# Patient Record
Sex: MALE | Race: WHITE | NOT HISPANIC OR LATINO | Employment: OTHER | URBAN - METROPOLITAN AREA
[De-identification: names, ages, dates, MRNs, and addresses within clinical notes are randomized per-mention and may not be internally consistent; named-entity substitution may affect disease eponyms.]

---

## 2023-02-07 ENCOUNTER — HOSPITAL ENCOUNTER (INPATIENT)
Facility: HOSPITAL | Age: 63
LOS: 7 days | Discharge: HOME | DRG: 663 | End: 2023-02-14
Attending: EMERGENCY MEDICINE | Admitting: PEDIATRICS
Payer: COMMERCIAL

## 2023-02-07 ENCOUNTER — APPOINTMENT (OUTPATIENT)
Dept: GENERAL RADIOLOGY | Facility: HOSPITAL | Age: 63
DRG: 663 | End: 2023-02-07
Payer: COMMERCIAL

## 2023-02-07 ENCOUNTER — APPOINTMENT (OUTPATIENT)
Dept: CT IMAGING | Facility: HOSPITAL | Age: 63
DRG: 663 | End: 2023-02-07
Payer: COMMERCIAL

## 2023-02-07 DIAGNOSIS — N18.4 ACUTE RENAL FAILURE SUPERIMPOSED ON STAGE 4 CHRONIC KIDNEY DISEASE, UNSPECIFIED ACUTE RENAL FAILURE TYPE: Primary | ICD-10-CM

## 2023-02-07 DIAGNOSIS — E86.0 DEHYDRATION: ICD-10-CM

## 2023-02-07 DIAGNOSIS — E87.20 METABOLIC ACIDOSIS: ICD-10-CM

## 2023-02-07 DIAGNOSIS — N17.9 ACUTE RENAL FAILURE SUPERIMPOSED ON STAGE 4 CHRONIC KIDNEY DISEASE, UNSPECIFIED ACUTE RENAL FAILURE TYPE: Primary | ICD-10-CM

## 2023-02-07 DIAGNOSIS — E83.41 HYPERMAGNESEMIA: ICD-10-CM

## 2023-02-07 DIAGNOSIS — N13.9 OBSTRUCTIVE UROPATHY: ICD-10-CM

## 2023-02-07 DIAGNOSIS — E87.5 HYPERKALEMIA: ICD-10-CM

## 2023-02-07 DIAGNOSIS — E83.39 HYPERPHOSPHATEMIA: ICD-10-CM

## 2023-02-07 PROBLEM — R79.89 ELEVATED TROPONIN: Status: ACTIVE | Noted: 2023-02-07

## 2023-02-07 PROBLEM — R74.8 ELEVATED LIPASE: Status: ACTIVE | Noted: 2023-02-07

## 2023-02-07 PROBLEM — R77.8 ELEVATED TROPONIN: Status: ACTIVE | Noted: 2023-02-07

## 2023-02-07 LAB
ALBUMIN SERPL-MCNC: 4.2 G/DL (ref 3.5–5.2)
ALBUMIN/GLOB SERPL: 0.9 G/DL
ALP SERPL-CCNC: 103 U/L (ref 39–117)
ALT SERPL W P-5'-P-CCNC: 19 U/L (ref 1–41)
ANION GAP SERPL CALCULATED.3IONS-SCNC: 22 MMOL/L (ref 5–15)
APTT PPP: 38.1 SECONDS (ref 60–90)
AST SERPL-CCNC: 12 U/L (ref 1–40)
BASOPHILS # BLD AUTO: 0.07 10*3/MM3 (ref 0–0.2)
BASOPHILS NFR BLD AUTO: 0.6 % (ref 0–1.5)
BILIRUB SERPL-MCNC: 0.3 MG/DL (ref 0–1.2)
BUN SERPL-MCNC: 164 MG/DL (ref 8–23)
BUN/CREAT SERPL: 12.4 (ref 7–25)
CALCIUM SPEC-SCNC: 8.5 MG/DL (ref 8.6–10.5)
CHLORIDE SERPL-SCNC: 99 MMOL/L (ref 98–107)
CO2 SERPL-SCNC: 13 MMOL/L (ref 22–29)
CREAT SERPL-MCNC: 13.21 MG/DL (ref 0.76–1.27)
DEPRECATED RDW RBC AUTO: 44.3 FL (ref 37–54)
EGFRCR SERPLBLD CKD-EPI 2021: 3.8 ML/MIN/1.73
EOSINOPHIL # BLD AUTO: 1.96 10*3/MM3 (ref 0–0.4)
EOSINOPHIL NFR BLD AUTO: 16.2 % (ref 0.3–6.2)
ERYTHROCYTE [DISTWIDTH] IN BLOOD BY AUTOMATED COUNT: 13.5 % (ref 12.3–15.4)
GEN 5 2HR TROPONIN T REFLEX: 57 NG/L
GLOBULIN UR ELPH-MCNC: 4.5 GM/DL
GLUCOSE BLDC GLUCOMTR-MCNC: 101 MG/DL (ref 70–130)
GLUCOSE SERPL-MCNC: 169 MG/DL (ref 65–99)
HCT VFR BLD AUTO: 23.7 % (ref 37.5–51)
HGB BLD-MCNC: 7.8 G/DL (ref 13–17.7)
HOLD SPECIMEN: NORMAL
IMM GRANULOCYTES # BLD AUTO: 0.05 10*3/MM3 (ref 0–0.05)
IMM GRANULOCYTES NFR BLD AUTO: 0.4 % (ref 0–0.5)
INR PPP: 1.17 (ref 0.84–1.13)
LIPASE SERPL-CCNC: 124 U/L (ref 13–60)
LYMPHOCYTES # BLD AUTO: 1.09 10*3/MM3 (ref 0.7–3.1)
LYMPHOCYTES NFR BLD AUTO: 9 % (ref 19.6–45.3)
MAGNESIUM SERPL-MCNC: 3.3 MG/DL (ref 1.6–2.4)
MCH RBC QN AUTO: 29.4 PG (ref 26.6–33)
MCHC RBC AUTO-ENTMCNC: 32.9 G/DL (ref 31.5–35.7)
MCV RBC AUTO: 89.4 FL (ref 79–97)
MONOCYTES # BLD AUTO: 0.51 10*3/MM3 (ref 0.1–0.9)
MONOCYTES NFR BLD AUTO: 4.2 % (ref 5–12)
NEUTROPHILS NFR BLD AUTO: 69.6 % (ref 42.7–76)
NEUTROPHILS NFR BLD AUTO: 8.4 10*3/MM3 (ref 1.7–7)
NRBC BLD AUTO-RTO: 0 /100 WBC (ref 0–0.2)
NT-PROBNP SERPL-MCNC: 1316 PG/ML (ref 0–900)
PHOSPHATE SERPL-MCNC: 6.8 MG/DL (ref 2.5–4.5)
PLATELET # BLD AUTO: 277 10*3/MM3 (ref 140–450)
PMV BLD AUTO: 8.7 FL (ref 6–12)
POTASSIUM SERPL-SCNC: 5.3 MMOL/L (ref 3.5–5.2)
PROT SERPL-MCNC: 8.7 G/DL (ref 6–8.5)
PROTHROMBIN TIME: 14.8 SECONDS (ref 11.4–14.4)
RBC # BLD AUTO: 2.65 10*6/MM3 (ref 4.14–5.8)
SODIUM SERPL-SCNC: 134 MMOL/L (ref 136–145)
TROPONIN T DELTA: -3 NG/L
TROPONIN T SERPL HS-MCNC: 60 NG/L
WBC NRBC COR # BLD: 12.08 10*3/MM3 (ref 3.4–10.8)
WHOLE BLOOD HOLD COAG: NORMAL
WHOLE BLOOD HOLD SPECIMEN: NORMAL

## 2023-02-07 PROCEDURE — 82962 GLUCOSE BLOOD TEST: CPT

## 2023-02-07 PROCEDURE — 83735 ASSAY OF MAGNESIUM: CPT | Performed by: EMERGENCY MEDICINE

## 2023-02-07 PROCEDURE — 93005 ELECTROCARDIOGRAM TRACING: CPT | Performed by: EMERGENCY MEDICINE

## 2023-02-07 PROCEDURE — 84484 ASSAY OF TROPONIN QUANT: CPT | Performed by: EMERGENCY MEDICINE

## 2023-02-07 PROCEDURE — 84484 ASSAY OF TROPONIN QUANT: CPT

## 2023-02-07 PROCEDURE — 83690 ASSAY OF LIPASE: CPT

## 2023-02-07 PROCEDURE — 84100 ASSAY OF PHOSPHORUS: CPT | Performed by: EMERGENCY MEDICINE

## 2023-02-07 PROCEDURE — 85730 THROMBOPLASTIN TIME PARTIAL: CPT | Performed by: EMERGENCY MEDICINE

## 2023-02-07 PROCEDURE — 99285 EMERGENCY DEPT VISIT HI MDM: CPT

## 2023-02-07 PROCEDURE — 36415 COLL VENOUS BLD VENIPUNCTURE: CPT

## 2023-02-07 PROCEDURE — 25010000002 MORPHINE PER 10 MG: Performed by: INTERNAL MEDICINE

## 2023-02-07 PROCEDURE — 25010000002 HEPARIN (PORCINE) PER 1000 UNITS: Performed by: INTERNAL MEDICINE

## 2023-02-07 PROCEDURE — 99223 1ST HOSP IP/OBS HIGH 75: CPT | Performed by: INTERNAL MEDICINE

## 2023-02-07 PROCEDURE — 74176 CT ABD & PELVIS W/O CONTRAST: CPT

## 2023-02-07 PROCEDURE — 83880 ASSAY OF NATRIURETIC PEPTIDE: CPT

## 2023-02-07 PROCEDURE — 71045 X-RAY EXAM CHEST 1 VIEW: CPT

## 2023-02-07 PROCEDURE — 93005 ELECTROCARDIOGRAM TRACING: CPT

## 2023-02-07 PROCEDURE — 85610 PROTHROMBIN TIME: CPT | Performed by: EMERGENCY MEDICINE

## 2023-02-07 PROCEDURE — 80053 COMPREHEN METABOLIC PANEL: CPT | Performed by: EMERGENCY MEDICINE

## 2023-02-07 PROCEDURE — 85025 COMPLETE CBC W/AUTO DIFF WBC: CPT | Performed by: EMERGENCY MEDICINE

## 2023-02-07 RX ORDER — SODIUM CHLORIDE 0.9 % (FLUSH) 0.9 %
10 SYRINGE (ML) INJECTION AS NEEDED
Status: DISCONTINUED | OUTPATIENT
Start: 2023-02-07 | End: 2023-02-14 | Stop reason: HOSPADM

## 2023-02-07 RX ORDER — SODIUM CHLORIDE 0.9 % (FLUSH) 0.9 %
10 SYRINGE (ML) INJECTION EVERY 12 HOURS SCHEDULED
Status: DISCONTINUED | OUTPATIENT
Start: 2023-02-07 | End: 2023-02-14 | Stop reason: HOSPADM

## 2023-02-07 RX ORDER — BISACODYL 5 MG/1
5 TABLET, DELAYED RELEASE ORAL DAILY PRN
Status: DISCONTINUED | OUTPATIENT
Start: 2023-02-07 | End: 2023-02-14 | Stop reason: HOSPADM

## 2023-02-07 RX ORDER — ACETAMINOPHEN 650 MG/1
650 SUPPOSITORY RECTAL EVERY 4 HOURS PRN
Status: DISCONTINUED | OUTPATIENT
Start: 2023-02-07 | End: 2023-02-14 | Stop reason: HOSPADM

## 2023-02-07 RX ORDER — SODIUM BICARBONATE 650 MG/1
650 TABLET ORAL 3 TIMES DAILY
Status: DISCONTINUED | OUTPATIENT
Start: 2023-02-07 | End: 2023-02-14 | Stop reason: HOSPADM

## 2023-02-07 RX ORDER — ACETAMINOPHEN 325 MG/1
650 TABLET ORAL EVERY 4 HOURS PRN
Status: DISCONTINUED | OUTPATIENT
Start: 2023-02-07 | End: 2023-02-14 | Stop reason: HOSPADM

## 2023-02-07 RX ORDER — MORPHINE SULFATE 2 MG/ML
2 INJECTION, SOLUTION INTRAMUSCULAR; INTRAVENOUS
Status: DISCONTINUED | OUTPATIENT
Start: 2023-02-07 | End: 2023-02-13

## 2023-02-07 RX ORDER — ONDANSETRON 4 MG/1
4 TABLET, FILM COATED ORAL EVERY 6 HOURS PRN
Status: DISCONTINUED | OUTPATIENT
Start: 2023-02-07 | End: 2023-02-14 | Stop reason: HOSPADM

## 2023-02-07 RX ORDER — ASPIRIN 81 MG/1
324 TABLET, CHEWABLE ORAL ONCE
Status: COMPLETED | OUTPATIENT
Start: 2023-02-07 | End: 2023-02-07

## 2023-02-07 RX ORDER — NALOXONE HCL 0.4 MG/ML
0.4 VIAL (ML) INJECTION
Status: DISCONTINUED | OUTPATIENT
Start: 2023-02-07 | End: 2023-02-13

## 2023-02-07 RX ORDER — CHOLECALCIFEROL (VITAMIN D3) 125 MCG
5 CAPSULE ORAL NIGHTLY PRN
Status: DISCONTINUED | OUTPATIENT
Start: 2023-02-07 | End: 2023-02-14 | Stop reason: HOSPADM

## 2023-02-07 RX ORDER — NICOTINE POLACRILEX 4 MG
15 LOZENGE BUCCAL
Status: DISCONTINUED | OUTPATIENT
Start: 2023-02-07 | End: 2023-02-14 | Stop reason: HOSPADM

## 2023-02-07 RX ORDER — HEPARIN SODIUM 5000 [USP'U]/ML
5000 INJECTION, SOLUTION INTRAVENOUS; SUBCUTANEOUS EVERY 8 HOURS SCHEDULED
Status: DISCONTINUED | OUTPATIENT
Start: 2023-02-07 | End: 2023-02-08

## 2023-02-07 RX ORDER — SODIUM CHLORIDE 9 MG/ML
40 INJECTION, SOLUTION INTRAVENOUS AS NEEDED
Status: DISCONTINUED | OUTPATIENT
Start: 2023-02-07 | End: 2023-02-14 | Stop reason: HOSPADM

## 2023-02-07 RX ORDER — IBUPROFEN 600 MG/1
1 TABLET ORAL
Status: DISCONTINUED | OUTPATIENT
Start: 2023-02-07 | End: 2023-02-14 | Stop reason: HOSPADM

## 2023-02-07 RX ORDER — ACETAMINOPHEN 160 MG/5ML
650 SOLUTION ORAL EVERY 4 HOURS PRN
Status: DISCONTINUED | OUTPATIENT
Start: 2023-02-07 | End: 2023-02-14 | Stop reason: HOSPADM

## 2023-02-07 RX ORDER — AMOXICILLIN 250 MG
2 CAPSULE ORAL 2 TIMES DAILY
Status: DISCONTINUED | OUTPATIENT
Start: 2023-02-07 | End: 2023-02-14 | Stop reason: HOSPADM

## 2023-02-07 RX ORDER — ONDANSETRON 2 MG/ML
4 INJECTION INTRAMUSCULAR; INTRAVENOUS EVERY 6 HOURS PRN
Status: DISCONTINUED | OUTPATIENT
Start: 2023-02-07 | End: 2023-02-14 | Stop reason: HOSPADM

## 2023-02-07 RX ORDER — POLYETHYLENE GLYCOL 3350 17 G/17G
17 POWDER, FOR SOLUTION ORAL DAILY PRN
Status: DISCONTINUED | OUTPATIENT
Start: 2023-02-07 | End: 2023-02-14 | Stop reason: HOSPADM

## 2023-02-07 RX ORDER — FOLIC ACID/VIT B COMPLEX AND C 0.8 MG
1 TABLET ORAL DAILY
Status: DISCONTINUED | OUTPATIENT
Start: 2023-02-07 | End: 2023-02-14 | Stop reason: HOSPADM

## 2023-02-07 RX ORDER — DEXTROSE MONOHYDRATE 25 G/50ML
25 INJECTION, SOLUTION INTRAVENOUS
Status: DISCONTINUED | OUTPATIENT
Start: 2023-02-07 | End: 2023-02-14 | Stop reason: HOSPADM

## 2023-02-07 RX ORDER — BISACODYL 10 MG
10 SUPPOSITORY, RECTAL RECTAL DAILY PRN
Status: DISCONTINUED | OUTPATIENT
Start: 2023-02-07 | End: 2023-02-14 | Stop reason: HOSPADM

## 2023-02-07 RX ORDER — PANTOPRAZOLE SODIUM 40 MG/1
40 TABLET, DELAYED RELEASE ORAL
Status: DISCONTINUED | OUTPATIENT
Start: 2023-02-07 | End: 2023-02-09

## 2023-02-07 RX ORDER — INSULIN LISPRO 100 [IU]/ML
0-7 INJECTION, SOLUTION INTRAVENOUS; SUBCUTANEOUS
Status: DISCONTINUED | OUTPATIENT
Start: 2023-02-07 | End: 2023-02-14 | Stop reason: HOSPADM

## 2023-02-07 RX ADMIN — HEPARIN SODIUM 5000 UNITS: 5000 INJECTION, SOLUTION INTRAVENOUS; SUBCUTANEOUS at 17:49

## 2023-02-07 RX ADMIN — HEPARIN SODIUM 5000 UNITS: 5000 INJECTION, SOLUTION INTRAVENOUS; SUBCUTANEOUS at 21:40

## 2023-02-07 RX ADMIN — MORPHINE SULFATE 2 MG: 2 INJECTION, SOLUTION INTRAMUSCULAR; INTRAVENOUS at 22:01

## 2023-02-07 RX ADMIN — SODIUM BICARBONATE 650 MG TABLET 650 MG: at 21:40

## 2023-02-07 RX ADMIN — ASPIRIN 81 MG 324 MG: 81 TABLET ORAL at 13:52

## 2023-02-07 RX ADMIN — Medication 10 ML: at 21:41

## 2023-02-07 RX ADMIN — PANTOPRAZOLE SODIUM 40 MG: 40 TABLET, DELAYED RELEASE ORAL at 17:49

## 2023-02-07 RX ADMIN — SODIUM CHLORIDE, POTASSIUM CHLORIDE, SODIUM LACTATE AND CALCIUM CHLORIDE 1000 ML: 600; 310; 30; 20 INJECTION, SOLUTION INTRAVENOUS at 13:52

## 2023-02-07 RX ADMIN — Medication 1 TABLET: at 21:40

## 2023-02-07 RX ADMIN — SODIUM BICARBONATE 150 MEQ: 84 INJECTION, SOLUTION INTRAVENOUS at 17:49

## 2023-02-07 NOTE — CASE MANAGEMENT/SOCIAL WORK
Discharge Planning Assessment  [facility]     Patient Name: Jun Garcia  MRN: [id number]  Today's Date: 2/7/2023    Admit Date: 2/7/2023    Plan: IDP   Discharge Needs Assessment     Row Name 02/07/23 1352       Living Environment    People in Home child(jennifer), adult    Current Living Arrangements home    Primary Care Provided by self    Family Caregiver if Needed child(jennifer), adult       Transition Planning    Transportation Anticipated family or friend will provide       Discharge Needs Assessment    Readmission Within the Last 30 Days no previous admission in last 30 days    Equipment Currently Used at Home none               Discharge Plan     Row Name 02/07/23 1352       Plan    Plan IDP    Plan Comments MSW met with Pt at bedside to complete IDP. Pt lives with his four children in [facility]. Pt confirmed demographics and reports no PCP. Pt has [facility] [place]. Pt independent with ADL’s; Pt has no DME, HH, or O2. Pt reports children can transport when medically ready. CM will continue to follow.    Final Discharge Disposition Code 30 - still a patient              Continued Care and Services - Admitted Since 2/7/2023    Coordination has not been started for this encounter.          Demographic Summary     Row Name 02/07/23 1350       General Information    Admission Type inpatient    Arrived From home    Referral Source admission list;emergency department    Reason for Consult discharge planning    Preferred Language English               Functional Status     Row Name 02/07/23 1350       Functional Status    Usual Activity Tolerance good    Current Activity Tolerance good       Functional Status, IADL    Medications independent    Meal Preparation independent    Housekeeping independent    Laundry independent    Shopping independent                         DEB Rebollar

## 2023-02-07 NOTE — PLAN OF CARE
Goal Outcome Evaluation:  Plan of Care Reviewed With: patient           Outcome Evaluation: Pt had caldwell catheter placed. 900ml returned after placement. Sodium bicarb gtt infusing. Pt npo.

## 2023-02-07 NOTE — H&P
[facility] Medicine Services  HISTORY AND PHYSICAL    Patient Name: Jun Garcia  : 1960  MRN: [id number]  Primary Care Physician: Provider, No Known  Date of admission: 2023      Subjective   Subjective     Chief Complaint: weakness, nausea    HPI:  Jun Garcia is a 62 y.o. male presenting with his family with worsening weakness, nausea, weight loss, epigastric pain. He has a complicated recent history and is unable to provide full story but from what I can gather in [facility] he was admitted at  2022 for obstructive uropathy and renal failure. At that time cystoscopy and urgent HD was recommended but it sounds to me based on our conversation that the patient refused both. He was discharged home and has been taking no medications since that time. Tells me today that since that time he has gradually gone downhill. He has been unable to eat hardly anything and has lost a significant amount of weight. He complains also of epigastric burning during this time. He tells me has has not followed up with anyone because he got mixed messages from his last stay and felt like he was doing fine.    Review of Systems   Gen- No fevers, chills  CV- No chest pain, palpitations  Resp- No cough, dyspnea  GI- No N/V/D    All other systems reviewed and are negative.     Personal History     PMH: CKD IV, obstructive uropathy, ? diabetes    PSH: Reviewed and noncontributory    Family History: Otherwise pertinent FHx was reviewed and unremarkable.     Social History:  No tobacco, ETOH, illicit drugs    Medications:  Available home medication information reviewed.         No Known Allergies    Objective   Objective     Vital Signs:   Temp:  [98.5 °F (36.9 °C)] 98.5 °F (36.9 °C)  Heart Rate:  [] 99  Resp:  [18] 18  BP: (103-148)/(70-84) 148/84       Physical Exam   Constitutional: Awake, alert, appears to feel poorly  Eyes: PERRLA, sclerae anicteric, no conjunctival injection  HENT:  NCAT, dry mm  Neck: Supple, no thyromegaly, no lymphadenopathy, trachea midline  Respiratory: Clear to auscultation bilaterally, nonlabored respirations   Cardiovascular: Slightly tachy, no murmur  Gastrointestinal: Positive bowel sounds, soft, nontender, nondistended  Musculoskeletal: Muscle wasting.  Psychiatric: Appropriate affect, cooperative  Neurologic: Oriented x 3, strength symmetric in all extremities, Cranial Nerves grossly intact to confrontation, speech clear  Skin: No rashes    Result Review:  I have personally reviewed the results from the time of this admission to 2/7/2023 14:00 EST and agree with these findings:  []  Laboratory list / accordion  []  Microbiology  []  Radiology  []  EKG/Telemetry   []  Cardiology/Vascular   []  Pathology  []  Old records  []  Other:  Most notable findings include:         LAB RESULTS:      Lab 02/07/23  1207   WBC 12.08*   HEMOGLOBIN 7.8*   HEMATOCRIT 23.7*   PLATELETS 277   NEUTROS ABS 8.40*   IMMATURE GRANS (ABS) 0.05   LYMPHS ABS 1.09   MONOS ABS 0.51   EOS ABS 1.96*   MCV 89.4   PROTIME 14.8*   INR 1.17*   APTT 38.1*         Lab 02/07/23  1207   SODIUM 134*   POTASSIUM 5.3*   CHLORIDE 99   CO2 13.0*   ANION GAP 22.0*   *   CREATININE 13.21*   EGFR 3.8*   GLUCOSE 169*   CALCIUM 8.5*   MAGNESIUM 3.3*   PHOSPHORUS 6.8*         Lab 02/07/23  1207   TOTAL PROTEIN 8.7*   ALBUMIN 4.2   GLOBULIN 4.5   ALT (SGPT) 19   AST (SGOT) 12   BILIRUBIN 0.3   ALK PHOS 103   LIPASE 124*         Lab 02/07/23  1207   PROBNP 1,316.0*   HSTROP T 60*                 UA    Urinalysis 11/17/22   Squamous Epithelial Cells, UA 0-5   Specific Gravity, UA 1.010   Blood, UA Large (A)   Leukocytes, UA Large (A)   RBC, UA 31-50 (A)   Bacteria, UA Negative   (A) Abnormal value       Comments are available for some flowsheets but are not being displayed.             Microbiology Results (last 10 days)     ** No results found for the last 240 hours. **          No radiology results from the  last 24 hrs        Assessment & Plan   Assessment & Plan     Active Hospital Problems    Diagnosis  POA   • **Acute renal failure superimposed on stage 4 chronic kidney disease, unspecified acute renal failure type (HCC) [N17.9, N18.4]  Yes   • Metabolic acidosis [E87.20]  Yes   • Hyperkalemia [E87.5]  Yes   • Elevated troponin [R77.8]  Yes   • Elevated lipase [R74.8]  Yes     63 y/o male with prior hx obstructive uropathy and CKD presenting with worsening FTT and ongoing renal failure.    CKD IV-V nearing ESRD  Metabolic acidosis  Hyperkalemia  --NAL has been contacted by ED, will see patient and discuss need for HD with him.  --Will start bicarb gtt for volume expansion and to correct acidosis.  --CM for HD chair if he agrees.    Hx obstructive uropathy  --CT scan from Nov indicates obstruction at level of bladder concerning for mass. Apparently he had hematuria at that time. It sounds like cystoscopy was recommended however never occurred.   --Repeat CT A/P  --Consult urology  --[place] caldwell.    Elevated troponin   --Suspect due to volume depletion in setting of his renal failure. His troponin is about level as it was previously. He denies chest pain at this time. Rec'd asa in ED. Will continue to monitor troponin and EKG.  --Echo.    Elevated lipase  --Based on history of abd burning may have some resolving pancreatitis? Will continue supportive tx with IVF, pain meds. Okay for CLD once decision made regarding HD, HD access.  --Will also treat empirically for PUD with BID PPI for now.    DVT prophylaxis:  [facility]      CODE STATUS:  Full Code  Code Status and Medical Interventions:   Ordered at: 02/07/23 1357     Code Status (Patient has no pulse and is not breathing):    CPR (Attempt to Resuscitate)     Medical Interventions (Patient has pulse or is breathing):    Full Support       Expected Discharge 2/11       Carola Henry II, DO  02/07/23

## 2023-02-08 ENCOUNTER — APPOINTMENT (OUTPATIENT)
Dept: CARDIOLOGY | Facility: HOSPITAL | Age: 63
DRG: 663 | End: 2023-02-08
Payer: COMMERCIAL

## 2023-02-08 LAB
ABO GROUP BLD: NORMAL
ABO GROUP BLD: NORMAL
ANION GAP SERPL CALCULATED.3IONS-SCNC: 20 MMOL/L (ref 5–15)
BH CV ECHO MEAS - AO MAX PG: 37.2 MMHG
BH CV ECHO MEAS - AO MEAN PG: 14 MMHG
BH CV ECHO MEAS - AO ROOT DIAM: 3.2 CM
BH CV ECHO MEAS - AO V2 MAX: 305 CM/SEC
BH CV ECHO MEAS - AO V2 VTI: 41.7 CM
BH CV ECHO MEAS - AVA(I,D): 1.81 CM2
BH CV ECHO MEAS - EDV(CUBED): 59.3 ML
BH CV ECHO MEAS - EDV(MOD-SP2): 49.7 ML
BH CV ECHO MEAS - EDV(MOD-SP4): 49.2 ML
BH CV ECHO MEAS - EF(MOD-BP): 80.6 %
BH CV ECHO MEAS - EF(MOD-SP2): 83.2 %
BH CV ECHO MEAS - EF(MOD-SP4): 78.9 %
BH CV ECHO MEAS - ESV(CUBED): 12.2 ML
BH CV ECHO MEAS - ESV(MOD-SP2): 8.3 ML
BH CV ECHO MEAS - ESV(MOD-SP4): 10.4 ML
BH CV ECHO MEAS - FS: 41 %
BH CV ECHO MEAS - IVS/LVPW: 0.86 CM
BH CV ECHO MEAS - IVSD: 1.2 CM
BH CV ECHO MEAS - LAT PEAK E' VEL: 6.7 CM/SEC
BH CV ECHO MEAS - LV MASS(C)D: 179.7 GRAMS
BH CV ECHO MEAS - LV MAX PG: 10.6 MMHG
BH CV ECHO MEAS - LV MEAN PG: 6 MMHG
BH CV ECHO MEAS - LV V1 MAX: 163 CM/SEC
BH CV ECHO MEAS - LV V1 VTI: 29.7 CM
BH CV ECHO MEAS - LVIDD: 3.9 CM
BH CV ECHO MEAS - LVIDS: 2.3 CM
BH CV ECHO MEAS - LVOT AREA: 2.5 CM2
BH CV ECHO MEAS - LVOT DIAM: 1.8 CM
BH CV ECHO MEAS - LVPWD: 1.4 CM
BH CV ECHO MEAS - MED PEAK E' VEL: 9.7 CM/SEC
BH CV ECHO MEAS - MR MAX PG: 68.9 MMHG
BH CV ECHO MEAS - MR MAX VEL: 415 CM/SEC
BH CV ECHO MEAS - MR MEAN PG: 33 MMHG
BH CV ECHO MEAS - MR MEAN VEL: 259 CM/SEC
BH CV ECHO MEAS - MR VTI: 69.6 CM
BH CV ECHO MEAS - MV A MAX VEL: 73.9 CM/SEC
BH CV ECHO MEAS - MV DEC SLOPE: 801 CM/SEC2
BH CV ECHO MEAS - MV DEC TIME: 0.12 MSEC
BH CV ECHO MEAS - MV E MAX VEL: 113 CM/SEC
BH CV ECHO MEAS - MV E/A: 1.53
BH CV ECHO MEAS - MV P1/2T: 49.4 MSEC
BH CV ECHO MEAS - MVA(P1/2T): 4.5 CM2
BH CV ECHO MEAS - PA ACC TIME: 0.08 SEC
BH CV ECHO MEAS - PA PR(ACCEL): 44.4 MMHG
BH CV ECHO MEAS - SV(LVOT): 75.6 ML
BH CV ECHO MEAS - SV(MOD-SP2): 41.4 ML
BH CV ECHO MEAS - SV(MOD-SP4): 38.8 ML
BH CV ECHO MEAS - TAPSE (>1.6): 2.5 CM
BH CV ECHO MEASUREMENTS AVERAGE E/E' RATIO: 13.78
BH CV VAS BP LEFT ARM: NORMAL MMHG
BH CV XLRA - RV BASE: 2.8 CM
BH CV XLRA - RV LENGTH: 8.1 CM
BH CV XLRA - RV MID: 1.8 CM
BH CV XLRA - TDI S': 18 CM/SEC
BLD GP AB SCN SERPL QL: NEGATIVE
BUN SERPL-MCNC: 140 MG/DL (ref 8–23)
BUN/CREAT SERPL: 11.9 (ref 7–25)
CALCIUM SPEC-SCNC: 8.2 MG/DL (ref 8.6–10.5)
CHLORIDE SERPL-SCNC: 97 MMOL/L (ref 98–107)
CO2 SERPL-SCNC: 21 MMOL/L (ref 22–29)
CREAT SERPL-MCNC: 11.76 MG/DL (ref 0.76–1.27)
DEPRECATED RDW RBC AUTO: 42.5 FL (ref 37–54)
EGFRCR SERPLBLD CKD-EPI 2021: 4.4 ML/MIN/1.73
ERYTHROCYTE [DISTWIDTH] IN BLOOD BY AUTOMATED COUNT: 13.2 % (ref 12.3–15.4)
FERRITIN SERPL-MCNC: 899.1 NG/ML (ref 30–400)
GLUCOSE BLDC GLUCOMTR-MCNC: 160 MG/DL (ref 70–130)
GLUCOSE BLDC GLUCOMTR-MCNC: 186 MG/DL (ref 70–130)
GLUCOSE BLDC GLUCOMTR-MCNC: 189 MG/DL (ref 70–130)
GLUCOSE BLDC GLUCOMTR-MCNC: 228 MG/DL (ref 70–130)
GLUCOSE SERPL-MCNC: 140 MG/DL (ref 65–99)
HAV IGM SERPL QL IA: NORMAL
HBV CORE IGM SERPL QL IA: NORMAL
HBV SURFACE AG SERPL QL IA: NORMAL
HCT VFR BLD AUTO: 18 % (ref 37.5–51)
HCV AB SER DONR QL: NORMAL
HGB BLD-MCNC: 5.9 G/DL (ref 13–17.7)
IRON 24H UR-MRATE: 68 MCG/DL (ref 59–158)
IRON SATN MFR SERPL: 29 % (ref 20–50)
IVRT: 109 MSEC
LEFT ATRIUM VOLUME INDEX: 24.8 ML/M2
LEFT ATRIUM VOLUME: 44.4 ML
MAXIMAL PREDICTED HEART RATE: 156 BPM
MCH RBC QN AUTO: 29.1 PG (ref 26.6–33)
MCHC RBC AUTO-ENTMCNC: 32.8 G/DL (ref 31.5–35.7)
MCV RBC AUTO: 88.7 FL (ref 79–97)
PHOSPHATE SERPL-MCNC: 6.7 MG/DL (ref 2.5–4.5)
PLATELET # BLD AUTO: 255 10*3/MM3 (ref 140–450)
PMV BLD AUTO: 9 FL (ref 6–12)
POTASSIUM SERPL-SCNC: 4.4 MMOL/L (ref 3.5–5.2)
QT INTERVAL: 338 MS
QTC INTERVAL: 461 MS
RBC # BLD AUTO: 2.03 10*6/MM3 (ref 4.14–5.8)
RH BLD: POSITIVE
RH BLD: POSITIVE
SODIUM SERPL-SCNC: 138 MMOL/L (ref 136–145)
STRESS TARGET HR: 133 BPM
T&S EXPIRATION DATE: NORMAL
TIBC SERPL-MCNC: 234 MCG/DL (ref 298–536)
TRANSFERRIN SERPL-MCNC: 157 MG/DL (ref 200–360)
WBC NRBC COR # BLD: 9.66 10*3/MM3 (ref 3.4–10.8)

## 2023-02-08 PROCEDURE — 93010 ELECTROCARDIOGRAM REPORT: CPT | Performed by: INTERNAL MEDICINE

## 2023-02-08 PROCEDURE — 82962 GLUCOSE BLOOD TEST: CPT

## 2023-02-08 PROCEDURE — 86900 BLOOD TYPING SEROLOGIC ABO: CPT | Performed by: INTERNAL MEDICINE

## 2023-02-08 PROCEDURE — 93306 TTE W/DOPPLER COMPLETE: CPT | Performed by: INTERNAL MEDICINE

## 2023-02-08 PROCEDURE — 86923 COMPATIBILITY TEST ELECTRIC: CPT

## 2023-02-08 PROCEDURE — 86901 BLOOD TYPING SEROLOGIC RH(D): CPT

## 2023-02-08 PROCEDURE — P9016 RBC LEUKOCYTES REDUCED: HCPCS

## 2023-02-08 PROCEDURE — 86900 BLOOD TYPING SEROLOGIC ABO: CPT

## 2023-02-08 PROCEDURE — 93306 TTE W/DOPPLER COMPLETE: CPT

## 2023-02-08 PROCEDURE — 80074 ACUTE HEPATITIS PANEL: CPT | Performed by: INTERNAL MEDICINE

## 2023-02-08 PROCEDURE — 36430 TRANSFUSION BLD/BLD COMPNT: CPT

## 2023-02-08 PROCEDURE — 85027 COMPLETE CBC AUTOMATED: CPT | Performed by: INTERNAL MEDICINE

## 2023-02-08 PROCEDURE — 82728 ASSAY OF FERRITIN: CPT | Performed by: INTERNAL MEDICINE

## 2023-02-08 PROCEDURE — 86850 RBC ANTIBODY SCREEN: CPT | Performed by: INTERNAL MEDICINE

## 2023-02-08 PROCEDURE — 86901 BLOOD TYPING SEROLOGIC RH(D): CPT | Performed by: INTERNAL MEDICINE

## 2023-02-08 PROCEDURE — 25010000002 ONDANSETRON PER 1 MG: Performed by: INTERNAL MEDICINE

## 2023-02-08 PROCEDURE — 63710000001 INSULIN LISPRO (HUMAN) PER 5 UNITS: Performed by: INTERNAL MEDICINE

## 2023-02-08 PROCEDURE — 83540 ASSAY OF IRON: CPT | Performed by: INTERNAL MEDICINE

## 2023-02-08 PROCEDURE — 80048 BASIC METABOLIC PNL TOTAL CA: CPT | Performed by: INTERNAL MEDICINE

## 2023-02-08 PROCEDURE — 99233 SBSQ HOSP IP/OBS HIGH 50: CPT | Performed by: INTERNAL MEDICINE

## 2023-02-08 PROCEDURE — 84100 ASSAY OF PHOSPHORUS: CPT | Performed by: INTERNAL MEDICINE

## 2023-02-08 PROCEDURE — 93005 ELECTROCARDIOGRAM TRACING: CPT | Performed by: INTERNAL MEDICINE

## 2023-02-08 PROCEDURE — 84466 ASSAY OF TRANSFERRIN: CPT | Performed by: INTERNAL MEDICINE

## 2023-02-08 PROCEDURE — 99223 1ST HOSP IP/OBS HIGH 75: CPT | Performed by: NURSE PRACTITIONER

## 2023-02-08 RX ADMIN — INSULIN LISPRO 2 UNITS: 100 INJECTION, SOLUTION INTRAVENOUS; SUBCUTANEOUS at 11:35

## 2023-02-08 RX ADMIN — INSULIN LISPRO 2 UNITS: 100 INJECTION, SOLUTION INTRAVENOUS; SUBCUTANEOUS at 16:32

## 2023-02-08 RX ADMIN — ONDANSETRON 4 MG: 2 INJECTION INTRAMUSCULAR; INTRAVENOUS at 16:32

## 2023-02-08 RX ADMIN — SODIUM BICARBONATE 150 MEQ: 84 INJECTION, SOLUTION INTRAVENOUS at 13:20

## 2023-02-08 RX ADMIN — PANTOPRAZOLE SODIUM 40 MG: 40 TABLET, DELAYED RELEASE ORAL at 16:32

## 2023-02-08 RX ADMIN — PANTOPRAZOLE SODIUM 40 MG: 40 TABLET, DELAYED RELEASE ORAL at 08:08

## 2023-02-08 RX ADMIN — Medication 10 ML: at 20:35

## 2023-02-08 RX ADMIN — Medication 1 TABLET: at 08:08

## 2023-02-08 RX ADMIN — INSULIN LISPRO 2 UNITS: 100 INJECTION, SOLUTION INTRAVENOUS; SUBCUTANEOUS at 08:08

## 2023-02-08 RX ADMIN — SODIUM BICARBONATE 650 MG TABLET 650 MG: at 16:32

## 2023-02-08 RX ADMIN — SODIUM BICARBONATE 650 MG TABLET 650 MG: at 08:08

## 2023-02-08 RX ADMIN — SODIUM BICARBONATE 650 MG TABLET 650 MG: at 20:34

## 2023-02-08 RX ADMIN — ONDANSETRON 4 MG: 2 INJECTION INTRAMUSCULAR; INTRAVENOUS at 09:33

## 2023-02-08 NOTE — PROGRESS NOTES
[facility] Medicine Services  PROGRESS NOTE    Patient Name: Jun Garcia  : 1960  MRN: [id number]    Date of Admission: 2023  Primary Care Physician: Provider, No Known    Subjective   Subjective     CC: Follow-up generalized weakness    HPI: No acute events overnight, however this morning patient had an episode of nausea, vomiting and being unresponsive did complain of some chest discomfort, all resolved spontaneously.    ROS:  Gen- No fevers, chills  CV- No chest pain, palpitations  Resp- No cough, dyspnea  GI- No N/V/D, abd pain      Objective   Objective     Vital Signs:   Temp:  [97.6 °F (36.4 °C)-98.5 °F (36.9 °C)] 98.3 °F (36.8 °C)  Heart Rate:  [] 106  Resp:  [16-18] 18  BP: ()/(70-96) 99/73     Physical Exam:  Constitutional: Chronically ill-appearing male, no acute distress, awake, alert  HENT: NCAT, mucous membranes moist  Respiratory: Clear to auscultation bilaterally, respiratory effort normal   Cardiovascular: RRR, no murmurs, rubs, or gallops  Gastrointestinal: Positive bowel sounds, soft, nontender, nondistended  : Whitfield catheter in place, bloody output  Musculoskeletal: No bilateral ankle edema  Psychiatric: Appropriate affect, cooperative  Neurologic: Nonfocal  Skin: No rashes    Results Reviewed:  LAB RESULTS:      Lab 23  1207   WBC 12.08*   HEMOGLOBIN 7.8*   HEMATOCRIT 23.7*   PLATELETS 277   NEUTROS ABS 8.40*   IMMATURE GRANS (ABS) 0.05   LYMPHS ABS 1.09   MONOS ABS 0.51   EOS ABS 1.96*   MCV 89.4   PROTIME 14.8*   APTT 38.1*         Lab 23  0343 23  1207   SODIUM 138 134*   POTASSIUM 4.4 5.3*   CHLORIDE 97* 99   CO2 21.0* 13.0*   ANION GAP 20.0* 22.0*   * 164*   CREATININE 11.76* 13.21*   EGFR 4.4* 3.8*   GLUCOSE 140* 169*   CALCIUM 8.2* 8.5*   MAGNESIUM  --  3.3*   PHOSPHORUS 6.7* 6.8*         Lab 23  1207   TOTAL PROTEIN 8.7*   ALBUMIN 4.2   GLOBULIN 4.5   ALT (SGPT) 19   AST (SGOT) 12   BILIRUBIN 0.3    ALK PHOS 103   LIPASE 124*         Lab 02/07/23  1418 02/07/23  1207   PROBNP  --  1,316.0*   HSTROP T 57* 60*   PROTIME  --  14.8*   INR  --  1.17*             Lab 02/08/23  0343   IRON 68   IRON SATURATION 29   TIBC 234*   TRANSFERRIN 157*   FERRITIN 899.10*         Brief Urine Lab Results  (Last result in the past 365 days)      Color   Clarity   Blood   Leuk Est   Nitrite   Protein   CREAT   Urine HCG        11/17/22 1804 Yellow   Cloudy     Large                     Microbiology Results Abnormal     None          CT Abdomen Pelvis Without Contrast    Result Date: 2/7/2023  CT ABDOMEN PELVIS WO CONTRAST Date of Exam: 2/7/2023 5:05 PM EST Indication: urinary obstruction. Comparison: None available. Technique: Axial CT images were obtained of the abdomen and pelvis without the administration of contrast. Reconstructed coronal and sagittal images were also obtained. Automated exposure control and iterative construction methods were used. Findings: No suspicious infiltrate is seen in the lung bases. No pleural effusion. There is a trace amount of pericardial fluid. Heart size appears within normal limits. Large calcified gallstone is seen in the gallbladder lumen. No definite pericholecystic inflammation or biliary ductal dilatation. Small cyst is seen in the posterior-inferior right hepatic lobe. No splenomegaly. No suspicious adrenal lesion is seen. No acute pancreatic abnormality is identified. There is atherosclerosis of the aorta without definite aneurysm. No significant abdominal adenopathy is seen. There is moderate to severe bilateral hydronephrosis. There is bilateral renal cortical thinning. No significant renal calculi are seen. There is bilateral ureteral dilatation to the urinary bladder. No obstructing calculus or other ureteral abnormality is seen. The bladder is prominently distended. The bladder wall appears mildly thickened. There appears to be enlargement of the prostate with irregular  protrusion into the inferior urinary bladder. No definite inflammatory changes are seen surrounding the bladder on this exam. The seminal vesicles appear symmetric and grossly normal in size. No definite urethral calculus is seen. Overall findings are suspicious for bladder outlet obstruction due to prostate enlargement with moderate to severe hydronephrosis. No acute gastric abnormality is seen. Small bowel loops appear nondilated. The appendix appears within normal limits. No definite lytic or aggressive osseous lesion is seen. There are degenerative changes in the lumbar spine. There is mild to moderate bilateral hip arthritis. Small sclerotic foci in the proximal femurs and at L2 are nonspecific but may represent bone islands.     Impression: Impression: 1.Moderate to severe bilateral hydronephrosis with ureteral dilatation and prominent bladder distention. The prostate appears enlarged with irregular protrusion into the urinary bladder. Findings are suspicious for bladder outlet obstruction due to the prostate. Recommend urology consultation and catheter placement. 2.Prostate enlargement is indeterminate and could be related to benign hypertrophy or malignancy. 3.Large calcified gallstone without CT findings of acute cholecystitis. 4.Trace pericardial effusion. 5.Small sclerotic foci in the proximal femurs and at L2 are indeterminate and could be benign bone islands, but sclerotic metastases, which are commonly seen with prostate malignancy, cannot be excluded, given the above findings. Electronically Signed: Chaz Rodriguez  2/7/2023 5:51 PM EST  Workstation ID: [id number]    XR Chest 1 View    Result Date: 2/7/2023  XR CHEST 1 VW Date of Exam: 2/7/2023 2:04 PM EST Indication: Chest Pain Triage Protocol. Comparison: None available. Findings: Heart size and pulmonary vasculature are within normal limits. Lungs clear. Costophrenic angles sharp     Impression: Impression: No active cardiopulmonary disease  Electronically Signed: Martin Marinelli  2/7/2023 2:38 PM EST  Workstation ID: [id number]          I have reviewed the medications:  Scheduled Meds:b complex-vitamin c-folic acid, 1 tablet, Oral, Daily  epoetin sloan/sloan-epbx, 20,000 Units, Subcutaneous, Once per day on Mon Wed Fri  heparin (porcine), 5,000 Units, Subcutaneous, Q8H  insulin lispro, 0-7 Units, Subcutaneous, TID AC  pantoprazole, 40 mg, Oral, BID AC  senna-docusate sodium, 2 tablet, Oral, BID  sodium bicarbonate, 650 mg, Oral, TID  sodium chloride, 10 mL, Intravenous, Q12H      Continuous Infusions:sodium bicarbonate, 150 mEq, Last Rate: 150 mEq (02/07/23 1749)      PRN Meds:.•  acetaminophen **OR** acetaminophen **OR** acetaminophen  •  senna-docusate sodium **AND** polyethylene glycol **AND** bisacodyl **AND** bisacodyl  •  dextrose  •  dextrose  •  glucagon (human recombinant)  •  melatonin  •  Morphine **AND** naloxone  •  ondansetron **OR** ondansetron  •  sodium chloride  •  sodium chloride  •  sodium chloride    Assessment & Plan   Assessment & Plan     Active Hospital Problems    Diagnosis  POA   • **Acute renal failure superimposed on stage 4 chronic kidney disease, unspecified acute renal failure type (HCC) [N17.9, N18.4]  Yes   • Metabolic acidosis [E87.20]  Yes   • Hyperkalemia [E87.5]  Yes   • Elevated troponin [R77.8]  Yes   • Elevated lipase [R74.8]  Yes      Resolved Hospital Problems   No resolved problems to display.      Brief Hospital Course to date:  63 y/o male with prior hx obstructive uropathy and CKD presenting with worsening FTT and ongoing renal failure.     CKD IV-V nearing ESRD  Metabolic acidosis, improving  Hyperkalemia, resolved  -Nephrology following, patient needs HD, was previously reluctant, but after further discussion with him he is now agreeable, will let renal know.  -Plan for tunneled catheter placement for initiation of HD  -Continue sodium bicarb  -Follow-up repeat renal function panel    Obstructive uropathy  -CT  abdomen pelvis continues to show moderate to severe bilateral hydronephrosis, enlarged prostate with irregular protrusion into the urinary bladder (same finding seen on CT done in November at )  -Urology has been consulted for possible cystoscopy  -Whitfield catheter in place     Anemia  -Likely anemia of chronic disease, follow-up repeat H&H as patient is now having gross hematuria    Elevated troponin   -Suspect this is secondary to volume depletion  -Patient is without any chest pain troponin has flattened   -EKG is unremarkable, follow-up echo elevated troponin      Elevated lipase  -Suspected to be likely secondary to resolving pancreatitis  -Continue supportive therapy with IV fluids and pain meds, diet as tolerated  -Continue twice daily PPI for now  for possible PUD     Expected Discharge Location and Transportation: home  Expected Discharge   Expected Discharge Date and Time     Expected Discharge Date Expected Discharge Time    Feb 10, 2023            DVT prophylaxis:  Medical DVT prophylaxis orders are present.     AM-PAC 6 Clicks Score (PT): 19 (02/07/23 9975)    CODE STATUS:   Code Status and Medical Interventions:   Ordered at: 02/07/23 6853     Code Status (Patient has no pulse and is not breathing):    CPR (Attempt to Resuscitate)     Medical Interventions (Patient has pulse or is breathing):    Full Support       Mirian Walsh MD  02/08/23

## 2023-02-08 NOTE — CASE MANAGEMENT/SOCIAL WORK
Continued Stay Note   [facility]     Patient Name: Jun Garcia  MRN: [id number]  Today's Date: 2/8/2023    Admit Date: 2/7/2023    Plan: Outpt HD   Discharge Plan     Row Name 02/08/23 1146       Plan    Plan Outpt HD    Patient/Family in Agreement with Plan yes    Plan Comments I spoke with the pt to discuss outpt HD clinic options. He could not offer what his preference would be. I spoke with his daughter Althea. She lives with him. She states they have no clinic choice. I faxed a new referral to [facility] at [phone]. Althea states family can transport the pt to from from outpt HD after DC if needed. The pt drives.    Final Discharge Disposition Code 01 - home or self-care               Discharge Codes    No documentation.               Expected Discharge Date and Time     Expected Discharge Date Expected Discharge Time    Feb 10, 2023             Milka Vazquez RN

## 2023-02-08 NOTE — CONSULTS
[facility]   HISTORY AND PHYSICAL    Patient Name: Jun Garcia  : 1960  MRN: [id number]  Primary Care Physician:  Art, No Known  Date of admission: 2023    Subjective   Subjective     Chief Complaint: Weakness    HPI:    Jun Garcia is a 62 y.o. male who presented to [facility] with worsening weakness, nausea, and weight loss.     Per chart review, he was admitted to [facility] in  with renal failure and bladder outlet obstruction. It was recommended that he undergo Cystoscopy and dialysis. Per chart review, it seems he declined this. He reports a caldwell catheter was placed during his admission at .     CT AP wo 23; Moderate/severe bilateral hydroureteronephrosis. Bilateral renal cortical thinning. The bladder is distended. The prostate is enlarged.      Labs upon admission remarkable for Cr 13.21, HCT 23.7. No UA results.   HCT this AM; 18.0. 2 units of PRBC ordered.     A caldwell catheter was placed upon admission with initial clear/yellow urine output. Caldwell catheter is now draining grossly bloody urine. He reports he was voiding fine at home without difficulty. He does not have a Urologist. He denies hx of smoking or family hx of bladder or kidney cancer.     Review of Systems   All systems were reviewed and negative except for: Gross hematuria    Personal History     Past Medical History:   Diagnosis Date   • Diabetes mellitus (HCC)    • Elevated cholesterol    • GERD (gastroesophageal reflux disease)    • Hyperlipidemia    • Hypertension    • Stroke (HCC)        History reviewed. No pertinent surgical history.    Family History: Family history is unknown by patient. Otherwise pertinent FHx was reviewed and not pertinent to current issue.    Social History:  reports that he has never smoked. He has never been exposed to tobacco smoke. He has never used smokeless tobacco. He reports that he does not drink alcohol and does not use drugs.    Home  Medications:         Allergies:  No Known Allergies    Objective   Objective     Vitals:   Temp:  [97.6 °F (36.4 °C)-98.5 °F (36.9 °C)] 98.3 °F (36.8 °C)  Heart Rate:  [] 106  Resp:  [16-18] 18  BP: ()/(70-96) 99/73  Physical Exam    Constitutional: Awake in bed, alert    Eyes: PERRLA, sclerae anicteric, no conjunctival injection   HENT: Normocephalic, atraumatic, mucous membranes moist   Neck: Supple, trachea midline   Respiratory:Equal chest rise, non-labored respirations    Cardiovascular: RRR   Gastrointestinal: Soft   Musculoskeletal: No bilateral ankle edema, no clubbing or cyanosis to extremities   Genitourinary: Uncircumcised, caldwell catheter with grossly bloody urine output   Psychiatric: Appropriate affect, cooperative   Neurologic: Oriented x 3, Cranial Nerves grossly intact, speech clear   Skin: No rashes     Result Review    Result Review:  I have personally reviewed the results from the time of this admission to 2/8/2023 08:35 EST and agree with these findings:  [x]  Laboratory  []  Microbiology  [x]  Radiology  []  EKG/Telemetry   []  Cardiology/Vascular   []  Pathology  []  Old records  []  Other:     Most notable findings include: Cr 13.21    Assessment & Plan   Assessment / Plan     Brief Patient Summary:  Jun Garcia is a 62 y.o. male who presented to [facility] with weakness and weight loss found to have moderate/severe bilateral hydroureteronephrosis and distended urinary bladder as well as renal failure.    Active Hospital Problems:  Active Hospital Problems    Diagnosis    • **Acute renal failure superimposed on stage 4 chronic kidney disease, unspecified acute renal failure type (HCC)    • Metabolic acidosis    • Hyperkalemia    • Elevated troponin    • Elevated lipase      I removed current indwelling caldwell catheter.   At the bedside, using sterile technique I was able to pass a 22 fr 3 way caldwell catheter with minimal resistance met. There was immediate return of grossly  bloody/maroon colored urine output. The caldwell catheter was secured with 30 cc normal saline and connected to gravity bag drainage. I irrigated caldwell catheter with 200 cc sterile water with return of two medium sized clots. The caldwell catheter was connected to continuous bladder irrigation and CBI was initiated at fast rate, urine output began to clear to pink.     Plan:  -Run CBI at rate to keep urine output light pink to clear  -Perform manual bladder irrigation as needed to remove intravesical clots  -Trend H&H; transfuse PRN  -Will need outpatient Cystoscopy at discharge   will follow, please call if any questions  D/w Dr. Damico      DVT prophylaxis:  Medical DVT prophylaxis orders are present.    CODE STATUS:    Code Status (Patient has no pulse and is not breathing): CPR (Attempt to Resuscitate)  Medical Interventions (Patient has pulse or is breathing): Full Support    Admission Status:  I believe this patient meets inpatient status.    Electronically signed by FRANKY Krause, 02/08/23, 8:35 AM EST.

## 2023-02-08 NOTE — CONSULTS
NAL Consult Note    Jun Garcia  1960  [id number]    Date of Admit:  2/7/2023    Date of Consult: 2/7/2023        Requesting Provider: No ref. provider found  Evaluating Physician: Zion Hardin MD        Reason for Consultation:  STAGE 5 CKD WITH UREMIA.   History of present illness:    Patient is a 62 y.o.  Yr old male WITH H/O STAGE 5 CKD SECONDARY TO OBSTRUCTIVE NEPHROPATHY ( SEEN AT [facility] 11/2022 AND TOLD THE HE NEEDED TO START DIALYSIS BUT THE PATIENT REFUSED ), HTN, DM, HLP, NONCOMPLIANCE WITH MEDS ADMITTED WITH SEVERE RENAL FAILURE (  WITH CREAT 13.2 ) AND SYMPTOMS OF UREMIA. PATIENT NOW WANTS TO START DIALYSIS ( HE MAY BE INTERESTED IN PD THAT HE LEARNED ABOUT AT  ).    Past Medical History:   Diagnosis Date   • Diabetes mellitus (HCC)    • Elevated cholesterol    • GERD (gastroesophageal reflux disease)    • Hyperlipidemia    • Hypertension    • Stroke (HCC)        History reviewed. No pertinent surgical history.    Social History     Socioeconomic History   • Marital status:    Tobacco Use   • Smoking status: Never     Passive exposure: Never   • Smokeless tobacco: Never   Vaping Use   • Vaping Use: Never used   Substance and Sexual Activity   • Alcohol use: Never   • Drug use: Never   • Sexual activity: Defer       Family history is unknown by patient.    No Known Allergies    Medication:    Current Facility-Administered Medications:   •  acetaminophen (TYLENOL) tablet 650 mg, 650 mg, Oral, Q4H PRN **OR** acetaminophen (TYLENOL) 160 MG/5ML solution 650 mg, 650 mg, Oral, Q4H PRN **OR** acetaminophen (TYLENOL) suppository 650 mg, 650 mg, Rectal, Q4H PRN, Carola Henry II, DO  •  b complex-vitamin c-folic acid (NEPHRO-LASHON) tablet 1 tablet, 1 tablet, Oral, Daily, Zion Hardin MD  •  sennosides-docusate (PERICOLACE) 8.6-50 MG per tablet 2 tablet, 2 tablet, Oral, BID **AND** polyethylene glycol (MIRALAX) packet 17 g, 17 g, Oral, Daily PRN **AND** bisacodyl  (DULCOLAX) EC tablet 5 mg, 5 mg, Oral, Daily PRN **AND** bisacodyl (DULCOLAX) suppository 10 mg, 10 mg, Rectal, Daily PRN, Carl, Carola M II, DO  •  dextrose (D50W) (25 g/50 mL) IV injection 25 g, 25 g, Intravenous, Q15 Min PRN, Carl, Carola M II, DO  •  dextrose (GLUTOSE) oral gel 15 g, 15 g, Oral, Q15 Min PRN, Carl, Carola M II, DO  •  [START ON 2/8/2023] epoetin sloan-epbx (RETACRIT) injection 20,000 Units, 20,000 Units, Subcutaneous, Once per day on Mon Wed Fri, Zion Hardin MD  •  glucagon (GLUCAGEN) injection 1 mg, 1 mg, Intramuscular, Q15 Min PRN, Carl, Carola M II, DO  •  heparin (porcine) 5000 UNIT/ML injection 5,000 Units, 5,000 Units, Subcutaneous, Q8H, Carl, Carola M II, DO, 5,000 Units at 02/07/23 1749  •  Insulin Lispro (humaLOG) injection 0-7 Units, 0-7 Units, Subcutaneous, TID AC, Carl, Carola M II, DO  •  melatonin tablet 5 mg, 5 mg, Oral, Nightly PRN, Carl, Carola M II, DO  •  morphine injection 2 mg, 2 mg, Intravenous, Q2H PRN **AND** naloxone (NARCAN) injection 0.4 mg, 0.4 mg, Intravenous, Q5 Min PRN, Carl, Carola M II, DO  •  ondansetron (ZOFRAN) tablet 4 mg, 4 mg, Oral, Q6H PRN **OR** ondansetron (ZOFRAN) injection 4 mg, 4 mg, Intravenous, Q6H PRN, Carl, Carola M II, DO  •  pantoprazole (PROTONIX) EC tablet 40 mg, 40 mg, Oral, BID AC, Carl, Carola M II, DO, 40 mg at 02/07/23 1749  •  sodium bicarbonate 8.4 % 150 mEq in dextrose (D5W) 5 % 1,000 mL infusion (greater than 75 mEq), 150 mEq, Intravenous, Continuous, Carola Henry M II, DO, Last Rate: 100 mL/hr at 02/07/23 1749, 150 mEq at 02/07/23 1749  •  sodium chloride 0.9 % flush 10 mL, 10 mL, Intravenous, PRN, Jefferson Burton MD  •  sodium chloride 0.9 % flush 10 mL, 10 mL, Intravenous, Q12H, Carola Henry II, DO  •  sodium chloride 0.9 % flush 10 mL, 10 mL, Intravenous, PRN, Carola Henry M II, DO  •  sodium chloride 0.9 % infusion 40 mL, 40 mL, Intravenous, PRN, Carola Henry II, DO    No  "medications prior to admission.       Review of Systems:    Constitutional-- No Fever, chills or sweats. + significant change in weight. + WEAKNESS/FATIGUE.   Eye-- no diplopia, no conjunctivitis  ENT-- No new hearing or throat complaints.  No epistaxis or oral sores. No odynophagia or dysphagia. No headache, photophobia or neck stiffness.  CV-- No chest pain, palpitations, soa, or edema  Resp-- No SOB/cough/Hemoptysis  GI- + nausea/vomiting. NO diarrhea.  No hematochezia, melena, or hematemesis. + ABD PAIN. + POOR APPETITE.   -- No dysuria, hematuria, or flank pain. No lower tract obstructive symptoms  Skin-- No rash. + ITCHING  Lymph- no painful or swollen lymph nodes in neck/axilla or groin.   Heme- No active bruising or bleeding; no Hx of DVT or PE.  MS-- no swelling or pain in the joints  Neuro-- No acute focal weakness or numbness in the arms or legs.  No seizures.  Psych--No anxiety or depression  Endo--No cold or heat intolerance.  No polyuria, polydipsia, or polyphagia    Full review of systems reviewed and negative otherwise for acute complaints    Physical Exam:   Vital Signs   Blood pressure 155/85, pulse 90, temperature 97.6 °F (36.4 °C), temperature source Axillary, resp. rate 18, height 170.2 cm (67\"), weight 68 kg (150 lb), SpO2 100 %.     GENERAL: Awake and alert, in no acute distress. APPEARS CHRONICALLY ILL. THIN.  HEENT: Normocephalic, atraumatic.  PER.  No conjunctivitis. No icterus. Oropharynx clear without evidence of thrush or exudate. No evidence of peridontal disease.    NECK: Supple without nuchal rigidity. No mass.  LYMPH: No painful cervical, axillary or inguinal lymphadenopathy.  HEART: RRR; No murmur, rubs, gallops. No bruits in neck, abdomen, or groins, no edema  LUNGS: Normal respiratory effort. Nonlabored. No dullness.  No crepitus.  Clear to auscultation bilaterally without wheezing, rales, rhonchi.  ABDOMEN: Soft, nontender, nondistended. Positive bowel sounds. No rebound or " guarding. NO mass or HSM.  JOINTS:  Full range of motion, no redness or tenderness.  EXT:  No cyanosis/clubbing.  :  BLADDER NOT DISTENDED. NO CVAT.   SKIN: Warm and dry without rash  NEURO: Oriented to PPT. No focal neurological deficits. Strength equal bilateral  PSYCHIATRIC: SPEAKS ENGLISH. Normal insight and judgement. Cooperative with PE    Laboratory Data  Results from last 7 days   Lab Units 02/07/23  1207   HEMOGLOBIN g/dL 7.8*   HEMATOCRIT % 23.7*     Results from last 7 days   Lab Units 02/07/23  1207   SODIUM mmol/L 134*   POTASSIUM mmol/L 5.3*   CHLORIDE mmol/L 99   CO2 mmol/L 13.0*   BUN mg/dL 164*   CREATININE mg/dL 13.21*   CALCIUM mg/dL 8.5*   PHOSPHORUS mg/dL 6.8*   MAGNESIUM mg/dL 3.3*   ALBUMIN g/dL 4.2     Results from last 7 days   Lab Units 02/07/23  1207   GLUCOSE mg/dL 169*         Results from last 7 days   Lab Units 02/07/23  1207   ALK PHOS U/L 103   BILIRUBIN mg/dL 0.3   ALT (SGPT) U/L 19   AST (SGOT) U/L 12     Estimated Creatinine Clearance: 5.4 mL/min (A) (by C-G formula based on SCr of 13.21 mg/dL (H)).    Radiology:      Impression: ESRD WITH UREMIA. H/O OBSTRUCTIVE NEPHROPATHY.  ANEMIA. DM. HTN. HLP.       PLAN: Thank you for asking us to see Jun Garcia, I recommend the following: START RRT. GET IR TO PLACE A THD CATH 2/8/22. START HD 2/8/23. ASK DR CHAVES TO SEE ABOUT PERMANENT ACCESS PLACEMENT.  TO ARRANGE OUTPATIENT HD TX'S.  CHECK IV FE. START EPO/NEPHROVITE. WILL FOLLOW. UROLOGY CONSULTED BY DR LE.        Zion Hardin MD  2/7/2023  19:14 EST

## 2023-02-08 NOTE — PROGRESS NOTES
[facility] Medicine Services  PROGRESS NOTE    Patient Name: Jun Garcia  : 1960  MRN: [id number]    Date of Admission: 2023  Primary Care Physician: Provider, No Known    Subjective   Subjective     CC:f/u generalized weakness    HPI: Patient is s/p OR for cystoscopy, see that he is feeling okay denies any pain currently.    ROS:  Gen- No fevers, chills  CV- No chest pain, palpitations  Resp- No cough, dyspnea  GI- No N/V/D, abd pain      Objective   Objective     Vital Signs:   Temp:  [97.4 °F (36.3 °C)-98.6 °F (37 °C)] 97.6 °F (36.4 °C)  Heart Rate:  [] 69  Resp:  [16-20] 16  BP: ()/(49-93) 165/81  Flow (L/min):  [2] 2     Physical Exam:  Constitutional: chronically ill and frail appearing male in no acute distress, awake, alert  HENT: NCAT, mucous membranes moist  Respiratory: Clear to auscultation bilaterally, respiratory effort normal   Cardiovascular: RRR, no murmurs, rubs, or gallops  : Whitfield catheter in place, blood-tinged output  Gastrointestinal: Positive bowel sounds, soft, nontender, nondistended  Musculoskeletal: No bilateral ankle edema  Psychiatric: Appropriate affect, cooperative  Neurologic: non-focal  Skin: No rashes    Results Reviewed:  LAB RESULTS:      Lab 23  0939 23  1207   WBC 12.49* 9.66 12.08*   HEMOGLOBIN 7.8* 5.9* 7.8*   HEMATOCRIT 22.6* 18.0* 23.7*   PLATELETS 186 255 277   NEUTROS ABS 9.08*  --  8.40*   IMMATURE GRANS (ABS) 0.06*  --  0.05   LYMPHS ABS 1.23  --  1.09   MONOS ABS 0.61  --  0.51   EOS ABS 1.46*  --  1.96*   MCV 84.0 88.7 89.4   PROTIME  --   --  14.8*   APTT  --   --  38.1*         Lab 23  0343 23  1207   SODIUM 138 138 134*   POTASSIUM 4.6 4.4 5.3*   CHLORIDE 94* 97* 99   CO2 27.0 21.0* 13.0*   ANION GAP 17.0* 20.0* 22.0*   * 140* 164*   CREATININE 12.87* 11.76* 13.21*   EGFR 3.9* 4.4* 3.8*   GLUCOSE 158* 140* 169*   CALCIUM 7.7* 8.2* 8.5*    MAGNESIUM  --   --  3.3*   PHOSPHORUS 8.4* 6.7* 6.8*         Lab 02/09/23  0449 02/07/23  1207   TOTAL PROTEIN  --  8.7*   ALBUMIN 3.1* 4.2   GLOBULIN  --  4.5   ALT (SGPT)  --  19   AST (SGOT)  --  12   BILIRUBIN  --  0.3   ALK PHOS  --  103   LIPASE  --  124*         Lab 02/07/23  1418 02/07/23  1207   PROBNP  --  1,316.0*   HSTROP T 57* 60*   PROTIME  --  14.8*   INR  --  1.17*             Lab 02/08/23  1019 02/08/23  0939 02/08/23  0343   IRON  --   --  68   IRON SATURATION  --   --  29   TIBC  --   --  234*   TRANSFERRIN  --   --  157*   FERRITIN  --   --  899.10*   ABO TYPING O   < >  --    RH TYPING Positive   < >  --    ANTIBODY SCREEN Negative  --   --     < > = values in this interval not displayed.         Brief Urine Lab Results  (Last result in the past 365 days)      Color   Clarity   Blood   Leuk Est   Nitrite   Protein   CREAT   Urine HCG        11/17/22 1804 Yellow   Cloudy     Large                     Microbiology Results Abnormal     None          Adult Transthoracic Echo Complete w/ Color, Spectral and Contrast if necessary per protocol    Result Date: 2/8/2023  •  Left ventricular systolic function is hyperdynamic (EF > 70%). •  Left ventricular wall thickness is consistent with mild concentric hypertrophy •  Normal right ventricular cavity size and systolic function noted. •  There is a moderate (1-2cm) pericardial effusion. There is no evidence of cardiac tamponade.  No chamber collapse.     CT Abdomen Pelvis Without Contrast    Result Date: 2/7/2023  CT ABDOMEN PELVIS WO CONTRAST Date of Exam: 2/7/2023 5:05 PM EST Indication: urinary obstruction. Comparison: None available. Technique: Axial CT images were obtained of the abdomen and pelvis without the administration of contrast. Reconstructed coronal and sagittal images were also obtained. Automated exposure control and iterative construction methods were used. Findings: No suspicious infiltrate is seen in the lung bases. No pleural  effusion. There is a trace amount of pericardial fluid. Heart size appears within normal limits. Large calcified gallstone is seen in the gallbladder lumen. No definite pericholecystic inflammation or biliary ductal dilatation. Small cyst is seen in the posterior-inferior right hepatic lobe. No splenomegaly. No suspicious adrenal lesion is seen. No acute pancreatic abnormality is identified. There is atherosclerosis of the aorta without definite aneurysm. No significant abdominal adenopathy is seen. There is moderate to severe bilateral hydronephrosis. There is bilateral renal cortical thinning. No significant renal calculi are seen. There is bilateral ureteral dilatation to the urinary bladder. No obstructing calculus or other ureteral abnormality is seen. The bladder is prominently distended. The bladder wall appears mildly thickened. There appears to be enlargement of the prostate with irregular protrusion into the inferior urinary bladder. No definite inflammatory changes are seen surrounding the bladder on this exam. The seminal vesicles appear symmetric and grossly normal in size. No definite urethral calculus is seen. Overall findings are suspicious for bladder outlet obstruction due to prostate enlargement with moderate to severe hydronephrosis. No acute gastric abnormality is seen. Small bowel loops appear nondilated. The appendix appears within normal limits. No definite lytic or aggressive osseous lesion is seen. There are degenerative changes in the lumbar spine. There is mild to moderate bilateral hip arthritis. Small sclerotic foci in the proximal femurs and at L2 are nonspecific but may represent bone islands.     Impression: Impression: 1.Moderate to severe bilateral hydronephrosis with ureteral dilatation and prominent bladder distention. The prostate appears enlarged with irregular protrusion into the urinary bladder. Findings are suspicious for bladder outlet obstruction due to the prostate.  Recommend urology consultation and catheter placement. 2.Prostate enlargement is indeterminate and could be related to benign hypertrophy or malignancy. 3.Large calcified gallstone without CT findings of acute cholecystitis. 4.Trace pericardial effusion. 5.Small sclerotic foci in the proximal femurs and at L2 are indeterminate and could be benign bone islands, but sclerotic metastases, which are commonly seen with prostate malignancy, cannot be excluded, given the above findings. Electronically Signed: Chaz Rodriguez  2/7/2023 5:51 PM EST  Workstation ID: [id number]    XR Chest 1 View    Result Date: 2/7/2023  XR CHEST 1 VW Date of Exam: 2/7/2023 2:04 PM EST Indication: Chest Pain Triage Protocol. Comparison: None available. Findings: Heart size and pulmonary vasculature are within normal limits. Lungs clear. Costophrenic angles sharp     Impression: Impression: No active cardiopulmonary disease Electronically Signed: Martin Isis  2/7/2023 2:38 PM EST  Workstation ID: [id number]      Results for orders placed during the hospital encounter of 02/07/23    Adult Transthoracic Echo Complete w/ Color, Spectral and Contrast if necessary per protocol    Interpretation Summary  •  Left ventricular systolic function is hyperdynamic (EF > 70%).  •  Left ventricular wall thickness is consistent with mild concentric hypertrophy  •  Normal right ventricular cavity size and systolic function noted.  •  There is a moderate (1-2cm) pericardial effusion. There is no evidence of cardiac tamponade.  No chamber collapse.      I have reviewed the medications:  Scheduled Meds:[MAR Hold] b complex-vitamin c-folic acid, 1 tablet, Oral, Daily  [MAR Hold] insulin lispro, 0-7 Units, Subcutaneous, TID AC  [MAR Hold] pantoprazole, 40 mg, Oral, Q AM  [MAR Hold] senna-docusate sodium, 2 tablet, Oral, BID  [MAR Hold] sodium bicarbonate, 650 mg, Oral, TID  [MAR Hold] sodium chloride, 10 mL, Intravenous, Q12H  sodium chloride, 3 mL, Intravenous,  Q12H      Continuous Infusions:lactated ringers, 9 mL/hr  sodium chloride, 9 mL/hr      PRN Meds:.•  [MAR Hold] acetaminophen **OR** [MAR Hold] acetaminophen **OR** [MAR Hold] acetaminophen  •  [MAR Hold] senna-docusate sodium **AND** [MAR Hold] polyethylene glycol **AND** [MAR Hold] bisacodyl **AND** [MAR Hold] bisacodyl  •  [MAR Hold] dextrose  •  [MAR Hold] dextrose  •  droperidol **OR** droperidol  •  fentanyl  •  [MAR Hold] glucagon (human recombinant)  •  hydrALAZINE  •  HYDROcodone-acetaminophen  •  HYDROmorphone  •  ipratropium-albuterol  •  labetalol  •  lactated ringers  •  [MAR Hold] melatonin  •  [MAR Hold] Morphine **AND** [MAR Hold] naloxone  •  naloxone  •  [MAR Hold] ondansetron **OR** [MAR Hold] ondansetron  •  ondansetron  •  promethazine **OR** promethazine  •  [MAR Hold] sodium chloride  •  [MAR Hold] sodium chloride  •  sodium chloride  •  [MAR Hold] sodium chloride  •  sodium chloride    Assessment & Plan   Assessment & Plan     Active Hospital Problems    Diagnosis  POA   • **Acute renal failure superimposed on stage 4 chronic kidney disease, unspecified acute renal failure type (HCC) [N17.9, N18.4]  Yes   • Metabolic acidosis [E87.20]  Yes   • Hyperkalemia [E87.5]  Yes   • Elevated troponin [R77.8]  Yes   • Elevated lipase [R74.8]  Yes      Resolved Hospital Problems   No resolved problems to display.        Brief Hospital Course to date:  61 y/o male with prior hx obstructive uropathy and CKD presenting with worsening FTT and ongoing renal failure.     CKD IV-V nearing ESRD  Metabolic acidosis, improving  Hyperkalemia, resolved  -Nephrology following, discussed with Dr. Caicedo, patient needs HD, plan for tunnel cath placement and initiation of HD tentatively for today 2/9/2023  -Dr. Estes following along for PD cath and AV fistula placement   -Follow-up repeat renal function panel     Obstructive uropathy  Gross hematuria  -CT abdomen pelvis continues to show moderate to severe bilateral  hydronephrosis, enlarged prostate with irregular protrusion into the urinary bladder (same finding seen on CT done in November at )  -Urology following, s/p caldwell cath placement, there was difficulty in irrigating at bedside overnight, he is s/p cystoscopy and clot evacuation in the OR by Dr. Velasco  -Continue CBI for the next 24hrs     Anemia  -Multifactorial ACD vs blood loss from hematuria  -Hg 5.8 2/8/23, s/p 2 units PRBC with good response, continue to closely monitor  -follow-up repeat H&H.     Elevated troponin   -Suspect this is secondary to volume depletion  -Patient is without any chest pain troponin has flattened   -EKG is unremarkable, echowith EF 70%, moderate pericardial effusion      Elevated lipase  -Suspected to be likely secondary to resolving pancreatitis  -Continue supportive therapy with IV fluids and pain meds, diet as tolerated  -Continue daily PPI for now  for possible PUD    Expected Discharge Location and Transportation: home  Expected Discharge   Expected Discharge Date and Time     Expected Discharge Date Expected Discharge Time    Feb 10, 2023            DVT prophylaxis:  No DVT prophylaxis order currently exists.     AM-PAC 6 Clicks Score (PT): 19 (02/09/23 0822)    CODE STATUS:   Code Status and Medical Interventions:   Ordered at: 02/07/23 4205     Code Status (Patient has no pulse and is not breathing):    CPR (Attempt to Resuscitate)     Medical Interventions (Patient has pulse or is breathing):    Full Support       Mirian Walsh MD  02/09/23

## 2023-02-08 NOTE — PROGRESS NOTES
" LOS: 1 day   Patient Care Team:  Provider, No Known as PCP - General  Provider, No Known as PCP - Family Medicine    Chief Complaint: Chest pain.    Subjective     Slight improvement in renal parameters. Good UOP. Currently on CBI due to gross hematuria with cath insertion Megha any uremic symptoms.   Subjective    History taken from: patient    Objective     Vital Sign Min/Max for last 24 hours  Temp  Min: 97.4 °F (36.3 °C)  Max: 98.3 °F (36.8 °C)   BP  Min: 86/53  Max: 155/85   Pulse  Min: 88  Max: 106   Resp  Min: 16  Max: 18   SpO2  Min: 99 %  Max: 100 %   No data recorded   Weight  Min: 68 kg (149 lb 14.6 oz)  Max: 68 kg (149 lb 14.6 oz)     Flowsheet Rows    Flowsheet Row First Filed Value   Admission Height 170.2 cm (67\") Documented at 02/07/2023 1150   Admission Weight 68 kg (150 lb) Documented at 02/07/2023 1150          I/O this shift:  In: 560 [P.O.:200; Blood:360]  Out: -2000   I/O last 3 completed shifts:  In: -   Out: 1300 [Urine:1300]    Objective:  General Appearance:  Comfortable.    Vital signs: (most recent): Blood pressure 132/76, pulse 101, temperature 97.6 °F (36.4 °C), temperature source Oral, resp. rate 16, height 170.2 cm (67.01\"), weight 68 kg (149 lb 14.6 oz), SpO2 100 %.  Vital signs are normal.    Output: Producing urine.    HEENT: Normal HEENT exam.    Lungs:  Normal effort and normal respiratory rate.  Breath sounds clear to auscultation.  He is not in respiratory distress.  No decreased breath sounds or wheezes.    Heart: Normal rate.  Regular rhythm.  S1 normal and S2 normal.  No murmur or gallop.   Abdomen: Abdomen is soft.  Bowel sounds are normal.     Extremities: Normal range of motion.  There is no dependent edema or local swelling.    Pulses: Distal pulses are intact.    Neurological: Patient is alert and oriented to person, place and time.  Normal strength.    Skin:  Warm.              Results Review:     I reviewed the patient's new clinical results.    WBC WBC   Date Value " Ref Range Status   02/08/2023 9.66 3.40 - 10.80 10*3/mm3 Final   02/07/2023 12.08 (H) 3.40 - 10.80 10*3/mm3 Final      HGB Hemoglobin   Date Value Ref Range Status   02/08/2023 5.9 (C) 13.0 - 17.7 g/dL Final   02/07/2023 7.8 (L) 13.0 - 17.7 g/dL Final      HCT Hematocrit   Date Value Ref Range Status   02/08/2023 18.0 (C) 37.5 - 51.0 % Final   02/07/2023 23.7 (L) 37.5 - 51.0 % Final      Platlets No results found for: LABPLAT   MCV MCV   Date Value Ref Range Status   02/08/2023 88.7 79.0 - 97.0 fL Final   02/07/2023 89.4 79.0 - 97.0 fL Final          Sodium Sodium   Date Value Ref Range Status   02/08/2023 138 136 - 145 mmol/L Final   02/07/2023 134 (L) 136 - 145 mmol/L Final      Potassium Potassium   Date Value Ref Range Status   02/08/2023 4.4 3.5 - 5.2 mmol/L Final   02/07/2023 5.3 (H) 3.5 - 5.2 mmol/L Final      Chloride Chloride   Date Value Ref Range Status   02/08/2023 97 (L) 98 - 107 mmol/L Final   02/07/2023 99 98 - 107 mmol/L Final      CO2 CO2   Date Value Ref Range Status   02/08/2023 21.0 (L) 22.0 - 29.0 mmol/L Final   02/07/2023 13.0 (L) 22.0 - 29.0 mmol/L Final      BUN BUN   Date Value Ref Range Status   02/08/2023 140 (H) 8 - 23 mg/dL Final   02/07/2023 164 (H) 8 - 23 mg/dL Final      Creatinine Creatinine   Date Value Ref Range Status   02/08/2023 11.76 (H) 0.76 - 1.27 mg/dL Final   02/07/2023 13.21 (H) 0.76 - 1.27 mg/dL Final      Calcium Calcium   Date Value Ref Range Status   02/08/2023 8.2 (L) 8.6 - 10.5 mg/dL Final   02/07/2023 8.5 (L) 8.6 - 10.5 mg/dL Final      PO4 No results found for: CAPO4   Albumin Albumin   Date Value Ref Range Status   02/07/2023 4.2 3.5 - 5.2 g/dL Final      Magnesium Magnesium   Date Value Ref Range Status   02/07/2023 3.3 (H) 1.6 - 2.4 mg/dL Final      Uric Acid No results found for: URICACID     Medication Review: yes    Assessment & Plan       Acute renal failure superimposed on stage 4 chronic kidney disease, unspecified acute renal failure type (HCC)     Metabolic acidosis    Hyperkalemia    Elevated troponin    Elevated lipase      Assessment & Plan     Assessment & Plan     CKD stage V: Hx of obstructive uropathy with severe hydronephrosis on this admission noted previously on admission at  hospital. Patient left the hospital against medical advise.      Azotemia:BUN significantly elevated. Does have mild uremic symptoms. Fatigue, poor appetite and vomiiting     Obstructive uropathy: Likley due to enlarge prostate resulting in severe hydronephrosis. Cystoscopy was recommended on last visit at  but patient didn't have any intervention. Similar findings noted on this admission      Anemia: ACD due to advance CKD     Hyperkalemia: Due to CKD     Met acidosis: Due to CKD      Hyponatremia: Due to CKD     Recs  Severe renal dysfunction in the setting of chronic obstruction. Unfortunately this might have caused irreversible renal damage as evidenced by cortical thining on CT scan. Patient reluctant about dialysis and wants to consider home dialysis. I explained to him that he may need HD given severe metabolic derangements. Given improvement in renal function and patients prefernce will hold off on Hd at present and monitor renal function. Seen by Gen surgery candidate for both type of access placement. On CBI managed per urology. D/C bicarb based fluids. Encourage po intake.     Jordan Marquez MD  02/08/23  18:32 EST

## 2023-02-08 NOTE — CONSULTS
Patient Care Team:  Provider, No Known as PCP - General  Provider, No Known as PCP - Family Medicine    Chief complaint: Epigastric pain  Weakness  Severe azotemia and renal failure       History of Present Illness: Mr Jun betancourt is a 63 yo gentleman with hx of CKD stage V, Obstructive uropathy, HTN. He is admitted with generalize weakness, epigastric pain. Patient is poor historian but did mentioned he has been out of his bp meds for last 5-6 months. He was admitted at  few months ago and was told to start dialysis however he left against medical advise. He does mention having kidney disease for many yrs.On this admission his labs remarkable for severe azotemia cr 10 , HB 7.8 Na 132. He cee any change in UOP. No significant LE edema noted. He mentioned food taste ok but he couldn't keep things down due to frequent nausea and vomiting. He is unable to tell if he had any workup in the past for ckd.However renal US from nov 2022 at  showed severe b/l hydronephrosis and bladder outlet obstruction 2/2 enlarge prostate. CT A/P on this admission showed similar findings for severe hydronephrosis but thinning of renal parenchyma. Caldwell cath was placed ~900cc UOP noted. Patient is extremely reluctant to the idea of dialysis he was hesitant to get caldwell cath placed as well.     Review of Systems   Constitutional: Positive for fatigue.   HENT: Negative.    Respiratory: Negative.    Cardiovascular: Negative.    Gastrointestinal: Positive for abdominal pain.   Genitourinary: Positive for difficulty urinating.   Musculoskeletal: Negative.    Neurological: Negative.    Hematological: Negative.    Psychiatric/Behavioral: Negative.         Past Medical History:   Diagnosis Date   • Diabetes mellitus (HCC)    • Elevated cholesterol    • GERD (gastroesophageal reflux disease)    • Hyperlipidemia    • Hypertension    • Stroke (HCC)    , History reviewed. No pertinent surgical history.,   Family History   Family  history unknown: Yes   ,   Social History     Socioeconomic History   • Marital status:    Tobacco Use   • Smoking status: Never     Passive exposure: Never   • Smokeless tobacco: Never   Vaping Use   • Vaping Use: Never used   Substance and Sexual Activity   • Alcohol use: Never   • Drug use: Never   • Sexual activity: Defer     E-cigarette/Vaping   • E-cigarette/Vaping Use Never User    • Passive Exposure No    • Counseling Given No      E-cigarette/Vaping Substances   • Nicotine No    • THC No    • CBD No    • Flavoring No      E-cigarette/Vaping Devices   • Disposable No    • Pre-filled or Refillable Cartridge No    • Refillable Tank No    • Pre-filled Pod No         and   No medications prior to admission.       Objective     Vital Signs  Temp:  [97.6 °F (36.4 °C)-98.5 °F (36.9 °C)] 97.6 °F (36.4 °C)  Heart Rate:  [] 90  Resp:  [18] 18  BP: (103-160)/(70-96) 155/85    No intake/output data recorded.  No intake/output data recorded.    Physical Exam  Constitutional:       Appearance: Normal appearance.   HENT:      Head: Normocephalic and atraumatic.      Nose: Nose normal.   Eyes:      Extraocular Movements: Extraocular movements intact.      Pupils: Pupils are equal, round, and reactive to light.   Cardiovascular:      Rate and Rhythm: Normal rate and regular rhythm.      Pulses: Normal pulses.      Heart sounds: Normal heart sounds.   Pulmonary:      Effort: Pulmonary effort is normal.      Breath sounds: Normal breath sounds.   Abdominal:      General: Abdomen is flat.   Skin:     General: Skin is warm.      Findings: No lesion or rash.   Neurological:      General: No focal deficit present.      Mental Status: He is alert and oriented to person, place, and time. Mental status is at baseline.         Results Review:    I reviewed the patient's new clinical results.    WBC WBC   Date Value Ref Range Status   02/07/2023 12.08 (H) 3.40 - 10.80 10*3/mm3 Final      HGB Hemoglobin   Date Value Ref  Range Status   02/07/2023 7.8 (L) 13.0 - 17.7 g/dL Final      HCT Hematocrit   Date Value Ref Range Status   02/07/2023 23.7 (L) 37.5 - 51.0 % Final      Platlets No results found for: LABPLAT   MCV MCV   Date Value Ref Range Status   02/07/2023 89.4 79.0 - 97.0 fL Final          Sodium Sodium   Date Value Ref Range Status   02/07/2023 134 (L) 136 - 145 mmol/L Final      Potassium Potassium   Date Value Ref Range Status   02/07/2023 5.3 (H) 3.5 - 5.2 mmol/L Final      Chloride Chloride   Date Value Ref Range Status   02/07/2023 99 98 - 107 mmol/L Final      CO2 CO2   Date Value Ref Range Status   02/07/2023 13.0 (L) 22.0 - 29.0 mmol/L Final      BUN BUN   Date Value Ref Range Status   02/07/2023 164 (H) 8 - 23 mg/dL Final      Creatinine Creatinine   Date Value Ref Range Status   02/07/2023 13.21 (H) 0.76 - 1.27 mg/dL Final      Calcium Calcium   Date Value Ref Range Status   02/07/2023 8.5 (L) 8.6 - 10.5 mg/dL Final      PO4 No results found for: CAPO4   Albumin Albumin   Date Value Ref Range Status   02/07/2023 4.2 3.5 - 5.2 g/dL Final      Magnesium Magnesium   Date Value Ref Range Status   02/07/2023 3.3 (H) 1.6 - 2.4 mg/dL Final      Uric Acid No results found for: URICACID         Assessment & Plan       Acute renal failure superimposed on stage 4 chronic kidney disease, unspecified acute renal failure type (HCC)    Metabolic acidosis    Hyperkalemia    Elevated troponin    Elevated lipase      Assessment & Plan    CKD stage V: Hx of obstructive uropathy with severe hydronephrosis on this admission noted previously on admission at  hospital. Patient left the hospital against medical advise.     Azotemia:BUN significantly elevated. Does have mild uremic symptoms. Fatigue, poor appetite and vomiiting    Obstructive uropathy: Likley due to enlarge prostate resulting in severe hydronephrosis. Cystoscopy was recommended on last visit at  but patient didn't have any intervention. Similar findings noted on this  admission     Anemia: ACD due to advance CKD    Hyperkalemia: Due to CKD    Met acidosis: Due to CKD     Hyponatremia: Due to CKD    Recs  Severe renal dysfunction in the setting of chronic obstruction. Unfortunately this  Might have caused irreversible renal damage as evidenced by cortical thining on CT scan. Patient reluctant about dialysis and wants to consider home dialysis. I explained to him that he may need HD given severe metabolic derangements. Will watch the renal function overnight after placing caldwell cath. He will also need urology consult. High risk for needing dialysis. NPO after midnight for possible H Dcath tomorrow if no significant improvement in renal function. Start Na-bicarb 650mg TID. Expect improvement in hyperkalemia after placing caldwell cath. Daily labs. Strict I/O.      I discussed the patients findings and my recommendations with patient    Jordan Marquez MD  02/07/23  19:27 EST

## 2023-02-08 NOTE — PLAN OF CARE
Problem: Adult Inpatient Plan of Care  Goal: Absence of Hospital-Acquired Illness or Injury  Outcome: Ongoing, Progressing  Intervention: Identify and Manage Fall Risk  Recent Flowsheet Documentation  Taken 2/7/2023 2000 by Lisa Richardson RN  Safety Promotion/Fall Prevention:   activity supervised   assistive device/personal items within reach   clutter free environment maintained   fall prevention program maintained   lighting adjusted   nonskid shoes/slippers when out of bed   safety round/check completed   room organization consistent  Intervention: Prevent Skin Injury  Recent Flowsheet Documentation  Taken 2/7/2023 2000 by Lisa Richardson RN  Body Position: position changed independently  Intervention: Prevent and Manage VTE (Venous Thromboembolism) Risk  Recent Flowsheet Documentation  Taken 2/7/2023 2000 by Lisa Richardson RN  Activity Management:   activity adjusted per tolerance   activity minimized  VTE Prevention/Management: (See MAR) bilateral  Range of Motion: active ROM (range of motion) encouraged  Intervention: Prevent Infection  Recent Flowsheet Documentation  Taken 2/7/2023 2000 by Lisa Richardson RN  Infection Prevention:   hand hygiene promoted   rest/sleep promoted   environmental surveillance performed  Goal: Optimal Comfort and Wellbeing  Outcome: Ongoing, Progressing  Intervention: Provide Person-Centered Care  Recent Flowsheet Documentation  Taken 2/7/2023 2000 by Lisa Richardson RN  Trust Relationship/Rapport:   care explained   thoughts/feelings acknowledged   questions encouraged  Goal: Readiness for Transition of Care  Outcome: Ongoing, Progressing     Problem: Fall Injury Risk  Goal: Absence of Fall and Fall-Related Injury  Outcome: Ongoing, Progressing  Intervention: Identify and Manage Contributors  Recent Flowsheet Documentation  Taken 2/7/2023 2000 by Lisa Richardson RN  Medication Review/Management: medications reviewed  Intervention: Promote Injury-Free  Environment  Recent Flowsheet Documentation  Taken 2/7/2023 2000 by Lisa Richardson, RN  Safety Promotion/Fall Prevention:   activity supervised   assistive device/personal items within reach   clutter free environment maintained   fall prevention program maintained   lighting adjusted   nonskid shoes/slippers when out of bed   safety round/check completed   room organization consistent     Problem: Skin Injury Risk Increased  Goal: Skin Health and Integrity  Outcome: Ongoing, Progressing  Intervention: Optimize Skin Protection  Recent Flowsheet Documentation  Taken 2/7/2023 2000 by Lisa Richardson, RN  Head of Bed (HOB) Positioning: HOB elevated   Goal Outcome Evaluation:

## 2023-02-08 NOTE — CONSULTS
General Surgery Consultation Note    Date of Service: 2/8/2023  Jun Garcia  [id number]  1960      Referring Provider: Mirian Walsh MD    Location of Consult: Inpatient     Reason for Consultation: Dialysis access       History of Present Illness:  I am seeing, Jun Feryjdsamia, in consultation for Mirian Walsh MD regarding dialysis access.  62-year-old gentleman with obstructive uropathy has acute on chronic renal failure.  I was asked to see this gentleman by nephrology for the future creation of long-term access via a arteriovenous fistula or PD catheter.  Patient currently has a Whitfield catheter in place and is being seen by urology for his gross hematuria.  He is also currently receiving a blood transfusion for hemoglobin of 5.9.  He is hopeful that his renal function will improve possibly by treating his prostate.  He is right-handed.  He has never had any chest operations including ports or pacemaker.  He has a left upper extremity IV in place.    Problems Addressed this Visit        Genitourinary and Reproductive     * (Principal) Acute renal failure superimposed on stage 4 chronic kidney disease, unspecified acute renal failure type (HCC) - Primary    Metabolic acidosis    Hyperkalemia   Other Visit Diagnoses     Hypermagnesemia        Hyperphosphatemia        Dehydration          Diagnoses       Codes Comments    Acute renal failure superimposed on stage 4 chronic kidney disease, unspecified acute renal failure type (HCC)    -  Primary ICD-10-CM: N17.9, N18.4  ICD-9-CM: 584.9, 585.4     Hypermagnesemia     ICD-10-CM: E83.41  ICD-9-CM: 275.2     Hyperkalemia     ICD-10-CM: E87.5  ICD-9-CM: 276.7     Hyperphosphatemia     ICD-10-CM: E83.39  ICD-9-CM: 275.3     Dehydration     ICD-10-CM: E86.0  ICD-9-CM: 276.51     Metabolic acidosis     ICD-10-CM: E87.20  ICD-9-CM: 276.2           Past Medical History:   Diagnosis Date   • Diabetes mellitus (HCC)    • Elevated cholesterol    • GERD  (gastroesophageal reflux disease)    • Hyperlipidemia    • Hypertension    • Stroke (HCC)        No past surgical history on file.    No Known Allergies    No current facility-administered medications on file prior to encounter.     No current outpatient medications on file prior to encounter.         Current Facility-Administered Medications:   •  acetaminophen (TYLENOL) tablet 650 mg, 650 mg, Oral, Q4H PRN **OR** acetaminophen (TYLENOL) 160 MG/5ML solution 650 mg, 650 mg, Oral, Q4H PRN **OR** acetaminophen (TYLENOL) suppository 650 mg, 650 mg, Rectal, Q4H PRN, Carola Henry M II, DO  •  b complex-vitamin c-folic acid (NEPHRO-LASHON) tablet 1 tablet, 1 tablet, Oral, Daily, Zion Hardin MD, 1 tablet at 02/08/23 0808  •  sennosides-docusate (PERICOLACE) 8.6-50 MG per tablet 2 tablet, 2 tablet, Oral, BID **AND** polyethylene glycol (MIRALAX) packet 17 g, 17 g, Oral, Daily PRN **AND** bisacodyl (DULCOLAX) EC tablet 5 mg, 5 mg, Oral, Daily PRN **AND** bisacodyl (DULCOLAX) suppository 10 mg, 10 mg, Rectal, Daily PRN, Carola Henry II, DO  •  dextrose (D50W) (25 g/50 mL) IV injection 25 g, 25 g, Intravenous, Q15 Min PRN, Carola Henry II, DO  •  dextrose (GLUTOSE) oral gel 15 g, 15 g, Oral, Q15 Min PRN, Carola Henry II, DO  •  epoetin sloan-epbx (RETACRIT) injection 20,000 Units, 20,000 Units, Subcutaneous, Once per day on Mon Wed Fri, Zion Hardin MD  •  glucagon (GLUCAGEN) injection 1 mg, 1 mg, Intramuscular, Q15 Min PRN, Carola Henry II, DO  •  Insulin Lispro (humaLOG) injection 0-7 Units, 0-7 Units, Subcutaneous, TID AC, Carola Herny II, DO, 2 Units at 02/08/23 1632  •  melatonin tablet 5 mg, 5 mg, Oral, Nightly PRN, Carola Henry II, DO  •  morphine injection 2 mg, 2 mg, Intravenous, Q2H PRN, 2 mg at 02/07/23 2201 **AND** naloxone (NARCAN) injection 0.4 mg, 0.4 mg, Intravenous, Q5 Min PRN, Carola Henry II, DO  •  ondansetron (ZOFRAN) tablet 4 mg, 4 mg, Oral, Q6H PRN  **OR** ondansetron (ZOFRAN) injection 4 mg, 4 mg, Intravenous, Q6H PRN, Carl, Carola M II, DO, 4 mg at 02/08/23 1632  •  pantoprazole (PROTONIX) EC tablet 40 mg, 40 mg, Oral, BID AC, Carl, Carola M II, DO, 40 mg at 02/08/23 1632  •  sodium bicarbonate 8.4 % 150 mEq in dextrose (D5W) 5 % 1,000 mL infusion (greater than 75 mEq), 150 mEq, Intravenous, Continuous, Carl, Carola M II, DO, Last Rate: 100 mL/hr at 02/08/23 1320, 150 mEq at 02/08/23 1320  •  sodium bicarbonate tablet 650 mg, 650 mg, Oral, TID, Jordan Marquez MD, 650 mg at 02/08/23 1632  •  sodium chloride 0.9 % flush 10 mL, 10 mL, Intravenous, PRN, Jefferson Burton MD  •  sodium chloride 0.9 % flush 10 mL, 10 mL, Intravenous, Q12H, Carl, Carola M II, DO, 10 mL at 02/07/23 2141  •  sodium chloride 0.9 % flush 10 mL, 10 mL, Intravenous, PRN, Carl, Carola M II, DO  •  sodium chloride 0.9 % infusion 40 mL, 40 mL, Intravenous, PRN, Carl, Carola M II, DO    Family History   Family history unknown: Yes     Social History     Socioeconomic History   • Marital status:    Tobacco Use   • Smoking status: Never     Passive exposure: Never   • Smokeless tobacco: Never   Vaping Use   • Vaping Use: Never used   Substance and Sexual Activity   • Alcohol use: Never   • Drug use: Never   • Sexual activity: Defer       Review of Systems:  Review of Systems   Constitutional: Positive for fatigue. Negative for appetite change.   HENT: Negative for drooling, hearing loss and postnasal drip.    Eyes: Negative for photophobia and visual disturbance.   Respiratory: Negative for cough, chest tightness and stridor.    Cardiovascular: Positive for leg swelling. Negative for chest pain.   Gastrointestinal: Negative for abdominal pain, nausea and vomiting.   Endocrine: Negative for polyphagia and polyuria.   Genitourinary: Positive for difficulty urinating and hematuria. Negative for urgency.   Musculoskeletal: Negative for arthralgias and joint swelling.   Skin:  "Negative for pallor and rash.   Allergic/Immunologic: Negative for immunocompromised state.   Neurological: Negative for dizziness, syncope and numbness.   Hematological: Negative for adenopathy.   Psychiatric/Behavioral: Negative for confusion. The patient is not hyperactive.      Otherwise the 12 point review of systems is negative.    /76   Pulse 101   Temp 97.6 °F (36.4 °C) (Oral)   Resp 16   Ht 170.2 cm (67.01\")   Wt 68 kg (149 lb 14.6 oz)   SpO2 100%   BMI 23.47 kg/m²   Body mass index is 23.47 kg/m².    General: Laying in bed pleasantly conversant  HEENT: PER, no icterus, normal sclerae  Cardiac: regular rhythm,  no audible rubs  Pulmonary: bilateral breath sounds, nonlabored  Abdominal: Soft, no abdominal scars, no obvious hernias, no peritonitis  Neurologic: awake, alert, no obvious focal deficits  Extremities: warm, + edema  Skin: no obvious rashes nor worrisome lesions seen     CBC  Results from last 7 days   Lab Units 02/08/23  0939   WBC 10*3/mm3 9.66   HEMOGLOBIN g/dL 5.9*   HEMATOCRIT % 18.0*   PLATELETS 10*3/mm3 255       CMP  Results from last 7 days   Lab Units 02/08/23  0343 02/07/23  1207   SODIUM mmol/L 138 134*   POTASSIUM mmol/L 4.4 5.3*   CHLORIDE mmol/L 97* 99   CO2 mmol/L 21.0* 13.0*   BUN mg/dL 140* 164*   CREATININE mg/dL 11.76* 13.21*   CALCIUM mg/dL 8.2* 8.5*   BILIRUBIN mg/dL  --  0.3   ALK PHOS U/L  --  103   ALT (SGPT) U/L  --  19   AST (SGOT) U/L  --  12   GLUCOSE mg/dL 140* 169*       Radiology  Imaging Results (Last 72 Hours)     Procedure Component Value Units Date/Time    CT Abdomen Pelvis Without Contrast [[id number]] Collected: 02/07/23 1741     Updated: 02/07/23 1753    Narrative:      CT ABDOMEN PELVIS WO CONTRAST    Date of Exam: 2/7/2023 5:05 PM EST    Indication: urinary obstruction.    Comparison: None available.    Technique: Axial CT images were obtained of the abdomen and pelvis without the administration of contrast. Reconstructed coronal and sagittal " images were also obtained. Automated exposure control and iterative construction methods were used.    Findings:  No suspicious infiltrate is seen in the lung bases. No pleural effusion. There is a trace amount of pericardial fluid. Heart size appears within normal limits.    Large calcified gallstone is seen in the gallbladder lumen. No definite pericholecystic inflammation or biliary ductal dilatation. Small cyst is seen in the posterior-inferior right hepatic lobe. No splenomegaly. No suspicious adrenal lesion is seen. No   acute pancreatic abnormality is identified. There is atherosclerosis of the aorta without definite aneurysm. No significant abdominal adenopathy is seen.    There is moderate to severe bilateral hydronephrosis. There is bilateral renal cortical thinning. No significant renal calculi are seen. There is bilateral ureteral dilatation to the urinary bladder. No obstructing calculus or other ureteral abnormality   is seen. The bladder is prominently distended. The bladder wall appears mildly thickened. There appears to be enlargement of the prostate with irregular protrusion into the inferior urinary bladder. No definite inflammatory changes are seen surrounding   the bladder on this exam. The seminal vesicles appear symmetric and grossly normal in size. No definite urethral calculus is seen. Overall findings are suspicious for bladder outlet obstruction due to prostate enlargement with moderate to severe   hydronephrosis.    No acute gastric abnormality is seen. Small bowel loops appear nondilated. The appendix appears within normal limits.    No definite lytic or aggressive osseous lesion is seen. There are degenerative changes in the lumbar spine. There is mild to moderate bilateral hip arthritis. Small sclerotic foci in the proximal femurs and at L2 are nonspecific but may represent bone   islands.      Impression:      Impression:  1.Moderate to severe bilateral hydronephrosis with ureteral  dilatation and prominent bladder distention. The prostate appears enlarged with irregular protrusion into the urinary bladder. Findings are suspicious for bladder outlet obstruction due to the   prostate. Recommend urology consultation and catheter placement.  2.Prostate enlargement is indeterminate and could be related to benign hypertrophy or malignancy.  3.Large calcified gallstone without CT findings of acute cholecystitis.  4.Trace pericardial effusion.  5.Small sclerotic foci in the proximal femurs and at L2 are indeterminate and could be benign bone islands, but sclerotic metastases, which are commonly seen with prostate malignancy, cannot be excluded, given the above findings.    Electronically Signed: Chaz Rodriguez    2/7/2023 5:51 PM EST    Workstation ID: [id number]    XR Chest 1 View [[id number]] Collected: 02/07/23 1437     Updated: 02/07/23 1440    Narrative:      XR CHEST 1 VW    Date of Exam: 2/7/2023 2:04 PM EST    Indication: Chest Pain Triage Protocol.    Comparison: None available.    Findings:  Heart size and pulmonary vasculature are within normal limits. Lungs clear. Costophrenic angles sharp      Impression:      Impression:  No active cardiopulmonary disease    Electronically Signed: Martin Marinelli    2/7/2023 2:38 PM EST    Workstation ID: [id number]            Assessment:  Acute kidney injury  Chronic renal failure - obstructive uropathy    Plan:  At this point he is hopeful that his kidney function will improve with relief of his obstructive uropathy.  I discussed the possibilities of arteriovenous fistula creation and PD with him and his family.  No further IVs in the left upper extremity or blood draws.  I will obtain a left upper extremity vein mapping for future arteriovenous fistula creation.  He is a candidate looking at his abdomen for peritoneal dialysis.  IR may place a tunneled catheter if this is needed.  Otherwise I will follow along peripherally.    Sanford Chowdhury,  MD  02/08/23  18:01 EST

## 2023-02-08 NOTE — DISCHARGE PLACEMENT REQUEST
"Jun Garcia (62 y.o. Male)   To [facility] Admissions  From Milka Vazquez [phone]    Date of Birth   1960    Social Security Number       Address   [address] [place] [address]    Home Phone   [phone]    MRN   [id number]       Sikh   Holiness    Marital Status                               Admission Date   2/7/23    Admission Type   Emergency    Admitting Provider   Mirian Walsh MD    Attending Provider   Mirian Walsh MD    Department, Room/Bed   [address], S513/1       Discharge Date       Discharge Disposition       Discharge Destination                               Attending Provider: Mirian Walsh MD    Allergies: No Known Allergies    Isolation: None   Infection: None   Code Status: CPR    Ht: 170.2 cm (67.01\")   Wt: 68 kg (149 lb 14.6 oz)    Admission Cmt: None   Principal Problem: Acute renal failure superimposed on stage 4 chronic kidney disease, unspecified acute renal failure type (HCC) [N17.9,N18.4]                 Active Insurance as of 2/7/2023     Primary Coverage     Payor Plan Insurance Group Employer/Plan Group    [facility] MEDICAID      Payor Plan Address Payor Plan Phone Number Payor Plan Fax Number Effective Dates    [address] [address] [phone]  2/7/2023 - None Entered    [facility] [id number]       Subscriber Name Subscriber Birth Date Member ID       JUN GARCIA 1960 [id number]                 Emergency Contacts      (Rel.) Home Phone Work Phone Mobile Phone    CAMPOSVENECIA DOWD (Daughter) [phone] -- [phone]    ANDRESTAWANDA (Son) [phone] -- [phone]            Insurance Information                [facility] MEDICAID Phone: [phone]    Subscriber: Jun Garcia Subscriber#: [id number]    Group#: -- Precert#: --             History & Physical      Carola Henry II, DO at 02/07/23 1359              [facility] Medicine " Services  HISTORY AND PHYSICAL    Patient Name: Jun Garcia  : 1960  MRN: [id number]  Primary Care Physician: Provider, No Known  Date of admission: 2023      Subjective    Subjective     Chief Complaint: weakness, nausea    HPI:  Jun Garcia is a 62 y.o. male presenting with his family with worsening weakness, nausea, weight loss, epigastric pain. He has a complicated recent history and is unable to provide full story but from what I can gather in [facility] he was admitted at  2022 for obstructive uropathy and renal failure. At that time cystoscopy and urgent HD was recommended but it sounds to me based on our conversation that the patient refused both. He was discharged home and has been taking no medications since that time. Tells me today that since that time he has gradually gone downhill. He has been unable to eat hardly anything and has lost a significant amount of weight. He complains also of epigastric burning during this time. He tells me has has not followed up with anyone because he got mixed messages from his last stay and felt like he was doing fine.    Review of Systems   Gen- No fevers, chills  CV- No chest pain, palpitations  Resp- No cough, dyspnea  GI- No N/V/D    All other systems reviewed and are negative.     Personal History     PMH: CKD IV, obstructive uropathy, ? diabetes    PSH: Reviewed and noncontributory    Family History: Otherwise pertinent FHx was reviewed and unremarkable.     Social History:  No tobacco, ETOH, illicit drugs    Medications:  Available home medication information reviewed.         No Known Allergies    Objective    Objective     Vital Signs:   Temp:  [98.5 °F (36.9 °C)] 98.5 °F (36.9 °C)  Heart Rate:  [] 99  Resp:  [18] 18  BP: (103-148)/(70-84) 148/84       Physical Exam   Constitutional: Awake, alert, appears to feel poorly  Eyes: PERRLA, sclerae anicteric, no conjunctival injection  HENT: NCAT, dry mm  Neck: Supple, no thyromegaly, no  lymphadenopathy, trachea midline  Respiratory: Clear to auscultation bilaterally, nonlabored respirations   Cardiovascular: Slightly tachy, no murmur  Gastrointestinal: Positive bowel sounds, soft, nontender, nondistended  Musculoskeletal: Muscle wasting.  Psychiatric: Appropriate affect, cooperative  Neurologic: Oriented x 3, strength symmetric in all extremities, Cranial Nerves grossly intact to confrontation, speech clear  Skin: No rashes    Result Review:  I have personally reviewed the results from the time of this admission to 2/7/2023 14:00 EST and agree with these findings:  []  Laboratory list / accordion  []  Microbiology  []  Radiology  []  EKG/Telemetry   []  Cardiology/Vascular   []  Pathology  []  Old records  []  Other:  Most notable findings include:         LAB RESULTS:      Lab 02/07/23  1207   WBC 12.08*   HEMOGLOBIN 7.8*   HEMATOCRIT 23.7*   PLATELETS 277   NEUTROS ABS 8.40*   IMMATURE GRANS (ABS) 0.05   LYMPHS ABS 1.09   MONOS ABS 0.51   EOS ABS 1.96*   MCV 89.4   PROTIME 14.8*   INR 1.17*   APTT 38.1*         Lab 02/07/23  1207   SODIUM 134*   POTASSIUM 5.3*   CHLORIDE 99   CO2 13.0*   ANION GAP 22.0*   *   CREATININE 13.21*   EGFR 3.8*   GLUCOSE 169*   CALCIUM 8.5*   MAGNESIUM 3.3*   PHOSPHORUS 6.8*         Lab 02/07/23  1207   TOTAL PROTEIN 8.7*   ALBUMIN 4.2   GLOBULIN 4.5   ALT (SGPT) 19   AST (SGOT) 12   BILIRUBIN 0.3   ALK PHOS 103   LIPASE 124*         Lab 02/07/23  1207   PROBNP 1,316.0*   HSTROP T 60*                 UA    Urinalysis 11/17/22   Squamous Epithelial Cells, UA 0-5   Specific Gravity, UA 1.010   Blood, UA Large (A)   Leukocytes, UA Large (A)   RBC, UA 31-50 (A)   Bacteria, UA Negative   (A) Abnormal value       Comments are available for some flowsheets but are not being displayed.             Microbiology Results (last 10 days)     ** No results found for the last 240 hours. **          No radiology results from the last 24 hrs        Assessment & Plan   Assessment  & Plan     Active Hospital Problems    Diagnosis  POA   • **Acute renal failure superimposed on stage 4 chronic kidney disease, unspecified acute renal failure type (HCC) [N17.9, N18.4]  Yes   • Metabolic acidosis [E87.20]  Yes   • Hyperkalemia [E87.5]  Yes   • Elevated troponin [R77.8]  Yes   • Elevated lipase [R74.8]  Yes     61 y/o male with prior hx obstructive uropathy and CKD presenting with worsening FTT and ongoing renal failure.    CKD IV-V nearing ESRD  Metabolic acidosis  Hyperkalemia  --NAL has been contacted by ED, will see patient and discuss need for HD with him.  --Will start bicarb gtt for volume expansion and to correct acidosis.  --CM for HD chair if he agrees.    Hx obstructive uropathy  --CT scan from Nov indicates obstruction at level of bladder concerning for mass. Apparently he had hematuria at that time. It sounds like cystoscopy was recommended however never occurred.   --Repeat CT A/P  --Consult urology  --Elsah caldwell.    Elevated troponin   --Suspect due to volume depletion in setting of his renal failure. His troponin is about level as it was previously. He denies chest pain at this time. Rec'd asa in ED. Will continue to monitor troponin and EKG.  --Echo.    Elevated lipase  --Based on history of abd burning may have some resolving pancreatitis? Will continue supportive tx with IVF, pain meds. Okay for CLD once decision made regarding HD, HD access.  --Will also treat empirically for PUD with BID PPI for now.    DVT prophylaxis:  [facility]      CODE STATUS:  Full Code  Code Status and Medical Interventions:   Ordered at: 02/07/23 1357     Code Status (Patient has no pulse and is not breathing):    CPR (Attempt to Resuscitate)     Medical Interventions (Patient has pulse or is breathing):    Full Support       Expected Discharge 2/11       Carola Henry II, DO  02/07/23    Electronically signed by Carola Henry II, DO at 02/07/23 1410       Vital Signs (last day)     Date/Time Temp  Temp src Pulse Resp BP Patient Position SpO2    02/08/23 1030 98.2 (36.8) Oral 106 18 107/75 Lying 100    02/08/23 0758 98.3 (36.8) Oral 106 18 99/73 Lying 100    02/08/23 0336 98.2 (36.8) Oral 97 16 108/77 Lying 100    02/07/23 2348 98.1 (36.7) Oral 95 18 143/81 Lying 99    02/07/23 1900 97.6 (36.4) Axillary 90 18 155/85 Lying 100    02/07/23 1451 97.8 (36.6) Oral 82 18 159/81 Lying 100    02/07/23 1400 -- -- 89 -- 150/96 -- 100    02/07/23 1355 -- -- 89 -- 160/93 -- 100    02/07/23 1300 -- -- 99 -- -- -- 99    02/07/23 1254 -- -- 104 -- 148/84 -- 98    02/07/23 1150 98.5 (36.9) Oral 114 18 103/70 Sitting 98            Current Facility-Administered Medications   Medication Dose Route Frequency Provider Last Rate Last Admin   • acetaminophen (TYLENOL) tablet 650 mg  650 mg Oral Q4H PRN Carola Henry II, DO        Or   • acetaminophen (TYLENOL) 160 MG/5ML solution 650 mg  650 mg Oral Q4H PRN Carola Henry II, DO        Or   • acetaminophen (TYLENOL) suppository 650 mg  650 mg Rectal Q4H PRN Carola Henry II, DO       • b complex-vitamin c-folic acid (NEPHRO-LASHON) tablet 1 tablet  1 tablet Oral Daily Zion Hardin MD   1 tablet at 02/08/23 0808   • sennosides-docusate (PERICOLACE) 8.6-50 MG per tablet 2 tablet  2 tablet Oral BID Carola Henry II, DO        And   • polyethylene glycol (MIRALAX) packet 17 g  17 g Oral Daily PRN Carola Henry II, DO        And   • bisacodyl (DULCOLAX) EC tablet 5 mg  5 mg Oral Daily PRN Carola Henry II, DO        And   • bisacodyl (DULCOLAX) suppository 10 mg  10 mg Rectal Daily PRN Carola Henry II, DO       • dextrose (D50W) (25 g/50 mL) IV injection 25 g  25 g Intravenous Q15 Min PRN Carola Henry II, DO       • dextrose (GLUTOSE) oral gel 15 g  15 g Oral Q15 Min PRN Carola Henry II, DO       • epoetin sloan-epbx (RETACRIT) injection 20,000 Units  20,000 Units Subcutaneous Once per day on Mon Wed Fri Lashawn, Zion Barrera MD       • glucagon  (GLUCAGEN) injection 1 mg  1 mg Intramuscular Q15 Min PRN Carl, Carola M II, DO       • heparin (porcine) 5000 UNIT/ML injection 5,000 Units  5,000 Units Subcutaneous Q8H Carl, Carola M II, DO   5,000 Units at 02/07/23 2140   • Insulin Lispro (humaLOG) injection 0-7 Units  0-7 Units Subcutaneous TID AC Carl, Carola M II, DO   2 Units at 02/08/23 1135   • melatonin tablet 5 mg  5 mg Oral Nightly PRN Carl, Carola M II, DO       • morphine injection 2 mg  2 mg Intravenous Q2H PRN Carl, Acrola M II, DO   2 mg at 02/07/23 2201    And   • naloxone (NARCAN) injection 0.4 mg  0.4 mg Intravenous Q5 Min PRN Carl, Carola M II, DO       • ondansetron (ZOFRAN) tablet 4 mg  4 mg Oral Q6H PRN Carl, Carola M II, DO        Or   • ondansetron (ZOFRAN) injection 4 mg  4 mg Intravenous Q6H PRN Carl, Carola M II, DO   4 mg at 02/08/23 0933   • pantoprazole (PROTONIX) EC tablet 40 mg  40 mg Oral BID AC Carl, Carola M II, DO   40 mg at 02/08/23 0808   • sodium bicarbonate 8.4 % 150 mEq in dextrose (D5W) 5 % 1,000 mL infusion (greater than 75 mEq)  150 mEq Intravenous Continuous Carl, Carola M II,  mL/hr at 02/07/23 1749 150 mEq at 02/07/23 1749   • sodium bicarbonate tablet 650 mg  650 mg Oral TID Jordan Marquez MD   650 mg at 02/08/23 0808   • sodium chloride 0.9 % flush 10 mL  10 mL Intravenous PRN Jefferson Burton MD       • sodium chloride 0.9 % flush 10 mL  10 mL Intravenous Q12H Carl, Carola M II, DO   10 mL at 02/07/23 2141   • sodium chloride 0.9 % flush 10 mL  10 mL Intravenous PRN Carl, Carola M II, DO       • sodium chloride 0.9 % infusion 40 mL  40 mL Intravenous PRN Carl, Carola M II, DO           Lab Results (last 24 hours)     Procedure Component Value Units Date/Time    POC Glucose Once [[id number]]  (Abnormal) Collected: 02/08/23 1052    Specimen: Blood Updated: 02/08/23 1053     Glucose 160 mg/dL      Comment: Meter: [id number] : [id number] Germaine Murillo       CBC (No Diff)  [[id number]]  (Abnormal) Collected: 02/08/23 0939    Specimen: Blood Updated: 02/08/23 1007     WBC 9.66 10*3/mm3      RBC 2.03 10*6/mm3      Hemoglobin 5.9 g/dL      Hematocrit 18.0 %      MCV 88.7 fL      MCH 29.1 pg      MCHC 32.8 g/dL      RDW 13.2 %      RDW-SD 42.5 fl      MPV 9.0 fL      Platelets 255 10*3/mm3     Narrative:      Verified by repeat analysis.  2nd collection.      POC Glucose Once [[id number]]  (Abnormal) Collected: 02/08/23 0913    Specimen: Blood Updated: 02/08/23 0915     Glucose 228 mg/dL      Comment: Meter: [id number] : [id number] Forbesgerald Wilson       POC Glucose Once [[id number]]  (Abnormal) Collected: 02/08/23 0800    Specimen: Blood Updated: 02/08/23 0801     Glucose 186 mg/dL      Comment: Meter: [id number] : [id number] Razia Lora       Basic Metabolic Panel [[id number]]  (Abnormal) Collected: 02/08/23 0343    Specimen: Blood Updated: 02/08/23 0718     Glucose 140 mg/dL       mg/dL      Creatinine 11.76 mg/dL      Sodium 138 mmol/L      Potassium 4.4 mmol/L      Chloride 97 mmol/L      CO2 21.0 mmol/L      Calcium 8.2 mg/dL      BUN/Creatinine Ratio 11.9     Anion Gap 20.0 mmol/L      eGFR 4.4 mL/min/1.73      Comment: <15 Indicative of kidney failure       Narrative:      GFR Normal >60  Chronic Kidney Disease <60  Kidney Failure <15      Iron Profile [[id number]]  (Abnormal) Collected: 02/08/23 0343    Specimen: Blood Updated: 02/08/23 0701     Iron 68 mcg/dL      Iron Saturation 29 %      Transferrin 157 mg/dL      TIBC 234 mcg/dL     Phosphorus [[id number]]  (Abnormal) Collected: 02/08/23 0343    Specimen: Blood Updated: 02/08/23 0701     Phosphorus 6.7 mg/dL     Hepatitis Panel, Acute [[id number]]  (Normal) Collected: 02/08/23 0343    Specimen: Blood Updated: 02/08/23 0651     Hepatitis B Surface Ag Non-Reactive     Hep A IgM Non-Reactive     Hep B C IgM Non-Reactive     Hepatitis C Ab Non-Reactive    Narrative:      Results may be falsely decreased if patient taking  Biotin.     Ferritin [[id number]]  (Abnormal) Collected: 02/08/23 0343    Specimen: Blood Updated: 02/08/23 0649     Ferritin 899.10 ng/mL     Narrative:      Results may be falsely decreased if patient taking Biotin.      POC Glucose Once [[id number]]  (Normal) Collected: 02/07/23 1615    Specimen: Blood Updated: 02/07/23 1622     Glucose 101 mg/dL      Comment: Meter: [id number] : [id number] Adele Rowe       [place] Draw [[id number]] Collected: 02/07/23 1207    Specimen: Blood Updated: 02/07/23 1615    Narrative:      The following orders were created for panel order [place] Draw.  Procedure                               Abnormality         Status                     ---------                               -----------         ------                     Green Top (Gel)[[id number]]                                  Final result               Lavender Top[[id number]]                                     Final result               Gold Top - SST[[id number]]                                   Final result               Chiang Top[[id number]]                                         Final result               Light Blue Top[[id number]]                                   Final result                 Please view results for these tests on the individual orders.    Gray Top [[id number]] Collected: 02/07/23 1207    Specimen: Blood Updated: 02/07/23 1615     Extra Tube Hold for add-ons.     Comment: Auto resulted.       High Sensitivity Troponin T 2Hr [[id number]]  (Abnormal) Collected: 02/07/23 1418    Specimen: Blood Updated: 02/07/23 1504     HS Troponin T 57 ng/L      Troponin T Delta -3 ng/L     Narrative:      High Sensitive Troponin T Reference Range:  <10.0 ng/L- Negative Female for AMI  <15.0 ng/L- Negative Male for AMI  >=10 - Abnormal Female indicating possible myocardial injury.  >=15 - Abnormal Male indicating possible myocardial injury.   Clinicians would have to utilize clinical acumen, EKG, Troponin, and serial changes  to determine if it is an Acute Myocardial Infarction or myocardial injury due to an underlying chronic condition.         Green Top (Gel) [[id number]] Collected: 02/07/23 1207    Specimen: Blood Updated: 02/07/23 1316     Extra Tube Hold for add-ons.     Comment: Auto resulted.       Lavender Top [[id number]] Collected: 02/07/23 1207    Specimen: Blood Updated: 02/07/23 1316     Extra Tube hold for add-on     Comment: Auto resulted       Gold Top - SST [[id number]] Collected: 02/07/23 1207    Specimen: Blood Updated: 02/07/23 1316     Extra Tube Hold for add-ons.     Comment: Auto resulted.       Light Blue Top [[id number]] Collected: 02/07/23 1207    Specimen: Blood Updated: 02/07/23 1316     Extra Tube Hold for add-ons.     Comment: Auto resulted       Protime-INR [[id number]]  (Abnormal) Collected: 02/07/23 1207    Specimen: Blood Updated: 02/07/23 1314     Protime 14.8 Seconds      INR 1.17    aPTT [[id number]]  (Abnormal) Collected: 02/07/23 1207    Specimen: Blood Updated: 02/07/23 1314     PTT 38.1 seconds     Narrative:      PTT = The equivalent PTT values for the therapeutic range of heparin levels at 0.3 to 0.5 U/ml are 60 to 70 seconds.    Magnesium [[id number]]  (Abnormal) Collected: 02/07/23 1207    Specimen: Blood Updated: 02/07/23 1305     Magnesium 3.3 mg/dL     Phosphorus [[id number]]  (Abnormal) Collected: 02/07/23 1207    Specimen: Blood Updated: 02/07/23 1305     Phosphorus 6.8 mg/dL     Comprehensive Metabolic Panel [[id number]]  (Abnormal) Collected: 02/07/23 1207    Specimen: Blood Updated: 02/07/23 1247     Glucose 169 mg/dL       mg/dL      Creatinine 13.21 mg/dL      Sodium 134 mmol/L      Potassium 5.3 mmol/L      Chloride 99 mmol/L      CO2 13.0 mmol/L      Calcium 8.5 mg/dL      Total Protein 8.7 g/dL      Albumin 4.2 g/dL      ALT (SGPT) 19 U/L      AST (SGOT) 12 U/L      Alkaline Phosphatase 103 U/L      Total Bilirubin 0.3 mg/dL      Globulin 4.5 gm/dL      Comment: Calculated Result         A/G Ratio 0.9 g/dL      BUN/Creatinine Ratio 12.4     Anion Gap 22.0 mmol/L      eGFR 3.8 mL/min/1.73      Comment: <15 Indicative of kidney failure       Narrative:      GFR Normal >60  Chronic Kidney Disease <60  Kidney Failure <15      Troponin [[id number]]  (Abnormal) Collected: 02/07/23 1207    Specimen: Blood Updated: 02/07/23 1241     HS Troponin T 60 ng/L     Narrative:      High Sensitive Troponin T Reference Range:  <10.0 ng/L- Negative Female for AMI  <15.0 ng/L- Negative Male for AMI  >=10 - Abnormal Female indicating possible myocardial injury.  >=15 - Abnormal Male indicating possible myocardial injury.   Clinicians would have to utilize clinical acumen, EKG, Troponin, and serial changes to determine if it is an Acute Myocaridal Infarction or myocardial injury due to an underlying chronic condition.         Lipase [[id number]]  (Abnormal) Collected: 02/07/23 1207    Specimen: Blood Updated: 02/07/23 1241     Lipase 124 U/L     BNP [[id number]]  (Abnormal) Collected: 02/07/23 1207    Specimen: Blood Updated: 02/07/23 1240     proBNP 1,316.0 pg/mL     Narrative:      Among patients with dyspnea, NT-proBNP is highly sensitive for the detection of acute congestive heart failure. In addition NT-proBNP of <300 pg/ml effectively rules out acute congestive heart failure with 99% negative predictive value.    Results may be falsely decreased if patient taking Biotin.      CBC & Differential [[id number]]  (Abnormal) Collected: 02/07/23 1207    Specimen: Blood Updated: 02/07/23 1221    Narrative:      The following orders were created for panel order CBC & Differential.  Procedure                               Abnormality         Status                     ---------                               -----------         ------                     CBC Auto Differential[[id number]]        Abnormal            Final result                 Please view results for these tests on the individual orders.    CBC Auto  Differential [[id number]]  (Abnormal) Collected: 02/07/23 1207    Specimen: Blood Updated: 02/07/23 1221     WBC 12.08 10*3/mm3      RBC 2.65 10*6/mm3      Hemoglobin 7.8 g/dL      Hematocrit 23.7 %      MCV 89.4 fL      MCH 29.4 pg      MCHC 32.9 g/dL      RDW 13.5 %      RDW-SD 44.3 fl      MPV 8.7 fL      Platelets 277 10*3/mm3      Neutrophil % 69.6 %      Lymphocyte % 9.0 %      Monocyte % 4.2 %      Eosinophil % 16.2 %      Basophil % 0.6 %      Immature Grans % 0.4 %      Neutrophils, Absolute 8.40 10*3/mm3      Lymphocytes, Absolute 1.09 10*3/mm3      Monocytes, Absolute 0.51 10*3/mm3      Eosinophils, Absolute 1.96 10*3/mm3      Basophils, Absolute 0.07 10*3/mm3      Immature Grans, Absolute 0.05 10*3/mm3      nRBC 0.0 /100 WBC         Imaging Results (Most Recent)     Procedure Component Value Units Date/Time    CT Abdomen Pelvis Without Contrast [[id number]] Collected: 02/07/23 1741     Updated: 02/07/23 1753    Narrative:      CT ABDOMEN PELVIS WO CONTRAST    Date of Exam: 2/7/2023 5:05 PM EST    Indication: urinary obstruction.    Comparison: None available.    Technique: Axial CT images were obtained of the abdomen and pelvis without the administration of contrast. Reconstructed coronal and sagittal images were also obtained. Automated exposure control and iterative construction methods were used.    Findings:  No suspicious infiltrate is seen in the lung bases. No pleural effusion. There is a trace amount of pericardial fluid. Heart size appears within normal limits.    Large calcified gallstone is seen in the gallbladder lumen. No definite pericholecystic inflammation or biliary ductal dilatation. Small cyst is seen in the posterior-inferior right hepatic lobe. No splenomegaly. No suspicious adrenal lesion is seen. No   acute pancreatic abnormality is identified. There is atherosclerosis of the aorta without definite aneurysm. No significant abdominal adenopathy is seen.    There is moderate to severe  bilateral hydronephrosis. There is bilateral renal cortical thinning. No significant renal calculi are seen. There is bilateral ureteral dilatation to the urinary bladder. No obstructing calculus or other ureteral abnormality   is seen. The bladder is prominently distended. The bladder wall appears mildly thickened. There appears to be enlargement of the prostate with irregular protrusion into the inferior urinary bladder. No definite inflammatory changes are seen surrounding   the bladder on this exam. The seminal vesicles appear symmetric and grossly normal in size. No definite urethral calculus is seen. Overall findings are suspicious for bladder outlet obstruction due to prostate enlargement with moderate to severe   hydronephrosis.    No acute gastric abnormality is seen. Small bowel loops appear nondilated. The appendix appears within normal limits.    No definite lytic or aggressive osseous lesion is seen. There are degenerative changes in the lumbar spine. There is mild to moderate bilateral hip arthritis. Small sclerotic foci in the proximal femurs and at L2 are nonspecific but may represent bone   islands.      Impression:      Impression:  1.Moderate to severe bilateral hydronephrosis with ureteral dilatation and prominent bladder distention. The prostate appears enlarged with irregular protrusion into the urinary bladder. Findings are suspicious for bladder outlet obstruction due to the   prostate. Recommend urology consultation and catheter placement.  2.Prostate enlargement is indeterminate and could be related to benign hypertrophy or malignancy.  3.Large calcified gallstone without CT findings of acute cholecystitis.  4.Trace pericardial effusion.  5.Small sclerotic foci in the proximal femurs and at L2 are indeterminate and could be benign bone islands, but sclerotic metastases, which are commonly seen with prostate malignancy, cannot be excluded, given the above findings.    Electronically Signed:  Chaz Rodriguez    2/7/2023 5:51 PM EST    Workstation ID: [id number]    XR Chest 1 View [[id number]] Collected: 02/07/23 1437     Updated: 02/07/23 1440    Narrative:      XR CHEST 1 VW    Date of Exam: 2/7/2023 2:04 PM EST    Indication: Chest Pain Triage Protocol.    Comparison: None available.    Findings:  Heart size and pulmonary vasculature are within normal limits. Lungs clear. Costophrenic angles sharp      Impression:      Impression:  No active cardiopulmonary disease    Electronically Signed: Martin Marinelli    2/7/2023 2:38 PM EST    Workstation ID: [id number]        ECG/EMG Results (most recent)     Procedure Component Value Units Date/Time    ECG 12 Lead ED Triage Standing Order; Chest Pain [[id number]] Collected: 02/07/23 1203     Updated: 02/07/23 1336     QT Interval 350 ms      QTC Interval 469 ms     Narrative:      Test Reason : ED Triage Standing Order~  Blood Pressure :   */*   mmHG  Vent. Rate : 108 BPM     Atrial Rate : 108 BPM     P-R Int : 186 ms          QRS Dur : 104 ms      QT Int : 350 ms       P-R-T Axes :  75  79  71 degrees     QTc Int : 469 ms    Sinus tachycardia  Incomplete right bundle branch block  Borderline ECG  No previous ECGs available    Referred By: EDMD           Confirmed By:     ECG 12 Lead ED Triage Standing Order; Chest Pain [[id number]] Collected: 02/07/23 1411     Updated: 02/07/23 1416     QT Interval 392 ms      QTC Interval 449 ms     Narrative:      Test Reason : ED Triage Standing Order~  Blood Pressure :   */*   mmHG  Vent. Rate :  79 BPM     Atrial Rate :  79 BPM     P-R Int : 224 ms          QRS Dur : 102 ms      QT Int : 392 ms       P-R-T Axes :  71  74  75 degrees     QTc Int : 449 ms    Sinus rhythm with 1st degree AV block with premature supraventricular complexes  Incomplete right bundle branch block  Borderline ECG  When compared with ECG of 07-FEB-2023 12:03, (Unconfirmed)  premature supraventricular complexes are now present  NM interval has  increased    Referred By: EDMD           Confirmed By:     SCANNED - TELEMETRY   [[id number]] Resulted: 02/07/23     Updated: 02/08/23 0626    ECG 12 Lead Chest Pain [[id number]] Collected: 02/08/23 0917     Updated: 02/08/23 0920     QT Interval 338 ms      QTC Interval 461 ms     Narrative:      Test Reason : Chest Pain  Blood Pressure :   */*   mmHG  Vent. Rate : 112 BPM     Atrial Rate : 112 BPM     P-R Int : 204 ms          QRS Dur :  82 ms      QT Int : 338 ms       P-R-T Axes :  73  85  75 degrees     QTc Int : 461 ms    Sinus tachycardia  Nonspecific ST abnormality  Abnormal ECG    Referred By: OPI           Confirmed By:     Adult Transthoracic Echo Complete w/ Color, Spectral and Contrast if necessary per protocol [[id number]] Resulted: 02/08/23 1041     Updated: 02/08/23 1046     Target HR (85%) 133 bpm      Max. Pred. HR (100%) 156 bpm      EF(MOD-bp) 80.6 %      LVIDd 3.9 cm      LVIDs 2.30 cm      IVSd 1.20 cm      LVPWd 1.40 cm      FS 41.0 %      IVS/LVPW 0.86 cm      ESV(cubed) 12.2 ml      EDV(cubed) 59.3 ml      LVOT area 2.5 cm2      LV mass(C)d 179.7 grams      LVOT diam 1.80 cm      EDV(MOD-sp2) 49.7 ml      EDV(MOD-sp4) 49.2 ml      ESV(MOD-sp2) 8.3 ml      ESV(MOD-sp4) 10.4 ml      SV(MOD-sp2) 41.4 ml      SV(MOD-sp4) 38.8 ml      EF(MOD-sp2) 83.2 %      EF(MOD-sp4) 78.9 %      MV E max darryl 113.0 cm/sec      MV A max darryl 73.9 cm/sec      MV dec time 0.12 msec      MV E/A 1.53     LA ESV Index (BP) 24.8 ml/m2      Med Peak E' Darryl 9.7 cm/sec      Lat Peak E' Darryl 6.7 cm/sec      Avg E/e' ratio 13.78     SV(LVOT) 75.6 ml      RV Base 2.8 cm      RV Mid 1.80 cm      RV Length 8.1 cm      TAPSE (>1.6) 2.5 cm      RV S' 18.0 cm/sec      LV V1 max 163.0 cm/sec      LV V1 max PG 10.6 mmHg      LV V1 mean PG 6.0 mmHg      LV V1 VTI 29.7 cm      Ao pk darryl 305.0 cm/sec      Ao max PG 37.2 mmHg      Ao mean PG 14.0 mmHg      Ao V2 VTI 41.7 cm      NOREEN(I,D) 1.81 cm2      MV P1/2t 49.4 msec      MVA(P1/2t) 4.5  cm2      MV dec slope 801.0 cm/sec2      MR max adele 415.0 cm/sec      MR max PG 68.9 mmHg      MR mean adele 259.0 cm/sec      MR mean PG 33.0 mmHg      MR VTI 69.6 cm      PA acc time 0.08 sec      PA pr(Accel) 44.4 mmHg      Ao root diam 3.2 cm      IVRT 109.0 msec      LA ESV (BP) 44.4 ml      BH CV VAS BP LEFT ARM 99/73 mmHg     Narrative:      •  Left ventricular systolic function is hyperdynamic (EF > 70%).  •  Left ventricular wall thickness is consistent with mild concentric   hypertrophy  •  Normal right ventricular cavity size and systolic function noted.  •  There is a moderate (1-2cm) pericardial effusion. There is no evidence   of cardiac tamponade.  No chamber collapse.          Operative/Procedure Notes (most recent note)    No notes of this type exist for this encounter.            Physician Progress Notes (most recent note)      Mirian Walsh MD at 23 0810              [facility] Medicine Services  PROGRESS NOTE    Patient Name: Jun Garcia  : 1960  MRN: [id number]    Date of Admission: 2023  Primary Care Physician: Provider, No Known    Subjective   Subjective     CC: Follow-up generalized weakness    HPI: No acute events overnight, however this morning patient had an episode of nausea, vomiting and being unresponsive did complain of some chest discomfort, all resolved spontaneously.    ROS:  Gen- No fevers, chills  CV- No chest pain, palpitations  Resp- No cough, dyspnea  GI- No N/V/D, abd pain      Objective   Objective     Vital Signs:   Temp:  [97.6 °F (36.4 °C)-98.5 °F (36.9 °C)] 98.3 °F (36.8 °C)  Heart Rate:  [] 106  Resp:  [16-18] 18  BP: ()/(70-96) 99/73     Physical Exam:  Constitutional: Chronically ill-appearing male, no acute distress, awake, alert  HENT: NCAT, mucous membranes moist  Respiratory: Clear to auscultation bilaterally, respiratory effort normal   Cardiovascular: RRR, no murmurs, rubs, or gallops  Gastrointestinal:  Positive bowel sounds, soft, nontender, nondistended  : Whitfield catheter in place, bloody output  Musculoskeletal: No bilateral ankle edema  Psychiatric: Appropriate affect, cooperative  Neurologic: Nonfocal  Skin: No rashes    Results Reviewed:  LAB RESULTS:      Lab 02/07/23  1207   WBC 12.08*   HEMOGLOBIN 7.8*   HEMATOCRIT 23.7*   PLATELETS 277   NEUTROS ABS 8.40*   IMMATURE GRANS (ABS) 0.05   LYMPHS ABS 1.09   MONOS ABS 0.51   EOS ABS 1.96*   MCV 89.4   PROTIME 14.8*   APTT 38.1*         Lab 02/08/23  0343 02/07/23  1207   SODIUM 138 134*   POTASSIUM 4.4 5.3*   CHLORIDE 97* 99   CO2 21.0* 13.0*   ANION GAP 20.0* 22.0*   * 164*   CREATININE 11.76* 13.21*   EGFR 4.4* 3.8*   GLUCOSE 140* 169*   CALCIUM 8.2* 8.5*   MAGNESIUM  --  3.3*   PHOSPHORUS 6.7* 6.8*         Lab 02/07/23  1207   TOTAL PROTEIN 8.7*   ALBUMIN 4.2   GLOBULIN 4.5   ALT (SGPT) 19   AST (SGOT) 12   BILIRUBIN 0.3   ALK PHOS 103   LIPASE 124*         Lab 02/07/23  1418 02/07/23  1207   PROBNP  --  1,316.0*   HSTROP T 57* 60*   PROTIME  --  14.8*   INR  --  1.17*             Lab 02/08/23  0343   IRON 68   IRON SATURATION 29   TIBC 234*   TRANSFERRIN 157*   FERRITIN 899.10*         Brief Urine Lab Results  (Last result in the past 365 days)      Color   Clarity   Blood   Leuk Est   Nitrite   Protein   CREAT   Urine HCG        11/17/22 1804 Yellow   Cloudy     Large                     Microbiology Results Abnormal     None          CT Abdomen Pelvis Without Contrast    Result Date: 2/7/2023  CT ABDOMEN PELVIS WO CONTRAST Date of Exam: 2/7/2023 5:05 PM EST Indication: urinary obstruction. Comparison: None available. Technique: Axial CT images were obtained of the abdomen and pelvis without the administration of contrast. Reconstructed coronal and sagittal images were also obtained. Automated exposure control and iterative construction methods were used. Findings: No suspicious infiltrate is seen in the lung bases. No pleural effusion. There is a  trace amount of pericardial fluid. Heart size appears within normal limits. Large calcified gallstone is seen in the gallbladder lumen. No definite pericholecystic inflammation or biliary ductal dilatation. Small cyst is seen in the posterior-inferior right hepatic lobe. No splenomegaly. No suspicious adrenal lesion is seen. No acute pancreatic abnormality is identified. There is atherosclerosis of the aorta without definite aneurysm. No significant abdominal adenopathy is seen. There is moderate to severe bilateral hydronephrosis. There is bilateral renal cortical thinning. No significant renal calculi are seen. There is bilateral ureteral dilatation to the urinary bladder. No obstructing calculus or other ureteral abnormality is seen. The bladder is prominently distended. The bladder wall appears mildly thickened. There appears to be enlargement of the prostate with irregular protrusion into the inferior urinary bladder. No definite inflammatory changes are seen surrounding the bladder on this exam. The seminal vesicles appear symmetric and grossly normal in size. No definite urethral calculus is seen. Overall findings are suspicious for bladder outlet obstruction due to prostate enlargement with moderate to severe hydronephrosis. No acute gastric abnormality is seen. Small bowel loops appear nondilated. The appendix appears within normal limits. No definite lytic or aggressive osseous lesion is seen. There are degenerative changes in the lumbar spine. There is mild to moderate bilateral hip arthritis. Small sclerotic foci in the proximal femurs and at L2 are nonspecific but may represent bone islands.     Impression: Impression: 1.Moderate to severe bilateral hydronephrosis with ureteral dilatation and prominent bladder distention. The prostate appears enlarged with irregular protrusion into the urinary bladder. Findings are suspicious for bladder outlet obstruction due to the prostate. Recommend urology  consultation and catheter placement. 2.Prostate enlargement is indeterminate and could be related to benign hypertrophy or malignancy. 3.Large calcified gallstone without CT findings of acute cholecystitis. 4.Trace pericardial effusion. 5.Small sclerotic foci in the proximal femurs and at L2 are indeterminate and could be benign bone islands, but sclerotic metastases, which are commonly seen with prostate malignancy, cannot be excluded, given the above findings. Electronically Signed: Chaz Rodriguez  2/7/2023 5:51 PM EST  Workstation ID: [id number]    XR Chest 1 View    Result Date: 2/7/2023  XR CHEST 1 VW Date of Exam: 2/7/2023 2:04 PM EST Indication: Chest Pain Triage Protocol. Comparison: None available. Findings: Heart size and pulmonary vasculature are within normal limits. Lungs clear. Costophrenic angles sharp     Impression: Impression: No active cardiopulmonary disease Electronically Signed: Martin Marinelli  2/7/2023 2:38 PM EST  Workstation ID: [id number]          I have reviewed the medications:  Scheduled Meds:b complex-vitamin c-folic acid, 1 tablet, Oral, Daily  epoetin sloan/sloan-epbx, 20,000 Units, Subcutaneous, Once per day on Mon Wed Fri  heparin (porcine), 5,000 Units, Subcutaneous, Q8H  insulin lispro, 0-7 Units, Subcutaneous, TID AC  pantoprazole, 40 mg, Oral, BID AC  senna-docusate sodium, 2 tablet, Oral, BID  sodium bicarbonate, 650 mg, Oral, TID  sodium chloride, 10 mL, Intravenous, Q12H      Continuous Infusions:sodium bicarbonate, 150 mEq, Last Rate: 150 mEq (02/07/23 5199)      PRN Meds:.•  acetaminophen **OR** acetaminophen **OR** acetaminophen  •  senna-docusate sodium **AND** polyethylene glycol **AND** bisacodyl **AND** bisacodyl  •  dextrose  •  dextrose  •  glucagon (human recombinant)  •  melatonin  •  Morphine **AND** naloxone  •  ondansetron **OR** ondansetron  •  sodium chloride  •  sodium chloride  •  sodium chloride    Assessment & Plan   Assessment & Plan     Active Hospital Problems     Diagnosis  POA   • **Acute renal failure superimposed on stage 4 chronic kidney disease, unspecified acute renal failure type (HCC) [N17.9, N18.4]  Yes   • Metabolic acidosis [E87.20]  Yes   • Hyperkalemia [E87.5]  Yes   • Elevated troponin [R77.8]  Yes   • Elevated lipase [R74.8]  Yes      Resolved Hospital Problems   No resolved problems to display.      Brief Hospital Course to date:  63 y/o male with prior hx obstructive uropathy and CKD presenting with worsening FTT and ongoing renal failure.     CKD IV-V nearing ESRD  Metabolic acidosis, improving  Hyperkalemia, resolved  -Nephrology following, patient needs HD, was previously reluctant, but after further discussion with him he is now agreeable, will let renal know.  -Plan for tunneled catheter placement for initiation of HD  -Continue sodium bicarb  -Follow-up repeat renal function panel    Obstructive uropathy  -CT abdomen pelvis continues to show moderate to severe bilateral hydronephrosis, enlarged prostate with irregular protrusion into the urinary bladder (same finding seen on CT done in November at )  -Urology has been consulted for possible cystoscopy  -Whitfield catheter in place     Anemia  -Likely anemia of chronic disease, follow-up repeat H&H as patient is now having gross hematuria    Elevated troponin   -Suspect this is secondary to volume depletion  -Patient is without any chest pain troponin has flattened   -EKG is unremarkable, follow-up echo elevated troponin      Elevated lipase  -Suspected to be likely secondary to resolving pancreatitis  -Continue supportive therapy with IV fluids and pain meds, diet as tolerated  -Continue twice daily PPI for now  for possible PUD     Expected Discharge Location and Transportation: home  Expected Discharge   Expected Discharge Date and Time     Expected Discharge Date Expected Discharge Time    Feb 10, 2023            DVT prophylaxis:  Medical DVT prophylaxis orders are present.     AM-PAC 6 Clicks  Score (PT): 19 (02/07/23 1515)    CODE STATUS:   Code Status and Medical Interventions:   Ordered at: 02/07/23 3457     Code Status (Patient has no pulse and is not breathing):    CPR (Attempt to Resuscitate)     Medical Interventions (Patient has pulse or is breathing):    Full Support       Mirian Walsh MD  02/08/23              Electronically signed by Mirian Walsh MD at 02/08/23 0954          Consult Notes (most recent note)      Jordan Marquez MD at 02/07/23 1927            Patient Care Team:  Provider, No Known as PCP - General  Provider, No Known as PCP - Family Medicine    Chief complaint: Epigastric pain  Weakness  Severe azotemia and renal failure       History of Present Illness: Mr Jun betancourt is a 63 yo gentleman with hx of CKD stage V, Obstructive uropathy, HTN. He is admitted with generalize weakness, epigastric pain. Patient is poor historian but did mentioned he has been out of his bp meds for last 5-6 months. He was admitted at  few months ago and was told to start dialysis however he left against medical advise. He does mention having kidney disease for many yrs.On this admission his labs remarkable for severe azotemia cr 10 , HB 7.8 Na 132. He cee any change in UOP. No significant LE edema noted. He mentioned food taste ok but he couldn't keep things down due to frequent nausea and vomiting. He is unable to tell if he had any workup in the past for ckd.However renal US from nov 2022 at  showed severe b/l hydronephrosis and bladder outlet obstruction 2/2 enlarge prostate. CT A/P on this admission showed similar findings for severe hydronephrosis but thinning of renal parenchyma. Caldwell cath was placed ~900cc UOP noted. Patient is extremely reluctant to the idea of dialysis he was hesitant to get caldwell cath placed as well.     Review of Systems   Constitutional: Positive for fatigue.   HENT: Negative.    Respiratory: Negative.    Cardiovascular: Negative.     Gastrointestinal: Positive for abdominal pain.   Genitourinary: Positive for difficulty urinating.   Musculoskeletal: Negative.    Neurological: Negative.    Hematological: Negative.    Psychiatric/Behavioral: Negative.         Past Medical History:   Diagnosis Date   • Diabetes mellitus (HCC)    • Elevated cholesterol    • GERD (gastroesophageal reflux disease)    • Hyperlipidemia    • Hypertension    • Stroke (HCC)    , History reviewed. No pertinent surgical history.,   Family History   Family history unknown: Yes   ,   Social History     Socioeconomic History   • Marital status:    Tobacco Use   • Smoking status: Never     Passive exposure: Never   • Smokeless tobacco: Never   Vaping Use   • Vaping Use: Never used   Substance and Sexual Activity   • Alcohol use: Never   • Drug use: Never   • Sexual activity: Defer     E-cigarette/Vaping   • E-cigarette/Vaping Use Never User    • Passive Exposure No    • Counseling Given No      E-cigarette/Vaping Substances   • Nicotine No    • THC No    • CBD No    • Flavoring No      E-cigarette/Vaping Devices   • Disposable No    • Pre-filled or Refillable Cartridge No    • Refillable Tank No    • Pre-filled Pod No         and   No medications prior to admission.       Objective     Vital Signs  Temp:  [97.6 °F (36.4 °C)-98.5 °F (36.9 °C)] 97.6 °F (36.4 °C)  Heart Rate:  [] 90  Resp:  [18] 18  BP: (103-160)/(70-96) 155/85    No intake/output data recorded.  No intake/output data recorded.    Physical Exam  Constitutional:       Appearance: Normal appearance.   HENT:      Head: Normocephalic and atraumatic.      Nose: Nose normal.   Eyes:      Extraocular Movements: Extraocular movements intact.      Pupils: Pupils are equal, round, and reactive to light.   Cardiovascular:      Rate and Rhythm: Normal rate and regular rhythm.      Pulses: Normal pulses.      Heart sounds: Normal heart sounds.   Pulmonary:      Effort: Pulmonary effort is normal.      Breath  sounds: Normal breath sounds.   Abdominal:      General: Abdomen is flat.   Skin:     General: Skin is warm.      Findings: No lesion or rash.   Neurological:      General: No focal deficit present.      Mental Status: He is alert and oriented to person, place, and time. Mental status is at baseline.         Results Review:    I reviewed the patient's new clinical results.    WBC WBC   Date Value Ref Range Status   02/07/2023 12.08 (H) 3.40 - 10.80 10*3/mm3 Final      HGB Hemoglobin   Date Value Ref Range Status   02/07/2023 7.8 (L) 13.0 - 17.7 g/dL Final      HCT Hematocrit   Date Value Ref Range Status   02/07/2023 23.7 (L) 37.5 - 51.0 % Final      Platlets No results found for: LABPLAT   MCV MCV   Date Value Ref Range Status   02/07/2023 89.4 79.0 - 97.0 fL Final          Sodium Sodium   Date Value Ref Range Status   02/07/2023 134 (L) 136 - 145 mmol/L Final      Potassium Potassium   Date Value Ref Range Status   02/07/2023 5.3 (H) 3.5 - 5.2 mmol/L Final      Chloride Chloride   Date Value Ref Range Status   02/07/2023 99 98 - 107 mmol/L Final      CO2 CO2   Date Value Ref Range Status   02/07/2023 13.0 (L) 22.0 - 29.0 mmol/L Final      BUN BUN   Date Value Ref Range Status   02/07/2023 164 (H) 8 - 23 mg/dL Final      Creatinine Creatinine   Date Value Ref Range Status   02/07/2023 13.21 (H) 0.76 - 1.27 mg/dL Final      Calcium Calcium   Date Value Ref Range Status   02/07/2023 8.5 (L) 8.6 - 10.5 mg/dL Final      PO4 No results found for: CAPO4   Albumin Albumin   Date Value Ref Range Status   02/07/2023 4.2 3.5 - 5.2 g/dL Final      Magnesium Magnesium   Date Value Ref Range Status   02/07/2023 3.3 (H) 1.6 - 2.4 mg/dL Final      Uric Acid No results found for: URICACID         Assessment & Plan       Acute renal failure superimposed on stage 4 chronic kidney disease, unspecified acute renal failure type (HCC)    Metabolic acidosis    Hyperkalemia    Elevated troponin    Elevated lipase      Assessment &  Plan    CKD stage V: Hx of obstructive uropathy with severe hydronephrosis on this admission noted previously on admission at [facility]. Patient left the hospital against medical advise.     Azotemia:BUN significantly elevated. Does have mild uremic symptoms. Fatigue, poor appetite and vomiiting    Obstructive uropathy: Likley due to enlarge prostate resulting in severe hydronephrosis. Cystoscopy was recommended on last visit at  but patient didn't have any intervention. Similar findings noted on this admission     Anemia: ACD due to advance CKD    Hyperkalemia: Due to CKD    Met acidosis: Due to CKD     Hyponatremia: Due to CKD    Recs  Severe renal dysfunction in the setting of chronic obstruction. Unfortunately this  Might have caused irreversible renal damage as evidenced by cortical thining on CT scan. Patient reluctant about dialysis and wants to consider home dialysis. I explained to him that he may need HD given severe metabolic derangements. Will watch the renal function overnight after placing caldwell cath. He will also need urology consult. High risk for needing dialysis. NPO after midnight for possible H Dcath tomorrow if no significant improvement in renal function. Start Na-bicarb 650mg TID. Expect improvement in hyperkalemia after placing caldwell cath. Daily labs. Strict I/O.      I discussed the patients findings and my recommendations with patient    Jordan Marquez MD  02/07/23  19:27 EST          Electronically signed by Jordan Marquez MD at 02/07/23 9018

## 2023-02-08 NOTE — ED PROVIDER NOTES
Subjective   History of Present Illness    Pt presents with chest tightness that started about three weeks ago.  It is worse with exertion and better after vomiting.  He has had vomiting for a couple of weeks, says all PO intake comes back up.  He also has palpitations with exertion and some fatigue overall.    He has PMH HTN, DM, CKD. He has been out of all meds for 4-6 months.  He was admitted over at the [facility] in November for renal failure, says he was told he may need dialysis but he didn't like what they were saying and left AMA.  He says he is having normal urine output currently.    History provided by:  Patient      Review of Systems   Constitutional: Negative for fever.   Respiratory: Negative for shortness of breath.    Cardiovascular: Positive for chest pain.   Gastrointestinal: Positive for nausea and vomiting. Negative for abdominal pain.   Genitourinary: Negative for decreased urine volume.   All other systems reviewed and are negative.      Past Medical History:   Diagnosis Date   • Diabetes mellitus (HCC)    • Elevated cholesterol    • GERD (gastroesophageal reflux disease)    • Hyperlipidemia    • Hypertension    • Stroke (HCC)        No Known Allergies    History reviewed. No pertinent surgical history.    Family History   Family history unknown: Yes       Social History     Socioeconomic History   • Marital status:    Tobacco Use   • Smoking status: Never     Passive exposure: Never   • Smokeless tobacco: Never   Vaping Use   • Vaping Use: Never used   Substance and Sexual Activity   • Alcohol use: Never   • Drug use: Never   • Sexual activity: Defer           Objective   Physical Exam  Vitals and nursing note reviewed.   Constitutional:       General: He is not in acute distress.     Appearance: Normal appearance. He is not ill-appearing.   HENT:      Head: Normocephalic and atraumatic.      Mouth/Throat:      Mouth: Mucous membranes are moist.   Eyes:      General: No scleral  icterus.        Right eye: No discharge.         Left eye: No discharge.      Conjunctiva/sclera: Conjunctivae normal.   Cardiovascular:      Rate and Rhythm: Normal rate and regular rhythm.      Heart sounds: No murmur heard.  Pulmonary:      Effort: Pulmonary effort is normal. No respiratory distress.      Breath sounds: Normal breath sounds. No wheezing.   Abdominal:      General: Bowel sounds are normal. There is no distension.      Palpations: Abdomen is soft.      Tenderness: There is no abdominal tenderness. There is no guarding or rebound.   Musculoskeletal:         General: No swelling. Normal range of motion.      Cervical back: Normal range of motion and neck supple.   Skin:     General: Skin is warm and dry.      Findings: No rash.   Neurological:      General: No focal deficit present.      Mental Status: He is alert. Mental status is at baseline.   Psychiatric:         Mood and Affect: Mood normal.         Behavior: Behavior normal.         Thought Content: Thought content normal.         Critical Care  Performed by: Jefferson Burton MD  Authorized by: Jefferson Burton MD     Critical care provider statement:     Critical care time (minutes):  45    Critical care time was exclusive of:  Separately billable procedures and treating other patients    Critical care was necessary to treat or prevent imminent or life-threatening deterioration of the following conditions:  Renal failure    Critical care was time spent personally by me on the following activities:  Obtaining history from patient or surrogate, ordering and performing treatments and interventions, ordering and review of laboratory studies, ordering and review of radiographic studies, discussions with consultants, examination of patient, review of old charts and re-evaluation of patient's condition    I assumed direction of critical care for this patient from another provider in my specialty: no      Care discussed with: admitting  provider                 ED Course  ED Course as of 03/20/23 1159   Fri Mar 17, 2023   1906 Patient stated that they have been due to that [facility] several times but I do not see any history on the computer of these visits.  Upon doing a chart search I found another record which is under a separate Social Security number.  Have notified registration to emergency his charts. [CP]      ED Course User Index  [CP] EzioWilliam, DO         EKG ST.  Labs are impressively deranged c/w acute on chronic renal failure.  Potassium mildly up but no EKG changes to suggest impending life threatening dysrhythmia.  I reviewed  notes, Cr was 5.98 on discharge there in November so this is a major change.  I consulted nephrology for evaluation and possible dialysis.  Long talk with pt and family about need for admission for this life threatening problem.                                  Medical Decision Making  Acute renal failure superimposed on stage 4 chronic kidney disease, unspecified acute renal failure type (HCC): chronic illness or injury with severe exacerbation, progression, or side effects of treatment that poses a threat to life or bodily functions  Dehydration: complicated acute illness or injury  Hyperkalemia: complicated acute illness or injury  Hypermagnesemia: complicated acute illness or injury  Hyperphosphatemia: complicated acute illness or injury  Metabolic acidosis: acute illness or injury  Amount and/or Complexity of Data Reviewed  Labs: ordered. Decision-making details documented in ED Course.  Radiology: ordered.  ECG/medicine tests: ordered and independent interpretation performed. Decision-making details documented in ED Course.      Risk  Prescription drug management.  Decision regarding hospitalization.      Critical Care  Total time providing critical care: 45 minutes      Final diagnoses:   Acute renal failure superimposed on stage 4 chronic kidney disease, unspecified acute renal failure type (HCC)    Hypermagnesemia   Hyperkalemia   Hyperphosphatemia   Dehydration   Metabolic acidosis       ED Disposition  ED Disposition     ED Disposition   Decision to Admit    Condition   --    Comment   Level of Care: Telemetry [5]   Diagnosis: Acute renal failure superimposed on stage 4 chronic kidney disease, unspecified acute renal failure type (HCC) [[id number]]   Admitting Physician: GLENDY GIRON [[id number]]   Certification: I Certify That Inpatient Hospital Services Are Medically Necessary For Greater Than 2 Midnights               No follow-up provider specified.       Medication List      No changes were made to your prescriptions during this visit.          Jefferson Burton MD  02/07/23 1930       Jefferson Burton MD  03/20/23 1515

## 2023-02-08 NOTE — PLAN OF CARE
Goal Outcome Evaluation:  Plan of Care Reviewed With: patient           Outcome Evaluation: Pt had CBI started today by urology. Pt urine is still red at this time. First unit of blood transfusing. Pt has no c/o pain at this time.

## 2023-02-08 NOTE — PAYOR COMM NOTE
"Utilization Review [phone]  Inpatient notification  Jun Garcia (62 y.o. Male)     Date of Birth   1960    Social Security Number       Address   [address] [place] [address]    Home Phone   [phone]    MRN   [id number]       Yarsani   Restorationist    Marital Status                               Admission Date   23    Admission Type   Emergency    Admitting Provider   Mirian Walsh MD    Attending Provider   Mirian Walsh MD    Department, Room/Bed   [address], S513/1       Discharge Date       Discharge Disposition       Discharge Destination                               Attending Provider: Mirian Walsh MD    Allergies: No Known Allergies    Isolation: None   Infection: None   Code Status: CPR    Ht: 170.2 cm (67\")   Wt: 68 kg (150 lb)    Admission Cmt: None   Principal Problem: Acute renal failure superimposed on stage 4 chronic kidney disease, unspecified acute renal failure type (HCC) [N17.9,N18.4]                 Active Insurance as of 2023     Primary Coverage     Payor Plan Insurance Group Employer/Plan Group    [employer] [place] [employer]ICA[employer]      Payor Plan Address Payor Plan Phone Number Payor Plan Fax Number Effective Dates    [address] [address] [phone]  2023 - None Entered    [facility] [id number]       Subscriber Name Subscriber Birth Date Member ID       JUN GARCIA 1960 [id number]                 Emergency Contacts      (Rel.) Home Phone Work Phone Mobile Phone    CAMPOSANA DOWDNA (Daughter) [phone] -- [phone]    ANDRESKAYLATAWANDA (Son) [phone] -- [phone]               History & Physical      Carola Henry II DO at 23 1359              [facility] Medicine Services  HISTORY AND PHYSICAL    Patient Name: Jun Garcia  : 1960  MRN: [id number]  Primary Care Physician: Provider, No Known  Date of admission: 2023      Subjective    Subjective "     Chief Complaint: weakness, nausea    HPI:  Jun Garcia is a 62 y.o. male presenting with his family with worsening weakness, nausea, weight loss, epigastric pain. He has a complicated recent history and is unable to provide full story but from what I can gather in [facility] he was admitted at  11/2022 for obstructive uropathy and renal failure. At that time cystoscopy and urgent HD was recommended but it sounds to me based on our conversation that the patient refused both. He was discharged home and has been taking no medications since that time. Tells me today that since that time he has gradually gone downhill. He has been unable to eat hardly anything and has lost a significant amount of weight. He complains also of epigastric burning during this time. He tells me has has not followed up with anyone because he got mixed messages from his last stay and felt like he was doing fine.    Review of Systems   Gen- No fevers, chills  CV- No chest pain, palpitations  Resp- No cough, dyspnea  GI- No N/V/D    All other systems reviewed and are negative.     Personal History     PMH: CKD IV, obstructive uropathy, ? diabetes    PSH: Reviewed and noncontributory    Family History: Otherwise pertinent FHx was reviewed and unremarkable.     Social History:  No tobacco, ETOH, illicit drugs    Medications:  Available home medication information reviewed.         No Known Allergies    Objective    Objective     Vital Signs:   Temp:  [98.5 °F (36.9 °C)] 98.5 °F (36.9 °C)  Heart Rate:  [] 99  Resp:  [18] 18  BP: (103-148)/(70-84) 148/84       Physical Exam   Constitutional: Awake, alert, appears to feel poorly  Eyes: PERRLA, sclerae anicteric, no conjunctival injection  HENT: NCAT, dry mm  Neck: Supple, no thyromegaly, no lymphadenopathy, trachea midline  Respiratory: Clear to auscultation bilaterally, nonlabored respirations   Cardiovascular: Slightly tachy, no murmur  Gastrointestinal: Positive bowel sounds, soft,  nontender, nondistended  Musculoskeletal: Muscle wasting.  Psychiatric: Appropriate affect, cooperative  Neurologic: Oriented x 3, strength symmetric in all extremities, Cranial Nerves grossly intact to confrontation, speech clear  Skin: No rashes    Result Review:  I have personally reviewed the results from the time of this admission to 2/7/2023 14:00 EST and agree with these findings:  []  Laboratory list / accordion  []  Microbiology  []  Radiology  []  EKG/Telemetry   []  Cardiology/Vascular   []  Pathology  []  Old records  []  Other:  Most notable findings include:         LAB RESULTS:      Lab 02/07/23  1207   WBC 12.08*   HEMOGLOBIN 7.8*   HEMATOCRIT 23.7*   PLATELETS 277   NEUTROS ABS 8.40*   IMMATURE GRANS (ABS) 0.05   LYMPHS ABS 1.09   MONOS ABS 0.51   EOS ABS 1.96*   MCV 89.4   PROTIME 14.8*   INR 1.17*   APTT 38.1*         Lab 02/07/23  1207   SODIUM 134*   POTASSIUM 5.3*   CHLORIDE 99   CO2 13.0*   ANION GAP 22.0*   *   CREATININE 13.21*   EGFR 3.8*   GLUCOSE 169*   CALCIUM 8.5*   MAGNESIUM 3.3*   PHOSPHORUS 6.8*         Lab 02/07/23  1207   TOTAL PROTEIN 8.7*   ALBUMIN 4.2   GLOBULIN 4.5   ALT (SGPT) 19   AST (SGOT) 12   BILIRUBIN 0.3   ALK PHOS 103   LIPASE 124*         Lab 02/07/23  1207   PROBNP 1,316.0*   HSTROP T 60*                 UA    Urinalysis 11/17/22   Squamous Epithelial Cells, UA 0-5   Specific Gravity, UA 1.010   Blood, UA Large (A)   Leukocytes, UA Large (A)   RBC, UA 31-50 (A)   Bacteria, UA Negative   (A) Abnormal value       Comments are available for some flowsheets but are not being displayed.             Microbiology Results (last 10 days)     ** No results found for the last 240 hours. **          No radiology results from the last 24 hrs        Assessment & Plan   Assessment & Plan     Active Hospital Problems    Diagnosis  POA   • **Acute renal failure superimposed on stage 4 chronic kidney disease, unspecified acute renal failure type (HCC) [N17.9, N18.4]  Yes   •  Metabolic acidosis [E87.20]  Yes   • Hyperkalemia [E87.5]  Yes   • Elevated troponin [R77.8]  Yes   • Elevated lipase [R74.8]  Yes     61 y/o male with prior hx obstructive uropathy and CKD presenting with worsening FTT and ongoing renal failure.    CKD IV-V nearing ESRD  Metabolic acidosis  Hyperkalemia  --NAL has been contacted by ED, will see patient and discuss need for HD with him.  --Will start bicarb gtt for volume expansion and to correct acidosis.  --CM for HD chair if he agrees.    Hx obstructive uropathy  --CT scan from Nov indicates obstruction at level of bladder concerning for mass. Apparently he had hematuria at that time. It sounds like cystoscopy was recommended however never occurred.   --Repeat CT A/P  --Consult urology  --[facility] caldwell.    Elevated troponin   --Suspect due to volume depletion in setting of his renal failure. His troponin is about level as it was previously. He denies chest pain at this time. Rec'd asa in ED. Will continue to monitor troponin and EKG.  --Echo.    Elevated lipase  --Based on history of abd burning may have some resolving pancreatitis? Will continue supportive tx with IVF, pain meds. Okay for CLD once decision made regarding HD, HD access.  --Will also treat empirically for PUD with BID PPI for now.    DVT prophylaxis:  [facility]      CODE STATUS:  Full Code  Code Status and Medical Interventions:   Ordered at: 02/07/23 1357     Code Status (Patient has no pulse and is not breathing):    CPR (Attempt to Resuscitate)     Medical Interventions (Patient has pulse or is breathing):    Full Support       Expected Discharge 2/11       Carola Henry II, DO  02/07/23    Electronically signed by Carola Henry II, DO at 02/07/23 1410       Physician Progress Notes (all)    No notes of this type exist for this encounter.            Consult Notes (all)      Jordan Marquez MD at 02/07/23 1927            Patient Care Team:  Provider, No Known as PCP - General  Provider, No Known  as PCP - Family Medicine    Chief complaint: Epigastric pain  Weakness  Severe azotemia and renal failure       History of Present Illness: Mr Jun betancourt is a 61 yo gentleman with hx of CKD stage V, Obstructive uropathy, HTN. He is admitted with generalize weakness, epigastric pain. Patient is poor historian but did mentioned he has been out of his bp meds for last 5-6 months. He was admitted at  few months ago and was told to start dialysis however he left against medical advise. He does mention having kidney disease for many yrs.On this admission his labs remarkable for severe azotemia cr 10 , HB 7.8 Na 132. He cee any change in UOP. No significant LE edema noted. He mentioned food taste ok but he couldn't keep things down due to frequent nausea and vomiting. He is unable to tell if he had any workup in the past for ckd.However renal US from nov 2022 at  showed severe b/l hydronephrosis and bladder outlet obstruction 2/2 enlarge prostate. CT A/P on this admission showed similar findings for severe hydronephrosis but thinning of renal parenchyma. Caldwell cath was placed ~900cc UOP noted. Patient is extremely reluctant to the idea of dialysis he was hesitant to get caldwell cath placed as well.     Review of Systems   Constitutional: Positive for fatigue.   HENT: Negative.    Respiratory: Negative.    Cardiovascular: Negative.    Gastrointestinal: Positive for abdominal pain.   Genitourinary: Positive for difficulty urinating.   Musculoskeletal: Negative.    Neurological: Negative.    Hematological: Negative.    Psychiatric/Behavioral: Negative.         Past Medical History:   Diagnosis Date   • Diabetes mellitus (HCC)    • Elevated cholesterol    • GERD (gastroesophageal reflux disease)    • Hyperlipidemia    • Hypertension    • Stroke (HCC)    , History reviewed. No pertinent surgical history.,   Family History   Family history unknown: Yes   ,   Social History     Socioeconomic History   • Marital  status:    Tobacco Use   • Smoking status: Never     Passive exposure: Never   • Smokeless tobacco: Never   Vaping Use   • Vaping Use: Never used   Substance and Sexual Activity   • Alcohol use: Never   • Drug use: Never   • Sexual activity: Defer     E-cigarette/Vaping   • E-cigarette/Vaping Use Never User    • Passive Exposure No    • Counseling Given No      E-cigarette/Vaping Substances   • Nicotine No    • THC No    • CBD No    • Flavoring No      E-cigarette/Vaping Devices   • Disposable No    • Pre-filled or Refillable Cartridge No    • Refillable Tank No    • Pre-filled Pod No         and   No medications prior to admission.       Objective     Vital Signs  Temp:  [97.6 °F (36.4 °C)-98.5 °F (36.9 °C)] 97.6 °F (36.4 °C)  Heart Rate:  [] 90  Resp:  [18] 18  BP: (103-160)/(70-96) 155/85    No intake/output data recorded.  No intake/output data recorded.    Physical Exam  Constitutional:       Appearance: Normal appearance.   HENT:      Head: Normocephalic and atraumatic.      Nose: Nose normal.   Eyes:      Extraocular Movements: Extraocular movements intact.      Pupils: Pupils are equal, round, and reactive to light.   Cardiovascular:      Rate and Rhythm: Normal rate and regular rhythm.      Pulses: Normal pulses.      Heart sounds: Normal heart sounds.   Pulmonary:      Effort: Pulmonary effort is normal.      Breath sounds: Normal breath sounds.   Abdominal:      General: Abdomen is flat.   Skin:     General: Skin is warm.      Findings: No lesion or rash.   Neurological:      General: No focal deficit present.      Mental Status: He is alert and oriented to person, place, and time. Mental status is at baseline.         Results Review:    I reviewed the patient's new clinical results.    WBC WBC   Date Value Ref Range Status   02/07/2023 12.08 (H) 3.40 - 10.80 10*3/mm3 Final      HGB Hemoglobin   Date Value Ref Range Status   02/07/2023 7.8 (L) 13.0 - 17.7 g/dL Final      HCT Hematocrit   Date  Value Ref Range Status   02/07/2023 23.7 (L) 37.5 - 51.0 % Final      Platlets No results found for: LABPLAT   MCV MCV   Date Value Ref Range Status   02/07/2023 89.4 79.0 - 97.0 fL Final          Sodium Sodium   Date Value Ref Range Status   02/07/2023 134 (L) 136 - 145 mmol/L Final      Potassium Potassium   Date Value Ref Range Status   02/07/2023 5.3 (H) 3.5 - 5.2 mmol/L Final      Chloride Chloride   Date Value Ref Range Status   02/07/2023 99 98 - 107 mmol/L Final      CO2 CO2   Date Value Ref Range Status   02/07/2023 13.0 (L) 22.0 - 29.0 mmol/L Final      BUN BUN   Date Value Ref Range Status   02/07/2023 164 (H) 8 - 23 mg/dL Final      Creatinine Creatinine   Date Value Ref Range Status   02/07/2023 13.21 (H) 0.76 - 1.27 mg/dL Final      Calcium Calcium   Date Value Ref Range Status   02/07/2023 8.5 (L) 8.6 - 10.5 mg/dL Final      PO4 No results found for: CAPO4   Albumin Albumin   Date Value Ref Range Status   02/07/2023 4.2 3.5 - 5.2 g/dL Final      Magnesium Magnesium   Date Value Ref Range Status   02/07/2023 3.3 (H) 1.6 - 2.4 mg/dL Final      Uric Acid No results found for: URICACID         Assessment & Plan       Acute renal failure superimposed on stage 4 chronic kidney disease, unspecified acute renal failure type (HCC)    Metabolic acidosis    Hyperkalemia    Elevated troponin    Elevated lipase      Assessment & Plan    CKD stage V: Hx of obstructive uropathy with severe hydronephrosis on this admission noted previously on admission at [facility]. Patient left the hospital against medical advise.     Azotemia:BUN significantly elevated. Does have mild uremic symptoms. Fatigue, poor appetite and vomiiting    Obstructive uropathy: Likley due to enlarge prostate resulting in severe hydronephrosis. Cystoscopy was recommended on last visit at  but patient didn't have any intervention. Similar findings noted on this admission     Anemia: ACD due to advance CKD    Hyperkalemia: Due to CKD    Met  acidosis: Due to CKD     Hyponatremia: Due to CKD    Recs  Severe renal dysfunction in the setting of chronic obstruction. Unfortunately this  Might have caused irreversible renal damage as evidenced by cortical thining on CT scan. Patient reluctant about dialysis and wants to consider home dialysis. I explained to him that he may need HD given severe metabolic derangements. Will watch the renal function overnight after placing caldwell cath. He will also need urology consult. High risk for needing dialysis. NPO after midnight for possible H Dcath tomorrow if no significant improvement in renal function. Start Na-bicarb 650mg TID. Expect improvement in hyperkalemia after placing caldwell cath. Daily labs. Strict I/O.      I discussed the patients findings and my recommendations with patient    Jordan Marquez MD  02/07/23  19:27 EST          Electronically signed by Jordan Marquez MD at 02/07/23 1945     Zion Hardin MD at 02/07/23 1914          NAL Consult Note    Jun Garcia  1960  [id number]    Date of Admit:  2/7/2023    Date of Consult: 2/7/2023        Requesting Provider: No ref. provider found  Evaluating Physician: Zion Hardin MD        Reason for Consultation:  STAGE 5 CKD WITH UREMIA.   History of present illness:    Patient is a 62 y.o.  Yr old male WITH H/O STAGE 5 CKD SECONDARY TO OBSTRUCTIVE NEPHROPATHY ( SEEN AT [facility] 11/2022 AND TOLD THE HE NEEDED TO START DIALYSIS BUT THE PATIENT REFUSED ), HTN, DM, HLP, NONCOMPLIANCE WITH MEDS ADMITTED WITH SEVERE RENAL FAILURE (  WITH CREAT 13.2 ) AND SYMPTOMS OF UREMIA. PATIENT NOW WANTS TO START DIALYSIS ( HE MAY BE INTERESTED IN PD THAT HE LEARNED ABOUT AT  ).    Past Medical History:   Diagnosis Date   • Diabetes mellitus (HCC)    • Elevated cholesterol    • GERD (gastroesophageal reflux disease)    • Hyperlipidemia    • Hypertension    • Stroke (HCC)        History reviewed. No pertinent surgical history.    Social History      Socioeconomic History   • Marital status:    Tobacco Use   • Smoking status: Never     Passive exposure: Never   • Smokeless tobacco: Never   Vaping Use   • Vaping Use: Never used   Substance and Sexual Activity   • Alcohol use: Never   • Drug use: Never   • Sexual activity: Defer       Family history is unknown by patient.    No Known Allergies    Medication:    Current Facility-Administered Medications:   •  acetaminophen (TYLENOL) tablet 650 mg, 650 mg, Oral, Q4H PRN **OR** acetaminophen (TYLENOL) 160 MG/5ML solution 650 mg, 650 mg, Oral, Q4H PRN **OR** acetaminophen (TYLENOL) suppository 650 mg, 650 mg, Rectal, Q4H PRN, Carl, Carola M II, DO  •  b complex-vitamin c-folic acid (NEPHRO-LASHON) tablet 1 tablet, 1 tablet, Oral, Daily, Zion Hardin MD  •  sennosides-docusate (PERICOLACE) 8.6-50 MG per tablet 2 tablet, 2 tablet, Oral, BID **AND** polyethylene glycol (MIRALAX) packet 17 g, 17 g, Oral, Daily PRN **AND** bisacodyl (DULCOLAX) EC tablet 5 mg, 5 mg, Oral, Daily PRN **AND** bisacodyl (DULCOLAX) suppository 10 mg, 10 mg, Rectal, Daily PRN, CarlCarola M II, DO  •  dextrose (D50W) (25 g/50 mL) IV injection 25 g, 25 g, Intravenous, Q15 Min PRN, CarlCarola M II, DO  •  dextrose (GLUTOSE) oral gel 15 g, 15 g, Oral, Q15 Min PRN, Carl, Carola M II, DO  •  [START ON 2/8/2023] epoetin sloan-epbx (RETACRIT) injection 20,000 Units, 20,000 Units, Subcutaneous, Once per day on Mon Wed Fri, Zion Hardin MD  •  glucagon (GLUCAGEN) injection 1 mg, 1 mg, Intramuscular, Q15 Min PRN, Carl Carola M II, DO  •  heparin (porcine) 5000 UNIT/ML injection 5,000 Units, 5,000 Units, Subcutaneous, Q8H, Carola Henry M II, DO, 5,000 Units at 02/07/23 1599  •  Insulin Lispro (humaLOG) injection 0-7 Units, 0-7 Units, Subcutaneous, TID AC, Carola Henry II, DO  •  melatonin tablet 5 mg, 5 mg, Oral, Nightly PRN, Carola Henry II, DO  •  morphine injection 2 mg, 2 mg, Intravenous, Q2H PRN  **AND** naloxone (NARCAN) injection 0.4 mg, 0.4 mg, Intravenous, Q5 Min PRN, Carl, Carola M II, DO  •  ondansetron (ZOFRAN) tablet 4 mg, 4 mg, Oral, Q6H PRN **OR** ondansetron (ZOFRAN) injection 4 mg, 4 mg, Intravenous, Q6H PRN, Carl, Carola M II, DO  •  pantoprazole (PROTONIX) EC tablet 40 mg, 40 mg, Oral, BID AC, Carl, Carola M II, DO, 40 mg at 02/07/23 1749  •  sodium bicarbonate 8.4 % 150 mEq in dextrose (D5W) 5 % 1,000 mL infusion (greater than 75 mEq), 150 mEq, Intravenous, Continuous, Carl, Carola M II, DO, Last Rate: 100 mL/hr at 02/07/23 1749, 150 mEq at 02/07/23 1749  •  sodium chloride 0.9 % flush 10 mL, 10 mL, Intravenous, PRN, Jefferson Burton MD  •  sodium chloride 0.9 % flush 10 mL, 10 mL, Intravenous, Q12H, Carl, Carola M II, DO  •  sodium chloride 0.9 % flush 10 mL, 10 mL, Intravenous, PRN, Carl, Carola M II, DO  •  sodium chloride 0.9 % infusion 40 mL, 40 mL, Intravenous, PRN, Carl, Carola M II, DO    No medications prior to admission.       Review of Systems:    Constitutional-- No Fever, chills or sweats. + significant change in weight. + WEAKNESS/FATIGUE.   Eye-- no diplopia, no conjunctivitis  ENT-- No new hearing or throat complaints.  No epistaxis or oral sores. No odynophagia or dysphagia. No headache, photophobia or neck stiffness.  CV-- No chest pain, palpitations, soa, or edema  Resp-- No SOB/cough/Hemoptysis  GI- + nausea/vomiting. NO diarrhea.  No hematochezia, melena, or hematemesis. + ABD PAIN. + POOR APPETITE.   -- No dysuria, hematuria, or flank pain. No lower tract obstructive symptoms  Skin-- No rash. + ITCHING  Lymph- no painful or swollen lymph nodes in neck/axilla or groin.   Heme- No active bruising or bleeding; no Hx of DVT or PE.  MS-- no swelling or pain in the joints  Neuro-- No acute focal weakness or numbness in the arms or legs.  No seizures.  Psych--No anxiety or depression  Endo--No cold or heat intolerance.  No polyuria, polydipsia, or  "polyphagia    Full review of systems reviewed and negative otherwise for acute complaints    Physical Exam:   Vital Signs   Blood pressure 155/85, pulse 90, temperature 97.6 °F (36.4 °C), temperature source Axillary, resp. rate 18, height 170.2 cm (67\"), weight 68 kg (150 lb), SpO2 100 %.     GENERAL: Awake and alert, in no acute distress. APPEARS CHRONICALLY ILL. THIN.  HEENT: Normocephalic, atraumatic.  PER.  No conjunctivitis. No icterus. Oropharynx clear without evidence of thrush or exudate. No evidence of peridontal disease.    NECK: Supple without nuchal rigidity. No mass.  LYMPH: No painful cervical, axillary or inguinal lymphadenopathy.  HEART: RRR; No murmur, rubs, gallops. No bruits in neck, abdomen, or groins, no edema  LUNGS: Normal respiratory effort. Nonlabored. No dullness.  No crepitus.  Clear to auscultation bilaterally without wheezing, rales, rhonchi.  ABDOMEN: Soft, nontender, nondistended. Positive bowel sounds. No rebound or guarding. NO mass or HSM.  JOINTS:  Full range of motion, no redness or tenderness.  EXT:  No cyanosis/clubbing.  :  BLADDER NOT DISTENDED. NO CVAT.   SKIN: Warm and dry without rash  NEURO: Oriented to PPT. No focal neurological deficits. Strength equal bilateral  PSYCHIATRIC: SPEAKS ENGLISH. Normal insight and judgement. Cooperative with PE    Laboratory Data  Results from last 7 days   Lab Units 02/07/23  1207   HEMOGLOBIN g/dL 7.8*   HEMATOCRIT % 23.7*     Results from last 7 days   Lab Units 02/07/23  1207   SODIUM mmol/L 134*   POTASSIUM mmol/L 5.3*   CHLORIDE mmol/L 99   CO2 mmol/L 13.0*   BUN mg/dL 164*   CREATININE mg/dL 13.21*   CALCIUM mg/dL 8.5*   PHOSPHORUS mg/dL 6.8*   MAGNESIUM mg/dL 3.3*   ALBUMIN g/dL 4.2     Results from last 7 days   Lab Units 02/07/23  1207   GLUCOSE mg/dL 169*         Results from last 7 days   Lab Units 02/07/23  1207   ALK PHOS U/L 103   BILIRUBIN mg/dL 0.3   ALT (SGPT) U/L 19   AST (SGOT) U/L 12     Estimated Creatinine Clearance: " 5.4 mL/min (A) (by C-G formula based on SCr of 13.21 mg/dL (H)).    Radiology:      Impression: ESRD WITH UREMIA. H/O OBSTRUCTIVE NEPHROPATHY.  ANEMIA. DM. HTN. HLP.       PLAN: Thank you for asking us to see Jun Garcia, I recommend the following: START RRT. GET IR TO PLACE A THD CATH 2/8/22. START HD 2/8/23. ASK DR CHAVES TO SEE ABOUT PERMANENT ACCESS PLACEMENT.  TO ARRANGE OUTPATIENT HD TX'S.  CHECK IV FE. START EPO/NEPHROVITE. WILL FOLLOW. UROLOGY CONSULTED BY DR LE.        Zion Hardin MD  2/7/2023  19:14 EST                    Electronically signed by Zion Hardin MD at 02/07/23 1928

## 2023-02-09 ENCOUNTER — APPOINTMENT (OUTPATIENT)
Dept: GENERAL RADIOLOGY | Facility: HOSPITAL | Age: 63
DRG: 663 | End: 2023-02-09
Payer: COMMERCIAL

## 2023-02-09 ENCOUNTER — ANESTHESIA (OUTPATIENT)
Dept: PERIOP | Facility: HOSPITAL | Age: 63
DRG: 663 | End: 2023-02-09
Payer: COMMERCIAL

## 2023-02-09 ENCOUNTER — ANESTHESIA EVENT (OUTPATIENT)
Dept: PERIOP | Facility: HOSPITAL | Age: 63
DRG: 663 | End: 2023-02-09
Payer: COMMERCIAL

## 2023-02-09 ENCOUNTER — APPOINTMENT (OUTPATIENT)
Dept: CARDIOLOGY | Facility: HOSPITAL | Age: 63
DRG: 663 | End: 2023-02-09
Payer: COMMERCIAL

## 2023-02-09 LAB
ALBUMIN SERPL-MCNC: 3.1 G/DL (ref 3.5–5.2)
ANION GAP SERPL CALCULATED.3IONS-SCNC: 17 MMOL/L (ref 5–15)
BASOPHILS # BLD AUTO: 0.05 10*3/MM3 (ref 0–0.2)
BASOPHILS NFR BLD AUTO: 0.4 % (ref 0–1.5)
BH BB BLOOD EXPIRATION DATE: NORMAL
BH BB BLOOD EXPIRATION DATE: NORMAL
BH BB BLOOD TYPE BARCODE: 5100
BH BB BLOOD TYPE BARCODE: 5100
BH BB DISPENSE STATUS: NORMAL
BH BB DISPENSE STATUS: NORMAL
BH BB PRODUCT CODE: NORMAL
BH BB PRODUCT CODE: NORMAL
BH BB UNIT NUMBER: NORMAL
BH BB UNIT NUMBER: NORMAL
BUN SERPL-MCNC: 150 MG/DL (ref 8–23)
BUN/CREAT SERPL: 11.7 (ref 7–25)
CALCIUM SPEC-SCNC: 7.7 MG/DL (ref 8.6–10.5)
CHLORIDE SERPL-SCNC: 94 MMOL/L (ref 98–107)
CO2 SERPL-SCNC: 27 MMOL/L (ref 22–29)
CREAT SERPL-MCNC: 12.87 MG/DL (ref 0.76–1.27)
CROSSMATCH INTERPRETATION: NORMAL
CROSSMATCH INTERPRETATION: NORMAL
DEPRECATED RDW RBC AUTO: 42.6 FL (ref 37–54)
EGFRCR SERPLBLD CKD-EPI 2021: 3.9 ML/MIN/1.73
EOSINOPHIL # BLD AUTO: 1.46 10*3/MM3 (ref 0–0.4)
EOSINOPHIL NFR BLD AUTO: 11.7 % (ref 0.3–6.2)
ERYTHROCYTE [DISTWIDTH] IN BLOOD BY AUTOMATED COUNT: 14.1 % (ref 12.3–15.4)
GLUCOSE BLDC GLUCOMTR-MCNC: 164 MG/DL (ref 70–130)
GLUCOSE BLDC GLUCOMTR-MCNC: 200 MG/DL (ref 70–130)
GLUCOSE BLDC GLUCOMTR-MCNC: 208 MG/DL (ref 70–130)
GLUCOSE BLDC GLUCOMTR-MCNC: 212 MG/DL (ref 70–130)
GLUCOSE SERPL-MCNC: 158 MG/DL (ref 65–99)
HCT VFR BLD AUTO: 22.6 % (ref 37.5–51)
HCT VFR BLD AUTO: 23 % (ref 37.5–51)
HGB BLD-MCNC: 7.8 G/DL (ref 13–17.7)
HGB BLD-MCNC: 8 G/DL (ref 13–17.7)
IMM GRANULOCYTES # BLD AUTO: 0.06 10*3/MM3 (ref 0–0.05)
IMM GRANULOCYTES NFR BLD AUTO: 0.5 % (ref 0–0.5)
LYMPHOCYTES # BLD AUTO: 1.23 10*3/MM3 (ref 0.7–3.1)
LYMPHOCYTES NFR BLD AUTO: 9.8 % (ref 19.6–45.3)
MCH RBC QN AUTO: 29 PG (ref 26.6–33)
MCHC RBC AUTO-ENTMCNC: 34.5 G/DL (ref 31.5–35.7)
MCV RBC AUTO: 84 FL (ref 79–97)
MONOCYTES # BLD AUTO: 0.61 10*3/MM3 (ref 0.1–0.9)
MONOCYTES NFR BLD AUTO: 4.9 % (ref 5–12)
NEUTROPHILS NFR BLD AUTO: 72.7 % (ref 42.7–76)
NEUTROPHILS NFR BLD AUTO: 9.08 10*3/MM3 (ref 1.7–7)
NRBC BLD AUTO-RTO: 0 /100 WBC (ref 0–0.2)
PHOSPHATE SERPL-MCNC: 8.4 MG/DL (ref 2.5–4.5)
PLATELET # BLD AUTO: 186 10*3/MM3 (ref 140–450)
PMV BLD AUTO: 9.2 FL (ref 6–12)
POTASSIUM SERPL-SCNC: 4.6 MMOL/L (ref 3.5–5.2)
QT INTERVAL: 350 MS
QT INTERVAL: 392 MS
QTC INTERVAL: 449 MS
QTC INTERVAL: 469 MS
RBC # BLD AUTO: 2.69 10*6/MM3 (ref 4.14–5.8)
SODIUM SERPL-SCNC: 138 MMOL/L (ref 136–145)
UNIT  ABO: NORMAL
UNIT  ABO: NORMAL
UNIT  RH: NORMAL
UNIT  RH: NORMAL
WBC NRBC COR # BLD: 12.49 10*3/MM3 (ref 3.4–10.8)

## 2023-02-09 PROCEDURE — 25010000002 ONDANSETRON PER 1 MG: Performed by: NURSE ANESTHETIST, CERTIFIED REGISTERED

## 2023-02-09 PROCEDURE — 85025 COMPLETE CBC W/AUTO DIFF WBC: CPT | Performed by: INTERNAL MEDICINE

## 2023-02-09 PROCEDURE — 36430 TRANSFUSION BLD/BLD COMPNT: CPT

## 2023-02-09 PROCEDURE — 82962 GLUCOSE BLOOD TEST: CPT

## 2023-02-09 PROCEDURE — 80069 RENAL FUNCTION PANEL: CPT | Performed by: INTERNAL MEDICINE

## 2023-02-09 PROCEDURE — 63710000001 INSULIN LISPRO (HUMAN) PER 5 UNITS: Performed by: INTERNAL MEDICINE

## 2023-02-09 PROCEDURE — 25010000002 CEFAZOLIN IN DEXTROSE 2-4 GM/100ML-% SOLUTION: Performed by: UROLOGY

## 2023-02-09 PROCEDURE — 0 MEPERIDINE PER 100 MG

## 2023-02-09 PROCEDURE — 93971 EXTREMITY STUDY: CPT

## 2023-02-09 PROCEDURE — 97161 PT EVAL LOW COMPLEX 20 MIN: CPT | Performed by: PHYSICAL THERAPIST

## 2023-02-09 PROCEDURE — 74420 UROGRAPHY RTRGR +-KUB: CPT

## 2023-02-09 PROCEDURE — P9016 RBC LEUKOCYTES REDUCED: HCPCS

## 2023-02-09 PROCEDURE — 25010000002 DEXAMETHASONE PER 1 MG: Performed by: NURSE ANESTHETIST, CERTIFIED REGISTERED

## 2023-02-09 PROCEDURE — C1758 CATHETER, URETERAL: HCPCS | Performed by: UROLOGY

## 2023-02-09 PROCEDURE — P9041 ALBUMIN (HUMAN),5%, 50ML: HCPCS | Performed by: NURSE ANESTHETIST, CERTIFIED REGISTERED

## 2023-02-09 PROCEDURE — 93971 EXTREMITY STUDY: CPT | Performed by: INTERNAL MEDICINE

## 2023-02-09 PROCEDURE — 25010000002 PROPOFOL 10 MG/ML EMULSION: Performed by: NURSE ANESTHETIST, CERTIFIED REGISTERED

## 2023-02-09 PROCEDURE — 85014 HEMATOCRIT: CPT | Performed by: INTERNAL MEDICINE

## 2023-02-09 PROCEDURE — 0W3R8ZZ CONTROL BLEEDING IN GENITOURINARY TRACT, VIA NATURAL OR ARTIFICIAL OPENING ENDOSCOPIC: ICD-10-PCS | Performed by: UROLOGY

## 2023-02-09 PROCEDURE — 25010000002 IOPAMIDOL 61 % SOLUTION: Performed by: UROLOGY

## 2023-02-09 PROCEDURE — 63710000001 INSULIN LISPRO (HUMAN) PER 5 UNITS: Performed by: UROLOGY

## 2023-02-09 PROCEDURE — 52001 CYSTO W/IRRG&EVAC MLT CLOTS: CPT | Performed by: UROLOGY

## 2023-02-09 PROCEDURE — 85018 HEMOGLOBIN: CPT | Performed by: INTERNAL MEDICINE

## 2023-02-09 PROCEDURE — 99232 SBSQ HOSP IP/OBS MODERATE 35: CPT | Performed by: UROLOGY

## 2023-02-09 PROCEDURE — 99233 SBSQ HOSP IP/OBS HIGH 50: CPT | Performed by: INTERNAL MEDICINE

## 2023-02-09 PROCEDURE — 25010000002 ALBUMIN HUMAN 5% PER 50 ML: Performed by: NURSE ANESTHETIST, CERTIFIED REGISTERED

## 2023-02-09 PROCEDURE — 86900 BLOOD TYPING SEROLOGIC ABO: CPT

## 2023-02-09 PROCEDURE — 52214 CYSTOSCOPY AND TREATMENT: CPT | Performed by: UROLOGY

## 2023-02-09 PROCEDURE — 0TCB8ZZ EXTIRPATION OF MATTER FROM BLADDER, VIA NATURAL OR ARTIFICIAL OPENING ENDOSCOPIC: ICD-10-PCS | Performed by: UROLOGY

## 2023-02-09 PROCEDURE — 25010000002 ONDANSETRON PER 1 MG: Performed by: UROLOGY

## 2023-02-09 PROCEDURE — 74420 UROGRAPHY RTRGR +-KUB: CPT | Performed by: UROLOGY

## 2023-02-09 PROCEDURE — 97165 OT EVAL LOW COMPLEX 30 MIN: CPT

## 2023-02-09 RX ORDER — SODIUM CHLORIDE 9 MG/ML
9 INJECTION, SOLUTION INTRAVENOUS CONTINUOUS
Status: DISCONTINUED | OUTPATIENT
Start: 2023-02-09 | End: 2023-02-14 | Stop reason: HOSPADM

## 2023-02-09 RX ORDER — FAMOTIDINE 10 MG/ML
20 INJECTION, SOLUTION INTRAVENOUS ONCE
Status: DISCONTINUED | OUTPATIENT
Start: 2023-02-09 | End: 2023-02-09 | Stop reason: HOSPADM

## 2023-02-09 RX ORDER — ALBUMIN, HUMAN INJ 5% 5 %
SOLUTION INTRAVENOUS CONTINUOUS PRN
Status: DISCONTINUED | OUTPATIENT
Start: 2023-02-09 | End: 2023-02-09 | Stop reason: SURG

## 2023-02-09 RX ORDER — SODIUM CHLORIDE 0.9 % (FLUSH) 0.9 %
10 SYRINGE (ML) INJECTION AS NEEDED
Status: DISCONTINUED | OUTPATIENT
Start: 2023-02-09 | End: 2023-02-09 | Stop reason: HOSPADM

## 2023-02-09 RX ORDER — LIDOCAINE HYDROCHLORIDE 10 MG/ML
0.5 INJECTION, SOLUTION EPIDURAL; INFILTRATION; INTRACAUDAL; PERINEURAL ONCE AS NEEDED
Status: DISCONTINUED | OUTPATIENT
Start: 2023-02-09 | End: 2023-02-09 | Stop reason: HOSPADM

## 2023-02-09 RX ORDER — SODIUM CHLORIDE, SODIUM LACTATE, POTASSIUM CHLORIDE, CALCIUM CHLORIDE 600; 310; 30; 20 MG/100ML; MG/100ML; MG/100ML; MG/100ML
9 INJECTION, SOLUTION INTRAVENOUS CONTINUOUS
Status: DISCONTINUED | OUTPATIENT
Start: 2023-02-09 | End: 2023-02-14 | Stop reason: HOSPADM

## 2023-02-09 RX ORDER — LIDOCAINE HYDROCHLORIDE 10 MG/ML
INJECTION, SOLUTION EPIDURAL; INFILTRATION; INTRACAUDAL; PERINEURAL AS NEEDED
Status: DISCONTINUED | OUTPATIENT
Start: 2023-02-09 | End: 2023-02-09 | Stop reason: SURG

## 2023-02-09 RX ORDER — MEPERIDINE HYDROCHLORIDE 50 MG/ML
INJECTION INTRAMUSCULAR; INTRAVENOUS; SUBCUTANEOUS
Status: COMPLETED
Start: 2023-02-09 | End: 2023-02-09

## 2023-02-09 RX ORDER — SODIUM CHLORIDE 9 MG/ML
40 INJECTION, SOLUTION INTRAVENOUS AS NEEDED
Status: DISCONTINUED | OUTPATIENT
Start: 2023-02-09 | End: 2023-02-09 | Stop reason: HOSPADM

## 2023-02-09 RX ORDER — FENTANYL CITRATE 50 UG/ML
50 INJECTION, SOLUTION INTRAMUSCULAR; INTRAVENOUS
Status: DISCONTINUED | OUTPATIENT
Start: 2023-02-09 | End: 2023-02-09 | Stop reason: HOSPADM

## 2023-02-09 RX ORDER — SODIUM CHLORIDE 0.9 % (FLUSH) 0.9 %
3 SYRINGE (ML) INJECTION EVERY 12 HOURS SCHEDULED
Status: DISCONTINUED | OUTPATIENT
Start: 2023-02-09 | End: 2023-02-09 | Stop reason: HOSPADM

## 2023-02-09 RX ORDER — ONDANSETRON 2 MG/ML
INJECTION INTRAMUSCULAR; INTRAVENOUS AS NEEDED
Status: DISCONTINUED | OUTPATIENT
Start: 2023-02-09 | End: 2023-02-09 | Stop reason: SURG

## 2023-02-09 RX ORDER — CEFAZOLIN SODIUM 2 G/100ML
2 INJECTION, SOLUTION INTRAVENOUS ONCE
Status: COMPLETED | OUTPATIENT
Start: 2023-02-09 | End: 2023-02-09

## 2023-02-09 RX ORDER — PANTOPRAZOLE SODIUM 40 MG/1
40 TABLET, DELAYED RELEASE ORAL
Status: DISCONTINUED | OUTPATIENT
Start: 2023-02-09 | End: 2023-02-14 | Stop reason: HOSPADM

## 2023-02-09 RX ORDER — SODIUM CHLORIDE, SODIUM LACTATE, POTASSIUM CHLORIDE, CALCIUM CHLORIDE 600; 310; 30; 20 MG/100ML; MG/100ML; MG/100ML; MG/100ML
INJECTION, SOLUTION INTRAVENOUS CONTINUOUS PRN
Status: DISCONTINUED | OUTPATIENT
Start: 2023-02-09 | End: 2023-02-09 | Stop reason: SURG

## 2023-02-09 RX ORDER — HYDROCODONE BITARTRATE AND ACETAMINOPHEN 5; 325 MG/1; MG/1
1 TABLET ORAL ONCE AS NEEDED
Status: DISCONTINUED | OUTPATIENT
Start: 2023-02-09 | End: 2023-02-09 | Stop reason: HOSPADM

## 2023-02-09 RX ORDER — PROPOFOL 10 MG/ML
VIAL (ML) INTRAVENOUS AS NEEDED
Status: DISCONTINUED | OUTPATIENT
Start: 2023-02-09 | End: 2023-02-09 | Stop reason: SURG

## 2023-02-09 RX ORDER — NALOXONE HCL 0.4 MG/ML
0.4 VIAL (ML) INJECTION AS NEEDED
Status: DISCONTINUED | OUTPATIENT
Start: 2023-02-09 | End: 2023-02-09 | Stop reason: HOSPADM

## 2023-02-09 RX ORDER — PROMETHAZINE HYDROCHLORIDE 25 MG/1
25 TABLET ORAL ONCE AS NEEDED
Status: DISCONTINUED | OUTPATIENT
Start: 2023-02-09 | End: 2023-02-09 | Stop reason: HOSPADM

## 2023-02-09 RX ORDER — DEXAMETHASONE SODIUM PHOSPHATE 4 MG/ML
INJECTION, SOLUTION INTRA-ARTICULAR; INTRALESIONAL; INTRAMUSCULAR; INTRAVENOUS; SOFT TISSUE AS NEEDED
Status: DISCONTINUED | OUTPATIENT
Start: 2023-02-09 | End: 2023-02-09 | Stop reason: SURG

## 2023-02-09 RX ORDER — LIDOCAINE HYDROCHLORIDE 20 MG/ML
JELLY TOPICAL AS NEEDED
Status: DISCONTINUED | OUTPATIENT
Start: 2023-02-09 | End: 2023-02-09 | Stop reason: HOSPADM

## 2023-02-09 RX ORDER — MAGNESIUM HYDROXIDE 1200 MG/15ML
LIQUID ORAL AS NEEDED
Status: DISCONTINUED | OUTPATIENT
Start: 2023-02-09 | End: 2023-02-09 | Stop reason: HOSPADM

## 2023-02-09 RX ORDER — MIDAZOLAM HYDROCHLORIDE 1 MG/ML
1 INJECTION INTRAMUSCULAR; INTRAVENOUS
Status: DISCONTINUED | OUTPATIENT
Start: 2023-02-09 | End: 2023-02-09 | Stop reason: HOSPADM

## 2023-02-09 RX ORDER — LABETALOL HYDROCHLORIDE 5 MG/ML
5 INJECTION, SOLUTION INTRAVENOUS
Status: DISCONTINUED | OUTPATIENT
Start: 2023-02-09 | End: 2023-02-09 | Stop reason: HOSPADM

## 2023-02-09 RX ORDER — PHENYLEPHRINE HCL IN 0.9% NACL 1 MG/10 ML
SYRINGE (ML) INTRAVENOUS AS NEEDED
Status: DISCONTINUED | OUTPATIENT
Start: 2023-02-09 | End: 2023-02-09 | Stop reason: SURG

## 2023-02-09 RX ORDER — FAMOTIDINE 20 MG/1
20 TABLET, FILM COATED ORAL ONCE
Status: DISCONTINUED | OUTPATIENT
Start: 2023-02-09 | End: 2023-02-09 | Stop reason: HOSPADM

## 2023-02-09 RX ORDER — SODIUM CHLORIDE 9 MG/ML
INJECTION, SOLUTION INTRAVENOUS CONTINUOUS PRN
Status: DISCONTINUED | OUTPATIENT
Start: 2023-02-09 | End: 2023-02-09 | Stop reason: SURG

## 2023-02-09 RX ORDER — DROPERIDOL 2.5 MG/ML
0.62 INJECTION, SOLUTION INTRAMUSCULAR; INTRAVENOUS ONCE AS NEEDED
Status: DISCONTINUED | OUTPATIENT
Start: 2023-02-09 | End: 2023-02-09 | Stop reason: HOSPADM

## 2023-02-09 RX ORDER — HYDRALAZINE HYDROCHLORIDE 20 MG/ML
5 INJECTION INTRAMUSCULAR; INTRAVENOUS
Status: DISCONTINUED | OUTPATIENT
Start: 2023-02-09 | End: 2023-02-09 | Stop reason: HOSPADM

## 2023-02-09 RX ORDER — SODIUM CHLORIDE 0.9 % (FLUSH) 0.9 %
10 SYRINGE (ML) INJECTION EVERY 12 HOURS SCHEDULED
Status: DISCONTINUED | OUTPATIENT
Start: 2023-02-09 | End: 2023-02-09 | Stop reason: HOSPADM

## 2023-02-09 RX ORDER — SODIUM CHLORIDE 0.9 % (FLUSH) 0.9 %
3-10 SYRINGE (ML) INJECTION AS NEEDED
Status: DISCONTINUED | OUTPATIENT
Start: 2023-02-09 | End: 2023-02-09 | Stop reason: HOSPADM

## 2023-02-09 RX ORDER — PROMETHAZINE HYDROCHLORIDE 25 MG/1
25 SUPPOSITORY RECTAL ONCE AS NEEDED
Status: DISCONTINUED | OUTPATIENT
Start: 2023-02-09 | End: 2023-02-09 | Stop reason: HOSPADM

## 2023-02-09 RX ORDER — DROPERIDOL 2.5 MG/ML
0.62 INJECTION, SOLUTION INTRAMUSCULAR; INTRAVENOUS
Status: DISCONTINUED | OUTPATIENT
Start: 2023-02-09 | End: 2023-02-09 | Stop reason: HOSPADM

## 2023-02-09 RX ORDER — ONDANSETRON 2 MG/ML
4 INJECTION INTRAMUSCULAR; INTRAVENOUS ONCE AS NEEDED
Status: DISCONTINUED | OUTPATIENT
Start: 2023-02-09 | End: 2023-02-09 | Stop reason: HOSPADM

## 2023-02-09 RX ORDER — IPRATROPIUM BROMIDE AND ALBUTEROL SULFATE 2.5; .5 MG/3ML; MG/3ML
3 SOLUTION RESPIRATORY (INHALATION) ONCE AS NEEDED
Status: DISCONTINUED | OUTPATIENT
Start: 2023-02-09 | End: 2023-02-09 | Stop reason: HOSPADM

## 2023-02-09 RX ADMIN — DEXAMETHASONE SODIUM PHOSPHATE 4 MG: 4 INJECTION, SOLUTION INTRAMUSCULAR; INTRAVENOUS at 11:04

## 2023-02-09 RX ADMIN — ALBUMIN HUMAN: 0.05 INJECTION, SOLUTION INTRAVENOUS at 10:21

## 2023-02-09 RX ADMIN — SODIUM CHLORIDE, POTASSIUM CHLORIDE, SODIUM LACTATE AND CALCIUM CHLORIDE: 600; 310; 30; 20 INJECTION, SOLUTION INTRAVENOUS at 10:19

## 2023-02-09 RX ADMIN — SODIUM CHLORIDE: 9 INJECTION, SOLUTION INTRAVENOUS at 09:56

## 2023-02-09 RX ADMIN — ONDANSETRON 4 MG: 2 INJECTION INTRAMUSCULAR; INTRAVENOUS at 16:37

## 2023-02-09 RX ADMIN — SODIUM CHLORIDE, SODIUM LACTATE, POTASSIUM CHLORIDE, CALCIUM CHLORIDE: 600; 310; 30; 20 INJECTION, SOLUTION INTRAVENOUS at 10:16

## 2023-02-09 RX ADMIN — Medication 400 MCG: at 10:05

## 2023-02-09 RX ADMIN — PANTOPRAZOLE SODIUM 40 MG: 40 TABLET, DELAYED RELEASE ORAL at 08:22

## 2023-02-09 RX ADMIN — Medication 200 MCG: at 10:09

## 2023-02-09 RX ADMIN — ONDANSETRON 4 MG: 2 INJECTION INTRAMUSCULAR; INTRAVENOUS at 11:04

## 2023-02-09 RX ADMIN — Medication 10 ML: at 20:08

## 2023-02-09 RX ADMIN — CEFAZOLIN SODIUM 2 G: 2 INJECTION, SOLUTION INTRAVENOUS at 10:03

## 2023-02-09 RX ADMIN — LIDOCAINE HYDROCHLORIDE 50 MG: 10 INJECTION, SOLUTION EPIDURAL; INFILTRATION; INTRACAUDAL; PERINEURAL at 10:05

## 2023-02-09 RX ADMIN — INSULIN LISPRO 3 UNITS: 100 INJECTION, SOLUTION INTRAVENOUS; SUBCUTANEOUS at 17:38

## 2023-02-09 RX ADMIN — Medication 1 TABLET: at 08:22

## 2023-02-09 RX ADMIN — SODIUM CHLORIDE: 9 INJECTION, SOLUTION INTRAVENOUS at 10:37

## 2023-02-09 RX ADMIN — Medication 200 MCG: at 10:40

## 2023-02-09 RX ADMIN — INSULIN LISPRO 3 UNITS: 100 INJECTION, SOLUTION INTRAVENOUS; SUBCUTANEOUS at 08:23

## 2023-02-09 RX ADMIN — SENNOSIDES AND DOCUSATE SODIUM 2 TABLET: 8.6; 5 TABLET ORAL at 08:22

## 2023-02-09 RX ADMIN — ALBUMIN HUMAN: 0.05 INJECTION, SOLUTION INTRAVENOUS at 10:26

## 2023-02-09 RX ADMIN — Medication 10 ML: at 08:23

## 2023-02-09 RX ADMIN — SENNOSIDES AND DOCUSATE SODIUM 2 TABLET: 8.6; 5 TABLET ORAL at 20:08

## 2023-02-09 RX ADMIN — MEPERIDINE HYDROCHLORIDE 12.5 MG: 50 INJECTION INTRAMUSCULAR; INTRAVENOUS; SUBCUTANEOUS at 12:09

## 2023-02-09 RX ADMIN — Medication 300 MCG: at 10:17

## 2023-02-09 RX ADMIN — PROPOFOL 140 MG: 10 INJECTION, EMULSION INTRAVENOUS at 10:05

## 2023-02-09 RX ADMIN — SODIUM BICARBONATE 650 MG TABLET 650 MG: at 16:14

## 2023-02-09 RX ADMIN — SODIUM BICARBONATE 650 MG TABLET 650 MG: at 20:08

## 2023-02-09 RX ADMIN — SODIUM BICARBONATE 650 MG TABLET 650 MG: at 08:22

## 2023-02-09 RX ADMIN — Medication 100 MCG: at 10:30

## 2023-02-09 RX ADMIN — ACETAMINOPHEN 325MG 650 MG: 325 TABLET ORAL at 08:31

## 2023-02-09 NOTE — PLAN OF CARE
Problem: Anemia  Goal: Anemia Symptom Improvement  Outcome: Ongoing, Not Progressing  Intervention: Monitor and Manage Anemia  Recent Flowsheet Documentation  Taken 2/9/2023 0400 by Lias Richardson RN  Safety Promotion/Fall Prevention:   activity supervised   assistive device/personal items within reach  Taken 2/9/2023 0200 by Lias Richardson RN  Safety Promotion/Fall Prevention:   activity supervised   assistive device/personal items within reach   clutter free environment maintained   fall prevention program maintained   lighting adjusted   nonskid shoes/slippers when out of bed   safety round/check completed   room organization consistent  Taken 2/9/2023 0000 by Lisa Richardson RN  Safety Promotion/Fall Prevention:   activity supervised   assistive device/personal items within reach   clutter free environment maintained   fall prevention program maintained   nonskid shoes/slippers when out of bed   mobility aid in reach   safety round/check completed   room organization consistent  Taken 2/8/2023 2000 by Lisa Richardson RN  Safety Promotion/Fall Prevention:   activity supervised   assistive device/personal items within reach   clutter free environment maintained   fall prevention program maintained   mobility aid in reach   lighting adjusted   nonskid shoes/slippers when out of bed   safety round/check completed   room organization consistent   Goal Outcome Evaluation:

## 2023-02-09 NOTE — PLAN OF CARE
Goal Outcome Evaluation:  Plan of Care Reviewed With: patient           Outcome Evaluation: PT evaluation completed.  Pt transferred supine-->sit with MinAx1, stood with CGA, and ambulated 350 feet without AD with CGAx1.  Skilled PT services warranted to improve weakness, decreased activity tolerance, and balance deficits.  Recommend home with assist and HH PT.

## 2023-02-09 NOTE — PROGRESS NOTES
[facility]   Urology Progress Note    Patient Name: Jun Garcia  : 1960  MRN: [id number]  Date of admission: 2023  Surgical Procedures Since Admission:  Procedure(s):  CYSTOSCOPY CLOT EVACUATION WITH FULGURATION  Surgeon:  Mandi Velasco MD  Status:  Day of Surgery  -------------------    Subjective   Subjective     Chief Complaint: Gross hematuria    History of Present Illness   Pt clotted his catheter off several times overnight.  Requiring multiple rounds of manual irrigation.  CBI has been going wide open with cherry red urine out.      I manually irrigated his catheter with approximately 150 cc of clot evacuated.  CBI restarted with immediate return of cherry colored urine.       Objective   Objective     Vitals:   Temp:  [97.4 °F (36.3 °C)-98.6 °F (37 °C)] 97.6 °F (36.4 °C)  Heart Rate:  [] 109  Resp:  [16-20] 18  BP: ()/(49-93) 92/67  Output by Drain (mL) 23 0701 - 23 1900 23 1901 - 23 0700 23 0701 - 23 1003 Range Total   Continuous Bladder Irrigation Triple-lumen 22 Fr -2000 -[id number]  -[id number]       Physical Exam  Vitals and nursing note reviewed.   Constitutional:       General: He is awake. He is not in acute distress.     Appearance: Normal appearance. He is well-developed.   HENT:      Head: Normocephalic and atraumatic.      Right Ear: External ear normal.      Left Ear: External ear normal.      Nose: Nose normal.   Eyes:      Conjunctiva/sclera: Conjunctivae normal.   Pulmonary:      Effort: Pulmonary effort is normal.   Abdominal:      General: There is no distension.      Palpations: Abdomen is soft. There is no mass.      Tenderness: There is no abdominal tenderness. There is no right CVA tenderness, left CVA tenderness, guarding or rebound.      Hernia: No hernia is present. There is no hernia in the left inguinal area or right inguinal area.   Genitourinary:     Pubic Area: No rash.       Penis: Normal.       Testes: Normal.       Rectum: No mass or tenderness. Normal anal tone.      Comments: Bright red urine   Musculoskeletal:      Cervical back: Normal range of motion.   Lymphadenopathy:      Lower Body: No right inguinal adenopathy. No left inguinal adenopathy.   Skin:     General: Skin is warm.   Neurological:      General: No focal deficit present.      Mental Status: He is alert and oriented to person, place, and time.   Psychiatric:         Behavior: Behavior normal. Behavior is cooperative.               Result Review    Result Review:  I have personally reviewed the results from the time of this admission to 2/9/2023 10:03 EST and agree with these findings:  []  Laboratory  []  Microbiology  []  Radiology  []  EKG/Telemetry   []  Cardiology/Vascular   []  Pathology  []  Old records  []  Other:  Most notable findings include:     Assessment & Plan   Assessment / Plan     Brief Patient Summary:  Jun Garcia is a 62 y.o. male who has gross hematuria and clot retention.  Unfortunately, we have been unsuccessful irrigating at the bedside.  I discussed his options with him and he is agreeable to cystoscopy and clot evacuation in the OR.  I discussed the risks and benefits of the operation with him as well.     Active Hospital Problems:  Active Hospital Problems    Diagnosis    • **Acute renal failure superimposed on stage 4 chronic kidney disease, unspecified acute renal failure type (HCC)    • Metabolic acidosis    • Hyperkalemia    • Elevated troponin    • Elevated lipase      Plan:   1. OR today for cystoscopy, clot evacuation

## 2023-02-09 NOTE — PROGRESS NOTES
"General Surgery Daily Progress Note    Subjective:  No new complaints    Objective:  /78 (BP Location: Left arm, Patient Position: Lying)   Pulse 77   Temp 97.7 °F (36.5 °C) (Oral)   Resp 18   Ht 170.2 cm (67\")   Wt 67.6 kg (149 lb)   SpO2 99%   BMI 23.34 kg/m²       General Appearance: Moderate distress  Eyes: Anicteric  Neck: Trachea midline   Cardiovascular:  RRR without murmur nor rub  Lungs:  Bilateral respirations unlabored   Abdomen:  Soft, nontender, no masses, no organomegaly   Extremities:  No cyanosis, trace edema   Skin:  No obvious rashes   Neurologic: awake and conversant       Imaging Results (Last 24 Hours)     Procedure Component Value Units Date/Time    XR Pyelogram Retrograde [[id number]] Resulted: 02/09/23 1104     Updated: 02/09/23 1106          CBC:  Results from last 7 days   Lab Units 02/09/23  1549 02/09/23  0449   WBC 10*3/mm3  --  12.49*   HEMOGLOBIN g/dL 8.0* 7.8*   HEMATOCRIT % 23.0* 22.6*   PLATELETS 10*3/mm3  --  186       CMP:  Results from last 7 days   Lab Units 02/09/23  0449 02/08/23  0343 02/07/23  1207   SODIUM mmol/L 138   < > 134*   POTASSIUM mmol/L 4.6   < > 5.3*   CHLORIDE mmol/L 94*   < > 99   CO2 mmol/L 27.0   < > 13.0*   BUN mg/dL 150*   < > 164*   CREATININE mg/dL 12.87*   < > 13.21*   CALCIUM mg/dL 7.7*   < > 8.5*   BILIRUBIN mg/dL  --   --  0.3   ALK PHOS U/L  --   --  103   ALT (SGPT) U/L  --   --  19   AST (SGOT) U/L  --   --  12   GLUCOSE mg/dL 158*   < > 169*    < > = values in this interval not displayed.         Assessment:  Stage V kidney disease  Obstructive uropathy    Plan:   Awaiting left upper extremity ultrasound results.  Will likely require ongoing dialysis access.  The patient needs to make a decision regarding peritoneal dialysis or hemodialysis with fistula creation.  We will see as an outpatient.    Follow-up at [facility], [phone], regarding future access.    Sanford Chowdhury MD - 2/9/2023, 17:42 " EST

## 2023-02-09 NOTE — ANESTHESIA PREPROCEDURE EVALUATION
Anesthesia Evaluation     Patient summary reviewed and Nursing notes reviewed   NPO Solid Status: > 8 hours  NPO Liquid Status: > 8 hours           Airway   Mallampati: II  TM distance: >3 FB  Neck ROM: full  No difficulty expected  Dental - normal exam     Pulmonary     breath sounds clear to auscultation  Cardiovascular   Exercise tolerance: good (4-7 METS)    Rhythm: regular  Rate: normal        Neuro/Psych  GI/Hepatic/Renal/Endo      Musculoskeletal     Abdominal    Substance History      OB/GYN          Other                      Anesthesia Plan    ASA 3     general     intravenous induction     Anesthetic plan, risks, benefits, and alternatives have been provided, discussed and informed consent has been obtained with: patient.        CODE STATUS:    Code Status (Patient has no pulse and is not breathing): CPR (Attempt to Resuscitate)  Medical Interventions (Patient has pulse or is breathing): Full Support

## 2023-02-09 NOTE — PLAN OF CARE
Goal Outcome Evaluation:  Plan of Care Reviewed With: patient, daughter, family        Progress: no change  Outcome Evaluation: Pt presents below his functional baseline with deficits including generalized weakness, decreased activity tolerance, and impaired balance warranting skilled OT services. Family and pt educated on adaptive equipment including shower chair and raised toilet seat for improved performance and fall prevention. Rec home with assist and home health OT and PT.

## 2023-02-09 NOTE — PROGRESS NOTES
" LOS: 2 days   Patient Care Team:  Provider, No Known as PCP - General  Provider, No Known as PCP - Family Medicine    Chief Complaint: Chest pain.    Subjective     No significant change in renal function. Plan for dialysis tomorrow     Subjective:  Symptoms:  He reports chest pain.        History taken from: patient    Objective     Vital Sign Min/Max for last 24 hours  Temp  Min: 97.4 °F (36.3 °C)  Max: 98.6 °F (37 °C)   BP  Min: 92/67  Max: 170/80   Pulse  Min: 65  Max: 118   Resp  Min: 16  Max: 20   SpO2  Min: 97 %  Max: 100 %   Flow (L/min)  Min: 2  Max: 2   Weight  Min: 67.6 kg (149 lb)  Max: 67.6 kg (149 lb)     Flowsheet Rows    Flowsheet Row First Filed Value   Admission Height 170.2 cm (67\") Documented at 02/07/2023 1150   Admission Weight 68 kg (150 lb) Documented at 02/07/2023 1150          I/O this shift:  In: 2676 [I.V.:1800; Blood:376; IV Piggyback:500]  Out: 1400 [Urine:900; Blood:500]  I/O last 3 completed shifts:  In: 931.7 [P.O.:200; Blood:731.7]  Out: -10047     Objective:  General Appearance:  Comfortable.    Vital signs: (most recent): Blood pressure 149/78, pulse 77, temperature 97.7 °F (36.5 °C), temperature source Oral, resp. rate 18, height 170.2 cm (67\"), weight 67.6 kg (149 lb), SpO2 99 %.  Vital signs are normal.    Output: Producing urine.    HEENT: Normal HEENT exam.    Lungs:  Normal effort and normal respiratory rate.  Breath sounds clear to auscultation.  He is not in respiratory distress.  No decreased breath sounds or wheezes.    Heart: Normal rate.  Regular rhythm.  S1 normal and S2 normal.  No murmur or gallop.   Abdomen: Abdomen is soft.  Bowel sounds are normal.     Extremities: Normal range of motion.  There is no dependent edema or local swelling.    Pulses: Distal pulses are intact.    Neurological: Patient is alert and oriented to person, place and time.  Normal strength.    Skin:  Warm.              Results Review:     I reviewed the patient's new clinical " results.    WBC WBC   Date Value Ref Range Status   02/09/2023 12.49 (H) 3.40 - 10.80 10*3/mm3 Final   02/08/2023 9.66 3.40 - 10.80 10*3/mm3 Final   02/07/2023 12.08 (H) 3.40 - 10.80 10*3/mm3 Final      HGB Hemoglobin   Date Value Ref Range Status   02/09/2023 7.8 (L) 13.0 - 17.7 g/dL Final   02/08/2023 5.9 (C) 13.0 - 17.7 g/dL Final   02/07/2023 7.8 (L) 13.0 - 17.7 g/dL Final      HCT Hematocrit   Date Value Ref Range Status   02/09/2023 22.6 (L) 37.5 - 51.0 % Final   02/08/2023 18.0 (C) 37.5 - 51.0 % Final   02/07/2023 23.7 (L) 37.5 - 51.0 % Final      Platlets No results found for: LABPLAT   MCV MCV   Date Value Ref Range Status   02/09/2023 84.0 79.0 - 97.0 fL Final   02/08/2023 88.7 79.0 - 97.0 fL Final   02/07/2023 89.4 79.0 - 97.0 fL Final          Sodium Sodium   Date Value Ref Range Status   02/09/2023 138 136 - 145 mmol/L Final   02/08/2023 138 136 - 145 mmol/L Final   02/07/2023 134 (L) 136 - 145 mmol/L Final      Potassium Potassium   Date Value Ref Range Status   02/09/2023 4.6 3.5 - 5.2 mmol/L Final   02/08/2023 4.4 3.5 - 5.2 mmol/L Final   02/07/2023 5.3 (H) 3.5 - 5.2 mmol/L Final      Chloride Chloride   Date Value Ref Range Status   02/09/2023 94 (L) 98 - 107 mmol/L Final   02/08/2023 97 (L) 98 - 107 mmol/L Final   02/07/2023 99 98 - 107 mmol/L Final      CO2 CO2   Date Value Ref Range Status   02/09/2023 27.0 22.0 - 29.0 mmol/L Final   02/08/2023 21.0 (L) 22.0 - 29.0 mmol/L Final   02/07/2023 13.0 (L) 22.0 - 29.0 mmol/L Final      BUN BUN   Date Value Ref Range Status   02/09/2023 150 (H) 8 - 23 mg/dL Final   02/08/2023 140 (H) 8 - 23 mg/dL Final   02/07/2023 164 (H) 8 - 23 mg/dL Final      Creatinine Creatinine   Date Value Ref Range Status   02/09/2023 12.87 (H) 0.76 - 1.27 mg/dL Final   02/08/2023 11.76 (H) 0.76 - 1.27 mg/dL Final   02/07/2023 13.21 (H) 0.76 - 1.27 mg/dL Final      Calcium Calcium   Date Value Ref Range Status   02/09/2023 7.7 (L) 8.6 - 10.5 mg/dL Final   02/08/2023 8.2 (L) 8.6  - 10.5 mg/dL Final   02/07/2023 8.5 (L) 8.6 - 10.5 mg/dL Final      PO4 No results found for: CAPO4   Albumin Albumin   Date Value Ref Range Status   02/09/2023 3.1 (L) 3.5 - 5.2 g/dL Final   02/07/2023 4.2 3.5 - 5.2 g/dL Final      Magnesium Magnesium   Date Value Ref Range Status   02/07/2023 3.3 (H) 1.6 - 2.4 mg/dL Final      Uric Acid No results found for: URICACID     Medication Review: yes    Assessment & Plan       Acute renal failure superimposed on stage 4 chronic kidney disease, unspecified acute renal failure type (HCC)    Metabolic acidosis    Hyperkalemia    Elevated troponin    Elevated lipase      Assessment & Plan        CKD stage V: Hx of obstructive uropathy with severe hydronephrosis on this admission noted previously on admission at [facility]. Patient left the hospital against medical advise.      Azotemia:BUN significantly elevated. Does have mild uremic symptoms. Fatigue, poor appetite and vomiiting     Obstructive uropathy: Likley due to enlarge prostate resulting in severe hydronephrosis. Cystoscopy was recommended on last visit at  but patient didn't have any intervention. Similar findings noted on this admission      Anemia: ACD due to advance CKD     Hyperkalemia: Due to CKD     Met acidosis: Due to CKD      Hyponatremia: Due to CKD     Recs  No significant improvement in renal function. Plan for HD tomorrow after placement of HD cath. NPO after midnight.     Jordan Marquez MD  02/09/23  16:06 EST

## 2023-02-09 NOTE — OP NOTE
Procedure Note    Jun Garcia      Pre-op Diagnosis:   clot retention    Post-op Diagnosis:     Post-Op Diagnosis Codes:     * Clot retention of urine [R33.8]    Procedure/CPT® Codes:  MS CYSTOURETHROSCOPY,FULGURATN [55944]  MS CYSTO W/IRRIG & EVAC MULTPLE OBSTRUCTING CLOTS [54236]  CHG UROGRAPHY RETROGRADE WITH/WO KUB [59734]    Surgeon: Mandi Velasco MD      Anesthesia: see anesthesia record    Staff:   * No surgical staff found *    Estimated Blood Loss: 500 mL  Urine Voided: * No values recorded between 2/9/2023 12:00 AM and 2/9/2023 11:21 AM *    Specimens:                None      Drains: 22 f 3-way caldwell catheter on CBI    Findings:   1.  Cystoscopy with large organized clot within the bladder  2.  Approximately 500 cc of clot evacuated  3.  Oozing noted from the bladder neck and prostate.  Large median lobe noted.  All bleeders fulgurated.  4.  Possible wisp of blood seen from the right ureteral orifice.  Retrograde pyelogram with what appears to be a possible filling defect, however, he does have possible cysts in the right renal pelvis.  Left retrograde pyelogram difficult to obtain due to J hooking of the ureter.  However, no large filling defect noted.  5.  No blood seen with contrast draining into the bladder.      Blood: N/A    Complications: None immediate    Indication for Procedure: Mr. Garcia is a 62-year-old gentleman who was admitted with gross hematuria.  He was found to be in clot retention overnight and required several episodes of manual irrigation.  He required blood transfusion as well.  This morning he again went into clot retention and we were unable to evacuate his clot.  He was taken to the operating room urgently for cystoscopy and clot evacuation.    Description of Procedure: The patient was seen and examined in the preoperative area.  The risks, benefits, and alternatives were explained to the patient and family.  Informed consent was obtained.  The patient was  then taken back to the operating theater and placed on the table in a supine position.  Venodyne boots were placed on the bilateral lower extremities.  General anesthesia was induced without any complication.  They were then placed in the dorsal lithotomy position with all pressure points padded and secured.  The patient was prepped and draped in the usual sterile fashion and a timeout was taken.      A 26 Togolese rigid resectoscope was then passed through the urethra and into the bladder.  Upon entry into the bladder a huge clot was encountered.  This was irrigated out with approximately 500 cc of clot in total evacuated.  Once this was accomplished a cystoscopy was performed over no masses or lesions noted within the walls of the bladder.  He had a heavily trabeculated bladder.  Attention was then turned to the bladder neck and prostate where there was oozing noted.  Using bipolar electrocautery all areas of bleeding were fulgurated.  We noticed a small wisp of blood coming from the right ureteral orifice.  It was unclear if this was left over blood or blood that was actually being drained from the right kidney.  Decision was made to perform a retrograde pyelogram.    Retrograde pyelogram was performed bilaterally.  The left side there was J hooking of the ureter but no obvious filling defect or hydronephrosis.  On the right side there was a filling defect in the renal pelvis this may coincide with his CT scan and hydronephrosis with possible cysts.  Upon removal of the Pollick catheter no blood was seen draining with the drainage of contrast.    At this point the bladder was again inspected there is no injury or further bleeding noted.  A 22 Togolese three-way hematuria catheter was inserted and started on CBI.  He was given lidocaine jelly.  He tolerated the procedure well and there were no complications to procedure.  He was taken to PACU in stable and estimated condition.    Disposition: The patient will be  discharge when PACU criteria is met.  We will keep him on CBI for the next 24 hours.  He is cleared to eat from our standpoint.  We will plan on a possible clamp trial in the morning.  The intraoperative findings and postoperative plans were discussed with the patient and family.     Mandi Velasco MD     Date: 2/9/2023  Time: 11:21 EST

## 2023-02-09 NOTE — NURSING NOTE
CBI was ran wide open all night, urine is still cherry red. Several clot evacuations were needed throughout the night, some easier than other. Clots continue to gather and clog the catheter. May potentially need larger caldwell.

## 2023-02-09 NOTE — ANESTHESIA POSTPROCEDURE EVALUATION
Patient: Jun Garcia    Procedure Summary     Date: 02/09/23 Room / Location:  [facility] 07 /  [facility]    Anesthesia Start: 0957 Anesthesia Stop: 1116    Procedure: CYSTOSCOPY CLOT EVACUATION WITH FULGURATION, RETROGRADE PYELOGRAM Diagnosis:     Surgeons: Mandi Velasco MD Provider: Sterling Tan MD    Anesthesia Type: general ASA Status: 3          Anesthesia Type: general    Vitals  No vitals data found for the desired time range.          Post Anesthesia Care and Evaluation    Patient location during evaluation: PACU  Patient participation: complete - patient participated  Level of consciousness: awake and alert  Pain management: adequate    Airway patency: patent  Anesthetic complications: No anesthetic complications  PONV Status: none  Cardiovascular status: hemodynamically stable and acceptable  Respiratory status: nonlabored ventilation, acceptable and nasal cannula  Hydration status: acceptable

## 2023-02-09 NOTE — THERAPY EVALUATION
Patient Name: Jun Garcia  : 1960    MRN: [id number]                              Today's Date: 2023       Admit Date: 2023    Visit Dx:     ICD-10-CM ICD-9-CM   1. Acute renal failure superimposed on stage 4 chronic kidney disease, unspecified acute renal failure type (HCC)  N17.9 584.9    N18.4 585.4   2. Hypermagnesemia  E83.41 275.2   3. Hyperkalemia  E87.5 276.7   4. Hyperphosphatemia  E83.39 275.3   5. Dehydration  E86.0 276.51   6. Metabolic acidosis  E87.20 276.2     Patient Active Problem List   Diagnosis   • Acute renal failure superimposed on stage 4 chronic kidney disease, unspecified acute renal failure type (HCC)   • Metabolic acidosis   • Hyperkalemia   • Elevated troponin   • Elevated lipase     Past Medical History:   Diagnosis Date   • Diabetes mellitus (HCC)    • Elevated cholesterol    • GERD (gastroesophageal reflux disease)    • Hyperlipidemia    • Hypertension    • Stroke (HCC)      History reviewed. No pertinent surgical history.   General Information     Row Name 23 1520          Physical Therapy Time and Intention    Document Type evaluation  -LM     Mode of Treatment individual therapy;physical therapy  -LM     Row Name 23 1520          General Information    Patient Profile Reviewed yes  -LM     Prior Level of Function independent:;all household mobility;gait;ADL's  Had 2 recent falls d/t dizziness  -LM     Existing Precautions/Restrictions fall;other (see comments)  CBI  -LM     Barriers to Rehab medically complex;language barrier  -LM     Row Name 23 1520          Living Environment    People in Home child(jennifer), adult  -LM     Row Name 23 1520          Home Main Entrance    Number of Stairs, Main Entrance three  States they are small steps  -LM     Stair Railings, Main Entrance none  -LM     Row Name 23 1520          Stairs Within Home, Primary    Stairs, Within Home, Primary Pt stays on the main level  -LM     Row Name 23 1520           Cognition    Orientation Status (Cognition) oriented x 3  -LM     Row Name 02/09/23 1520          Safety Issues, Functional Mobility    Safety Issues Affecting Function (Mobility) insight into deficits/self-awareness;safety precaution awareness  -LM     Impairments Affecting Function (Mobility) balance;endurance/activity tolerance;strength  -LM           User Key  (r) = Recorded By, (t) = Taken By, (c) = Cosigned By    Initials Name Provider Type    LM Lelo Higuera, PT Physical Therapist               Mobility     Row Name 02/09/23 1523          Bed Mobility    Bed Mobility supine-sit;sit-supine  -LM     Supine-Sit [place] (Bed Mobility) minimum assist (75% patient effort);1 person assist;verbal cues  -LM     Sit-Supine [place] (Bed Mobility) standby assist  -LM     Assistive Device (Bed Mobility) bed rails;head of bed elevated  -LM     Row Name 02/09/23 1523          Sit-Stand Transfer    Sit-Stand [place] (Transfers) contact guard;1 person assist;verbal cues  -LM     Row Name 02/09/23 1523          Gait/Stairs (Locomotion)    [place] Level (Gait) contact guard;1 person assist;verbal cues  -LM     Distance in Feet (Gait) 350  -LM     Deviations/Abnormal Patterns (Gait) liliya decreased  -LM     Bilateral Gait Deviations heel strike decreased  -LM     Comment, (Gait/Stairs) One very small standing rest break needed.  Increased sway noted throughout, but no LOB.  Pt reports feeling much better after walking.  -LM           User Key  (r) = Recorded By, (t) = Taken By, (c) = Cosigned By    Initials Name Provider Type    LM Lelo Higuera, PT Physical Therapist               Obj/Interventions     Row Name 02/09/23 1527          Range of Motion Comprehensive    General Range of Motion bilateral lower extremity ROM WFL  -LM     Row Name 02/09/23 1527          Strength Comprehensive (MMT)    General Manual Muscle Testing (MMT) Assessment lower extremity strength deficits identified  -LM      Comment, General Manual Muscle Testing (MMT) Assessment RLE grossly 4/5 throughout; LLE grossly 4-/5 throughout  -LM     Row Name 02/09/23 1527          Balance    Balance Assessment sitting static balance;standing static balance;standing dynamic balance  -LM     Static Sitting Balance independent  -LM     Position, Sitting Balance unsupported  -LM     Static Standing Balance standby assist  -LM     Dynamic Standing Balance contact guard  -LM     Position/Device Used, Standing Balance unsupported  -LM     Row Name 02/09/23 1527          Sensory Assessment (Somatosensory)    Sensory Assessment (Somatosensory) LE sensation intact  -LM           User Key  (r) = Recorded By, (t) = Taken By, (c) = Cosigned By    Initials Name Provider Type    LM Lelo Higuera, PT Physical Therapist               Goals/Plan     Row Name 02/09/23 1529          Bed Mobility Goal 1 (PT)    Activity/Assistive Device (Bed Mobility Goal 1, PT) sit to supine/supine to sit  -LM     [place] Level/Cues Needed (Bed Mobility Goal 1, PT) independent  -LM     Time Frame (Bed Mobility Goal 1, PT) long term goal (LTG);10 days  -LM     Row Name 02/09/23 1529          Gait Training Goal 1 (PT)    Activity/Assistive Device (Gait Training Goal 1, PT) gait (walking locomotion)  -LM     [place] Level (Gait Training Goal 1, PT) independent  -LM     Distance (Gait Training Goal 1, PT) 300 feet  -LM     Time Frame (Gait Training Goal 1, PT) long term goal (LTG);10 days  -LM     Row Name 02/09/23 1529          Stairs Goal 1 (PT)    Activity/Assistive Device (Stairs Goal 1, PT) ascending stairs;descending stairs  -LM     [place] Level/Cues Needed (Stairs Goal 1, PT) standby assist  -LM     Number of Stairs (Stairs Goal 1, PT) 3  -LM     Time Frame (Stairs Goal 1, PT) long term goal (LTG);10 days  -LM     Row Name 02/09/23 1529          Therapy Assessment/Plan (PT)    Planned Therapy Interventions (PT) balance training;bed mobility training;gait  training;home exercise program;motor coordination training;neuromuscular re-education;patient/family education;postural re-education;ROM (range of motion);stair training;strengthening;stretching;transfer training  -           User Key  (r) = Recorded By, (t) = Taken By, (c) = Cosigned By    Initials Name Provider Type    Lelo Valadez, PT Physical Therapist               Clinical Impression     Row Name 02/09/23 1528          Pain    Pretreatment Pain Rating 0/10 - no pain  -LM     Posttreatment Pain Rating 0/10 - no pain  -LM     Row Name 02/09/23 1528          Plan of Care Review    Plan of Care Reviewed With patient  -LM     Outcome Evaluation PT evaluation completed.  Pt transferred supine-->sit with MinAx1, stood with CGA, and ambulated 350 feet without AD with CGAx1.  Skilled PT services warranted to improve weakness, decreased activity tolerance, and balance deficits.  Recommend home with assist and HH PT.  -     Row Name 02/09/23 1528          Therapy Assessment/Plan (PT)    Rehab Potential (PT) good, to achieve stated therapy goals  -     Criteria for Skilled Interventions Met (PT) yes;meets criteria;skilled treatment is necessary  -     Therapy Frequency (PT) daily  -     Row Name 02/09/23 1528          Positioning and Restraints    Pre-Treatment Position in bed  -LM     Post Treatment Position bed  -LM     In Bed supine;call light within reach;encouraged to call for assist;with family/caregiver;notified nsg  -           User Key  (r) = Recorded By, (t) = Taken By, (c) = Cosigned By    Initials Name Provider Type    Lelo Valadez, PT Physical Therapist               Outcome Measures     Row Name 02/09/23 1530 02/09/23 0822       How much help from another person do you currently need...    Turning from your back to your side while in flat bed without using bedrails? 4  -LM 4  -DF    Moving from lying on back to sitting on the side of a flat bed without bedrails? 3  -LM 4  -DF    Moving to  and from a bed to a chair (including a wheelchair)? 3  -LM 3  -DF    Standing up from a chair using your arms (e.g., wheelchair, bedside chair)? 3  -LM 3  -DF    Climbing 3-5 steps with a railing? 3  -LM 2  -DF    To walk in hospital room? 3  -LM 3  -DF    AM-PAC 6 Clicks Score (PT) 19  -LM 19  -DF    Highest level of mobility 6 --> Walked 10 steps or more  -LM 6 --> Walked 10 steps or more  -DF    Row Name 02/09/23 1530          Functional Assessment    Outcome Measure Options AM-PAC 6 Clicks Basic Mobility (PT)  -           User Key  (r) = Recorded By, (t) = Taken By, (c) = Cosigned By    Initials Name Provider Type    LM Lelo Higuera, PT Physical Therapist    Genoveva Burris RN Registered Nurse                             Physical Therapy Education     Title: PT OT SLP Therapies (In Progress)     Topic: Physical Therapy (In Progress)     Point: Mobility training (Done)     Learning Progress Summary           Patient Acceptance, E,I, VU,NR by  at 2/9/2023 1530                   Point: Home exercise program (Not Started)     Learner Progress:  Not documented in this visit.          Point: Precautions (Done)     Learning Progress Summary           Patient Acceptance, E,I, VU,NR by  at 2/9/2023 1530                               User Key     Initials Effective Dates Name Provider Type Discipline     06/16/21 -  Lelo Higuera, PT Physical Therapist PT              PT Recommendation and Plan  Planned Therapy Interventions (PT): balance training, bed mobility training, gait training, home exercise program, motor coordination training, neuromuscular re-education, patient/family education, postural re-education, ROM (range of motion), stair training, strengthening, stretching, transfer training  Plan of Care Reviewed With: patient  Outcome Evaluation: PT evaluation completed.  Pt transferred supine-->sit with MinAx1, stood with CGA, and ambulated 350 feet without AD with CGAx1.  Skilled PT services warranted  to improve weakness, decreased activity tolerance, and balance deficits.  Recommend home with assist and HH PT.     Time Calculation:    PT Charges     Row Name 02/09/23 1531             Time Calculation    Start Time 1448  -LM      PT Received On 02/09/23  -LM      PT Goal Re-Cert Due Date 02/19/23  -LM         Untimed Charges    PT Eval/Re-eval Minutes 46  -LM         Total Minutes    Untimed Charges Total Minutes 46  -LM       Total Minutes 46  -LM            User Key  (r) = Recorded By, (t) = Taken By, (c) = Cosigned By    Initials Name Provider Type    LM Lelo Higuera, PT Physical Therapist              Therapy Charges for Today     Code Description Service Date Service Provider Modifiers Qty    [id number] HC PT EVAL LOW COMPLEXITY 4 2/9/2023 Lelo Higuera, PT GP 1          PT G-Codes  Outcome Measure Options: AM-PAC 6 Clicks Basic Mobility (PT)  AM-PAC 6 Clicks Score (PT): 19  PT Discharge Summary  Anticipated Discharge Disposition (PT): home with assist, home with home health    Lelo Higuera PT  2/9/2023

## 2023-02-09 NOTE — THERAPY EVALUATION
Patient Name: Jun Garcia  : 1960    MRN: [id number]                              Today's Date: 2023       Admit Date: 2023    Visit Dx:     ICD-10-CM ICD-9-CM   1. Acute renal failure superimposed on stage 4 chronic kidney disease, unspecified acute renal failure type (HCC)  N17.9 584.9    N18.4 585.4   2. Hypermagnesemia  E83.41 275.2   3. Hyperkalemia  E87.5 276.7   4. Hyperphosphatemia  E83.39 275.3   5. Dehydration  E86.0 276.51   6. Metabolic acidosis  E87.20 276.2     Patient Active Problem List   Diagnosis   • Acute renal failure superimposed on stage 4 chronic kidney disease, unspecified acute renal failure type (HCC)   • Metabolic acidosis   • Hyperkalemia   • Elevated troponin   • Elevated lipase     Past Medical History:   Diagnosis Date   • Diabetes mellitus (HCC)    • Elevated cholesterol    • GERD (gastroesophageal reflux disease)    • Hyperlipidemia    • Hypertension    • Stroke (HCC)      History reviewed. No pertinent surgical history.   General Information     Row Name 23 1544          OT Time and Intention    Document Type evaluation  -AN     Mode of Treatment occupational therapy  -AN     Row Name 23 1544          General Information    Patient Profile Reviewed yes  -AN     Prior Level of Function independent:;all household mobility;community mobility;gait;ADL's  2 recent falls due to dizziness  -AN     Existing Precautions/Restrictions fall;other (see comments)  CBI  -AN     Barriers to Rehab medically complex;language barrier  -AN     Row Name 23 1544          Living Environment    People in Home child(jennifer), adult  -AN     Row Name 23 1544          Home Main Entrance    Number of Stairs, Main Entrance three  -AN     Stair Railings, Main Entrance none  -AN     Row Name 23 1544          Stairs Within Home, Primary    Stairs, Within Home, Primary pt able to stay on the main floor  -AN     Stairs Comment, Within Home, Primary walk in shower;  daughter endorses low toilets  -AN     Row Name 02/09/23 1544          Cognition    Orientation Status (Cognition) oriented x 3  -AN     Row Name 02/09/23 1544          Safety Issues, Functional Mobility    Safety Issues Affecting Function (Mobility) safety precautions follow-through/compliance;safety precaution awareness;insight into deficits/self-awareness  -AN     Impairments Affecting Function (Mobility) balance;endurance/activity tolerance;strength  -AN           User Key  (r) = Recorded By, (t) = Taken By, (c) = Cosigned By    Initials Name Provider Type    AN Latonia Boateng OT Occupational Therapist                 Mobility/ADL's     Row Name 02/09/23 1545          Bed Mobility    Bed Mobility supine-sit  -AN     Supine-Sit [place] (Bed Mobility) minimum assist (75% patient effort);1 person assist;verbal cues  -AN     Sit-Supine [place] (Bed Mobility) supervision  -AN     Bed Mobility, Safety Issues impaired trunk control for bed mobility  -AN     Assistive Device (Bed Mobility) bed rails;head of bed elevated  -AN     Row Name 02/09/23 1545          Transfers    Transfers sit-stand transfer;stand-sit transfer;toilet transfer  -AN     [facility] Name 02/09/23 1545          Sit-Stand Transfer    Sit-Stand [place] (Transfers) contact guard;verbal cues  -AN     Row Name 02/09/23 1545          Stand-Sit Transfer    Stand-Sit [place] (Transfers) contact guard  -AN     [facility] Name 02/09/23 1545          Toilet Transfer    Type (Toilet Transfer) sit-stand;stand-sit  -AN     [place] Level (Toilet Transfer) contact guard  -AN     Comment, (Toilet Transfer) provided education to pt and daughter on raising the height of the toilet by either a BSC placed over top or a toilet seat riser; verbalized understanding  -AN     [facility] Name 02/09/23 1545          Functional Mobility    Functional Mobility- Ind. Level contact guard assist  -AN     Functional Mobility-Distance (Feet) --  household distance  -AN      Row Name 02/09/23 1545          Activities of Daily Living    BADL Assessment/Intervention upper body dressing;bathing;lower body dressing  -AN     [facility] Name 02/09/23 1545          Upper Body Dressing Assessment/Training    [place] Level (Upper Body Dressing) don;set up  gown  -AN     Position (Upper Body Dressing) edge of bed sitting  -AN     Row Name 02/09/23 1545          Bathing Assessment/Intervention    Comment, (Bathing) provided education to pt and daughter on use of shower chair when in the shower for fall prevention and improved performance; verbalized understanding  -AN     Row Name 02/09/23 1545          Lower Body Dressing Assessment/Training    [place] Level (Lower Body Dressing) don;shoes/slippers;doff;set up  -AN     Position (Lower Body Dressing) edge of bed sitting  -AN           User Key  (r) = Recorded By, (t) = Taken By, (c) = Cosigned By    Initials Name Provider Type    AN Latonia Boateng OT Occupational Therapist               Obj/Interventions     Row Name 02/09/23 1549          Sensory Assessment (Somatosensory)    Sensory Assessment (Somatosensory) sensation intact  -AN     Row Name 02/09/23 1549          Vision Assessment/Intervention    Visual Impairment/Limitations WFL  -AN     Row Name 02/09/23 1549          Range of Motion Comprehensive    General Range of Motion bilateral upper extremity ROM WFL;bilateral lower extremity ROM WFL  -AN     Row Name 02/09/23 1549          Strength Comprehensive (MMT)    General Manual Muscle Testing (MMT) Assessment upper extremity strength deficits identified;lower extremity strength deficits identified  -AN     Comment, General Manual Muscle Testing (MMT) Assessment BUE grossly 4/5, observed LLE weakness with mobility  -AN     Row Name 02/09/23 1549          Balance    Balance Assessment sitting static balance;sitting dynamic balance;sit to stand dynamic balance;standing static balance;standing dynamic balance  -AN     Static Sitting  Balance supervision  -AN     Dynamic Sitting Balance supervision  -AN     Position, Sitting Balance sitting edge of bed  -AN     Sit to Stand Dynamic Balance contact guard;verbal cues  -AN     Static Standing Balance standby assist  -AN     Dynamic Standing Balance contact guard  -AN     Position/Device Used, Standing Balance supported  -AN           User Key  (r) = Recorded By, (t) = Taken By, (c) = Cosigned By    Initials Name Provider Type    Latonia Larkin, OT Occupational Therapist               Goals/Plan     Row Name 02/09/23 1552          Bed Mobility Goal 1 (OT)    Activity/Assistive Device (Bed Mobility Goal 1, OT) sit to supine/supine to sit  -AN     [place] Level/Cues Needed (Bed Mobility Goal 1, OT) modified independence  -AN     Time Frame (Bed Mobility Goal 1, OT) long term goal (LTG);10 days  -AN     Row Name 02/09/23 1552          Transfer Goal 1 (OT)    Activity/Assistive Device (Transfer Goal 1, OT) sit-to-stand/stand-to-sit;bed-to-chair/chair-to-bed;toilet  -AN     [place] Level/Cues Needed (Transfer Goal 1, OT) modified independence  -AN     Time Frame (Transfer Goal 1, OT) long term goal (LTG);10 days  -AN     Row Name 02/09/23 1552          Toileting Goal 1 (OT)    Activity/Device (Toileting Goal 1, OT) adjust/manage clothing;perform perineal hygiene;commode  -AN     [place] Level/Cues Needed (Toileting Goal 1, OT) modified independence  -AN     Time Frame (Toileting Goal 1, OT) long term goal (LTG);10 days  -AN     Row Name 02/09/23 1552          Therapy Assessment/Plan (OT)    Planned Therapy Interventions (OT) BADL retraining;activity tolerance training;adaptive equipment training;functional balance retraining;occupation/activity based interventions;patient/caregiver education/training;transfer/mobility retraining;strengthening exercise  -AN           User Key  (r) = Recorded By, (t) = Taken By, (c) = Cosigned By    Initials Name Provider Type    Latonia Larkin,  OT Occupational Therapist               Clinical Impression     Row Name 02/09/23 1549          Pain Assessment    Pretreatment Pain Rating 0/10 - no pain  -AN     Posttreatment Pain Rating 0/10 - no pain  -AN     Pre/Posttreatment Pain Comment asymptomatic  -AN     Pain Intervention(s) Repositioned;Ambulation/increased activity  -AN     Row Name 02/09/23 1549          Plan of Care Review    Plan of Care Reviewed With patient;daughter;family  -AN     Progress no change  -AN     Outcome Evaluation Pt presents below his functional baseline with deficits including generalized weakness, decreased activity tolerance, and impaired balance warranting skilled OT services. Family and pt educated on adaptive equipment including shower chair and raised toilet seat for improved performance and fall prevention. Rec home with assist and home health OT and PT.  -AN     Row Name 02/09/23 1549          Therapy Assessment/Plan (OT)    Patient/Family Therapy Goal Statement (OT) Return to PLOF  -AN     Rehab Potential (OT) good, to achieve stated therapy goals  -AN     Criteria for Skilled Therapeutic Interventions Met (OT) yes;skilled treatment is necessary  -AN     Therapy Frequency (OT) daily  -AN     Row Name 02/09/23 1549          Therapy Plan Review/Discharge Plan (OT)    Equipment Needs Upon Discharge (OT) shower chair;commode chair  -AN     Anticipated Discharge Disposition (OT) home with assist;home with home health  -AN     [facility] Name 02/09/23 1549          Vital Signs    Pre Systolic BP Rehab --  VSS  -AN     O2 Delivery Pre Treatment room air  -AN     O2 Delivery Intra Treatment room air  -AN     O2 Delivery Post Treatment room air  -AN     Pre Patient Position Supine  -AN     Intra Patient Position Standing  -AN     Post Patient Position Supine  -AN     [facility] Name 02/09/23 1549          Positioning and Restraints    Pre-Treatment Position in bed  -AN     Post Treatment Position bed  -AN     In Bed notified nsg;supine;exit  alarm on;encouraged to call for assist;call light within reach;side rails up x2  -AN           User Key  (r) = Recorded By, (t) = Taken By, (c) = Cosigned By    Initials Name Provider Type    Latonia Larkin OT Occupational Therapist               Outcome Measures     Row Name 02/09/23 1552          How much help from another is currently needed...    Putting on and taking off regular lower body clothing? 3  -AN     Bathing (including washing, rinsing, and drying) 3  -AN     Toileting (which includes using toilet bed pan or urinal) 3  -AN     Putting on and taking off regular upper body clothing 3  -AN     Taking care of personal grooming (such as brushing teeth) 4  -AN     Eating meals 4  -AN     AM-PAC 6 Clicks Score (OT) 20  -AN     Row Name 02/09/23 1530 02/09/23 0822       How much help from another person do you currently need...    Turning from your back to your side while in flat bed without using bedrails? 4  -LM 4  -DF    Moving from lying on back to sitting on the side of a flat bed without bedrails? 3  -LM 4  -DF    Moving to and from a bed to a chair (including a wheelchair)? 3  -LM 3  -DF    Standing up from a chair using your arms (e.g., wheelchair, bedside chair)? 3  -LM 3  -DF    Climbing 3-5 steps with a railing? 3  -LM 2  -DF    To walk in hospital room? 3  -LM 3  -DF    AM-PAC 6 Clicks Score (PT) 19  -LM 19  -DF    Highest level of mobility 6 --> Walked 10 steps or more  -LM 6 --> Walked 10 steps or more  -DF    Row Name 02/09/23 1552 02/09/23 1530       Functional Assessment    Outcome Measure Options AM-PAC 6 Clicks Daily Activity (OT)  -AN AM-PAC 6 Clicks Basic Mobility (PT)  -LM          User Key  (r) = Recorded By, (t) = Taken By, (c) = Cosigned By    Initials Name Provider Type    Lelo Valadez, PT Physical Therapist    Genoveva Burris RN Registered Nurse    Latonia Larkin OT Occupational Therapist                Occupational Therapy Education     Title: PT OT SLP  Therapies (In Progress)     Topic: Occupational Therapy (In Progress)     Point: ADL training (Done)     Description:   Instruct learner(s) on proper safety adaptation and remediation techniques during self care or transfers.   Instruct in proper use of assistive devices.              Learning Progress Summary           Patient Acceptance, E, VU by AN at 2/9/2023 1553   Family Acceptance, E, VU by AN at 2/9/2023 1553                   Point: Home exercise program (Not Started)     Description:   Instruct learner(s) on appropriate technique for monitoring, assisting and/or progressing therapeutic exercises/activities.              Learner Progress:  Not documented in this visit.          Point: Precautions (Done)     Description:   Instruct learner(s) on prescribed precautions during self-care and functional transfers.              Learning Progress Summary           Patient Acceptance, E, VU by AN at 2/9/2023 1553   Family Acceptance, E, VU by AN at 2/9/2023 1553                   Point: Body mechanics (Done)     Description:   Instruct learner(s) on proper positioning and spine alignment during self-care, functional mobility activities and/or exercises.              Learning Progress Summary           Patient Acceptance, E, VU by AN at 2/9/2023 1553   Family Acceptance, E, VU by AN at 2/9/2023 1553                               User Key     Initials Effective Dates Name Provider Type Discipline     09/21/21 -  Latonia Boateng OT Occupational Therapist OT              OT Recommendation and Plan  Planned Therapy Interventions (OT): BADL retraining, activity tolerance training, adaptive equipment training, functional balance retraining, occupation/activity based interventions, patient/caregiver education/training, transfer/mobility retraining, strengthening exercise  Therapy Frequency (OT): daily  Plan of Care Review  Plan of Care Reviewed With: patient, daughter, family  Progress: no change  Outcome Evaluation:  Pt presents below his functional baseline with deficits including generalized weakness, decreased activity tolerance, and impaired balance warranting skilled OT services. Family and pt educated on adaptive equipment including shower chair and raised toilet seat for improved performance and fall prevention. Rec home with assist and home health OT and PT.     Time Calculation:    Time Calculation- OT     Row Name 02/09/23 1553             Time Calculation- OT    OT Start Time 1450  -AN      OT Received On 02/09/23  -AN      OT Goal Re-Cert Due Date 02/19/23  -AN         Untimed Charges    OT Eval/Re-eval Minutes 46  -AN         Total Minutes    Untimed Charges Total Minutes 46  -AN       Total Minutes 46  -AN            User Key  (r) = Recorded By, (t) = Taken By, (c) = Cosigned By    Initials Name Provider Type    AN Latonia Boateng OT Occupational Therapist              Therapy Charges for Today     Code Description Service Date Service Provider Modifiers Qty    [id number] HC OT EVAL LOW COMPLEXITY 4 2/9/2023 Latonia Boateng OT GO 1               Latonia Boateng OT  2/9/2023

## 2023-02-09 NOTE — ANESTHESIA PROCEDURE NOTES
Airway  Urgency: elective    Date/Time: 2/9/2023 10:06 AM    General Information and Staff    Patient location during procedure: OR  CRNA/CAA: Shantanu Wallace III, CRNA    Indications and Patient Condition  Indications for airway management: airway protection    Preoxygenated: yes  MILS not maintained throughout  Mask difficulty assessment: 0 - not attempted    Final Airway Details  Final airway type: supraglottic airway      Successful airway: I-gel  Size 4     Number of attempts at approach: 1  Assessment: lips, teeth, and gum same as pre-op    Additional Comments  Symmetric chest rise and fall with equal breath sounds bilaterally.

## 2023-02-10 ENCOUNTER — APPOINTMENT (OUTPATIENT)
Dept: NEPHROLOGY | Facility: HOSPITAL | Age: 63
DRG: 663 | End: 2023-02-10
Payer: COMMERCIAL

## 2023-02-10 ENCOUNTER — APPOINTMENT (OUTPATIENT)
Dept: INTERVENTIONAL RADIOLOGY/VASCULAR | Facility: HOSPITAL | Age: 63
DRG: 663 | End: 2023-02-10
Payer: COMMERCIAL

## 2023-02-10 LAB
ALBUMIN SERPL-MCNC: 3.2 G/DL (ref 3.5–5.2)
ANION GAP SERPL CALCULATED.3IONS-SCNC: 19 MMOL/L (ref 5–15)
BH BB BLOOD EXPIRATION DATE: NORMAL
BH BB BLOOD TYPE BARCODE: 5100
BH BB DISPENSE STATUS: NORMAL
BH BB PRODUCT CODE: NORMAL
BH BB UNIT NUMBER: NORMAL
BH CV UPPER VENOUS LEFT AXILLARY COMPRESS: NORMAL
BH CV UPPER VENOUS LEFT AXILLARY PHASIC: NORMAL
BH CV UPPER VENOUS LEFT AXILLARY SPONT: NORMAL
BH CV UPPER VENOUS LEFT BASILIC FOREARM COMPRESS: NORMAL
BH CV UPPER VENOUS LEFT BASILIC UPPER COMPRESS: NORMAL
BH CV UPPER VENOUS LEFT BRACHIAL COMPRESS: NORMAL
BH CV UPPER VENOUS LEFT CEPHALIC UPPER COMPRESS: NORMAL
BH CV UPPER VENOUS LEFT INTERNAL JUGULAR COMPRESS: NORMAL
BH CV UPPER VENOUS LEFT INTERNAL JUGULAR PHASIC: NORMAL
BH CV UPPER VENOUS LEFT INTERNAL JUGULAR SPONT: NORMAL
BH CV UPPER VENOUS LEFT SUBCLAVIAN COMPRESS: NORMAL
BH CV UPPER VENOUS LEFT SUBCLAVIAN PHASIC: NORMAL
BH CV UPPER VENOUS LEFT SUBCLAVIAN SPONT: NORMAL
BH CV VAS MEAS BASILIC ANTECUBITAL FOSSA LEFT: 0.27 CM
BH CV VAS MEAS BASILIC FOREARM LEFT - MID: 0.19 CM
BH CV VAS MEAS BASILIC FOREARM LEFT - PROX: 0.26 CM
BH CV VAS MEAS BASILIC UPPER ARM LEFT - DIST: 0.34 CM
BH CV VAS MEAS BASILIC UPPER ARM LEFT - MID: 0.26 CM
BH CV VAS MEAS BASILIC UPPER ARM LEFT - PROX: 0.31 CM
BH CV VAS MEAS CEPHALIC UPPER ARM LEFT - MID: 0.06 CM
BH CV VAS MEAS CEPHALIC UPPER ARM LEFT - PROX: 0.07 CM
BH CV VAS MEAS RADIAL UPPER ARM LEFT - DIST: 0.19 CM
BUN SERPL-MCNC: 153 MG/DL (ref 8–23)
BUN/CREAT SERPL: 12 (ref 7–25)
CALCIUM SPEC-SCNC: 7.8 MG/DL (ref 8.6–10.5)
CHLORIDE SERPL-SCNC: 96 MMOL/L (ref 98–107)
CO2 SERPL-SCNC: 24 MMOL/L (ref 22–29)
CREAT SERPL-MCNC: 12.71 MG/DL (ref 0.76–1.27)
CROSSMATCH INTERPRETATION: NORMAL
EGFRCR SERPLBLD CKD-EPI 2021: 4 ML/MIN/1.73
GLUCOSE BLDC GLUCOMTR-MCNC: 117 MG/DL (ref 70–130)
GLUCOSE BLDC GLUCOMTR-MCNC: 261 MG/DL (ref 70–130)
GLUCOSE SERPL-MCNC: 98 MG/DL (ref 65–99)
MAXIMAL PREDICTED HEART RATE: 156 BPM
PHOSPHATE SERPL-MCNC: 9.2 MG/DL (ref 2.5–4.5)
POTASSIUM SERPL-SCNC: 4.6 MMOL/L (ref 3.5–5.2)
SODIUM SERPL-SCNC: 139 MMOL/L (ref 136–145)
STRESS TARGET HR: 133 BPM
UNIT  ABO: NORMAL
UNIT  RH: NORMAL
UPPER ARTERIAL LEFT ARM BRACHIAL LENGTH: 0.45 CM

## 2023-02-10 PROCEDURE — 99233 SBSQ HOSP IP/OBS HIGH 50: CPT | Performed by: NURSE PRACTITIONER

## 2023-02-10 PROCEDURE — 25010000002 HEPARIN (PORCINE) PER 1000 UNITS: Performed by: INTERNAL MEDICINE

## 2023-02-10 PROCEDURE — C1894 INTRO/SHEATH, NON-LASER: HCPCS

## 2023-02-10 PROCEDURE — 99233 SBSQ HOSP IP/OBS HIGH 50: CPT | Performed by: INTERNAL MEDICINE

## 2023-02-10 PROCEDURE — C1750 CATH, HEMODIALYSIS,LONG-TERM: HCPCS

## 2023-02-10 PROCEDURE — 25010000002 FENTANYL CITRATE (PF) 50 MCG/ML SOLUTION: Performed by: RADIOLOGY

## 2023-02-10 PROCEDURE — 80069 RENAL FUNCTION PANEL: CPT | Performed by: INTERNAL MEDICINE

## 2023-02-10 PROCEDURE — 36558 INSERT TUNNELED CV CATH: CPT

## 2023-02-10 PROCEDURE — 25010000002 CEFAZOLIN 1-4 GM/50ML-% SOLUTION: Performed by: RADIOLOGY

## 2023-02-10 PROCEDURE — 25010000002 ONDANSETRON PER 1 MG: Performed by: UROLOGY

## 2023-02-10 PROCEDURE — 0JH63XZ INSERTION OF TUNNELED VASCULAR ACCESS DEVICE INTO CHEST SUBCUTANEOUS TISSUE AND FASCIA, PERCUTANEOUS APPROACH: ICD-10-PCS | Performed by: RADIOLOGY

## 2023-02-10 PROCEDURE — 99152 MOD SED SAME PHYS/QHP 5/>YRS: CPT

## 2023-02-10 PROCEDURE — 77001 FLUOROGUIDE FOR VEIN DEVICE: CPT

## 2023-02-10 PROCEDURE — 63710000001 INSULIN LISPRO (HUMAN) PER 5 UNITS: Performed by: UROLOGY

## 2023-02-10 PROCEDURE — B548ZZA ULTRASONOGRAPHY OF SUPERIOR VENA CAVA, GUIDANCE: ICD-10-PCS | Performed by: RADIOLOGY

## 2023-02-10 PROCEDURE — 02HV33Z INSERTION OF INFUSION DEVICE INTO SUPERIOR VENA CAVA, PERCUTANEOUS APPROACH: ICD-10-PCS | Performed by: RADIOLOGY

## 2023-02-10 PROCEDURE — 25010000002 HEPARIN (PORCINE) PER 1000 UNITS

## 2023-02-10 PROCEDURE — 82962 GLUCOSE BLOOD TEST: CPT

## 2023-02-10 PROCEDURE — 25010000002 MIDAZOLAM PER 1 MG: Performed by: RADIOLOGY

## 2023-02-10 PROCEDURE — 76937 US GUIDE VASCULAR ACCESS: CPT

## 2023-02-10 RX ORDER — MIDAZOLAM HYDROCHLORIDE 1 MG/ML
INJECTION INTRAMUSCULAR; INTRAVENOUS
Status: DISPENSED
Start: 2023-02-10 | End: 2023-02-10

## 2023-02-10 RX ORDER — CEFAZOLIN SODIUM 1 G/50ML
1 INJECTION, SOLUTION INTRAVENOUS
Status: COMPLETED | OUTPATIENT
Start: 2023-02-10 | End: 2023-02-10

## 2023-02-10 RX ORDER — HEPARIN SODIUM 1000 [USP'U]/ML
INJECTION, SOLUTION INTRAVENOUS; SUBCUTANEOUS
Status: COMPLETED
Start: 2023-02-10 | End: 2023-02-10

## 2023-02-10 RX ORDER — HEPARIN SODIUM 1000 [USP'U]/ML
2100 INJECTION, SOLUTION INTRAVENOUS; SUBCUTANEOUS AS NEEDED
Status: DISCONTINUED | OUTPATIENT
Start: 2023-02-10 | End: 2023-02-14 | Stop reason: HOSPADM

## 2023-02-10 RX ORDER — LIDOCAINE HYDROCHLORIDE AND EPINEPHRINE 20; 5 MG/ML; UG/ML
INJECTION, SOLUTION EPIDURAL; INFILTRATION; INTRACAUDAL; PERINEURAL
Status: COMPLETED
Start: 2023-02-10 | End: 2023-02-10

## 2023-02-10 RX ORDER — MANNITOL 250 MG/ML
25 INJECTION, SOLUTION INTRAVENOUS AS NEEDED
Status: DISPENSED | OUTPATIENT
Start: 2023-02-10 | End: 2023-02-11

## 2023-02-10 RX ORDER — FENTANYL CITRATE 50 UG/ML
INJECTION, SOLUTION INTRAMUSCULAR; INTRAVENOUS AS NEEDED
Status: COMPLETED | OUTPATIENT
Start: 2023-02-10 | End: 2023-02-10

## 2023-02-10 RX ORDER — MIDAZOLAM HYDROCHLORIDE 1 MG/ML
INJECTION INTRAMUSCULAR; INTRAVENOUS AS NEEDED
Status: COMPLETED | OUTPATIENT
Start: 2023-02-10 | End: 2023-02-10

## 2023-02-10 RX ORDER — FENTANYL CITRATE 50 UG/ML
INJECTION, SOLUTION INTRAMUSCULAR; INTRAVENOUS
Status: DISPENSED
Start: 2023-02-10 | End: 2023-02-10

## 2023-02-10 RX ADMIN — SENNOSIDES AND DOCUSATE SODIUM 2 TABLET: 8.6; 5 TABLET ORAL at 14:38

## 2023-02-10 RX ADMIN — FENTANYL CITRATE 50 MCG: 50 INJECTION, SOLUTION INTRAMUSCULAR; INTRAVENOUS at 10:38

## 2023-02-10 RX ADMIN — Medication 10 ML: at 08:53

## 2023-02-10 RX ADMIN — HEPARIN SODIUM 2100 UNITS: 1000 INJECTION INTRAVENOUS; SUBCUTANEOUS at 12:11

## 2023-02-10 RX ADMIN — LIDOCAINE HYDROCHLORIDE AND EPINEPHRINE 4 ML: 20; 5 INJECTION, SOLUTION EPIDURAL; INFILTRATION; INTRACAUDAL; PERINEURAL at 10:47

## 2023-02-10 RX ADMIN — SODIUM BICARBONATE 650 MG TABLET 650 MG: at 20:31

## 2023-02-10 RX ADMIN — Medication 10 ML: at 20:31

## 2023-02-10 RX ADMIN — MICONAZOLE NITRATE 1 APPLICATION: 20 CREAM TOPICAL at 17:09

## 2023-02-10 RX ADMIN — SODIUM BICARBONATE 650 MG TABLET 650 MG: at 18:54

## 2023-02-10 RX ADMIN — SODIUM BICARBONATE 650 MG TABLET 650 MG: at 14:38

## 2023-02-10 RX ADMIN — SENNOSIDES AND DOCUSATE SODIUM 2 TABLET: 8.6; 5 TABLET ORAL at 20:31

## 2023-02-10 RX ADMIN — ONDANSETRON 4 MG: 2 INJECTION INTRAMUSCULAR; INTRAVENOUS at 08:52

## 2023-02-10 RX ADMIN — HEPARIN SODIUM 4100 UNITS: 1000 INJECTION, SOLUTION INTRAVENOUS; SUBCUTANEOUS at 10:46

## 2023-02-10 RX ADMIN — CEFAZOLIN SODIUM 1 G: 1 INJECTION, SOLUTION INTRAVENOUS at 10:23

## 2023-02-10 RX ADMIN — MIDAZOLAM HYDROCHLORIDE 1 MG: 1 INJECTION, SOLUTION INTRAMUSCULAR; INTRAVENOUS at 10:38

## 2023-02-10 RX ADMIN — INSULIN LISPRO 4 UNITS: 100 INJECTION, SOLUTION INTRAVENOUS; SUBCUTANEOUS at 17:09

## 2023-02-10 RX ADMIN — PANTOPRAZOLE SODIUM 40 MG: 40 TABLET, DELAYED RELEASE ORAL at 05:17

## 2023-02-10 NOTE — PLAN OF CARE
Goal Outcome Evaluation:  Plan of Care Reviewed With: patient            Pt VSS. RA. NSR. Pt still on CBI, with [place] to clear urine. Very few clots. Plans for a dialysis cath today with hemodialysis after. Will continue to monitor.

## 2023-02-10 NOTE — DISCHARGE PLACEMENT REQUEST
Jun Garcia (62 y.o. Male)   To [facility]  From Milka Vazquez [phone]    Syed Guzman, RN   Registered Nurse  Radiology  Nursing Note      Signed  Date of Service:  02/10/23 1047  Creation Time:  02/10/23 1047     Signed          15.5 Yoruba Image guided tunneled dialysis catheter placed to right internal jugular by Dr Perdomo. Patient tolerated well. Sedation time of 13 minutes. Patient was given 50 mcg Fentanyl & 1 mg Versed. Report called to nurse.                   [address] [address]  Phone:  [phone]  Fax:  [phone]        Patient:     Jun Garcia MRN:  [id number]   [address] [address] :  1960  SSN:    Phone: [phone] Sex:  M      INSURANCE PAYOR PLAN GROUP # SUBSCRIBER ID   Primary:    [facility] [id number]   [id number]   Admitting Diagnosis: Acute renal failure superimposed on stage 4 chronic kidney disease, unspecified acute renal failure type (HCC) [N17.9, N18.4]  Order Date:  2023        Hemodialysis Inpatient       (Order ID: [id number])     Diagnosis:         Priority:  Routine Expected Date:   Expiration Date:        Interval:  Once Count:    Duration of Treatment: 1.5 Hours  Access Site: Tunneled Dialysis Catheter  Dialyzer: Revaclear  DFR: 500 mL/min  Dialysate Temperature (C): 36  BFR-As tolerated to a maximum of: 250 mL/min  Dialysate Solution Bath: K+ = 2 mEq, Ca = 2.5mEq  Bicarb: 33 mEq  Na+: 140 mEq  Fluid Removal: 500     Specimen Type:   Specimen Source:   Specimen Taken Date:   Specimen Taken Time:                  Authorizing Provider:Jordan Marquez MD  Authorizing Provider's NPI: [id number]  Order Entered By: Jordan Marquez MD 2023  4:08 PM     Electronically signed by: Jordan Marquez MD 2023  4:08 PM         Date of Birth   1960    Social Security Number       Address   [address] [place] [address]    Home Phone   [phone]    MRN  "  [id number]       Anglican   Shinto    Marital Status                               Admission Date   23    Admission Type   Emergency    Admitting Provider   Mirian Walsh MD    Attending Provider   Mirian Walsh MD    Department, Room/Bed   [facility] 5F, S513/1       Discharge Date       Discharge Disposition       Discharge Destination                               Attending Provider: Mirian Walsh MD    Allergies: No Known Allergies    Isolation: None   Infection: None   Code Status: CPR    Ht: 170.2 cm (67.01\")   Wt: 67.6 kg (149 lb 0.5 oz)    Admission Cmt: None   Principal Problem: Acute renal failure superimposed on stage 4 chronic kidney disease, unspecified acute renal failure type (HCC) [N17.9,N18.4]                 Active Insurance as of 2023     Primary Coverage     Payor Plan Insurance Group Employer/Plan Group    [employer] [place] [employer] [place]ICA[place]      Payor Plan Address Payor Plan Phone Number Payor Plan Fax Number Effective Dates    [address] [address] [phone]  2023 - None Entered    [address] [address]       Subscriber Name Subscriber Birth Date Member ID       JUN GARCIA 1960 [id number]                 Emergency Contacts      (Rel.) Home Phone Work Phone Mobile Phone    VENECIA CAMPOS (Daughter) [phone] -- [phone]    CAMPOSTAWANDA DOWD (Son) [phone] -- [phone]            Insurance Information                [facility]/WELLCARE MEDICAID Phone: [phone]    Subscriber: Jun Garcia Subscriber#: [id number]    Group#: -- Precert#: --             History & Physical      Carola Henry II, DO at [address] [place] [address] Medicine Services  HISTORY AND PHYSICAL    Patient Name: Jun Garcia  : 1960  MRN: [id number]  Primary Care Physician: Provider, No Known  Date of admission: 2023      Subjective    Subjective     Chief Complaint: weakness, " nausea    HPI:  Jun Garcia is a 62 y.o. male presenting with his family with worsening weakness, nausea, weight loss, epigastric pain. He has a complicated recent history and is unable to provide full story but from what I can gather in Psychiatric he was admitted at  11/2022 for obstructive uropathy and renal failure. At that time cystoscopy and urgent HD was recommended but it sounds to me based on our conversation that the patient refused both. He was discharged home and has been taking no medications since that time. Tells me today that since that time he has gradually gone downhill. He has been unable to eat hardly anything and has lost a significant amount of weight. He complains also of epigastric burning during this time. He tells me has has not followed up with anyone because he got mixed messages from his last stay and felt like he was doing fine.    Review of Systems   Gen- No fevers, chills  CV- No chest pain, palpitations  Resp- No cough, dyspnea  GI- No N/V/D    All other systems reviewed and are negative.     Personal History     PMH: CKD IV, obstructive uropathy, ? diabetes    PSH: Reviewed and noncontributory    Family History: Otherwise pertinent FHx was reviewed and unremarkable.     Social History:  No tobacco, ETOH, illicit drugs    Medications:  Available home medication information reviewed.         No Known Allergies    Objective    Objective     Vital Signs:   Temp:  [98.5 °F (36.9 °C)] 98.5 °F (36.9 °C)  Heart Rate:  [] 99  Resp:  [18] 18  BP: (103-148)/(70-84) 148/84       Physical Exam   Constitutional: Awake, alert, appears to feel poorly  Eyes: PERRLA, sclerae anicteric, no conjunctival injection  HENT: NCAT, dry mm  Neck: Supple, no thyromegaly, no lymphadenopathy, trachea midline  Respiratory: Clear to auscultation bilaterally, nonlabored respirations   Cardiovascular: Slightly tachy, no murmur  Gastrointestinal: Positive bowel sounds, soft, nontender,  nondistended  Musculoskeletal: Muscle wasting.  Psychiatric: Appropriate affect, cooperative  Neurologic: Oriented x 3, strength symmetric in all extremities, Cranial Nerves grossly intact to confrontation, speech clear  Skin: No rashes    Result Review:  I have personally reviewed the results from the time of this admission to 2/7/2023 14:00 EST and agree with these findings:  []  Laboratory list / accordion  []  Microbiology  []  Radiology  []  EKG/Telemetry   []  Cardiology/Vascular   []  Pathology  []  Old records  []  Other:  Most notable findings include:         LAB RESULTS:      Lab 02/07/23  1207   WBC 12.08*   HEMOGLOBIN 7.8*   HEMATOCRIT 23.7*   PLATELETS 277   NEUTROS ABS 8.40*   IMMATURE GRANS (ABS) 0.05   LYMPHS ABS 1.09   MONOS ABS 0.51   EOS ABS 1.96*   MCV 89.4   PROTIME 14.8*   INR 1.17*   APTT 38.1*         Lab 02/07/23  1207   SODIUM 134*   POTASSIUM 5.3*   CHLORIDE 99   CO2 13.0*   ANION GAP 22.0*   *   CREATININE 13.21*   EGFR 3.8*   GLUCOSE 169*   CALCIUM 8.5*   MAGNESIUM 3.3*   PHOSPHORUS 6.8*         Lab 02/07/23  1207   TOTAL PROTEIN 8.7*   ALBUMIN 4.2   GLOBULIN 4.5   ALT (SGPT) 19   AST (SGOT) 12   BILIRUBIN 0.3   ALK PHOS 103   LIPASE 124*         Lab 02/07/23  1207   PROBNP 1,316.0*   HSTROP T 60*                 UA    Urinalysis 11/17/22   Squamous Epithelial Cells, UA 0-5   Specific Gravity, UA 1.010   Blood, UA Large (A)   Leukocytes, UA Large (A)   RBC, UA 31-50 (A)   Bacteria, UA Negative   (A) Abnormal value       Comments are available for some flowsheets but are not being displayed.             Microbiology Results (last 10 days)     ** No results found for the last 240 hours. **          No radiology results from the last 24 hrs        Assessment & Plan   Assessment & Plan     Active Hospital Problems    Diagnosis  POA   • **Acute renal failure superimposed on stage 4 chronic kidney disease, unspecified acute renal failure type (HCC) [N17.9, N18.4]  Yes   • Metabolic  acidosis [E87.20]  Yes   • Hyperkalemia [E87.5]  Yes   • Elevated troponin [R77.8]  Yes   • Elevated lipase [R74.8]  Yes     63 y/o male with prior hx obstructive uropathy and CKD presenting with worsening FTT and ongoing renal failure.    CKD IV-V nearing ESRD  Metabolic acidosis  Hyperkalemia  --NAL has been contacted by ED, will see patient and discuss need for HD with him.  --Will start bicarb gtt for volume expansion and to correct acidosis.  --CM for HD chair if he agrees.    Hx obstructive uropathy  --CT scan from Nov indicates obstruction at level of bladder concerning for mass. Apparently he had hematuria at that time. It sounds like cystoscopy was recommended however never occurred.   --Repeat CT A/P  --Consult urology  --[place] caldwell.    Elevated troponin   --Suspect due to volume depletion in setting of his renal failure. His troponin is about level as it was previously. He denies chest pain at this time. Rec'd asa in ED. Will continue to monitor troponin and EKG.  --Echo.    Elevated lipase  --Based on history of abd burning may have some resolving pancreatitis? Will continue supportive tx with IVF, pain meds. Okay for CLD once decision made regarding HD, HD access.  --Will also treat empirically for PUD with BID PPI for now.    DVT prophylaxis:  [facility]      CODE STATUS:  Full Code  Code Status and Medical Interventions:   Ordered at: 02/07/23 1357     Code Status (Patient has no pulse and is not breathing):    CPR (Attempt to Resuscitate)     Medical Interventions (Patient has pulse or is breathing):    Full Support       Expected Discharge 2/11       Carola Henry II, DO  02/07/23    Electronically signed by Carola Henry II, DO at 02/07/23 1410       Lab Results (last 72 hours)     Procedure Component Value Units Date/Time    POC Glucose Once [[id number]]  (Normal) Collected: 02/10/23 0730    Specimen: Blood Updated: 02/10/23 0731     Glucose 117 mg/dL      Comment: Meter: [id number] :  [id number] Gorge Skanee       Renal Function Panel [[id number]]  (Abnormal) Collected: 02/10/23 0410    Specimen: Blood Updated: 02/10/23 0504     Glucose 98 mg/dL       mg/dL      Creatinine 12.71 mg/dL      Sodium 139 mmol/L      Potassium 4.6 mmol/L      Chloride 96 mmol/L      CO2 24.0 mmol/L      Calcium 7.8 mg/dL      Albumin 3.2 g/dL      Phosphorus 9.2 mg/dL      Anion Gap 19.0 mmol/L      BUN/Creatinine Ratio 12.0     eGFR 4.0 mL/min/1.73      Comment: <15 Indicative of kidney failure       Narrative:      GFR Normal >60  Chronic Kidney Disease <60  Kidney Failure <15      POC Glucose Once [[id number]]  (Abnormal) Collected: 02/1960    Specimen: Blood Updated: 02/09/23 2008     Glucose 200 mg/dL      Comment: Meter: [id number] : [id number] Gertrude Correa       POC Glucose Once [[id number]]  (Abnormal) Collected: 02/09/23 1723    Specimen: Blood Updated: 02/09/23 1726     Glucose 208 mg/dL      Comment: Meter: [id number] : [id number] Norberto Calloway       Hemoglobin & Hematocrit, Blood [[id number]]  (Abnormal) Collected: 02/09/23 1549    Specimen: Blood Updated: 02/09/23 1622     Hemoglobin 8.0 g/dL      Hematocrit 23.0 %     POC Glucose Once [[id number]]  (Abnormal) Collected: 02/09/23 1127    Specimen: Blood Updated: 02/09/23 1129     Glucose 164 mg/dL      Comment: Meter: [id number] : [id number] Yanet Booth       POC Glucose Once [[id number]]  (Abnormal) Collected: 02/09/23 0751    Specimen: Blood Updated: 02/09/23 0755     Glucose 212 mg/dL      Comment: Meter: [id number] : [id number] Razia Paulino       Renal Function Panel [[id number]]  (Abnormal) Collected: 02/09/23 0449    Specimen: Blood Updated: 02/09/23 0617     Glucose 158 mg/dL       mg/dL      Creatinine 12.87 mg/dL      Sodium 138 mmol/L      Potassium 4.6 mmol/L      Chloride 94 mmol/L      CO2 27.0 mmol/L      Calcium 7.7 mg/dL      Albumin 3.1 g/dL      Phosphorus 8.4 mg/dL      Anion Gap 17.0 mmol/L       BUN/Creatinine Ratio 11.7     eGFR 3.9 mL/min/1.73      Comment: <15 Indicative of kidney failure       Narrative:      GFR Normal >60  Chronic Kidney Disease <60  Kidney Failure <15      CBC & Differential [[id number]]  (Abnormal) Collected: 02/09/23 0449    Specimen: Blood Updated: 02/09/23 0531    Narrative:      The following orders were created for panel order CBC & Differential.  Procedure                               Abnormality         Status                     ---------                               -----------         ------                     CBC Auto Differential[[id number]]        Abnormal            Final result                 Please view results for these tests on the individual orders.    CBC Auto Differential [[id number]]  (Abnormal) Collected: 02/09/23 0449    Specimen: Blood Updated: 02/09/23 0531     WBC 12.49 10*3/mm3      RBC 2.69 10*6/mm3      Hemoglobin 7.8 g/dL      Hematocrit 22.6 %      MCV 84.0 fL      MCH 29.0 pg      MCHC 34.5 g/dL      RDW 14.1 %      RDW-SD 42.6 fl      MPV 9.2 fL      Platelets 186 10*3/mm3      Neutrophil % 72.7 %      Lymphocyte % 9.8 %      Monocyte % 4.9 %      Eosinophil % 11.7 %      Basophil % 0.4 %      Immature Grans % 0.5 %      Neutrophils, Absolute 9.08 10*3/mm3      Lymphocytes, Absolute 1.23 10*3/mm3      Monocytes, Absolute 0.61 10*3/mm3      Eosinophils, Absolute 1.46 10*3/mm3      Basophils, Absolute 0.05 10*3/mm3      Immature Grans, Absolute 0.06 10*3/mm3      nRBC 0.0 /100 WBC     POC Glucose Once [[id number]]  (Abnormal) Collected: 02/08/23 1618    Specimen: Blood Updated: 02/08/23 1620     Glucose 189 mg/dL      Comment: Meter: [id number] : [id number] Germaine Murillo       POC Glucose Once [[id number]]  (Abnormal) Collected: 02/08/23 1052    Specimen: Blood Updated: 02/08/23 1053     Glucose 160 mg/dL      Comment: Meter: [id number] : [id number] Herron Lidia       CBC (No Diff) [[id number]]  (Abnormal) Collected: 02/08/23 0939    Specimen:  Blood Updated: 02/08/23 1007     WBC 9.66 10*3/mm3      RBC 2.03 10*6/mm3      Hemoglobin 5.9 g/dL      Hematocrit 18.0 %      MCV 88.7 fL      MCH 29.1 pg      MCHC 32.8 g/dL      RDW 13.2 %      RDW-SD 42.5 fl      MPV 9.0 fL      Platelets 255 10*3/mm3     Narrative:      Verified by repeat analysis.  2nd collection.      POC Glucose Once [[id number]]  (Abnormal) Collected: 02/08/23 0913    Specimen: Blood Updated: 02/08/23 0915     Glucose 228 mg/dL      Comment: Meter: [id number] : [id number] Andrei Wilson       POC Glucose Once [[id number]]  (Abnormal) Collected: 02/08/23 0800    Specimen: Blood Updated: 02/08/23 0801     Glucose 186 mg/dL      Comment: Meter: [id number] : [id number] Razia Paulino       Basic Metabolic Panel [[id number]]  (Abnormal) Collected: 02/08/23 0343    Specimen: Blood Updated: 02/08/23 0718     Glucose 140 mg/dL       mg/dL      Creatinine 11.76 mg/dL      Sodium 138 mmol/L      Potassium 4.4 mmol/L      Chloride 97 mmol/L      CO2 21.0 mmol/L      Calcium 8.2 mg/dL      BUN/Creatinine Ratio 11.9     Anion Gap 20.0 mmol/L      eGFR 4.4 mL/min/1.73      Comment: <15 Indicative of kidney failure       Narrative:      GFR Normal >60  Chronic Kidney Disease <60  Kidney Failure <15      Iron Profile [[id number]]  (Abnormal) Collected: 02/08/23 0343    Specimen: Blood Updated: 02/08/23 0701     Iron 68 mcg/dL      Iron Saturation 29 %      Transferrin 157 mg/dL      TIBC 234 mcg/dL     Phosphorus [[id number]]  (Abnormal) Collected: 02/08/23 0343    Specimen: Blood Updated: 02/08/23 0701     Phosphorus 6.7 mg/dL     Hepatitis Panel, Acute [[id number]]  (Normal) Collected: 02/08/23 0343    Specimen: Blood Updated: 02/08/23 0651     Hepatitis B Surface Ag Non-Reactive     Hep A IgM Non-Reactive     Hep B C IgM Non-Reactive     Hepatitis C Ab Non-Reactive    Narrative:      Results may be falsely decreased if patient taking Biotin.     Ferritin [[id number]]  (Abnormal) Collected: 02/08/23  0343    Specimen: Blood Updated: 02/08/23 0649     Ferritin 899.10 ng/mL     Narrative:      Results may be falsely decreased if patient taking Biotin.      POC Glucose Once [[id number]]  (Normal) Collected: 02/07/23 1615    Specimen: Blood Updated: 02/07/23 1622     Glucose 101 mg/dL      Comment: Meter: [id number] : [id number] Adele Rowe       [place] Draw [[id number]] Collected: 02/07/23 1207    Specimen: Blood Updated: 02/07/23 1615    Narrative:      The following orders were created for panel order [place] Draw.  Procedure                               Abnormality         Status                     ---------                               -----------         ------                     Green Top (Gel)[[id number]]                                  Final result               Lavender Top[[id number]]                                     Final result               Gold Top - SST[[id number]]                                   Final result               Chiang Top[[id number]]                                         Final result               Light Blue Top[[id number]]                                   Final result                 Please view results for these tests on the individual orders.    Gray Top [[id number]] Collected: 02/07/23 1207    Specimen: Blood Updated: 02/07/23 1615     Extra Tube Hold for add-ons.     Comment: Auto resulted.       High Sensitivity Troponin T 2Hr [[id number]]  (Abnormal) Collected: 02/07/23 1418    Specimen: Blood Updated: 02/07/23 1504     HS Troponin T 57 ng/L      Troponin T Delta -3 ng/L     Narrative:      High Sensitive Troponin T Reference Range:  <10.0 ng/L- Negative Female for AMI  <15.0 ng/L- Negative Male for AMI  >=10 - Abnormal Female indicating possible myocardial injury.  >=15 - Abnormal Male indicating possible myocardial injury.   Clinicians would have to utilize clinical acumen, EKG, Troponin, and serial changes to determine if it is an Acute Myocardial Infarction or  myocardial injury due to an underlying chronic condition.         Green Top (Gel) [[id number]] Collected: 02/07/23 1207    Specimen: Blood Updated: 02/07/23 1316     Extra Tube Hold for add-ons.     Comment: Auto resulted.       Lavender Top [[id number]] Collected: 02/07/23 1207    Specimen: Blood Updated: 02/07/23 1316     Extra Tube hold for add-on     Comment: Auto resulted       Gold Top - SST [[id number]] Collected: 02/07/23 1207    Specimen: Blood Updated: 02/07/23 1316     Extra Tube Hold for add-ons.     Comment: Auto resulted.       Light Blue Top [[id number]] Collected: 02/07/23 1207    Specimen: Blood Updated: 02/07/23 1316     Extra Tube Hold for add-ons.     Comment: Auto resulted       Protime-INR [[id number]]  (Abnormal) Collected: 02/07/23 1207    Specimen: Blood Updated: 02/07/23 1314     Protime 14.8 Seconds      INR 1.17    aPTT [[id number]]  (Abnormal) Collected: 02/07/23 1207    Specimen: Blood Updated: 02/07/23 1314     PTT 38.1 seconds     Narrative:      PTT = The equivalent PTT values for the therapeutic range of heparin levels at 0.3 to 0.5 U/ml are 60 to 70 seconds.    Magnesium [[id number]]  (Abnormal) Collected: 02/07/23 1207    Specimen: Blood Updated: 02/07/23 1305     Magnesium 3.3 mg/dL     Phosphorus [[id number]]  (Abnormal) Collected: 02/07/23 1207    Specimen: Blood Updated: 02/07/23 1305     Phosphorus 6.8 mg/dL     Comprehensive Metabolic Panel [[id number]]  (Abnormal) Collected: 02/07/23 1207    Specimen: Blood Updated: 02/07/23 1247     Glucose 169 mg/dL       mg/dL      Creatinine 13.21 mg/dL      Sodium 134 mmol/L      Potassium 5.3 mmol/L      Chloride 99 mmol/L      CO2 13.0 mmol/L      Calcium 8.5 mg/dL      Total Protein 8.7 g/dL      Albumin 4.2 g/dL      ALT (SGPT) 19 U/L      AST (SGOT) 12 U/L      Alkaline Phosphatase 103 U/L      Total Bilirubin 0.3 mg/dL      Globulin 4.5 gm/dL      Comment: Calculated Result        A/G Ratio 0.9 g/dL      BUN/Creatinine Ratio 12.4      Anion Gap 22.0 mmol/L      eGFR 3.8 mL/min/1.73      Comment: <15 Indicative of kidney failure       Narrative:      GFR Normal >60  Chronic Kidney Disease <60  Kidney Failure <15      Troponin [[id number]]  (Abnormal) Collected: 02/07/23 1207    Specimen: Blood Updated: 02/07/23 1241     HS Troponin T 60 ng/L     Narrative:      High Sensitive Troponin T Reference Range:  <10.0 ng/L- Negative Female for AMI  <15.0 ng/L- Negative Male for AMI  >=10 - Abnormal Female indicating possible myocardial injury.  >=15 - Abnormal Male indicating possible myocardial injury.   Clinicians would have to utilize clinical acumen, EKG, Troponin, and serial changes to determine if it is an Acute Myocaridal Infarction or myocardial injury due to an underlying chronic condition.         Lipase [[id number]]  (Abnormal) Collected: 02/07/23 1207    Specimen: Blood Updated: 02/07/23 1241     Lipase 124 U/L     BNP [[id number]]  (Abnormal) Collected: 02/07/23 1207    Specimen: Blood Updated: 02/07/23 1240     proBNP 1,316.0 pg/mL     Narrative:      Among patients with dyspnea, NT-proBNP is highly sensitive for the detection of acute congestive heart failure. In addition NT-proBNP of <300 pg/ml effectively rules out acute congestive heart failure with 99% negative predictive value.    Results may be falsely decreased if patient taking Biotin.      CBC & Differential [[id number]]  (Abnormal) Collected: 02/07/23 1207    Specimen: Blood Updated: 02/07/23 1221    Narrative:      The following orders were created for panel order CBC & Differential.  Procedure                               Abnormality         Status                     ---------                               -----------         ------                     CBC Auto Differential[[id number]]        Abnormal            Final result                 Please view results for these tests on the individual orders.    CBC Auto Differential [[id number]]  (Abnormal) Collected: 02/07/23 1207     Specimen: Blood Updated: 02/07/23 1221     WBC 12.08 10*3/mm3      RBC 2.65 10*6/mm3      Hemoglobin 7.8 g/dL      Hematocrit 23.7 %      MCV 89.4 fL      MCH 29.4 pg      MCHC 32.9 g/dL      RDW 13.5 %      RDW-SD 44.3 fl      MPV 8.7 fL      Platelets 277 10*3/mm3      Neutrophil % 69.6 %      Lymphocyte % 9.0 %      Monocyte % 4.2 %      Eosinophil % 16.2 %      Basophil % 0.6 %      Immature Grans % 0.4 %      Neutrophils, Absolute 8.40 10*3/mm3      Lymphocytes, Absolute 1.09 10*3/mm3      Monocytes, Absolute 0.51 10*3/mm3      Eosinophils, Absolute 1.96 10*3/mm3      Basophils, Absolute 0.07 10*3/mm3      Immature Grans, Absolute 0.05 10*3/mm3      nRBC 0.0 /100 WBC           Imaging Results (Most Recent)     Procedure Component Value Units Date/Time    IR Tunneled Catheter [[id number]] Resulted: 02/10/23 1006     Updated: 02/10/23 1048    XR Pyelogram Retrograde [[id number]] Collected: 02/10/23 0752     Updated: 02/10/23 0759    Narrative:      XR PYELOGRAM RETROGRADE    Date of Exam: 2/9/2023 10:48 AM EST    Indication: CYSTOSCOPY CLOT EVACUATION WITH FULGURATION, RETROGRADE PYELOGRAM.    Comparison: CT 2/7/2023   Findings:  Bilat. retrograde programs were performed. There is severe left-sided hydronephrosis. Within the right renal pelvis there is a filling defect which may be an air bubble. Correlation with real-time fluoroscopy would be recommended. There is severe   right-sided hydronephrosis.    Total fluoroscopy time 0.9 minutes, 7 images        Impression:      Impression:  Bilateral hydronephrosis with filling defects within the right renal pelvis which may be an air bubble. Clinical correlation recommended.    Electronically Signed: Moi Holden    2/10/2023 7:57 AM EST    Workstation ID: [id number]    CT Abdomen Pelvis Without Contrast [[id number]] Collected: 02/07/23 1741     Updated: 02/07/23 1753    Narrative:      CT ABDOMEN PELVIS WO CONTRAST    Date of Exam: 2/7/2023 5:05 PM EST    Indication:  urinary obstruction.    Comparison: None available.    Technique: Axial CT images were obtained of the abdomen and pelvis without the administration of contrast. Reconstructed coronal and sagittal images were also obtained. Automated exposure control and iterative construction methods were used.    Findings:  No suspicious infiltrate is seen in the lung bases. No pleural effusion. There is a trace amount of pericardial fluid. Heart size appears within normal limits.    Large calcified gallstone is seen in the gallbladder lumen. No definite pericholecystic inflammation or biliary ductal dilatation. Small cyst is seen in the posterior-inferior right hepatic lobe. No splenomegaly. No suspicious adrenal lesion is seen. No   acute pancreatic abnormality is identified. There is atherosclerosis of the aorta without definite aneurysm. No significant abdominal adenopathy is seen.    There is moderate to severe bilateral hydronephrosis. There is bilateral renal cortical thinning. No significant renal calculi are seen. There is bilateral ureteral dilatation to the urinary bladder. No obstructing calculus or other ureteral abnormality   is seen. The bladder is prominently distended. The bladder wall appears mildly thickened. There appears to be enlargement of the prostate with irregular protrusion into the inferior urinary bladder. No definite inflammatory changes are seen surrounding   the bladder on this exam. The seminal vesicles appear symmetric and grossly normal in size. No definite urethral calculus is seen. Overall findings are suspicious for bladder outlet obstruction due to prostate enlargement with moderate to severe   hydronephrosis.    No acute gastric abnormality is seen. Small bowel loops appear nondilated. The appendix appears within normal limits.    No definite lytic or aggressive osseous lesion is seen. There are degenerative changes in the lumbar spine. There is mild to moderate bilateral hip arthritis.  Small sclerotic foci in the proximal femurs and at L2 are nonspecific but may represent bone   islands.      Impression:      Impression:  1.Moderate to severe bilateral hydronephrosis with ureteral dilatation and prominent bladder distention. The prostate appears enlarged with irregular protrusion into the urinary bladder. Findings are suspicious for bladder outlet obstruction due to the   prostate. Recommend urology consultation and catheter placement.  2.Prostate enlargement is indeterminate and could be related to benign hypertrophy or malignancy.  3.Large calcified gallstone without CT findings of acute cholecystitis.  4.Trace pericardial effusion.  5.Small sclerotic foci in the proximal femurs and at L2 are indeterminate and could be benign bone islands, but sclerotic metastases, which are commonly seen with prostate malignancy, cannot be excluded, given the above findings.    Electronically Signed: Chaz Rodriguez    2/7/2023 5:51 PM EST    Workstation ID: [id number]    XR Chest 1 View [[id number]] Collected: 02/07/23 1437     Updated: 02/07/23 1440    Narrative:      XR CHEST 1 VW    Date of Exam: 2/7/2023 2:04 PM EST    Indication: Chest Pain Triage Protocol.    Comparison: None available.    Findings:  Heart size and pulmonary vasculature are within normal limits. Lungs clear. Costophrenic angles sharp      Impression:      Impression:  No active cardiopulmonary disease    Electronically Signed: Martin Marinelli    2/7/2023 2:38 PM EST    Workstation ID: [id number]        ECG/EMG Results (most recent)     Procedure Component Value Units Date/Time    SCANNED - TELEMETRY   [[id number]] Resulted: 02/07/23     Updated: 02/08/23 0626    Adult Transthoracic Echo Complete w/ Color, Spectral and Contrast if necessary per protocol [[id number]] Resulted: 02/08/23 1041     Updated: 02/08/23 1046     Target HR (85%) 133 bpm      Max. Pred. HR (100%) 156 bpm      EF(MOD-bp) 80.6 %      LVIDd 3.9 cm      LVIDs 2.30 cm      IVSd 1.20  cm      LVPWd 1.40 cm      FS 41.0 %      IVS/LVPW 0.86 cm      ESV(cubed) 12.2 ml      EDV(cubed) 59.3 ml      LVOT area 2.5 cm2      LV mass(C)d 179.7 grams      LVOT diam 1.80 cm      EDV(MOD-sp2) 49.7 ml      EDV(MOD-sp4) 49.2 ml      ESV(MOD-sp2) 8.3 ml      ESV(MOD-sp4) 10.4 ml      SV(MOD-sp2) 41.4 ml      SV(MOD-sp4) 38.8 ml      EF(MOD-sp2) 83.2 %      EF(MOD-sp4) 78.9 %      MV E max darryl 113.0 cm/sec      MV A max darryl 73.9 cm/sec      MV dec time 0.12 msec      MV E/A 1.53     LA ESV Index (BP) 24.8 ml/m2      Med Peak E' Darryl 9.7 cm/sec      Lat Peak E' Darryl 6.7 cm/sec      Avg E/e' ratio 13.78     SV(LVOT) 75.6 ml      RV Base 2.8 cm      RV Mid 1.80 cm      RV Length 8.1 cm      TAPSE (>1.6) 2.5 cm      RV S' 18.0 cm/sec      LV V1 max 163.0 cm/sec      LV V1 max PG 10.6 mmHg      LV V1 mean PG 6.0 mmHg      LV V1 VTI 29.7 cm      Ao pk darryl 305.0 cm/sec      Ao max PG 37.2 mmHg      Ao mean PG 14.0 mmHg      Ao V2 VTI 41.7 cm      NOREEN(I,D) 1.81 cm2      MV P1/2t 49.4 msec      MVA(P1/2t) 4.5 cm2      MV dec slope 801.0 cm/sec2      MR max darryl 415.0 cm/sec      MR max PG 68.9 mmHg      MR mean darryl 259.0 cm/sec      MR mean PG 33.0 mmHg      MR VTI 69.6 cm      PA acc time 0.08 sec      PA pr(Accel) 44.4 mmHg      Ao root diam 3.2 cm      IVRT 109.0 msec      LA ESV (BP) 44.4 ml      BH CV VAS BP LEFT ARM 99/73 mmHg     Narrative:      •  Left ventricular systolic function is hyperdynamic (EF > 70%).  •  Left ventricular wall thickness is consistent with mild concentric   hypertrophy  •  Normal right ventricular cavity size and systolic function noted.  •  There is a moderate (1-2cm) pericardial effusion. There is no evidence   of cardiac tamponade.  No chamber collapse.      ECG 12 Lead Chest Pain [[id number]] Collected: 02/08/23 0917     Updated: 02/08/23 1656     QT Interval 338 ms      QTC Interval 461 ms     Narrative:      Test Reason : Chest Pain  Blood Pressure :   */*   mmHG  Vent. Rate : 112 BPM      Atrial Rate : 112 BPM     P-R Int : 204 ms          QRS Dur :  82 ms      QT Int : 338 ms       P-R-T Axes :  73  85  75 degrees     QTc Int : 461 ms    Sinus tachycardia  Nonspecific ST abnormality  Abnormal ECG  Confirmed by KENNETH GONZALES MD ([id number]) on 2/8/2023 4:52:28 PM    Referred By: OPI           Confirmed By: KENNETH GONZALES MD    ECG 12 Lead ED Triage Standing Order; Chest Pain [[id number]] Collected: 02/07/23 1411     Updated: 02/09/23 2011     QT Interval 392 ms      QTC Interval 449 ms     Narrative:      Test Reason : ED Triage Standing Order~  Blood Pressure :   */*   mmHG  Vent. Rate :  79 BPM     Atrial Rate :  79 BPM     P-R Int : 224 ms          QRS Dur : 102 ms      QT Int : 392 ms       P-R-T Axes :  71  74  75 degrees     QTc Int : 449 ms    Sinus rhythm with 1st degree AV block with premature supraventricular complexes  Incomplete right bundle branch block  Borderline ECG  When compared with ECG of 07-FEB-2023 12:03, (Unconfirmed)  premature supraventricular complexes are now present  HI interval has increased  Confirmed by GAGE ALEMAN MD ([id number]) on 2/9/2023 8:08:59 PM    Referred By: EDMD           Confirmed By: GAGE ALEMAN MD    ECG 12 Lead ED Triage Standing Order; Chest Pain [[id number]] Collected: 02/07/23 1203     Updated: 02/09/23 2011     QT Interval 350 ms      QTC Interval 469 ms     Narrative:      Test Reason : ED Triage Standing Order~  Blood Pressure :   */*   mmHG  Vent. Rate : 108 BPM     Atrial Rate : 108 BPM     P-R Int : 186 ms          QRS Dur : 104 ms      QT Int : 350 ms       P-R-T Axes :  75  79  71 degrees     QTc Int : 469 ms    Sinus tachycardia  Incomplete right bundle branch block  Borderline ECG  No previous ECGs available  Confirmed by GAGE ALEMAN MD ([id number]) on 2/9/2023 8:09:01 PM    Referred By: TIM           Confirmed By: GAGE ALEMAN MD           Physician Progress Notes (most recent note)      Lesly Coffman APRN at 02/10/23 0921     Summary:Urology Progress                 [facility]   Urology Progress Note    Patient Name: Jun Garcia  : 1960  MRN: [id number]  Primary Care Physician:  Provider, No Known  Date of admission: 2023    Subjective   Subjective     Chief Complaint: Weakness    HPI:  Patient resting in bed. Denies pain. CBI on slow drip. Dark pink urine to caldwell drainage. Light pink in tubing. No clots.     Review of Systems   All systems were reviewed and negative except for: hematuria    Objective   Objective     Vitals:   Temp:  [97.4 °F (36.3 °C)-98 °F (36.7 °C)] 98 °F (36.7 °C)  Heart Rate:  [65-78] 78  Resp:  [16-20] 18  BP: (116-170)/(69-82) 147/81  Flow (L/min):  [2] 2  Physical Exam    Constitutional: Awake in bed, alert   Eyes: PERRLA, sclerae anicteric, no conjunctival injection   HENT: Normocephalic, atraumatic, mucous membranes moist   Neck: Supple, trachea midline   Respiratory: Equal chest rise, non-labored respirations    Cardiovascular: RRR, palpable radial pulses bilaterally   Gastrointestinal: Soft, nontender, non-distended   Genitourinary: 3 way caldwell catheter with cbi on slow drip. Dark pink urine to caldwell drainage bag. Light pink urine in tubing. No clots. Stat lock tugging catheter.    Musculoskeletal: No lower extremity edema bilaterally, no clubbing or cyanosis to extremities   Psychiatric: Appropriate affect, cooperative   Neurologic: Oriented x 3,  Cranial Nerves grossly intact, speech clear   Skin: No rashes     Result Review    Result Review:  I have personally reviewed the results from the time of this admission to 2/10/2023 09:59 EST and agree with these findings:  [x]  Laboratory  []  Microbiology  []  Radiology  []  EKG/Telemetry   []  Cardiology/Vascular   []  Pathology  []  Old records  []  Other:    Assessment & Plan   Assessment / Plan     Brief Patient Summary:  Jun Garcia is a 62 y.o. male who presented to [facility] with weakness and weight loss found to have moderate/severe bilateral  hydroureteronephrosis and distended urinary bladder as well as renal failure. He is POD 1 s/p Cystoscopy, clot evacuation and fulguration with Dr. Velasco.     Active Hospital Problems:  Active Hospital Problems    Diagnosis    • **Acute renal failure superimposed on stage 4 chronic kidney disease, unspecified acute renal failure type (HCC)    • Metabolic acidosis    • Hyperkalemia    • Elevated troponin    • Elevated lipase        Plan:  -I increased CBI drip at bedside, urine output now clear. Stat lock exchanged.   -Run CBI at rate to keep urine output light pink to clear  -Perform manual bladder irrigation as needed to remove intravesical blood clots  -Trend H&H, transfuse PRN   will follow, please call if any questions  D/w Dr. Damico        DVT prophylaxis:  No DVT prophylaxis order currently exists.    CODE STATUS:   Code Status (Patient has no pulse and is not breathing): CPR (Attempt to Resuscitate)  Medical Interventions (Patient has pulse or is breathing): Full Support    Disposition:  I expect patient to remain inpatient.    Electronically signed by FRANKY Krause, 02/10/23, 9:59 AM EST.             Electronically signed by Lelsy Coffman APRN at 02/10/23 1004

## 2023-02-10 NOTE — PRE-SEDATION DOCUMENTATION
[facility]   Vascular Interventional Radiology  History & Physicial    Pertinent information including components of history, physical examination, review of systems, lab and imaging data, and impression and plan from this source was also reviewed for the creation of the following pre-procedural note: Progress Notes by Mirian Walsh MD (02/10/2023 07:20)       Patient Name:Jun Garcia    : 1960    MRN: [id number]    Primary Care Physician: Provider, No Known    Referring Physician: No ref. provider found     Date of admission: 2023    Subjective     Reason for Consult: TDC placement    History of Present Illness     Jun Garcia is a 62 y.o. male referred to IR as noted above.      Active Hospital Problems:  Active Hospital Problems    Diagnosis    • **Acute renal failure superimposed on stage 4 chronic kidney disease, unspecified acute renal failure type (HCC)    • Metabolic acidosis    • Hyperkalemia    • Elevated troponin    • Elevated lipase        Personal History     Past Medical History:   Diagnosis Date   • Diabetes mellitus (HCC)    • Elevated cholesterol    • GERD (gastroesophageal reflux disease)    • Hyperlipidemia    • Hypertension    • Stroke (HCC)        History reviewed. No pertinent surgical history.    Family History: His Family history is unknown by patient.     Social History: He  reports that he has never smoked. He has never been exposed to tobacco smoke. He has never used smokeless tobacco. He reports that he does not drink alcohol and does not use drugs.    Home Medications:       Current Medications:  •  acetaminophen **OR** acetaminophen **OR** acetaminophen  •  b complex-vitamin c-folic acid  •  senna-docusate sodium **AND** polyethylene glycol **AND** bisacodyl **AND** bisacodyl  •  dextrose  •  dextrose  •  fentaNYL citrate (PF)  •  glucagon (human recombinant)  •  heparin (porcine)  •  insulin lispro  •  lactated ringers  •  lidocaine-EPINEPHrine  •   "mannitol  •  melatonin  •  midazolam  •  Morphine **AND** naloxone  •  ondansetron **OR** ondansetron  •  pantoprazole  •  sodium bicarbonate  •  sodium chloride  •  sodium chloride  •  sodium chloride  •  sodium chloride  •  sodium chloride     Allergies:  He has No Known Allergies.    Review of Systems    IR Procedure pertinent significant findings are mentioned in the PMH and PSH above.    Objective     Visit Vitals  /79   Pulse 84   Temp 98 °F (36.7 °C) (Oral)   Resp 16   Ht 170.2 cm (67.01\")   Wt 67.6 kg (149 lb 0.5 oz)   SpO2 100%   BMI 23.34 kg/m²        Physical Exam    A&Ox3.   Able to communicate  No Apparent Distress  Average physique   CVS: Regular rate and rhythm  Respiratory: Non labored breathing. No signs of respiratory compromise.    Result Review      I have personally reviewed the results from the time of this admission to 2/10/2023 10:26 EST and agree with these findings.  [x]  Laboratory  []  Microbiology  [x]  Radiology  []  EKG/Telemetry   []  Cardiology/Vascular   []  Pathology  []  Old records  []  Other:    Most notable findings include: As noted:    Results from last 7 days   Lab Units 02/09/23  1549 02/09/23  0449 02/08/23  0939 02/07/23  1207   INR   --   --   --  1.17*   APTT seconds  --   --   --  38.1*   HEMOGLOBIN g/dL 8.0* 7.8* 5.9* 7.8*   HEMATOCRIT % 23.0* 22.6* 18.0* 23.7*   PLATELETS 10*3/mm3  --  186 255 277       Estimated Creatinine Clearance: 5.6 mL/min (A) (by C-G formula based on SCr of 12.71 mg/dL (H)).   Creatinine   Date Value Ref Range Status   02/10/2023 12.71 (H) 0.76 - 1.27 mg/dL Final   02/09/2023 12.87 (H) 0.76 - 1.27 mg/dL Final   02/08/2023 11.76 (H) 0.76 - 1.27 mg/dL Final       SARS-CoV-2, LAYNE   Date Value Ref Range Status   11/17/2022 Not Detected Not Detected Final        No results found for: PREGTESTUR, PREGSERUM, HCG, HCGQUANT     ASA SCALE ASSESSMENT (applicable ONLY if sedation planned):   3     MALLAMPATI CLASSIFICATION (applicable ONLY if " sedation planned):   3    Assessment / Plan     Jun Garcia is a 62 y.o. male referred to the IR service with above problem.    Plan:   As above.    Notice: The note was created before the performance of the procedure. It might have been left in the pending status and signed off after the procedure. The time stamp on the note may be misleading.    Aidan Perdomo MD   Vascular Interventional Radiology  02/10/23   10:26 AM EST

## 2023-02-10 NOTE — PROGRESS NOTES
Urology Brief Note    I reevaluated the patient this afternoon, his catheter is draining light pink on a slow drip.  I anticipate we will be able to wean off his continuous bladder irrigation tomorrow 2/11/23.     Tentative plan will be patient will see FRANKY Krause back in clinic in 7 to 10 days postdischarge for intermittent catheterization teaching and catheter removal.      He is high risk for bladder outlet surgery (renal failure, anemia, etc), and bladder outlet surgery may not benefit him given his renal failure and bilateral renal atrophy.     We discussed he is high risk for recurrent retention and may be at risk of pyocystis if he is not emptying his bladder, at this juncture it is likely that he will need long-term dialysis however we will still need to manage his residual urine with intermittent catheterization.  Patient reports understanding.  He states he has performed intermittent catheterization previously with his urologist at [facility].    PLAN  - f/u FRANKY Krause for intermittent cath teaching 2-3x daily within 7-10 days of d/c  - Urology will see patient Saturday to wean CBI    Pawan Damico MD

## 2023-02-10 NOTE — PROGRESS NOTES
" LOS: 3 days   Patient Care Team:  Provider, No Known as PCP - General  Provider, No Known as PCP - Family Medicine    Chief Complaint: Chest pain.    Subjective     First HD session today tolerated well. Plan for Next session tomorrow.     Subjective:  Symptoms:  He reports chest pain.        History taken from: patient    Objective     Vital Sign Min/Max for last 24 hours  Temp  Min: 97.7 °F (36.5 °C)  Max: 98 °F (36.7 °C)   BP  Min: 113/83  Max: 179/79   Pulse  Min: 76  Max: 89   Resp  Min: 16  Max: 20   SpO2  Min: 97 %  Max: 100 %   No data recorded   Weight  Min: 67.6 kg (149 lb 0.5 oz)  Max: 67.6 kg (149 lb 0.5 oz)     Flowsheet Rows    Flowsheet Row First Filed Value   Admission Height 170.2 cm (67\") Documented at 02/07/2023 1150   Admission Weight 68 kg (150 lb) Documented at 02/07/2023 1150          I/O this shift:  In: -   Out: -760 [Other:840]  I/O last 3 completed shifts:  In: 3287.7 [P.O.:240; I.V.:1800; Blood:747.7; IV Piggyback:500]  Out: -25730 [Blood:500]    Objective:  General Appearance:  Comfortable.    Vital signs: (most recent): Blood pressure 152/75, pulse 77, temperature 97.8 °F (36.6 °C), temperature source Oral, resp. rate 20, height 170.2 cm (67.01\"), weight 67.6 kg (149 lb 0.5 oz), SpO2 99 %.  Vital signs are normal.    Output: Producing urine.    HEENT: Normal HEENT exam.    Lungs:  Normal effort and normal respiratory rate.  Breath sounds clear to auscultation.  He is not in respiratory distress.  No decreased breath sounds or wheezes.    Heart: Normal rate.  Regular rhythm.  S1 normal and S2 normal.  No murmur or gallop.   Abdomen: Abdomen is soft.  Bowel sounds are normal.     Extremities: Normal range of motion.  There is no dependent edema or local swelling.    Pulses: Distal pulses are intact.    Neurological: Patient is alert and oriented to person, place and time.  Normal strength.    Skin:  Warm.              Results Review:     I reviewed the patient's new clinical " results.    WBC WBC   Date Value Ref Range Status   02/09/2023 12.49 (H) 3.40 - 10.80 10*3/mm3 Final   02/08/2023 9.66 3.40 - 10.80 10*3/mm3 Final      HGB Hemoglobin   Date Value Ref Range Status   02/09/2023 8.0 (L) 13.0 - 17.7 g/dL Final   02/09/2023 7.8 (L) 13.0 - 17.7 g/dL Final   02/08/2023 5.9 (C) 13.0 - 17.7 g/dL Final      HCT Hematocrit   Date Value Ref Range Status   02/09/2023 23.0 (L) 37.5 - 51.0 % Final   02/09/2023 22.6 (L) 37.5 - 51.0 % Final   02/08/2023 18.0 (C) 37.5 - 51.0 % Final      Platlets No results found for: LABPLAT   MCV MCV   Date Value Ref Range Status   02/09/2023 84.0 79.0 - 97.0 fL Final   02/08/2023 88.7 79.0 - 97.0 fL Final          Sodium Sodium   Date Value Ref Range Status   02/10/2023 139 136 - 145 mmol/L Final   02/09/2023 138 136 - 145 mmol/L Final   02/08/2023 138 136 - 145 mmol/L Final      Potassium Potassium   Date Value Ref Range Status   02/10/2023 4.6 3.5 - 5.2 mmol/L Final   02/09/2023 4.6 3.5 - 5.2 mmol/L Final   02/08/2023 4.4 3.5 - 5.2 mmol/L Final      Chloride Chloride   Date Value Ref Range Status   02/10/2023 96 (L) 98 - 107 mmol/L Final   02/09/2023 94 (L) 98 - 107 mmol/L Final   02/08/2023 97 (L) 98 - 107 mmol/L Final      CO2 CO2   Date Value Ref Range Status   02/10/2023 24.0 22.0 - 29.0 mmol/L Final   02/09/2023 27.0 22.0 - 29.0 mmol/L Final   02/08/2023 21.0 (L) 22.0 - 29.0 mmol/L Final      BUN BUN   Date Value Ref Range Status   02/10/2023 153 (H) 8 - 23 mg/dL Final   02/09/2023 150 (H) 8 - 23 mg/dL Final   02/08/2023 140 (H) 8 - 23 mg/dL Final      Creatinine Creatinine   Date Value Ref Range Status   02/10/2023 12.71 (H) 0.76 - 1.27 mg/dL Final   02/09/2023 12.87 (H) 0.76 - 1.27 mg/dL Final   02/08/2023 11.76 (H) 0.76 - 1.27 mg/dL Final      Calcium Calcium   Date Value Ref Range Status   02/10/2023 7.8 (L) 8.6 - 10.5 mg/dL Final   02/09/2023 7.7 (L) 8.6 - 10.5 mg/dL Final   02/08/2023 8.2 (L) 8.6 - 10.5 mg/dL Final      PO4 No results found for:  CAPO4   Albumin Albumin   Date Value Ref Range Status   02/10/2023 3.2 (L) 3.5 - 5.2 g/dL Final   02/09/2023 3.1 (L) 3.5 - 5.2 g/dL Final      Magnesium No results found for: MG   Uric Acid No results found for: URICACID     Medication Review: yes    Assessment & Plan       Acute renal failure superimposed on stage 4 chronic kidney disease, unspecified acute renal failure type (HCC)    Metabolic acidosis    Hyperkalemia    Elevated troponin    Elevated lipase      Assessment & Plan        CKD stage V: Hx of obstructive uropathy with severe hydronephrosis on this admission noted previously on admission at [facility]. Patient left the hospital against medical advise.      Azotemia:BUN significantly elevated. Does have mild uremic symptoms. Fatigue, poor appetite and vomiiting     Obstructive uropathy: Likley due to enlarge prostate resulting in severe hydronephrosis. Cystoscopy was recommended on last visit at  but patient didn't have any intervention. Similar findings noted on this admission      Anemia: ACD due to advance CKD     Hyperkalemia: Due to CKD     Met acidosis: Due to CKD      Hyponatremia: Due to CKD     Recs  Plan for Hd in AM. Currently on CBI  Daily labs     Jordan Marquez MD  02/10/23  17:25 EST

## 2023-02-10 NOTE — CASE MANAGEMENT/SOCIAL WORK
Continued Stay Note  [facility]     Patient Name: Jun Garcia  MRN: [id number]  Today's Date: 2/10/2023    Admit Date: 2/7/2023    Plan: Home at DC   Discharge Plan     Row Name 02/10/23 1142       Plan    Plan Home at DC    Patient/Family in Agreement with Plan other (see comments)    Plan Comments The pt is off the floor. [place] with [place] [facility] [place] [phone] ex [phone]. The pt has an outpt HD spot at [facility] for MWF at 0600. I spoke with Vivienne at the [facility] clinic and faxed her additional info to [phone]. The pts start date at the outpt clinic will be Wed 2-. The plan is to DC after HD on Monday 2-. CM will f/u on Monday and update [facility].    1500. I updated the pt on the outpt HD plans. He is in agreeement.    Final Discharge Disposition Code 01 - home or self-care               Discharge Codes    No documentation.               Expected Discharge Date and Time     Expected Discharge Date Expected Discharge Time    Feb 14, 2023             Milka Vazquez RN

## 2023-02-10 NOTE — PROGRESS NOTES
[facility]   Urology Progress Note    Patient Name: Jun Garcia  : 1960  MRN: [id number]  Primary Care Physician:  Provider, No Known  Date of admission: 2023    Subjective   Subjective     Chief Complaint: Weakness    HPI:  Patient resting in bed. Denies pain. CBI on slow drip. Dark pink urine to caldwell drainage. Light pink in tubing. No clots.     Review of Systems   All systems were reviewed and negative except for: hematuria    Objective   Objective     Vitals:   Temp:  [97.4 °F (36.3 °C)-98 °F (36.7 °C)] 98 °F (36.7 °C)  Heart Rate:  [65-78] 78  Resp:  [16-20] 18  BP: (116-170)/(69-82) 147/81  Flow (L/min):  [2] 2  Physical Exam    Constitutional: Awake in bed, alert   Eyes: PERRLA, sclerae anicteric, no conjunctival injection   HENT: Normocephalic, atraumatic, mucous membranes moist   Neck: Supple, trachea midline   Respiratory: Equal chest rise, non-labored respirations    Cardiovascular: RRR, palpable radial pulses bilaterally   Gastrointestinal: Soft, nontender, non-distended   Genitourinary: 3 way caldwell catheter with cbi on slow drip. Dark pink urine to caldwell drainage bag. Light pink urine in tubing. No clots. Stat lock tugging catheter.    Musculoskeletal: No lower extremity edema bilaterally, no clubbing or cyanosis to extremities   Psychiatric: Appropriate affect, cooperative   Neurologic: Oriented x 3,  Cranial Nerves grossly intact, speech clear   Skin: No rashes     Result Review    Result Review:  I have personally reviewed the results from the time of this admission to 2/10/2023 09:59 EST and agree with these findings:  [x]  Laboratory  []  Microbiology  []  Radiology  []  EKG/Telemetry   []  Cardiology/Vascular   []  Pathology  []  Old records  []  Other:    Assessment & Plan   Assessment / Plan     Brief Patient Summary:  Jun Garcia is a 62 y.o. male who presented to [facility] with weakness and weight loss found to have moderate/severe bilateral  hydroureteronephrosis and distended urinary bladder as well as renal failure. He is POD 1 s/p Cystoscopy, clot evacuation and fulguration with Dr. Velasco.     Active Hospital Problems:  Active Hospital Problems    Diagnosis    • **Acute renal failure superimposed on stage 4 chronic kidney disease, unspecified acute renal failure type (HCC)    • Metabolic acidosis    • Hyperkalemia    • Elevated troponin    • Elevated lipase        Plan:  -I increased CBI drip at bedside, urine output now clear. Stat lock exchanged.   -Run CBI at rate to keep urine output light pink to clear  -Perform manual bladder irrigation as needed to remove intravesical blood clots  -Trend H&H, transfuse PRN   will follow, please call if any questions  D/w Dr. Damico        DVT prophylaxis:  No DVT prophylaxis order currently exists.    CODE STATUS:   Code Status (Patient has no pulse and is not breathing): CPR (Attempt to Resuscitate)  Medical Interventions (Patient has pulse or is breathing): Full Support    Disposition:  I expect patient to remain inpatient.    Electronically signed by FRANKY Krause, 02/10/23, 9:59 AM EST.

## 2023-02-10 NOTE — PLAN OF CARE
Problem: Device-Related Complication Risk (Hemodialysis)  Goal: Safe, Effective Therapy Delivery  Outcome: Ongoing, Progressing  Intervention: Optimize Device Care and Function  Recent Flowsheet Documentation  Taken 2/10/2023 1238 by Jacque Reid, RN  Medication Review/Management:   medications reviewed   high-risk medications identified  Taken 2/10/2023 1103 by Jacque Reid, RN  Medication Review/Management:   medications reviewed   high-risk medications identified     Problem: Hemodynamic Instability (Hemodialysis)  Goal: Effective Tissue Perfusion  Outcome: Ongoing, Progressing     Problem: Infection (Hemodialysis)  Goal: Absence of Infection Signs and Symptoms  Outcome: Ongoing, Progressing   Goal Outcome Evaluation:      HD complete, blood reinfused, tx tolerated well. Report given to primary MATT eBckett. HD tomorrow.

## 2023-02-10 NOTE — PROGRESS NOTES
"                    Clinical Nutrition       Patient Name: Jun Garcia  YOB: 1960  MRN: [id number]  Date of Encounter: 02/10/23 14:51 EST  Admission date: 2/7/2023      Reason for Visit   MST score 2+, Unintended wt loss, Reduced oral intake    EMR Reviewed     EMR Reviewed: yes     Admission Diagnosis:  Acute renal failure superimposed on stage 4 chronic kidney disease, unspecified acute renal failure type (HCC) [N17.9, N18.4]    Problem List:    Acute renal failure superimposed on stage 4 chronic kidney disease, unspecified acute renal failure type (HCC)    Metabolic acidosis    Hyperkalemia    Elevated troponin    Elevated lipase    Anthropometric      Height: 170.2 cm (67.01\")  Last Filed Weight: Weight: 67.6 kg (149 lb 0.5 oz) (02/09/23 1817)  Weight Method: Stated  BMI: BMI (Calculated): 23.3  BMI classification: Normal: 18.5-24.9kg/m2   IBW:  145 lb    UBW: only previously documented wt in VUG=529 lb on 11/17/22  Weight change: none at least 3 months, no previous data avail     Reported/Observed/Food/Nutrition Related - Comments     Pt in dialysis at time visit, wife at bedside. She denies pt having any nutritional concerns, eating well. Per EMR, weight stable at least 3 months- no further previous data. Wife denied pt having further dietary needs/preferences, NKFA.      Current Nutrition Prescription     Diet: Diabetic Diets; Consistent Carbohydrate; Texture: Regular Texture (IDDSI 7); Fluid Consistency: Thin (IDDSI 0)  Orders Placed This Encounter      DIET MESSAGE Pt is requesting a tray, he has been NPO and is hungry.  Thank you.  He gets a diabetic tray.      Average Intake from Charting: none yet documented    Nutrition Diagnosis     Problem No nutrition diagnosis at this time   Etiology    Signs/Symptoms    Status:    Actions     Follow treatment progress, Care plan reviewed, Advise alternate selection, Advised available snacks, Interview for preferences, Menu provided    Monitor " Per Protocol      Carmel Solomon RD  Time Spent: 20m

## 2023-02-10 NOTE — NURSING NOTE
15.5 Divehi Image guided tunneled dialysis catheter placed to right internal jugular by Dr Perdomo. Patient tolerated well. Sedation time of 13 minutes. Patient was given 50 mcg Fentanyl & 1 mg Versed. Report called to nurse.

## 2023-02-10 NOTE — NURSING NOTE
Pt back from dialysis and HD cath placement at this time.  Pt voices no complaints. VSS.  Will continue to monitor.

## 2023-02-10 NOTE — PROGRESS NOTES
[facility] Medicine Services  PROGRESS NOTE    Patient Name: Jun Garcia  : 1960  MRN: [id number]    Date of Admission: 2023  Primary Care Physician: Provider, No Known    Subjective   Subjective     CC: Follow-up generalized weakness    HPI: No acute events overnight, presented he slept well, feels much better this morning    ROS:  Gen- No fevers, chills  CV- No chest pain, palpitations  Resp- No cough, dyspnea  GI- No N/V/D, abd pain      Objective   Objective     Vital Signs:   Temp:  [97.4 °F (36.3 °C)-98 °F (36.7 °C)] 98 °F (36.7 °C)  Heart Rate:  [] 77  Resp:  [16-20] 20  BP: ()/(67-82) 142/70  Flow (L/min):  [2] 2     Physical Exam:  Constitutional: Chronically ill-appearing male, in no acute distress, awake, alert  HENT: NCAT, mucous membranes moist  Respiratory: Clear to auscultation bilaterally, respiratory effort normal   Cardiovascular: RRR, no murmurs, rubs, or gallops  : Whitfield catheter in place, CBI ongoing  Gastrointestinal: Positive bowel sounds, soft, nontender, nondistended  Musculoskeletal: No bilateral ankle edema  Psychiatric: Appropriate affect, cooperative  Neurologic: Nonfocal  Skin: No rashes    Results Reviewed:  LAB RESULTS:      Lab 23  1549 23  0449 23  0939 23  1207   WBC  --  12.49* 9.66 12.08*   HEMOGLOBIN 8.0* 7.8* 5.9* 7.8*   HEMATOCRIT 23.0* 22.6* 18.0* 23.7*   PLATELETS  --  186 255 277   NEUTROS ABS  --  9.08*  --  8.40*   IMMATURE GRANS (ABS)  --  0.06*  --  0.05   LYMPHS ABS  --  1.23  --  1.09   MONOS ABS  --  0.61  --  0.51   EOS ABS  --  1.46*  --  1.96*   MCV  --  84.0 88.7 89.4   PROTIME  --   --   --  14.8*   APTT  --   --   --  38.1*         Lab 02/10/23  0410 23  0449 23  0343 23  1207   SODIUM 139 138 138 134*   POTASSIUM 4.6 4.6 4.4 5.3*   CHLORIDE 96* 94* 97* 99   CO2 24.0 27.0 21.0* 13.0*   ANION GAP 19.0* 17.0* 20.0* 22.0*   * 150* 140* 164*   CREATININE  12.71* 12.87* 11.76* 13.21*   EGFR 4.0* 3.9* 4.4* 3.8*   GLUCOSE 98 158* 140* 169*   CALCIUM 7.8* 7.7* 8.2* 8.5*   MAGNESIUM  --   --   --  3.3*   PHOSPHORUS 9.2* 8.4* 6.7* 6.8*         Lab 02/10/23  0410 02/09/23  0449 02/07/23  1207   TOTAL PROTEIN  --   --  8.7*   ALBUMIN 3.2* 3.1* 4.2   GLOBULIN  --   --  4.5   ALT (SGPT)  --   --  19   AST (SGOT)  --   --  12   BILIRUBIN  --   --  0.3   ALK PHOS  --   --  103   LIPASE  --   --  124*         Lab 02/07/23  1418 02/07/23  1207   PROBNP  --  1,316.0*   HSTROP T 57* 60*   PROTIME  --  14.8*   INR  --  1.17*             Lab 02/08/23  1019 02/08/23  0939 02/08/23  0343   IRON  --   --  68   IRON SATURATION  --   --  29   TIBC  --   --  234*   TRANSFERRIN  --   --  157*   FERRITIN  --   --  899.10*   ABO TYPING O   < >  --    RH TYPING Positive   < >  --    ANTIBODY SCREEN Negative  --   --     < > = values in this interval not displayed.         Brief Urine Lab Results  (Last result in the past 365 days)      Color   Clarity   Blood   Leuk Est   Nitrite   Protein   CREAT   Urine HCG        11/17/22 1804 Yellow   Cloudy     Large                     Microbiology Results Abnormal     None          Adult Transthoracic Echo Complete w/ Color, Spectral and Contrast if necessary per protocol    Result Date: 2/8/2023  •  Left ventricular systolic function is hyperdynamic (EF > 70%). •  Left ventricular wall thickness is consistent with mild concentric hypertrophy •  Normal right ventricular cavity size and systolic function noted. •  There is a moderate (1-2cm) pericardial effusion. There is no evidence of cardiac tamponade.  No chamber collapse.       Results for orders placed during the hospital encounter of 02/07/23    Adult Transthoracic Echo Complete w/ Color, Spectral and Contrast if necessary per protocol    Interpretation Summary  •  Left ventricular systolic function is hyperdynamic (EF > 70%).  •  Left ventricular wall thickness is consistent with mild concentric  hypertrophy  •  Normal right ventricular cavity size and systolic function noted.  •  There is a moderate (1-2cm) pericardial effusion. There is no evidence of cardiac tamponade.  No chamber collapse.      I have reviewed the medications:  Scheduled Meds:b complex-vitamin c-folic acid, 1 tablet, Oral, Daily  insulin lispro, 0-7 Units, Subcutaneous, TID AC  pantoprazole, 40 mg, Oral, Q AM  senna-docusate sodium, 2 tablet, Oral, BID  sodium bicarbonate, 650 mg, Oral, TID  sodium chloride, 10 mL, Intravenous, Q12H      Continuous Infusions:lactated ringers, 9 mL/hr  sodium chloride, 9 mL/hr      PRN Meds:.•  acetaminophen **OR** acetaminophen **OR** acetaminophen  •  senna-docusate sodium **AND** polyethylene glycol **AND** bisacodyl **AND** bisacodyl  •  dextrose  •  dextrose  •  glucagon (human recombinant)  •  melatonin  •  Morphine **AND** naloxone  •  ondansetron **OR** ondansetron  •  sodium chloride  •  sodium chloride  •  sodium chloride    Assessment & Plan   Assessment & Plan     Active Hospital Problems    Diagnosis  POA   • **Acute renal failure superimposed on stage 4 chronic kidney disease, unspecified acute renal failure type (HCC) [N17.9, N18.4]  Yes   • Metabolic acidosis [E87.20]  Yes   • Hyperkalemia [E87.5]  Yes   • Elevated troponin [R77.8]  Yes   • Elevated lipase [R74.8]  Yes      Resolved Hospital Problems   No resolved problems to display.        Brief Hospital Course to date:  Jun Garcia is a 62 y.o. male with prior hx obstructive uropathy and CKD presenting with worsening FTT and ongoing renal failure and obstructive uropathy.     CKD IV-V nearing ESRD  Metabolic acidosis, improving  Hyperkalemia, resolved  -Nephrology following, discussed with Dr. Caicedo, patient needs HD, plan for tunnel cath placement and initiation of HD today 2/9/2023  -Dr. Estes following along for PD cath and AV fistula placement, this will be evaluated as outpatient   -Follow-up repeat renal function  panel  -Patient will need HD chair arranged at discharge, CM to arrange     Obstructive uropathy  Gross hematuria  -CT abdomen pelvis showed moderate to severe bilateral hydronephrosis, enlarged prostate with irregular protrusion into the urinary bladder (same finding seen on CT done in November at )  -Urology following, he is s/p cystoscopy and clot evacuation in the OR by Dr. Velasco 2/9/2023  -CBI ongoing      Anemia  -Multifactorial ACD vs blood loss from hematuria  -Hg 5.8 2/8/23, s/p 2 units PRBC with good response, continue to closely monitor  -follow-up repeat H&H.     Elevated troponin   -Suspect this is secondary to volume depletion  -Patient is without any chest pain troponin has flattened   -EKG is unremarkable, echowith EF 70%, moderate pericardial effusion      Elevated lipase  -Suspected to be likely secondary to resolving pancreatitis  -Continue supportive therapy with IV fluids and pain meds, diet as tolerated    Expected Discharge Location and Transportation: Home  Expected Discharge   Expected Discharge Date and Time     Expected Discharge Date Expected Discharge Time    Feb 14, 2023            DVT prophylaxis:  No DVT prophylaxis order currently exists.     AM-PAC 6 Clicks Score (PT): 19 (02/09/23 2008)    CODE STATUS:   Code Status and Medical Interventions:   Ordered at: 02/07/23 6495     Code Status (Patient has no pulse and is not breathing):    CPR (Attempt to Resuscitate)     Medical Interventions (Patient has pulse or is breathing):    Full Support       Mirian Walsh MD  02/10/23

## 2023-02-11 ENCOUNTER — APPOINTMENT (OUTPATIENT)
Dept: NEPHROLOGY | Facility: HOSPITAL | Age: 63
DRG: 663 | End: 2023-02-11
Payer: COMMERCIAL

## 2023-02-11 LAB
ALBUMIN SERPL-MCNC: 3.3 G/DL (ref 3.5–5.2)
ANION GAP SERPL CALCULATED.3IONS-SCNC: 14 MMOL/L (ref 5–15)
BUN SERPL-MCNC: 115 MG/DL (ref 8–23)
BUN/CREAT SERPL: 11.7 (ref 7–25)
CALCIUM SPEC-SCNC: 7.7 MG/DL (ref 8.6–10.5)
CHLORIDE SERPL-SCNC: 102 MMOL/L (ref 98–107)
CO2 SERPL-SCNC: 24 MMOL/L (ref 22–29)
CREAT SERPL-MCNC: 9.81 MG/DL (ref 0.76–1.27)
DEPRECATED RDW RBC AUTO: 47 FL (ref 37–54)
EGFRCR SERPLBLD CKD-EPI 2021: 5.4 ML/MIN/1.73
ERYTHROCYTE [DISTWIDTH] IN BLOOD BY AUTOMATED COUNT: 13.8 % (ref 12.3–15.4)
GLUCOSE BLDC GLUCOMTR-MCNC: 148 MG/DL (ref 70–130)
GLUCOSE BLDC GLUCOMTR-MCNC: 151 MG/DL (ref 70–130)
GLUCOSE BLDC GLUCOMTR-MCNC: 249 MG/DL (ref 70–130)
GLUCOSE BLDC GLUCOMTR-MCNC: 318 MG/DL (ref 70–130)
GLUCOSE BLDC GLUCOMTR-MCNC: 596 MG/DL (ref 70–130)
GLUCOSE SERPL-MCNC: 97 MG/DL (ref 65–99)
HCT VFR BLD AUTO: 20.3 % (ref 37.5–51)
HCT VFR BLD AUTO: 23.2 % (ref 37.5–51)
HCT VFR BLD AUTO: 27.8 % (ref 37.5–51)
HGB BLD-MCNC: 6.6 G/DL (ref 13–17.7)
HGB BLD-MCNC: 7.8 G/DL (ref 13–17.7)
HGB BLD-MCNC: 9.6 G/DL (ref 13–17.7)
MCH RBC QN AUTO: 30.7 PG (ref 26.6–33)
MCHC RBC AUTO-ENTMCNC: 32.5 G/DL (ref 31.5–35.7)
MCV RBC AUTO: 94.4 FL (ref 79–97)
PHOSPHATE SERPL-MCNC: 7.6 MG/DL (ref 2.5–4.5)
PLATELET # BLD AUTO: 130 10*3/MM3 (ref 140–450)
PMV BLD AUTO: 9 FL (ref 6–12)
POTASSIUM SERPL-SCNC: 4.5 MMOL/L (ref 3.5–5.2)
RBC # BLD AUTO: 2.15 10*6/MM3 (ref 4.14–5.8)
SODIUM SERPL-SCNC: 140 MMOL/L (ref 136–145)
WBC NRBC COR # BLD: 11.43 10*3/MM3 (ref 3.4–10.8)

## 2023-02-11 PROCEDURE — 99233 SBSQ HOSP IP/OBS HIGH 50: CPT | Performed by: INTERNAL MEDICINE

## 2023-02-11 PROCEDURE — 99232 SBSQ HOSP IP/OBS MODERATE 35: CPT | Performed by: NURSE PRACTITIONER

## 2023-02-11 PROCEDURE — 85027 COMPLETE CBC AUTOMATED: CPT | Performed by: INTERNAL MEDICINE

## 2023-02-11 PROCEDURE — P9016 RBC LEUKOCYTES REDUCED: HCPCS

## 2023-02-11 PROCEDURE — 82962 GLUCOSE BLOOD TEST: CPT

## 2023-02-11 PROCEDURE — 85014 HEMATOCRIT: CPT | Performed by: INTERNAL MEDICINE

## 2023-02-11 PROCEDURE — 86900 BLOOD TYPING SEROLOGIC ABO: CPT

## 2023-02-11 PROCEDURE — 63710000001 INSULIN LISPRO (HUMAN) PER 5 UNITS: Performed by: UROLOGY

## 2023-02-11 PROCEDURE — 85018 HEMOGLOBIN: CPT | Performed by: INTERNAL MEDICINE

## 2023-02-11 PROCEDURE — 80069 RENAL FUNCTION PANEL: CPT | Performed by: INTERNAL MEDICINE

## 2023-02-11 PROCEDURE — 25010000002 MORPHINE PER 10 MG: Performed by: UROLOGY

## 2023-02-11 RX ADMIN — INSULIN LISPRO 5 UNITS: 100 INJECTION, SOLUTION INTRAVENOUS; SUBCUTANEOUS at 14:12

## 2023-02-11 RX ADMIN — Medication 10 ML: at 08:28

## 2023-02-11 RX ADMIN — ACETAMINOPHEN 325MG 650 MG: 325 TABLET ORAL at 16:45

## 2023-02-11 RX ADMIN — Medication 10 ML: at 20:12

## 2023-02-11 RX ADMIN — SODIUM BICARBONATE 650 MG TABLET 650 MG: at 08:27

## 2023-02-11 RX ADMIN — INSULIN LISPRO 3 UNITS: 100 INJECTION, SOLUTION INTRAVENOUS; SUBCUTANEOUS at 08:27

## 2023-02-11 RX ADMIN — SODIUM BICARBONATE 650 MG TABLET 650 MG: at 15:54

## 2023-02-11 RX ADMIN — SODIUM BICARBONATE 650 MG TABLET 650 MG: at 20:08

## 2023-02-11 RX ADMIN — SENNOSIDES AND DOCUSATE SODIUM 2 TABLET: 8.6; 5 TABLET ORAL at 08:28

## 2023-02-11 RX ADMIN — Medication 5 MG: at 20:07

## 2023-02-11 RX ADMIN — MORPHINE SULFATE 2 MG: 2 INJECTION, SOLUTION INTRAMUSCULAR; INTRAVENOUS at 14:01

## 2023-02-11 RX ADMIN — MORPHINE SULFATE 2 MG: 2 INJECTION, SOLUTION INTRAMUSCULAR; INTRAVENOUS at 20:07

## 2023-02-11 RX ADMIN — MICONAZOLE NITRATE 1 APPLICATION: 20 CREAM TOPICAL at 13:51

## 2023-02-11 RX ADMIN — MICONAZOLE NITRATE 1 APPLICATION: 20 CREAM TOPICAL at 18:27

## 2023-02-11 RX ADMIN — MORPHINE SULFATE 2 MG: 2 INJECTION, SOLUTION INTRAMUSCULAR; INTRAVENOUS at 01:17

## 2023-02-11 RX ADMIN — PANTOPRAZOLE SODIUM 40 MG: 40 TABLET, DELAYED RELEASE ORAL at 05:38

## 2023-02-11 RX ADMIN — SENNOSIDES AND DOCUSATE SODIUM 2 TABLET: 8.6; 5 TABLET ORAL at 20:07

## 2023-02-11 RX ADMIN — Medication 1 TABLET: at 08:28

## 2023-02-11 NOTE — PROGRESS NOTES
[facility] Medicine Services  PROGRESS NOTE    Patient Name: Jun Garcia  : 1960  MRN: [id number]    Date of Admission: 2023  Primary Care Physician: Provider, No Known    Subjective   Subjective     CC: Follow-up generalized weakness    HPI: No acute events overnight, patient that he rested well, does have some discomfort around his tunneled cath site    ROS:  Gen- No fevers, chills  CV- No chest pain, palpitations  Resp- No cough, dyspnea  GI- No N/V/D, abd pain       Objective   Objective     Vital Signs:   Temp:  [97.7 °F (36.5 °C)-99.2 °F (37.3 °C)] 98 °F (36.7 °C)  Heart Rate:  [76-92] 92  Resp:  [14-20] 14  BP: (113-179)/(70-96) 160/81  Flow (L/min):  [2] 2     Physical Exam:  Constitutional: Chronically ill-appearing male no acute distress, awake, alert  HENT: NCAT, mucous membranes moist  Respiratory: Clear to auscultation bilaterally, respiratory effort normal   Cardiovascular: RRR, no murmurs, rubs, or gallops  Gastrointestinal: Positive bowel sounds, soft, nontender, nondistended  Musculoskeletal: No bilateral ankle edema  Psychiatric: Appropriate affect, cooperative  Neurologic: Oriented x 3, nonfocal  Skin: No rashes    Results Reviewed:  LAB RESULTS:      Lab 23  0334 23  1549 23  0449 23  0939 23  1207   WBC 11.43*  --  12.49* 9.66 12.08*   HEMOGLOBIN 6.6* 8.0* 7.8* 5.9* 7.8*   HEMATOCRIT 20.3* 23.0* 22.6* 18.0* 23.7*   PLATELETS 130*  --  186 255 277   NEUTROS ABS  --   --  9.08*  --  8.40*   IMMATURE GRANS (ABS)  --   --  0.06*  --  0.05   LYMPHS ABS  --   --  1.23  --  1.09   MONOS ABS  --   --  0.61  --  0.51   EOS ABS  --   --  1.46*  --  1.96*   MCV 94.4  --  84.0 88.7 89.4   PROTIME  --   --   --   --  14.8*   APTT  --   --   --   --  38.1*         Lab 23  0334 02/10/23  0410 23  0449 23  0343 23  1207   SODIUM 140 139 138 138 134*   POTASSIUM 4.5 4.6 4.6 4.4 5.3*   CHLORIDE 102 96* 94* 97* 99    CO2 24.0 24.0 27.0 21.0* 13.0*   ANION GAP 14.0 19.0* 17.0* 20.0* 22.0*   * 153* 150* 140* 164*   CREATININE 9.81* 12.71* 12.87* 11.76* 13.21*   EGFR 5.4* 4.0* 3.9* 4.4* 3.8*   GLUCOSE 97 98 158* 140* 169*   CALCIUM 7.7* 7.8* 7.7* 8.2* 8.5*   MAGNESIUM  --   --   --   --  3.3*   PHOSPHORUS 7.6* 9.2* 8.4* 6.7* 6.8*         Lab 02/11/23  0334 02/10/23  0410 02/09/23  0449 02/07/23  1207   TOTAL PROTEIN  --   --   --  8.7*   ALBUMIN 3.3* 3.2* 3.1* 4.2   GLOBULIN  --   --   --  4.5   ALT (SGPT)  --   --   --  19   AST (SGOT)  --   --   --  12   BILIRUBIN  --   --   --  0.3   ALK PHOS  --   --   --  103   LIPASE  --   --   --  124*         Lab 02/07/23  1418 02/07/23  1207   PROBNP  --  1,316.0*   HSTROP T 57* 60*   PROTIME  --  14.8*   INR  --  1.17*             Lab 02/08/23  1019 02/08/23  0939 02/08/23  0343   IRON  --   --  68   IRON SATURATION  --   --  29   TIBC  --   --  234*   TRANSFERRIN  --   --  157*   FERRITIN  --   --  899.10*   ABO TYPING O   < >  --    RH TYPING Positive   < >  --    ANTIBODY SCREEN Negative  --   --     < > = values in this interval not displayed.         Brief Urine Lab Results  (Last result in the past 365 days)      Color   Clarity   Blood   Leuk Est   Nitrite   Protein   CREAT   Urine HCG        11/17/22 1804 Yellow   Cloudy     Large                     Microbiology Results Abnormal     None          IR Tunneled Catheter    Result Date: 2/10/2023  IR TUNNELED CATHETER-                                                         History: CKD V; N17.9-Acute kidney failure, unspecified; N18.4-Chronic kidney disease, stage 4 (severe); E83.41-Hypermagnesemia; E87.5-Hyperkalemia; E83.39-Other disorders of phosphorus metabolism; E86.0-Dehydration; E87.20-Acidosis, unspecified    : Aidan Perdomo MD.  Modality: Sonography and fluoroscopy                DOSE REDUCTION: The examination was performed according to departmental dose-optimization program.  Fluoro time: 0.4  minutes  Radiation Dose: 1.7 mGy air Kerma.                                                                              SEDATION: Moderate sedation was administered. 1 milligram of Versed and 50 micrograms of fentanyl IV was used for moderate sedation monitored under my direction. Total intra service time of sedation was 13 minutes. The patient's vital signs were monitored throughout the procedure and recorded in the patient's medical record by the nurse.  Medicines: Ancef 1 g IV.  Anesthesia: Lidocaine with epinephrine, local infiltration.  Estimated blood loss:  < 5 cc.          Technique: A thorough discussion of the risks, benefits, and alternatives of the procedure, and if applicable, moderate sedation, was carried out with the patient. They were encouraged to ask any questions. Any questions were answered. They verbalized understanding. A written informed consent was then signed.   A multi-component timeout was performed prior to starting the procedure using the departmental protocol.  The procedure room personnel used personal protective equipment. The operators used sterile gloves and if indicated, sterile gowns. The surgical site was prepped with chlorhexidine gluconate  and draped in the maximal applicable sterile fashion.  A preliminary ultrasonogram was performed of the target that revealed a patent and compressible Right internal jugular vein. Pertinent ultrasound images were stored in the PACS for documentation.  A sterile prep and drape of the right neck and upper chest was performed using maximal sterile technique.  Using aseptic precautions, real-time ultrasound guidance, the target vein was accessed after local anesthetic infiltration and dermatotomy with an access needle. A guidewire was advanced into the central venous system under fluoroscopic guidance. Over the wire following standard technique a peel-away sheath was placed.  After local anesthesia, an incision was created at the exit site  location and a cuffed tunneled dialysis catheter of an appropriate length was tunneled from the exit site to the venotomy site using a tunneling device. The catheter was advanced into the venous system through the peel-away sheath which was removed.  The catheter aspirated and flushed well and was terminally packed with heparin 1000 units per cc.  The catheter was secured to skin using nonabsorbable suture and a CHG dressing applied.  The venotomy site was closed using Dermabond. An aseptic dressing was applied using the protocol for Dermabond.  The patient was transferred to the recovery area and was discharged from the department in stable condition.                     Complications: None immediate.     Device: 15.5 Slovenian x 19 cm cuff to tip Duraflow catheter.  Findings: Patent and compressible Right internal jugular. Final image shows the catheter to be in good position with the catheter tip in the right atrium, an excellent position for use. There is no complication.                                                               Impression: Impression:    Successful ultrasound and fluoroscopic guided Right internal jugular vein route cuffed tunneled hemodialysis catheter placement as described above.  Thank you for the opportunity to assist in the care of your patient.  This report was finalized on 2/10/2023 8:51 PM by Aidan Perdomo MD.      XR Pyelogram Retrograde    Result Date: 2/10/2023  XR PYELOGRAM RETROGRADE Date of Exam: 2/9/2023 10:48 AM EST Indication: CYSTOSCOPY CLOT EVACUATION WITH FULGURATION, RETROGRADE PYELOGRAM. Comparison: CT 2/7/2023 Findings: Bilat. retrograde programs were performed. There is severe left-sided hydronephrosis. Within the right renal pelvis there is a filling defect which may be an air bubble. Correlation with real-time fluoroscopy would be recommended. There is severe right-sided hydronephrosis. Total fluoroscopy time 0.9 minutes, 7 images     Impression: Impression:  Bilateral hydronephrosis with filling defects within the right renal pelvis which may be an air bubble. Clinical correlation recommended. Electronically Signed: Moi Holden  2/10/2023 7:57 AM EST  Workstation ID: [id number]    Duplex Vein Mapping Study Upper Extremity - Left CAR    Result Date: 2/10/2023  •  Inadequate/small left cephalic vein •  Adequate/good basilic vein. 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       Results for orders placed during the hospital encounter of 02/07/23    Adult Transthoracic Echo Complete w/ Color, Spectral and Contrast if necessary per protocol    Interpretation Summary  •  Left ventricular systolic function is hyperdynamic (EF > 70%).  •  Left ventricular wall thickness is consistent with mild concentric hypertrophy  •  Normal right ventricular cavity size and systolic function noted.  •  There is a moderate (1-2cm) pericardial effusion. There is no evidence of cardiac tamponade.  No chamber collapse.      I have reviewed the medications:  Scheduled Meds:b complex-vitamin c-folic acid, 1 tablet, Oral, Daily  insulin lispro, 0-7 Units, Subcutaneous, TID AC  miconazole, 1 application, Topical, Q12H  pantoprazole, 40 mg, Oral, Q AM  senna-docusate sodium, 2 tablet, Oral, BID  sodium bicarbonate, 650 mg, Oral, TID  sodium chloride, 10 mL, Intravenous, Q12H      Continuous Infusions:lactated ringers, 9 mL/hr  sodium chloride, 9 mL/hr      PRN Meds:.•  acetaminophen **OR** acetaminophen **OR** acetaminophen  •  senna-docusate sodium **AND** polyethylene glycol **AND** bisacodyl **AND** bisacodyl  •  dextrose  •  dextrose  •  glucagon (human recombinant)  •  heparin (porcine)  •  mannitol  •  melatonin  •  Morphine **AND** naloxone  •  ondansetron **OR** ondansetron  •  sodium chloride  •  sodium chloride  •  sodium chloride    Assessment & Plan   Assessment & Plan     Active Hospital Problems    Diagnosis  POA   • **Acute renal failure  superimposed on stage 4 chronic kidney disease, unspecified acute renal failure type (HCC) [N17.9, N18.4]  Yes   • Metabolic acidosis [E87.20]  Yes   • Hyperkalemia [E87.5]  Yes   • Elevated troponin [R77.8]  Yes   • Elevated lipase [R74.8]  Yes      Resolved Hospital Problems   No resolved problems to display.        Brief Hospital Course to date:  Jun Garcia is a 62 y.o. male with prior hx obstructive uropathy and CKD presenting with worsening FTT and ongoing renal failure and obstructive uropathy.     CKD IV-V nearing ESRD  Metabolic acidosis, improving  Hyperkalemia, resolved  -Nephrology following, cysts seen by Dr. Caicedo, he is  s/p tunnel cath placement and has been initiated on HD, started 2/9/2023  -Dr. Estes following along for PD cath and AV fistula placement, this will be evaluated as outpatient   -Follow-up repeat renal function panel  - has already arranged HD chair, tentatively will be on MWF schedule.(Will likely to be dialyzed Monday so they can start his outpatient sessions on Wednesday, 2/15/2023)     Obstructive uropathy  Gross hematuria  -CT abdomen pelvis showed moderate to severe bilateral hydronephrosis, enlarged prostate with irregular protrusion into the urinary bladder (same finding seen on CT done in November at )  -Urology following, he is s/p cystoscopy and clot evacuation in the OR by Dr. Velasco 2/9/2023  -CBI ongoing, remains high risk for bladder outlet surgery, will be discharged with [facility] and will see urology (Lesly alanis, FRANKY ) as outpatient 7-10  days post is just for intermittent catheterization teaching and catheter removal     Anemia  -Multifactorial ACD vs blood loss from hematuria  -Hg 5.8 2/8/23, s/p 2 units PRBC with good response, however this morning H&H has dropped again to 6.6 we will give an additional 1 unit PRBC today  -follow-up repeat H&H.     Elevated troponin   -Suspect this is secondary to volume depletion  -Patient is without any chest pain  troponin has flattened   -EKG is unremarkable, echowith EF 70%, moderate pericardial effusion      Elevated lipase  -Suspected to be likely secondary to resolving pancreatitis  -Continue supportive therapy with IV fluids and pain meds, diet as tolerated    Expected Discharge Location and Transportation:home  Expected Discharge   Expected Discharge Date and Time     Expected Discharge Date Expected Discharge Time    Feb 13, 2023            DVT prophylaxis:  Medical DVT prophylaxis orders are present.     AM-PAC 6 Clicks Score (PT): 19 (02/11/23 0000)    CODE STATUS:   Code Status and Medical Interventions:   Ordered at: 02/07/23 1357     Code Status (Patient has no pulse and is not breathing):    CPR (Attempt to Resuscitate)     Medical Interventions (Patient has pulse or is breathing):    Full Support       Mirian Walsh MD  02/11/23

## 2023-02-11 NOTE — PROGRESS NOTES
[facility]   Urology Progress Note    Patient Name: Jun Garcia  : 1960  MRN: [id number]  Primary Care Physician:  Provider, No Known  Date of admission: 2023    Subjective   Subjective     Chief Complaint: Weakness    HPI:  Patient resting in bed. Plan for dialysis today. CBI on slow drip; urine light pink. No clots. Itching/irritation from caldwell catheter. Miconazole cream PRN.   HCT 20.3.     Review of Systems   All systems were reviewed and negative except for: hematuria    Objective   Objective     Vitals:   Temp:  [97.7 °F (36.5 °C)-99.2 °F (37.3 °C)] 98 °F (36.7 °C)  Heart Rate:  [76-92] 81  Resp:  [14-20] 16  BP: (113-160)/(70-96) 141/77  Flow (L/min):  [2] 2  Physical Exam    Constitutional: Awake in bed, alert   Eyes: PERRLA, sclerae anicteric, no conjunctival injection   HENT: Normocephalic, atraumatic, mucous membranes moist   Neck: Supple, trachea midline   Respiratory: Equal chest rise, non-labored respirations    Cardiovascular: RRR   Gastrointestinal: Soft, nontender, non-distended   Genitourinary: 3 way caldwell catheter; cbi on slow drip. Urine output light pink.    Musculoskeletal: No lower extremity edema bilaterally, no clubbing or cyanosis to extremities   Psychiatric: Appropriate affect, cooperative   Neurologic: Oriented x 3,  Cranial Nerves grossly intact, speech clear   Skin: No rashes     Result Review    Result Review:  I have personally reviewed the results from the time of this admission to 2023 11:12 EST and agree with these findings:  [x]  Laboratory  []  Microbiology  []  Radiology  []  EKG/Telemetry   []  Cardiology/Vascular   []  Pathology  []  Old records  []  Other:    Most notable findings include: HCT 20.3    Assessment & Plan   Assessment / Plan     Brief Patient Summary:  Jun Garcia is a 62 y.o. male who presented to [facility] with weakness and weight loss found to have moderate/severe bilateral hydroureteronephrosis and distended urinary  bladder as well as renal failure. He is POD 2 s/p Cystoscopy, clot evacuation and fulguration with Dr. Velasco.     Active Hospital Problems:  Active Hospital Problems    Diagnosis    • **Acute renal failure superimposed on stage 4 chronic kidney disease, unspecified acute renal failure type (HCC)    • Metabolic acidosis    • Hyperkalemia    • Elevated troponin    • Elevated lipase        Plan:   -Continue CBI on slow drip today given HCT. Likely plan to discontinue CBI 02/12 if urine remains clear/light pink.  -Plan for outpatient follow up 7-10 days post discharge for voiding trial and for intermittent catheterization teaching.    will follow, please call if any questions.  Rounds with Dr. Christianson    DVT prophylaxis:  Medical DVT prophylaxis orders are present.    CODE STATUS:   Code Status (Patient has no pulse and is not breathing): CPR (Attempt to Resuscitate)  Medical Interventions (Patient has pulse or is breathing): Full Support    Disposition:  I expect patient to remain inpatient.    Electronically signed by FRANKY Krause, 02/11/23, 11:12 AM EST.

## 2023-02-11 NOTE — PLAN OF CARE
Goal Outcome Evaluation:  Plan of Care Reviewed With: patient            Pt VSS. RA. NSR. Pt complained of some pain at dialysis cath site. PRN morphine given. CBI still going. Plan for dialysis today. Will continue with POC.

## 2023-02-11 NOTE — PROGRESS NOTES
" LOS: 4 days   Patient Care Team:  Provider, No Known as PCP - General  Provider, No Known as PCP - Family Medicine    Chief Complaint: Chest pain.    Subjective     2nd session of HD today. Tolerating well. Urine clearing up but he remains on CBI.     Subjective:  Symptoms:  He reports chest pain.        History taken from: patient    Objective     Vital Sign Min/Max for last 24 hours  Temp  Min: 97.7 °F (36.5 °C)  Max: 99.2 °F (37.3 °C)   BP  Min: 124/73  Max: 160/81   Pulse  Min: 77  Max: 92   Resp  Min: 14  Max: 20   SpO2  Min: 98 %  Max: 100 %   Flow (L/min)  Min: 2  Max: 2   No data recorded     Flowsheet Rows    Flowsheet Row First Filed Value   Admission Height 170.2 cm (67\") Documented at 02/07/2023 1150   Admission Weight 68 kg (150 lb) Documented at 02/07/2023 1150          I/O this shift:  In: 558 [Blood:558]  Out: -   I/O last 3 completed shifts:  In: 240 [P.O.:240]  Out: -3035 [Other:840]    Objective:  General Appearance:  Comfortable.    Vital signs: (most recent): Blood pressure 148/81, pulse 83, temperature 98.1 °F (36.7 °C), temperature source Oral, resp. rate 14, height 170.2 cm (67.01\"), weight 67.6 kg (149 lb 0.5 oz), SpO2 99 %.  Vital signs are normal.    Output: Producing urine.    HEENT: Normal HEENT exam.    Lungs:  Normal effort and normal respiratory rate.  Breath sounds clear to auscultation.  He is not in respiratory distress.  No decreased breath sounds or wheezes.    Heart: Normal rate.  Regular rhythm.  S1 normal and S2 normal.  No murmur or gallop.   Abdomen: Abdomen is soft.  Bowel sounds are normal.     Extremities: Normal range of motion.  There is no dependent edema or local swelling.    Pulses: Distal pulses are intact.    Neurological: Patient is alert and oriented to person, place and time.  Normal strength.    Skin:  Warm.              Results Review:     I reviewed the patient's new clinical results.    WBC WBC   Date Value Ref Range Status   02/11/2023 11.43 (H) 3.40 - " 10.80 10*3/mm3 Final   02/09/2023 12.49 (H) 3.40 - 10.80 10*3/mm3 Final      HGB Hemoglobin   Date Value Ref Range Status   02/11/2023 7.8 (L) 13.0 - 17.7 g/dL Final   02/11/2023 6.6 (C) 13.0 - 17.7 g/dL Final   02/09/2023 8.0 (L) 13.0 - 17.7 g/dL Final   02/09/2023 7.8 (L) 13.0 - 17.7 g/dL Final      HCT Hematocrit   Date Value Ref Range Status   02/11/2023 23.2 (L) 37.5 - 51.0 % Final   02/11/2023 20.3 (C) 37.5 - 51.0 % Final   02/09/2023 23.0 (L) 37.5 - 51.0 % Final   02/09/2023 22.6 (L) 37.5 - 51.0 % Final      Platlets No results found for: LABPLAT   MCV MCV   Date Value Ref Range Status   02/11/2023 94.4 79.0 - 97.0 fL Final   02/09/2023 84.0 79.0 - 97.0 fL Final          Sodium Sodium   Date Value Ref Range Status   02/11/2023 140 136 - 145 mmol/L Final   02/10/2023 139 136 - 145 mmol/L Final   02/09/2023 138 136 - 145 mmol/L Final      Potassium Potassium   Date Value Ref Range Status   02/11/2023 4.5 3.5 - 5.2 mmol/L Final   02/10/2023 4.6 3.5 - 5.2 mmol/L Final   02/09/2023 4.6 3.5 - 5.2 mmol/L Final      Chloride Chloride   Date Value Ref Range Status   02/11/2023 102 98 - 107 mmol/L Final   02/10/2023 96 (L) 98 - 107 mmol/L Final   02/09/2023 94 (L) 98 - 107 mmol/L Final      CO2 CO2   Date Value Ref Range Status   02/11/2023 24.0 22.0 - 29.0 mmol/L Final   02/10/2023 24.0 22.0 - 29.0 mmol/L Final   02/09/2023 27.0 22.0 - 29.0 mmol/L Final      BUN BUN   Date Value Ref Range Status   02/11/2023 115 (H) 8 - 23 mg/dL Final   02/10/2023 153 (H) 8 - 23 mg/dL Final   02/09/2023 150 (H) 8 - 23 mg/dL Final      Creatinine Creatinine   Date Value Ref Range Status   02/11/2023 9.81 (H) 0.76 - 1.27 mg/dL Final   02/10/2023 12.71 (H) 0.76 - 1.27 mg/dL Final   02/09/2023 12.87 (H) 0.76 - 1.27 mg/dL Final      Calcium Calcium   Date Value Ref Range Status   02/11/2023 7.7 (L) 8.6 - 10.5 mg/dL Final   02/10/2023 7.8 (L) 8.6 - 10.5 mg/dL Final   02/09/2023 7.7 (L) 8.6 - 10.5 mg/dL Final      PO4 No results found for:  CAPO4   Albumin Albumin   Date Value Ref Range Status   02/11/2023 3.3 (L) 3.5 - 5.2 g/dL Final   02/10/2023 3.2 (L) 3.5 - 5.2 g/dL Final   02/09/2023 3.1 (L) 3.5 - 5.2 g/dL Final      Magnesium No results found for: MG   Uric Acid No results found for: URICACID     Medication Review: yes    Assessment & Plan       Acute renal failure superimposed on stage 4 chronic kidney disease, unspecified acute renal failure type (HCC)    Metabolic acidosis    Hyperkalemia    Elevated troponin    Elevated lipase      Assessment & Plan        CKD stage V: Hx of obstructive uropathy with severe hydronephrosis on this admission noted previously on admission at [facility]. Patient left the hospital against medical advise.      Azotemia:BUN significantly elevated. Does have mild uremic symptoms. Fatigue, poor appetite and vomiiting     Obstructive uropathy: Likley due to enlarge prostate resulting in severe hydronephrosis. Cystoscopy was recommended on last visit at  but patient didn't have any intervention. Similar findings noted on this admission      Anemia: ACD due to advance CKD     Hyperkalemia: Due to CKD     Met acidosis: Due to CKD      Hyponatremia: Due to CKD     Recs  3rd HD session on Monday. Need outpatient HD chair. Will have outpatient discussion about home dialysis modality. It seems like patient preferred TTS jeannie in the afternoon but he got HD chair for MWF in the morning       Jordan Marquez MD  02/11/23  12:53 EST

## 2023-02-11 NOTE — PLAN OF CARE
Goal Outcome Evaluation:      PT admitted for acute renal failure /urinary retention. He continues to have a bladder irrigation infusing without any difficulty. Urine remains pink/red resembling a blush wine.  Intake and Outputs are on flowsheet to be reviewed. Pt H/H this AM was 6.6 so he was transfused with one unit PRBC this AM which was stopped at 0800hrs. Pt did go to dialysis and repeat H/H was 7.8/23.2 at 1100Am . Dr Walsh would like to see Hemoglobin above 8.0. Therefore,he was transfused with an additional unit of PRBC. H/H will be redrawn tonight at 2200hr hrs. AM labs will be a CBC no diff and renal panel. PT has c/o pain at urethral meatus and topical cream/tylenol and morphine was given to darryn pain. Pt did sit in the chair for a few hrs this afternoon and was entertained by his friend visiting a good portion of the afternoon.

## 2023-02-12 LAB
ALBUMIN SERPL-MCNC: 3.2 G/DL (ref 3.5–5.2)
ANION GAP SERPL CALCULATED.3IONS-SCNC: 13 MMOL/L (ref 5–15)
BH BB BLOOD EXPIRATION DATE: NORMAL
BH BB BLOOD EXPIRATION DATE: NORMAL
BH BB BLOOD TYPE BARCODE: 5100
BH BB BLOOD TYPE BARCODE: 5100
BH BB DISPENSE STATUS: NORMAL
BH BB DISPENSE STATUS: NORMAL
BH BB PRODUCT CODE: NORMAL
BH BB PRODUCT CODE: NORMAL
BH BB UNIT NUMBER: NORMAL
BH BB UNIT NUMBER: NORMAL
BUN SERPL-MCNC: 80 MG/DL (ref 8–23)
BUN/CREAT SERPL: 10.9 (ref 7–25)
CALCIUM SPEC-SCNC: 7.8 MG/DL (ref 8.6–10.5)
CHLORIDE SERPL-SCNC: 103 MMOL/L (ref 98–107)
CO2 SERPL-SCNC: 22 MMOL/L (ref 22–29)
CREAT SERPL-MCNC: 7.34 MG/DL (ref 0.76–1.27)
CROSSMATCH INTERPRETATION: NORMAL
CROSSMATCH INTERPRETATION: NORMAL
DEPRECATED RDW RBC AUTO: 43.1 FL (ref 37–54)
EGFRCR SERPLBLD CKD-EPI 2021: 7.7 ML/MIN/1.73
ERYTHROCYTE [DISTWIDTH] IN BLOOD BY AUTOMATED COUNT: 13.6 % (ref 12.3–15.4)
GLUCOSE BLDC GLUCOMTR-MCNC: 129 MG/DL (ref 70–130)
GLUCOSE BLDC GLUCOMTR-MCNC: 159 MG/DL (ref 70–130)
GLUCOSE BLDC GLUCOMTR-MCNC: 160 MG/DL (ref 70–130)
GLUCOSE SERPL-MCNC: 125 MG/DL (ref 65–99)
HCT VFR BLD AUTO: 28.1 % (ref 37.5–51)
HGB BLD-MCNC: 9.4 G/DL (ref 13–17.7)
MCH RBC QN AUTO: 29.4 PG (ref 26.6–33)
MCHC RBC AUTO-ENTMCNC: 33.5 G/DL (ref 31.5–35.7)
MCV RBC AUTO: 87.8 FL (ref 79–97)
PHOSPHATE SERPL-MCNC: 6.1 MG/DL (ref 2.5–4.5)
PLATELET # BLD AUTO: 137 10*3/MM3 (ref 140–450)
PMV BLD AUTO: 8.9 FL (ref 6–12)
POTASSIUM SERPL-SCNC: 4.3 MMOL/L (ref 3.5–5.2)
RBC # BLD AUTO: 3.2 10*6/MM3 (ref 4.14–5.8)
SODIUM SERPL-SCNC: 138 MMOL/L (ref 136–145)
UNIT  ABO: NORMAL
UNIT  ABO: NORMAL
UNIT  RH: NORMAL
UNIT  RH: NORMAL
WBC NRBC COR # BLD: 13.21 10*3/MM3 (ref 3.4–10.8)

## 2023-02-12 PROCEDURE — 85027 COMPLETE CBC AUTOMATED: CPT | Performed by: INTERNAL MEDICINE

## 2023-02-12 PROCEDURE — 99232 SBSQ HOSP IP/OBS MODERATE 35: CPT | Performed by: INTERNAL MEDICINE

## 2023-02-12 PROCEDURE — 25010000002 MORPHINE PER 10 MG: Performed by: UROLOGY

## 2023-02-12 PROCEDURE — 99232 SBSQ HOSP IP/OBS MODERATE 35: CPT | Performed by: NURSE PRACTITIONER

## 2023-02-12 PROCEDURE — 63710000001 INSULIN LISPRO (HUMAN) PER 5 UNITS: Performed by: UROLOGY

## 2023-02-12 PROCEDURE — 82962 GLUCOSE BLOOD TEST: CPT

## 2023-02-12 PROCEDURE — 80069 RENAL FUNCTION PANEL: CPT | Performed by: INTERNAL MEDICINE

## 2023-02-12 RX ORDER — LIDOCAINE HYDROCHLORIDE 20 MG/ML
JELLY TOPICAL AS NEEDED
Status: DISCONTINUED | OUTPATIENT
Start: 2023-02-12 | End: 2023-02-14 | Stop reason: HOSPADM

## 2023-02-12 RX ADMIN — BISACODYL 5 MG: 5 TABLET, COATED ORAL at 08:42

## 2023-02-12 RX ADMIN — Medication 5 MG: at 21:04

## 2023-02-12 RX ADMIN — SODIUM BICARBONATE 650 MG TABLET 650 MG: at 21:04

## 2023-02-12 RX ADMIN — MORPHINE SULFATE 2 MG: 2 INJECTION, SOLUTION INTRAMUSCULAR; INTRAVENOUS at 08:42

## 2023-02-12 RX ADMIN — Medication 1 TABLET: at 08:08

## 2023-02-12 RX ADMIN — MICONAZOLE NITRATE 1 APPLICATION: 20 CREAM TOPICAL at 21:00

## 2023-02-12 RX ADMIN — SENNOSIDES AND DOCUSATE SODIUM 2 TABLET: 8.6; 5 TABLET ORAL at 08:08

## 2023-02-12 RX ADMIN — INSULIN LISPRO 2 UNITS: 100 INJECTION, SOLUTION INTRAVENOUS; SUBCUTANEOUS at 11:40

## 2023-02-12 RX ADMIN — SENNOSIDES AND DOCUSATE SODIUM 2 TABLET: 8.6; 5 TABLET ORAL at 21:04

## 2023-02-12 RX ADMIN — POLYETHYLENE GLYCOL 3350 17 G: 17 POWDER, FOR SOLUTION ORAL at 16:28

## 2023-02-12 RX ADMIN — Medication 10 ML: at 08:09

## 2023-02-12 RX ADMIN — ACETAMINOPHEN 325MG 650 MG: 325 TABLET ORAL at 16:29

## 2023-02-12 RX ADMIN — MICONAZOLE NITRATE 1 APPLICATION: 20 CREAM TOPICAL at 14:14

## 2023-02-12 RX ADMIN — SODIUM BICARBONATE 650 MG TABLET 650 MG: at 16:29

## 2023-02-12 RX ADMIN — INSULIN LISPRO 2 UNITS: 100 INJECTION, SOLUTION INTRAVENOUS; SUBCUTANEOUS at 16:51

## 2023-02-12 RX ADMIN — PANTOPRAZOLE SODIUM 40 MG: 40 TABLET, DELAYED RELEASE ORAL at 05:43

## 2023-02-12 RX ADMIN — SODIUM BICARBONATE 650 MG TABLET 650 MG: at 08:08

## 2023-02-12 RX ADMIN — MICONAZOLE NITRATE 1 APPLICATION: 20 CREAM TOPICAL at 08:09

## 2023-02-12 NOTE — PROGRESS NOTES
" LOS: 5 days   Patient Care Team:  Provider, No Known as PCP - General  Provider, No Known as PCP - Family Medicine    Chief Complaint: Chest pain.    Subjective     3rd HD session tomorrow    Subjective:  Symptoms:  He reports chest pain.        History taken from: patient    Objective     Vital Sign Min/Max for last 24 hours  Temp  Min: 97.7 °F (36.5 °C)  Max: 98.3 °F (36.8 °C)   BP  Min: 111/74  Max: 185/106   Pulse  Min: 84  Max: 114   Resp  Min: 14  Max: 18   SpO2  Min: 93 %  Max: 99 %   No data recorded   No data recorded     Flowsheet Rows    Flowsheet Row First Filed Value   Admission Height 170.2 cm (67\") Documented at 02/07/2023 1150   Admission Weight 68 kg (150 lb) Documented at 02/07/2023 1150          I/O this shift:  In: -   Out: -1850   I/O last 3 completed shifts:  In: 1639.3 [I.V.:305; Blood:1334.3]  Out: -2950 [Other:1000]    Objective:  General Appearance:  Comfortable.    Vital signs: (most recent): Blood pressure 154/93, pulse 84, temperature 98.2 °F (36.8 °C), temperature source Oral, resp. rate 15, height 170.2 cm (67.01\"), weight 67.6 kg (149 lb 0.5 oz), SpO2 97 %.  Vital signs are normal.    Output: Producing urine.    HEENT: Normal HEENT exam.    Lungs:  Normal effort and normal respiratory rate.  Breath sounds clear to auscultation.  He is not in respiratory distress.  No decreased breath sounds or wheezes.    Heart: Normal rate.  Regular rhythm.  S1 normal and S2 normal.  No murmur or gallop.   Abdomen: Abdomen is soft.  Bowel sounds are normal.     Extremities: Normal range of motion.  There is no dependent edema or local swelling.    Pulses: Distal pulses are intact.    Neurological: Patient is alert and oriented to person, place and time.  Normal strength.    Skin:  Warm.              Results Review:     I reviewed the patient's new clinical results.    WBC WBC   Date Value Ref Range Status   02/12/2023 13.21 (H) 3.40 - 10.80 10*3/mm3 Final   02/11/2023 11.43 (H) 3.40 - 10.80 10*3/mm3 " Final      HGB Hemoglobin   Date Value Ref Range Status   02/12/2023 9.4 (L) 13.0 - 17.7 g/dL Final   02/11/2023 9.6 (L) 13.0 - 17.7 g/dL Final   02/11/2023 7.8 (L) 13.0 - 17.7 g/dL Final   02/11/2023 6.6 (C) 13.0 - 17.7 g/dL Final   02/09/2023 8.0 (L) 13.0 - 17.7 g/dL Final      HCT Hematocrit   Date Value Ref Range Status   02/12/2023 28.1 (L) 37.5 - 51.0 % Final   02/11/2023 27.8 (L) 37.5 - 51.0 % Final   02/11/2023 23.2 (L) 37.5 - 51.0 % Final   02/11/2023 20.3 (C) 37.5 - 51.0 % Final   02/09/2023 23.0 (L) 37.5 - 51.0 % Final      Platlets No results found for: LABPLAT   MCV MCV   Date Value Ref Range Status   02/12/2023 87.8 79.0 - 97.0 fL Final   02/11/2023 94.4 79.0 - 97.0 fL Final          Sodium Sodium   Date Value Ref Range Status   02/12/2023 138 136 - 145 mmol/L Final   02/11/2023 140 136 - 145 mmol/L Final   02/10/2023 139 136 - 145 mmol/L Final      Potassium Potassium   Date Value Ref Range Status   02/12/2023 4.3 3.5 - 5.2 mmol/L Final   02/11/2023 4.5 3.5 - 5.2 mmol/L Final   02/10/2023 4.6 3.5 - 5.2 mmol/L Final      Chloride Chloride   Date Value Ref Range Status   02/12/2023 103 98 - 107 mmol/L Final   02/11/2023 102 98 - 107 mmol/L Final   02/10/2023 96 (L) 98 - 107 mmol/L Final      CO2 CO2   Date Value Ref Range Status   02/12/2023 22.0 22.0 - 29.0 mmol/L Final   02/11/2023 24.0 22.0 - 29.0 mmol/L Final   02/10/2023 24.0 22.0 - 29.0 mmol/L Final      BUN BUN   Date Value Ref Range Status   02/12/2023 80 (H) 8 - 23 mg/dL Final   02/11/2023 115 (H) 8 - 23 mg/dL Final   02/10/2023 153 (H) 8 - 23 mg/dL Final      Creatinine Creatinine   Date Value Ref Range Status   02/12/2023 7.34 (H) 0.76 - 1.27 mg/dL Final   02/11/2023 9.81 (H) 0.76 - 1.27 mg/dL Final   02/10/2023 12.71 (H) 0.76 - 1.27 mg/dL Final      Calcium Calcium   Date Value Ref Range Status   02/12/2023 7.8 (L) 8.6 - 10.5 mg/dL Final   02/11/2023 7.7 (L) 8.6 - 10.5 mg/dL Final   02/10/2023 7.8 (L) 8.6 - 10.5 mg/dL Final      PO4 No  results found for: CAPO4   Albumin Albumin   Date Value Ref Range Status   02/12/2023 3.2 (L) 3.5 - 5.2 g/dL Final   02/11/2023 3.3 (L) 3.5 - 5.2 g/dL Final   02/10/2023 3.2 (L) 3.5 - 5.2 g/dL Final      Magnesium No results found for: MG   Uric Acid No results found for: URICACID     Medication Review: yes    Assessment & Plan       Acute renal failure superimposed on stage 4 chronic kidney disease, unspecified acute renal failure type (HCC)    Metabolic acidosis    Hyperkalemia    Elevated troponin    Elevated lipase      Assessment & Plan        CKD stage V: Hx of obstructive uropathy with severe hydronephrosis on this admission noted previously on admission at  hospital. Patient left the hospital against medical advise.      Azotemia:BUN significantly elevated. Does have mild uremic symptoms. Fatigue, poor appetite and vomiiting     Obstructive uropathy: Likley due to enlarge prostate resulting in severe hydronephrosis. Cystoscopy was recommended on last visit at  but patient didn't have any intervention. Similar findings noted on this admission      Anemia: ACD due to advance CKD     Hyperkalemia: Due to CKD     Met acidosis: Due to CKD      Hyponatremia: Due to CKD     Recs  3rd HD session on Monday. Need outpatient HD chair. Will have outpatient discussion about home dialysis modality. It seems like patient preferred TTS [facility]annie in the afternoon but he got HD chair for MWF in the morning       Jordan Marquez MD  02/12/23  13:13 EST

## 2023-02-12 NOTE — PLAN OF CARE
Problem: Adult Inpatient Plan of Care  Goal: Plan of Care Review  Outcome: Ongoing, Progressing  Goal: Patient-Specific Goal (Individualized)  Outcome: Ongoing, Progressing  Goal: Absence of Hospital-Acquired Illness or Injury  Outcome: Ongoing, Progressing  Intervention: Identify and Manage Fall Risk  Intervention: Prevent Skin Injury  Intervention: Prevent and Manage VTE (Venous Thromboembolism) Risk  Intervention: Prevent Infection  Goal: Optimal Comfort and Wellbeing  Outcome: Ongoing, Progressing  Intervention: Provide Person-Centered Care  Goal: Readiness for Transition of Care  Outcome: Ongoing, Progressing     Problem: Fall Injury Risk  Goal: Absence of Fall and Fall-Related Injury  Outcome: Ongoing, Progressing  Intervention: Identify and Manage Contributors  Intervention: Promote Injury-Free Environment     Problem: Skin Injury Risk Increased  Goal: Skin Health and Integrity  Outcome: Ongoing, Progressing  Intervention: Optimize Skin Protection     Problem: Anemia  Goal: Anemia Symptom Improvement  Outcome: Ongoing, Progressing  Intervention: Monitor and Manage Anemia     Problem: Device-Related Complication Risk (Hemodialysis)  Goal: Safe, Effective Therapy Delivery  Outcome: Ongoing, Progressing  Intervention: Optimize Device Care and Function     Problem: Hemodynamic Instability (Hemodialysis)  Goal: Effective Tissue Perfusion  Outcome: Ongoing, Progressing     Problem: Infection (Hemodialysis)  Goal: Absence of Infection Signs and Symptoms  Outcome: Ongoing, Progressing     Problem: Fluid and Electrolyte Imbalance (Acute Kidney Injury/Impairment)  Goal: Fluid and Electrolyte Balance  Outcome: Ongoing, Progressing     Problem: Oral Intake Inadequate (Acute Kidney Injury/Impairment)  Goal: Optimal Nutrition Intake  Outcome: Ongoing, Progressing     Problem: Renal Function Impairment (Acute Kidney Injury/Impairment)  Goal: Effective Renal Function  Outcome: Ongoing, Progressing  Intervention: Monitor  and Support Renal Function   Goal Outcome Evaluation:      PT admitted for acute renal failure /urinary retention. He has a bladder irrigation infusing - pain r/to urethral opening/ meatus.   Urine dark pink  Intake and Outputs are on flowsheet to be reviewed. Pt H/H this AM was 6.6 -  one unit PRBC this AM . Pt did go to dialysis and repeat H/H was 7.8/23.2 at 1100Am = 1 unit PRBC  . 2300 HH 9.6 Dr Walsh would like to see Hemoglobin above 8.0.  AM labs will be a CBC no diff and renal panel.    morphine was given to darryn pain.

## 2023-02-12 NOTE — PROGRESS NOTES
[facility] Medicine Services  PROGRESS NOTE    Patient Name: Jun Garcia  : 1960  MRN: [id number]    Date of Admission: 2023  Primary Care Physician: Provider, No Known    Subjective   Subjective     CC: Follow-up generalized weakness    HPI: No acute events overnight, patient does endorse some discomfort around his Whitfield, otherwise slept well    ROS:  Gen- No fevers, chills  CV- No chest pain, palpitations  Resp- No cough, dyspnea  GI- No N/V/D, abd pain      Objective   Objective     Vital Signs:   Temp:  [97.7 °F (36.5 °C)-98.3 °F (36.8 °C)] 97.7 °F (36.5 °C)  Heart Rate:  [] 90  Resp:  [12-18] 18  BP: (111-185)/() 134/97     Physical Exam:  Constitutional: Chronically ill-appearing male no acute distress, awake, alert  HENT: NCAT, mucous membranes moist  Respiratory: Clear to auscultation bilaterally, respiratory effort normal   Cardiovascular: RRR, no murmurs, rubs, or gallops  Gastrointestinal: Positive bowel sounds, soft, nontender, nondistended  Musculoskeletal: No bilateral ankle edema  : Whitfield catheter in place  Psychiatric: Appropriate affect, cooperative  Neurologic: Oriented x 3, nonfocal  Skin: No rashes    Results Reviewed:  LAB RESULTS:      Lab 23  0337 23  2326 23  1100 23  0334 23  1549 23  0449 23  0939 23  1207   WBC 13.21*  --   --  11.43*  --  12.49* 9.66 12.08*   HEMOGLOBIN 9.4* 9.6* 7.8* 6.6* 8.0* 7.8* 5.9* 7.8*   HEMATOCRIT 28.1* 27.8* 23.2* 20.3* 23.0* 22.6* 18.0* 23.7*   PLATELETS 137*  --   --  130*  --  186 255 277   NEUTROS ABS  --   --   --   --   --  9.08*  --  8.40*   IMMATURE GRANS (ABS)  --   --   --   --   --  0.06*  --  0.05   LYMPHS ABS  --   --   --   --   --  1.23  --  1.09   MONOS ABS  --   --   --   --   --  0.61  --  0.51   EOS ABS  --   --   --   --   --  1.46*  --  1.96*   MCV 87.8  --   --  94.4  --  84.0 88.7 89.4   PROTIME  --   --   --   --   --   --   --   14.8*   APTT  --   --   --   --   --   --   --  38.1*         Lab 02/12/23  0337 02/11/23  0334 02/10/23  0410 02/09/23  0449 02/08/23  0343 02/07/23  1207   SODIUM 138 140 139 138 138 134*   POTASSIUM 4.3 4.5 4.6 4.6 4.4 5.3*   CHLORIDE 103 102 96* 94* 97* 99   CO2 22.0 24.0 24.0 27.0 21.0* 13.0*   ANION GAP 13.0 14.0 19.0* 17.0* 20.0* 22.0*   BUN 80* 115* 153* 150* 140* 164*   CREATININE 7.34* 9.81* 12.71* 12.87* 11.76* 13.21*   EGFR 7.7* 5.4* 4.0* 3.9* 4.4* 3.8*   GLUCOSE 125* 97 98 158* 140* 169*   CALCIUM 7.8* 7.7* 7.8* 7.7* 8.2* 8.5*   MAGNESIUM  --   --   --   --   --  3.3*   PHOSPHORUS 6.1* 7.6* 9.2* 8.4* 6.7* 6.8*         Lab 02/12/23  0337 02/11/23  0334 02/10/23  0410 02/09/23  0449 02/07/23  1207   TOTAL PROTEIN  --   --   --   --  8.7*   ALBUMIN 3.2* 3.3* 3.2* 3.1* 4.2   GLOBULIN  --   --   --   --  4.5   ALT (SGPT)  --   --   --   --  19   AST (SGOT)  --   --   --   --  12   BILIRUBIN  --   --   --   --  0.3   ALK PHOS  --   --   --   --  103   LIPASE  --   --   --   --  124*         Lab 02/07/23  1418 02/07/23  1207   PROBNP  --  1,316.0*   HSTROP T 57* 60*   PROTIME  --  14.8*   INR  --  1.17*             Lab 02/08/23  1019 02/08/23  0939 02/08/23 0343   IRON  --   --  68   IRON SATURATION  --   --  29   TIBC  --   --  234*   TRANSFERRIN  --   --  157*   FERRITIN  --   --  899.10*   ABO TYPING O   < >  --    RH TYPING Positive   < >  --    ANTIBODY SCREEN Negative  --   --     < > = values in this interval not displayed.         Brief Urine Lab Results  (Last result in the past 365 days)      Color   Clarity   Blood   Leuk Est   Nitrite   Protein   CREAT   Urine HCG        11/17/22 1804 Yellow   Cloudy     Large                     Microbiology Results Abnormal     None          IR Tunneled Catheter    Result Date: 2/10/2023  IR TUNNELED CATHETER-                                                         History: CKD V; N17.9-Acute kidney failure, unspecified; N18.4-Chronic kidney disease, stage 4  (severe); E83.41-Hypermagnesemia; E87.5-Hyperkalemia; E83.39-Other disorders of phosphorus metabolism; E86.0-Dehydration; E87.20-Acidosis, unspecified    : Aidan Perdomo MD.  Modality: Sonography and fluoroscopy                DOSE REDUCTION: The examination was performed according to departmental dose-optimization program.  Fluoro time: 0.4 minutes  Radiation Dose: 1.7 mGy air Kerma.                                                                              SEDATION: Moderate sedation was administered. 1 milligram of Versed and 50 micrograms of fentanyl IV was used for moderate sedation monitored under my direction. Total intra service time of sedation was 13 minutes. The patient's vital signs were monitored throughout the procedure and recorded in the patient's medical record by the nurse.  Medicines: Ancef 1 g IV.  Anesthesia: Lidocaine with epinephrine, local infiltration.  Estimated blood loss:  < 5 cc.          Technique: A thorough discussion of the risks, benefits, and alternatives of the procedure, and if applicable, moderate sedation, was carried out with the patient. They were encouraged to ask any questions. Any questions were answered. They verbalized understanding. A written informed consent was then signed.   A multi-component timeout was performed prior to starting the procedure using the departmental protocol.  The procedure room personnel used personal protective equipment. The operators used sterile gloves and if indicated, sterile gowns. The surgical site was prepped with chlorhexidine gluconate  and draped in the maximal applicable sterile fashion.  A preliminary ultrasonogram was performed of the target that revealed a patent and compressible Right internal jugular vein. Pertinent ultrasound images were stored in the PACS for documentation.  A sterile prep and drape of the right neck and upper chest was performed using maximal sterile technique.  Using aseptic precautions,  real-time ultrasound guidance, the target vein was accessed after local anesthetic infiltration and dermatotomy with an access needle. A guidewire was advanced into the central venous system under fluoroscopic guidance. Over the wire following standard technique a peel-away sheath was placed.  After local anesthesia, an incision was created at the exit site location and a cuffed tunneled dialysis catheter of an appropriate length was tunneled from the exit site to the venotomy site using a tunneling device. The catheter was advanced into the venous system through the peel-away sheath which was removed.  The catheter aspirated and flushed well and was terminally packed with heparin 1000 units per cc.  The catheter was secured to skin using nonabsorbable suture and a CHG dressing applied.  The venotomy site was closed using Dermabond. An aseptic dressing was applied using the protocol for Dermabond.  The patient was transferred to the recovery area and was discharged from the department in stable condition.                     Complications: None immediate.     Device: 15.5 Estonian x 19 cm cuff to tip Duraflow catheter.  Findings: Patent and compressible Right internal jugular. Final image shows the catheter to be in good position with the catheter tip in the right atrium, an excellent position for use. There is no complication.                                                               Impression: Impression:    Successful ultrasound and fluoroscopic guided Right internal jugular vein route cuffed tunneled hemodialysis catheter placement as described above.  Thank you for the opportunity to assist in the care of your patient.  This report was finalized on 2/10/2023 8:51 PM by Aidan Perdomo MD.        Results for orders placed during the hospital encounter of 02/07/23    Adult Transthoracic Echo Complete w/ Color, Spectral and Contrast if necessary per protocol    Interpretation Summary  •  Left ventricular  systolic function is hyperdynamic (EF > 70%).  •  Left ventricular wall thickness is consistent with mild concentric hypertrophy  •  Normal right ventricular cavity size and systolic function noted.  •  There is a moderate (1-2cm) pericardial effusion. There is no evidence of cardiac tamponade.  No chamber collapse.      I have reviewed the medications:  Scheduled Meds:b complex-vitamin c-folic acid, 1 tablet, Oral, Daily  insulin lispro, 0-7 Units, Subcutaneous, TID AC  miconazole, 1 application, Topical, Q12H  pantoprazole, 40 mg, Oral, Q AM  senna-docusate sodium, 2 tablet, Oral, BID  sodium bicarbonate, 650 mg, Oral, TID  sodium chloride, 10 mL, Intravenous, Q12H      Continuous Infusions:lactated ringers, 9 mL/hr  sodium chloride, 9 mL/hr      PRN Meds:.•  acetaminophen **OR** acetaminophen **OR** acetaminophen  •  senna-docusate sodium **AND** polyethylene glycol **AND** bisacodyl **AND** bisacodyl  •  dextrose  •  dextrose  •  glucagon (human recombinant)  •  heparin (porcine)  •  melatonin  •  Morphine **AND** naloxone  •  ondansetron **OR** ondansetron  •  sodium chloride  •  sodium chloride  •  sodium chloride    Assessment & Plan   Assessment & Plan     Active Hospital Problems    Diagnosis  POA   • **Acute renal failure superimposed on stage 4 chronic kidney disease, unspecified acute renal failure type (HCC) [N17.9, N18.4]  Yes   • Metabolic acidosis [E87.20]  Yes   • Hyperkalemia [E87.5]  Yes   • Elevated troponin [R77.8]  Yes   • Elevated lipase [R74.8]  Yes      Resolved Hospital Problems   No resolved problems to display.        Brief Hospital Course to date:  Jun Garcia is a 62 y.o. male with prior hx obstructive uropathy and CKD presenting with worsening FTT and ongoing renal failure and obstructive uropathy.     CKD IV-V nearing ESRD  Metabolic acidosis, improving  Hyperkalemia, resolved  -Nephrology following, seen by Dr. Caicedo, he is s/p tunnel cath placement and has been initiated on  HD, started 2/9/2023, has so far received 2 sessions, third session planned for tomorrow 2/13/2023  -Dr. Estes followed for PD cath and AV fistula placement, this will be evaluated as outpatient   - has already arranged HD chair, for MWF, however per renal patient prefers TThS, CM to rearrange.     Obstructive uropathy  Gross hematuria  -CT abdomen pelvis showed moderate to severe bilateral hydronephrosis, enlarged prostate with irregular protrusion into the urinary bladder (same finding seen on CT done in November at )  -Urology following, he is s/p cystoscopy and clot evacuation in the OR by Dr. Velasco 2/9/2023  -CBI ongoing, remains high risk for bladder outlet surgery, will be discharged with Whitfield and will see urology (Lesly alanis, APRN ) as outpatient 7-10  days post discharge for intermittent catheterization teaching and catheter removal     Anemia  -Multifactorial ACD from ESRD vs blood loss from hematuria  -Hg 5.8 2/8/23, s/p 2 units PRBC with good response, however this morning H&H has dropped again to 6.6 on 2/11, he is s/p an additional 1 unit PRBC with good response  -follow-up repeat H&H.     Elevated troponin   -Suspect this is secondary to volume depletion  -Patient is without any chest pain troponin has flattened   -EKG is unremarkable, echo with EF 70%, moderate pericardial effusion      Elevated lipase  -Suspected to be likely secondary to resolving pancreatitis  -s/p supportive therapy with IV fluids and pain meds     Expected Discharge Location and Transportation: Home  Expected Discharge   Expected Discharge Date and Time     Expected Discharge Date Expected Discharge Time    Feb 13, 2023            DVT prophylaxis:  Medical DVT prophylaxis orders are present.     AM-PAC 6 Clicks Score (PT): 24 (02/11/23 2007)    CODE STATUS:   Code Status and Medical Interventions:   Ordered at: 02/07/23 6630     Code Status (Patient has no pulse and is not breathing):    CPR (Attempt to Resuscitate)      Medical Interventions (Patient has pulse or is breathing):    Full Support       Mirian Walsh MD  02/12/23

## 2023-02-12 NOTE — PROGRESS NOTES
[facility]   Urology Progress Note    Patient Name: Jun Garcia  : 1960  MRN: [id number]  Primary Care Physician:  Provider, No Known  Date of admission: 2023    Subjective   Subjective     Chief Complaint: Weakness    HPI:  Patient resting in bed. Mild discomfort from caldwell catheter. CBI on slow drip. [place]/clear urine output to caldwell drainage. HCT 28.1.    Review of Systems   All systems were reviewed and negative except for: hematuria    Objective   Objective     Vitals:   Temp:  [97.7 °F (36.5 °C)-98.3 °F (36.8 °C)] 98.2 °F (36.8 °C)  Heart Rate:  [] 84  Resp:  [14-18] 15  BP: (111-185)/() 154/93  Physical Exam    Constitutional: Awake in bed, alert   Eyes: PERRLA, sclerae anicteric, no conjunctival injection   HENT: Normocephalic, atraumatic, mucous membranes moist   Neck: Supple, trachea midline   Respiratory: Equal chest rise, non-labored respirations    Cardiovascular: RRR, palpable radial pulses bilaterally   Gastrointestinal: Soft, nontender, non-distended   Genitourinary: 3 way caldwell catheter with cbi on slow drip. Urine clear/pink to caldwell drainage.    Musculoskeletal: No lower extremity edema bilaterally, no clubbing or cyanosis to extremities   Psychiatric: Appropriate affect, cooperative   Neurologic: Oriented x 3,  Cranial Nerves grossly intact, speech clear   Skin: No rashes     Result Review    Result Review:  I have personally reviewed the results from the time of this admission to 2023 12:34 EST and agree with these findings:  [x]  Laboratory  []  Microbiology  []  Radiology  []  EKG/Telemetry   []  Cardiology/Vascular   []  Pathology  []  Old records  []  Other:    Most notable findings include: Cr 7.34    Assessment & Plan   Assessment / Plan     Brief Patient Summary:  Jun Garcia is a 62 y.o. male who presented to [facility] with weakness and weight loss found to have moderate/severe bilateral hydroureteronephrosis and distended urinary  bladder as well as renal failure. He is POD 3 s/p Cystoscopy, clot evacuation and fulguration with Dr. Velasco.      Active Hospital Problems:  Active Hospital Problems    Diagnosis    • **Acute renal failure superimposed on stage 4 chronic kidney disease, unspecified acute renal failure type (HCC)    • Metabolic acidosis    • Hyperkalemia    • Elevated troponin    • Elevated lipase        Plan:   -I clamped CBI at bedside. D/w RN to restart CBI if urine becomes grossly bloody  -F/U 7-10 days post discharge for voiding trial and to learn CIC.    will be available as needed, please call if any questions  D/w Dr. Christianson       DVT prophylaxis:  Medical DVT prophylaxis orders are present.    CODE STATUS:   Code Status (Patient has no pulse and is not breathing): CPR (Attempt to Resuscitate)  Medical Interventions (Patient has pulse or is breathing): Full Support    Disposition:  I expect patient to remain inpatient    Electronically signed by FRANKY Krause, 02/12/23, 12:34 PM EST.

## 2023-02-13 ENCOUNTER — APPOINTMENT (OUTPATIENT)
Dept: NEPHROLOGY | Facility: HOSPITAL | Age: 63
DRG: 663 | End: 2023-02-13
Payer: COMMERCIAL

## 2023-02-13 LAB
ALBUMIN SERPL-MCNC: 3.2 G/DL (ref 3.5–5.2)
ANION GAP SERPL CALCULATED.3IONS-SCNC: 16 MMOL/L (ref 5–15)
BUN SERPL-MCNC: 94 MG/DL (ref 8–23)
BUN/CREAT SERPL: 10.5 (ref 7–25)
CALCIUM SPEC-SCNC: 8.3 MG/DL (ref 8.6–10.5)
CHLORIDE SERPL-SCNC: 102 MMOL/L (ref 98–107)
CO2 SERPL-SCNC: 21 MMOL/L (ref 22–29)
CREAT SERPL-MCNC: 8.93 MG/DL (ref 0.76–1.27)
DEPRECATED RDW RBC AUTO: 44.1 FL (ref 37–54)
EGFRCR SERPLBLD CKD-EPI 2021: 6.1 ML/MIN/1.73
ERYTHROCYTE [DISTWIDTH] IN BLOOD BY AUTOMATED COUNT: 13.7 % (ref 12.3–15.4)
GLUCOSE BLDC GLUCOMTR-MCNC: 133 MG/DL (ref 70–130)
GLUCOSE BLDC GLUCOMTR-MCNC: 142 MG/DL (ref 70–130)
GLUCOSE BLDC GLUCOMTR-MCNC: 97 MG/DL (ref 70–130)
GLUCOSE SERPL-MCNC: 100 MG/DL (ref 65–99)
HCT VFR BLD AUTO: 29.1 % (ref 37.5–51)
HGB BLD-MCNC: 9.7 G/DL (ref 13–17.7)
MCH RBC QN AUTO: 29.7 PG (ref 26.6–33)
MCHC RBC AUTO-ENTMCNC: 33.3 G/DL (ref 31.5–35.7)
MCV RBC AUTO: 89 FL (ref 79–97)
PHOSPHATE SERPL-MCNC: 7 MG/DL (ref 2.5–4.5)
PLATELET # BLD AUTO: 167 10*3/MM3 (ref 140–450)
PMV BLD AUTO: 8.8 FL (ref 6–12)
POTASSIUM SERPL-SCNC: 4.4 MMOL/L (ref 3.5–5.2)
RBC # BLD AUTO: 3.27 10*6/MM3 (ref 4.14–5.8)
SODIUM SERPL-SCNC: 139 MMOL/L (ref 136–145)
WBC NRBC COR # BLD: 13.84 10*3/MM3 (ref 3.4–10.8)

## 2023-02-13 PROCEDURE — 25010000002 METHYLNALTREXONE 12 MG/0.6ML SOLUTION: Performed by: NURSE PRACTITIONER

## 2023-02-13 PROCEDURE — 85027 COMPLETE CBC AUTOMATED: CPT | Performed by: INTERNAL MEDICINE

## 2023-02-13 PROCEDURE — 97110 THERAPEUTIC EXERCISES: CPT | Performed by: PHYSICAL THERAPIST

## 2023-02-13 PROCEDURE — 97116 GAIT TRAINING THERAPY: CPT | Performed by: PHYSICAL THERAPIST

## 2023-02-13 PROCEDURE — 99232 SBSQ HOSP IP/OBS MODERATE 35: CPT | Performed by: NURSE PRACTITIONER

## 2023-02-13 PROCEDURE — 80069 RENAL FUNCTION PANEL: CPT | Performed by: INTERNAL MEDICINE

## 2023-02-13 PROCEDURE — 99233 SBSQ HOSP IP/OBS HIGH 50: CPT | Performed by: NURSE PRACTITIONER

## 2023-02-13 PROCEDURE — 25010000002 MANNITOL PER 50 ML: Performed by: INTERNAL MEDICINE

## 2023-02-13 PROCEDURE — 25010000002 MORPHINE PER 10 MG: Performed by: UROLOGY

## 2023-02-13 PROCEDURE — 82962 GLUCOSE BLOOD TEST: CPT

## 2023-02-13 RX ORDER — HYDROCODONE BITARTRATE AND ACETAMINOPHEN 5; 325 MG/1; MG/1
1 TABLET ORAL EVERY 6 HOURS PRN
Status: DISCONTINUED | OUTPATIENT
Start: 2023-02-13 | End: 2023-02-14 | Stop reason: HOSPADM

## 2023-02-13 RX ORDER — MANNITOL 250 MG/ML
25 INJECTION, SOLUTION INTRAVENOUS AS NEEDED
Status: ACTIVE | OUTPATIENT
Start: 2023-02-13 | End: 2023-02-13

## 2023-02-13 RX ORDER — BACITRACIN ZINC AND POLYMYXIN B SULFATE 500; 10000 [USP'U]/G; [USP'U]/G
1 OINTMENT TOPICAL
Status: DISCONTINUED | OUTPATIENT
Start: 2023-02-13 | End: 2023-02-14 | Stop reason: HOSPADM

## 2023-02-13 RX ORDER — MANNITOL 250 MG/ML
25 INJECTION, SOLUTION INTRAVENOUS AS NEEDED
Status: DISPENSED | OUTPATIENT
Start: 2023-02-13 | End: 2023-02-13

## 2023-02-13 RX ADMIN — MICONAZOLE NITRATE 1 APPLICATION: 20 CREAM TOPICAL at 20:42

## 2023-02-13 RX ADMIN — SODIUM BICARBONATE 650 MG TABLET 650 MG: at 15:52

## 2023-02-13 RX ADMIN — SENNOSIDES AND DOCUSATE SODIUM 2 TABLET: 8.6; 5 TABLET ORAL at 20:42

## 2023-02-13 RX ADMIN — SODIUM BICARBONATE 650 MG TABLET 650 MG: at 20:42

## 2023-02-13 RX ADMIN — Medication 10 ML: at 11:50

## 2023-02-13 RX ADMIN — MORPHINE SULFATE 2 MG: 2 INJECTION, SOLUTION INTRAMUSCULAR; INTRAVENOUS at 04:05

## 2023-02-13 RX ADMIN — Medication 5 MG: at 20:42

## 2023-02-13 RX ADMIN — PANTOPRAZOLE SODIUM 40 MG: 40 TABLET, DELAYED RELEASE ORAL at 05:20

## 2023-02-13 RX ADMIN — METHYLNALTREXONE BROMIDE 6 MG: 12 INJECTION, SOLUTION SUBCUTANEOUS at 15:53

## 2023-02-13 RX ADMIN — HYDROCODONE BITARTRATE AND ACETAMINOPHEN 1 TABLET: 5; 325 TABLET ORAL at 22:57

## 2023-02-13 RX ADMIN — Medication 1 TABLET: at 11:49

## 2023-02-13 RX ADMIN — MANNITOL 25 G: 12.5 INJECTION, SOLUTION INTRAVENOUS at 09:17

## 2023-02-13 RX ADMIN — Medication 10 ML: at 20:42

## 2023-02-13 RX ADMIN — SENNOSIDES AND DOCUSATE SODIUM 2 TABLET: 8.6; 5 TABLET ORAL at 11:49

## 2023-02-13 RX ADMIN — BACITRACIN ZINC AND POLYMYXIN B SULFATE 1 APPLICATION: at 14:41

## 2023-02-13 RX ADMIN — HYDROCODONE BITARTRATE AND ACETAMINOPHEN 1 TABLET: 5; 325 TABLET ORAL at 16:13

## 2023-02-13 RX ADMIN — ACETAMINOPHEN 325MG 650 MG: 325 TABLET ORAL at 13:01

## 2023-02-13 RX ADMIN — SODIUM BICARBONATE 650 MG TABLET 650 MG: at 11:49

## 2023-02-13 NOTE — CASE MANAGEMENT/SOCIAL WORK
Continued Stay Note   [place]     Patient Name: Jun Garcia  MRN: [id number]  Today's Date: 2/13/2023    Admit Date: 2/7/2023    Plan: Home at DC   Discharge Plan     Row Name 02/13/23 1152       Plan    Plan Home at DC    Patient/Family in Agreement with Plan yes    Plan Comments I spoke with the pt. He wants to keep the MWF schedule for outpt HD at [facility]. I spoke with Sandra at [facility] [phone]. The pt has an outpt start date of 2- at 0600. If he does not DC by 2- I will need to move his start date to Friday.    Final Discharge Disposition Code 01 - home or self-care               Discharge Codes    No documentation.               Expected Discharge Date and Time     Expected Discharge Date Expected Discharge Time    Feb 13, 2023             Milka Vazquez RN

## 2023-02-13 NOTE — PROGRESS NOTES
" LOS: 6 days   Patient Care Team:  Provider, No Known as PCP - General  Provider, No Known as PCP - Family Medicine    Chief Complaint: Chest pain.    Subjective     Seen on HD tolerating well so far. Goal UF 2 liter as tolerated. Bp stable. Good access pressure.       Subjective:  Symptoms:  He reports chest pain.        History taken from: patient    Objective     Vital Sign Min/Max for last 24 hours  Temp  Min: 98 °F (36.7 °C)  Max: 98.2 °F (36.8 °C)   BP  Min: 86/40  Max: 155/87   Pulse  Min: 81  Max: 111   Resp  Min: 14  Max: 18   SpO2  Min: 97 %  Max: 100 %   No data recorded   No data recorded     Flowsheet Rows    Flowsheet Row First Filed Value   Admission Height 170.2 cm (67\") Documented at 02/07/2023 1150   Admission Weight 68 kg (150 lb) Documented at 02/07/2023 1150          No intake/output data recorded.  I/O last 3 completed shifts:  In: 622.3 [P.O.:320; I.V.:26; Blood:276.3]  Out: -6650     Objective:  General Appearance:  Comfortable.    Vital signs: (most recent): Blood pressure 113/85, pulse 111, temperature 98 °F (36.7 °C), temperature source Oral, resp. rate 14, height 170.2 cm (67.01\"), weight 67.6 kg (149 lb 0.5 oz), SpO2 100 %.  Vital signs are normal.    Output: Producing urine.    HEENT: Normal HEENT exam.    Lungs:  Normal effort and normal respiratory rate.  Breath sounds clear to auscultation.  He is not in respiratory distress.  No decreased breath sounds or wheezes.    Heart: Normal rate.  Regular rhythm.  S1 normal and S2 normal.  No murmur or gallop.   Abdomen: Abdomen is soft.  Bowel sounds are normal.     Extremities: Normal range of motion.  There is no dependent edema or local swelling.    Pulses: Distal pulses are intact.    Neurological: Patient is alert and oriented to person, place and time.  Normal strength.    Skin:  Warm.              Results Review:     I reviewed the patient's new clinical results.    WBC WBC   Date Value Ref Range Status   02/13/2023 13.84 (H) 3.40 - " 10.80 10*3/mm3 Final   02/12/2023 13.21 (H) 3.40 - 10.80 10*3/mm3 Final   02/11/2023 11.43 (H) 3.40 - 10.80 10*3/mm3 Final      HGB Hemoglobin   Date Value Ref Range Status   02/13/2023 9.7 (L) 13.0 - 17.7 g/dL Final   02/12/2023 9.4 (L) 13.0 - 17.7 g/dL Final   02/11/2023 9.6 (L) 13.0 - 17.7 g/dL Final   02/11/2023 7.8 (L) 13.0 - 17.7 g/dL Final   02/11/2023 6.6 (C) 13.0 - 17.7 g/dL Final      HCT Hematocrit   Date Value Ref Range Status   02/13/2023 29.1 (L) 37.5 - 51.0 % Final   02/12/2023 28.1 (L) 37.5 - 51.0 % Final   02/11/2023 27.8 (L) 37.5 - 51.0 % Final   02/11/2023 23.2 (L) 37.5 - 51.0 % Final   02/11/2023 20.3 (C) 37.5 - 51.0 % Final      Platlets No results found for: LABPLAT   MCV MCV   Date Value Ref Range Status   02/13/2023 89.0 79.0 - 97.0 fL Final   02/12/2023 87.8 79.0 - 97.0 fL Final   02/11/2023 94.4 79.0 - 97.0 fL Final          Sodium Sodium   Date Value Ref Range Status   02/13/2023 139 136 - 145 mmol/L Final   02/12/2023 138 136 - 145 mmol/L Final   02/11/2023 140 136 - 145 mmol/L Final      Potassium Potassium   Date Value Ref Range Status   02/13/2023 4.4 3.5 - 5.2 mmol/L Final   02/12/2023 4.3 3.5 - 5.2 mmol/L Final   02/11/2023 4.5 3.5 - 5.2 mmol/L Final      Chloride Chloride   Date Value Ref Range Status   02/13/2023 102 98 - 107 mmol/L Final   02/12/2023 103 98 - 107 mmol/L Final   02/11/2023 102 98 - 107 mmol/L Final      CO2 CO2   Date Value Ref Range Status   02/13/2023 21.0 (L) 22.0 - 29.0 mmol/L Final   02/12/2023 22.0 22.0 - 29.0 mmol/L Final   02/11/2023 24.0 22.0 - 29.0 mmol/L Final      BUN BUN   Date Value Ref Range Status   02/13/2023 94 (H) 8 - 23 mg/dL Final   02/12/2023 80 (H) 8 - 23 mg/dL Final   02/11/2023 115 (H) 8 - 23 mg/dL Final      Creatinine Creatinine   Date Value Ref Range Status   02/13/2023 8.93 (H) 0.76 - 1.27 mg/dL Final   02/12/2023 7.34 (H) 0.76 - 1.27 mg/dL Final   02/11/2023 9.81 (H) 0.76 - 1.27 mg/dL Final      Calcium Calcium   Date Value Ref Range  Status   02/13/2023 8.3 (L) 8.6 - 10.5 mg/dL Final   02/12/2023 7.8 (L) 8.6 - 10.5 mg/dL Final   02/11/2023 7.7 (L) 8.6 - 10.5 mg/dL Final      PO4 No results found for: CAPO4   Albumin Albumin   Date Value Ref Range Status   02/13/2023 3.2 (L) 3.5 - 5.2 g/dL Final   02/12/2023 3.2 (L) 3.5 - 5.2 g/dL Final   02/11/2023 3.3 (L) 3.5 - 5.2 g/dL Final      Magnesium No results found for: MG   Uric Acid No results found for: URICACID     Medication Review: yes    Assessment & Plan       Acute renal failure superimposed on stage 4 chronic kidney disease, unspecified acute renal failure type (HCC)    Metabolic acidosis    Hyperkalemia    Elevated troponin    Elevated lipase      Assessment & Plan        CKD stage V: Hx of obstructive uropathy with severe hydronephrosis on this admission noted previously on admission at [facility]. Patient left the hospital against medical advise.      Azotemia:BUN significantly elevated. Does have mild uremic symptoms. Fatigue, poor appetite and vomiiting     Obstructive uropathy: Likley due to enlarge prostate resulting in severe hydronephrosis. Cystoscopy was recommended on last visit at  but patient didn't have any intervention. Similar findings noted on this admission. Underwent cystoscopy and also required CBI.       Anemia: ACD due to advance CKD     Hyperkalemia: Due to CKD     Met acidosis: Due to CKD      Hyponatremia: Due to CKD     Recs  HD per [facility] [place] Will continue HD per [facility]. Will have outpatient discussion about home dialysis modality. Can take tylenol for pain. He will likely go home with urinary cath and f/u w urology       Jordan Marquez MD  02/13/23  10:09 EST

## 2023-02-13 NOTE — PLAN OF CARE
Problem: Adult Inpatient Plan of Care  Goal: Plan of Care Review  Outcome: Ongoing, Progressing  Goal: Patient-Specific Goal (Individualized)  Outcome: Ongoing, Progressing  Goal: Absence of Hospital-Acquired Illness or Injury  Outcome: Ongoing, Progressing  Intervention: Identify and Manage Fall Risk  Recent Flowsheet Documentation  Taken 2/12/2023 2200 by Deya Bocanegra RN  Safety Promotion/Fall Prevention:   activity supervised   assistive device/personal items within reach   clutter free environment maintained   fall prevention program maintained   mobility aid in reach   lighting adjusted   muscle strengthening facilitated   nonskid shoes/slippers when out of bed   room organization consistent   toileting scheduled   safety round/check completed  Intervention: Prevent Skin Injury  Recent Flowsheet Documentation  Taken 2/12/2023 2200 by Deya Bocanegra RN  Body Position: position changed independently  Skin Protection: incontinence pads utilized  Intervention: Prevent and Manage VTE (Venous Thromboembolism) Risk  Recent Flowsheet Documentation  Taken 2/12/2023 2200 by Deya Bocanegra RN  Activity Management: activity adjusted per tolerance  Intervention: Prevent Infection  Recent Flowsheet Documentation  Taken 2/12/2023 2200 by Deya Bocanegra RN  Infection Prevention: rest/sleep promoted  Goal: Optimal Comfort and Wellbeing  Outcome: Ongoing, Progressing  Goal: Readiness for Transition of Care  Outcome: Ongoing, Progressing     Problem: Fall Injury Risk  Goal: Absence of Fall and Fall-Related Injury  Outcome: Ongoing, Progressing  Intervention: Identify and Manage Contributors  Recent Flowsheet Documentation  Taken 2/12/2023 2200 by Deya Bocanegra RN  Medication Review/Management: medications reviewed  Intervention: Promote Injury-Free Environment  Recent Flowsheet Documentation  Taken 2/12/2023 2200 by Deya Bocanegra RN  Safety Promotion/Fall Prevention:   activity supervised   assistive device/personal  items within reach   clutter free environment maintained   fall prevention program maintained   mobility aid in reach   lighting adjusted   muscle strengthening facilitated   nonskid shoes/slippers when out of bed   room organization consistent   toileting scheduled   safety round/check completed     Problem: Skin Injury Risk Increased  Goal: Skin Health and Integrity  Outcome: Ongoing, Progressing  Intervention: Optimize Skin Protection  Recent Flowsheet Documentation  Taken 2/12/2023 2200 by Deya Bocanegra RN  Pressure Reduction Techniques:   weight shift assistance provided   frequent weight shift encouraged  Head of Bed (HOB) Positioning: HOB elevated  Pressure Reduction Devices: positioning supports utilized  Skin Protection: incontinence pads utilized     Problem: Anemia  Goal: Anemia Symptom Improvement  Outcome: Ongoing, Progressing  Intervention: Monitor and Manage Anemia  Recent Flowsheet Documentation  Taken 2/12/2023 2200 by Deya Bocanegra RN  Safety Promotion/Fall Prevention:   activity supervised   assistive device/personal items within reach   clutter free environment maintained   fall prevention program maintained   mobility aid in reach   lighting adjusted   muscle strengthening facilitated   nonskid shoes/slippers when out of bed   room organization consistent   toileting scheduled   safety round/check completed     Problem: Device-Related Complication Risk (Hemodialysis)  Goal: Safe, Effective Therapy Delivery  Outcome: Ongoing, Progressing  Intervention: Optimize Device Care and Function  Recent Flowsheet Documentation  Taken 2/12/2023 2200 by Deya Bocanegra RN  Medication Review/Management: medications reviewed     Problem: Hemodynamic Instability (Hemodialysis)  Goal: Effective Tissue Perfusion  Outcome: Ongoing, Progressing     Problem: Infection (Hemodialysis)  Goal: Absence of Infection Signs and Symptoms  Outcome: Ongoing, Progressing     Problem: Fluid and Electrolyte Imbalance (Acute  Kidney Injury/Impairment)  Goal: Fluid and Electrolyte Balance  Outcome: Ongoing, Progressing     Problem: Oral Intake Inadequate (Acute Kidney Injury/Impairment)  Goal: Optimal Nutrition Intake  Outcome: Ongoing, Progressing     Problem: Renal Function Impairment (Acute Kidney Injury/Impairment)  Goal: Effective Renal Function  Outcome: Ongoing, Progressing  Intervention: Monitor and Support Renal Function  Recent Flowsheet Documentation  Taken 2/12/2023 2200 by Deya Bocanegra, RN  Medication Review/Management: medications reviewed   Goal Outcome Evaluation:                  written material

## 2023-02-13 NOTE — PROGRESS NOTES
[facility] Medicine Services  PROGRESS NOTE    Patient Name: Jun Garcia  : 1960  MRN: [id number]    Date of Admission: 2023  Primary Care Physician: Provider, No Known    Subjective   Subjective     CC: Follow-up generalized weakness    HPI:   Penile and lower abdominal pain overnight, CBI restarted    ROS:  Gen- No fevers, chills  CV- No chest pain, palpitations  Resp- No cough, dyspnea  GI- No N/V/D, abd pain as above      Objective   Objective     Vital Signs:   Temp:  [97.8 °F (36.6 °C)-98.2 °F (36.8 °C)] 97.8 °F (36.6 °C)  Heart Rate:  [] 107  Resp:  [14-18] 16  BP: ()/(40-89) 140/83     Physical Exam:  Constitutional: No acute distress, awake, alert  HENT: NCAT, mucous membranes moist  Respiratory: Clear to auscultation bilaterally, respiratory effort normal   Cardiovascular: regular, tachy, no murmurs, rubs, or gallops  Gastrointestinal: Positive bowel sounds, soft, nontender, nondistended  Musculoskeletal: No bilateral ankle edema  Psychiatric: Appropriate affect, cooperative  Neurologic: Oriented x 3, DEMARCO, speech clear  Skin: No rashes noted      Results Reviewed:  LAB RESULTS:      Lab 23  0426 23  0337 23  2326 23  1100 23  0334 23  1549 23  0449 23  0939 23  1207   WBC 13.84* 13.21*  --   --  11.43*  --  12.49* 9.66 12.08*   HEMOGLOBIN 9.7* 9.4* 9.6* 7.8* 6.6*   < > 7.8* 5.9* 7.8*   HEMATOCRIT 29.1* 28.1* 27.8* 23.2* 20.3*   < > 22.6* 18.0* 23.7*   PLATELETS 167 137*  --   --  130*  --  186 255 277   NEUTROS ABS  --   --   --   --   --   --  9.08*  --  8.40*   IMMATURE GRANS (ABS)  --   --   --   --   --   --  0.06*  --  0.05   LYMPHS ABS  --   --   --   --   --   --  1.23  --  1.09   MONOS ABS  --   --   --   --   --   --  0.61  --  0.51   EOS ABS  --   --   --   --   --   --  1.46*  --  1.96*   MCV 89.0 87.8  --   --  94.4  --  84.0 88.7 89.4   PROTIME  --   --   --   --   --   --   --   --   14.8*   APTT  --   --   --   --   --   --   --   --  38.1*    < > = values in this interval not displayed.         Lab 02/13/23  0426 02/12/23  0337 02/11/23  0334 02/10/23  0410 02/09/23  0449 02/08/23  0343 02/07/23  1207   SODIUM 139 138 140 139 138   < > 134*   POTASSIUM 4.4 4.3 4.5 4.6 4.6   < > 5.3*   CHLORIDE 102 103 102 96* 94*   < > 99   CO2 21.0* 22.0 24.0 24.0 27.0   < > 13.0*   ANION GAP 16.0* 13.0 14.0 19.0* 17.0*   < > 22.0*   BUN 94* 80* 115* 153* 150*   < > 164*   CREATININE 8.93* 7.34* 9.81* 12.71* 12.87*   < > 13.21*   EGFR 6.1* 7.7* 5.4* 4.0* 3.9*   < > 3.8*   GLUCOSE 100* 125* 97 98 158*   < > 169*   CALCIUM 8.3* 7.8* 7.7* 7.8* 7.7*   < > 8.5*   MAGNESIUM  --   --   --   --   --   --  3.3*   PHOSPHORUS 7.0* 6.1* 7.6* 9.2* 8.4*   < > 6.8*    < > = values in this interval not displayed.         Lab 02/13/23  0426 02/12/23  0337 02/11/23  0334 02/10/23  0410 02/09/23  0449 02/07/23  1207   TOTAL PROTEIN  --   --   --   --   --  8.7*   ALBUMIN 3.2* 3.2* 3.3* 3.2* 3.1* 4.2   GLOBULIN  --   --   --   --   --  4.5   ALT (SGPT)  --   --   --   --   --  19   AST (SGOT)  --   --   --   --   --  12   BILIRUBIN  --   --   --   --   --  0.3   ALK PHOS  --   --   --   --   --  103   LIPASE  --   --   --   --   --  124*         Lab 02/07/23  1418 02/07/23  1207   PROBNP  --  1,316.0*   HSTROP T 57* 60*   PROTIME  --  14.8*   INR  --  1.17*             Lab 02/08/23  1019 02/08/23  0939 02/08/23  0343   IRON  --   --  68   IRON SATURATION  --   --  29   TIBC  --   --  234*   TRANSFERRIN  --   --  157*   FERRITIN  --   --  899.10*   ABO TYPING O   < >  --    RH TYPING Positive   < >  --    ANTIBODY SCREEN Negative  --   --     < > = values in this interval not displayed.         Brief Urine Lab Results  (Last result in the past 365 days)      Color   Clarity   Blood   Leuk Est   Nitrite   Protein   CREAT   Urine HCG        11/17/22 1804 Yellow   Cloudy     Large                     Microbiology Results Abnormal      None          No radiology results from the last 24 hrs    Results for orders placed during the hospital encounter of 02/07/23    Adult Transthoracic Echo Complete w/ Color, Spectral and Contrast if necessary per protocol    Interpretation Summary  •  Left ventricular systolic function is hyperdynamic (EF > 70%).  •  Left ventricular wall thickness is consistent with mild concentric hypertrophy  •  Normal right ventricular cavity size and systolic function noted.  •  There is a moderate (1-2cm) pericardial effusion. There is no evidence of cardiac tamponade.  No chamber collapse.      I have reviewed the medications:  Scheduled Meds:b complex-vitamin c-folic acid, 1 tablet, Oral, Daily  insulin lispro, 0-7 Units, Subcutaneous, TID AC  miconazole, 1 application, Topical, Q12H  pantoprazole, 40 mg, Oral, Q AM  senna-docusate sodium, 2 tablet, Oral, BID  sodium bicarbonate, 650 mg, Oral, TID  sodium chloride, 10 mL, Intravenous, Q12H      Continuous Infusions:lactated ringers, 9 mL/hr  sodium chloride, 9 mL/hr      PRN Meds:.•  acetaminophen **OR** acetaminophen **OR** acetaminophen  •  senna-docusate sodium **AND** polyethylene glycol **AND** bisacodyl **AND** bisacodyl  •  dextrose  •  dextrose  •  glucagon (human recombinant)  •  heparin (porcine)  •  Lidocaine HCl gel  •  mannitol  •  mannitol  •  melatonin  •  Morphine **AND** naloxone  •  ondansetron **OR** ondansetron  •  sodium chloride  •  sodium chloride  •  sodium chloride    Assessment & Plan   Assessment & Plan     Active Hospital Problems    Diagnosis  POA   • **Acute renal failure superimposed on stage 4 chronic kidney disease, unspecified acute renal failure type (HCC) [N17.9, N18.4]  Yes   • Metabolic acidosis [E87.20]  Yes   • Hyperkalemia [E87.5]  Yes   • Elevated troponin [R77.8]  Yes   • Elevated lipase [R74.8]  Yes      Resolved Hospital Problems   No resolved problems to display.        Brief Hospital Course to date:  Jun Garcia is a 62  y.o. male with prior hx obstructive uropathy and CKD presenting with worsening FTT and ongoing renal failure and obstructive uropathy.     CKD IV-V nearing ESRD  Metabolic acidosis, improving  Hyperkalemia, resolved  -Nephrology following, seen by Dr. Caicedo, he is s/p tunnel cath placement and has been initiated on HD, started 2/9/2023, has so far received 3 sessions  -Dr. Chowdhury followed for PD cath and AV fistula placement, this will be evaluated as outpatient   - has already arranged HD chair, for MWF     Obstructive uropathy  Gross hematuria, resolved  -CT abdomen pelvis showed moderate to severe bilateral hydronephrosis, enlarged prostate with irregular protrusion into the urinary bladder (same finding seen on CT done in November at )  -Urology following, he is s/p cystoscopy and clot evacuation in the OR by Dr. Velasco 2/9/2023  -CBI restarted overnight due to penile shaft pain, remains high risk for bladder outlet surgery, plan is to be discharged with [facility] and will see urology (Lesly alanis, FRANKY ) as outpatient 7-10  days post discharge for intermittent catheterization teaching and catheter removal.  Urology to see for further plans, clamping at 2p to re-eval     Anemia  -Multifactorial ACD from ESRD vs blood loss from hematuria  -Hg 5.8 2/8/23, s/p 2 units PRBC with good response, however this morning H&H has dropped again to 6.6 on 2/11, he is s/p an additional 1 unit PRBC with good response  -repeat H&H stable     Elevated troponin   -Suspect this is secondary to volume depletion  -Patient is without any chest pain troponin has flattened   -EKG is unremarkable, echo with EF 70%, moderate pericardial effusion      Elevated lipase  -Suspected to be likely secondary to resolving pancreatitis  -s/p supportive therapy with IV fluids and pain meds     Constipation  -last BM prior to arrival  -has received multiple agents in bowel protocol  -add relistor    Expected Discharge Location and Transportation:  Home  Expected Discharge   Expected Discharge Date and Time     Expected Discharge Date Expected Discharge Time    Feb 14, 2023            DVT prophylaxis:  Medical DVT prophylaxis orders are present.     AM-PAC 6 Clicks Score (PT): 20 (02/13/23 0800)    CODE STATUS:   Code Status and Medical Interventions:   Ordered at: 02/07/23 1357     Code Status (Patient has no pulse and is not breathing):    CPR (Attempt to Resuscitate)     Medical Interventions (Patient has pulse or is breathing):    Full Support       Maritza Hernández, FRANKY  02/13/23

## 2023-02-13 NOTE — THERAPY TREATMENT NOTE
Patient Name: Jun Garcia  : 1960    MRN: [id number]                              Today's Date: 2023       Admit Date: 2023    Visit Dx:     ICD-10-CM ICD-9-CM   1. Acute renal failure superimposed on stage 4 chronic kidney disease, unspecified acute renal failure type (HCC)  N17.9 584.9    N18.4 585.4   2. Hypermagnesemia  E83.41 275.2   3. Hyperkalemia  E87.5 276.7   4. Hyperphosphatemia  E83.39 275.3   5. Dehydration  E86.0 276.51   6. Metabolic acidosis  E87.20 276.2     Patient Active Problem List   Diagnosis   • Acute renal failure superimposed on stage 4 chronic kidney disease, unspecified acute renal failure type (HCC)   • Metabolic acidosis   • Hyperkalemia   • Elevated troponin   • Elevated lipase     Past Medical History:   Diagnosis Date   • Diabetes mellitus (HCC)    • Elevated cholesterol    • GERD (gastroesophageal reflux disease)    • Hyperlipidemia    • Hypertension    • Stroke (HCC)      Past Surgical History:   Procedure Laterality Date   • CYSTOSCOPY BLADDER BIOPSY N/A 2023    Procedure: CYSTOSCOPY CLOT EVACUATION WITH FULGURATION, RETROGRADE PYELOGRAM;  Surgeon: Mandi Velasco MD;  Location: [facility];  Service: Urology;  Laterality: N/A;      General Information     Row Name 23 1403          Physical Therapy Time and Intention    Document Type therapy note (daily note)  -LM     Mode of Treatment individual therapy;physical therapy  -     Row Name 23 1403          General Information    Patient Profile Reviewed yes  -LM     Existing Precautions/Restrictions fall;other (see comments)  CBI  -LM     Row Name 23 1403          Cognition    Orientation Status (Cognition) oriented x 3  -LM     Row Name 23 1403          Safety Issues, Functional Mobility    Safety Issues Affecting Function (Mobility) safety precaution awareness;safety precautions follow-through/compliance  -     Impairments Affecting Function (Mobility) balance;endurance/activity  tolerance;strength  -LM           User Key  (r) = Recorded By, (t) = Taken By, (c) = Cosigned By    Initials Name Provider Type    LM Lelo Higuera PT Physical Therapist               Mobility     Row Name 02/13/23 1403          Bed Mobility    Bed Mobility supine-sit;sit-supine  -LM     Supine-Sit [place] (Bed Mobility) modified independence  -LM     Sit-Supine [place] (Bed Mobility) independent  -LM     Row Name 02/13/23 1403          Sit-Stand Transfer    Sit-Stand [place] (Transfers) standby assist  -LM     Row Name 02/13/23 1403          Gait/Stairs (Locomotion)    [place] Level (Gait) contact guard;1 person assist  -LM     Assistive Device (Gait) other (see comments)  Pt pushed his CBI pole  -LM     Distance in Feet (Gait) 200  -LM     Deviations/Abnormal Patterns (Gait) liliya decreased  -LM     Bilateral Gait Deviations heel strike decreased  -LM     Comment, (Gait/Stairs) 2 small LOBs noted, but pt able to self correct.  Vc's for forward head posture as pt likes to look at his feet.  -LM           User Key  (r) = Recorded By, (t) = Taken By, (c) = Cosigned By    Initials Name Provider Type    Lelo Valadez PT Physical Therapist               Obj/Interventions     Row Name 02/13/23 1405          Motor Skills    Therapeutic Exercise hip;knee;ankle  -LM     Row Name 02/13/23 1405          Hip (Therapeutic Exercise)    Hip (Therapeutic Exercise) AROM (active range of motion);isometric exercises  -LM     Hip AROM (Therapeutic Exercise) bilateral;aBduction;aDduction;supine;10 repetitions  -LM     Hip Isometrics (Therapeutic Exercise) bilateral;gluteal sets;supine;10 repetitions  -LM     Row Name 02/13/23 1405          Knee (Therapeutic Exercise)    Knee (Therapeutic Exercise) AROM (active range of motion);isometric exercises  -LM     Knee AROM (Therapeutic Exercise) bilateral;SLR (straight leg raise);heel slides;supine;10 repetitions  -LM     Knee Isometrics (Therapeutic Exercise)  bilateral;quad sets;supine;10 repetitions  -LM     Row Name 02/13/23 1405          Ankle (Therapeutic Exercise)    Ankle (Therapeutic Exercise) AROM (active range of motion)  -LM     Ankle AROM (Therapeutic Exercise) bilateral;dorsiflexion;plantarflexion;supine;10 repetitions  -LM     Row Name 02/13/23 1405          Balance    Static Sitting Balance independent  -LM     Dynamic Sitting Balance independent  -LM     Position, Sitting Balance unsupported  -LM     Static Standing Balance standby assist  -LM     Dynamic Standing Balance contact guard  -LM     Position/Device Used, Standing Balance supported;other (see comments)  Pt pushed his CBI pole  -LM     Comment, Balance Pt donned/doffed his shoes while sitting EOB.  -LM           User Key  (r) = Recorded By, (t) = Taken By, (c) = Cosigned By    Initials Name Provider Type    LM Lelo Higuera, PT Physical Therapist               Goals/Plan    No documentation.                Clinical Impression     Row Name 02/13/23 1406          Pain    Pretreatment Pain Rating 7/10  -LM     Posttreatment Pain Rating 7/10  -LM     Pain Location - other (see comments)  catheter insertion site/bladder area  -LM     Pain Intervention(s) Repositioned;Ambulation/increased activity  -LM     Row Name 02/13/23 1406          Plan of Care Review    Plan of Care Reviewed With patient  -LM     Progress improving  -LM     Outcome Evaluation Progressing well.  Pt transfered supine<-->sit independently, stood with SBA, and ambulated 200 feet pushing his own CBI pole with CGAx1 - 2 small LOB's noted pt able to self correct.  Pt continues to be limited by decreased activity tolerance, balance deficits, and gait instability.  Continue to recommend home with assist and HH PT.  -LM     Row Name 02/13/23 1406          Vital Signs    Pre Systolic BP Rehab 140  -LM     Pre Treatment Diastolic BP 83  -LM     Pretreatment Heart Rate (beats/min) 111  -LM     Posttreatment Heart Rate (beats/min) 113  -LM      Pre SpO2 (%) 98  -LM     O2 Delivery Pre Treatment room air  -LM     Post SpO2 (%) 98  -LM     O2 Delivery Post Treatment room air  -LM     Pre Patient Position Supine  -LM     Post Patient Position Supine  -LM     Row Name 02/13/23 1406          Positioning and Restraints    Pre-Treatment Position in bed  -LM     Post Treatment Position bed  -LM     In Bed supine;call light within reach;encouraged to call for assist;SCD pump applied;notified nsg  -LM           User Key  (r) = Recorded By, (t) = Taken By, (c) = Cosigned By    Initials Name Provider Type    Lelo Valadez, PT Physical Therapist               Outcome Measures     Row Name 02/13/23 1408 02/13/23 0800       How much help from another person do you currently need...    Turning from your back to your side while in flat bed without using bedrails? 4  -LM 4  -[place]    Moving from lying on back to sitting on the side of a flat bed without bedrails? 4  -LM 4  -[place]    Moving to and from a bed to a chair (including a wheelchair)? 3  -LM 3  -[place]    Standing up from a chair using your arms (e.g., wheelchair, bedside chair)? 3  -LM 3  -[place]    Climbing 3-5 steps with a railing? 3  -LM 3  -[place]    To walk in hospital room? 3  -LM 3  -[place]    AM-PAC 6 Clicks Score (PT) 20  -LM 20  -[place]    Highest level of mobility 6 --> Walked 10 steps or more  -LM 6 --> Walked 10 steps or more  -[place]    Row Name 02/13/23 1408          Functional Assessment    Outcome Measure Options AM-PAC 6 Clicks Basic Mobility (PT)  -LM           User Key  (r) = Recorded By, (t) = Taken By, (c) = Cosigned By    Initials Name Provider Type    Lelo Valadez, PT Physical Therapist    Lucila Tran, RN Registered Nurse                             Physical Therapy Education     Title: PT OT SLP Therapies (In Progress)     Topic: Physical Therapy (In Progress)     Point: Mobility training (In Progress)     Learning Progress Summary           Patient Acceptance, E, NR by BA at 2/12/2023 0451    Acceptance,  E,I, VU,NR by LM at 2/9/2023 1530                   Point: Home exercise program (In Progress)     Learning Progress Summary           Patient Acceptance, E, NR by KR at 2/12/2023 2218                   Point: Precautions (In Progress)     Learning Progress Summary           Patient Acceptance, E, NR by KR at 2/12/2023 2218    Acceptance, E,I, VU,NR by LM at 2/9/2023 1530                               User Key     Initials Effective Dates Name Provider Type Discipline     06/16/21 -  Lelo Higuera PT Physical Therapist PT    KR 01/16/23 -  Deya Bocanegra, RN Registered Nurse Nurse              PT Recommendation and Plan  Planned Therapy Interventions (PT): balance training, bed mobility training, gait training, home exercise program, motor coordination training, neuromuscular re-education, patient/family education, postural re-education, ROM (range of motion), stair training, strengthening, stretching, transfer training  Plan of Care Reviewed With: patient  Progress: improving  Outcome Evaluation: Progressing well.  Pt transfered supine<-->sit independently, stood with SBA, and ambulated 200 feet pushing his own CBI pole with CGAx1 - 2 small LOB's noted pt able to self correct.  Pt continues to be limited by decreased activity tolerance, balance deficits, and gait instability.  Continue to recommend home with assist and HH PT.     Time Calculation:    PT Charges     Row Name 02/13/23 1408             Time Calculation    Start Time 1338  -LM      PT Received On 02/13/23  -LM      PT Goal Re-Cert Due Date 02/19/23  -LM         Timed Charges    34149 - PT Therapeutic Exercise Minutes 8  -LM      10188 - Gait Training Minutes  15  -LM         Total Minutes    Timed Charges Total Minutes 23  -LM       Total Minutes 23  -LM            User Key  (r) = Recorded By, (t) = Taken By, (c) = Cosigned By    Initials Name Provider Type    LM Lelo Higuera, PT Physical Therapist              Therapy Charges for Today     Code  Description Service Date Service Provider Modifiers Qty    21254276587 HC GAIT TRAINING EA 15 MIN 2/13/2023 Lelo Higuera, PT GP 1    28861314575 HC PT THER PROC EA 15 MIN 2/13/2023 Lelo Higuera, PT GP 1          PT G-Codes  Outcome Measure Options: AM-PAC 6 Clicks Basic Mobility (PT)  AM-PAC 6 Clicks Score (PT): 20  AM-PAC 6 Clicks Score (OT): 20  PT Discharge Summary  Anticipated Discharge Disposition (PT): home with assist, home with home health    Lelo Higuera, PT  2/13/2023

## 2023-02-13 NOTE — PROGRESS NOTES
Patient resting in bed. Just returned from Dialysis. Continues to report discomfort from caldwell catheter. Attempted to clamp CBI yesterday. Developed pain and bladder scan 100cc. RN restarted CBI with pink urine output and small clots.     CBI on slow drip with urine output yellow/clear, tinge of pink. 1 tiny clot in tubing.     No urethral discharge. Scant erythema of glans penis. Continue Miconazole cream BID and Lidocaine Jelly PRN for discomfort. Begin Bacitracin ointment around tip of catheter.     Will attempt to clamp CBI again.    will be available, please call if any questions.

## 2023-02-13 NOTE — PLAN OF CARE
Problem: Device-Related Complication Risk (Hemodialysis)  Goal: Safe, Effective Therapy Delivery  Outcome: Ongoing, Progressing     Problem: Hemodynamic Instability (Hemodialysis)  Goal: Effective Tissue Perfusion  Outcome: Ongoing, Progressing     Problem: Infection (Hemodialysis)  Goal: Absence of Infection Signs and Symptoms  Outcome: Ongoing, Progressing   Goal Outcome Evaluation:               HD completed. UF goal reached. Tolerated well, some episodes of low BP but stabilized. Blood returned to pt. Report to primary nurse.

## 2023-02-13 NOTE — PLAN OF CARE
Goal Outcome Evaluation:  Plan of Care Reviewed With: patient        Progress: improving  Outcome Evaluation: Progressing well.  Pt transfered supine<-->sit independently, stood with SBA, and ambulated 200 feet pushing his own CBI pole with CGAx1 - 2 small LOB's noted pt able to self correct.  Pt continues to be limited by decreased activity tolerance, balance deficits, and gait instability.  Continue to recommend home with assist and HH PT.

## 2023-02-14 ENCOUNTER — READMISSION MANAGEMENT (OUTPATIENT)
Dept: CALL CENTER | Facility: HOSPITAL | Age: 63
End: 2023-02-14
Payer: COMMERCIAL

## 2023-02-14 VITALS
HEART RATE: 80 BPM | DIASTOLIC BLOOD PRESSURE: 83 MMHG | HEIGHT: 67 IN | BODY MASS INDEX: 23.39 KG/M2 | RESPIRATION RATE: 16 BRPM | TEMPERATURE: 97.5 F | SYSTOLIC BLOOD PRESSURE: 127 MMHG | WEIGHT: 149.03 LBS | OXYGEN SATURATION: 97 %

## 2023-02-14 LAB
ANION GAP SERPL CALCULATED.3IONS-SCNC: 15 MMOL/L (ref 5–15)
BASOPHILS # BLD AUTO: 0.1 10*3/MM3 (ref 0–0.2)
BASOPHILS NFR BLD AUTO: 0.8 % (ref 0–1.5)
BUN SERPL-MCNC: 68 MG/DL (ref 8–23)
BUN/CREAT SERPL: 10 (ref 7–25)
CALCIUM SPEC-SCNC: 8.3 MG/DL (ref 8.6–10.5)
CHLORIDE SERPL-SCNC: 100 MMOL/L (ref 98–107)
CO2 SERPL-SCNC: 22 MMOL/L (ref 22–29)
CREAT SERPL-MCNC: 6.82 MG/DL (ref 0.76–1.27)
DEPRECATED RDW RBC AUTO: 44 FL (ref 37–54)
EGFRCR SERPLBLD CKD-EPI 2021: 8.4 ML/MIN/1.73
EOSINOPHIL # BLD AUTO: 1.94 10*3/MM3 (ref 0–0.4)
EOSINOPHIL NFR BLD AUTO: 15.1 % (ref 0.3–6.2)
ERYTHROCYTE [DISTWIDTH] IN BLOOD BY AUTOMATED COUNT: 13.6 % (ref 12.3–15.4)
GLUCOSE BLDC GLUCOMTR-MCNC: 114 MG/DL (ref 70–130)
GLUCOSE BLDC GLUCOMTR-MCNC: 158 MG/DL (ref 70–130)
GLUCOSE SERPL-MCNC: 92 MG/DL (ref 65–99)
HCT VFR BLD AUTO: 29.7 % (ref 37.5–51)
HGB BLD-MCNC: 9.8 G/DL (ref 13–17.7)
IMM GRANULOCYTES # BLD AUTO: 0.17 10*3/MM3 (ref 0–0.05)
IMM GRANULOCYTES NFR BLD AUTO: 1.3 % (ref 0–0.5)
LYMPHOCYTES # BLD AUTO: 1.5 10*3/MM3 (ref 0.7–3.1)
LYMPHOCYTES NFR BLD AUTO: 11.7 % (ref 19.6–45.3)
MCH RBC QN AUTO: 29.6 PG (ref 26.6–33)
MCHC RBC AUTO-ENTMCNC: 33 G/DL (ref 31.5–35.7)
MCV RBC AUTO: 89.7 FL (ref 79–97)
MONOCYTES # BLD AUTO: 1.2 10*3/MM3 (ref 0.1–0.9)
MONOCYTES NFR BLD AUTO: 9.3 % (ref 5–12)
NEUTROPHILS NFR BLD AUTO: 61.8 % (ref 42.7–76)
NEUTROPHILS NFR BLD AUTO: 7.96 10*3/MM3 (ref 1.7–7)
NRBC BLD AUTO-RTO: 0 /100 WBC (ref 0–0.2)
PLATELET # BLD AUTO: 192 10*3/MM3 (ref 140–450)
PMV BLD AUTO: 9 FL (ref 6–12)
POTASSIUM SERPL-SCNC: 4.5 MMOL/L (ref 3.5–5.2)
RBC # BLD AUTO: 3.31 10*6/MM3 (ref 4.14–5.8)
SODIUM SERPL-SCNC: 137 MMOL/L (ref 136–145)
WBC NRBC COR # BLD: 12.87 10*3/MM3 (ref 3.4–10.8)

## 2023-02-14 PROCEDURE — G0108 DIAB MANAGE TRN  PER INDIV: HCPCS | Performed by: REGISTERED NURSE

## 2023-02-14 PROCEDURE — 99239 HOSP IP/OBS DSCHRG MGMT >30: CPT | Performed by: NURSE PRACTITIONER

## 2023-02-14 PROCEDURE — 82962 GLUCOSE BLOOD TEST: CPT

## 2023-02-14 PROCEDURE — 99233 SBSQ HOSP IP/OBS HIGH 50: CPT | Performed by: NURSE PRACTITIONER

## 2023-02-14 PROCEDURE — 85025 COMPLETE CBC W/AUTO DIFF WBC: CPT | Performed by: NURSE PRACTITIONER

## 2023-02-14 PROCEDURE — 63710000001 INSULIN LISPRO (HUMAN) PER 5 UNITS: Performed by: UROLOGY

## 2023-02-14 PROCEDURE — 80048 BASIC METABOLIC PNL TOTAL CA: CPT | Performed by: NURSE PRACTITIONER

## 2023-02-14 PROCEDURE — 97116 GAIT TRAINING THERAPY: CPT

## 2023-02-14 PROCEDURE — 97110 THERAPEUTIC EXERCISES: CPT

## 2023-02-14 RX ORDER — HYDROCODONE BITARTRATE AND ACETAMINOPHEN 5; 325 MG/1; MG/1
1 TABLET ORAL EVERY 8 HOURS PRN
Qty: 6 TABLET | Refills: 0 | Status: SHIPPED | OUTPATIENT
Start: 2023-02-14

## 2023-02-14 RX ORDER — SODIUM BICARBONATE 650 MG/1
650 TABLET ORAL 3 TIMES DAILY
Qty: 90 TABLET | Refills: 1 | Status: SHIPPED | OUTPATIENT
Start: 2023-02-14

## 2023-02-14 RX ORDER — BACITRACIN ZINC AND POLYMYXIN B SULFATE 500; 1000 [USP'U]/G; [USP'U]/G
OINTMENT TOPICAL
Qty: 30 G | Refills: 0 | Status: SHIPPED | OUTPATIENT
Start: 2023-02-14

## 2023-02-14 RX ORDER — ACETAMINOPHEN 325 MG/1
650 TABLET ORAL EVERY 4 HOURS PRN
Start: 2023-02-14

## 2023-02-14 RX ORDER — FOLIC ACID/VIT B COMPLEX AND C 0.8 MG
1 TABLET ORAL DAILY
Qty: 30 TABLET | Refills: 1 | Status: SHIPPED | OUTPATIENT
Start: 2023-02-15

## 2023-02-14 RX ORDER — PANTOPRAZOLE SODIUM 40 MG/1
40 TABLET, DELAYED RELEASE ORAL
Qty: 30 TABLET | Refills: 1 | Status: SHIPPED | OUTPATIENT
Start: 2023-02-15

## 2023-02-14 RX ADMIN — PANTOPRAZOLE SODIUM 40 MG: 40 TABLET, DELAYED RELEASE ORAL at 05:31

## 2023-02-14 RX ADMIN — SENNOSIDES AND DOCUSATE SODIUM 2 TABLET: 8.6; 5 TABLET ORAL at 09:38

## 2023-02-14 RX ADMIN — LIDOCAINE HYDROCHLORIDE: 20 JELLY TOPICAL at 10:13

## 2023-02-14 RX ADMIN — SODIUM BICARBONATE 650 MG TABLET 650 MG: at 09:38

## 2023-02-14 RX ADMIN — Medication 10 ML: at 09:37

## 2023-02-14 RX ADMIN — INSULIN LISPRO 2 UNITS: 100 INJECTION, SOLUTION INTRAVENOUS; SUBCUTANEOUS at 12:45

## 2023-02-14 RX ADMIN — Medication 1 TABLET: at 09:39

## 2023-02-14 RX ADMIN — HYDROCODONE BITARTRATE AND ACETAMINOPHEN 1 TABLET: 5; 325 TABLET ORAL at 10:13

## 2023-02-14 RX ADMIN — BACITRACIN ZINC AND POLYMYXIN B SULFATE 1 APPLICATION: at 09:38

## 2023-02-14 RX ADMIN — MICONAZOLE NITRATE 1 APPLICATION: 20 CREAM TOPICAL at 09:38

## 2023-02-14 NOTE — PROGRESS NOTES
Mammogram due May 2022  Call weight management for evaluation  Patient resting in bed. CBI clamped since yesterday afternoon. Dark pink urine output to caldwell catheter drainage, no clots.     Added bacitracin ointment yesterday; he reports this is helping with his pain.     At the bedside, I performed manual bladder irrigation with 250 cc sterile water. Urine output light pink/clear, no clots.     Ok to discharge home from Urology standpoint with caldwell catheter in place. Nursing to provide leg bag/teach caldwell care at discharge. Will send prescription for Bacitracin ointment twice daily as needed for catheter irritation. Plan for outpatient follow up in 7-10 days to learn CIC. Our office will call to schedule appointment.      will sign off, thank you for the consult.   D/w Dr. Damico

## 2023-02-14 NOTE — DISCHARGE PLACEMENT REQUEST
"Jun Garcia (62 y.o. Male)   To [facility]  From Milka Vazquez [phone]    Date of Birth   1960    Social Security Number       Address   [address] [place] [address]    Home Phone   [phone]    MRN   [id number]       Pentecostalism   Hoahaoism    Marital Status                               Admission Date   23    Admission Type   Emergency    Admitting Provider   Anum Bowers MD    Attending Provider   Anum Bowers MD    Department, Room/Bed   [address], S513/1       Discharge Date       Discharge Disposition   Home or Self Care    Discharge Destination                               Attending Provider: Anum Bowers MD    Allergies: No Known Allergies    Isolation: None   Infection: None   Code Status: CPR    Ht: 170.2 cm (67.01\")   Wt: 67.6 kg (149 lb 0.5 oz)    Admission Cmt: None   Principal Problem: Acute renal failure superimposed on stage 4 chronic kidney disease, unspecified acute renal failure type (HCC) [N17.9,N18.4]                 Active Insurance as of 2023     Primary Coverage     Payor Plan Insurance Group Employer/Plan Group    [employer] [place] [employer] MEDICA[place]      Payor Plan Address Payor Plan Phone Number Payor Plan Fax Number Effective Dates    [address] [address] [phone]  2023 - None Entered    [facility] [id number]       Subscriber Name Subscriber Birth Date Member ID       JUN GARCIA 1960 [id number]                 Emergency Contacts      (Rel.) Home Phone Work Phone Mobile Phone    VENECIA CAMPOS (Daughter) [phone] -- [phone]    CAMPOSTAWANDA DOWD (Son) [phone] -- [phone]                 Discharge Summary      Maritza Hernández APRN at 23 1010              [facility] Medicine Services  DISCHARGE SUMMARY    Patient Name: Jun Garcia  : 1960  MRN: [id number]    Date of Admission: 2023 12:48 PM  Date of Discharge: 23  Primary " Care Physician: Provider, No Known    Consults     Date and Time Order Name Status Description    2/8/2023 12:34 AM Inpatient General Surgery Consult Completed     2/7/2023  3:07 PM Inpatient Nephrology Consult      2/7/2023  2:09 PM Inpatient Urology Consult Completed           Hospital Course     Presenting Problem:   Acute renal failure superimposed on stage 4 chronic kidney disease, unspecified acute renal failure type (HCC) [N17.9, N18.4]    Active Hospital Problems    Diagnosis  POA   • **Acute renal failure superimposed on stage 4 chronic kidney disease, unspecified acute renal failure type (HCC) [N17.9, N18.4]  Yes   • Diabetes mellitus (HCC) [E11.9]  Yes   • Metabolic acidosis [E87.20]  Yes   • Hyperkalemia [E87.5]  Yes   • Elevated troponin [R77.8]  Yes   • Elevated lipase [R74.8]  Yes      Resolved Hospital Problems   No resolved problems to display.        Hospital Course:  Jun Garcia is a 62 y.o. male with prior hx obstructive uropathy and CKD presenting with worsening FTT and ongoing renal failure and obstructive uropathy.     CKD IV-V nearing ESRD  Metabolic acidosis, improving  Hyperkalemia, resolved  -Nephrology following, seen by Dr. Caicedo, he is s/p tunnel cath placement and has been initiated on HD, started 2/9/2023, has so far received 3 sessions  -Dr. Chowdhury followed for PD cath and AV fistula placement, this will be evaluated as outpatient   - has already arranged HD chair, for MWF     Obstructive uropathy  Gross hematuria, resolved  -CT abdomen pelvis showed moderate to severe bilateral hydronephrosis, enlarged prostate with irregular protrusion into the urinary bladder (same finding seen on CT done in November at )  -Urology following, he is s/p cystoscopy and clot evacuation in the OR by Dr. Velasco 2/9/2023  -CBI restarted overnight 2/13/23 due to penile shaft pain, remains high risk for bladder outlet surgery, plan is to be discharged with [facility] and will see urology (Lesly  FRANKY Coffman ) as outpatient 7-10  days post discharge for intermittent catheterization teaching and catheter removal.    -continue BID bacitracin ointment to help with penile pain/catheter irriation  -manual bladder irrigation with 250 cc sterile water done per urology on date of dc. Urine output light pink/clear, no clots.      Anemia  -Multifactorial ACD from ESRD vs blood loss from hematuria  -Hg 5.8 2/8/23, s/p 2 units PRBC with good response, H&H  dropped again to 6.6 on 2/11, he is s/p an additional 1 unit PRBC with good response  -repeat H&H stable     T2DM  -do not have a recent A1C, unable to obtain this admission due to multiple blood products  -blood sugars have been fairly controlled last few days.  Earlier in admission, blood sugars in 200's, up to 500 one time  -start januvia due to ESRD  -diabetes educator to see    Elevated troponin   -Suspect this is secondary to volume depletion  -Patient is without any chest pain and troponin has flattened   -EKG is unremarkable, echo with EF 70%, moderate pericardial effusion      Elevated lipase  -Suspected to be likely secondary to resolving pancreatitis  -s/p supportive therapy with IV fluids and pain meds     Constipation, resolved  -s/p relistor         Discharge Follow Up Recommendations for outpatient labs/diagnostics:  PCP 1 week  Urology 7-10 days, their office will call for an appointment  Keep caldwell until that follow up  MWF dialysis at [facility]    Day of Discharge     HPI:   Penile pain/caldwell irritation improved    Review of Systems  Gen- No fevers, chills  CV- No chest pain, palpitations  Resp- No cough, dyspnea  GI- No N/V/D, abd pain      Vital Signs:   Temp:  [97.5 °F (36.4 °C)-98.6 °F (37 °C)] 97.5 °F (36.4 °C)  Heart Rate:  [] 80  Resp:  [16-18] 16  BP: (125-145)/(71-89) 127/83      Physical Exam:  Constitutional: No acute distress, awake, alert  HENT: NCAT, mucous membranes moist  Respiratory: Clear to auscultation bilaterally,  respiratory effort normal   Cardiovascular: RRR, no murmurs, rubs, or gallops  Gastrointestinal: Positive bowel sounds, soft, nontender, nondistended  Musculoskeletal: No bilateral ankle edema  Psychiatric: Flat affect, cooperative  Neurologic: Oriented x 3, DEMARCO, speech clear  Skin: No rashes      Pertinent  and/or Most Recent Results     LAB RESULTS:      Lab 02/14/23 0442 02/13/23 0426 02/12/23  0337 02/11/23  2326 02/11/23  1100 02/11/23  0334 02/09/23  1549 02/09/23 0449   WBC 12.87* 13.84* 13.21*  --   --  11.43*  --  12.49*   HEMOGLOBIN 9.8* 9.7* 9.4* 9.6* 7.8* 6.6*   < > 7.8*   HEMATOCRIT 29.7* 29.1* 28.1* 27.8* 23.2* 20.3*   < > 22.6*   PLATELETS 192 167 137*  --   --  130*  --  186   NEUTROS ABS 7.96*  --   --   --   --   --   --  9.08*   IMMATURE GRANS (ABS) 0.17*  --   --   --   --   --   --  0.06*   LYMPHS ABS 1.50  --   --   --   --   --   --  1.23   MONOS ABS 1.20*  --   --   --   --   --   --  0.61   EOS ABS 1.94*  --   --   --   --   --   --  1.46*   MCV 89.7 89.0 87.8  --   --  94.4  --  84.0    < > = values in this interval not displayed.         Lab 02/14/23 0442 02/13/23 0426 02/12/23 0337 02/11/23  0334 02/10/23  0410 02/09/23 0449   SODIUM 137 139 138 140 139 138   POTASSIUM 4.5 4.4 4.3 4.5 4.6 4.6   CHLORIDE 100 102 103 102 96* 94*   CO2 22.0 21.0* 22.0 24.0 24.0 27.0   ANION GAP 15.0 16.0* 13.0 14.0 19.0* 17.0*   BUN 68* 94* 80* 115* 153* 150*   CREATININE 6.82* 8.93* 7.34* 9.81* 12.71* 12.87*   EGFR 8.4* 6.1* 7.7* 5.4* 4.0* 3.9*   GLUCOSE 92 100* 125* 97 98 158*   CALCIUM 8.3* 8.3* 7.8* 7.7* 7.8* 7.7*   PHOSPHORUS  --  7.0* 6.1* 7.6* 9.2* 8.4*         Lab 02/13/23  0426 02/12/23  0337 02/11/23  0334 02/10/23  0410 02/09/23  0449   ALBUMIN 3.2* 3.2* 3.3* 3.2* 3.1*                 Lab 02/08/23  1019 02/08/23  0939 02/08/23  0343   IRON  --   --  68   IRON SATURATION  --   --  29   TIBC  --   --  234*   TRANSFERRIN  --   --  157*   FERRITIN  --   --  899.10*   ABO TYPING O   < >  --     RH TYPING Positive   < >  --    ANTIBODY SCREEN Negative  --   --     < > = values in this interval not displayed.         Brief Urine Lab Results  (Last result in the past 365 days)      Color   Clarity   Blood   Leuk Est   Nitrite   Protein   CREAT   Urine HCG        11/17/22 1804 Yellow   Cloudy     Large                   Microbiology Results (last 10 days)     ** No results found for the last 240 hours. **          Adult Transthoracic Echo Complete w/ Color, Spectral and Contrast if necessary per protocol    Result Date: 2/8/2023  •  Left ventricular systolic function is hyperdynamic (EF > 70%). •  Left ventricular wall thickness is consistent with mild concentric hypertrophy •  Normal right ventricular cavity size and systolic function noted. •  There is a moderate (1-2cm) pericardial effusion. There is no evidence of cardiac tamponade.  No chamber collapse.     CT Abdomen Pelvis Without Contrast    Result Date: 2/7/2023  CT ABDOMEN PELVIS WO CONTRAST Date of Exam: 2/7/2023 5:05 PM EST Indication: urinary obstruction. Comparison: None available. Technique: Axial CT images were obtained of the abdomen and pelvis without the administration of contrast. Reconstructed coronal and sagittal images were also obtained. Automated exposure control and iterative construction methods were used. Findings: No suspicious infiltrate is seen in the lung bases. No pleural effusion. There is a trace amount of pericardial fluid. Heart size appears within normal limits. Large calcified gallstone is seen in the gallbladder lumen. No definite pericholecystic inflammation or biliary ductal dilatation. Small cyst is seen in the posterior-inferior right hepatic lobe. No splenomegaly. No suspicious adrenal lesion is seen. No acute pancreatic abnormality is identified. There is atherosclerosis of the aorta without definite aneurysm. No significant abdominal adenopathy is seen. There is moderate to severe bilateral hydronephrosis. There  is bilateral renal cortical thinning. No significant renal calculi are seen. There is bilateral ureteral dilatation to the urinary bladder. No obstructing calculus or other ureteral abnormality is seen. The bladder is prominently distended. The bladder wall appears mildly thickened. There appears to be enlargement of the prostate with irregular protrusion into the inferior urinary bladder. No definite inflammatory changes are seen surrounding the bladder on this exam. The seminal vesicles appear symmetric and grossly normal in size. No definite urethral calculus is seen. Overall findings are suspicious for bladder outlet obstruction due to prostate enlargement with moderate to severe hydronephrosis. No acute gastric abnormality is seen. Small bowel loops appear nondilated. The appendix appears within normal limits. No definite lytic or aggressive osseous lesion is seen. There are degenerative changes in the lumbar spine. There is mild to moderate bilateral hip arthritis. Small sclerotic foci in the proximal femurs and at L2 are nonspecific but may represent bone islands.     Impression: 1.Moderate to severe bilateral hydronephrosis with ureteral dilatation and prominent bladder distention. The prostate appears enlarged with irregular protrusion into the urinary bladder. Findings are suspicious for bladder outlet obstruction due to the prostate. Recommend urology consultation and catheter placement. 2.Prostate enlargement is indeterminate and could be related to benign hypertrophy or malignancy. 3.Large calcified gallstone without CT findings of acute cholecystitis. 4.Trace pericardial effusion. 5.Small sclerotic foci in the proximal femurs and at L2 are indeterminate and could be benign bone islands, but sclerotic metastases, which are commonly seen with prostate malignancy, cannot be excluded, given the above findings. Electronically Signed: Chaz Rodriguez  2/7/2023 5:51 PM EST  Workstation ID: [id number]    IR  Tunneled Catheter    Result Date: 2/10/2023  IR TUNNELED CATHETER-                                                         History: CKD V; N17.9-Acute kidney failure, unspecified; N18.4-Chronic kidney disease, stage 4 (severe); E83.41-Hypermagnesemia; E87.5-Hyperkalemia; E83.39-Other disorders of phosphorus metabolism; E86.0-Dehydration; E87.20-Acidosis, unspecified    : Aidan Perdomo MD.  Modality: Sonography and fluoroscopy                DOSE REDUCTION: The examination was performed according to departmental dose-optimization program.  Fluoro time: 0.4 minutes  Radiation Dose: 1.7 mGy air Kerma.                                                                              SEDATION: Moderate sedation was administered. 1 milligram of Versed and 50 micrograms of fentanyl IV was used for moderate sedation monitored under my direction. Total intra service time of sedation was 13 minutes. The patient's vital signs were monitored throughout the procedure and recorded in the patient's medical record by the nurse.  Medicines: Ancef 1 g IV.  Anesthesia: Lidocaine with epinephrine, local infiltration.  Estimated blood loss:  < 5 cc.          Technique: A thorough discussion of the risks, benefits, and alternatives of the procedure, and if applicable, moderate sedation, was carried out with the patient. They were encouraged to ask any questions. Any questions were answered. They verbalized understanding. A written informed consent was then signed.   A multi-component timeout was performed prior to starting the procedure using the departmental protocol.  The procedure room personnel used personal protective equipment. The operators used sterile gloves and if indicated, sterile gowns. The surgical site was prepped with chlorhexidine gluconate  and draped in the maximal applicable sterile fashion.  A preliminary ultrasonogram was performed of the target that revealed a patent and compressible Right internal  jugular vein. Pertinent ultrasound images were stored in the PACS for documentation.  A sterile prep and drape of the right neck and upper chest was performed using maximal sterile technique.  Using aseptic precautions, real-time ultrasound guidance, the target vein was accessed after local anesthetic infiltration and dermatotomy with an access needle. A guidewire was advanced into the central venous system under fluoroscopic guidance. Over the wire following standard technique a peel-away sheath was placed.  After local anesthesia, an incision was created at the exit site location and a cuffed tunneled dialysis catheter of an appropriate length was tunneled from the exit site to the venotomy site using a tunneling device. The catheter was advanced into the venous system through the peel-away sheath which was removed.  The catheter aspirated and flushed well and was terminally packed with heparin 1000 units per cc.  The catheter was secured to skin using nonabsorbable suture and a CHG dressing applied.  The venotomy site was closed using Dermabond. An aseptic dressing was applied using the protocol for Dermabond.  The patient was transferred to the recovery area and was discharged from the department in stable condition.                     Complications: None immediate.     Device: 15.5 Tajik x 19 cm cuff to tip Duraflow catheter.  Findings: Patent and compressible Right internal jugular. Final image shows the catheter to be in good position with the catheter tip in the right atrium, an excellent position for use. There is no complication.                                                               Impression:    Successful ultrasound and fluoroscopic guided Right internal jugular vein route cuffed tunneled hemodialysis catheter placement as described above.  Thank you for the opportunity to assist in the care of your patient.  This report was finalized on 2/10/2023 8:51 PM by Aidan Perdomo MD.      XR Chest 1  View    Result Date: 2/7/2023  XR CHEST 1 VW Date of Exam: 2/7/2023 2:04 PM EST Indication: Chest Pain Triage Protocol. Comparison: None available. Findings: Heart size and pulmonary vasculature are within normal limits. Lungs clear. Costophrenic angles sharp     Impression: No active cardiopulmonary disease Electronically Signed: Martin Marinelli  2/7/2023 2:38 PM EST  Workstation ID: [id number]    XR Pyelogram Retrograde    Result Date: 2/10/2023  XR PYELOGRAM RETROGRADE Date of Exam: 2/9/2023 10:48 AM EST Indication: CYSTOSCOPY CLOT EVACUATION WITH FULGURATION, RETROGRADE PYELOGRAM. Comparison: CT 2/7/2023 Findings: Bilat. retrograde programs were performed. There is severe left-sided hydronephrosis. Within the right renal pelvis there is a filling defect which may be an air bubble. Correlation with real-time fluoroscopy would be recommended. There is severe right-sided hydronephrosis. Total fluoroscopy time 0.9 minutes, 7 images     Impression: Bilateral hydronephrosis with filling defects within the right renal pelvis which may be an air bubble. Clinical correlation recommended. Electronically Signed: Moi Holden  2/10/2023 7:57 AM EST  Workstation ID: [id number]    Duplex Vein Mapping Study Upper Extremity - Left CAR    Result Date: 2/10/2023  •  Inadequate/small left cephalic vein •  Adequate/good basilic vein. 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       Results for orders placed during the hospital encounter of 02/07/23    Duplex Vein Mapping Study Upper Extremity - Left CAR    Interpretation Summary  •  Inadequate/small left cephalic vein  •  Adequate/good basilic vein.  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      Results for orders placed during the hospital encounter of 02/07/23    Duplex Vein Mapping Study Upper Extremity - Left CAR    Interpretation Summary  •  Inadequate/small left cephalic vein  •   Adequate/good basilic vein.  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      Results for orders placed during the hospital encounter of 02/07/23    Adult Transthoracic Echo Complete w/ Color, Spectral and Contrast if necessary per protocol    Interpretation Summary  •  Left ventricular systolic function is hyperdynamic (EF > 70%).  •  Left ventricular wall thickness is consistent with mild concentric hypertrophy  •  Normal right ventricular cavity size and systolic function noted.  •  There is a moderate (1-2cm) pericardial effusion. There is no evidence of cardiac tamponade.  No chamber collapse.        Discharge Details        Discharge Medications      New Medications      Instructions Start Date   acetaminophen 325 MG tablet  Commonly known as: TYLENOL   650 mg, Oral, Every 4 Hours PRN      b complex-vitamin c-folic acid 0.8 MG tablet tablet   1 tablet, Oral, Daily   Start Date: February 15, 2023     bacitracin-polymyxin b 500-10349 UNIT/GM ointment  Commonly known as: POLYSPORIN   Apply to tip of penis/catheter twice daily for irritation      HYDROcodone-acetaminophen 5-325 MG per tablet  Commonly known as: NORCO   1 tablet, Oral, Every 8 Hours PRN      miconazole 2 % cream  Commonly known as: MICOTIN   1 application, Topical, Every 12 Hours Scheduled      pantoprazole 40 MG EC tablet  Commonly known as: PROTONIX   40 mg, Oral, Every Early Morning   Start Date: February 15, 2023     SITagliptin 25 MG tablet  Commonly known as: Januvia   25 mg, Oral, Daily      sodium bicarbonate 650 MG tablet   650 mg, Oral, 3 Times Daily             No Known Allergies      Discharge Disposition:  Home or Self Care    Diet:  Hospital:  Diet Order   Procedures   • Diet: Diabetic Diets; Consistent Carbohydrate; Texture: Regular Texture (IDDSI 7); Fluid Consistency: Thin (IDDSI 0)       Activity:  Activity Instructions     Activity as Tolerated                 CODE STATUS:    Code  Status and Medical Interventions:   Ordered at: 02/07/23 1357     Code Status (Patient has no pulse and is not breathing):    CPR (Attempt to Resuscitate)     Medical Interventions (Patient has pulse or is breathing):    Full Support       Future Appointments   Date Time Provider Department Center   2/15/2023  7:00 AM [facility]IALYS[facility]   2/17/2023 11:00 AM Samir Chow DO [facility]   2/20/2023  2:30 PM Lesly Coffman APRN [facility]       Additional Instructions for the Follow-ups that You Need to Schedule     Discharge Follow-up with PCP   As directed       Currently Documented PCP:    Provider, No Known    PCP Phone Number:    None     Follow Up Details: 1 week         Discharge Follow-up with Specified Provider: Nephrology   As directed      To: Nephrology    Follow Up Details: Dialysis on [facility]         Discharge Follow-up with Specified Provider: Urology   As directed      To: Urology    Follow Up Details: 7-10d, office will call with an appointment                 FRANKY Sumner  02/14/23      Time Spent on Discharge:  I spent 40 minutes on this discharge activity which included: face-to-face encounter with the patient, reviewing the data in the system, coordination of the care with the nursing staff as well as consultants, documentation, and entering orders.            Electronically signed by Maritza Hernández APRN at 02/14/23 5482

## 2023-02-14 NOTE — PROGRESS NOTES
" LOS: 7 days   Patient Care Team:  Provider, No Known as PCP - General  Provider, No Known as PCP - Family Medicine    Chief Complaint: F/U ESRD    Subjective     Seen on HD tolerating well so far. Goal UF 2 liter as tolerated. Bp stable. Good access pressure.       Subjective:  Symptoms:  Resolved.  No chest pain.    Diet:  No nausea or vomiting.        History taken from: patient    Objective     Vital Sign Min/Max for last 24 hours  Temp  Min: 97.5 °F (36.4 °C)  Max: 98.6 °F (37 °C)   BP  Min: 125/80  Max: 145/86   Pulse  Min: 81  Max: 116   Resp  Min: 16  Max: 18   SpO2  Min: 95 %  Max: 98 %   No data recorded   No data recorded     Flowsheet Rows    Flowsheet Row First Filed Value   Admission Height 170.2 cm (67\") Documented at 02/07/2023 1150   Admission Weight 68 kg (150 lb) Documented at 02/07/2023 1150          No intake/output data recorded.  I/O last 3 completed shifts:  In: -   Out: -830 [Other:1520]    Objective:  General Appearance:  Comfortable (THD CATH).    Vital signs: (most recent): Blood pressure 127/83, pulse 85, temperature 97.5 °F (36.4 °C), temperature source Oral, resp. rate 16, height 170.2 cm (67.01\"), weight 67.6 kg (149 lb 0.5 oz), SpO2 97 %.  Vital signs are normal.    Output: Producing urine.    HEENT: Normal HEENT exam.    Lungs:  Normal effort and normal respiratory rate.  Breath sounds clear to auscultation.  He is not in respiratory distress.  No decreased breath sounds or wheezes.    Heart: Normal rate.  Regular rhythm.  S1 normal and S2 normal.  No murmur or gallop.   Abdomen: Abdomen is soft.  Bowel sounds are normal.     Extremities: Normal range of motion.  There is no dependent edema or local swelling.    Pulses: Distal pulses are intact.    Neurological: Patient is alert and oriented to person, place and time.  Normal strength.    Skin:  Warm.              Results Review:     I reviewed the patient's new clinical results.    WBC WBC   Date Value Ref Range Status   02/14/2023 " 12.87 (H) 3.40 - 10.80 10*3/mm3 Final   02/13/2023 13.84 (H) 3.40 - 10.80 10*3/mm3 Final   02/12/2023 13.21 (H) 3.40 - 10.80 10*3/mm3 Final      HGB Hemoglobin   Date Value Ref Range Status   02/14/2023 9.8 (L) 13.0 - 17.7 g/dL Final   02/13/2023 9.7 (L) 13.0 - 17.7 g/dL Final   02/12/2023 9.4 (L) 13.0 - 17.7 g/dL Final   02/11/2023 9.6 (L) 13.0 - 17.7 g/dL Final      HCT Hematocrit   Date Value Ref Range Status   02/14/2023 29.7 (L) 37.5 - 51.0 % Final   02/13/2023 29.1 (L) 37.5 - 51.0 % Final   02/12/2023 28.1 (L) 37.5 - 51.0 % Final   02/11/2023 27.8 (L) 37.5 - 51.0 % Final      Platlets No results found for: LABPLAT   MCV MCV   Date Value Ref Range Status   02/14/2023 89.7 79.0 - 97.0 fL Final   02/13/2023 89.0 79.0 - 97.0 fL Final   02/12/2023 87.8 79.0 - 97.0 fL Final          Sodium Sodium   Date Value Ref Range Status   02/14/2023 137 136 - 145 mmol/L Final   02/13/2023 139 136 - 145 mmol/L Final   02/12/2023 138 136 - 145 mmol/L Final      Potassium Potassium   Date Value Ref Range Status   02/14/2023 4.5 3.5 - 5.2 mmol/L Final   02/13/2023 4.4 3.5 - 5.2 mmol/L Final   02/12/2023 4.3 3.5 - 5.2 mmol/L Final      Chloride Chloride   Date Value Ref Range Status   02/14/2023 100 98 - 107 mmol/L Final   02/13/2023 102 98 - 107 mmol/L Final   02/12/2023 103 98 - 107 mmol/L Final      CO2 CO2   Date Value Ref Range Status   02/14/2023 22.0 22.0 - 29.0 mmol/L Final   02/13/2023 21.0 (L) 22.0 - 29.0 mmol/L Final   02/12/2023 22.0 22.0 - 29.0 mmol/L Final      BUN BUN   Date Value Ref Range Status   02/14/2023 68 (H) 8 - 23 mg/dL Final   02/13/2023 94 (H) 8 - 23 mg/dL Final   02/12/2023 80 (H) 8 - 23 mg/dL Final      Creatinine Creatinine   Date Value Ref Range Status   02/14/2023 6.82 (H) 0.76 - 1.27 mg/dL Final   02/13/2023 8.93 (H) 0.76 - 1.27 mg/dL Final   02/12/2023 7.34 (H) 0.76 - 1.27 mg/dL Final      Calcium Calcium   Date Value Ref Range Status   02/14/2023 8.3 (L) 8.6 - 10.5 mg/dL Final   02/13/2023 8.3 (L)  8.6 - 10.5 mg/dL Final   02/12/2023 7.8 (L) 8.6 - 10.5 mg/dL Final      PO4 No results found for: CAPO4   Albumin Albumin   Date Value Ref Range Status   02/13/2023 3.2 (L) 3.5 - 5.2 g/dL Final   02/12/2023 3.2 (L) 3.5 - 5.2 g/dL Final      Magnesium No results found for: MG   Uric Acid No results found for: URICACID     Medication Review: yes    Assessment & Plan       Acute renal failure superimposed on stage 4 chronic kidney disease, unspecified acute renal failure type (HCC)    Metabolic acidosis    Hyperkalemia    Elevated troponin    Elevated lipase    Diabetes mellitus (HCC)      Assessment & Plan        CKD stage V/ESRD: Hx of obstructive uropathy with severe hydronephrosis on this admission noted previously on admission at  hospital. Patient left the hospital against medical advise.      Azotemia:BUN significantly elevated. Does have mild uremic symptoms. Fatigue, poor appetite and vomiiting     Obstructive uropathy: Likley due to enlarge prostate resulting in severe hydronephrosis. Cystoscopy was recommended on last visit at  but patient didn't have any intervention. Similar findings noted on this admission. Underwent cystoscopy and also required CBI.       Anemia: ACD due to advance CKD     Hyperkalemia: Due to CKD     Met acidosis: Due to CKD      Hyponatremia: Due to CKD     Recs  HD per [facility].. Will continue HD per [facility]. Will have outpatient discussion about home dialysis modality. PATIENT WILL NEED OUTPATIENT AVF +/- PD CATH IF PATIENT CHOOSES CAPD. Can take tylenol for pain. He will likely go home with urinary cath and f/u w urology   NEXT HD 2/15/23 EITHER IN-PATIENT OR OUTPATIENT IF DISCHARGED.    Zion Hardin MD  02/14/23  11:50 EST

## 2023-02-14 NOTE — CONSULTS
"Mr. Garcia gave permission for diabetes education. His daughter was also present. He reports he was diagnosed 13+ years ago with diabetes. He states he had been on insulin, but his PCP passed away about one year ago and he hasn't taken insulin since as he has not followed up with another provider. He has not had an A1c, nor checking his BG at home as he does not have a meter. Per APRN note, due to receiving multiple blood products, unable to get a current A1c. He needs a new PCP to follow up with him. He also has concerns if he is going to be sent home on insulin, as he has been receiving bolus insulin here.   Discussed with his nurse, Jody, Mr. Garcia's concerns. Jody is to set up a follow up appointment with a new PCP prior to discharge and no home insulin ordered per discharge instructions. Patient and daughter made aware.   Instructed that he obtain an OTC meter, such as [employer]'s Reli On, which is inexpensive to use. Discussed that he check minimally BID, fasting and 2 hours post prandial. To take his readings with him to his PCP follow up appointment. He will also have his glucose checked at his HD appointments, starting tomorrow OP.  Reviewed signs, symptoms and treatment of hyperglycemia and hypoglycemia, using the Rule of 15. Patient verbalized understanding using Teach Back. Instructed to check BG for any signs/symptoms of hypo/hyperglycemia. Handouts provided:  \"What is Diabetes?\", A1c goals, BG goals. Thank you for this referral.        "

## 2023-02-14 NOTE — PLAN OF CARE
Problem: Adult Inpatient Plan of Care  Goal: Plan of Care Review  Outcome: Ongoing, Progressing  Goal: Patient-Specific Goal (Individualized)  Outcome: Ongoing, Progressing  Goal: Absence of Hospital-Acquired Illness or Injury  Outcome: Ongoing, Progressing  Intervention: Identify and Manage Fall Risk  Recent Flowsheet Documentation  Taken 2/14/2023 0000 by Deya Bocanegra RN  Safety Promotion/Fall Prevention:   activity supervised   assistive device/personal items within reach   clutter free environment maintained   fall prevention program maintained   mobility aid in reach   lighting adjusted   nonskid shoes/slippers when out of bed   muscle strengthening facilitated   room organization consistent   safety round/check completed   toileting scheduled  Taken 2/13/2023 2200 by Deya Bocanegra RN  Safety Promotion/Fall Prevention:   activity supervised   assistive device/personal items within reach   clutter free environment maintained   fall prevention program maintained   lighting adjusted   mobility aid in reach   muscle strengthening facilitated   nonskid shoes/slippers when out of bed   room organization consistent   safety round/check completed   toileting scheduled  Intervention: Prevent Skin Injury  Recent Flowsheet Documentation  Taken 2/14/2023 0000 by Deya Bocanegra RN  Body Position: position changed independently  Taken 2/13/2023 2200 by Deya Bocanegra RN  Body Position: position changed independently  Skin Protection: incontinence pads utilized  Intervention: Prevent and Manage VTE (Venous Thromboembolism) Risk  Recent Flowsheet Documentation  Taken 2/14/2023 0000 by Deya Bocanegra RN  Activity Management: activity adjusted per tolerance  Taken 2/13/2023 2200 by Deya Bocanegra RN  Activity Management: activity adjusted per tolerance  VTE Prevention/Management: sequential compression devices on  Range of Motion: active ROM (range of motion) encouraged  Intervention: Prevent Infection  Recent  Flowsheet Documentation  Taken 2/14/2023 0000 by Deya Bocanegra RN  Infection Prevention: hand hygiene promoted  Taken 2/13/2023 2200 by Deya Bocanegra RN  Infection Prevention: hand hygiene promoted  Goal: Optimal Comfort and Wellbeing  Outcome: Ongoing, Progressing  Goal: Readiness for Transition of Care  Outcome: Ongoing, Progressing     Problem: Fall Injury Risk  Goal: Absence of Fall and Fall-Related Injury  Outcome: Ongoing, Progressing  Intervention: Identify and Manage Contributors  Recent Flowsheet Documentation  Taken 2/14/2023 0000 by Deya Bocanegra RN  Medication Review/Management: medications reviewed  Taken 2/13/2023 2200 by Deya Bocanegra RN  Medication Review/Management: medications reviewed  Intervention: Promote Injury-Free Environment  Recent Flowsheet Documentation  Taken 2/14/2023 0000 by Deya Bocanegra RN  Safety Promotion/Fall Prevention:   activity supervised   assistive device/personal items within reach   clutter free environment maintained   fall prevention program maintained   mobility aid in reach   lighting adjusted   nonskid shoes/slippers when out of bed   muscle strengthening facilitated   room organization consistent   safety round/check completed   toileting scheduled  Taken 2/13/2023 2200 by Deya Bocanegra RN  Safety Promotion/Fall Prevention:   activity supervised   assistive device/personal items within reach   clutter free environment maintained   fall prevention program maintained   lighting adjusted   mobility aid in reach   muscle strengthening facilitated   nonskid shoes/slippers when out of bed   room organization consistent   safety round/check completed   toileting scheduled     Problem: Skin Injury Risk Increased  Goal: Skin Health and Integrity  Outcome: Ongoing, Progressing  Intervention: Optimize Skin Protection  Recent Flowsheet Documentation  Taken 2/14/2023 0000 by Deya Bocanegra RN  Head of Bed (HOB) Positioning: HOB elevated  Taken 2/13/2023 2200 by  Deya Bocanegra RN  Pressure Reduction Techniques: frequent weight shift encouraged  Head of Bed (HOB) Positioning: HOB elevated  Pressure Reduction Devices: positioning supports utilized  Skin Protection: incontinence pads utilized     Problem: Anemia  Goal: Anemia Symptom Improvement  Outcome: Ongoing, Progressing  Intervention: Monitor and Manage Anemia  Recent Flowsheet Documentation  Taken 2/14/2023 0000 by Deya Bocanegra RN  Safety Promotion/Fall Prevention:   activity supervised   assistive device/personal items within reach   clutter free environment maintained   fall prevention program maintained   mobility aid in reach   lighting adjusted   nonskid shoes/slippers when out of bed   muscle strengthening facilitated   room organization consistent   safety round/check completed   toileting scheduled  Taken 2/13/2023 2200 by Deya Bocanegra RN  Safety Promotion/Fall Prevention:   activity supervised   assistive device/personal items within reach   clutter free environment maintained   fall prevention program maintained   lighting adjusted   mobility aid in reach   muscle strengthening facilitated   nonskid shoes/slippers when out of bed   room organization consistent   safety round/check completed   toileting scheduled     Problem: Device-Related Complication Risk (Hemodialysis)  Goal: Safe, Effective Therapy Delivery  Outcome: Ongoing, Progressing  Intervention: Optimize Device Care and Function  Recent Flowsheet Documentation  Taken 2/14/2023 0000 by Deya Bocanegra RN  Medication Review/Management: medications reviewed  Taken 2/13/2023 2200 by Deya Bocanegra RN  Medication Review/Management: medications reviewed     Problem: Hemodynamic Instability (Hemodialysis)  Goal: Effective Tissue Perfusion  Outcome: Ongoing, Progressing     Problem: Infection (Hemodialysis)  Goal: Absence of Infection Signs and Symptoms  Outcome: Ongoing, Progressing     Problem: Fluid and Electrolyte Imbalance (Acute Kidney  Injury/Impairment)  Goal: Fluid and Electrolyte Balance  Outcome: Ongoing, Progressing     Problem: Oral Intake Inadequate (Acute Kidney Injury/Impairment)  Goal: Optimal Nutrition Intake  Outcome: Ongoing, Progressing     Problem: Renal Function Impairment (Acute Kidney Injury/Impairment)  Goal: Effective Renal Function  Outcome: Ongoing, Progressing  Intervention: Monitor and Support Renal Function  Recent Flowsheet Documentation  Taken 2/14/2023 0000 by Deya Bocanegra RN  Medication Review/Management: medications reviewed  Taken 2/13/2023 2200 by Deya Bocanegra, RN  Medication Review/Management: medications reviewed   Goal Outcome Evaluation:

## 2023-02-14 NOTE — CASE MANAGEMENT/SOCIAL WORK
Case Management Discharge Note      Final Note: I spoke with Scott at [facility] and informed him of the pts DC today. The pt has an outpt HD spot at [facility] to start on 2-. The start time has changed to 1400. I informed the pt of this change. He verbalizes undertanding. He will have transport. A PCP appointment is in the AVS.     I faxed the DC summary to [facility] at [phone]    Selected Continued Care - Admitted Since 2/7/2023     Destination    No services have been selected for the patient.              Durable Medical Equipment    No services have been selected for the patient.              Dialysis/Infusion     Service Provider Selected Services Address Phone Fax Patient Preferred    [facility] [address], [place] [id number] [phone] --          Home Medical Care    No services have been selected for the patient.              Therapy    No services have been selected for the patient.              Community Resources    No services have been selected for the patient.              Community & DME    No services have been selected for the patient.                       Final Discharge Disposition Code: 01 - home or self-care

## 2023-02-14 NOTE — THERAPY TREATMENT NOTE
Patient Name: Jun Garcia  : 1960    MRN: [id number]                              Today's Date: 2023       Admit Date: 2023    Visit Dx:     ICD-10-CM ICD-9-CM   1. Acute renal failure superimposed on stage 4 chronic kidney disease, unspecified acute renal failure type (HCC)  N17.9 584.9    N18.4 585.4   2. Hypermagnesemia  E83.41 275.2   3. Hyperkalemia  E87.5 276.7   4. Hyperphosphatemia  E83.39 275.3   5. Dehydration  E86.0 276.51   6. Metabolic acidosis  E87.20 276.2     Patient Active Problem List   Diagnosis   • Acute renal failure superimposed on stage 4 chronic kidney disease, unspecified acute renal failure type (HCC)   • Metabolic acidosis   • Hyperkalemia   • Elevated troponin   • Elevated lipase     Past Medical History:   Diagnosis Date   • Diabetes mellitus (HCC)    • Elevated cholesterol    • GERD (gastroesophageal reflux disease)    • Hyperlipidemia    • Hypertension    • Stroke (HCC)      Past Surgical History:   Procedure Laterality Date   • CYSTOSCOPY BLADDER BIOPSY N/A 2023    Procedure: CYSTOSCOPY CLOT EVACUATION WITH FULGURATION, RETROGRADE PYELOGRAM;  Surgeon: Mandi Velasco MD;  Location: [facility];  Service: Urology;  Laterality: N/A;      General Information     Row Name 23 1000          Physical Therapy Time and Intention    Document Type therapy note (daily note)  -AS     Mode of Treatment physical therapy  -AS     Row Name 23 1000          General Information    Patient Profile Reviewed yes  -AS     Existing Precautions/Restrictions fall;other (see comments)  [place]  -AS     Barriers to Rehab medically complex;language barrier  -AS     Row Name 23 1000          Cognition    Orientation Status (Cognition) oriented x 3  -AS     Row Name 23 1000          Safety Issues, Functional Mobility    Safety Issues Affecting Function (Mobility) safety precaution awareness;safety precautions follow-through/compliance  -AS     Impairments Affecting  Function (Mobility) balance;endurance/activity tolerance;strength  -AS     Comment, Safety Issues/Impairments (Mobility) alert, patient having pain at [facility]  -AS           User Key  (r) = Recorded By, (t) = Taken By, (c) = Cosigned By    Initials Name Provider Type    AS Candy Eubanks PTA Physical Therapist Assistant               Mobility     Row Name 02/14/23 1001          Bed Mobility    Supine-Sit [place] (Bed Mobility) modified independence  -AS     Sit-Supine [place] (Bed Mobility) modified independence  -AS     Assistive Device (Bed Mobility) head of bed elevated;bed rails  -AS     Comment, (Bed Mobility) no safety concerns with bed mobility or transfers  -AS     Row Name 02/14/23 1001          Transfers    Comment, (Transfers) good safety awareness  -AS     Row Name 02/14/23 1001          Bed-Chair Transfer    Bed-Chair [place] (Transfers) not tested  -AS     Comment, (Bed-Chair Transfer) patient requested back to bed  -AS     Row Name 02/14/23 1001          Sit-Stand Transfer    Sit-Stand [place] (Transfers) standby assist  -AS     Comment, (Sit-Stand Transfer) no AD  -AS     Row Name 02/14/23 1001          Gait/Stairs (Locomotion)    [place] Level (Gait) verbal cues;contact guard;1 person assist  -AS     Assistive Device (Gait) other (see comments)  no AD  -AS     Distance in Feet (Gait) 200  -AS     Deviations/Abnormal Patterns (Gait) bilateral deviations;base of support, wide;liliya decreased  -AS     Bilateral Gait Deviations heel strike decreased  -AS     Comment, (Gait/Stairs) patient ambulated 200 feet with CGA x1, patient with wide RAMÓN this date with pain at [facility], slightly unsteady gait with a few LOB but patient able to self correct. May need STC for improved gait mechanics.  -AS           User Key  (r) = Recorded By, (t) = Taken By, (c) = Cosigned By    Initials Name Provider Type    AS Candy Eubanks PTA Physical Therapist Assistant                Obj/Interventions     Row Name 02/14/23 1005          Motor Skills    Therapeutic Exercise knee;ankle  -AS     Row Name 02/14/23 1005          Knee (Therapeutic Exercise)    Knee (Therapeutic Exercise) strengthening exercise  -AS     Knee Strengthening (Therapeutic Exercise) bilateral;marching while standing;LAQ (long arc quad);sitting;10 repetitions  -AS     Row Name 02/14/23 1005          Ankle (Therapeutic Exercise)    Ankle (Therapeutic Exercise) AROM (active range of motion)  -AS     Ankle AROM (Therapeutic Exercise) bilateral;dorsiflexion;plantarflexion;sitting;10 repetitions  -AS           User Key  (r) = Recorded By, (t) = Taken By, (c) = Cosigned By    Initials Name Provider Type    AS Candy Eubanks PTA Physical Therapist Assistant               Goals/Plan    No documentation.                Clinical Impression     Row Name 02/14/23 1005          Pain    Pretreatment Pain Rating 5/10  -AS     Posttreatment Pain Rating 5/10  -AS     Pain Location - --  [place] site  -AS     Pain Intervention(s) Repositioned;Ambulation/increased activity  -AS     Row Name 02/14/23 1005          Plan of Care Review    Plan of Care Reviewed With patient  -AS     Progress improving  -AS     Outcome Evaluation patient ambulated 200 feet with CGA x1, patient with wide RAMÓN this date with pain at [facility], slightly unsteady gait with a few LOB but patient able to self correct. May need STC for improved gait mechanics.  -AS     Row Name 02/14/23 1005          Positioning and Restraints    Pre-Treatment Position in bed  -AS     Post Treatment Position bed  -AS     In Bed supine;call light within reach;encouraged to call for assist;notified nsg  -AS           User Key  (r) = Recorded By, (t) = Taken By, (c) = Cosigned By    Initials Name Provider Type    AS Candy Eubanks PTA Physical Therapist Assistant               Outcome Measures     Row Name 02/14/23 1007          How much help from another person do you currently  need...    Turning from your back to your side while in flat bed without using bedrails? 4  -AS     Moving from lying on back to sitting on the side of a flat bed without bedrails? 4  -AS     Moving to and from a bed to a chair (including a wheelchair)? 3  -AS     Standing up from a chair using your arms (e.g., wheelchair, bedside chair)? 3  -AS     Climbing 3-5 steps with a railing? 3  -AS     To walk in hospital room? 3  -AS     AM-PAC 6 Clicks Score (PT) 20  -AS     Highest level of mobility 6 --> Walked 10 steps or more  -AS     Row Name 02/14/23 1007          Functional Assessment    Outcome Measure Options AM-PAC 6 Clicks Basic Mobility (PT)  -AS           User Key  (r) = Recorded By, (t) = Taken By, (c) = Cosigned By    Initials Name Provider Type    AS Candy Eubanks PTA Physical Therapist Assistant                             Physical Therapy Education     Title: PT OT SLP Therapies (In Progress)     Topic: Physical Therapy (In Progress)     Point: Mobility training (In Progress)     Learning Progress Summary           Patient Acceptance, E, NR by AS at 2/14/2023 1007    Acceptance, E, VU by KR at 2/14/2023 0344    Acceptance, E, NR by KR at 2/12/2023 2218    Acceptance, E,I, VU,NR by LM at 2/9/2023 1530   Family Acceptance, E, VU by KR at 2/14/2023 0344                   Point: Home exercise program (In Progress)     Learning Progress Summary           Patient Acceptance, E, NR by AS at 2/14/2023 1007    Acceptance, E, VU by KR at 2/14/2023 0344    Acceptance, E, NR by KR at 2/12/2023 2218   Family Acceptance, E, VU by KR at 2/14/2023 0344                   Point: Precautions (In Progress)     Learning Progress Summary           Patient Acceptance, E, NR by AS at 2/14/2023 1007    Acceptance, E, VU by KR at 2/14/2023 0344    Acceptance, E, NR by KR at 2/12/2023 2218    Acceptance, E,I, VU,NR by LM at 2/9/2023 1530   Family Acceptance, E, VU by KR at 2/14/2023 0344                               User  Key     Initials Effective Dates Name Provider Type Discipline    AS 02/03/23 -  Candy Eubanks PTA Physical Therapist Assistant PT    LM 06/16/21 -  Lelo Higeura PT Physical Therapist PT    KR 01/16/23 -  Deya Bocanegra, RN Registered Nurse Nurse              PT Recommendation and Plan     Plan of Care Reviewed With: patient  Progress: improving  Outcome Evaluation: patient ambulated 200 feet with CGA x1, patient with wide RAMÓN this date with pain at [place] [facility], slightly unsteady gait with a few LOB but patient able to self correct. May need STC for improved gait mechanics.     Time Calculation:    PT Charges     Row Name 02/14/23 1007             Time Calculation    Start Time 0935  -AS      PT Received On 02/14/23  -AS      PT Goal Re-Cert Due Date 02/19/23  -AS         Timed Charges    66369 - PT Therapeutic Exercise Minutes 10  -AS      88107 - Gait Training Minutes  13  -AS         Total Minutes    Timed Charges Total Minutes 23  -AS       Total Minutes 23  -AS            User Key  (r) = Recorded By, (t) = Taken By, (c) = Cosigned By    Initials Name Provider Type    AS Candy Eubanks PTA Physical Therapist Assistant              Therapy Charges for Today     Code Description Service Date Service Provider Modifiers Qty    24491705791 HC PT THER PROC EA 15 MIN 2/14/2023 Candy Eubanks PTA GP 1    94482140635 HC GAIT TRAINING EA 15 MIN 2/14/2023 Candy Eubanks PTA GP 1          PT G-Codes  Outcome Measure Options: AM-PAC 6 Clicks Basic Mobility (PT)  AM-PAC 6 Clicks Score (PT): 20  AM-PAC 6 Clicks Score (OT): 20       Candy Eubanks PTA  2/14/2023

## 2023-02-14 NOTE — PLAN OF CARE
Goal Outcome Evaluation:  Plan of Care Reviewed With: patient        Progress: improving  Outcome Evaluation: patient ambulated 200 feet with CGA x1, patient with wide RAMÓN this date with pain at [place] [facility], slightly unsteady gait with a few LOB but patient able to self correct. May need STC for improved gait mechanics.

## 2023-02-14 NOTE — DISCHARGE SUMMARY
[facility] Medicine Services  DISCHARGE SUMMARY    Patient Name: Jun Garcia  : 1960  MRN: [id number]    Date of Admission: 2023 12:48 PM  Date of Discharge: 23  Primary Care Physician: Provider, No Known    Consults     Date and Time Order Name Status Description    2023 12:34 AM Inpatient General Surgery Consult Completed     2023  3:07 PM Inpatient Nephrology Consult      2023  2:09 PM Inpatient Urology Consult Completed           Hospital Course     Presenting Problem:   Acute renal failure superimposed on stage 4 chronic kidney disease, unspecified acute renal failure type (HCC) [N17.9, N18.4]    Active Hospital Problems    Diagnosis  POA   • **Acute renal failure superimposed on stage 4 chronic kidney disease, unspecified acute renal failure type (HCC) [N17.9, N18.4]  Yes   • Diabetes mellitus (HCC) [E11.9]  Yes   • Metabolic acidosis [E87.20]  Yes   • Hyperkalemia [E87.5]  Yes   • Elevated troponin [R77.8]  Yes   • Elevated lipase [R74.8]  Yes      Resolved Hospital Problems   No resolved problems to display.        Hospital Course:  Jun Garcia is a 62 y.o. male with prior hx obstructive uropathy and CKD presenting with worsening FTT and ongoing renal failure and obstructive uropathy.     CKD IV-V nearing ESRD  Metabolic acidosis, improving  Hyperkalemia, resolved  -Nephrology following, seen by Dr. Caicedo, he is s/p tunnel cath placement and has been initiated on HD, started 2023, has so far received 3 sessions  -Dr. Chowdhury followed for PD cath and AV fistula placement, this will be evaluated as outpatient   - has already arranged HD chair, for MWF     Obstructive uropathy  Gross hematuria, resolved  -CT abdomen pelvis showed moderate to severe bilateral hydronephrosis, enlarged prostate with irregular protrusion into the urinary bladder (same finding seen on CT done in November at )  -Urology following, he is s/p cystoscopy and  clot evacuation in the OR by Dr. Velasco 2/9/2023  -CBI restarted overnight 2/13/23 due to penile shaft pain, remains high risk for bladder outlet surgery, plan is to be discharged with Caldwell and will see urology (FRANKY Krause ) as outpatient 7-10  days post discharge for intermittent catheterization teaching and catheter removal.    -continue BID bacitracin ointment to help with penile pain/catheter irriation  -manual bladder irrigation with 250 cc sterile water done per urology on date of dc. Urine output light pink/clear, no clots.      Anemia  -Multifactorial ACD from ESRD vs blood loss from hematuria  -Hg 5.8 2/8/23, s/p 2 units PRBC with good response, H&H  dropped again to 6.6 on 2/11, he is s/p an additional 1 unit PRBC with good response  -repeat H&H stable     T2DM  -do not have a recent A1C, unable to obtain this admission due to multiple blood products  -blood sugars have been fairly controlled last few days.  Earlier in admission, blood sugars in 200's, up to 500 one time  -start januvia due to ESRD  -diabetes educator to see    Elevated troponin   -Suspect this is secondary to volume depletion  -Patient is without any chest pain and troponin has flattened   -EKG is unremarkable, echo with EF 70%, moderate pericardial effusion      Elevated lipase  -Suspected to be likely secondary to resolving pancreatitis  -s/p supportive therapy with IV fluids and pain meds     Constipation, resolved  -s/p relistor         Discharge Follow Up Recommendations for outpatient labs/diagnostics:  PCP 1 week  Urology 7-10 days, their office will call for an appointment  Keep caldwell until that follow up  MWF dialysis at [facility]    Day of Discharge     HPI:   Penile pain/caldwell irritation improved    Review of Systems  Gen- No fevers, chills  CV- No chest pain, palpitations  Resp- No cough, dyspnea  GI- No N/V/D, abd pain      Vital Signs:   Temp:  [97.5 °F (36.4 °C)-98.6 °F (37 °C)] 97.5 °F (36.4 °C)  Heart Rate:   [] 80  Resp:  [16-18] 16  BP: (125-145)/(71-89) 127/83      Physical Exam:  Constitutional: No acute distress, awake, alert  HENT: NCAT, mucous membranes moist  Respiratory: Clear to auscultation bilaterally, respiratory effort normal   Cardiovascular: RRR, no murmurs, rubs, or gallops  Gastrointestinal: Positive bowel sounds, soft, nontender, nondistended  Musculoskeletal: No bilateral ankle edema  Psychiatric: Flat affect, cooperative  Neurologic: Oriented x 3, DEMARCO, speech clear  Skin: No rashes      Pertinent  and/or Most Recent Results     LAB RESULTS:      Lab 02/14/23 0442 02/13/23 0426 02/12/23  0337 02/11/23  2326 02/11/23  1100 02/11/23  0334 02/09/23  1549 02/09/23 0449   WBC 12.87* 13.84* 13.21*  --   --  11.43*  --  12.49*   HEMOGLOBIN 9.8* 9.7* 9.4* 9.6* 7.8* 6.6*   < > 7.8*   HEMATOCRIT 29.7* 29.1* 28.1* 27.8* 23.2* 20.3*   < > 22.6*   PLATELETS 192 167 137*  --   --  130*  --  186   NEUTROS ABS 7.96*  --   --   --   --   --   --  9.08*   IMMATURE GRANS (ABS) 0.17*  --   --   --   --   --   --  0.06*   LYMPHS ABS 1.50  --   --   --   --   --   --  1.23   MONOS ABS 1.20*  --   --   --   --   --   --  0.61   EOS ABS 1.94*  --   --   --   --   --   --  1.46*   MCV 89.7 89.0 87.8  --   --  94.4  --  84.0    < > = values in this interval not displayed.         Lab 02/14/23 0442 02/13/23 0426 02/12/23  0337 02/11/23  0334 02/10/23  0410 02/09/23  0449   SODIUM 137 139 138 140 139 138   POTASSIUM 4.5 4.4 4.3 4.5 4.6 4.6   CHLORIDE 100 102 103 102 96* 94*   CO2 22.0 21.0* 22.0 24.0 24.0 27.0   ANION GAP 15.0 16.0* 13.0 14.0 19.0* 17.0*   BUN 68* 94* 80* 115* 153* 150*   CREATININE 6.82* 8.93* 7.34* 9.81* 12.71* 12.87*   EGFR 8.4* 6.1* 7.7* 5.4* 4.0* 3.9*   GLUCOSE 92 100* 125* 97 98 158*   CALCIUM 8.3* 8.3* 7.8* 7.7* 7.8* 7.7*   PHOSPHORUS  --  7.0* 6.1* 7.6* 9.2* 8.4*         Lab 02/13/23  0426 02/12/23  0337 02/11/23  0334 02/10/23  0410 02/09/23  0449   ALBUMIN 3.2* 3.2* 3.3* 3.2* 3.1*                  Lab 02/08/23  1019 02/08/23  0939 02/08/23  0343   IRON  --   --  68   IRON SATURATION  --   --  29   TIBC  --   --  234*   TRANSFERRIN  --   --  157*   FERRITIN  --   --  899.10*   ABO TYPING O   < >  --    RH TYPING Positive   < >  --    ANTIBODY SCREEN Negative  --   --     < > = values in this interval not displayed.         Brief Urine Lab Results  (Last result in the past 365 days)      Color   Clarity   Blood   Leuk Est   Nitrite   Protein   CREAT   Urine HCG        11/17/22 1804 Yellow   Cloudy     Large                   Microbiology Results (last 10 days)     ** No results found for the last 240 hours. **          Adult Transthoracic Echo Complete w/ Color, Spectral and Contrast if necessary per protocol    Result Date: 2/8/2023  •  Left ventricular systolic function is hyperdynamic (EF > 70%). •  Left ventricular wall thickness is consistent with mild concentric hypertrophy •  Normal right ventricular cavity size and systolic function noted. •  There is a moderate (1-2cm) pericardial effusion. There is no evidence of cardiac tamponade.  No chamber collapse.     CT Abdomen Pelvis Without Contrast    Result Date: 2/7/2023  CT ABDOMEN PELVIS WO CONTRAST Date of Exam: 2/7/2023 5:05 PM EST Indication: urinary obstruction. Comparison: None available. Technique: Axial CT images were obtained of the abdomen and pelvis without the administration of contrast. Reconstructed coronal and sagittal images were also obtained. Automated exposure control and iterative construction methods were used. Findings: No suspicious infiltrate is seen in the lung bases. No pleural effusion. There is a trace amount of pericardial fluid. Heart size appears within normal limits. Large calcified gallstone is seen in the gallbladder lumen. No definite pericholecystic inflammation or biliary ductal dilatation. Small cyst is seen in the posterior-inferior right hepatic lobe. No splenomegaly. No suspicious adrenal lesion is seen. No  acute pancreatic abnormality is identified. There is atherosclerosis of the aorta without definite aneurysm. No significant abdominal adenopathy is seen. There is moderate to severe bilateral hydronephrosis. There is bilateral renal cortical thinning. No significant renal calculi are seen. There is bilateral ureteral dilatation to the urinary bladder. No obstructing calculus or other ureteral abnormality is seen. The bladder is prominently distended. The bladder wall appears mildly thickened. There appears to be enlargement of the prostate with irregular protrusion into the inferior urinary bladder. No definite inflammatory changes are seen surrounding the bladder on this exam. The seminal vesicles appear symmetric and grossly normal in size. No definite urethral calculus is seen. Overall findings are suspicious for bladder outlet obstruction due to prostate enlargement with moderate to severe hydronephrosis. No acute gastric abnormality is seen. Small bowel loops appear nondilated. The appendix appears within normal limits. No definite lytic or aggressive osseous lesion is seen. There are degenerative changes in the lumbar spine. There is mild to moderate bilateral hip arthritis. Small sclerotic foci in the proximal femurs and at L2 are nonspecific but may represent bone islands.     Impression: 1.Moderate to severe bilateral hydronephrosis with ureteral dilatation and prominent bladder distention. The prostate appears enlarged with irregular protrusion into the urinary bladder. Findings are suspicious for bladder outlet obstruction due to the prostate. Recommend urology consultation and catheter placement. 2.Prostate enlargement is indeterminate and could be related to benign hypertrophy or malignancy. 3.Large calcified gallstone without CT findings of acute cholecystitis. 4.Trace pericardial effusion. 5.Small sclerotic foci in the proximal femurs and at L2 are indeterminate and could be benign bone islands, but  sclerotic metastases, which are commonly seen with prostate malignancy, cannot be excluded, given the above findings. Electronically Signed: Chaz Rodriguez  2/7/2023 5:51 PM EST  Workstation ID: [id number]    IR Tunneled Catheter    Result Date: 2/10/2023  IR TUNNELED CATHETER-                                                         History: CKD V; N17.9-Acute kidney failure, unspecified; N18.4-Chronic kidney disease, stage 4 (severe); E83.41-Hypermagnesemia; E87.5-Hyperkalemia; E83.39-Other disorders of phosphorus metabolism; E86.0-Dehydration; E87.20-Acidosis, unspecified    : Aidan Perdomo MD.  Modality: Sonography and fluoroscopy                DOSE REDUCTION: The examination was performed according to departmental dose-optimization program.  Fluoro time: 0.4 minutes  Radiation Dose: 1.7 mGy air Kerma.                                                                              SEDATION: Moderate sedation was administered. 1 milligram of Versed and 50 micrograms of fentanyl IV was used for moderate sedation monitored under my direction. Total intra service time of sedation was 13 minutes. The patient's vital signs were monitored throughout the procedure and recorded in the patient's medical record by the nurse.  Medicines: Ancef 1 g IV.  Anesthesia: Lidocaine with epinephrine, local infiltration.  Estimated blood loss:  < 5 cc.          Technique: A thorough discussion of the risks, benefits, and alternatives of the procedure, and if applicable, moderate sedation, was carried out with the patient. They were encouraged to ask any questions. Any questions were answered. They verbalized understanding. A written informed consent was then signed.   A multi-component timeout was performed prior to starting the procedure using the departmental protocol.  The procedure room personnel used personal protective equipment. The operators used sterile gloves and if indicated, sterile gowns. The surgical site was  prepped with chlorhexidine gluconate  and draped in the maximal applicable sterile fashion.  A preliminary ultrasonogram was performed of the target that revealed a patent and compressible Right internal jugular vein. Pertinent ultrasound images were stored in the PACS for documentation.  A sterile prep and drape of the right neck and upper chest was performed using maximal sterile technique.  Using aseptic precautions, real-time ultrasound guidance, the target vein was accessed after local anesthetic infiltration and dermatotomy with an access needle. A guidewire was advanced into the central venous system under fluoroscopic guidance. Over the wire following standard technique a peel-away sheath was placed.  After local anesthesia, an incision was created at the exit site location and a cuffed tunneled dialysis catheter of an appropriate length was tunneled from the exit site to the venotomy site using a tunneling device. The catheter was advanced into the venous system through the peel-away sheath which was removed.  The catheter aspirated and flushed well and was terminally packed with heparin 1000 units per cc.  The catheter was secured to skin using nonabsorbable suture and a CHG dressing applied.  The venotomy site was closed using Dermabond. An aseptic dressing was applied using the protocol for Dermabond.  The patient was transferred to the recovery area and was discharged from the department in stable condition.                     Complications: None immediate.     Device: 15.5 Armenian x 19 cm cuff to tip Duraflow catheter.  Findings: Patent and compressible Right internal jugular. Final image shows the catheter to be in good position with the catheter tip in the right atrium, an excellent position for use. There is no complication.                                                               Impression:    Successful ultrasound and fluoroscopic guided Right internal jugular vein route cuffed tunneled  hemodialysis catheter placement as described above.  Thank you for the opportunity to assist in the care of your patient.  This report was finalized on 2/10/2023 8:51 PM by Aidan Perdomo MD.      XR Chest 1 View    Result Date: 2/7/2023  XR CHEST 1 VW Date of Exam: 2/7/2023 2:04 PM EST Indication: Chest Pain Triage Protocol. Comparison: None available. Findings: Heart size and pulmonary vasculature are within normal limits. Lungs clear. Costophrenic angles sharp     Impression: No active cardiopulmonary disease Electronically Signed: Martin Marinelli  2/7/2023 2:38 PM EST  Workstation ID: [id number]    XR Pyelogram Retrograde    Result Date: 2/10/2023  XR PYELOGRAM RETROGRADE Date of Exam: 2/9/2023 10:48 AM EST Indication: CYSTOSCOPY CLOT EVACUATION WITH FULGURATION, RETROGRADE PYELOGRAM. Comparison: CT 2/7/2023 Findings: Bilat. retrograde programs were performed. There is severe left-sided hydronephrosis. Within the right renal pelvis there is a filling defect which may be an air bubble. Correlation with real-time fluoroscopy would be recommended. There is severe right-sided hydronephrosis. Total fluoroscopy time 0.9 minutes, 7 images     Impression: Bilateral hydronephrosis with filling defects within the right renal pelvis which may be an air bubble. Clinical correlation recommended. Electronically Signed: Moi Holden  2/10/2023 7:57 AM EST  Workstation ID: [id number]    Duplex Vein Mapping Study Upper Extremity - Left CAR    Result Date: 2/10/2023  •  Inadequate/small left cephalic vein •  Adequate/good basilic vein. 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       Results for orders placed during the hospital encounter of 02/07/23    Duplex Vein Mapping Study Upper Extremity - Left CAR    Interpretation Summary  •  Inadequate/small left cephalic vein  •  Adequate/good basilic vein.  •  Chronic superficial thrombophlebitis noted in the left cephalic vein in the  forearm.      Results for orders placed during the hospital encounter of 02/07/23    Duplex Vein Mapping Study Upper Extremity - Left CAR    Interpretation Summary  •  Inadequate/small left cephalic vein  •  Adequate/good basilic vein.  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      Results for orders placed during the hospital encounter of 02/07/23    Adult Transthoracic Echo Complete w/ Color, Spectral and Contrast if necessary per protocol    Interpretation Summary  •  Left ventricular systolic function is hyperdynamic (EF > 70%).  •  Left ventricular wall thickness is consistent with mild concentric hypertrophy  •  Normal right ventricular cavity size and systolic function noted.  •  There is a moderate (1-2cm) pericardial effusion. There is no evidence of cardiac tamponade.  No chamber collapse.        Discharge Details        Discharge Medications      New Medications      Instructions Start Date   acetaminophen 325 MG tablet  Commonly known as: TYLENOL   650 mg, Oral, Every 4 Hours PRN      b complex-vitamin c-folic acid 0.8 MG tablet tablet   1 tablet, Oral, Daily   Start Date: February 15, 2023     bacitracin-polymyxin b [id number] UNIT/GM ointment  Commonly known as: POLYSPORIN   Apply to tip of penis/catheter twice daily for irritation      HYDROcodone-acetaminophen 5-325 MG per tablet  Commonly known as: NORCO   1 tablet, Oral, Every 8 Hours PRN      miconazole 2 % cream  Commonly known as: MICOTIN   1 application, Topical, Every 12 Hours Scheduled      pantoprazole 40 MG EC tablet  Commonly known as: PROTONIX   40 mg, Oral, Every Early Morning   Start Date: February 15, 2023     SITagliptin 25 MG tablet  Commonly known as: Januvia   25 mg, Oral, Daily      sodium bicarbonate 650 MG tablet   650 mg, Oral, 3 Times Daily             No Known Allergies      Discharge Disposition:  Home or Self Care    Diet:  Hospital:  Diet Order   Procedures   • Diet: Diabetic  Diets; Consistent Carbohydrate; Texture: Regular Texture (IDDSI 7); Fluid Consistency: Thin (IDDSI 0)       Activity:  Activity Instructions     Activity as Tolerated                 CODE STATUS:    Code Status and Medical Interventions:   Ordered at: 02/07/23 1357     Code Status (Patient has no pulse and is not breathing):    CPR (Attempt to Resuscitate)     Medical Interventions (Patient has pulse or is breathing):    Full Support       Future Appointments   Date Time Provider Department Center   2/15/2023  7:00 AM [facility]   2/17/2023 11:00 AM Samir Chow DO [facility]   2/20/2023  2:30 PM Lesly Coffman APRN [facility]       Additional Instructions for the Follow-ups that You Need to Schedule     Discharge Follow-up with PCP   As directed       Currently Documented PCP:    Provider, No Known    PCP Phone Number:    None     Follow Up Details: 1 week         Discharge Follow-up with Specified Provider: Nephrology   As directed      To: Nephrology    Follow Up Details: Dialysis on [facility]         Discharge Follow-up with Specified Provider: Urology   As directed      To: Urology    Follow Up Details: 7-10d, office will call with an appointment                 FRANKY Sumner  02/14/23      Time Spent on Discharge:  I spent 40 minutes on this discharge activity which included: face-to-face encounter with the patient, reviewing the data in the system, coordination of the care with the nursing staff as well as consultants, documentation, and entering orders.

## 2023-02-15 ENCOUNTER — TRANSITIONAL CARE MANAGEMENT TELEPHONE ENCOUNTER (OUTPATIENT)
Dept: CALL CENTER | Facility: HOSPITAL | Age: 63
End: 2023-02-15
Payer: COMMERCIAL

## 2023-02-15 NOTE — OUTREACH NOTE
Prep Survey    Flowsheet Row Responses   [facility] facility patient discharged from? [place]   Is LACE score < 7 ? No   Eligibility [facility]   Date of Admission 02/07/23   Date of Discharge 02/14/23   Discharge Disposition Home or Self Care   Discharge diagnosis Cystoscopy clot evacuation    Does the patient have one of the following disease processes/diagnoses(primary or secondary)? Other   Does the patient have Home health ordered? No   Is there a DME ordered? No   Comments regarding appointments New PCP appt.   Prep survey completed? Yes          WM RODRIGUEZ - Registered Nurse

## 2023-02-15 NOTE — OUTREACH NOTE
Call Center TCM Note    Flowsheet Row Responses   [facility] patient discharged from? [place]   Does the patient have one of the following disease processes/diagnoses(primary or secondary)? Other   TCM attempt successful? Yes   Call start time 1144   Call end time 1146   Discharge diagnosis Cystoscopy clot evacuation    Meds reviewed with patient/caregiver? Yes   Is the patient having any side effects they believe may be caused by any medication additions or changes? No   Does the patient have all medications ordered at discharge? Yes   Is the patient taking all medications as directed (includes completed medication regime)? Yes   Comments New patient appt with Dr. Chow 2/22 @ 10:15   Does the patient have an appointment with their PCP within 7 days of discharge? Yes   Psychosocial issues? No   Did the patient receive a copy of their discharge instructions? Yes   Nursing interventions Reviewed instructions with patient   What is the patient's perception of their health status since discharge? Improving   Is the patient/caregiver able to teach back signs and symptoms related to disease process for when to call PCP? Yes   Is the patient/caregiver able to teach back signs and symptoms related to disease process for when to call 911? Yes   Is the patient/caregiver able to teach back the hierarchy of who to call/visit for symptoms/problems? PCP, Specialist, Home health nurse, Urgent Care, ED, 911 Yes   Additional teach back comments Call was brief and states he is doing well.    TCM call completed? Yes   Wrap up additional comments Denies questions or needs at this time.   Call end time 1146          Lucila Inman LPN    2/15/2023, 11:47 EST

## 2023-02-21 NOTE — PROGRESS NOTES
Enter Query Response Below      Query Response:     Hyponatremia clinically insignificant          If applicable, please update the problem list.     Patient: Jun Garcia        : 1960  Account: [id number]           Admit Date:         How to Respond to this query:       a. Click New Note     b. Answer query within the yellow box.                c. Update the Problem List, if applicable.      If you have any questions about this query contact me at: [email or web address]    Dr. Marquez,     62-year-old male with PMH HTN, CKD, diabetes, presented with worsening weakness, nausea, weight loss, epigastric pain, and ongoing renal failure. Patient was initiated on hemodialysis.   Consulting Nephrologist's notes - include, “Hyponatremia: Due to CKD”.  Serum sodium results: - 134, - 138, - 138, 2/10- 139, - 140, - 138, - 139, - 137  Treatment included daily lab monitoring, IV fluids.    Please clarify the following:  Hyponatremia-clinically significant  Hyponatremia-clinically insignificant  Other (please specify)_______  Unable to determine    By submitting this query, we are merely seeking further clarification of documentation to accurately reflect all conditions that you are monitoring, evaluating, treating or that extend the hospitalization or utilize additional resources of care. Please utilize your independent clinical judgment when addressing the question(s) above.     This query and your response, once completed, will be entered into the legal medical record.    Sincerely,  Mony Thayer  Clinical Documentation Integrity Program

## 2023-02-23 ENCOUNTER — OFFICE VISIT (OUTPATIENT)
Dept: UROLOGY | Facility: CLINIC | Age: 63
End: 2023-02-23
Payer: COMMERCIAL

## 2023-02-23 VITALS
HEIGHT: 67 IN | SYSTOLIC BLOOD PRESSURE: 144 MMHG | OXYGEN SATURATION: 98 % | BODY MASS INDEX: 23.39 KG/M2 | WEIGHT: 149 LBS | HEART RATE: 108 BPM | DIASTOLIC BLOOD PRESSURE: 78 MMHG

## 2023-02-23 DIAGNOSIS — R31.0 GROSS HEMATURIA: ICD-10-CM

## 2023-02-23 DIAGNOSIS — R33.8 URINARY RETENTION DUE TO BENIGN PROSTATIC HYPERPLASIA: ICD-10-CM

## 2023-02-23 DIAGNOSIS — N40.1 URINARY RETENTION DUE TO BENIGN PROSTATIC HYPERPLASIA: ICD-10-CM

## 2023-02-23 DIAGNOSIS — N18.4 ACUTE RENAL FAILURE SUPERIMPOSED ON STAGE 4 CHRONIC KIDNEY DISEASE, UNSPECIFIED ACUTE RENAL FAILURE TYPE: Primary | ICD-10-CM

## 2023-02-23 DIAGNOSIS — N17.9 ACUTE RENAL FAILURE SUPERIMPOSED ON STAGE 4 CHRONIC KIDNEY DISEASE, UNSPECIFIED ACUTE RENAL FAILURE TYPE: Primary | ICD-10-CM

## 2023-02-23 PROCEDURE — 99214 OFFICE O/P EST MOD 30 MIN: CPT | Performed by: NURSE PRACTITIONER

## 2023-02-23 NOTE — PROGRESS NOTES
Follow Up Office Visit      Patient Name: Jun Garcia  : 1960   MRN: [id number]     Chief Complaint:    Chief Complaint   Patient presents with   • Catheteral removal       Referring Provider: No ref. provider found    History of Present Illness: Jun Garcia is a 62 y.o. male who presents today for hospital follow up. He was seen in consult while admitted at [facility] on 23 for severe bilateral hydroureteronephrosis, distended urinary bladder and enlarged prostate. He was found to have ARF with Cr 13.21. He was also found to be severely anemic with HCT of 18.0. A caldwell catheter was initially placed for decompression of urinary tract. He developed grossly bloody urine output requiring Cystoscopy, clot evacuation and fulguration on 23 with Dr. Velasco. No obvious bladder mass noted. He was managed on CBI post-operatively and eventually was able to wean off.     He presents today for caldwell catheter removal/void trial and to learn CIC. He is currently undergoing HD 3 times weekly.     Of note, he was previously admitted to [facility] with urinary retention and left AGAINST MEDICAL ADVICE with catheter removal at that time.      His son is with him today.     3 way caldwell catheter with dark pink urine output, no clots. He reports it became bloody/dark pink last night.   Subjective      Review of System: Review of Systems   Genitourinary:        Urinary retention      I have reviewed the ROS documented by my clinical staff, I have updated appropriately and I agree. FRANKY Krause    I have reviewed and the following portions of the patient's history were updated as appropriate: past family history, past medical history, past social history, past surgical history and problem list.    Medications:     Current Outpatient Medications:   •  acetaminophen (TYLENOL) 325 MG tablet, Take 2 tablets by mouth Every 4 (Four) Hours As Needed for Mild Pain., Disp: , Rfl:  "  •  b complex-vitamin c-folic acid (NEPHRO-LASHON) 0.8 MG tablet tablet, Take 1 tablet by mouth Daily., Disp: 30 tablet, Rfl: 1  •  bacitracin-polymyxin b (POLYSPORIN) 500-61381 UNIT/GM ointment, Apply to tip of penis/catheter twice daily for irritation, Disp: 30 g, Rfl: 0  •  HYDROcodone-acetaminophen (NORCO) 5-325 MG per tablet, Take 1 tablet by mouth Every 8 (Eight) Hours As Needed for Moderate Pain., Disp: 6 tablet, Rfl: 0  •  miconazole (MICOTIN) 2 % cream, Apply 1 application topically to the appropriate area as directed Every 12 (Twelve) Hours., Disp: , Rfl:   •  pantoprazole (PROTONIX) 40 MG EC tablet, Take 1 tablet by mouth Every Morning., Disp: 30 tablet, Rfl: 1  •  SITagliptin (Januvia) 25 MG tablet, Take 1 tablet by mouth Daily., Disp: 30 tablet, Rfl: 1  •  sodium bicarbonate 650 MG tablet, Take 1 tablet by mouth 3 (Three) Times a Day., Disp: 90 tablet, Rfl: 1    Allergies:   No Known Allergies      Objective     Physical Exam:   Vital Signs:   Vitals:    02/23/23 1459   BP: 144/78   Pulse: 108   SpO2: 98%   Weight: 67.6 kg (149 lb)   Height: 170.2 cm (67.01\")     Body mass index is 23.33 kg/m².     Physical Exam  Vitals and nursing note reviewed.   Constitutional:       Appearance: Normal appearance.   HENT:      Head: Normocephalic and atraumatic.      Nose: Nose normal.      Mouth/Throat:      Mouth: Mucous membranes are moist.   Eyes:      Pupils: Pupils are equal, round, and reactive to light.   Pulmonary:      Effort: Pulmonary effort is normal.   Abdominal:      General: Abdomen is flat.      Palpations: Abdomen is soft.   Genitourinary:     Comments: Uncircumcised. 3 way 22 fr caldwell catheter with dark pink urine output to caldwell catheter leg bag. No clots.  Musculoskeletal:         General: Normal range of motion.      Cervical back: Normal range of motion.   Skin:     General: Skin is warm and dry.      Capillary Refill: Capillary refill takes less than 2 seconds.   Neurological:      General: No " focal deficit present.      Mental Status: He is alert.   Psychiatric:         Mood and Affect: Mood normal.         Labs:   Brief Urine Lab Results  (Last result in the past 365 days)      Color   Clarity   Blood   Leuk Est   Nitrite   Protein   CREAT   Urine HCG        11/17/22 1804 Yellow   Cloudy     Large                          Lab Results   Component Value Date    GLUCOSE 92 02/14/2023    CALCIUM 8.3 (L) 02/14/2023     02/14/2023    K 4.5 02/14/2023    CO2 22.0 02/14/2023     02/14/2023    BUN 68 (H) 02/14/2023    CREATININE 6.82 (H) 02/14/2023    BCR 10.0 02/14/2023    ANIONGAP 15.0 02/14/2023       Lab Results   Component Value Date    WBC 12.87 (H) 02/14/2023    HGB 9.8 (L) 02/14/2023    HCT 29.7 (L) 02/14/2023    MCV 89.7 02/14/2023     02/14/2023       Images:   CT Abdomen Pelvis Without Contrast    Result Date: 2/7/2023  Impression: 1.Moderate to severe bilateral hydronephrosis with ureteral dilatation and prominent bladder distention. The prostate appears enlarged with irregular protrusion into the urinary bladder. Findings are suspicious for bladder outlet obstruction due to the prostate. Recommend urology consultation and catheter placement. 2.Prostate enlargement is indeterminate and could be related to benign hypertrophy or malignancy. 3.Large calcified gallstone without CT findings of acute cholecystitis. 4.Trace pericardial effusion. 5.Small sclerotic foci in the proximal femurs and at L2 are indeterminate and could be benign bone islands, but sclerotic metastases, which are commonly seen with prostate malignancy, cannot be excluded, given the above findings. Electronically Signed: Chaz Rodriguez  2/7/2023 5:51 PM EST  Workstation ID: [id number]    CT Abdomen Pelvis Without Contrast    Result Date: 11/18/2022  Bilateral hydronephrosis due to ureteral obstruction at the level of the bladder. Within the bladder there is a large filling defect with a differential diagnosis would  include tumor and/or hemorrhage or combination of both. There is also diffuse bladder wall thickening. CRITICAL RESULT:   No. COMMUNICATION: Per this written report. Dictated by Suman Lara MD on 11/18/2022 2:20 PM Signed by Suman Lara MD on 11/18/2022 2:22 PM    IR Tunneled Catheter    Result Date: 2/10/2023  Impression:    Successful ultrasound and fluoroscopic guided Right internal jugular vein route cuffed tunneled hemodialysis catheter placement as described above.  Thank you for the opportunity to assist in the care of your patient.  This report was finalized on 2/10/2023 8:51 PM by Aidan Perdomo MD.      XR Chest 1 View    Result Date: 2/7/2023  Impression: No active cardiopulmonary disease Electronically Signed: Martin Marinelli  2/7/2023 2:38 PM EST  Workstation ID: [id number]    XR Chest 1 View    Result Date: 11/17/2022  No acute cardiopulmonary abnormality. CRITICAL RESULT:   No. COMMUNICATION: Per this written report. Approved by Kalen Randle DO on 11/17/2022 10:50 AM By electronically signing this report, I, the attending physician, attest that I have personally reviewed the images/data for the above examination(s) and agree with the final edited report. Dictated by Kalen Randle DO on 11/17/2022 10:50 AM Signed by Shantanu Rodriguez MD on 11/17/2022 11:56 AM    US Renal Bilateral    Result Date: 11/17/2022  Severe bilateral hydronephrosis , increased from 4/13/2022. There is bilateral renal parenchymal thinning. Severely distended bladder with mild circumferential bladder wall thickening. Nodular, enlarged prostate protruding into the bladder. These findings may represent sequela of chronic bladder outlet obstruction, however an underlying bladder neoplasm is difficult to exclude on this examination. Urology evaluation recommended. CRITICAL RESULT: No. COMMUNICATION: Per this written report. By electronically signing this report, I, the attending physician, attest that I have personally reviewed the  images/data for the above examination(s) and agree with the final edited report. Dictated by Jody Tran DO on 11/17/2022 3:30 PM Signed by Moi Holden on 11/17/2022 3:52 PM    XR Pyelogram Retrograde    Result Date: 2/10/2023  Impression: Bilateral hydronephrosis with filling defects within the right renal pelvis which may be an air bubble. Clinical correlation recommended. Electronically Signed: Moi Holden  2/10/2023 7:57 AM EST  Workstation ID: [id number]      Measures:   Tobacco:   Jun Garcia  reports that he has never smoked. He has never been exposed to tobacco smoke. He has never used smokeless tobacco..    Urine Incontinence: Patient reports that he is not currently experiencing any symptoms of urinary incontinence.    Assessment / Plan      Assessment/Plan:   62 y.o. male who presented today for follow up of urinary retention and hematuria requiring cystoscopy, clot evacuation and fulguration.     At the bedside, I performed manual bladder irrigation with 500 cc sterile water. Urine output light pink/clear, without clots. He tolerated well. His caldwell catheter leg bag was exchanged, extension tubing was applied and new stat lock was applied.     He defers caldwell catheter removal at this time due to dark pink urine and fear of pain with removal. He is requesting to keep caldwell catheter in place for another two weeks. He will follow up in 2 weeks for caldwell catheter removal and to learn CIC. This will be 4 weeks total of indwelling caldwell catheter in place.     Diagnoses and all orders for this visit:    1. Acute renal failure superimposed on stage 4 chronic kidney disease, unspecified acute renal failure type (HCC) (Primary)    2. Urinary retention due to benign prostatic hyperplasia    3. Gross hematuria         Follow Up:   Return in about 2 weeks (around 3/9/2023).    I spent approximately 40 minutes providing clinical care for this patient; including review of patient's chart and provider  documentation, face to face time spent with patient in examination room (obtaining history, performing physical exam, discussing diagnosis and management options), placing orders, and completing patient documentation.     FRANKY Krause  [facility] [place]

## 2023-03-08 ENCOUNTER — TELEPHONE (OUTPATIENT)
Dept: UROLOGY | Facility: CLINIC | Age: 63
End: 2023-03-08
Payer: COMMERCIAL

## 2023-03-08 NOTE — TELEPHONE ENCOUNTER
Patient call into the Hub, has a f/u appointment tomorrow with Lesly to get catheter out.    Patient states  [facility] - DR. DAMON[facility]   Would like for pt to keep catheter in for two more weeks, for study that DR. Damon is doing with pt.    Forwarding message to Lesly.    Please advise Lesly.  OK to keep catheter?  Thank you.

## 2023-03-08 NOTE — TELEPHONE ENCOUNTER
Caller: [facility] - DR. [facility]     Relationship: DIALYSIS PROVIDER    Best call back number: [phone]    DR. DAMON'S OFFICE CALLING, WANTS TO LEAVE PT'S CATHETER IN FOR ANOTHER 2 WEEKS AND CANCEL TOMORROW'S APPT. WANTS TO MEASURE HIS URINE OUTPUT AND DETERMINE IF HE NEEDS TO BE PUT ON A MACHINE.    SPOKE TO CLINICAL, ADVISED TO SEND MESSAGE.      English

## 2023-03-09 NOTE — TELEPHONE ENCOUNTER
Informed pt per Lesly Coffman, catheter needs to be exchange.      Patient is unable to come in today, tomorrow or Monday.      Patient will come in on Tuesday at 2:00 pm for a nurse visit.

## 2023-03-17 ENCOUNTER — HOSPITAL ENCOUNTER (INPATIENT)
Facility: HOSPITAL | Age: 63
LOS: 3 days | Discharge: HOME | DRG: 666 | End: 2023-03-21
Attending: EMERGENCY MEDICINE | Admitting: INTERNAL MEDICINE
Payer: COMMERCIAL

## 2023-03-17 ENCOUNTER — APPOINTMENT (OUTPATIENT)
Dept: CT IMAGING | Facility: HOSPITAL | Age: 63
DRG: 666 | End: 2023-03-17
Payer: COMMERCIAL

## 2023-03-17 DIAGNOSIS — R31.9 HEMATURIA, UNSPECIFIED TYPE: Primary | ICD-10-CM

## 2023-03-17 DIAGNOSIS — R10.2 PELVIC PAIN: ICD-10-CM

## 2023-03-17 DIAGNOSIS — R33.8 ACUTE URINARY RETENTION: ICD-10-CM

## 2023-03-17 LAB
ABO GROUP BLD: NORMAL
ALBUMIN SERPL-MCNC: 3.9 G/DL (ref 3.5–5.2)
ALBUMIN/GLOB SERPL: 1 G/DL
ALP SERPL-CCNC: 120 U/L (ref 39–117)
ALT SERPL W P-5'-P-CCNC: 16 U/L (ref 1–41)
ANION GAP SERPL CALCULATED.3IONS-SCNC: 15 MMOL/L (ref 5–15)
AST SERPL-CCNC: 23 U/L (ref 1–40)
B PARAPERT DNA SPEC QL NAA+PROBE: NOT DETECTED
B PERT DNA SPEC QL NAA+PROBE: NOT DETECTED
BACTERIA UR QL AUTO: ABNORMAL /HPF
BASOPHILS # BLD MANUAL: 0 10*3/MM3 (ref 0–0.2)
BASOPHILS NFR BLD MANUAL: 0 % (ref 0–1.5)
BILIRUB SERPL-MCNC: 0.5 MG/DL (ref 0–1.2)
BILIRUB UR QL STRIP: ABNORMAL
BLD GP AB SCN SERPL QL: NEGATIVE
BUN SERPL-MCNC: 24 MG/DL (ref 8–23)
BUN/CREAT SERPL: 6.7 (ref 7–25)
C PNEUM DNA NPH QL NAA+NON-PROBE: NOT DETECTED
CALCIUM SPEC-SCNC: 9.3 MG/DL (ref 8.6–10.5)
CHLORIDE SERPL-SCNC: 94 MMOL/L (ref 98–107)
CLARITY UR: ABNORMAL
CO2 SERPL-SCNC: 27 MMOL/L (ref 22–29)
COLOR UR: ABNORMAL
CREAT SERPL-MCNC: 3.58 MG/DL (ref 0.76–1.27)
D-LACTATE SERPL-SCNC: 0.9 MMOL/L (ref 0.5–2)
D-LACTATE SERPL-SCNC: 2.6 MMOL/L (ref 0.5–2)
DEPRECATED RDW RBC AUTO: 49 FL (ref 37–54)
EGFRCR SERPLBLD CKD-EPI 2021: 18.2 ML/MIN/1.73
EOSINOPHIL # BLD MANUAL: 0.23 10*3/MM3 (ref 0–0.4)
EOSINOPHIL NFR BLD MANUAL: 4 % (ref 0.3–6.2)
ERYTHROCYTE [DISTWIDTH] IN BLOOD BY AUTOMATED COUNT: 14.6 % (ref 12.3–15.4)
FLUAV SUBTYP SPEC NAA+PROBE: NOT DETECTED
FLUBV RNA ISLT QL NAA+PROBE: NOT DETECTED
GEN 5 2HR TROPONIN T REFLEX: 62 NG/L
GLOBULIN UR ELPH-MCNC: 4 GM/DL
GLUCOSE SERPL-MCNC: 154 MG/DL (ref 65–99)
GLUCOSE UR STRIP-MCNC: ABNORMAL MG/DL
HADV DNA SPEC NAA+PROBE: NOT DETECTED
HCOV 229E RNA SPEC QL NAA+PROBE: NOT DETECTED
HCOV HKU1 RNA SPEC QL NAA+PROBE: NOT DETECTED
HCOV NL63 RNA SPEC QL NAA+PROBE: NOT DETECTED
HCOV OC43 RNA SPEC QL NAA+PROBE: NOT DETECTED
HCT VFR BLD AUTO: 17 % (ref 37.5–51)
HCT VFR BLD AUTO: 21.9 % (ref 37.5–51)
HGB BLD-MCNC: 5.3 G/DL (ref 13–17.7)
HGB BLD-MCNC: 7.2 G/DL (ref 13–17.7)
HGB UR QL STRIP.AUTO: ABNORMAL
HMPV RNA NPH QL NAA+NON-PROBE: NOT DETECTED
HOLD SPECIMEN: NORMAL
HPIV1 RNA ISLT QL NAA+PROBE: NOT DETECTED
HPIV2 RNA SPEC QL NAA+PROBE: NOT DETECTED
HPIV3 RNA NPH QL NAA+PROBE: NOT DETECTED
HPIV4 P GENE NPH QL NAA+PROBE: NOT DETECTED
KETONES UR QL STRIP: ABNORMAL
LEUKOCYTE ESTERASE UR QL STRIP.AUTO: ABNORMAL
LIPASE SERPL-CCNC: 25 U/L (ref 13–60)
LYMPHOCYTES # BLD MANUAL: 0.41 10*3/MM3 (ref 0.7–3.1)
LYMPHOCYTES NFR BLD MANUAL: 8 % (ref 5–12)
M PNEUMO IGG SER IA-ACNC: NOT DETECTED
MACROCYTES BLD QL SMEAR: ABNORMAL
MCH RBC QN AUTO: 30.3 PG (ref 26.6–33)
MCHC RBC AUTO-ENTMCNC: 32.9 G/DL (ref 31.5–35.7)
MCV RBC AUTO: 92 FL (ref 79–97)
MONOCYTES # BLD: 0.47 10*3/MM3 (ref 0.1–0.9)
NEUTROPHILS # BLD AUTO: 4.75 10*3/MM3 (ref 1.7–7)
NEUTROPHILS NFR BLD MANUAL: 64 % (ref 42.7–76)
NEUTS BAND NFR BLD MANUAL: 17 % (ref 0–5)
NITRITE UR QL STRIP: POSITIVE
NRBC SPEC MANUAL: 0 /100 WBC (ref 0–0.2)
PH UR STRIP.AUTO: 8 [PH] (ref 5–8)
PLAT MORPH BLD: NORMAL
PLATELET # BLD AUTO: 151 10*3/MM3 (ref 140–450)
PMV BLD AUTO: 9.3 FL (ref 6–12)
POLYCHROMASIA BLD QL SMEAR: ABNORMAL
POTASSIUM SERPL-SCNC: 3.7 MMOL/L (ref 3.5–5.2)
PROCALCITONIN SERPL-MCNC: 54.1 NG/ML (ref 0–0.25)
PROT SERPL-MCNC: 7.9 G/DL (ref 6–8.5)
PROT UR QL STRIP: ABNORMAL
RBC # BLD AUTO: 2.38 10*6/MM3 (ref 4.14–5.8)
RBC # UR STRIP: ABNORMAL /HPF
REF LAB TEST METHOD: ABNORMAL
RH BLD: POSITIVE
RHINOVIRUS RNA SPEC NAA+PROBE: NOT DETECTED
RSV RNA NPH QL NAA+NON-PROBE: NOT DETECTED
SARS-COV-2 RNA NPH QL NAA+NON-PROBE: NOT DETECTED
SODIUM SERPL-SCNC: 136 MMOL/L (ref 136–145)
SP GR UR STRIP: 1.02 (ref 1–1.03)
SQUAMOUS #/AREA URNS HPF: ABNORMAL /[HPF]
T&S EXPIRATION DATE: NORMAL
TROPONIN T DELTA: -5 NG/L
TROPONIN T SERPL HS-MCNC: 67 NG/L
UROBILINOGEN UR QL STRIP: ABNORMAL
VARIANT LYMPHS NFR BLD MANUAL: 7 % (ref 19.6–45.3)
WBC # UR STRIP: ABNORMAL /HPF
WBC MORPH BLD: NORMAL
WBC NRBC COR # BLD: 5.86 10*3/MM3 (ref 3.4–10.8)
WHOLE BLOOD HOLD COAG: NORMAL
WHOLE BLOOD HOLD SPECIMEN: NORMAL

## 2023-03-17 PROCEDURE — 25010000002 VANCOMYCIN 10 G RECONSTITUTED SOLUTION: Performed by: EMERGENCY MEDICINE

## 2023-03-17 PROCEDURE — 85025 COMPLETE CBC W/AUTO DIFF WBC: CPT | Performed by: EMERGENCY MEDICINE

## 2023-03-17 PROCEDURE — 85014 HEMATOCRIT: CPT | Performed by: EMERGENCY MEDICINE

## 2023-03-17 PROCEDURE — 85007 BL SMEAR W/DIFF WBC COUNT: CPT | Performed by: EMERGENCY MEDICINE

## 2023-03-17 PROCEDURE — 36415 COLL VENOUS BLD VENIPUNCTURE: CPT

## 2023-03-17 PROCEDURE — 86923 COMPATIBILITY TEST ELECTRIC: CPT

## 2023-03-17 PROCEDURE — 74176 CT ABD & PELVIS W/O CONTRAST: CPT

## 2023-03-17 PROCEDURE — 84484 ASSAY OF TROPONIN QUANT: CPT | Performed by: EMERGENCY MEDICINE

## 2023-03-17 PROCEDURE — 86900 BLOOD TYPING SEROLOGIC ABO: CPT | Performed by: EMERGENCY MEDICINE

## 2023-03-17 PROCEDURE — 86901 BLOOD TYPING SEROLOGIC RH(D): CPT | Performed by: EMERGENCY MEDICINE

## 2023-03-17 PROCEDURE — 3E03329 INTRODUCTION OF OTHER ANTI-INFECTIVE INTO PERIPHERAL VEIN, PERCUTANEOUS APPROACH: ICD-10-PCS | Performed by: EMERGENCY MEDICINE

## 2023-03-17 PROCEDURE — 80053 COMPREHEN METABOLIC PANEL: CPT | Performed by: EMERGENCY MEDICINE

## 2023-03-17 PROCEDURE — 83690 ASSAY OF LIPASE: CPT | Performed by: EMERGENCY MEDICINE

## 2023-03-17 PROCEDURE — 81003 URINALYSIS AUTO W/O SCOPE: CPT | Performed by: EMERGENCY MEDICINE

## 2023-03-17 PROCEDURE — 86850 RBC ANTIBODY SCREEN: CPT | Performed by: EMERGENCY MEDICINE

## 2023-03-17 PROCEDURE — 84145 PROCALCITONIN (PCT): CPT | Performed by: EMERGENCY MEDICINE

## 2023-03-17 PROCEDURE — 83605 ASSAY OF LACTIC ACID: CPT | Performed by: EMERGENCY MEDICINE

## 2023-03-17 PROCEDURE — 85018 HEMOGLOBIN: CPT | Performed by: EMERGENCY MEDICINE

## 2023-03-17 PROCEDURE — 25010000002 PIPERACILLIN SOD-TAZOBACTAM PER 1 G: Performed by: EMERGENCY MEDICINE

## 2023-03-17 PROCEDURE — 93005 ELECTROCARDIOGRAM TRACING: CPT | Performed by: EMERGENCY MEDICINE

## 2023-03-17 PROCEDURE — 83036 HEMOGLOBIN GLYCOSYLATED A1C: CPT | Performed by: NURSE PRACTITIONER

## 2023-03-17 PROCEDURE — 99285 EMERGENCY DEPT VISIT HI MDM: CPT

## 2023-03-17 PROCEDURE — 0202U NFCT DS 22 TRGT SARS-COV-2: CPT | Performed by: EMERGENCY MEDICINE

## 2023-03-17 PROCEDURE — 87040 BLOOD CULTURE FOR BACTERIA: CPT | Performed by: EMERGENCY MEDICINE

## 2023-03-17 PROCEDURE — 81015 MICROSCOPIC EXAM OF URINE: CPT | Performed by: EMERGENCY MEDICINE

## 2023-03-17 RX ORDER — ACETAMINOPHEN 325 MG/1
650 TABLET ORAL ONCE
Status: COMPLETED | OUTPATIENT
Start: 2023-03-17 | End: 2023-03-17

## 2023-03-17 RX ORDER — SODIUM CHLORIDE 9 MG/ML
10 INJECTION INTRAVENOUS AS NEEDED
Status: DISCONTINUED | OUTPATIENT
Start: 2023-03-17 | End: 2023-03-21 | Stop reason: HOSPADM

## 2023-03-17 RX ADMIN — ACETAMINOPHEN 325MG 650 MG: 325 TABLET ORAL at 19:31

## 2023-03-17 RX ADMIN — TAZOBACTAM SODIUM AND PIPERACILLIN SODIUM 3.38 G: 375; 3 INJECTION, SOLUTION INTRAVENOUS at 20:38

## 2023-03-17 RX ADMIN — SODIUM CHLORIDE 1905 ML: 9 INJECTION, SOLUTION INTRAVENOUS at 19:10

## 2023-03-17 RX ADMIN — VANCOMYCIN HYDROCHLORIDE 1250 MG: 10 INJECTION, POWDER, LYOPHILIZED, FOR SOLUTION INTRAVENOUS at 21:14

## 2023-03-17 NOTE — ED PROVIDER NOTES
Subjective   History of Present Illness  Patient is a pleasant 62-year-old male with a history of chronic dialysis and a TURP procedure 1 month ago with urology.  Had a catheter placed after this procedure and has had a catheter in for the past 1 month.  2 days ago he went and had the catheter removed.  Since removal of the catheter he has had a headache, back pain, and recurrent hematuria.  He went to dialysis today and was sent from the dialysis center here to the emergency department for evaluation of these complaints.  He admits to diarrhea last night but denies vomiting, fever, chest pain, difficulty breathing, or other acute complaints.  Family is with the patient and the family supply some of the history.  Patient declined .    He states that they did complete the dialysis session prior to transferring here to the emergency department.        Review of Systems   All other systems reviewed and are negative.      No past medical history on file.    No Known Allergies    No past surgical history on file.    No family history on file.    Social History     Socioeconomic History   • Marital status: Single           Objective   Physical Exam  Vitals and nursing note reviewed.   Constitutional:       General: He is not in acute distress.     Appearance: Normal appearance. He is ill-appearing.   HENT:      Head: Normocephalic and atraumatic.   Eyes:      Conjunctiva/sclera: Conjunctivae normal.      Pupils: Pupils are equal, round, and reactive to light.   Cardiovascular:      Rate and Rhythm: Regular rhythm. Tachycardia present.      Heart sounds: Normal heart sounds.   Pulmonary:      Effort: Pulmonary effort is normal. No respiratory distress.      Breath sounds: Normal breath sounds.   Abdominal:      General: Bowel sounds are normal. There is no distension.      Palpations: Abdomen is soft. There is no mass.      Tenderness: There is no abdominal tenderness. There is no rebound.   Musculoskeletal:          General: Normal range of motion.      Cervical back: Normal range of motion and neck supple.   Skin:     General: Skin is warm and dry.      Capillary Refill: Capillary refill takes less than 2 seconds.      Coloration: Skin is pale.   Neurological:      General: No focal deficit present.      Mental Status: He is alert and oriented to person, place, and time.         Procedures           ED Course  ED Course as of 03/18/23 0046   Fri Mar 17, 2023   7418 Discussed case with Dr. Christianson, urology.  He is very familiar with this patient.  Recommends three-way catheter placement with continuous irrigation overnight.  Agrees with admission to ICU/blood transfusion and he will consult on the patient in the morning. [CP]      ED Course User Index  [CP] William Holguin DO            Latest Reference Range & Units 03/17/23 18:30 03/17/23 19:30   HS Troponin T <15 ng/L 67 (C)    Glucose 65 - 99 mg/dL 154 (H)    Sodium 136 - 145 mmol/L 136    Potassium 3.5 - 5.2 mmol/L 3.7    CO2 22.0 - 29.0 mmol/L 27.0    Chloride 98 - 107 mmol/L 94 (L)    Anion Gap 5.0 - 15.0 mmol/L 15.0    Creatinine 0.76 - 1.27 mg/dL 3.58 (H)    BUN 8 - 23 mg/dL 24 (H)    BUN/Creatinine Ratio 7.0 - 25.0  6.7 (L)    Calcium 8.6 - 10.5 mg/dL 9.3    eGFR >60.0 mL/min/1.73 18.2 (L)    Alkaline Phosphatase 39 - 117 U/L 120 (H)    Total Protein 6.0 - 8.5 g/dL 7.9    ALT (SGPT) 1 - 41 U/L 16    AST (SGOT) 1 - 40 U/L 23    Total Bilirubin 0.0 - 1.2 mg/dL 0.5    Albumin 3.5 - 5.2 g/dL 3.9    Globulin gm/dL 4.0    A/G Ratio g/dL 1.0    Lactate 0.5 - 2.0 mmol/L 2.6 (C)    Lipase 13 - 60 U/L 25    Procalcitonin 0.00 - 0.25 ng/mL 54.10 (H)    WBC 3.40 - 10.80 10*3/mm3 5.86    RBC 4.14 - 5.80 10*6/mm3 2.38 (L)    Hemoglobin 13.0 - 17.7 g/dL 7.2 (L)    Hematocrit 37.5 - 51.0 % 21.9 (L)    RDW 12.3 - 15.4 % 14.6    MCV 79.0 - 97.0 fL 92.0    MCH 26.6 - 33.0 pg 30.3    MCHC 31.5 - 35.7 g/dL 32.9    MPV 6.0 - 12.0 fL 9.3    Platelets 140 - 450 10*3/mm3 151    RDW-SD 37.0 -  54.0 fl 49.0    Neutrophil Rel % 42.7 - 76.0 % 64.0    Bands Rel %  0.0 - 5.0 % 17.0 (H)    Lymphocyte Rel % 19.6 - 45.3 % 7.0 (L)    Monocyte Rel % 5.0 - 12.0 % 8.0    Eosinophil Rel % 0.3 - 6.2 % 4.0    Basophil Rel % 0.0 - 1.5 % 0.0    Neutrophils Absolute 1.70 - 7.00 10*3/mm3 4.75    Lymphocytes Absolute 0.70 - 3.10 10*3/mm3 0.41 (L)    Monocytes Absolute 0.10 - 0.90 10*3/mm3 0.47    Eosinophils Absolute 0.00 - 0.40 10*3/mm3 0.23    Basophils Absolute 0.00 - 0.20 10*3/mm3 0.00    WBC Morphology Normal  Normal    Polychromasia None Seen  Slight/1+    Platelet Morphology Normal  Normal    nRBC 0.0 - 0.2 /100 WBC 0.0    Macrocytes None Seen  Slight/1+    Color, UA Yellow, Straw   Red !   Appearance, UA Clear   Turbid !   Specific Gravity, UA 1.005 - 1.030   1.020   pH, UA 5.0 - 8.0   8.0   Glucose Negative   100 mg/dL (Trace) !   Ketones, UA Negative   15 mg/dL (1+) !   Blood, UA Negative   Large (3+) !   Nitrite, UA Negative   Positive !   Leukocytes, UA Negative   Large (3+) !   Protein, UA Negative   >=300 mg/dL (3+) !   Bilirubin, UA Negative   Moderate (2+) !   Urobilinogen, UA 0.2 - 1.0 E.U./dL   1.0 E.U./dL   (C): Data is critically high  (H): Data is abnormally high  (L): Data is abnormally low  !: Data is abnormal                      No results found for this or any previous visit (from the past 24 hour(s)).  Note: In addition to lab results from this visit, the labs listed above may include labs taken at another facility or during a different encounter within the last 24 hours. Please correlate lab times with ED admission and discharge times for further clarification of the services performed during this visit.    CT Abdomen Pelvis Without Contrast   Final Result      1. Moderate to severe hydroureteronephrosis again noted with bladder distention. Findings likely related to chronic outlet obstruction related to prostamegaly. Consider consultation with urology for further management   2. Comparison the  prostate appears grossly stable and may represent either benign or malignant enlargement.   3. Cholelithiasis   4. Other findings as above.                Electronically Signed: Josué Clinton     3/17/2023 8:03 PM EDT     Workstation ID: [id number]        Vitals:    03/21/23 0700 03/21/23 0759 03/21/23 0900 03/21/23 1100   BP:  140/79  143/70   BP Location:  Left arm  Left arm   Patient Position:  Lying  Lying   Pulse: 80 81 84 84   Resp:  18  16   Temp:  99.2 °F (37.3 °C)  98.1 °F (36.7 °C)   TempSrc:  Oral  Oral   SpO2:  99%     Weight:       Height:         Medications   sodium chloride 0.9 % bolus 1,905 mL (0 mL Intravenous Stopped 3/17/23 2238)   piperacillin-tazobactam (ZOSYN) 3.375 g in iso-osmotic dextrose 50 ml (premix) (0 g Intravenous Stopped 3/17/23 2112)   vancomycin 1250 mg/250 mL 0.9% NS IVPB ([facility]) (0 mg Intravenous Stopped 3/17/23 2238)   acetaminophen (TYLENOL) tablet 650 mg (650 mg Oral Given 3/17/23 1931)   diphenhydrAMINE (BENADRYL) capsule 25 mg ( Oral Not Given:  See Alt 3/18/23 0917)     Or   diphenhydrAMINE (BENADRYL) injection 25 mg (25 mg Intravenous Given 3/18/23 0917)   Hydrocortisone Sod Suc (PF) (Solu-CORTEF) injection 100 mg (100 mg Intravenous Given 3/18/23 0917)   acetaminophen (TYLENOL) tablet 650 mg (650 mg Oral Given 3/18/23 0918)     Or   acetaminophen (TYLENOL) 160 MG/5ML solution 650 mg ( Oral Not Given:  See Alt 3/18/23 0918)     Or   acetaminophen (TYLENOL) suppository 650 mg ( Rectal Not Given:  See Alt 3/18/23 0918)   morphine injection 1 mg (1 mg Intravenous Given 3/19/23 2259)     ECG/EMG Results (last 24 hours)     Procedure Component Value Units Date/Time    SCANNED - TELEMETRY   [[id number]] Resulted: 03/17/23     Updated: 03/20/23 1352        ECG 12 Lead Pre-Op / Pre-Procedure   Final Result   Test Reason : Pre-Op / Pre-Procedure   Blood Pressure :   */*   mmHG   Vent. Rate : 100 BPM     Atrial Rate : 100 BPM      P-R Int : 230 ms          QRS Dur :  94 ms       QT Int  : 356 ms       P-R-T Axes :  50  23  25 degrees      QTc Int : 459 ms      Sinus rhythm with 1st degree AV block   Inferior-posterior infarct , age undetermined   Abnormal ECG   No previous ECGs available   Confirmed by MD Timbo, dA ([id number]) on 3/19/2023 8:53:25 AM      Referred By:            Confirmed By: Ad Izquierdo MD      ECG 12 Lead Other; tachycardia   Preliminary Result   Test Reason : Other~   Blood Pressure :   */*   mmHG   Vent. Rate : 146 BPM     Atrial Rate : 147 BPM      P-R Int : 120 ms          QRS Dur : 100 ms       QT Int : 322 ms       P-R-T Axes :   * 103  39 degrees      QTc Int : 501 ms      Sinus tachycardia   Incomplete right bundle branch block   Possible Right ventricular hypertrophy   Abnormal ECG   No previous ECGs available      Referred By:            Confirmed By:       SCANNED - TELEMETRY     Final Result      SCANNED - TELEMETRY     Final Result      SCANNED - TELEMETRY     Final Result      SCANNED - TELEMETRY     Final Result                    Medical Decision Making  Presentation is consistent with acure urinary retention. 3 way catheter placed for continuous bladder irrigation. Case discussed with Dr. Christianson, urology and internal medicine attending.  Pt will be admitted for further evaluation and management.    Acute urinary retention: complicated acute illness or injury  Hematuria, unspecified type: complicated acute illness or injury  Pelvic pain: complicated acute illness or injury  Amount and/or Complexity of Data Reviewed  Labs: ordered. Decision-making details documented in ED Course.  Radiology: ordered and independent interpretation performed. Decision-making details documented in ED Course.  ECG/medicine tests: ordered and independent interpretation performed. Decision-making details documented in ED Course.      Risk  OTC drugs.  Prescription drug management.  Decision regarding hospitalization.              Final diagnoses:   Hematuria, unspecified type   Acute  urinary retention   Pelvic pain       ED Disposition  ED Disposition     ED Disposition   Decision to Admit    Condition   --    Comment   Level of Care: Critical Care [6]                  William Holguin DO  03/30/23 0702

## 2023-03-18 ENCOUNTER — APPOINTMENT (OUTPATIENT)
Dept: CARDIOLOGY | Facility: HOSPITAL | Age: 63
DRG: 666 | End: 2023-03-18
Payer: COMMERCIAL

## 2023-03-18 ENCOUNTER — ANESTHESIA (OUTPATIENT)
Dept: PERIOP | Facility: HOSPITAL | Age: 63
DRG: 666 | End: 2023-03-18
Payer: COMMERCIAL

## 2023-03-18 ENCOUNTER — ANESTHESIA EVENT (OUTPATIENT)
Dept: PERIOP | Facility: HOSPITAL | Age: 63
DRG: 666 | End: 2023-03-18
Payer: COMMERCIAL

## 2023-03-18 ENCOUNTER — ANESTHESIA EVENT CONVERTED (OUTPATIENT)
Dept: ANESTHESIOLOGY | Facility: HOSPITAL | Age: 63
DRG: 666 | End: 2023-03-18
Payer: COMMERCIAL

## 2023-03-18 PROBLEM — R31.0 GROSS HEMATURIA: Status: ACTIVE | Noted: 2023-03-18

## 2023-03-18 PROBLEM — R31.9 HEMATURIA: Status: ACTIVE | Noted: 2023-03-18

## 2023-03-18 PROBLEM — D64.9 SYMPTOMATIC ANEMIA: Status: ACTIVE | Noted: 2023-03-18

## 2023-03-18 PROBLEM — N18.5 CKD (CHRONIC KIDNEY DISEASE) STAGE 5, GFR LESS THAN 15 ML/MIN: Status: ACTIVE | Noted: 2023-03-18

## 2023-03-18 PROBLEM — E11.9 TYPE 2 DIABETES MELLITUS: Status: ACTIVE | Noted: 2023-03-18

## 2023-03-18 LAB
ABO GROUP BLD: NORMAL
ALBUMIN SERPL-MCNC: 3 G/DL (ref 3.5–5.2)
ANION GAP SERPL CALCULATED.3IONS-SCNC: 13 MMOL/L (ref 5–15)
BACTERIA UR QL AUTO: ABNORMAL /HPF
BH CV ECHO MEAS - EDV(CUBED): 79.5 ML
BH CV ECHO MEAS - EDV(MOD-SP2): 65.2 ML
BH CV ECHO MEAS - EDV(MOD-SP4): 65.7 ML
BH CV ECHO MEAS - EF(MOD-BP): 61.7 %
BH CV ECHO MEAS - EF(MOD-SP2): 70.2 %
BH CV ECHO MEAS - EF(MOD-SP4): 59.5 %
BH CV ECHO MEAS - ESV(CUBED): 17.6 ML
BH CV ECHO MEAS - ESV(MOD-SP2): 19.4 ML
BH CV ECHO MEAS - ESV(MOD-SP4): 26.6 ML
BH CV ECHO MEAS - FS: 39.5 %
BH CV ECHO MEAS - IVS/LVPW: 1 CM
BH CV ECHO MEAS - IVSD: 1.3 CM
BH CV ECHO MEAS - LV MASS(C)D: 207.8 GRAMS
BH CV ECHO MEAS - LVIDD: 4.3 CM
BH CV ECHO MEAS - LVIDS: 2.6 CM
BH CV ECHO MEAS - LVPWD: 1.3 CM
BH CV ECHO MEAS - SV(MOD-SP2): 45.8 ML
BH CV ECHO MEAS - SV(MOD-SP4): 39.1 ML
BILIRUB UR QL STRIP: ABNORMAL
BUN SERPL-MCNC: 35 MG/DL (ref 8–23)
BUN/CREAT SERPL: 6 (ref 7–25)
CA-I SERPL ISE-MCNC: 1.12 MMOL/L (ref 1.12–1.32)
CALCIUM SPEC-SCNC: 8.4 MG/DL (ref 8.6–10.5)
CHLORIDE SERPL-SCNC: 99 MMOL/L (ref 98–107)
CLARITY UR: ABNORMAL
CO2 SERPL-SCNC: 26 MMOL/L (ref 22–29)
COLOR UR: ABNORMAL
CREAT SERPL-MCNC: 5.88 MG/DL (ref 0.76–1.27)
DEPRECATED RDW RBC AUTO: 48.9 FL (ref 37–54)
EGFRCR SERPLBLD CKD-EPI 2021: 10 ML/MIN/1.73
ERYTHROCYTE [DISTWIDTH] IN BLOOD BY AUTOMATED COUNT: 14.3 % (ref 12.3–15.4)
GLUCOSE BLDC GLUCOMTR-MCNC: 100 MG/DL (ref 70–130)
GLUCOSE BLDC GLUCOMTR-MCNC: 127 MG/DL (ref 70–130)
GLUCOSE BLDC GLUCOMTR-MCNC: 297 MG/DL (ref 70–130)
GLUCOSE BLDC GLUCOMTR-MCNC: 94 MG/DL (ref 70–130)
GLUCOSE SERPL-MCNC: 82 MG/DL (ref 65–99)
GLUCOSE UR STRIP-MCNC: NEGATIVE MG/DL
HBA1C MFR BLD: 5 % (ref 4.8–5.6)
HCT VFR BLD AUTO: 20.8 % (ref 37.5–51)
HCT VFR BLD AUTO: 24.4 % (ref 37.5–51)
HCT VFR BLD AUTO: 25.4 % (ref 37.5–51)
HCT VFR BLD AUTO: 25.7 % (ref 37.5–51)
HGB BLD-MCNC: 6.6 G/DL (ref 13–17.7)
HGB BLD-MCNC: 7.8 G/DL (ref 13–17.7)
HGB BLD-MCNC: 8.3 G/DL (ref 13–17.7)
HGB BLD-MCNC: 8.3 G/DL (ref 13–17.7)
HGB UR QL STRIP.AUTO: ABNORMAL
HYALINE CASTS UR QL AUTO: ABNORMAL /LPF
IRON 24H UR-MRATE: 24 MCG/DL (ref 59–158)
IRON SATN MFR SERPL: 14 % (ref 20–50)
KETONES UR QL STRIP: NEGATIVE
LEUKOCYTE ESTERASE UR QL STRIP.AUTO: ABNORMAL
MAGNESIUM SERPL-MCNC: 2.2 MG/DL (ref 1.6–2.4)
MAXIMAL PREDICTED HEART RATE: 156 BPM
MCH RBC QN AUTO: 29.9 PG (ref 26.6–33)
MCHC RBC AUTO-ENTMCNC: 31.7 G/DL (ref 31.5–35.7)
MCV RBC AUTO: 94.1 FL (ref 79–97)
MRSA DNA SPEC QL NAA+PROBE: NEGATIVE
NITRITE UR QL STRIP: POSITIVE
PH UR STRIP.AUTO: 8 [PH] (ref 5–8)
PHOSPHATE SERPL-MCNC: 5.4 MG/DL (ref 2.5–4.5)
PLATELET # BLD AUTO: 135 10*3/MM3 (ref 140–450)
PMV BLD AUTO: 9.6 FL (ref 6–12)
POTASSIUM SERPL-SCNC: 4.3 MMOL/L (ref 3.5–5.2)
PROT UR QL STRIP: ABNORMAL
RBC # BLD AUTO: 2.21 10*6/MM3 (ref 4.14–5.8)
RBC # UR STRIP: ABNORMAL /HPF
REF LAB TEST METHOD: ABNORMAL
RH BLD: POSITIVE
SODIUM SERPL-SCNC: 138 MMOL/L (ref 136–145)
SP GR UR STRIP: 1.02 (ref 1–1.03)
SQUAMOUS #/AREA URNS HPF: ABNORMAL /HPF
STRESS TARGET HR: 133 BPM
TIBC SERPL-MCNC: 173 MCG/DL (ref 298–536)
TRANSFERRIN SERPL-MCNC: 116 MG/DL (ref 200–360)
UROBILINOGEN UR QL STRIP: ABNORMAL
WBC # UR STRIP: ABNORMAL /HPF
WBC NRBC COR # BLD: 4.05 10*3/MM3 (ref 3.4–10.8)

## 2023-03-18 PROCEDURE — 86900 BLOOD TYPING SEROLOGIC ABO: CPT

## 2023-03-18 PROCEDURE — 99223 1ST HOSP IP/OBS HIGH 75: CPT | Performed by: INTERNAL MEDICINE

## 2023-03-18 PROCEDURE — 93010 ELECTROCARDIOGRAM REPORT: CPT | Performed by: INTERNAL MEDICINE

## 2023-03-18 PROCEDURE — 85018 HEMOGLOBIN: CPT | Performed by: UROLOGY

## 2023-03-18 PROCEDURE — 82962 GLUCOSE BLOOD TEST: CPT

## 2023-03-18 PROCEDURE — P9016 RBC LEUKOCYTES REDUCED: HCPCS

## 2023-03-18 PROCEDURE — 84295 ASSAY OF SERUM SODIUM: CPT

## 2023-03-18 PROCEDURE — 52001 CYSTO W/IRRG&EVAC MLT CLOTS: CPT | Performed by: UROLOGY

## 2023-03-18 PROCEDURE — 82330 ASSAY OF CALCIUM: CPT

## 2023-03-18 PROCEDURE — 25010000002 PIPERACILLIN SOD-TAZOBACTAM PER 1 G: Performed by: UROLOGY

## 2023-03-18 PROCEDURE — 36430 TRANSFUSION BLD/BLD COMPNT: CPT

## 2023-03-18 PROCEDURE — 80069 RENAL FUNCTION PANEL: CPT | Performed by: NURSE PRACTITIONER

## 2023-03-18 PROCEDURE — 93005 ELECTROCARDIOGRAM TRACING: CPT | Performed by: INTERNAL MEDICINE

## 2023-03-18 PROCEDURE — 85027 COMPLETE CBC AUTOMATED: CPT | Performed by: NURSE PRACTITIONER

## 2023-03-18 PROCEDURE — 87086 URINE CULTURE/COLONY COUNT: CPT | Performed by: EMERGENCY MEDICINE

## 2023-03-18 PROCEDURE — 25010000002 DEXAMETHASONE PER 1 MG

## 2023-03-18 PROCEDURE — 25010000002 PIPERACILLIN SOD-TAZOBACTAM PER 1 G: Performed by: NURSE PRACTITIONER

## 2023-03-18 PROCEDURE — 84466 ASSAY OF TRANSFERRIN: CPT | Performed by: NURSE PRACTITIONER

## 2023-03-18 PROCEDURE — 99222 1ST HOSP IP/OBS MODERATE 55: CPT | Performed by: UROLOGY

## 2023-03-18 PROCEDURE — 0T9B70Z DRAINAGE OF BLADDER WITH DRAINAGE DEVICE, VIA NATURAL OR ARTIFICIAL OPENING: ICD-10-PCS | Performed by: UROLOGY

## 2023-03-18 PROCEDURE — 85014 HEMATOCRIT: CPT | Performed by: INTERNAL MEDICINE

## 2023-03-18 PROCEDURE — 93321 DOPPLER ECHO F-UP/LMTD STD: CPT

## 2023-03-18 PROCEDURE — 99024 POSTOP FOLLOW-UP VISIT: CPT | Performed by: UROLOGY

## 2023-03-18 PROCEDURE — 0TCB8ZZ EXTIRPATION OF MATTER FROM BLADDER, VIA NATURAL OR ARTIFICIAL OPENING ENDOSCOPIC: ICD-10-PCS | Performed by: UROLOGY

## 2023-03-18 PROCEDURE — 84132 ASSAY OF SERUM POTASSIUM: CPT

## 2023-03-18 PROCEDURE — 25010000002 DIPHENHYDRAMINE PER 50 MG

## 2023-03-18 PROCEDURE — 25010000002 PHENYLEPHRINE 10 MG/ML SOLUTION 1 ML VIAL

## 2023-03-18 PROCEDURE — 85018 HEMOGLOBIN: CPT | Performed by: INTERNAL MEDICINE

## 2023-03-18 PROCEDURE — 52601 PROSTATECTOMY (TURP): CPT | Performed by: UROLOGY

## 2023-03-18 PROCEDURE — 25010000002 HYDROCORTISONE SOD SUC (PF) 100 MG RECONSTITUTED SOLUTION

## 2023-03-18 PROCEDURE — 86901 BLOOD TYPING SEROLOGIC RH(D): CPT

## 2023-03-18 PROCEDURE — 25010000002 FENTANYL CITRATE (PF) 100 MCG/2ML SOLUTION

## 2023-03-18 PROCEDURE — 25010000002 ONDANSETRON PER 1 MG

## 2023-03-18 PROCEDURE — 93321 DOPPLER ECHO F-UP/LMTD STD: CPT | Performed by: INTERNAL MEDICINE

## 2023-03-18 PROCEDURE — 82330 ASSAY OF CALCIUM: CPT | Performed by: NURSE PRACTITIONER

## 2023-03-18 PROCEDURE — 82803 BLOOD GASES ANY COMBINATION: CPT

## 2023-03-18 PROCEDURE — 87186 SC STD MICRODIL/AGAR DIL: CPT | Performed by: EMERGENCY MEDICINE

## 2023-03-18 PROCEDURE — 82947 ASSAY GLUCOSE BLOOD QUANT: CPT

## 2023-03-18 PROCEDURE — 0VB08ZZ EXCISION OF PROSTATE, VIA NATURAL OR ARTIFICIAL OPENING ENDOSCOPIC: ICD-10-PCS | Performed by: UROLOGY

## 2023-03-18 PROCEDURE — 93308 TTE F-UP OR LMTD: CPT | Performed by: INTERNAL MEDICINE

## 2023-03-18 PROCEDURE — 25010000002 PROPOFOL 10 MG/ML EMULSION

## 2023-03-18 PROCEDURE — 85014 HEMATOCRIT: CPT

## 2023-03-18 PROCEDURE — 93308 TTE F-UP OR LMTD: CPT

## 2023-03-18 PROCEDURE — 83735 ASSAY OF MAGNESIUM: CPT | Performed by: NURSE PRACTITIONER

## 2023-03-18 PROCEDURE — 85014 HEMATOCRIT: CPT | Performed by: UROLOGY

## 2023-03-18 PROCEDURE — 25010000002 HYDROMORPHONE 1 MG/ML SOLUTION

## 2023-03-18 PROCEDURE — 83540 ASSAY OF IRON: CPT | Performed by: NURSE PRACTITIONER

## 2023-03-18 PROCEDURE — 81001 URINALYSIS AUTO W/SCOPE: CPT | Performed by: EMERGENCY MEDICINE

## 2023-03-18 PROCEDURE — 87077 CULTURE AEROBIC IDENTIFY: CPT | Performed by: EMERGENCY MEDICINE

## 2023-03-18 PROCEDURE — 87641 MR-STAPH DNA AMP PROBE: CPT | Performed by: NURSE PRACTITIONER

## 2023-03-18 PROCEDURE — 25010000002 NA FERRIC GLUC CPLX PER 12.5 MG: Performed by: UROLOGY

## 2023-03-18 RX ORDER — DROPERIDOL 2.5 MG/ML
0.62 INJECTION, SOLUTION INTRAMUSCULAR; INTRAVENOUS ONCE AS NEEDED
Status: CANCELLED | OUTPATIENT
Start: 2023-03-18

## 2023-03-18 RX ORDER — DIPHENHYDRAMINE HCL 25 MG
25 CAPSULE ORAL ONCE
Status: COMPLETED | OUTPATIENT
Start: 2023-03-18 | End: 2023-03-18

## 2023-03-18 RX ORDER — INSULIN LISPRO 100 [IU]/ML
0-9 INJECTION, SOLUTION INTRAVENOUS; SUBCUTANEOUS
Status: DISCONTINUED | OUTPATIENT
Start: 2023-03-18 | End: 2023-03-21 | Stop reason: HOSPADM

## 2023-03-18 RX ORDER — FENTANYL CITRATE 50 UG/ML
50 INJECTION, SOLUTION INTRAMUSCULAR; INTRAVENOUS
Status: CANCELLED | OUTPATIENT
Start: 2023-03-18

## 2023-03-18 RX ORDER — IBUPROFEN 600 MG/1
1 TABLET ORAL
Status: DISCONTINUED | OUTPATIENT
Start: 2023-03-18 | End: 2023-03-21 | Stop reason: HOSPADM

## 2023-03-18 RX ORDER — PROPOFOL 10 MG/ML
VIAL (ML) INTRAVENOUS AS NEEDED
Status: DISCONTINUED | OUTPATIENT
Start: 2023-03-18 | End: 2023-03-18 | Stop reason: SURG

## 2023-03-18 RX ORDER — HYDROCODONE BITARTRATE AND ACETAMINOPHEN 5; 325 MG/1; MG/1
1 TABLET ORAL EVERY 8 HOURS PRN
COMMUNITY

## 2023-03-18 RX ORDER — MAGNESIUM HYDROXIDE 1200 MG/15ML
LIQUID ORAL AS NEEDED
Status: DISCONTINUED | OUTPATIENT
Start: 2023-03-18 | End: 2023-03-18 | Stop reason: HOSPADM

## 2023-03-18 RX ORDER — FENTANYL CITRATE 50 UG/ML
INJECTION, SOLUTION INTRAMUSCULAR; INTRAVENOUS AS NEEDED
Status: DISCONTINUED | OUTPATIENT
Start: 2023-03-18 | End: 2023-03-18 | Stop reason: SURG

## 2023-03-18 RX ORDER — SODIUM CHLORIDE 9 MG/ML
40 INJECTION, SOLUTION INTRAVENOUS AS NEEDED
Status: CANCELLED | OUTPATIENT
Start: 2023-03-18

## 2023-03-18 RX ORDER — SEVELAMER CARBONATE 800 MG/1
800 TABLET, FILM COATED ORAL
COMMUNITY

## 2023-03-18 RX ORDER — ACETAMINOPHEN 650 MG/1
650 SUPPOSITORY RECTAL ONCE
Status: COMPLETED | OUTPATIENT
Start: 2023-03-18 | End: 2023-03-18

## 2023-03-18 RX ORDER — NALOXONE HCL 0.4 MG/ML
0.4 VIAL (ML) INJECTION AS NEEDED
Status: CANCELLED | OUTPATIENT
Start: 2023-03-18

## 2023-03-18 RX ORDER — PROMETHAZINE HYDROCHLORIDE 25 MG/1
25 TABLET ORAL ONCE AS NEEDED
Status: CANCELLED | OUTPATIENT
Start: 2023-03-18

## 2023-03-18 RX ORDER — SODIUM BICARBONATE 650 MG/1
650 TABLET ORAL 4 TIMES DAILY
COMMUNITY

## 2023-03-18 RX ORDER — SODIUM CHLORIDE 9 MG/ML
40 INJECTION, SOLUTION INTRAVENOUS AS NEEDED
Status: DISCONTINUED | OUTPATIENT
Start: 2023-03-18 | End: 2023-03-18 | Stop reason: HOSPADM

## 2023-03-18 RX ORDER — LIDOCAINE HYDROCHLORIDE 20 MG/ML
JELLY TOPICAL AS NEEDED
Status: DISCONTINUED | OUTPATIENT
Start: 2023-03-18 | End: 2023-03-21 | Stop reason: HOSPADM

## 2023-03-18 RX ORDER — PROMETHAZINE HYDROCHLORIDE 25 MG/1
25 TABLET ORAL ONCE AS NEEDED
Status: DISCONTINUED | OUTPATIENT
Start: 2023-03-18 | End: 2023-03-18 | Stop reason: HOSPADM

## 2023-03-18 RX ORDER — ACETAMINOPHEN 160 MG/5ML
650 SOLUTION ORAL ONCE
Status: COMPLETED | OUTPATIENT
Start: 2023-03-18 | End: 2023-03-18

## 2023-03-18 RX ORDER — NICOTINE POLACRILEX 4 MG
15 LOZENGE BUCCAL
Status: DISCONTINUED | OUTPATIENT
Start: 2023-03-18 | End: 2023-03-21 | Stop reason: HOSPADM

## 2023-03-18 RX ORDER — SODIUM CHLORIDE 0.9 % (FLUSH) 0.9 %
3-10 SYRINGE (ML) INJECTION AS NEEDED
Status: DISCONTINUED | OUTPATIENT
Start: 2023-03-18 | End: 2023-03-18 | Stop reason: HOSPADM

## 2023-03-18 RX ORDER — DEXAMETHASONE SODIUM PHOSPHATE 4 MG/ML
INJECTION, SOLUTION INTRA-ARTICULAR; INTRALESIONAL; INTRAMUSCULAR; INTRAVENOUS; SOFT TISSUE AS NEEDED
Status: DISCONTINUED | OUTPATIENT
Start: 2023-03-18 | End: 2023-03-18 | Stop reason: SURG

## 2023-03-18 RX ORDER — DROPERIDOL 2.5 MG/ML
0.62 INJECTION, SOLUTION INTRAMUSCULAR; INTRAVENOUS
Status: CANCELLED | OUTPATIENT
Start: 2023-03-18

## 2023-03-18 RX ORDER — MEPERIDINE HYDROCHLORIDE 25 MG/ML
12.5 INJECTION INTRAMUSCULAR; INTRAVENOUS; SUBCUTANEOUS
Status: CANCELLED | OUTPATIENT
Start: 2023-03-18 | End: 2023-03-19

## 2023-03-18 RX ORDER — HYDROCODONE BITARTRATE AND ACETAMINOPHEN 5; 325 MG/1; MG/1
1 TABLET ORAL ONCE AS NEEDED
Status: DISCONTINUED | OUTPATIENT
Start: 2023-03-18 | End: 2023-03-18 | Stop reason: HOSPADM

## 2023-03-18 RX ORDER — PANTOPRAZOLE SODIUM 40 MG/1
40 TABLET, DELAYED RELEASE ORAL DAILY
COMMUNITY

## 2023-03-18 RX ORDER — HYDRALAZINE HYDROCHLORIDE 20 MG/ML
5 INJECTION INTRAMUSCULAR; INTRAVENOUS
Status: CANCELLED | OUTPATIENT
Start: 2023-03-18

## 2023-03-18 RX ORDER — AMLODIPINE BESYLATE 10 MG/1
10 TABLET ORAL DAILY
COMMUNITY

## 2023-03-18 RX ORDER — HYDROCODONE BITARTRATE AND ACETAMINOPHEN 5; 325 MG/1; MG/1
1 TABLET ORAL ONCE AS NEEDED
Status: CANCELLED | OUTPATIENT
Start: 2023-03-18 | End: 2023-03-28

## 2023-03-18 RX ORDER — HEPARIN SODIUM 5000 [USP'U]/ML
5000 INJECTION, SOLUTION INTRAVENOUS; SUBCUTANEOUS EVERY 8 HOURS SCHEDULED
Status: DISCONTINUED | OUTPATIENT
Start: 2023-03-18 | End: 2023-03-18

## 2023-03-18 RX ORDER — SODIUM CHLORIDE, SODIUM LACTATE, POTASSIUM CHLORIDE, CALCIUM CHLORIDE 600; 310; 30; 20 MG/100ML; MG/100ML; MG/100ML; MG/100ML
INJECTION, SOLUTION INTRAVENOUS CONTINUOUS PRN
Status: DISCONTINUED | OUTPATIENT
Start: 2023-03-18 | End: 2023-03-18 | Stop reason: SURG

## 2023-03-18 RX ORDER — ONDANSETRON 2 MG/ML
INJECTION INTRAMUSCULAR; INTRAVENOUS AS NEEDED
Status: DISCONTINUED | OUTPATIENT
Start: 2023-03-18 | End: 2023-03-18 | Stop reason: SURG

## 2023-03-18 RX ORDER — PANTOPRAZOLE SODIUM 40 MG/10ML
40 INJECTION, POWDER, LYOPHILIZED, FOR SOLUTION INTRAVENOUS
Status: DISCONTINUED | OUTPATIENT
Start: 2023-03-18 | End: 2023-03-19

## 2023-03-18 RX ORDER — SODIUM CHLORIDE 0.9 % (FLUSH) 0.9 %
3 SYRINGE (ML) INJECTION EVERY 12 HOURS SCHEDULED
Status: DISCONTINUED | OUTPATIENT
Start: 2023-03-18 | End: 2023-03-18 | Stop reason: HOSPADM

## 2023-03-18 RX ORDER — LABETALOL HYDROCHLORIDE 5 MG/ML
5 INJECTION, SOLUTION INTRAVENOUS
Status: CANCELLED | OUTPATIENT
Start: 2023-03-18

## 2023-03-18 RX ORDER — LIDOCAINE HYDROCHLORIDE 10 MG/ML
INJECTION, SOLUTION EPIDURAL; INFILTRATION; INTRACAUDAL; PERINEURAL AS NEEDED
Status: DISCONTINUED | OUTPATIENT
Start: 2023-03-18 | End: 2023-03-18 | Stop reason: SURG

## 2023-03-18 RX ORDER — SODIUM CHLORIDE 0.9 % (FLUSH) 0.9 %
3-10 SYRINGE (ML) INJECTION AS NEEDED
Status: CANCELLED | OUTPATIENT
Start: 2023-03-18

## 2023-03-18 RX ORDER — IPRATROPIUM BROMIDE AND ALBUTEROL SULFATE 2.5; .5 MG/3ML; MG/3ML
3 SOLUTION RESPIRATORY (INHALATION) ONCE AS NEEDED
Status: DISCONTINUED | OUTPATIENT
Start: 2023-03-18 | End: 2023-03-18 | Stop reason: HOSPADM

## 2023-03-18 RX ORDER — IPRATROPIUM BROMIDE AND ALBUTEROL SULFATE 2.5; .5 MG/3ML; MG/3ML
3 SOLUTION RESPIRATORY (INHALATION) ONCE AS NEEDED
Status: CANCELLED | OUTPATIENT
Start: 2023-03-18

## 2023-03-18 RX ORDER — BACITRACIN ZINC AND POLYMYXIN B SULFATE 500; 1000 [USP'U]/G; [USP'U]/G
1 OINTMENT TOPICAL 2 TIMES DAILY
COMMUNITY

## 2023-03-18 RX ORDER — NALOXONE HCL 0.4 MG/ML
0.4 VIAL (ML) INJECTION AS NEEDED
Status: DISCONTINUED | OUTPATIENT
Start: 2023-03-18 | End: 2023-03-18 | Stop reason: HOSPADM

## 2023-03-18 RX ORDER — SUCCINYLCHOLINE/SOD CL,ISO/PF 200MG/10ML
SYRINGE (ML) INTRAVENOUS AS NEEDED
Status: DISCONTINUED | OUTPATIENT
Start: 2023-03-18 | End: 2023-03-18 | Stop reason: SURG

## 2023-03-18 RX ORDER — ONDANSETRON 2 MG/ML
4 INJECTION INTRAMUSCULAR; INTRAVENOUS ONCE AS NEEDED
Status: CANCELLED | OUTPATIENT
Start: 2023-03-18

## 2023-03-18 RX ORDER — ACETAMINOPHEN 325 MG/1
650 TABLET ORAL ONCE
Status: COMPLETED | OUTPATIENT
Start: 2023-03-18 | End: 2023-03-18

## 2023-03-18 RX ORDER — SODIUM CHLORIDE 9 MG/ML
9 INJECTION, SOLUTION INTRAVENOUS CONTINUOUS
Status: DISCONTINUED | OUTPATIENT
Start: 2023-03-18 | End: 2023-03-19

## 2023-03-18 RX ORDER — DEXTROSE MONOHYDRATE 25 G/50ML
25 INJECTION, SOLUTION INTRAVENOUS
Status: DISCONTINUED | OUTPATIENT
Start: 2023-03-18 | End: 2023-03-21 | Stop reason: HOSPADM

## 2023-03-18 RX ORDER — SODIUM BICARBONATE 650 MG/1
650 TABLET ORAL 2 TIMES DAILY
Status: DISCONTINUED | OUTPATIENT
Start: 2023-03-18 | End: 2023-03-21 | Stop reason: HOSPADM

## 2023-03-18 RX ORDER — FOLIC ACID/VIT B COMPLEX AND C 0.8 MG
1 TABLET ORAL DAILY
Status: DISCONTINUED | OUTPATIENT
Start: 2023-03-18 | End: 2023-03-21 | Stop reason: HOSPADM

## 2023-03-18 RX ORDER — PROMETHAZINE HYDROCHLORIDE 25 MG/1
25 SUPPOSITORY RECTAL ONCE AS NEEDED
Status: DISCONTINUED | OUTPATIENT
Start: 2023-03-18 | End: 2023-03-18 | Stop reason: HOSPADM

## 2023-03-18 RX ORDER — SODIUM CHLORIDE 0.9 % (FLUSH) 0.9 %
3 SYRINGE (ML) INJECTION EVERY 12 HOURS SCHEDULED
Status: CANCELLED | OUTPATIENT
Start: 2023-03-18

## 2023-03-18 RX ORDER — DIPHENHYDRAMINE HYDROCHLORIDE 50 MG/ML
25 INJECTION INTRAMUSCULAR; INTRAVENOUS ONCE
Status: COMPLETED | OUTPATIENT
Start: 2023-03-18 | End: 2023-03-18

## 2023-03-18 RX ORDER — PROMETHAZINE HYDROCHLORIDE 25 MG/1
25 SUPPOSITORY RECTAL ONCE AS NEEDED
Status: CANCELLED | OUTPATIENT
Start: 2023-03-18

## 2023-03-18 RX ADMIN — ACETAMINOPHEN 325MG 650 MG: 325 TABLET ORAL at 09:18

## 2023-03-18 RX ADMIN — PROPOFOL 50 MG: 10 INJECTION, EMULSION INTRAVENOUS at 10:35

## 2023-03-18 RX ADMIN — FENTANYL CITRATE 50 MCG: 50 INJECTION, SOLUTION INTRAMUSCULAR; INTRAVENOUS at 10:42

## 2023-03-18 RX ADMIN — ONDANSETRON 4 MG: 2 INJECTION INTRAMUSCULAR; INTRAVENOUS at 10:45

## 2023-03-18 RX ADMIN — SODIUM BICARBONATE 650 MG TABLET 650 MG: at 20:22

## 2023-03-18 RX ADMIN — HYDROMORPHONE HYDROCHLORIDE 0.25 MG: 1 INJECTION, SOLUTION INTRAMUSCULAR; INTRAVENOUS; SUBCUTANEOUS at 11:33

## 2023-03-18 RX ADMIN — TAZOBACTAM SODIUM AND PIPERACILLIN SODIUM 3.38 G: 375; 3 INJECTION, SOLUTION INTRAVENOUS at 08:23

## 2023-03-18 RX ADMIN — SODIUM CHLORIDE, POTASSIUM CHLORIDE, SODIUM LACTATE AND CALCIUM CHLORIDE: 600; 310; 30; 20 INJECTION, SOLUTION INTRAVENOUS at 10:28

## 2023-03-18 RX ADMIN — FENTANYL CITRATE 50 MCG: 50 INJECTION, SOLUTION INTRAMUSCULAR; INTRAVENOUS at 10:33

## 2023-03-18 RX ADMIN — TAZOBACTAM SODIUM AND PIPERACILLIN SODIUM 3.38 G: 375; 3 INJECTION, SOLUTION INTRAVENOUS at 10:38

## 2023-03-18 RX ADMIN — DEXAMETHASONE SODIUM PHOSPHATE 4 MG: 4 INJECTION, SOLUTION INTRAMUSCULAR; INTRAVENOUS at 10:45

## 2023-03-18 RX ADMIN — DIPHENHYDRAMINE HYDROCHLORIDE 25 MG: 50 INJECTION INTRAMUSCULAR; INTRAVENOUS at 09:17

## 2023-03-18 RX ADMIN — LIDOCAINE HYDROCHLORIDE 50 MG: 10 INJECTION, SOLUTION EPIDURAL; INFILTRATION; INTRACAUDAL; PERINEURAL at 10:33

## 2023-03-18 RX ADMIN — PROPOFOL 150 MG: 10 INJECTION, EMULSION INTRAVENOUS at 10:33

## 2023-03-18 RX ADMIN — PHENYLEPHRINE HYDROCHLORIDE 0.5 MCG/KG/MIN: 10 INJECTION INTRAVENOUS at 10:43

## 2023-03-18 RX ADMIN — SODIUM CHLORIDE 125 MG: 9 INJECTION, SOLUTION INTRAVENOUS at 17:10

## 2023-03-18 RX ADMIN — HYDROMORPHONE HYDROCHLORIDE 0.25 MG: 1 INJECTION, SOLUTION INTRAMUSCULAR; INTRAVENOUS; SUBCUTANEOUS at 11:42

## 2023-03-18 RX ADMIN — HYDROCORTISONE SODIUM SUCCINATE 100 MG: 100 INJECTION, POWDER, FOR SOLUTION INTRAMUSCULAR; INTRAVENOUS at 09:17

## 2023-03-18 RX ADMIN — Medication 140 MG: at 10:33

## 2023-03-18 RX ADMIN — TAZOBACTAM SODIUM AND PIPERACILLIN SODIUM 3.38 G: 375; 3 INJECTION, SOLUTION INTRAVENOUS at 20:22

## 2023-03-18 RX ADMIN — PANTOPRAZOLE SODIUM 40 MG: 40 INJECTION, POWDER, LYOPHILIZED, FOR SOLUTION INTRAVENOUS at 05:42

## 2023-03-18 NOTE — ANESTHESIA POSTPROCEDURE EVALUATION
Patient: Jun Young    Procedure Summary     Date: 03/18/23 Room / Location:  [facility]    Anesthesia Start: 1028 Anesthesia Stop: 1122    Procedure: CYSTOSCOPY WITH FULGURATION OF BLEEDERS POSSIBLE SUPER PUBIC CATH PLACEMENT (Bladder) Diagnosis:     Surgeons: Santana Christianson MD Provider: Candy Noble DO    Anesthesia Type: generalBriana ASA Status: 3 - Emergent          Anesthesia Type: general, Briana    Vitals  Vitals Value Taken Time   /72 03/18/23 1121   Temp 99.2 °F (37.3 °C) 03/18/23 1121   Pulse 89 03/18/23 1121   Resp 14 03/18/23 1121   SpO2 96 % 03/18/23 1121           Post Anesthesia Care and Evaluation    Patient location during evaluation: PACU  Patient participation: complete - patient participated  Level of consciousness: awake and alert  Pain score: 0  Pain management: adequate    Airway patency: patent  Anesthetic complications: No anesthetic complications  PONV Status: none  Cardiovascular status: hemodynamically stable and acceptable  Respiratory status: nonlabored ventilation, acceptable and nasal cannula  Hydration status: acceptable    Comments: Pt arrived to PACU with no issues during transport. Pt placed directly to monitors. Vital signs are within parameters. Pt maintaining ventilation spontaneously. No changes to dental. Report given to PACU and all question and concerns were addressed as well as the plan of care.

## 2023-03-18 NOTE — PLAN OF CARE
Goal Outcome Evaluation:              Outcome Evaluation: Patient recieved from ED. AOX4. On room air. Transfused with 1 unit of RBCs. UOP less than adequete @ 25mls. VSS.

## 2023-03-18 NOTE — CONSULTS
[facility]   HISTORY AND PHYSICAL    Patient Name: Jun Young  : 1960  MRN: [id number]  Primary Care Physician:  Art, No Known  Date of admission: 3/17/2023    Subjective   Subjective     Chief Complaint: Hematuria, acute urinary retention    HPI:    Jun Young is a 62 y.o. male presenting to [facility] ED with acute urinary retention and gross hematuria.      He has a complicated past medical and urologic history.  He has had recent admission due to urinary retention, STALIN on CKD.  He had a Whitfield catheter placed after a transurethral resection of prostate and fulguration of bleeding, his catheter was removed in clinic approximately 48 hours ago.  He reports that he has not voided since catheter removal.  He reports only blood per urethra since catheter removal.  At time of presentation the patient was found to have tachycardia with heart rate of 150, hypotension.  His creatinine is 5.8.  Hemoglobin was obtained and is 5.3.    Patient is currently admitted to the ICU, he has received a blood transfusion and repeat hemoglobin is 6.6.  Patient is not voided since arrival.  He is refusing a Whitfield catheter while awake.     CT scan has been performed demonstrating bilateral hydronephrosis down to a very distended urinary bladder and prostatomegaly.    Urology consulted for evaluation.          Review of Systems   The following systems were reviewed and negative;  constitution, respiratory, cardiovascular, gastrointestinal, genitourinary, musculoskeletal, neurological and behavioral/psych.    Personal History     Past Medical History:   Diagnosis Date   • Acute on chronic renal failure (HCC)    • Bilateral hydronephrosis    • BPH (benign prostatic hyperplasia)    • Cholelithiasis    • Diabetes mellitus (HCC)    • Dyslipidemia    • GERD (gastroesophageal reflux disease)    • Gross hematuria    • Hemodialysis patient (HCC)    • History of transfusion    • Hypertension    •  Pericardial effusion     echo 2/2023 - 1-2 cm preserved LV function       Past Surgical History:   Procedure Laterality Date   • CYSTOSCOPY WITH CLOT EVACUATION      fulguration - 3 way catheter placement   • INSERTION HEMODIALYSIS CATHETER         Family History: family history is not on file. Otherwise pertinent FHx was reviewed and not pertinent to current issue.    Social History:  reports that he has never smoked. He has never used smokeless tobacco. He reports that he does not drink alcohol and does not use drugs.    Home Medications:         Allergies:  No Known Allergies    Objective   Objective     Vitals:   Temp:  [98.7 °F (37.1 °C)-102.4 °F (39.1 °C)] 101.1 °F (38.4 °C)  Heart Rate:  [] 99  Resp:  [18-20] 20  BP: ()/(49-80) 139/80  Physical Exam    Constitutional: Awake in bed, alert    Eyes: PERRLA, sclerae anicteric, no conjunctival injection   HENT: Normocephalic, atraumatic, mucous membranes moist   Neck: Supple, trachea midline   Respiratory:Equal chest rise, non-labored respirations    Cardiovascular: Perfused, no edema   Gastrointestinal: Soft, nontender, non-distended, suprapubic fullness.   Musculoskeletal: No bilateral ankle edema, no clubbing or cyanosis to extremities   Psychiatric: Appropriate affect, cooperative   Neurologic: Oriented x 3, Cranial Nerves grossly intact, speech clear   Skin: No rashes     Result Review    Result Review:  I have personally reviewed the results from the time of this admission to 3/18/2023 09:52 EDT and agree with these findings:  [x]  Laboratory  [x]  Microbiology  [x]  Radiology  []  EKG/Telemetry   []  Cardiology/Vascular   []  Pathology  [x]  Old records  []  Other:    Most notable findings include:   Cr 5.88  Hgb 5.3    CT abdomen and pelvis with bilateral hydronephrosis down to the level of very distended urinary bladder, prostatic hypertrophy noted       Assessment & Plan   Assessment / Plan     Brief Patient Summary:  Jun Florence  Hannah is a 62 y.o. male who presents to [facility] with acute onset urinary retention and hematuria.  He has had complex past urologic history including urinary retention related to BPH resulting in chronic CKD.  Most recently he was admitted to [facility] with STALIN and CKD.  He had a Whitfield catheter placed for hematuria and clot evacuation and small TURP performed.  He returned to have Whitfield catheter removed in clinic approximately 48 hours ago.  He reports that he has not voided since catheter has been removed.  He reports onset of blood per urethra.    The patient presented to [facility] ED due to hematuria.  Hemoglobin was found to be 5.3 and creatinine 5.8.  He was admitted to the ICU due to tachycardia, hypotension, hemoglobin of 5.3.  Urology consulted.  He has not voided.  He has refused Whitfield catheter placement, has refused in the past.    We have discussed the indication for emergent operative intervention including cystoscopy, fulguration of bleeding, clot evacuation and catheter placement.  We have discussed the emergent indication for intervention and catheter placement.  We have discussed the need for catheter placement and drainage.  He voices understanding.  We have discussed the risk and benefits of procedure and he elects to proceed.  He will maintain Whitfield catheter in place during admission, will attempt trial of void versus further discussion of possible suprapubic tube placement as an outpatient when he follows with established urologic provider    Active Hospital Problems:  Active Hospital Problems    Diagnosis    • Symptomatic anemia    • Gross hematuria    • CKD (chronic kidney disease) stage 5, GFR less than 15 ml/min ([facility])    • Type 2 diabetes mellitus (HCC)      Plan:   - To OR for cystoscopy, fulguration of bleeding, clot evacuation, Whitfield catheter placement    DVT prophylaxis:  No DVT prophylaxis order currently exists.    CODE STATUS:       Admission Status: Per primary  team    Electronically signed by Santana Christianson MD, 03/18/23, 9:52 AM EDT.

## 2023-03-18 NOTE — ANESTHESIA PROCEDURE NOTES
Airway  Urgency: elective    Date/Time: 3/18/2023 10:35 AM  Airway not difficult    General Information and Staff    Patient location during procedure: OR  CRNA/CAA: Vaibhav Cuellar CRNA    Indications and Patient Condition  Indications for airway management: airway protection    Preoxygenated: yes  MILS maintained throughout  Mask difficulty assessment: 0 - not attempted    Final Airway Details  Final airway type: endotracheal airway      Successful airway: ETT  Cuffed: yes   Successful intubation technique: video laryngoscopy  Endotracheal tube insertion site: oral  Blade: Harris  Blade size: 3  ETT size (mm): 7.5  Cormack-Lehane Classification: grade I - full view of glottis  Placement verified by: chest auscultation and capnometry   Cuff volume (mL): 10  Measured from: lips  ETT/EBT  to lips (cm): 20  Number of attempts at approach: 1  Assessment: lips, teeth, and gum same as pre-op and atraumatic intubation    Additional Comments  Negative epigastric sounds, Breath sound equal bilaterally with symmetric chest rise and fall. RSI conducted

## 2023-03-18 NOTE — ANESTHESIA PROCEDURE NOTES
Arterial Line      Patient reassessed immediately prior to procedure    Patient location during procedure: pre-op   Line placed for hemodynamic monitoring.  Performed By   Anesthesiologist: Candy Noble DO   Preanesthetic Checklist  Completed: patient identified, IV checked, site marked, risks and benefits discussed, surgical consent, monitors and equipment checked, pre-op evaluation and timeout performed  Arterial Line Prep    Sterile Tech: cap, gloves and sterile barriers  Prep: ChloraPrep  Patient monitoring: blood pressure monitoring, continuous pulse oximetry and EKG  Arterial Line Procedure   Laterality:right  Location:  radial artery  Catheter size: 20 G   Guidance: ultrasound guided and palpation technique  Number of attempts: 1  Successful placement: yes   Post Assessment   Dressing Type: line sutured, occlusive dressing applied, secured with tape and wrist guard applied.   Complications no  Circ/Move/Sens Assessment: normal and unchanged.   Patient Tolerance: patient tolerated the procedure well with no apparent complications

## 2023-03-18 NOTE — ANESTHESIA PREPROCEDURE EVALUATION
Anesthesia Evaluation     Patient summary reviewed and Nursing notes reviewed   no history of anesthetic complications:  NPO Solid Status: > 8 hours  NPO Liquid Status: > 2 hours           Airway   Mallampati: II  TM distance: >3 FB  Neck ROM: full  No difficulty expected  Dental    (+) poor dentition    Pulmonary - normal exam   (-) asthma, sleep apnea, not a smoker  Cardiovascular - normal exam    ECG reviewed    (+) hypertension,       Neuro/Psych  (-) seizures, CVA  GI/Hepatic/Renal/Endo    (+)  GERD well controlled,  renal disease CRI, diabetes mellitus type 2,     Musculoskeletal     Abdominal    Substance History - negative use     OB/GYN          Other   blood dyscrasia anemia,     ROS/Med Hx Other: troponins elevated                Anesthesia Plan    ASA 3 - emergent     general and Briana   Rapid sequence  (Risks and benefits of general anesthesia discussed with patient (including MI, CVA, death, recall, aspiration, oropharyngeal/dental damage), questions answered, agreeable to proceed.    )  intravenous induction     Anesthetic plan, risks, benefits, and alternatives have been provided, discussed and informed consent has been obtained with: patient.    Use of blood products discussed with patient  Consented to blood products.   Plan discussed with CRNA.        CODE STATUS:

## 2023-03-18 NOTE — PLAN OF CARE
Goal Outcome Evaluation:  Plan of Care Reviewed With: patient, son        Progress: improving     Taken to OR for cyctoscopy/clot evacuation/fulgration of bleed, 3 aquilino F/C placed with CBI. Pre meds given for blood transfusion. 1 u PRBC initiated before transfer to OR. Additional unit PRBC given in OR. Post op H&H 7.8/24.4. Q6 hour H&H checks. Temp 101 before OR, afebrile post procedure. Art line in place. -140's. Hr 60-80 post procedure. Remains on RA. CBI infusing without difficulty. Son updated at bedside.

## 2023-03-18 NOTE — SIGNIFICANT NOTE
Blood infusing, FRANKY Cerda notified of elevated temp. Advised to decrease rate to 100mls/hr and continue to monitor for other S/S of possible reaction

## 2023-03-18 NOTE — CONSULTS
NAL Consult Note    Jun Young  1960  [id number]    Date of Admit:  3/17/2023    Date of Consult: 3/18/2023        Requesting Provider: No ref. provider found  Evaluating Physician: Zion Hardin MD        Reason for Consultation:  ESRD  History of present illness:    Patient is a 62 y.o.  Yr old male KNOWN TO [facility] WITH ESRD  ( M-W-F HD [facility] WITH [facility] )  & CHRONIC URINARY OBSTRUCTION ( WITH UROLOGY FOLLOWING )  ADMITTED WITH GROSS HEMATURIA AND WE ARE ASKED TO SEE TO MANAGE ESRD/DIALYSIS. PATIENT DOES NOT SPEAK ENGLISH AND H/O OBTAINED FROM DAUGHTER BY PHONE AND DIALYSIS CLINIC. GETS HD FOR 3 HRS BY THD CATH. GOT FULL TX YESTERDAY. PATIENT HAS SEVERE ANEMIA AND IS GETTING TRANSFUSED. GOING TO THE OR THIS AM.    Past Medical History:   Diagnosis Date   • BPH (benign prostatic hyperplasia)        Past Surgical History:   Procedure Laterality Date   • CYSTOSCOPY TRANSURETHRAL RESECTION OF PROSTATE         Social History     Socioeconomic History   • Marital status: Single   Tobacco Use   • Smoking status: Never   • Smokeless tobacco: Never   Vaping Use   • Vaping Use: Never used   Substance and Sexual Activity   • Alcohol use: Never   • Drug use: Never   • Sexual activity: Not Currently       family history is not on file.    No Known Allergies    Medication:    Current Facility-Administered Medications:   •  b complex-vitamin c-folic acid (NEPHRO-LASHON) tablet 1 tablet, 1 tablet, Oral, Daily, Radha Tan APRN, 1 tablet at 03/18/23 0823  •  dextrose (D50W) (25 g/50 mL) IV injection 25 g, 25 g, Intravenous, Q15 Min PRN, Radha Tan APRN  •  dextrose (GLUTOSE) oral gel 15 g, 15 g, Oral, Q15 Min PRN, Radha Tan APRN  •  [START ON 3/20/2023] epoetin sloan-epbx (RETACRIT) injection 20,000 Units, 20,000 Units, Subcutaneous, Once per day on Mon Wed Fri, Zion Hardin MD  •  ferric gluconate (FERRLECIT)125 MG in sodium chloride 0.9 % 100 mL IVPB, 125 mg, Intravenous, Daily  "Before Supper, Zion Hardin MD  •  glucagon (GLUCAGEN) injection 1 mg, 1 mg, Intramuscular, Q15 Min PRN, Radha Tan APRN  •  Insulin Lispro (humaLOG) injection 0-9 Units, 0-9 Units, Subcutaneous, TID AC, Radha Tan APRN  •  Lidocaine HCl gel (XYLOCAINE) urethral/mucosal syringe, , Topical, PRN, Radha Tan APRN  •  pantoprazole (PROTONIX) injection 40 mg, 40 mg, Intravenous, Q AM, Radha Tan APRN, 40 mg at 03/18/23 0542  •  piperacillin-tazobactam (ZOSYN) 3.375 g in iso-osmotic dextrose 50 ml (premix), 3.375 g, Intravenous, Q12H, Radha Tan APRN, 3.375 g at 03/18/23 0823  •  sodium bicarbonate tablet 650 mg, 650 mg, Oral, BID, Radha Tan APRN  •  Sodium Chloride (PF) 0.9 % 10 mL, 10 mL, Intravenous, PRN, [place], William, DO    No medications prior to admission.       Review of Systems:    Constitutional-- + Fever. NO chills or sweats. + significant change in weight  Eye-- no diplopia, no conjunctivitis  ENT-- No new hearing or throat complaints.  No epistaxis or oral sores. No odynophagia or dysphagia. POSITIVE headache. NO photophobia or neck stiffness.  CV-- No chest pain. + palpitations.  NO edema  Resp-- No SOB/cough/Hemoptysis  GI- No nausea, vomiting, or diarrhea.  No hematochezia, melena, or hematemesis.  -- No dysuria. + hematuria. NO flank pain. No lower tract obstructive symptoms  Skin-- No rash/ITCHING.   Lymph- no painful or swollen lymph nodes in neck/axilla or groin.   Heme- No active bruising or bleeding; no Hx of DVT or PE.  MS-- no swelling or pain in the joints  Neuro-- No acute focal weakness or numbness in the arms or legs.  No seizures.  Psych--No anxiety or depression  Endo--No cold or heat intolerance.  No polyuria, polydipsia, or polyphagia    Full review of systems reviewed and negative otherwise for acute complaints    Physical Exam:   Vital Signs   Blood pressure 139/80, pulse 99, temperature (!) 101.1 °F (38.4 °C), resp. rate 20, height 170.2 cm (67\"), " weight 68.2 kg (150 lb 5.7 oz), SpO2 96 %.     GENERAL: Awake and alert, in no acute distress. THD CATH  HEENT: Normocephalic, atraumatic.  PER.  No conjunctivitis. No icterus. Oropharynx clear without evidence of thrush or exudate. No evidence of peridontal disease.    NECK: Supple without nuchal rigidity. No mass.  LYMPH: No painful cervical, axillary or inguinal lymphadenopathy.  HEART: RRR; No murmur, rubs, gallops. No bruits in neck, abdomen, or groins, no edema  LUNGS: Normal respiratory effort. Nonlabored. No dullness.  No crepitus.  Clear to auscultation bilaterally without wheezing, rales, rhonchi.  ABDOMEN: Soft, nontender, nondistended. Positive bowel sounds. No rebound or guarding. NO mass or HSM.  JOINTS:  Full range of motion, no redness or tenderness.  EXT:  No cyanosis/clubbing.  :  BLADDER NOT DISTENDED.   SKIN: Warm and dry without rash  NEURO: Oriented to PPT. No focal neurological deficits. Strength equal bilateral  PSYCHIATRIC: Normal insight and judgement. Cooperative with PE    Laboratory Data  Results from last 7 days   Lab Units 03/18/23  0820 03/17/23  2211 03/17/23  1830   HEMOGLOBIN g/dL 6.6* 5.3* 7.2*   HEMATOCRIT % 20.8* 17.0* 21.9*     Results from last 7 days   Lab Units 03/18/23  0820 03/17/23  1830   SODIUM mmol/L 138 136   POTASSIUM mmol/L 4.3 3.7   CHLORIDE mmol/L 99 94*   CO2 mmol/L 26.0 27.0   BUN mg/dL 35* 24*   CREATININE mg/dL 5.88* 3.58*   CALCIUM mg/dL 8.4* 9.3   PHOSPHORUS mg/dL 5.4*  --    MAGNESIUM mg/dL 2.2  --    ALBUMIN g/dL 3.0* 3.9     Results from last 7 days   Lab Units 03/18/23  0820   GLUCOSE mg/dL 82     Results from last 7 days   Lab Units 03/18/23  0636   COLOR UA  Red*   CLARITY UA  Turbid*   PH, URINE  8.0   SPECIFIC GRAVITY, URINE  1.017   GLUCOSE UA  Negative   KETONES UA  Negative   BILIRUBIN UA  Small (1+)*   PROTEIN UA  >=300 mg/dL (3+)*   BLOOD UA  Large (3+)*   LEUKOCYTES UA  Large (3+)*   NITRITE UA  Positive*     Results from last 7 days   Lab  Units 03/17/23  1830   ALK PHOS U/L 120*   BILIRUBIN mg/dL 0.5   ALT (SGPT) U/L 16   AST (SGOT) U/L 23     Estimated Creatinine Clearance: 12.2 mL/min (A) (by C-G formula based on SCr of 5.88 mg/dL (H)).    Radiology:      Impression: ESRD ON M-W-F HD. ANEMIA WITH FE SAT 14%. HEMATURIA WITH H/O OBSTRUCTION.       PLAN: Thank you for asking us to see Jun Young, I recommend the following: GIVE IV FE AND EPO. NEXT HD TX LIKELY 3/20/23. WILL FOLLOW. D/W ICU NURSE.       Zion Hardin MD  3/18/2023  09:49 EDT

## 2023-03-18 NOTE — OP NOTE
CYSTOSCOPY with CLOT EVACUATION; FULGURATION OF BLEEDING; RESECTION OF PROSTATE Procedure Report    Patient Name:  Jun Young  YOB: 1960    Date of Surgery:  3/18/2023     Indications: 62-year-old male presenting with acute urinary retention and hematuria.  He has a history of prostatomegaly, chronic urinary retention, CKD.  He underwent recent Maher catheter removal, reports he has not voided in 48 hours, onset of hematuria.  Upon presentation he has significant urinary retention, bilateral hydronephrosis confirmed on CT imaging.  Hemoglobin is 5.3.  Given hematuria and acute urinary retention we have discussed the emergent indication for cystoscopy, fulguration of bleeding, clot evacuation and Maher catheter placement.  The risk benefits and alternatives were discussed with the patient and his family and they elected proceed    Pre-op Diagnosis:   Urinary retention  Gross hematuria       Post-Op Diagnosis Codes:  Urinary retention  Gross hematuria        Procedure(s):  CYSTOSCOPY WITH CLOT EVACUATION  TRANSURETHRAL RESECTION OF PROSTATE  FULGURATION OF BLEEDING  MAHER CATHETER PLACEMENT    Staff:  Surgeon(s):  Santana Christianson MD         Anesthesia: General    Estimated Blood Loss: none    Implants:    Nothing was implanted during the procedure    Specimen:          None    Drains: 24 Fr 3-way maher       Findings:   1.  Normal pendulous, bulbar urethra.  Prostatomegaly with lateral lobe and median lobe tissue.  Bleeding from median lobe tissue.  2.  Clot evacuation with drainage of clot from the urinary bladder.  3.  Transurethral resection of prostate median lobe tissue.  4.  Fulguration of bleeding performed.  5.  Placement of 24 Occitan three-way catheter with CBI initiated    Complications: None immediate    Description of Procedure:   After informed consent was obtained and signed, the patient was taken back to the operating suite. A time-out was performed to verify procedure  details and the patient's identity. Next, the patient was placed supine on the operating table. Preoperative antibiotics were infused. The patient then underwent smooth induction of general endotracheal anesthesia. Bilateral lower extremity sequential compression devices were cycling. The patient was then moved to a dorsal lithotomy position. All pressure points were carefully padded. The genitals were then prepped and draped in usual sterile fashion.     Next, a 26-Omani continuous flow resectoscope was assembled and inserted atraumatically into the urethra and into the bladder. There was bilateral hyperplasia and coapting prostatic lobes, with significant median lobe tissue.  There was bleeding from the median lobe tissue.    Once in the bladder clot was identified.  Using a Rigo syringe the clot was evacuated.    Using the bipolar loop on cut cautery we began resecting the the median lobe tissue.  A rollerball was then used to obtain hemostasis at our resection site and prior areas of resection which had mild bleeding.    Given that there was good hemostasis under no-flow and all prostatic chips had been removed, the patient's bladder was drained. A 24 Fr 3-way catheter was placed on slight traction, with CBI initiated. The procedure was then concluded. The patient was awoken from anesthesia and taken to PACU in good condition.      Disposition/Follow Up:   The patient will continue admission with ICU service/hospitalist service.  He will continue CBI over 24 hours.  We will decrease CBI, attempt to clamp during admission.  He will require Whitfield catheter at discharge.  Will require outpatient follow-up with urology service    Santana Christianson MD     Date: 3/18/2023  Time: 11:54 EDT

## 2023-03-18 NOTE — H&P
ICU ADMISSION NOTE    Chief complaint     Urinary retention with UTI  Chronic kidney disease, recent onset of dialysis  Profound anemia with gross hematuria    Subjective     Patient is a 62 y.o. male non-smoker, with diabetes, hypertension, dyslipidemia, who was hospitalized February 7 with bilateral hydronephrosis, BPH, acute on chronic renal failure with creatinine of 13 and gross hematuria.  He has had renal failure for a while and had refused admission or dialysis.  However this time he was convinced to initiate hemodialysis.  At the time his creatinine was 13.  He was taken to surgery as well and underwent cystoscopy, evacuation of large bladder clot, fulguration and placement of three-way catheter for irrigation.  A tunneled access was also placed.  He has been receiving hemodialysis every Monday Wednesday Friday.  In addition during that hospitalization an echocardiogram was done revealing a 1 to 2 cm pericardial effusion with preserved LV function.  According to his son he was making yellow urine.  His last hemodialysis was March 17.  On March 14 he was evaluated by urology and Whitfield catheter was removed.  Since removal he has only had some small amounts of bloody urine.  He denied fever but in the emergency room he had a temperature of 100.4 and was tachycardic.  His initial hemoglobin was 7 but worsened to 5 after volume resuscitation.  His troponins were 67 and 52.  Procalcitonin was markedly elevated at 54.  White count was normal at 5.86 but he had a left shift with 17% bands.  He was ordered vancomycin and Zosyn.  Urinalysis was obtained and was grossly bloody with 1+ bacteria.    Review of Systems  Review of Systems   Constitutional: Positive for fatigue, fever and unexpected weight change. Negative for chills.   HENT: Negative for congestion, sore throat and trouble swallowing.    Eyes: Negative for visual disturbance.   Respiratory: Negative for chest tightness and shortness of breath.   "  Cardiovascular: Positive for palpitations. Negative for chest pain and leg swelling.   Gastrointestinal: Negative for abdominal pain, blood in stool, nausea and vomiting.   Endocrine: Negative.    Genitourinary: Positive for difficulty urinating and hematuria.   Musculoskeletal: Negative.    Skin: Negative.    Allergic/Immunologic: Negative.    Neurological: Positive for headaches.   Hematological: Negative.    Psychiatric/Behavioral: Negative.         Home Medications  (Not in a hospital admission)  Patient is not sure  Recent office visit list Nephro-Coleman 1 daily, Norco 5, Protonix 40 mg daily, Januvia 25 mg daily bicarbonate 650 mg 3 times daily    History  No past medical history on file.  No past surgical history on file.  No family history on file.      Diabetes mellitus type 2  Dyslipidemia  Hypertension  BPH  Chronic kidney disease  Gross hematuria  Pericardial effusion  GERD   Cholelithiasis  Cystoscopy with clot evacuation and fulguration, three-way catheter placement February 9, 2023  No allergies  Works as a ,       (Not in a hospital admission)    Allergies:  Patient has no known allergies.      Objective     Vital Signs  Blood pressure 111/58, pulse 103, temperature 100.4 °F (38 °C), temperature source Oral, resp. rate 18, height 170.2 cm (67\"), weight 63.5 kg (140 lb), SpO2 95 %.    Physical Exam:  General Appearance:   Thin middle-aged gentleman in no distress   Head:   Atraumatic   Eyes:          Conjunctiva pale   Ears:     Throat:  Oral mucosa dry   Neck:  Neck veins flat, trachea midline, right IJ Edward cath dressing intact   Back:      Lungs:    Symmetric chest expansion without wheeze or rhonchi    Heart:   Increased rate, regular, S1, S2 auscultated, no rub   Abdomen:    Flat, bowel sounds present, nontender   Rectal:     Deferred   Extremities:  No pretibial edema or cyanosis   Pulses:  Palpable radial pulses   Skin:  Warm and dry   Lymph nodes:  No cervical adenopathy "   Neurologic:  Alert and oriented,  equal, speech fluent       Results Review:   Lab Results (last 24 hours)     Procedure Component Value Units Date/Time    Urinalysis, Microscopic Only - Urine, Clean Catch [[id number]]  (Abnormal) Collected: 03/17/23 2253    Specimen: Urine, Clean Catch Updated: 03/17/23 2256     RBC, UA       Unable to determine due to loaded field     /HPF     WBC, UA       Unable to determine due to loaded field     /HPF     Bacteria, UA 1+ /HPF      Squamous Epithelial Cells, UA --     Methodology Automated Microscopy    Urinalysis With Microscopic If Indicated (No Culture) - Urine, Clean Catch [[id number]]  (Abnormal) Collected: 03/17/23 1930    Specimen: Urine, Clean Catch Updated: 03/17/23 2254     Color, UA Red     Appearance, UA Turbid     pH, UA 8.0     Specific Gravity, UA 1.020     Glucose,  mg/dL (Trace)     Ketones, UA 15 mg/dL (1+)     Bilirubin, UA Moderate (2+)     Blood, UA Large (3+)     Protein, UA >=300 mg/dL (3+)     Leuk Esterase, UA Large (3+)     Nitrite, UA Positive     Urobilinogen, UA 1.0 E.U./dL    Hemoglobin & Hematocrit, Blood [[id number]]  (Abnormal) Collected: 03/17/23 2211    Specimen: Blood Updated: 03/17/23 2242     Hemoglobin 5.3 g/dL      Hematocrit 17.0 %     STAT Lactic Acid, Reflex [[id number]]  (Normal) Collected: 03/17/23 2211    Specimen: Blood Updated: 03/17/23 2239     Lactate 0.9 mmol/L      Comment: Falsely depressed results may occur on samples drawn from patients receiving N-Acetylcysteine (NAC) or Metamizole.       [place] Draw [[id number]] Collected: 03/17/23 1830    Specimen: Blood Updated: 03/17/23 2231    Narrative:      The following orders were created for panel order [place] Draw.  Procedure                               Abnormality         Status                     ---------                               -----------         ------                     Green Top (Gel)[[id number]]                                  Final result                Lavender Top[[id number]]                                     Final result               Gold Top - SST[[id number]]                                   Final result               Chiang Top[[id number]]                                         Final result               Light Blue Top[[id number]]                                   Final result                 Please view results for these tests on the individual orders.    Gray Top [[id number]] Collected: 03/17/23 1830    Specimen: Blood Updated: 03/17/23 2231     Extra Tube Hold for add-ons.     Comment: Auto resulted.       High Sensitivity Troponin T 2Hr [[id number]]  (Abnormal) Collected: 03/17/23 2118    Specimen: Blood Updated: 03/17/23 2145     HS Troponin T 62 ng/L      Troponin T Delta -5 ng/L     Narrative:      High Sensitive Troponin T Reference Range:  <10.0 ng/L- Negative Female for AMI  <15.0 ng/L- Negative Male for AMI  >=10 - Abnormal Female indicating possible myocardial injury.  >=15 - Abnormal Male indicating possible myocardial injury.   Clinicians would have to utilize clinical acumen, EKG, Troponin, and serial changes to determine if it is an Acute Myocardial Infarction or myocardial injury due to an underlying chronic condition.         Blood Culture - Blood, Arm, Right [[id number]] Collected: 03/17/23 2030    Specimen: Blood from Arm, Right Updated: 03/17/23 2047    Blood Culture - Blood, Arm, Left [[id number]] Collected: 03/17/23 2015    Specimen: Blood from Arm, Left Updated: 03/17/23 2046    COVID PRE-OP / PRE-PROCEDURE SCREENING ORDER (NO ISOLATION) - Swab, Nasopharynx [[id number]]  (Normal) Collected: 03/17/23 1919    Specimen: Swab from Nasopharynx Updated: 03/17/23 2029    Narrative:      The following orders were created for panel order COVID PRE-OP / PRE-PROCEDURE SCREENING ORDER (NO ISOLATION) - Swab, Nasopharynx.  Procedure                               Abnormality         Status                     ---------                                -----------         ------                     Respiratory Panel PCR w/...[[id number]]  Normal              Final result                 Please view results for these tests on the individual orders.    Respiratory Panel PCR w/COVID-19(SARS-CoV-2) [facility]/ANGELLA/MILLICENT/PAD/COR/MAD/QUIQUE In-House, NP Swab in UTM/VTM, 3-4 HR TAT - Swab, Nasopharynx [[id number]]  (Normal) Collected: 03/17/23 1919    Specimen: Swab from Nasopharynx Updated: 03/17/23 2029     ADENOVIRUS, PCR Not Detected     Coronavirus 229E Not Detected     Coronavirus HKU1 Not Detected     Coronavirus NL63 Not Detected     Coronavirus OC43 Not Detected     COVID19 Not Detected     Human Metapneumovirus Not Detected     Human Rhinovirus/Enterovirus Not Detected     Influenza A PCR Not Detected     Influenza B PCR Not Detected     Parainfluenza Virus 1 Not Detected     Parainfluenza Virus 2 Not Detected     Parainfluenza Virus 3 Not Detected     Parainfluenza Virus 4 Not Detected     RSV, PCR Not Detected     Bordetella pertussis pcr Not Detected     Bordetella parapertussis PCR Not Detected     Chlamydophila pneumoniae PCR Not Detected     Mycoplasma pneumo by PCR Not Detected    Narrative:      In the setting of a positive respiratory panel with a viral infection PLUS a negative procalcitonin without other underlying concern for bacterial infection, consider observing off antibiotics or discontinuation of antibiotics and continue supportive care. If the respiratory panel is positive for atypical bacterial infection (Bordetella pertussis, Chlamydophila pneumoniae, or Mycoplasma pneumoniae), consider antibiotic de-escalation to target atypical bacterial infection.    Procalcitonin [[id number]]  (Abnormal) Collected: 03/17/23 1830    Specimen: Blood Updated: 03/17/23 2015     Procalcitonin 54.10 ng/mL     Narrative:      As a Marker for Sepsis (Non-Neonates):    1. <0.5 ng/mL represents a low risk of severe sepsis and/or septic shock.  2. >2 ng/mL represents a high risk  "of severe sepsis and/or septic shock.    As a Marker for Lower Respiratory Tract Infections that require antibiotic therapy:    PCT on Admission    Antibiotic Therapy       6-12 Hrs later    >0.5                Strongly Recommended  >0.25 - <0.5        Recommended   0.1 - 0.25          Discouraged              Remeasure/reassess PCT  <0.1                Strongly Discouraged     Remeasure/reassess PCT    As 28 day mortality risk marker: \"Change in Procalcitonin Result\" (>80% or <=80%) if Day 0 (or Day 1) and Day 4 values are available. Refer to http://[email or web address]-pct-calculator.[email or web address]    Change in PCT <=80%  A decrease of PCT levels below or equal to 80% defines a positive change in PCT test result representing a higher risk for 28-day all-cause mortality of patients diagnosed with severe sepsis for septic shock.    Change in PCT >80%  A decrease of PCT levels of more than 80% defines a negative change in PCT result representing a lower risk for 28-day all-cause mortality of patients diagnosed with severe sepsis or septic shock.       Green Top (Gel) [[id number]] Collected: 03/17/23 1830    Specimen: Blood Updated: 03/17/23 1931     Extra Tube Hold for add-ons.     Comment: Auto resulted.       Gold Top - SST [[id number]] Collected: 03/17/23 1830    Specimen: Blood Updated: 03/17/23 1931     Extra Tube Hold for add-ons.     Comment: Auto resulted.       Lavender Top [[id number]] Collected: 03/17/23 1830    Specimen: Blood Updated: 03/17/23 1931     Extra Tube hold for add-on     Comment: Auto resulted       Light Blue Top [[id number]] Collected: 03/17/23 1830    Specimen: Blood Updated: 03/17/23 1931     Extra Tube Hold for add-ons.     Comment: Auto resulted       High Sensitivity Troponin T [[id number]]  (Abnormal) Collected: 03/17/23 1830    Specimen: Blood Updated: 03/17/23 1926     HS Troponin T 67 ng/L     Narrative:      High Sensitive Troponin T Reference Range:  <10.0 ng/L- Negative Female for AMI  <15.0 ng/L- " Negative Male for AMI  >=10 - Abnormal Female indicating possible myocardial injury.  >=15 - Abnormal Male indicating possible myocardial injury.   Clinicians would have to utilize clinical acumen, EKG, Troponin, and serial changes to determine if it is an Acute Myocardial Infarction or myocardial injury due to an underlying chronic condition.         Lactic Acid, Plasma [[id number]]  (Abnormal) Collected: 03/17/23 1830    Specimen: Blood Updated: 03/17/23 1926     Lactate 2.6 mmol/L      Comment: Falsely depressed results may occur on samples drawn from patients receiving N-Acetylcysteine (NAC) or Metamizole.       CBC & Differential [[id number]]  (Abnormal) Collected: 03/17/23 1830    Specimen: Blood Updated: 03/17/23 1926    Narrative:      The following orders were created for panel order CBC & Differential.  Procedure                               Abnormality         Status                     ---------                               -----------         ------                     CBC Auto Differential[[id number]]        Abnormal            Final result               Scan Slide[[id number]]                                                                    Please view results for these tests on the individual orders.    CBC Auto Differential [[id number]]  (Abnormal) Collected: 03/17/23 1830    Specimen: Blood Updated: 03/17/23 1926     WBC 5.86 10*3/mm3      RBC 2.38 10*6/mm3      Hemoglobin 7.2 g/dL      Hematocrit 21.9 %      MCV 92.0 fL      MCH 30.3 pg      MCHC 32.9 g/dL      RDW 14.6 %      RDW-SD 49.0 fl      MPV 9.3 fL      Platelets 151 10*3/mm3     Narrative:      The previously reported component NRBC is no longer being reported. Previous result was 0.0 /100 WBC (Reference Range: 0.0-0.2 /100 WBC) on 3/17/2023 at 1858 EDT.    Manual Differential [[id number]]  (Abnormal) Collected: 03/17/23 1830    Specimen: Blood Updated: 03/17/23 1926     Neutrophil % 64.0 %      Lymphocyte % 7.0 %      Monocyte % 8.0 %       Eosinophil % 4.0 %      Basophil % 0.0 %      Bands %  17.0 %      Neutrophils Absolute 4.75 10*3/mm3      Lymphocytes Absolute 0.41 10*3/mm3      Monocytes Absolute 0.47 10*3/mm3      Eosinophils Absolute 0.23 10*3/mm3      Basophils Absolute 0.00 10*3/mm3      nRBC 0.0 /100 WBC      Macrocytes Slight/1+     Polychromasia Slight/1+     WBC Morphology Normal     Platelet Morphology Normal    Comprehensive Metabolic Panel [[id number]]  (Abnormal) Collected: 03/17/23 1830    Specimen: Blood Updated: 03/17/23 1917     Glucose 154 mg/dL      BUN 24 mg/dL      Creatinine 3.58 mg/dL      Sodium 136 mmol/L      Potassium 3.7 mmol/L      Chloride 94 mmol/L      CO2 27.0 mmol/L      Calcium 9.3 mg/dL      Total Protein 7.9 g/dL      Albumin 3.9 g/dL      ALT (SGPT) 16 U/L      AST (SGOT) 23 U/L      Alkaline Phosphatase 120 U/L      Total Bilirubin 0.5 mg/dL      Globulin 4.0 gm/dL      Comment: Calculated Result        A/G Ratio 1.0 g/dL      BUN/Creatinine Ratio 6.7     Anion Gap 15.0 mmol/L      eGFR 18.2 mL/min/1.73     Narrative:      GFR Normal >60  Chronic Kidney Disease <60  Kidney Failure <15      Lipase [[id number]]  (Normal) Collected: 03/17/23 1830    Specimen: Blood Updated: 03/17/23 1917     Lipase 25 U/L         Imaging Results (Last 24 Hours)     Procedure Component Value Units Date/Time    CT Abdomen Pelvis Without Contrast [[id number]] Collected: 03/17/23 1948     Updated: 03/17/23 2006    Narrative:      CT ABDOMEN PELVIS WO CONTRAST    Date of Exam: 3/17/2023 7:35 PM EDT    Indication: mid back pain, hematuria, headachelk.    Comparison: 2/7/2023    Technique: Axial CT images were obtained of the abdomen and pelvis without the administration of contrast. Reconstructed coronal and sagittal images were also obtained. Automated exposure control and iterative construction methods were used.      FINDINGS:    Abdomen/Pelvis:    Lower Chest: Platelike and dependent opacities are noted at the lung bases.    No free  air noted below the diaphragm     Organs: Large stone noted within the gallbladder. Limited noncontrast enhanced gallbladder is otherwise grossly unremarkable in appearance. Limited noncontrast imaging liver is a small low-attenuation lesion posteriorly right hepatic lobe similar to the   prior study likely small cyst or.    The pancreas, spleen and adrenal glands are grossly unremarkable    There is severe bilateral hydronephrosis cortical thinning of the renal parenchyma bilaterally. Dilation of the ureters is noted extending to the bladder.     GI/Bowel: Noncontrast imaging of the stomach and small bowel demonstrate no acute abnormality. Ileocecal valve and appendix are grossly unremarkable. Colon demonstrates no acute abnormality    Pelvis: Enlarged irregular appearance of the prostate noted with diffuse wall thickening of the bladder similar to the prior study is noted. No suspicious adenopathy appreciated within the pelvis. Bilateral fat-containing hernias noted    Peritoneum/Retroperitoneum: The aorta is normal in caliber. There is a changes. No suspicious retroperitoneal adenopathy    Bones/Soft Tissues: No destructive bone lesion. Multilevel degenerative changes spine. Probable bone islands again noted within the spine and proximal femur bilaterally      Impression:        1. Moderate to severe hydroureteronephrosis again noted with bladder distention. Findings likely related to chronic outlet obstruction related to prostamegaly. Consider consultation with urology for further management  2. Comparison the prostate appears grossly stable and may represent either benign or malignant enlargement.  3. Cholelithiasis  4. Other findings as above.           Electronically Signed: Josué Clinton    3/17/2023 8:03 PM EDT    Workstation ID: [id number]           PROBLEM LIST    Urinary retention  Urinary tract infection  Chronic bilateral hydronephrosis/BPH  Chronic kidney disease on hemodialysis   severe  anemia        Assessment & Plan     Unfortunate 62-year-old  gentleman with diabetes, hypertension, dyslipidemia, BPH with bilateral hydronephrosis and renal failure and associated gross hematuria.  He did undergo cystoscopy and catheter placement in February as well as initiation of hemodialysis.  Today his creatinine is 3.58.  Whitfield catheter was removed on March 14 and he has had minimal urine.  CT scan reveals distention of his bladder and persistent hydronephrosis prostate enlargement consistent with with bladder outlet obstruction.  He has been receiving hemodialysis every Monday Wednesday Friday and his creatinine is only mildly increased at 3.58 compared to 13 in February prior to initiation of dialysis.  Potassium is also adequate at 3.7.  Unfortunately, urinalysis reveals a urinary tract infection and procalcitonin is elevated at 54.  Respiratory nasal swab was negative and CT of the abdomen did not reveal any worrisome colitis.  He does have evidence of gallstones but no cholecystitis.  Clinically aspect urine is the source for his fever.  He also was noted to be anemic with a hemoglobin of 7, dropping to 5.3 after hydration.  He denies any GI blood loss and I clinically suspect anemia of chronic disease with gross hematuria and blood loss.  He did receive 3 units of packed red cells in February.  During his February admission, he also was found to have a 1 to 2 cm pericardial effusion.  This is most likely from his uremia.  Now with dialysis his BUN and creatinine are markedly better.  He does have a resting tachycardia which is probably from his fever and sepsis, and anemia but, would like to recheck an echo to look at the effusion.    -I will consult urology for Whitfield catheter placement as he has had a very difficult Whitfield catheter placement in the past and is fearful    -Transfused 2 units of red cells    -Notify nephrology so that they may continue dialysis    -Vancomycin, Zosyn    -Resume oral  bicarbonate and multivitamin    -Continue proton pump inhibitor    -Sliding scale insulin    -Echocardiogram, limited, reevaluate effusion      I discussed the patients findings and my recommendations with patient, patient's son    Annel FARAZ Padilla MD  03/18/23  00:36 EDT    Time: 55 minutes    Please note that portions of this note were completed with a voice recognition program.

## 2023-03-19 PROBLEM — N18.6 ESRD (END STAGE RENAL DISEASE): Status: ACTIVE | Noted: 2023-03-19

## 2023-03-19 PROBLEM — N30.01 ACUTE CYSTITIS WITH HEMATURIA: Status: ACTIVE | Noted: 2023-03-19

## 2023-03-19 LAB
ALBUMIN SERPL-MCNC: 2.9 G/DL (ref 3.5–5.2)
ANION GAP SERPL CALCULATED.3IONS-SCNC: 16 MMOL/L (ref 5–15)
BH BB BLOOD EXPIRATION DATE: NORMAL
BH BB BLOOD EXPIRATION DATE: NORMAL
BH BB BLOOD TYPE BARCODE: 5100
BH BB BLOOD TYPE BARCODE: 5100
BH BB DISPENSE STATUS: NORMAL
BH BB DISPENSE STATUS: NORMAL
BH BB PRODUCT CODE: NORMAL
BH BB PRODUCT CODE: NORMAL
BH BB UNIT NUMBER: NORMAL
BH BB UNIT NUMBER: NORMAL
BUN SERPL-MCNC: 60 MG/DL (ref 8–23)
BUN/CREAT SERPL: 8 (ref 7–25)
CALCIUM SPEC-SCNC: 8.2 MG/DL (ref 8.6–10.5)
CHLORIDE SERPL-SCNC: 96 MMOL/L (ref 98–107)
CO2 SERPL-SCNC: 22 MMOL/L (ref 22–29)
CREAT SERPL-MCNC: 7.52 MG/DL (ref 0.76–1.27)
CROSSMATCH INTERPRETATION: NORMAL
CROSSMATCH INTERPRETATION: NORMAL
DEPRECATED RDW RBC AUTO: 50.5 FL (ref 37–54)
EGFRCR SERPLBLD CKD-EPI 2021: 7.5 ML/MIN/1.73
ERYTHROCYTE [DISTWIDTH] IN BLOOD BY AUTOMATED COUNT: 14.9 % (ref 12.3–15.4)
GLUCOSE BLDC GLUCOMTR-MCNC: 139 MG/DL (ref 70–130)
GLUCOSE BLDC GLUCOMTR-MCNC: 180 MG/DL (ref 70–130)
GLUCOSE BLDC GLUCOMTR-MCNC: 263 MG/DL (ref 70–130)
GLUCOSE SERPL-MCNC: 230 MG/DL (ref 65–99)
HCT VFR BLD AUTO: 25.5 % (ref 37.5–51)
HGB BLD-MCNC: 8.3 G/DL (ref 13–17.7)
MCH RBC QN AUTO: 30 PG (ref 26.6–33)
MCHC RBC AUTO-ENTMCNC: 32.5 G/DL (ref 31.5–35.7)
MCV RBC AUTO: 92.1 FL (ref 79–97)
PHOSPHATE SERPL-MCNC: 7.6 MG/DL (ref 2.5–4.5)
PLATELET # BLD AUTO: 157 10*3/MM3 (ref 140–450)
PMV BLD AUTO: 10.1 FL (ref 6–12)
POTASSIUM SERPL-SCNC: 4.6 MMOL/L (ref 3.5–5.2)
QT INTERVAL: 356 MS
QTC INTERVAL: 459 MS
RBC # BLD AUTO: 2.77 10*6/MM3 (ref 4.14–5.8)
SODIUM SERPL-SCNC: 134 MMOL/L (ref 136–145)
UNIT  ABO: NORMAL
UNIT  ABO: NORMAL
UNIT  RH: NORMAL
UNIT  RH: NORMAL
WBC NRBC COR # BLD: 5.1 10*3/MM3 (ref 3.4–10.8)

## 2023-03-19 PROCEDURE — 85027 COMPLETE CBC AUTOMATED: CPT | Performed by: UROLOGY

## 2023-03-19 PROCEDURE — 80069 RENAL FUNCTION PANEL: CPT | Performed by: UROLOGY

## 2023-03-19 PROCEDURE — 99232 SBSQ HOSP IP/OBS MODERATE 35: CPT | Performed by: INTERNAL MEDICINE

## 2023-03-19 PROCEDURE — 25010000002 NA FERRIC GLUC CPLX PER 12.5 MG: Performed by: INTERNAL MEDICINE

## 2023-03-19 PROCEDURE — 99024 POSTOP FOLLOW-UP VISIT: CPT | Performed by: UROLOGY

## 2023-03-19 PROCEDURE — 63710000001 INSULIN DETEMIR PER 5 UNITS: Performed by: INTERNAL MEDICINE

## 2023-03-19 PROCEDURE — 25010000002 PIPERACILLIN SOD-TAZOBACTAM PER 1 G: Performed by: INTERNAL MEDICINE

## 2023-03-19 PROCEDURE — 63710000001 INSULIN LISPRO (HUMAN) PER 5 UNITS: Performed by: UROLOGY

## 2023-03-19 PROCEDURE — 82962 GLUCOSE BLOOD TEST: CPT

## 2023-03-19 PROCEDURE — 25010000002 PIPERACILLIN SOD-TAZOBACTAM PER 1 G: Performed by: UROLOGY

## 2023-03-19 PROCEDURE — 25010000002 MORPHINE PER 10 MG: Performed by: NURSE PRACTITIONER

## 2023-03-19 RX ORDER — PANTOPRAZOLE SODIUM 40 MG/1
40 TABLET, DELAYED RELEASE ORAL
Status: DISCONTINUED | OUTPATIENT
Start: 2023-03-20 | End: 2023-03-21 | Stop reason: HOSPADM

## 2023-03-19 RX ORDER — MORPHINE SULFATE 2 MG/ML
1 INJECTION, SOLUTION INTRAMUSCULAR; INTRAVENOUS ONCE
Status: COMPLETED | OUTPATIENT
Start: 2023-03-19 | End: 2023-03-19

## 2023-03-19 RX ORDER — ACETAMINOPHEN 325 MG/1
650 TABLET ORAL EVERY 6 HOURS PRN
Status: DISCONTINUED | OUTPATIENT
Start: 2023-03-19 | End: 2023-03-21 | Stop reason: HOSPADM

## 2023-03-19 RX ADMIN — MORPHINE SULFATE 1 MG: 2 INJECTION, SOLUTION INTRAMUSCULAR; INTRAVENOUS at 22:59

## 2023-03-19 RX ADMIN — TAZOBACTAM SODIUM AND PIPERACILLIN SODIUM 3.38 G: 375; 3 INJECTION, SOLUTION INTRAVENOUS at 22:22

## 2023-03-19 RX ADMIN — PANTOPRAZOLE SODIUM 40 MG: 40 INJECTION, POWDER, LYOPHILIZED, FOR SOLUTION INTRAVENOUS at 05:02

## 2023-03-19 RX ADMIN — INSULIN LISPRO 2 UNITS: 100 INJECTION, SOLUTION INTRAVENOUS; SUBCUTANEOUS at 11:48

## 2023-03-19 RX ADMIN — INSULIN LISPRO 6 UNITS: 100 INJECTION, SOLUTION INTRAVENOUS; SUBCUTANEOUS at 08:46

## 2023-03-19 RX ADMIN — INSULIN DETEMIR 15 UNITS: 100 INJECTION, SOLUTION SUBCUTANEOUS at 08:46

## 2023-03-19 RX ADMIN — SODIUM BICARBONATE 650 MG TABLET 650 MG: at 22:23

## 2023-03-19 RX ADMIN — TAZOBACTAM SODIUM AND PIPERACILLIN SODIUM 3.38 G: 375; 3 INJECTION, SOLUTION INTRAVENOUS at 08:46

## 2023-03-19 RX ADMIN — ACETAMINOPHEN 325MG 650 MG: 325 TABLET ORAL at 19:30

## 2023-03-19 RX ADMIN — Medication 1 TABLET: at 08:45

## 2023-03-19 RX ADMIN — SODIUM BICARBONATE 650 MG TABLET 650 MG: at 08:45

## 2023-03-19 RX ADMIN — SODIUM CHLORIDE 125 MG: 9 INJECTION, SOLUTION INTRAVENOUS at 17:56

## 2023-03-19 NOTE — PROGRESS NOTES
"   LOS: 1 day    Patient Care Team:  Provider, No Known as PCP - General    Reason For Visit:  F/U ESRD.   Subjective           Review of Systems:    Pulm: No soa   CV:  No CP      Objective     b complex-vitamin c-folic acid, 1 tablet, Oral, Daily  [START ON 3/20/2023] epoetin sloan/sloan-epbx, 20,000 Units, Subcutaneous, Once per day on Mon Wed Fri  ferric gluconate, 125 mg, Intravenous, Daily Before Supper  insulin detemir, 15 Units, Subcutaneous, Daily  insulin lispro, 0-9 Units, Subcutaneous, TID AC  [START ON 3/20/2023] pantoprazole, 40 mg, Oral, Q AM  piperacillin-tazobactam, 3.375 g, Intravenous, Q12H  sodium bicarbonate, 650 mg, Oral, BID             Vital Signs:  Blood pressure 140/86, pulse 67, temperature 97 °F (36.1 °C), temperature source Axillary, resp. rate 18, height 170.2 cm (67\"), weight 68.2 kg (150 lb 5.7 oz), SpO2 99 %.    Flowsheet Rows    Flowsheet Row First Filed Value   Admission Height 170.2 cm (67\") Documented at 03/17/2023 1814   Admission Weight 63.5 kg (140 lb) Documented at 03/17/2023 1814          03/18 0701 - 03/19 0700  In: 81653 [P.O.:240; I.V.:200]  Out: 06219 [Urine:03067]    Physical Exam:    General Appearance: NAD, alert and cooperative, THD CATH  Eyes: PER, conjunctivae and sclerae normal, no icterus  Lungs: respirations regular and unlabored, no crepitus, clear to auscultation  Heart/CV: regular rhythm & normal rate, no murmur, no gallop, no rub and no edema  Abdomen: not distended, soft, non-tender, no masses,  bowel sounds present  Skin: No rash, Warm and dry    Radiology:      Renal Imaging:        Labs:  Results from last 7 days   Lab Units 03/19/23  0502 03/18/23  2339 03/18/23  1758 03/18/23  1314 03/18/23  0820 03/17/23  2211 03/17/23  1830   WBC 10*3/mm3 5.10  --   --   --  4.05  --  5.86   HEMOGLOBIN g/dL 8.3* 8.3* 8.3*   < > 6.6*   < > 7.2*   HEMATOCRIT % 25.5* 25.4* 25.7*   < > 20.8*   < > 21.9*   PLATELETS 10*3/mm3 157  --   --   --  135*  --  151    < > = values " in this interval not displayed.     Results from last 7 days   Lab Units 03/19/23  0502 03/18/23  0820 03/17/23  1830   SODIUM mmol/L 134* 138 136   POTASSIUM mmol/L 4.6 4.3 3.7   CHLORIDE mmol/L 96* 99 94*   CO2 mmol/L 22.0 26.0 27.0   BUN mg/dL 60* 35* 24*   CREATININE mg/dL 7.52* 5.88* 3.58*   CALCIUM mg/dL 8.2* 8.4* 9.3   PHOSPHORUS mg/dL 7.6* 5.4*  --    MAGNESIUM mg/dL  --  2.2  --    ALBUMIN g/dL 2.9* 3.0* 3.9     Results from last 7 days   Lab Units 03/19/23  0502   GLUCOSE mg/dL 230*       Results from last 7 days   Lab Units 03/17/23  1830   ALK PHOS U/L 120*   BILIRUBIN mg/dL 0.5   ALT (SGPT) U/L 16   AST (SGOT) U/L 23           Results from last 7 days   Lab Units 03/18/23  0636   COLOR UA  Red*   CLARITY UA  Turbid*   PH, URINE  8.0   SPECIFIC GRAVITY, URINE  1.017   GLUCOSE UA  Negative   KETONES UA  Negative   BILIRUBIN UA  Small (1+)*   PROTEIN UA  >=300 mg/dL (3+)*   BLOOD UA  Large (3+)*   LEUKOCYTES UA  Large (3+)*   NITRITE UA  Positive*       Estimated Creatinine Clearance: 9.6 mL/min (A) (by C-G formula based on SCr of 7.52 mg/dL (H)).      Assessment       Hematuria    Symptomatic anemia    Gross hematuria    CKD (chronic kidney disease) stage 5, GFR less than 15 ml/min (HCC)    Type 2 diabetes mellitus (HCC)            Impression: ESRD. ANEMIA.             Recommendations: HD 3/20/23.    Zion Hardin MD  03/19/23  09:01 EDT

## 2023-03-19 NOTE — PROGRESS NOTES
INTENSIVIST   PROGRESS NOTE     Hospital:  LOS: 1 day     Mr. Jun Young, 62 y.o. male is followed for a Chief Complaint of: Hematuria      Subjective   S     Interval History:  No events overnight.        The patient's relevant past medical, surgical and social history were reviewed and updated in Epic as appropriate.      ROS:   Constitutional: Negative for fever.   Respiratory: Negative for dyspnea.   Cardiovascular: Negative for chest pain.   Gastrointestinal: Negative for  nausea, vomiting and diarrhea.     Objective   O     Vitals:  Temp  Min: 96.7 °F (35.9 °C)  Max: 99.3 °F (37.4 °C)  BP  Min: 113/66  Max: 145/82  Pulse  Min: 57  Max: 86  Resp  Min: 16  Max: 20  SpO2  Min: 94 %  Max: 99 % Flow (L/min)  Min: 2  Max: 3    Intake/Ouptut 24 hrs (7:00AM - 6:59 AM)  Intake & Output (last 3 days)       03/16 0701  03/17 0700 03/17 0701  03/18 0700 03/18 0701  03/19 0700 03/19 0701  03/20 0700    P.O.   240 477    I.V. (mL/kg)   200 (2.9) 27.1 (0.4)    Blood  350 300     Other   42251     IV Piggyback   150     Total Intake(mL/kg)  350 (5.1) 38766 (189) 504.1 (7.4)    Urine (mL/kg/hr)  25 04820 (7.5) 1200 (4)    Stool   0     Total Output  25 34220 1200    Net  +325 +540 -695.9            Stool Unmeasured Occurrence   1 x             Physical Examination  Telemetry:  Normal sinus rhythm.   Constitutional:  No acute distress.  Resting in bed comfortably.    Eyes: No scleral icterus.   PERRL, EOM intact.    Neck:  Supple, FROM   Cardiovascular: Normal rate, regular and rhythm. Normal heart sounds.  No murmurs, gallop or rub.   Respiratory: No respiratory distress. Normal respiratory effort.  Normal breath sounds  Clear to auscultation   Abdominal:  Soft. No masses. Nontender. No distension. No HSM.   Extremities: No digital cyanosis. No clubbing.  No peripheral edema.   Skin: No rashes, lesions or ulcers   Neurological:   Alert and interactive.              Results from last 7 days   Lab Units  03/19/23  0502 03/18/23  2339 03/18/23  1758 03/18/23  1314 03/18/23  0820 03/17/23  2211 03/17/23  1830   WBC 10*3/mm3 5.10  --   --   --  4.05  --  5.86   HEMOGLOBIN g/dL 8.3* 8.3* 8.3*   < > 6.6*   < > 7.2*   MCV fL 92.1  --   --   --  94.1  --  92.0   PLATELETS 10*3/mm3 157  --   --   --  135*  --  151    < > = values in this interval not displayed.     Results from last 7 days   Lab Units 03/19/23  0502 03/18/23  0820 03/17/23  1830   SODIUM mmol/L 134* 138 136   POTASSIUM mmol/L 4.6 4.3 3.7   CO2 mmol/L 22.0 26.0 27.0   CREATININE mg/dL 7.52* 5.88* 3.58*   GLUCOSE mg/dL 230* 82 154*   MAGNESIUM mg/dL  --  2.2  --    PHOSPHORUS mg/dL 7.6* 5.4*  --      Estimated Creatinine Clearance: 9.6 mL/min (A) (by C-G formula based on SCr of 7.52 mg/dL (H)).  Results from last 7 days   Lab Units 03/17/23  1830   ALK PHOS U/L 120*   BILIRUBIN mg/dL 0.5   ALT (SGPT) U/L 16   AST (SGOT) U/L 23             Images:  Imaging Results (Last 24 Hours)     ** No results found for the last 24 hours. **            Results: Reviewed.  I reviewed the patient's new laboratory and imaging results.  I independently reviewed the patient's new images.    Medications: Reviewed.    Assessment & Plan   A / P     Mr. Young is a 61yo M with a history of T2DM, HTN, HLD and ESRD on HD MWF who presented to [facility] on 3/18/23 with hematuria and a UTI. He was admitted to the ICU after his hemoglobin was noted to be 5.     Urology was consulted and he was taken to the OR by Dr. Christianson on 3/18/23 and underwent cystoscopy with clot evacuation and transuretheral resection of the prostage with caldwell catheter placement.     Continuous bladder irrigation is running.     Nutrition:   Diet: Renal Diets, Diabetic Diets; Consistent Carbohydrate; Low Phosphorus, Low Potassium; Texture: Regular Texture (IDDSI 7); Fluid Consistency: Thin (IDDSI 0)  Advance Directives:   There are no questions and answers to display.       Active Hospital Problems    Diagnosis     • **Hematuria    • Symptomatic anemia    • Gross hematuria    • CKD (chronic kidney disease) stage 5, GFR less than 15 ml/min ([facility])    • Type 2 diabetes mellitus (HCC)        Assessment / Plan:    1. CBI per Urology  2. Continue Zosyn. Follow up urine cultures.   3. Increase insulin  4. Echocardiogram with small <1cm pericardial effusion (decreased from previous)  5. AM labs  6. Okay to transfer to telemetry when a bed is available.     High level of risk due to:  severe exacerbation of chronic illness and illness with threat to life or bodily function.    Plan of care and goals reviewed during interdisciplinary rounds.  I discussed the patient's findings and my recommendations with patient and nursing staff        Veronique Barnes, DO    Intensive Care Medicine and Pulmonary Medicine

## 2023-03-19 NOTE — PROGRESS NOTES
[facility]   Urology Progress Note    Patient Name: Jun Young  : 1960  MRN: [id number]  Primary Care Physician:  Provider, No Known  Date of admission: 3/17/2023    Subjective   Subjective     Chief Complaint: Hematuria, urinary retention    HPI:  Afebrile, hemodynamically stable.  CBI remains clear on slow drip    Review of Systems   The following systems were reviewed and negative;  constitution, respiratory, cardiovascular, gastrointestinal, genitourinary, musculoskeletal, neurological and behavioral/psych.    Objective   Objective     Vitals:   Temp:  [96.7 °F (35.9 °C)-98.5 °F (36.9 °C)] 97 °F (36.1 °C)  Heart Rate:  [57-85] 64  Resp:  [16-20] 18  BP: (113-145)/(65-87) 126/87  Physical Exam    Constitutional: Awake in bed, alert   Eyes: PERRLA, sclerae anicteric, no conjunctival injection   HENT: Normocephalic, atraumatic, mucous membranes moist   Neck: Supple, trachea midline   Respiratory: Equal chest rise, non-labored respirations    Cardiovascular: Perfused, no edema   Gastrointestinal: Soft, nontender, non-distended   Genitourinary: Three-way Whitfield catheter in place with clear urine on slow drip CBI   Musculoskeletal: No lower extremity edema bilaterally, no clubbing or cyanosis to extremities   Psychiatric: Appropriate affect, cooperative   Neurologic: Oriented x 3,  Cranial Nerves grossly intact, speech clear   Skin: No rashes     Result Review    Result Review:  I have personally reviewed the results from the time of this admission to 3/19/2023 12:37 EDT and agree with these findings:  [x]  Laboratory  [x]  Microbiology  []  Radiology  []  EKG/Telemetry   []  Cardiology/Vascular   []  Pathology  []  Old records  []  Other:    Most notable findings include:   Creatinine 7.5  WBC 5.1  Hemoglobin 8.3    Assessment & Plan   Assessment / Plan     Brief Patient Summary:  Jun Young is a 62 y.o. male who is postoperative day 1 from clot evacuation, fulguration and  resection of prostate, Whitfield catheter insertion.  CBI is clear on slow drip.  We will continue for another 24 hours to ensure it remains clear, hemoglobin is stable.  Whitfield catheter will stay in place during hospitalization and at discharge.  He will require outpatient  follow-up to coordinate continued Whitfield catheter management.     Active Hospital Problems:  Active Hospital Problems    Diagnosis    • **Gross hematuria    • ESRD (end stage renal disease) (HCC)    • Acute cystitis with hematuria    • Symptomatic anemia    • Type 2 diabetes mellitus (HCC)           DVT prophylaxis:  Mechanical DVT prophylaxis orders are present.    CODE STATUS:   Code Status (Patient has no pulse and is not breathing): CPR (Attempt to Resuscitate)  Medical Interventions (Patient has pulse or is breathing): Full Support  Release to patient: Routine Release    Disposition: Per primary team    Electronically signed by Santana Christianson MD, 03/19/23, 12:37 PM EDT.

## 2023-03-19 NOTE — PLAN OF CARE
Goal Outcome Evaluation:              Outcome Evaluation: PT on CBI throughout shift. UOP 3350. On room air. Appetite excellent. Resting well throughout shift. Afebrile

## 2023-03-20 ENCOUNTER — APPOINTMENT (OUTPATIENT)
Dept: NEPHROLOGY | Facility: HOSPITAL | Age: 63
DRG: 666 | End: 2023-03-20
Payer: COMMERCIAL

## 2023-03-20 LAB
ALBUMIN SERPL-MCNC: 2.7 G/DL (ref 3.5–5.2)
ANION GAP SERPL CALCULATED.3IONS-SCNC: 19 MMOL/L (ref 5–15)
BACTERIA SPEC AEROBE CULT: ABNORMAL
BASE EXCESS BLDA CALC-SCNC: -1 MMOL/L (ref -5–5)
BUN SERPL-MCNC: 84 MG/DL (ref 8–23)
BUN/CREAT SERPL: 9.9 (ref 7–25)
CA-I BLDA-SCNC: 1.08 MMOL/L (ref 1.2–1.32)
CALCIUM SPEC-SCNC: 7.4 MG/DL (ref 8.6–10.5)
CHLORIDE SERPL-SCNC: 101 MMOL/L (ref 98–107)
CO2 BLDA-SCNC: 25 MMOL/L (ref 24–29)
CO2 SERPL-SCNC: 21 MMOL/L (ref 22–29)
CREAT SERPL-MCNC: 8.5 MG/DL (ref 0.76–1.27)
DEPRECATED RDW RBC AUTO: 49 FL (ref 37–54)
EGFRCR SERPLBLD CKD-EPI 2021: 6.4 ML/MIN/1.73
ERYTHROCYTE [DISTWIDTH] IN BLOOD BY AUTOMATED COUNT: 14.7 % (ref 12.3–15.4)
GLUCOSE BLDC GLUCOMTR-MCNC: 137 MG/DL (ref 70–130)
GLUCOSE BLDC GLUCOMTR-MCNC: 140 MG/DL (ref 70–130)
GLUCOSE BLDC GLUCOMTR-MCNC: 75 MG/DL (ref 70–130)
GLUCOSE BLDC GLUCOMTR-MCNC: 94 MG/DL (ref 70–130)
GLUCOSE SERPL-MCNC: 154 MG/DL (ref 65–99)
HCO3 BLDA-SCNC: 23.6 MMOL/L (ref 22–26)
HCT VFR BLD AUTO: 25.5 % (ref 37.5–51)
HCT VFR BLDA CALC: 22 % (ref 38–51)
HGB BLD-MCNC: 8.3 G/DL (ref 13–17.7)
HGB BLDA-MCNC: 7.5 G/DL (ref 12–17)
MAGNESIUM SERPL-MCNC: 2.3 MG/DL (ref 1.6–2.4)
MCH RBC QN AUTO: 29.6 PG (ref 26.6–33)
MCHC RBC AUTO-ENTMCNC: 32.5 G/DL (ref 31.5–35.7)
MCV RBC AUTO: 91.1 FL (ref 79–97)
PCO2 BLDA: 34.6 MM HG (ref 35–45)
PH BLDA: 7.44 PH UNITS (ref 7.35–7.6)
PHOSPHATE SERPL-MCNC: 5.7 MG/DL (ref 2.5–4.5)
PLATELET # BLD AUTO: 174 10*3/MM3 (ref 140–450)
PMV BLD AUTO: 9.4 FL (ref 6–12)
PO2 BLDA: 74 MMHG (ref 80–105)
POTASSIUM BLDA-SCNC: 4.2 MMOL/L (ref 3.5–4.9)
POTASSIUM SERPL-SCNC: 4.7 MMOL/L (ref 3.5–5.2)
QT INTERVAL: 322 MS
QTC INTERVAL: 501 MS
RBC # BLD AUTO: 2.8 10*6/MM3 (ref 4.14–5.8)
SAO2 % BLDA: 95 % (ref 95–98)
SODIUM BLD-SCNC: 138 MMOL/L (ref 138–146)
SODIUM SERPL-SCNC: 141 MMOL/L (ref 136–145)
WBC NRBC COR # BLD: 7.95 10*3/MM3 (ref 3.4–10.8)

## 2023-03-20 PROCEDURE — 5A1D70Z PERFORMANCE OF URINARY FILTRATION, INTERMITTENT, LESS THAN 6 HOURS PER DAY: ICD-10-PCS | Performed by: INTERNAL MEDICINE

## 2023-03-20 PROCEDURE — 99232 SBSQ HOSP IP/OBS MODERATE 35: CPT | Performed by: INTERNAL MEDICINE

## 2023-03-20 PROCEDURE — 82962 GLUCOSE BLOOD TEST: CPT

## 2023-03-20 PROCEDURE — 25010000002 PIPERACILLIN SOD-TAZOBACTAM PER 1 G: Performed by: INTERNAL MEDICINE

## 2023-03-20 PROCEDURE — 80069 RENAL FUNCTION PANEL: CPT | Performed by: INTERNAL MEDICINE

## 2023-03-20 PROCEDURE — 83735 ASSAY OF MAGNESIUM: CPT | Performed by: INTERNAL MEDICINE

## 2023-03-20 PROCEDURE — 25010000002 NA FERRIC GLUC CPLX PER 12.5 MG: Performed by: INTERNAL MEDICINE

## 2023-03-20 PROCEDURE — 85027 COMPLETE CBC AUTOMATED: CPT | Performed by: INTERNAL MEDICINE

## 2023-03-20 PROCEDURE — 99024 POSTOP FOLLOW-UP VISIT: CPT | Performed by: NURSE PRACTITIONER

## 2023-03-20 PROCEDURE — 25010000002 CEFTRIAXONE PER 250 MG: Performed by: INTERNAL MEDICINE

## 2023-03-20 PROCEDURE — 63710000001 INSULIN DETEMIR PER 5 UNITS: Performed by: INTERNAL MEDICINE

## 2023-03-20 PROCEDURE — 25010000002 EPOETIN ALFA-EPBX 10000 UNIT/ML SOLUTION: Performed by: INTERNAL MEDICINE

## 2023-03-20 RX ORDER — AMLODIPINE BESYLATE 10 MG/1
10 TABLET ORAL
Status: DISCONTINUED | OUTPATIENT
Start: 2023-03-20 | End: 2023-03-21 | Stop reason: HOSPADM

## 2023-03-20 RX ADMIN — INSULIN DETEMIR 15 UNITS: 100 INJECTION, SOLUTION SUBCUTANEOUS at 08:00

## 2023-03-20 RX ADMIN — SODIUM BICARBONATE 650 MG TABLET 650 MG: at 08:00

## 2023-03-20 RX ADMIN — EPOETIN ALFA-EPBX 20000 UNITS: 10000 INJECTION, SOLUTION INTRAVENOUS; SUBCUTANEOUS at 10:04

## 2023-03-20 RX ADMIN — TAZOBACTAM SODIUM AND PIPERACILLIN SODIUM 3.38 G: 375; 3 INJECTION, SOLUTION INTRAVENOUS at 08:01

## 2023-03-20 RX ADMIN — AMLODIPINE BESYLATE 10 MG: 10 TABLET ORAL at 17:51

## 2023-03-20 RX ADMIN — Medication 1 TABLET: at 08:00

## 2023-03-20 RX ADMIN — PANTOPRAZOLE SODIUM 40 MG: 40 TABLET, DELAYED RELEASE ORAL at 05:21

## 2023-03-20 RX ADMIN — SODIUM CHLORIDE 1 G: 900 INJECTION INTRAVENOUS at 17:44

## 2023-03-20 RX ADMIN — SODIUM CHLORIDE 125 MG: 9 INJECTION, SOLUTION INTRAVENOUS at 17:44

## 2023-03-20 RX ADMIN — SODIUM BICARBONATE 650 MG TABLET 650 MG: at 20:41

## 2023-03-20 NOTE — PLAN OF CARE
HD completed. Tolerated well. UF goal reached. Blood returned. Report given to primary RN.   Problem: Device-Related Complication Risk (Hemodialysis)  Goal: Safe, Effective Therapy Delivery  Outcome: Ongoing, Progressing     Problem: Hemodynamic Instability (Hemodialysis)  Goal: Effective Tissue Perfusion  Outcome: Ongoing, Progressing     Problem: Infection (Hemodialysis)  Goal: Absence of Infection Signs and Symptoms  Outcome: Ongoing, Progressing   Goal Outcome Evaluation:

## 2023-03-20 NOTE — CASE MANAGEMENT/SOCIAL WORK
Continued Stay Note  [facility]     Patient Name: Jun Young  MRN: [id number]  Today's Date: 3/20/2023    Admit Date: 3/17/2023    Plan: Home with family   Discharge Plan     Row Name 03/20/23 1214       Plan    Plan Home with family    Patient/Family in Agreement with Plan yes    Plan Comments Spoke with daughter, Sylvia by phone. Patient [facility] HD [facility] [address]. CM will continue to follow.    Final Discharge Disposition Code 01 - home or self-care               Discharge Codes    No documentation.                     Sanford Bejarano RN

## 2023-03-20 NOTE — PLAN OF CARE
Goal Outcome Evaluation:  Plan of Care Reviewed With: patient        Progress: no change  Outcome Evaluation: Pt on CBI throuhout shift. VSS, pt c/o of left flank pain and epigastric pain. I gave morphine 1mg per provider on call from ICU. Pt has not c/o of pain since then, I will continue to monitor pt.

## 2023-03-20 NOTE — PAYOR COMM NOTE
"  Jun Young (62 y.o. Male)     Ludy Garcia RN  Utilization Review  [phone]  [phone]      Date of Birth   1960    Social Security Number       Address   [address] [place] [address]    Home Phone   [phone]    MRN   [id number]       [employer]    Marital Status   Single                            Admission Date   3/17/23    Admission Type   Emergency    Admitting Provider   Joslyn Pollard DO    Attending Provider   Joslyn Pollard DO    Department, Room/Bed   [address], S569/1       Discharge Date       Discharge Disposition       Discharge Destination                               Attending Provider: Joslyn Pollard DO    Allergies: No Known Allergies    Isolation: None   Infection: None   Code Status: CPR    Ht: 170.2 cm (67\")   Wt: 68.2 kg (150 lb 5.7 oz)    Admission Cmt: None   Principal Problem: Gross hematuria [R31.0]                 Active Insurance as of 3/17/2023     Primary Coverage     Payor Plan Insurance Group Employer/Plan Group    [employer] [place] [employer] [place]ICA[place]      Payor Plan Address Payor Plan Phone Number Payor Plan Fax Number Effective Dates    [address] [address] [phone]  3/17/2023 - None Entered    [facility] [id number]       Subscriber Name Subscriber Birth Date Member ID       JUN YOUNG 1960 [id number]                 Emergency Contacts      (Rel.) Home Phone Work Phone Mobile Phone    ANDRESVENECIA (Daughter) [phone] -- --    TAWANDA CAMPOS (Son) [phone] -- --    CORTEZ CAMPOS (Son) [phone] -- --               History & Physical      Annel Padilla MD at 03/18/23 0029              ICU ADMISSION NOTE    Chief complaint     Urinary retention with UTI  Chronic kidney disease, recent onset of dialysis  Profound anemia with gross hematuria    Subjective      Patient is a 62 y.o. male non-smoker, with diabetes, hypertension, dyslipidemia, who " was hospitalized February 7 with bilateral hydronephrosis, BPH, acute on chronic renal failure with creatinine of 13 and gross hematuria.  He has had renal failure for a while and had refused admission or dialysis.  However this time he was convinced to initiate hemodialysis.  At the time his creatinine was 13.  He was taken to surgery as well and underwent cystoscopy, evacuation of large bladder clot, fulguration and placement of three-way catheter for irrigation.  A tunneled access was also placed.  He has been receiving hemodialysis every Monday Wednesday Friday.  In addition during that hospitalization an echocardiogram was done revealing a 1 to 2 cm pericardial effusion with preserved LV function.  According to his son he was making yellow urine.  His last hemodialysis was March 17.  On March 14 he was evaluated by urology and Whitfield catheter was removed.  Since removal he has only had some small amounts of bloody urine.  He denied fever but in the emergency room he had a temperature of 100.4 and was tachycardic.  His initial hemoglobin was 7 but worsened to 5 after volume resuscitation.  His troponins were 67 and 52.  Procalcitonin was markedly elevated at 54.  White count was normal at 5.86 but he had a left shift with 17% bands.  He was ordered vancomycin and Zosyn.  Urinalysis was obtained and was grossly bloody with 1+ bacteria.    Review of Systems  Review of Systems   Constitutional: Positive for fatigue, fever and unexpected weight change. Negative for chills.   HENT: Negative for congestion, sore throat and trouble swallowing.    Eyes: Negative for visual disturbance.   Respiratory: Negative for chest tightness and shortness of breath.    Cardiovascular: Positive for palpitations. Negative for chest pain and leg swelling.   Gastrointestinal: Negative for abdominal pain, blood in stool, nausea and vomiting.   Endocrine: Negative.    Genitourinary: Positive for difficulty urinating and hematuria.  "  Musculoskeletal: Negative.    Skin: Negative.    Allergic/Immunologic: Negative.    Neurological: Positive for headaches.   Hematological: Negative.    Psychiatric/Behavioral: Negative.         Home Medications  (Not in a hospital admission)  Patient is not sure  Recent office visit list Nephro-Coleman 1 daily, Norco 5, Protonix 40 mg daily, Januvia 25 mg daily bicarbonate 650 mg 3 times daily    History  No past medical history on file.  No past surgical history on file.  No family history on file.      Diabetes mellitus type 2  Dyslipidemia  Hypertension  BPH  Chronic kidney disease  Gross hematuria  Pericardial effusion  GERD   Cholelithiasis  Cystoscopy with clot evacuation and fulguration, three-way catheter placement February 9, 2023  No allergies  Works as a ,       (Not in a hospital admission)    Allergies:  Patient has no known allergies.      Objective      Vital Signs  Blood pressure 111/58, pulse 103, temperature 100.4 °F (38 °C), temperature source Oral, resp. rate 18, height 170.2 cm (67\"), weight 63.5 kg (140 lb), SpO2 95 %.    Physical Exam:  General Appearance:   Thin middle-aged gentleman in no distress   Head:   Atraumatic   Eyes:          Conjunctiva pale   Ears:     Throat:  Oral mucosa dry   Neck:  Neck veins flat, trachea midline, right IJ Edward cath dressing intact   Back:      Lungs:    Symmetric chest expansion without wheeze or rhonchi    Heart:   Increased rate, regular, S1, S2 auscultated, no rub   Abdomen:    Flat, bowel sounds present, nontender   Rectal:     Deferred   Extremities:  No pretibial edema or cyanosis   Pulses:  Palpable radial pulses   Skin:  Warm and dry   Lymph nodes:  No cervical adenopathy   Neurologic:  Alert and oriented,  equal, speech fluent       Results Review:   Lab Results (last 24 hours)     Procedure Component Value Units Date/Time    Urinalysis, Microscopic Only - Urine, Clean Catch [[id number]]  (Abnormal) Collected: 03/17/23 0103    " Specimen: Urine, Clean Catch Updated: 03/17/23 2256     RBC, UA       Unable to determine due to loaded field     /HPF     WBC, UA       Unable to determine due to loaded field     /HPF     Bacteria, UA 1+ /HPF      Squamous Epithelial Cells, UA --     Methodology Automated Microscopy    Urinalysis With Microscopic If Indicated (No Culture) - Urine, Clean Catch [[id number]]  (Abnormal) Collected: 03/17/23 1930    Specimen: Urine, Clean Catch Updated: 03/17/23 2254     Color, UA Red     Appearance, UA Turbid     pH, UA 8.0     Specific Gravity, UA 1.020     Glucose,  mg/dL (Trace)     Ketones, UA 15 mg/dL (1+)     Bilirubin, UA Moderate (2+)     Blood, UA Large (3+)     Protein, UA >=300 mg/dL (3+)     Leuk Esterase, UA Large (3+)     Nitrite, UA Positive     Urobilinogen, UA 1.0 E.U./dL    Hemoglobin & Hematocrit, Blood [[id number]]  (Abnormal) Collected: 03/17/23 2211    Specimen: Blood Updated: 03/17/23 2242     Hemoglobin 5.3 g/dL      Hematocrit 17.0 %     STAT Lactic Acid, Reflex [[id number]]  (Normal) Collected: 03/17/23 2211    Specimen: Blood Updated: 03/17/23 2239     Lactate 0.9 mmol/L      Comment: Falsely depressed results may occur on samples drawn from patients receiving N-Acetylcysteine (NAC) or Metamizole.       [place] Draw [[id number]] Collected: 03/17/23 1830    Specimen: Blood Updated: 03/17/23 2231    Narrative:      The following orders were created for panel order [place] Draw.  Procedure                               Abnormality         Status                     ---------                               -----------         ------                     Green Top (Gel)[[id number]]                                  Final result               Lavender Top[[id number]]                                     Final result               Gold Top - SST[[id number]]                                   Final result               Chiang Top[[id number]]                                         Final result                Light Blue Top[[id number]]                                   Final result                 Please view results for these tests on the individual orders.    Gray Top [[id number]] Collected: 03/17/23 1830    Specimen: Blood Updated: 03/17/23 2231     Extra Tube Hold for add-ons.     Comment: Auto resulted.       High Sensitivity Troponin T 2Hr [[id number]]  (Abnormal) Collected: 03/17/23 2118    Specimen: Blood Updated: 03/17/23 2145     HS Troponin T 62 ng/L      Troponin T Delta -5 ng/L     Narrative:      High Sensitive Troponin T Reference Range:  <10.0 ng/L- Negative Female for AMI  <15.0 ng/L- Negative Male for AMI  >=10 - Abnormal Female indicating possible myocardial injury.  >=15 - Abnormal Male indicating possible myocardial injury.   Clinicians would have to utilize clinical acumen, EKG, Troponin, and serial changes to determine if it is an Acute Myocardial Infarction or myocardial injury due to an underlying chronic condition.         Blood Culture - Blood, Arm, Right [[id number]] Collected: 03/17/23 2030    Specimen: Blood from Arm, Right Updated: 03/17/23 2047    Blood Culture - Blood, Arm, Left [[id number]] Collected: 03/17/23 2015    Specimen: Blood from Arm, Left Updated: 03/17/23 2046    COVID PRE-OP / PRE-PROCEDURE SCREENING ORDER (NO ISOLATION) - Swab, Nasopharynx [[id number]]  (Normal) Collected: 03/17/23 1919    Specimen: Swab from Nasopharynx Updated: 03/17/23 2029    Narrative:      The following orders were created for panel order COVID PRE-OP / PRE-PROCEDURE SCREENING ORDER (NO ISOLATION) - Swab, Nasopharynx.  Procedure                               Abnormality         Status                     ---------                               -----------         ------                     Respiratory Panel PCR w/...[[id number]]  Normal              Final result                 Please view results for these tests on the individual orders.    Respiratory Panel PCR w/COVID-19(SARS-CoV-2)  INNA/ANGELLA/MILLICENT/PAD/COR/MAD/QUIQUE In-House, NP Swab in UTM/VTM, 3-4 HR TAT - Swab, Nasopharynx [[id number]]  (Normal) Collected: 03/17/23 1919    Specimen: Swab from Nasopharynx Updated: 03/17/23 2029     ADENOVIRUS, PCR Not Detected     Coronavirus 229E Not Detected     Coronavirus HKU1 Not Detected     Coronavirus NL63 Not Detected     Coronavirus OC43 Not Detected     COVID19 Not Detected     Human Metapneumovirus Not Detected     Human Rhinovirus/Enterovirus Not Detected     Influenza A PCR Not Detected     Influenza B PCR Not Detected     Parainfluenza Virus 1 Not Detected     Parainfluenza Virus 2 Not Detected     Parainfluenza Virus 3 Not Detected     Parainfluenza Virus 4 Not Detected     RSV, PCR Not Detected     Bordetella pertussis pcr Not Detected     Bordetella parapertussis PCR Not Detected     Chlamydophila pneumoniae PCR Not Detected     Mycoplasma pneumo by PCR Not Detected    Narrative:      In the setting of a positive respiratory panel with a viral infection PLUS a negative procalcitonin without other underlying concern for bacterial infection, consider observing off antibiotics or discontinuation of antibiotics and continue supportive care. If the respiratory panel is positive for atypical bacterial infection (Bordetella pertussis, Chlamydophila pneumoniae, or Mycoplasma pneumoniae), consider antibiotic de-escalation to target atypical bacterial infection.    Procalcitonin [[id number]]  (Abnormal) Collected: 03/17/23 1830    Specimen: Blood Updated: 03/17/23 2015     Procalcitonin 54.10 ng/mL     Narrative:      As a Marker for Sepsis (Non-Neonates):    1. <0.5 ng/mL represents a low risk of severe sepsis and/or septic shock.  2. >2 ng/mL represents a high risk of severe sepsis and/or septic shock.    As a Marker for Lower Respiratory Tract Infections that require antibiotic therapy:    PCT on Admission    Antibiotic Therapy       6-12 Hrs later    >0.5                Strongly Recommended  >0.25 - <0.5   "      Recommended   0.1 - 0.25          Discouraged              Remeasure/reassess PCT  <0.1                Strongly Discouraged     Remeasure/reassess PCT    As 28 day mortality risk marker: \"Change in Procalcitonin Result\" (>80% or <=80%) if Day 0 (or Day 1) and Day 4 values are available. Refer to http://[email or web address]-pct-[email or web address]    Change in PCT <=80%  A decrease of PCT levels below or equal to 80% defines a positive change in PCT test result representing a higher risk for 28-day all-cause mortality of patients diagnosed with severe sepsis for septic shock.    Change in PCT >80%  A decrease of PCT levels of more than 80% defines a negative change in PCT result representing a lower risk for 28-day all-cause mortality of patients diagnosed with severe sepsis or septic shock.       Green Top (Gel) [[id number]] Collected: 03/17/23 1830    Specimen: Blood Updated: 03/17/23 1931     Extra Tube Hold for add-ons.     Comment: Auto resulted.       Gold Top - SST [[id number]] Collected: 03/17/23 1830    Specimen: Blood Updated: 03/17/23 1931     Extra Tube Hold for add-ons.     Comment: Auto resulted.       Lavender Top [[id number]] Collected: 03/17/23 1830    Specimen: Blood Updated: 03/17/23 1931     Extra Tube hold for add-on     Comment: Auto resulted       Light Blue Top [[id number]] Collected: 03/17/23 1830    Specimen: Blood Updated: 03/17/23 1931     Extra Tube Hold for add-ons.     Comment: Auto resulted       High Sensitivity Troponin T [[id number]]  (Abnormal) Collected: 03/17/23 1830    Specimen: Blood Updated: 03/17/23 1926     HS Troponin T 67 ng/L     Narrative:      High Sensitive Troponin T Reference Range:  <10.0 ng/L- Negative Female for AMI  <15.0 ng/L- Negative Male for AMI  >=10 - Abnormal Female indicating possible myocardial injury.  >=15 - Abnormal Male indicating possible myocardial injury.   Clinicians would have to utilize clinical acumen, EKG, Troponin, and serial changes to determine if it is " an Acute Myocardial Infarction or myocardial injury due to an underlying chronic condition.         Lactic Acid, Plasma [[id number]]  (Abnormal) Collected: 03/17/23 1830    Specimen: Blood Updated: 03/17/23 1926     Lactate 2.6 mmol/L      Comment: Falsely depressed results may occur on samples drawn from patients receiving N-Acetylcysteine (NAC) or Metamizole.       CBC & Differential [[id number]]  (Abnormal) Collected: 03/17/23 1830    Specimen: Blood Updated: 03/17/23 1926    Narrative:      The following orders were created for panel order CBC & Differential.  Procedure                               Abnormality         Status                     ---------                               -----------         ------                     CBC Auto Differential[[id number]]        Abnormal            Final result               Scan Slide[[id number]]                                                                    Please view results for these tests on the individual orders.    CBC Auto Differential [[id number]]  (Abnormal) Collected: 03/17/23 1830    Specimen: Blood Updated: 03/17/23 1926     WBC 5.86 10*3/mm3      RBC 2.38 10*6/mm3      Hemoglobin 7.2 g/dL      Hematocrit 21.9 %      MCV 92.0 fL      MCH 30.3 pg      MCHC 32.9 g/dL      RDW 14.6 %      RDW-SD 49.0 fl      MPV 9.3 fL      Platelets 151 10*3/mm3     Narrative:      The previously reported component NRBC is no longer being reported. Previous result was 0.0 /100 WBC (Reference Range: 0.0-0.2 /100 WBC) on 3/17/2023 at 1858 EDT.    Manual Differential [[id number]]  (Abnormal) Collected: 03/17/23 1830    Specimen: Blood Updated: 03/17/23 1926     Neutrophil % 64.0 %      Lymphocyte % 7.0 %      Monocyte % 8.0 %      Eosinophil % 4.0 %      Basophil % 0.0 %      Bands %  17.0 %      Neutrophils Absolute 4.75 10*3/mm3      Lymphocytes Absolute 0.41 10*3/mm3      Monocytes Absolute 0.47 10*3/mm3      Eosinophils Absolute 0.23 10*3/mm3      Basophils Absolute 0.00  10*3/mm3      nRBC 0.0 /100 WBC      Macrocytes Slight/1+     Polychromasia Slight/1+     WBC Morphology Normal     Platelet Morphology Normal    Comprehensive Metabolic Panel [[id number]]  (Abnormal) Collected: 03/17/23 1830    Specimen: Blood Updated: 03/17/23 1917     Glucose 154 mg/dL      BUN 24 mg/dL      Creatinine 3.58 mg/dL      Sodium 136 mmol/L      Potassium 3.7 mmol/L      Chloride 94 mmol/L      CO2 27.0 mmol/L      Calcium 9.3 mg/dL      Total Protein 7.9 g/dL      Albumin 3.9 g/dL      ALT (SGPT) 16 U/L      AST (SGOT) 23 U/L      Alkaline Phosphatase 120 U/L      Total Bilirubin 0.5 mg/dL      Globulin 4.0 gm/dL      Comment: Calculated Result        A/G Ratio 1.0 g/dL      BUN/Creatinine Ratio 6.7     Anion Gap 15.0 mmol/L      eGFR 18.2 mL/min/1.73     Narrative:      GFR Normal >60  Chronic Kidney Disease <60  Kidney Failure <15      Lipase [[id number]]  (Normal) Collected: 03/17/23 1830    Specimen: Blood Updated: 03/17/23 1917     Lipase 25 U/L         Imaging Results (Last 24 Hours)     Procedure Component Value Units Date/Time    CT Abdomen Pelvis Without Contrast [[id number]] Collected: 03/17/23 1948     Updated: 03/17/23 2006    Narrative:      CT ABDOMEN PELVIS WO CONTRAST    Date of Exam: 3/17/2023 7:35 PM EDT    Indication: mid back pain, hematuria, headachelk.    Comparison: 2/7/2023    Technique: Axial CT images were obtained of the abdomen and pelvis without the administration of contrast. Reconstructed coronal and sagittal images were also obtained. Automated exposure control and iterative construction methods were used.      FINDINGS:    Abdomen/Pelvis:    Lower Chest: Platelike and dependent opacities are noted at the lung bases.    No free air noted below the diaphragm     Organs: Large stone noted within the gallbladder. Limited noncontrast enhanced gallbladder is otherwise grossly unremarkable in appearance. Limited noncontrast imaging liver is a small low-attenuation lesion  posteriorly right hepatic lobe similar to the   prior study likely small cyst or.    The pancreas, spleen and adrenal glands are grossly unremarkable    There is severe bilateral hydronephrosis cortical thinning of the renal parenchyma bilaterally. Dilation of the ureters is noted extending to the bladder.     GI/Bowel: Noncontrast imaging of the stomach and small bowel demonstrate no acute abnormality. Ileocecal valve and appendix are grossly unremarkable. Colon demonstrates no acute abnormality    Pelvis: Enlarged irregular appearance of the prostate noted with diffuse wall thickening of the bladder similar to the prior study is noted. No suspicious adenopathy appreciated within the pelvis. Bilateral fat-containing hernias noted    Peritoneum/Retroperitoneum: The aorta is normal in caliber. There is a changes. No suspicious retroperitoneal adenopathy    Bones/Soft Tissues: No destructive bone lesion. Multilevel degenerative changes spine. Probable bone islands again noted within the spine and proximal femur bilaterally      Impression:        1. Moderate to severe hydroureteronephrosis again noted with bladder distention. Findings likely related to chronic outlet obstruction related to prostamegaly. Consider consultation with urology for further management  2. Comparison the prostate appears grossly stable and may represent either benign or malignant enlargement.  3. Cholelithiasis  4. Other findings as above.           Electronically Signed: Josué Clinton    3/17/2023 8:03 PM EDT    Workstation ID: [id number]           PROBLEM LIST    Urinary retention  Urinary tract infection  Chronic bilateral hydronephrosis/BPH  Chronic kidney disease on hemodialysis   severe anemia        Assessment & Plan     Unfortunate 62-year-old  gentleman with diabetes, hypertension, dyslipidemia, BPH with bilateral hydronephrosis and renal failure and associated gross hematuria.  He did undergo cystoscopy and catheter placement  in February as well as initiation of hemodialysis.  Today his creatinine is 3.58.  Whitfield catheter was removed on March 14 and he has had minimal urine.  CT scan reveals distention of his bladder and persistent hydronephrosis prostate enlargement consistent with with bladder outlet obstruction.  He has been receiving hemodialysis every Monday Wednesday Friday and his creatinine is only mildly increased at 3.58 compared to 13 in February prior to initiation of dialysis.  Potassium is also adequate at 3.7.  Unfortunately, urinalysis reveals a urinary tract infection and procalcitonin is elevated at 54.  Respiratory nasal swab was negative and CT of the abdomen did not reveal any worrisome colitis.  He does have evidence of gallstones but no cholecystitis.  Clinically aspect urine is the source for his fever.  He also was noted to be anemic with a hemoglobin of 7, dropping to 5.3 after hydration.  He denies any GI blood loss and I clinically suspect anemia of chronic disease with gross hematuria and blood loss.  He did receive 3 units of packed red cells in February.  During his February admission, he also was found to have a 1 to 2 cm pericardial effusion.  This is most likely from his uremia.  Now with dialysis his BUN and creatinine are markedly better.  He does have a resting tachycardia which is probably from his fever and sepsis, and anemia but, would like to recheck an echo to look at the effusion.    -I will consult urology for Whitfield catheter placement as he has had a very difficult Whitfield catheter placement in the past and is fearful    -Transfused 2 units of red cells    -Notify nephrology so that they may continue dialysis    -Vancomycin, Zosyn    -Resume oral bicarbonate and multivitamin    -Continue proton pump inhibitor    -Sliding scale insulin    -Echocardiogram, limited, reevaluate effusion      I discussed the patients findings and my recommendations with patient, patient's son    Annel Padilla,  MD  03/18/23  00:36 EDT    Time: 55 minutes    Please note that portions of this note were completed with a voice recognition program.       Electronically signed by Annel Padilla MD at 03/18/23 0101       Emergency Department Notes    No notes of this type exist for this encounter.         Vital Signs (last 3 days)     Date/Time Temp Temp src Pulse Resp BP Patient Position SpO2    03/20/23 1100 99.1 (37.3) Oral 78 16 154/76 Lying --    03/20/23 0900 -- -- 78 -- -- -- --    03/20/23 0747 97.6 (36.4) Oral 77 18 148/79 Lying 96    03/20/23 0700 -- -- 81 -- -- -- --    03/20/23 0432 99.1 (37.3) Oral 78 18 145/75 Lying --    03/20/23 0017 99.8 (37.7) Oral 81 18 147/79 Lying 95    03/19/23 2220 98.9 (37.2) Oral 78 18 133/68 Lying 98    03/19/23 1638 96.8 (36) Oral 70 18 120/76 Lying --    03/19/23 1347 96.2 (35.7) Oral 73 18 154/86 Lying 99    03/19/23 1300 -- -- 66 -- 136/87 -- 99    03/19/23 1200 -- -- 73 18 139/78 Lying 99    03/19/23 1100 -- -- 68 -- 135/78 -- 98    03/19/23 1000 -- -- 64 18 126/87 Lying 96    03/19/23 0900 -- -- 73 -- 135/80 -- 98    03/19/23 0830 -- -- 67 -- 140/86 -- 99    03/19/23 0800 97 (36.1) Axillary 78 18 134/80 Lying 98    03/19/23 0730 -- -- 62 -- 125/71 -- 97    03/19/23 0700 -- -- 59 -- 113/66 -- 97    03/19/23 0500 -- -- 68 -- 118/71 -- 97    03/19/23 0400 98.4 (36.9) Oral 67 18 117/67 Lying 97    03/19/23 0300 -- -- 64 -- 129/70 -- 96    03/19/23 0200 -- -- 63 -- -- -- 97    03/19/23 0100 -- -- 70 -- 134/77 -- 97    03/19/23 0000 98.5 (36.9) Oral 73 18 136/79 Lying 97    03/18/23 2300 -- -- 57 -- 121/77 -- 94    03/18/23 2200 -- -- 65 -- 116/65 -- 96    03/18/23 2100 -- -- 72 -- 113/67 -- 95    03/18/23 2000 98.3 (36.8) Oral 81 18 -- Lying 95    03/18/23 1900 -- -- 85 -- 138/79 -- 97    03/18/23 1830 -- -- 73 -- 131/79 -- 97    03/18/23 1800 -- -- 73 -- 139/76 -- 97    03/18/23 1730 -- -- 71 -- 144/84 -- 97    03/18/23 1700 -- -- 65 20 137/82 -- 98    03/18/23 1630 -- -- 66 --  145/82 -- 98    03/18/23 1600 96.7 (35.9) Axillary 69 16 130/70 -- 98    03/18/23 1530 -- -- 64 -- 129/69 -- 97    03/18/23 1515 -- -- 65 -- -- -- 97    03/18/23 1500 -- -- 69 16 125/68 -- 98    03/18/23 1430 -- -- 67 -- 113/68 -- 96    03/18/23 1400 -- -- 70 16 120/69 -- 96    03/18/23 1345 -- -- 74 -- -- -- 95    03/18/23 1330 -- -- 72 -- 121/69 -- 95    03/18/23 1315 -- -- 72 -- -- -- 95    03/18/23 1300 -- -- 77 18 121/71 -- 96    03/18/23 1245 -- -- 75 -- -- -- 95    03/18/23 1237 98.2 (36.8) -- 77 18 129/65 -- 94    03/18/23 1200 -- -- -- -- -- -- 97    03/18/23 1155 98 (36.7) -- 85 18 132/72 -- 97    03/18/23 1150 -- -- 85 -- 127/70 -- 97    03/18/23 1145 -- -- 86 -- 127/74 -- 96    03/18/23 1140 99 (37.2) -- 85 18 138/78 -- 98    03/18/23 1135 99.3 (37.4) -- 80 18 137/76 -- 98    03/18/23 1130 99 (37.2) -- 86 18 130/75 -- 98    03/18/23 1125 99.3 (37.4) -- 84 16 134/73 -- 98    03/18/23 1121 99.2 (37.3) -- 89 14 127/72 -- 96    03/18/23 1118 99.3 (37.4) Temporal 89 20 127/72 Lying 98    03/18/23 1015 98.9 (37.2) Temporal 96 18 130/82 Sitting 96    03/18/23 0952 99.7 (37.6) Oral 101 18 136/75 -- --    03/18/23 0945 -- -- 99 20 139/80 -- 96    03/18/23 0938 101.1 (38.4) -- 100 20 133/76 -- 96    03/18/23 0900 -- -- 102 -- 126/79 -- 95    03/18/23 0845 -- -- 100 -- 131/74 -- 95    03/18/23 0800 101.6 (38.7) Oral 107 20 124/78 -- 93    03/18/23 0700 -- -- 112 -- 119/72 -- 93    03/18/23 0645 -- -- 112 -- 126/72 -- 94    03/18/23 0630 -- -- 117 -- 134/74 -- 94    03/18/23 0627 100.7 (38.2) Oral 114 -- 126/77 -- 93    03/18/23 0430 102.4 (39.1) -- 114 -- 139/76 -- 95    03/18/23 0345 -- -- 108 -- 124/65 -- 96    03/18/23 0330 -- -- 108 -- 119/57 -- 95    03/18/23 0315 -- -- 106 -- 118/69 -- 95    03/18/23 0300 -- -- 105 -- 118/67 -- 95    03/18/23 0248 99 (37.2) Oral 106 18 122/62 -- 96    03/18/23 0243 99.2 (37.3) Oral 108 18 123/61 -- 95    03/18/23 0238 98.9 (37.2) Oral 108 18 123/64 -- 95    03/18/23 0233 99.2  (37.3) Oral 111 18 125/58 -- 96    03/18/23 0228 99.3 (37.4) Oral 105 -- 107/55 -- 93    03/18/23 0215 98.7 (37.1) Oral 98 18 106/49 -- 90    03/18/23 0130 98.7 (37.1) Oral 106 18 126/71 Lying 96    03/18/23 0105 99.4 (37.4) Oral -- -- -- -- --    03/18/23 0101 -- -- -- -- 112/66 -- --    03/17/23 2130 -- -- 103 -- 111/58 -- 95    03/17/23 2005 -- -- -- -- 103/62 -- --    03/17/23 1814 100.4 (38) Oral 150 18 98/64 Sitting 96          Oxygen Therapy (last 3 days)     Date/Time SpO2 Device (Oxygen Therapy) Flow (L/min) Oxygen Concentration (%) ETCO2 (mmHg)    03/20/23 0747 96 room air -- -- --    03/20/23 0432 -- room air -- -- --    03/20/23 0400 -- room air -- -- --    03/20/23 0200 -- room air -- -- --    03/20/23 0017 95 room air -- -- --    03/19/23 2259 -- room air -- -- --    03/19/23 2220 98 room air -- -- --    03/19/23 2000 -- room air -- -- --    03/19/23 1347 99 room air -- -- --    03/19/23 1300 99 -- -- -- --    03/19/23 1200 99 room air -- -- --    03/19/23 1100 98 -- -- -- --    03/19/23 1000 96 room air -- -- --    03/19/23 0900 98 -- -- -- --    03/19/23 0830 99 -- -- -- --    03/19/23 0800 98 room air -- -- --    03/19/23 0730 97 -- -- -- --    03/19/23 0700 97 -- -- -- --    03/19/23 0500 97 -- -- -- --    03/19/23 0400 97 room air -- -- --    03/19/23 0300 96 -- -- -- --    03/19/23 0200 97 -- -- -- --    03/19/23 0100 97 -- -- -- --    03/19/23 0000 97 -- -- -- --    03/18/23 2300 94 -- -- -- --    03/18/23 2200 96 -- -- -- --    03/18/23 2100 95 -- -- -- --    03/18/23 2000 95 room air -- -- --    03/18/23 1900 97 -- -- -- --    03/18/23 1830 97 -- -- -- --    03/18/23 1800 97 room air -- -- --    03/18/23 1730 97 -- -- -- --    03/18/23 1700 98 room air -- -- --    03/18/23 1630 98 -- -- -- --    03/18/23 1600 98 room air -- -- --    03/18/23 1530 97 -- -- -- --    03/18/23 1515 97 -- -- -- --    03/18/23 1500 98 room air -- -- --    03/18/23 1430 96 -- -- -- --    03/18/23 1410 -- room air --  -- --    03/18/23 1400 96 room air -- -- --    03/18/23 1345 95 -- -- -- --    03/18/23 1330 95 -- -- -- --    03/18/23 1315 95 -- -- -- --    03/18/23 1300 96 room air -- -- --    03/18/23 1245 95 -- -- -- --    03/18/23 1237 94 room air -- -- --    03/18/23 1210 -- nasal cannula 2 -- --    03/18/23 1200 97 -- -- -- --    03/18/23 1155 97 -- 2 -- --    03/18/23 1150 97 -- -- -- --    03/18/23 1145 96 -- -- -- --    03/18/23 1140 98 -- 2 -- --    03/18/23 1135 98 -- 2 -- --    03/18/23 1130 98 -- 2 -- --    03/18/23 1125 98 -- 3 -- --    03/18/23 1121 96 -- 2 -- --    03/18/23 1118 98 nasal cannula 3 -- --    03/18/23 1015 96 room air -- -- --    03/18/23 1000 -- room air -- -- --    03/18/23 0945 96 room air -- -- --    03/18/23 0938 96 -- -- -- --    03/18/23 0900 95 -- -- -- --    03/18/23 0845 95 -- -- -- --    03/18/23 0800 93 room air -- -- --    03/18/23 0700 93 -- -- -- --    03/18/23 0645 94 -- -- -- --    03/18/23 0630 94 -- -- -- --    03/18/23 0627 93 -- -- -- --    03/18/23 0430 95 -- -- -- --    03/18/23 0345 96 -- -- -- --    03/18/23 0330 95 -- -- -- --    03/18/23 0315 95 -- -- -- --    03/18/23 0300 95 -- -- -- --    03/18/23 0248 96 -- -- -- --    03/18/23 0243 95 -- -- -- --    03/18/23 0238 95 -- -- -- --    03/18/23 0233 96 -- -- -- --    03/18/23 0228 93 -- -- -- --    03/18/23 0215 90 -- -- -- --    03/18/23 0130 96 -- -- -- --    03/17/23 2130 95 -- -- -- --    03/17/23 1814 96 -- -- -- --        Lines, Drains & Airways     Active LDAs     Name Placement date Placement time Site Days    Peripheral IV 03/18/23 0425 Anterior;Right;Upper Arm 03/18/23 0425  Arm  2    Urethral Catheter Non-latex;Silicone;Straight-tip 22 Fr. 03/18/23  1107  -- 2    Hemodialysis Cath Double 03/19/23  1421  Chest  less than 1                  Current Facility-Administered Medications   Medication Dose Route Frequency Provider Last Rate Last Admin   • acetaminophen (TYLENOL) tablet 650 mg  650 mg Oral Q6H TIMO Styles  Karen LINK, APRN   650 mg at 03/19/23 1930   • amLODIPine (NORVASC) tablet 10 mg  10 mg Oral Q24H Joslyn Pollard R, DO       • b complex-vitamin c-folic acid (NEPHRO-LASHON) tablet 1 tablet  1 tablet Oral Daily Case, Veronique V., DO   1 tablet at 03/20/23 0800   • cefTRIAXone (ROCEPHIN) 1 g/100 mL 0.9% NS (MBP)  1 g Intravenous Q24H Joslyn Pollard R, DO       • dextrose (D50W) (25 g/50 mL) IV injection 25 g  25 g Intravenous Q15 Min PRN Case, Veronique V., DO       • dextrose (GLUTOSE) oral gel 15 g  15 g Oral Q15 Min PRN Case, Veronique V., DO       • epoetin sloan-epbx (RETACRIT) injection 20,000 Units  20,000 Units Subcutaneous Once per day on Mon Wed Fri Case, Veronique V., DO   20,000 Units at 03/20/23 1004   • ferric gluconate (FERRLECIT)125 MG in sodium chloride 0.9 % 100 mL IVPB  125 mg Intravenous Daily Before Supper Case, Veronique V.,  mL/hr at 03/19/23 1756 125 mg at 03/19/23 1756   • glucagon (GLUCAGEN) injection 1 mg  1 mg Intramuscular Q15 Min PRN Case, Veronique V., DO       • insulin detemir (LEVEMIR) injection 15 Units  15 Units Subcutaneous Daily Case, Veronique V., DO   15 Units at 03/20/23 0800   • Insulin Lispro (humaLOG) injection 0-9 Units  0-9 Units Subcutaneous TID AC Case, Veronique V., DO   2 Units at 03/19/23 1148   • Lidocaine HCl gel (XYLOCAINE) urethral/mucosal syringe   Topical PRN Case, Veronique V., DO       • pantoprazole (PROTONIX) EC tablet 40 mg  40 mg Oral Q AM Case, Veronique V., DO   40 mg at 03/20/23 0521   • sodium bicarbonate tablet 650 mg  650 mg Oral BID Case, Veronique V., DO   650 mg at 03/20/23 0800   • Sodium Chloride (PF) 0.9 % 10 mL  10 mL Intravenous PRN Case, Veronique V., DO           ECG/EMG Results (last 72 hours)     Procedure Component Value Units Date/Time    Adult Transthoracic Echo Limited W/ Cont if Necessary Per Protocol [[id number]] Resulted: 03/18/23 1415     Updated: 03/18/23 1416     Target HR (85%) 133 bpm      Max. Pred. HR (100%) 156 bpm      EF(MOD-bp) 61.7 %       LVIDd 4.3 cm      LVIDs 2.6 cm      IVSd 1.30 cm      LVPWd 1.30 cm      FS 39.5 %      IVS/LVPW 1.00 cm      ESV(cubed) 17.6 ml      EDV(cubed) 79.5 ml      LV mass(C)d 207.8 grams      EDV(MOD-sp2) 65.2 ml      EDV(MOD-sp4) 65.7 ml      ESV(MOD-sp2) 19.4 ml      ESV(MOD-sp4) 26.6 ml      SV(MOD-sp2) 45.8 ml      SV(MOD-sp4) 39.1 ml      EF(MOD-sp2) 70.2 %      EF(MOD-sp4) 59.5 %     Narrative:      •  Left ventricular systolic function is normal. Calculated left   ventricular EF = 61.7% Left ventricular ejection fraction appears to be 61   - 65%.  •  Left ventricular wall thickness is consistent with mild concentric   hypertrophy.  •  There is a small (<1cm) pericardial effusion.      ECG 12 Lead Pre-Op / Pre-Procedure [[id number]] Collected: 03/18/23 0942     Updated: 03/19/23 0854     QT Interval 356 ms      QTC Interval 459 ms     Narrative:      Test Reason : Pre-Op / Pre-Procedure  Blood Pressure :   */*   mmHG  Vent. Rate : 100 BPM     Atrial Rate : 100 BPM     P-R Int : 230 ms          QRS Dur :  94 ms      QT Int : 356 ms       P-R-T Axes :  50  23  25 degrees     QTc Int : 459 ms    Sinus rhythm with 1st degree AV block  Inferior-posterior infarct , age undetermined  Abnormal ECG  No previous ECGs available  Confirmed by MD Timbo, Ad ([id number]) on 3/19/2023 8:53:25 AM    Referred By:            Confirmed By: Ad Izquierdo MD    SCANNED - TELEMETRY   [[id number]] Resulted: 03/17/23     Updated: 03/20/23 0425    SCANNED - TELEMETRY   [[id number]] Resulted: 03/17/23     Updated: 03/20/23 0518    SCANNED - TELEMETRY   [[id number]] Resulted: 03/17/23     Updated: 03/20/23 0543    ECG 12 Lead Other; tachycardia [[id number]] Collected: 03/17/23 1901     Updated: 03/20/23 0845     QT Interval 322 ms      QTC Interval 501 ms     Narrative:      Test Reason : Other~  Blood Pressure :   */*   mmHG  Vent. Rate : 146 BPM     Atrial Rate : 147 BPM     P-R Int : 120 ms          QRS Dur : 100 ms      QT Int : 322 ms        P-R-T Axes :   * 103  39 degrees     QTc Int : 501 ms    Sinus tachycardia  Incomplete right bundle branch block  Possible Right ventricular hypertrophy  Abnormal ECG  No previous ECGs available    Referred By:            Confirmed By:              Operative/Procedure Notes (last 72 hours)      Santana Christianson MD at 03/18/23 1040     Summary:Urology Operative Note             CYSTOSCOPY with CLOT EVACUATION; FULGURATION OF BLEEDING; RESECTION OF PROSTATE Procedure Report    Patient Name:  Jun Young  YOB: 1960    Date of Surgery:  3/18/2023     Indications: 62-year-old male presenting with acute urinary retention and hematuria.  He has a history of prostatomegaly, chronic urinary retention, CKD.  He underwent recent Maher catheter removal, reports he has not voided in 48 hours, onset of hematuria.  Upon presentation he has significant urinary retention, bilateral hydronephrosis confirmed on CT imaging.  Hemoglobin is 5.3.  Given hematuria and acute urinary retention we have discussed the emergent indication for cystoscopy, fulguration of bleeding, clot evacuation and Maher catheter placement.  The risk benefits and alternatives were discussed with the patient and his family and they elected proceed    Pre-op Diagnosis:   Urinary retention  Gross hematuria       Post-Op Diagnosis Codes:  Urinary retention  Gross hematuria        Procedure(s):  CYSTOSCOPY WITH CLOT EVACUATION  TRANSURETHRAL RESECTION OF PROSTATE  FULGURATION OF BLEEDING  MAHER CATHETER PLACEMENT    Staff:  Surgeon(s):  Santana Christianson MD         Anesthesia: General    Estimated Blood Loss: none    Implants:    Nothing was implanted during the procedure    Specimen:          None    Drains: 24 Fr 3-way maher       Findings:   1.  Normal pendulous, bulbar urethra.  Prostatomegaly with lateral lobe and median lobe tissue.  Bleeding from median lobe tissue.  2.  Clot evacuation with drainage of clot from the urinary  bladder.  3.  Transurethral resection of prostate median lobe tissue.  4.  Fulguration of bleeding performed.  5.  Placement of 24 Nigerian three-way catheter with CBI initiated    Complications: None immediate    Description of Procedure:   After informed consent was obtained and signed, the patient was taken back to the operating suite. A time-out was performed to verify procedure details and the patient's identity. Next, the patient was placed supine on the operating table. Preoperative antibiotics were infused. The patient then underwent smooth induction of general endotracheal anesthesia. Bilateral lower extremity sequential compression devices were cycling. The patient was then moved to a dorsal lithotomy position. All pressure points were carefully padded. The genitals were then prepped and draped in usual sterile fashion.     Next, a 26-Nigerian continuous flow resectoscope was assembled and inserted atraumatically into the urethra and into the bladder. There was bilateral hyperplasia and coapting prostatic lobes, with significant median lobe tissue.  There was bleeding from the median lobe tissue.    Once in the bladder clot was identified.  Using a Rigo syringe the clot was evacuated.    Using the bipolar loop on cut cautery we began resecting the the median lobe tissue.  A rollerball was then used to obtain hemostasis at our resection site and prior areas of resection which had mild bleeding.    Given that there was good hemostasis under no-flow and all prostatic chips had been removed, the patient's bladder was drained. A 24 Fr 3-way catheter was placed on slight traction, with CBI initiated. The procedure was then concluded. The patient was awoken from anesthesia and taken to PACU in good condition.      Disposition/Follow Up:   The patient will continue admission with ICU service/hospitalist service.  He will continue CBI over 24 hours.  We will decrease CBI, attempt to clamp during admission.  He will  require Whitfield catheter at discharge.  Will require outpatient follow-up with urology service    Santana Christianson MD     Date: 3/18/2023  Time: 11:54 EDT      Electronically signed by Santana Christianson MD at 23 1200          Physician Progress Notes (last 72 hours)      Joslyn Pollard DO at 23 1023              [facility] Medicine Services  PROGRESS NOTE    Patient Name: Jun Young  : 1960  MRN: [id number]    Date of Admission: 3/17/2023  Primary Care Physician: Provider, No Known    Subjective   Subjective     CC:  F/u hematuria, prostate resection    HPI:  Patient resting in bed. He has no complaints. Urine clear.    ROS:  Gen- No fevers, chills  CV- No chest pain, palpitations  Resp- No cough, dyspnea  GI- No N/V/D, abd pain    Objective   Objective     Vital Signs:   Temp:  [96.2 °F (35.7 °C)-99.8 °F (37.7 °C)] 97.6 °F (36.4 °C)  Heart Rate:  [66-81] 77  Resp:  [18] 18  BP: (120-154)/(68-87) 148/79     Physical Exam:  Constitutional: No acute distress, awake, alert  HENT: NCAT, mucous membranes moist  Respiratory: Clear to auscultation bilaterally, respiratory effort normal   Cardiovascular: RRR, no murmurs, rubs, or gallops  Gastrointestinal:  soft, nontender, nondistended  Musculoskeletal: No bilateral ankle edema  Psychiatric: Appropriate affect, cooperative  Neurologic: Oriented x 3, speech clear, no focal deficits  Skin: No rashes  : CBI in place, urine clear      Results Reviewed:  LAB RESULTS:      Lab 23  0535 23  0502 23  2339 23  1758 23  1314 23  1029 23  0820 23  2211 23  1830   WBC 7.95 5.10  --   --   --   --  4.05  --  5.86   HEMOGLOBIN 8.3* 8.3* 8.3* 8.3* 7.8*  --  6.6* 5.3* 7.2*   HEMOGLOBIN, POC  --   --   --   --   --    < >  --   --   --    HEMATOCRIT 25.5* 25.5* 25.4* 25.7* 24.4*  --  20.8* 17.0* 21.9*   HEMATOCRIT POC  --   --   --   --   --    < >  --   --   --    PLATELETS 174  157  --   --   --   --  135*  --  151   NEUTROS ABS  --   --   --   --   --   --   --   --  4.75   EOS ABS  --   --   --   --   --   --   --   --  0.23   MCV 91.1 92.1  --   --   --   --  94.1  --  92.0   PROCALCITONIN  --   --   --   --   --   --   --   --  54.10*   LACTATE  --   --   --   --   --   --   --  0.9 2.6*    < > = values in this interval not displayed.         Lab 03/20/23  0535 03/19/23  0502 03/18/23  0820 03/17/23 2211 03/17/23 1830   SODIUM 141 134* 138  --  136   POTASSIUM 4.7 4.6 4.3  --  3.7   CHLORIDE 101 96* 99  --  94*   CO2 21.0* 22.0 26.0  --  27.0   ANION GAP 19.0* 16.0* 13.0  --  15.0   BUN 84* 60* 35*  --  24*   CREATININE 8.50* 7.52* 5.88*  --  3.58*   EGFR 6.4* 7.5* 10.0*  --  18.2*   GLUCOSE 154* 230* 82  --  154*   CALCIUM 7.4* 8.2* 8.4*  --  9.3   IONIZED CALCIUM  --   --  1.12  --   --    MAGNESIUM 2.3  --  2.2  --   --    PHOSPHORUS 5.7* 7.6* 5.4*  --   --    HEMOGLOBIN A1C  --   --   --  5.00  --          Lab 03/20/23  0535 03/19/23  0502 03/18/23  0820 03/17/23  1830   TOTAL PROTEIN  --   --   --  7.9   ALBUMIN 2.7* 2.9* 3.0* 3.9   GLOBULIN  --   --   --  4.0   ALT (SGPT)  --   --   --  16   AST (SGOT)  --   --   --  23   BILIRUBIN  --   --   --  0.5   ALK PHOS  --   --   --  120*   LIPASE  --   --   --  25         Lab 03/17/23 2118 03/17/23  1830   HSTROP T 62* 67*             Lab 03/18/23  0820 03/18/23  0057 03/17/23  2118   IRON 24*  --   --    IRON SATURATION 14*  --   --    TIBC 173*  --   --    TRANSFERRIN 116*  --   --    ABO TYPING  --  O O   RH TYPING  --  Positive Positive   ANTIBODY SCREEN  --   --  Negative         Lab 03/18/23  1029   PH, ARTERIAL 7.44     Brief Urine Lab Results  (Last result in the past 365 days)      Color   Clarity   Blood   Leuk Est   Nitrite   Protein   CREAT   Urine HCG        03/18/23 0636 Red   Turbid   Large (3+)   Large (3+)   Positive   >=300 mg/dL (3+)                 Microbiology Results Abnormal     Procedure Component Value -  Date/Time    Blood Culture - Blood, Arm, Right [[id number]]  (Normal) Collected: 03/17/23 2030    Lab Status: Preliminary result Specimen: Blood from Arm, Right Updated: 03/19/23 2100     Blood Culture No growth at 2 days    Blood Culture - Blood, Arm, Left [[id number]]  (Normal) Collected: 03/17/23 2015    Lab Status: Preliminary result Specimen: Blood from Arm, Left Updated: 03/19/23 2100     Blood Culture No growth at 2 days    MRSA Screen, PCR (Inpatient) - Swab, Nares [[id number]]  (Normal) Collected: 03/18/23 0555    Lab Status: Final result Specimen: Swab from Nares Updated: 03/18/23 0737     MRSA PCR Negative    Narrative:      The negative predictive value of this diagnostic test is high and should only be used to consider de-escalating anti-MRSA therapy. A positive result may indicate colonization with MRSA and must be correlated clinically.  MRSA Negative    COVID PRE-OP / PRE-PROCEDURE SCREENING ORDER (NO ISOLATION) - Swab, Nasopharynx [[id number]]  (Normal) Collected: 03/17/23 1919    Lab Status: Final result Specimen: Swab from Nasopharynx Updated: 03/17/23 2029    Narrative:      The following orders were created for panel order COVID PRE-OP / PRE-PROCEDURE SCREENING ORDER (NO ISOLATION) - Swab, Nasopharynx.  Procedure                               Abnormality         Status                     ---------                               -----------         ------                     Respiratory Panel PCR w/...[[id number]]  Normal              Final result                 Please view results for these tests on the individual orders.    Respiratory Panel PCR w/COVID-19(SARS-CoV-2) INNA/ANGELLA/MILLICENT/PAD/COR/MAD/QUIQUE In-House, NP Swab in UTM/VTM, 3-4 HR TAT - Swab, Nasopharynx [[id number]]  (Normal) Collected: 03/17/23 1919    Lab Status: Final result Specimen: Swab from Nasopharynx Updated: 03/17/23 2029     ADENOVIRUS, PCR Not Detected     Coronavirus 229E Not Detected     Coronavirus HKU1 Not Detected     Coronavirus  NL63 Not Detected     Coronavirus OC43 Not Detected     COVID19 Not Detected     Human Metapneumovirus Not Detected     Human Rhinovirus/Enterovirus Not Detected     Influenza A PCR Not Detected     Influenza B PCR Not Detected     Parainfluenza Virus 1 Not Detected     Parainfluenza Virus 2 Not Detected     Parainfluenza Virus 3 Not Detected     Parainfluenza Virus 4 Not Detected     RSV, PCR Not Detected     Bordetella pertussis pcr Not Detected     Bordetella parapertussis PCR Not Detected     Chlamydophila pneumoniae PCR Not Detected     Mycoplasma pneumo by PCR Not Detected    Narrative:      In the setting of a positive respiratory panel with a viral infection PLUS a negative procalcitonin without other underlying concern for bacterial infection, consider observing off antibiotics or discontinuation of antibiotics and continue supportive care. If the respiratory panel is positive for atypical bacterial infection (Bordetella pertussis, Chlamydophila pneumoniae, or Mycoplasma pneumoniae), consider antibiotic de-escalation to target atypical bacterial infection.          No radiology results from the last 24 hrs    Results for orders placed during the hospital encounter of 03/17/23    Adult Transthoracic Echo Limited W/ Cont if Necessary Per Protocol    Interpretation Summary  •  Left ventricular systolic function is normal. Calculated left ventricular EF = 61.7% Left ventricular ejection fraction appears to be 61 - 65%.  •  Left ventricular wall thickness is consistent with mild concentric hypertrophy.  •  There is a small (<1cm) pericardial effusion.      Current medications:  Scheduled Meds:b complex-vitamin c-folic acid, 1 tablet, Oral, Daily  epoetin sloan/sloan-epbx, 20,000 Units, Subcutaneous, Once per day on Mon Wed Fri  ferric gluconate, 125 mg, Intravenous, Daily Before Supper  insulin detemir, 15 Units, Subcutaneous, Daily  insulin lispro, 0-9 Units, Subcutaneous, TID AC  pantoprazole, 40 mg, Oral, Q  AM  piperacillin-tazobactam, 3.375 g, Intravenous, Q12H  sodium bicarbonate, 650 mg, Oral, BID      Continuous Infusions:   PRN Meds:.•  acetaminophen  •  dextrose  •  dextrose  •  glucagon (human recombinant)  •  Lidocaine HCl gel  •  Sodium Chloride (PF)    Assessment & Plan   Assessment & Plan     Active Hospital Problems    Diagnosis  POA   • **Gross hematuria [R31.0]  Yes   • ESRD (end stage renal disease) (HCC) [N18.6]  Yes   • Acute cystitis with hematuria [N30.01]  Yes   • Symptomatic anemia [D64.9]  Yes   • Type 2 diabetes mellitus (HCC) [E11.9]  Yes      Resolved Hospital Problems   No resolved problems to display.        Brief Hospital Course to date:  Jun Young is a 62 y.o. male with PMHx significant for DMII, HTN, ESRD on HD M/W/F, HLD who presented with gross hematuria and severe anemia. He was initially managed in the ICU and transferred to the floor on 3/20.    Gross Hematuria  -s/p cystoscopy with clot evacuation and transuretheral resection of the prostate with caldwell catheter placement on 3/18 with Dr. Christianson  - s/p 2 units of PRBCs, hemoglobin remains stable, hematuria resolved- CBI in place, Urology following, post-operative management per their service  - continue Zosyn    DMII  - levemir 15 units daily with SSI coverage    ESRD:  - dialysis per nephrology on MWF    HTN  HLD  - continue home regimen      Expected Discharge Location and Transportation: Home  Expected Discharge 1-2 days, or when okay with Urology       DVT prophylaxis:  Mechanical DVT prophylaxis orders are present.          CODE STATUS:   Code Status and Medical Interventions:   Ordered at: 03/19/23 1140     Code Status (Patient has no pulse and is not breathing):    CPR (Attempt to Resuscitate)     Medical Interventions (Patient has pulse or is breathing):    Full Support     Release to patient:    Routine Release       Joslyn Pollard DO  03/20/23        Electronically signed by Joslyn Pollard DO at  "03/20/23 1033     Zion Hardin MD at 03/20/23 1021             LOS: 2 days    Patient Care Team:  Provider, No Known as PCP - General    Reason For Visit:  F/U ESRD.   Subjective           Review of Systems:    Pulm: No soa   CV:  No CP      Objective     b complex-vitamin c-folic acid, 1 tablet, Oral, Daily  epoetin sloan/sloan-epbx, 20,000 Units, Subcutaneous, Once per day on Mon Wed Fri  ferric gluconate, 125 mg, Intravenous, Daily Before Supper  insulin detemir, 15 Units, Subcutaneous, Daily  insulin lispro, 0-9 Units, Subcutaneous, TID AC  pantoprazole, 40 mg, Oral, Q AM  piperacillin-tazobactam, 3.375 g, Intravenous, Q12H  sodium bicarbonate, 650 mg, Oral, BID             Vital Signs:  Blood pressure 148/79, pulse 77, temperature 97.6 °F (36.4 °C), temperature source Oral, resp. rate 18, height 170.2 cm (67\"), weight 68.2 kg (150 lb 5.7 oz), SpO2 96 %.    Flowsheet Rows    Flowsheet Row First Filed Value   Admission Height 170.2 cm (67\") Documented at 03/17/2023 1814   Admission Weight 63.5 kg (140 lb) Documented at 03/17/2023 1814          03/19 0701 - 03/20 0700  In: 7644.1 [P.O.:717; I.V.:27.1]  Out: 6850 [Urine:6850]    Physical Exam:    General Appearance: NAD, alert and cooperative, THD CATH  Eyes: PER, conjunctivae and sclerae normal, no icterus  Lungs: respirations regular and unlabored, no crepitus, clear to auscultation  Heart/CV: regular rhythm & normal rate, no murmur, no gallop, no rub and no edema  Abdomen: not distended, soft, non-tender, no masses,  bowel sounds present  Skin: No rash, Warm and dry    Radiology:      Renal Imaging:        Labs:  Results from last 7 days   Lab Units 03/20/23  0535 03/19/23  0502 03/18/23  2339 03/18/23  1029 03/18/23  0820   WBC 10*3/mm3 7.95 5.10  --   --  4.05   HEMOGLOBIN g/dL 8.3* 8.3* 8.3*   < > 6.6*   HEMOGLOBIN, POC   --   --   --    < >  --    HEMATOCRIT % 25.5* 25.5* 25.4*   < > 20.8*   HEMATOCRIT POC   --   --   --    < >  --    PLATELETS " 10*3/mm3 174 157  --   --  135*    < > = values in this interval not displayed.     Results from last 7 days   Lab Units 23  0535 23  0502 23  0820 23  1830   SODIUM mmol/L 141 134* 138 136   POTASSIUM mmol/L 4.7 4.6 4.3 3.7   CHLORIDE mmol/L 101 96* 99 94*   CO2 mmol/L 21.0* 22.0 26.0 27.0   BUN mg/dL 84* 60* 35* 24*   CREATININE mg/dL 8.50* 7.52* 5.88* 3.58*   CALCIUM mg/dL 7.4* 8.2* 8.4* 9.3   PHOSPHORUS mg/dL 5.7* 7.6* 5.4*  --    MAGNESIUM mg/dL 2.3  --  2.2  --    ALBUMIN g/dL 2.7* 2.9* 3.0* 3.9     Results from last 7 days   Lab Units 23  0535   GLUCOSE mg/dL 154*       Results from last 7 days   Lab Units 23  1830   ALK PHOS U/L 120*   BILIRUBIN mg/dL 0.5   ALT (SGPT) U/L 16   AST (SGOT) U/L 23     Results from last 7 days   Lab Units 23  1029   PH, ARTERIAL pH units 7.44       Results from last 7 days   Lab Units 23  0636   COLOR UA  Red*   CLARITY UA  Turbid*   PH, URINE  8.0   SPECIFIC GRAVITY, URINE  1.017   GLUCOSE UA  Negative   KETONES UA  Negative   BILIRUBIN UA  Small (1+)*   PROTEIN UA  >=300 mg/dL (3+)*   BLOOD UA  Large (3+)*   LEUKOCYTES UA  Large (3+)*   NITRITE UA  Positive*       Estimated Creatinine Clearance: 8.5 mL/min (A) (by C-G formula based on SCr of 8.5 mg/dL (H)).      Assessment       Gross hematuria    Symptomatic anemia    Type 2 diabetes mellitus (HCC)    ESRD (end stage renal disease) (HCC)    Acute cystitis with hematuria            Impression: ESRD. ANEMIA.             Recommendations: HD TODAY 3/20/23.    Zion Hardin MD  23  10:21 EDT          Electronically signed by Zion Hardin MD at 23 1022     Lesly Coffman APRN at 23 0916     Summary:Urology Progress                [facility]   Urology Progress Note    Patient Name: Jun Young  : 1960  MRN: [id number]  Primary Care Physician:  Provider, No Known  Date of admission: 3/17/2023    Subjective   Subjective      Chief Complaint: Hematuria    HPI:  Patient resting in bed. Denies pain. CBI on slow drip with urine output yellow/clear to caldwell drainage. TMAX overnight 99.8.     Review of Systems   As above    Objective   Objective     Vitals:   Temp:  [96.2 °F (35.7 °C)-99.8 °F (37.7 °C)] 97.6 °F (36.4 °C)  Heart Rate:  [64-81] 77  Resp:  [18] 18  BP: (120-154)/(68-87) 148/79  Physical Exam    Constitutional: Awake in bed, alert   Eyes: PERRLA, sclerae anicteric, no conjunctival injection   HENT: Normocephalic, atraumatic, mucous membranes moist   Neck: Supple, trachea midline   Respiratory: Equal chest rise, non-labored respirations    Cardiovascular: RRR   Gastrointestinal: Soft   Genitourinary: CBI on slow drip with yellow/clear urine output to caldwell drainage   Musculoskeletal: No lower extremity edema bilaterally, no clubbing or cyanosis to extremities   Psychiatric: Appropriate affect, cooperative   Neurologic: Oriented x 3,  Cranial Nerves grossly intact, speech clear   Skin: No rashes     Result Review    Result Review:  I have personally reviewed the results from the time of this admission to 3/20/2023 09:16 EDT and agree with these findings:  [x]  Laboratory  []  Microbiology  []  Radiology  []  EKG/Telemetry   []  Cardiology/Vascular   []  Pathology  []  Old records  [x]  Other:Vital signs, operative report    Most notable findings include: Cr 8.50    Assessment & Plan   Assessment / Plan     Brief Patient Summary:  Jun Young is a 62 y.o. male who is POD 2 s/p Clot evacuation, fulguration, resection of prostate and caldwell catheter insertion with Dr. Christianson.     Active Hospital Problems:  Active Hospital Problems    Diagnosis    • **Gross hematuria    • ESRD (end stage renal disease) (HCC)    • Acute cystitis with hematuria    • Symptomatic anemia    • Type 2 diabetes mellitus (HCC)        CBI clamped at bedside. Monitor UOP.  Caldwell catheter will remain in place at discharge with plans for outpatient  follow up with Dr. Damico to discuss possible need for suprapubic tube. He is understanding and agreeable with plan of care.   D/w Dr. Christianson.     DVT prophylaxis:  Mechanical DVT prophylaxis orders are present.    CODE STATUS:   Code Status (Patient has no pulse and is not breathing): CPR (Attempt to Resuscitate)  Medical Interventions (Patient has pulse or is breathing): Full Support  Release to patient: Routine Release    Disposition:  I expect patient to remain inpatient.    Electronically signed by FRANKY Krause, 23, 9:16 AM EDT.             Electronically signed by Lesly Coffman APRN at 23 0921     Santana Christianson MD at 23 [phone]     Summary:Urology Progress Note                [facility]   Urology Progress Note    Patient Name: Jun Young  : 1960  MRN: [id number]  Primary Care Physician:  Provider, No Known  Date of admission: 3/17/2023    Subjective   Subjective     Chief Complaint: Hematuria, urinary retention    HPI:  Afebrile, hemodynamically stable.  CBI remains clear on slow drip    Review of Systems   The following systems were reviewed and negative;  constitution, respiratory, cardiovascular, gastrointestinal, genitourinary, musculoskeletal, neurological and behavioral/psych.    Objective   Objective     Vitals:   Temp:  [96.7 °F (35.9 °C)-98.5 °F (36.9 °C)] 97 °F (36.1 °C)  Heart Rate:  [57-85] 64  Resp:  [16-20] 18  BP: (113-145)/(65-87) 126/87  Physical Exam    Constitutional: Awake in bed, alert   Eyes: PERRLA, sclerae anicteric, no conjunctival injection   HENT: Normocephalic, atraumatic, mucous membranes moist   Neck: Supple, trachea midline   Respiratory: Equal chest rise, non-labored respirations    Cardiovascular: Perfused, no edema   Gastrointestinal: Soft, nontender, non-distended   Genitourinary: Three-way Whitfield catheter in place with clear urine on slow drip CBI   Musculoskeletal: No lower extremity edema bilaterally, no clubbing or  cyanosis to extremities   Psychiatric: Appropriate affect, cooperative   Neurologic: Oriented x 3,  Cranial Nerves grossly intact, speech clear   Skin: No rashes     Result Review    Result Review:  I have personally reviewed the results from the time of this admission to 3/19/2023 12:37 EDT and agree with these findings:  [x]  Laboratory  [x]  Microbiology  []  Radiology  []  EKG/Telemetry   []  Cardiology/Vascular   []  Pathology  []  Old records  []  Other:    Most notable findings include:   Creatinine 7.5  WBC 5.1  Hemoglobin 8.3    Assessment & Plan   Assessment / Plan     Brief Patient Summary:  Jun Young is a 62 y.o. male who is postoperative day 1 from clot evacuation, fulguration and resection of prostate, Whitfield catheter insertion.  CBI is clear on slow drip.  We will continue for another 24 hours to ensure it remains clear, hemoglobin is stable.  Whitfield catheter will stay in place during hospitalization and at discharge.  He will require outpatient  follow-up to coordinate continued Whitfield catheter management.     Active Hospital Problems:  Active Hospital Problems    Diagnosis    • **Gross hematuria    • ESRD (end stage renal disease) (HCC)    • Acute cystitis with hematuria    • Symptomatic anemia    • Type 2 diabetes mellitus (HCC)           DVT prophylaxis:  Mechanical DVT prophylaxis orders are present.    CODE STATUS:   Code Status (Patient has no pulse and is not breathing): CPR (Attempt to Resuscitate)  Medical Interventions (Patient has pulse or is breathing): Full Support  Release to patient: Routine Release    Disposition: Per primary team    Electronically signed by Santana Christianson MD, 03/19/23, 12:37 PM EDT.             Electronically signed by Santana Christianson MD at 03/19/23 1239     Veronique Barnes DO at 03/19/23 1123          INTENSIVIST   PROGRESS NOTE     Hospital:  LOS: 1 day     Mr. Jun Young, 62 y.o. male is followed for a Chief Complaint of:  Hematuria      Subjective   S     Interval History:  No events overnight.        The patient's relevant past medical, surgical and social history were reviewed and updated in Epic as appropriate.      ROS:   Constitutional: Negative for fever.   Respiratory: Negative for dyspnea.   Cardiovascular: Negative for chest pain.   Gastrointestinal: Negative for  nausea, vomiting and diarrhea.     Objective   O     Vitals:  Temp  Min: 96.7 °F (35.9 °C)  Max: 99.3 °F (37.4 °C)  BP  Min: 113/66  Max: 145/82  Pulse  Min: 57  Max: 86  Resp  Min: 16  Max: 20  SpO2  Min: 94 %  Max: 99 % Flow (L/min)  Min: 2  Max: 3    Intake/Ouptut 24 hrs (7:00AM - 6:59 AM)  Intake & Output (last 3 days)       03/16 0701 03/17 0700 03/17 0701 03/18 0700 03/18 0701 03/19 0700 03/19 0701 03/20 0700    P.O.   240 477    I.V. (mL/kg)   200 (2.9) 27.1 (0.4)    Blood  350 300     Other   05860     IV Piggyback   150     Total Intake(mL/kg)  350 (5.1) 76348 (189) 504.1 (7.4)    Urine (mL/kg/hr)  25 51235 (7.5) 1200 (4)    Stool   0     Total Output  25 65470 1200    Net  +325 +540 -695.9            Stool Unmeasured Occurrence   1 x             Physical Examination  Telemetry:  Normal sinus rhythm.   Constitutional:  No acute distress.  Resting in bed comfortably.    Eyes: No scleral icterus.   PERRL, EOM intact.    Neck:  Supple, FROM   Cardiovascular: Normal rate, regular and rhythm. Normal heart sounds.  No murmurs, gallop or rub.   Respiratory: No respiratory distress. Normal respiratory effort.  Normal breath sounds  Clear to auscultation   Abdominal:  Soft. No masses. Nontender. No distension. No HSM.   Extremities: No digital cyanosis. No clubbing.  No peripheral edema.   Skin: No rashes, lesions or ulcers   Neurological:   Alert and interactive.              Results from last 7 days   Lab Units 03/19/23  0502 03/18/23  2339 03/18/23  1758 03/18/23  1314 03/18/23  0820 03/17/23  2211 03/17/23  1830   WBC 10*3/mm3 5.10  --   --   --  4.05  --   5.86   HEMOGLOBIN g/dL 8.3* 8.3* 8.3*   < > 6.6*   < > 7.2*   MCV fL 92.1  --   --   --  94.1  --  92.0   PLATELETS 10*3/mm3 157  --   --   --  135*  --  151    < > = values in this interval not displayed.     Results from last 7 days   Lab Units 03/19/23  0502 03/18/23  0820 03/17/23  1830   SODIUM mmol/L 134* 138 136   POTASSIUM mmol/L 4.6 4.3 3.7   CO2 mmol/L 22.0 26.0 27.0   CREATININE mg/dL 7.52* 5.88* 3.58*   GLUCOSE mg/dL 230* 82 154*   MAGNESIUM mg/dL  --  2.2  --    PHOSPHORUS mg/dL 7.6* 5.4*  --      Estimated Creatinine Clearance: 9.6 mL/min (A) (by C-G formula based on SCr of 7.52 mg/dL (H)).  Results from last 7 days   Lab Units 03/17/23  1830   ALK PHOS U/L 120*   BILIRUBIN mg/dL 0.5   ALT (SGPT) U/L 16   AST (SGOT) U/L 23             Images:  Imaging Results (Last 24 Hours)     ** No results found for the last 24 hours. **            Results: Reviewed.  I reviewed the patient's new laboratory and imaging results.  I independently reviewed the patient's new images.    Medications: Reviewed.    Assessment & Plan   A / P     Mr. Young is a 63yo M with a history of T2DM, HTN, HLD and ESRD on HD MWF who presented to [facility] on 3/18/23 with hematuria and a UTI. He was admitted to the ICU after his hemoglobin was noted to be 5.     Urology was consulted and he was taken to the OR by Dr. Christianson on 3/18/23 and underwent cystoscopy with clot evacuation and transuretheral resection of the prostage with caldwell catheter placement.     Continuous bladder irrigation is running.     Nutrition:   Diet: Renal Diets, Diabetic Diets; Consistent Carbohydrate; Low Phosphorus, Low Potassium; Texture: Regular Texture (IDDSI 7); Fluid Consistency: Thin (IDDSI 0)  Advance Directives:   There are no questions and answers to display.       Active Hospital Problems    Diagnosis    • **Hematuria    • Symptomatic anemia    • Gross hematuria    • CKD (chronic kidney disease) stage 5, GFR less than 15 ml/min (HCC)    • Type 2  "diabetes mellitus (HCC)        Assessment / Plan:    1. CBI per Urology  2. Continue Zosyn. Follow up urine cultures.   3. Increase insulin  4. Echocardiogram with small <1cm pericardial effusion (decreased from previous)  5. AM labs  6. Okay to transfer to telemetry when a bed is available.     High level of risk due to:  severe exacerbation of chronic illness and illness with threat to life or bodily function.    Plan of care and goals reviewed during interdisciplinary rounds.  I discussed the patient's findings and my recommendations with patient and nursing staff        Veronique Barnes DO    Intensive Care Medicine and Pulmonary Medicine       Electronically signed by Veronique Barnes DO at 03/19/23 1139     Zion Hardin MD at 03/19/23 0901             LOS: 1 day    Patient Care Team:  Provider, No Known as PCP - General    Reason For Visit:  F/U ESRD.   Subjective           Review of Systems:    Pulm: No soa   CV:  No CP      Objective     b complex-vitamin c-folic acid, 1 tablet, Oral, Daily  [START ON 3/20/2023] epoetin sloan/sloan-epbx, 20,000 Units, Subcutaneous, Once per day on Mon Wed Fri  ferric gluconate, 125 mg, Intravenous, Daily Before Supper  insulin detemir, 15 Units, Subcutaneous, Daily  insulin lispro, 0-9 Units, Subcutaneous, TID AC  [START ON 3/20/2023] pantoprazole, 40 mg, Oral, Q AM  piperacillin-tazobactam, 3.375 g, Intravenous, Q12H  sodium bicarbonate, 650 mg, Oral, BID             Vital Signs:  Blood pressure 140/86, pulse 67, temperature 97 °F (36.1 °C), temperature source Axillary, resp. rate 18, height 170.2 cm (67\"), weight 68.2 kg (150 lb 5.7 oz), SpO2 99 %.    Flowsheet Rows    Flowsheet Row First Filed Value   Admission Height 170.2 cm (67\") Documented at 03/17/2023 1814   Admission Weight 63.5 kg (140 lb) Documented at 03/17/2023 1814 03/18 0701 - 03/19 0700  In: 49465 [P.O.:240; I.V.:200]  Out: 64440 [Urine:92356]    Physical Exam:    General Appearance: NAD, " alert and cooperative, THD CATH  Eyes: PER, conjunctivae and sclerae normal, no icterus  Lungs: respirations regular and unlabored, no crepitus, clear to auscultation  Heart/CV: regular rhythm & normal rate, no murmur, no gallop, no rub and no edema  Abdomen: not distended, soft, non-tender, no masses,  bowel sounds present  Skin: No rash, Warm and dry    Radiology:      Renal Imaging:        Labs:  Results from last 7 days   Lab Units 03/19/23  0502 03/18/23  2339 03/18/23  1758 03/18/23  1314 03/18/23  0820 03/17/23  2211 03/17/23  1830   WBC 10*3/mm3 5.10  --   --   --  4.05  --  5.86   HEMOGLOBIN g/dL 8.3* 8.3* 8.3*   < > 6.6*   < > 7.2*   HEMATOCRIT % 25.5* 25.4* 25.7*   < > 20.8*   < > 21.9*   PLATELETS 10*3/mm3 157  --   --   --  135*  --  151    < > = values in this interval not displayed.     Results from last 7 days   Lab Units 03/19/23  0502 03/18/23  0820 03/17/23  1830   SODIUM mmol/L 134* 138 136   POTASSIUM mmol/L 4.6 4.3 3.7   CHLORIDE mmol/L 96* 99 94*   CO2 mmol/L 22.0 26.0 27.0   BUN mg/dL 60* 35* 24*   CREATININE mg/dL 7.52* 5.88* 3.58*   CALCIUM mg/dL 8.2* 8.4* 9.3   PHOSPHORUS mg/dL 7.6* 5.4*  --    MAGNESIUM mg/dL  --  2.2  --    ALBUMIN g/dL 2.9* 3.0* 3.9     Results from last 7 days   Lab Units 03/19/23  0502   GLUCOSE mg/dL 230*       Results from last 7 days   Lab Units 03/17/23  1830   ALK PHOS U/L 120*   BILIRUBIN mg/dL 0.5   ALT (SGPT) U/L 16   AST (SGOT) U/L 23           Results from last 7 days   Lab Units 03/18/23  0636   COLOR UA  Red*   CLARITY UA  Turbid*   PH, URINE  8.0   SPECIFIC GRAVITY, URINE  1.017   GLUCOSE UA  Negative   KETONES UA  Negative   BILIRUBIN UA  Small (1+)*   PROTEIN UA  >=300 mg/dL (3+)*   BLOOD UA  Large (3+)*   LEUKOCYTES UA  Large (3+)*   NITRITE UA  Positive*       Estimated Creatinine Clearance: 9.6 mL/min (A) (by C-G formula based on SCr of 7.52 mg/dL (H)).      Assessment       Hematuria    Symptomatic anemia    Gross hematuria    CKD (chronic kidney  disease) stage 5, GFR less than 15 ml/min (HCC)    Type 2 diabetes mellitus (HCC)            Impression: ESRD. ANEMIA.             Recommendations: HD 3/20/23.    Zion Hardin MD  23  09:01 EDT          Electronically signed by Zion Hardin MD at 23 0902          Consult Notes (last 72 hours)      Santana Christianson MD at 23 0951      Consult Orders    1. Inpatient Urology Consult [[id number]] ordered by Radha Tan APRN at 23 0031          Summary:Urology Consult Note                [facility]   HISTORY AND PHYSICAL    Patient Name: Jun Young  : 1960  MRN: [id number]  Primary Care Physician:  Provider, No Known  Date of admission: 3/17/2023    Subjective   Subjective     Chief Complaint: Hematuria, acute urinary retention    HPI:    Jun Young is a 62 y.o. male presenting to [facility] ED with acute urinary retention and gross hematuria.      He has a complicated past medical and urologic history.  He has had recent admission due to urinary retention, STALIN on CKD.  He had a Whitfield catheter placed after a transurethral resection of prostate and fulguration of bleeding, his catheter was removed in clinic approximately 48 hours ago.  He reports that he has not voided since catheter removal.  He reports only blood per urethra since catheter removal.  At time of presentation the patient was found to have tachycardia with heart rate of 150, hypotension.  His creatinine is 5.8.  Hemoglobin was obtained and is 5.3.    Patient is currently admitted to the ICU, he has received a blood transfusion and repeat hemoglobin is 6.6.  Patient is not voided since arrival.  He is refusing a Whitfield catheter while awake.     CT scan has been performed demonstrating bilateral hydronephrosis down to a very distended urinary bladder and prostatomegaly.    Urology consulted for evaluation.          Review of Systems   The following systems were reviewed and  negative;  constitution, respiratory, cardiovascular, gastrointestinal, genitourinary, musculoskeletal, neurological and behavioral/psych.    Personal History     Past Medical History:   Diagnosis Date   • Acute on chronic renal failure (HCC)    • Bilateral hydronephrosis    • BPH (benign prostatic hyperplasia)    • Cholelithiasis    • Diabetes mellitus (HCC)    • Dyslipidemia    • GERD (gastroesophageal reflux disease)    • Gross hematuria    • Hemodialysis patient (HCC)    • History of transfusion    • Hypertension    • Pericardial effusion     echo 2/2023 - 1-2 cm preserved LV function       Past Surgical History:   Procedure Laterality Date   • CYSTOSCOPY WITH CLOT EVACUATION      fulguration - 3 way catheter placement   • INSERTION HEMODIALYSIS CATHETER         Family History: family history is not on file. Otherwise pertinent FHx was reviewed and not pertinent to current issue.    Social History:  reports that he has never smoked. He has never used smokeless tobacco. He reports that he does not drink alcohol and does not use drugs.    Home Medications:         Allergies:  No Known Allergies    Objective   Objective     Vitals:   Temp:  [98.7 °F (37.1 °C)-102.4 °F (39.1 °C)] 101.1 °F (38.4 °C)  Heart Rate:  [] 99  Resp:  [18-20] 20  BP: ()/(49-80) 139/80  Physical Exam    Constitutional: Awake in bed, alert    Eyes: PERRLA, sclerae anicteric, no conjunctival injection   HENT: Normocephalic, atraumatic, mucous membranes moist   Neck: Supple, trachea midline   Respiratory:Equal chest rise, non-labored respirations    Cardiovascular: Perfused, no edema   Gastrointestinal: Soft, nontender, non-distended, suprapubic fullness.   Musculoskeletal: No bilateral ankle edema, no clubbing or cyanosis to extremities   Psychiatric: Appropriate affect, cooperative   Neurologic: Oriented x 3, Cranial Nerves grossly intact, speech clear   Skin: No rashes     Result Review    Result Review:  I have personally  reviewed the results from the time of this admission to 3/18/2023 09:52 EDT and agree with these findings:  [x]  Laboratory  [x]  Microbiology  [x]  Radiology  []  EKG/Telemetry   []  Cardiology/Vascular   []  Pathology  [x]  Old records  []  Other:    Most notable findings include:   Cr 5.88  Hgb 5.3    CT abdomen and pelvis with bilateral hydronephrosis down to the level of very distended urinary bladder, prostatic hypertrophy noted       Assessment & Plan   Assessment / Plan     Brief Patient Summary:  Jun Young is a 62 y.o. male who presents to [facility] with acute onset urinary retention and hematuria.  He has had complex past urologic history including urinary retention related to BPH resulting in chronic CKD.  Most recently he was admitted to [facility] with STALIN and CKD.  He had a Whitfield catheter placed for hematuria and clot evacuation and small TURP performed.  He returned to have Whitfield catheter removed in clinic approximately 48 hours ago.  He reports that he has not voided since catheter has been removed.  He reports onset of blood per urethra.    The patient presented to [facility] ED due to hematuria.  Hemoglobin was found to be 5.3 and creatinine 5.8.  He was admitted to the ICU due to tachycardia, hypotension, hemoglobin of 5.3.  Urology consulted.  He has not voided.  He has refused Whitfield catheter placement, has refused in the past.    We have discussed the indication for emergent operative intervention including cystoscopy, fulguration of bleeding, clot evacuation and catheter placement.  We have discussed the emergent indication for intervention and catheter placement.  We have discussed the need for catheter placement and drainage.  He voices understanding.  We have discussed the risk and benefits of procedure and he elects to proceed.  He will maintain Whitfield catheter in place during admission, will attempt trial of void versus further discussion of possible suprapubic tube  placement as an outpatient when he follows with established urologic provider    Active Hospital Problems:  Active Hospital Problems    Diagnosis    • Symptomatic anemia    • Gross hematuria    • CKD (chronic kidney disease) stage 5, GFR less than 15 ml/min (HCC)    • Type 2 diabetes mellitus (HCC)      Plan:   - To OR for cystoscopy, fulguration of bleeding, clot evacuation, Whitfield catheter placement    DVT prophylaxis:  No DVT prophylaxis order currently exists.    CODE STATUS:       Admission Status: Per primary team    Electronically signed by Santana Christianson MD, 03/18/23, 9:52 AM EDT.             Electronically signed by Santana Christianson MD at 03/18/23 1004     Zion Hardin MD at 03/18/23 0949          [facility] Consult Note    Jun Young  1960  [id number]    Date of Admit:  3/17/2023    Date of Consult: 3/18/2023        Requesting Provider: No ref. provider found  Evaluating Physician: Zion Hardin MD        Reason for Consultation:  ESRD  History of present illness:    Patient is a 62 y.o.  Yr old male KNOWN TO [facility] WITH ESRD  ( M-W-F HD [facility] WITH [facility] )  & CHRONIC URINARY OBSTRUCTION ( WITH UROLOGY FOLLOWING )  ADMITTED WITH GROSS HEMATURIA AND WE ARE ASKED TO SEE TO MANAGE ESRD/DIALYSIS. PATIENT DOES NOT SPEAK ENGLISH AND H/O OBTAINED FROM DAUGHTER BY PHONE AND DIALYSIS CLINIC. GETS HD FOR 3 HRS BY D CATH. GOT FULL TX YESTERDAY. PATIENT HAS SEVERE ANEMIA AND IS GETTING TRANSFUSED. GOING TO THE OR THIS AM.    Past Medical History:   Diagnosis Date   • BPH (benign prostatic hyperplasia)        Past Surgical History:   Procedure Laterality Date   • CYSTOSCOPY TRANSURETHRAL RESECTION OF PROSTATE         Social History     Socioeconomic History   • Marital status: Single   Tobacco Use   • Smoking status: Never   • Smokeless tobacco: Never   Vaping Use   • Vaping Use: Never used   Substance and Sexual Activity   • Alcohol use: Never   • Drug use: Never   • Sexual activity: Not  Currently       family history is not on file.    No Known Allergies    Medication:    Current Facility-Administered Medications:   •  b complex-vitamin c-folic acid (NEPHRO-LASHON) tablet 1 tablet, 1 tablet, Oral, Daily, Radha Tan APRN, 1 tablet at 03/18/23 0823  •  dextrose (D50W) (25 g/50 mL) IV injection 25 g, 25 g, Intravenous, Q15 Min PRN, Radha Tan APRN  •  dextrose (GLUTOSE) oral gel 15 g, 15 g, Oral, Q15 Min PRN, Radha Tan APRN  •  [START ON 3/20/2023] epoetin sloan-epbx (RETACRIT) injection 20,000 Units, 20,000 Units, Subcutaneous, Once per day on Mon Wed Fri, Zion Hardin MD  •  ferric gluconate (FERRLECIT)125 MG in sodium chloride 0.9 % 100 mL IVPB, 125 mg, Intravenous, Daily Before Supper, Zion Hardin MD  •  glucagon (GLUCAGEN) injection 1 mg, 1 mg, Intramuscular, Q15 Min PRN, Radha Tan APRN  •  Insulin Lispro (humaLOG) injection 0-9 Units, 0-9 Units, Subcutaneous, TID AC, Radha Tan APRN  •  Lidocaine HCl gel (XYLOCAINE) urethral/mucosal syringe, , Topical, PRN, Radha Tan APRN  •  pantoprazole (PROTONIX) injection 40 mg, 40 mg, Intravenous, Q AM, Radha Tan APRN, 40 mg at 03/18/23 0542  •  piperacillin-tazobactam (ZOSYN) 3.375 g in iso-osmotic dextrose 50 ml (premix), 3.375 g, Intravenous, Q12H, Radha Tan APRN, 3.375 g at 03/18/23 0823  •  sodium bicarbonate tablet 650 mg, 650 mg, Oral, BID, Radha Tan APRN  •  Sodium Chloride (PF) 0.9 % 10 mL, 10 mL, Intravenous, PRN, Prosser, William, DO    No medications prior to admission.       Review of Systems:    Constitutional-- + Fever. NO chills or sweats. + significant change in weight  Eye-- no diplopia, no conjunctivitis  ENT-- No new hearing or throat complaints.  No epistaxis or oral sores. No odynophagia or dysphagia. POSITIVE headache. NO photophobia or neck stiffness.  CV-- No chest pain. + palpitations.  NO edema  Resp-- No SOB/cough/Hemoptysis  GI- No nausea, vomiting, or diarrhea.  No  "hematochezia, melena, or hematemesis.  -- No dysuria. + hematuria. NO flank pain. No lower tract obstructive symptoms  Skin-- No rash/ITCHING.   Lymph- no painful or swollen lymph nodes in neck/axilla or groin.   Heme- No active bruising or bleeding; no Hx of DVT or PE.  MS-- no swelling or pain in the joints  Neuro-- No acute focal weakness or numbness in the arms or legs.  No seizures.  Psych--No anxiety or depression  Endo--No cold or heat intolerance.  No polyuria, polydipsia, or polyphagia    Full review of systems reviewed and negative otherwise for acute complaints    Physical Exam:   Vital Signs   Blood pressure 139/80, pulse 99, temperature (!) 101.1 °F (38.4 °C), resp. rate 20, height 170.2 cm (67\"), weight 68.2 kg (150 lb 5.7 oz), SpO2 96 %.     GENERAL: Awake and alert, in no acute distress. THD CATH  HEENT: Normocephalic, atraumatic.  PER.  No conjunctivitis. No icterus. Oropharynx clear without evidence of thrush or exudate. No evidence of peridontal disease.    NECK: Supple without nuchal rigidity. No mass.  LYMPH: No painful cervical, axillary or inguinal lymphadenopathy.  HEART: RRR; No murmur, rubs, gallops. No bruits in neck, abdomen, or groins, no edema  LUNGS: Normal respiratory effort. Nonlabored. No dullness.  No crepitus.  Clear to auscultation bilaterally without wheezing, rales, rhonchi.  ABDOMEN: Soft, nontender, nondistended. Positive bowel sounds. No rebound or guarding. NO mass or HSM.  JOINTS:  Full range of motion, no redness or tenderness.  EXT:  No cyanosis/clubbing.  :  BLADDER NOT DISTENDED.   SKIN: Warm and dry without rash  NEURO: Oriented to PPT. No focal neurological deficits. Strength equal bilateral  PSYCHIATRIC: Normal insight and judgement. Cooperative with PE    Laboratory Data  Results from last 7 days   Lab Units 03/18/23  0820 03/17/23  2211 03/17/23  1830   HEMOGLOBIN g/dL 6.6* 5.3* 7.2*   HEMATOCRIT % 20.8* 17.0* 21.9*     Results from last 7 days   Lab Units " 03/18/23  0820 03/17/23  1830   SODIUM mmol/L 138 136   POTASSIUM mmol/L 4.3 3.7   CHLORIDE mmol/L 99 94*   CO2 mmol/L 26.0 27.0   BUN mg/dL 35* 24*   CREATININE mg/dL 5.88* 3.58*   CALCIUM mg/dL 8.4* 9.3   PHOSPHORUS mg/dL 5.4*  --    MAGNESIUM mg/dL 2.2  --    ALBUMIN g/dL 3.0* 3.9     Results from last 7 days   Lab Units 03/18/23  0820   GLUCOSE mg/dL 82     Results from last 7 days   Lab Units 03/18/23  0636   COLOR UA  Red*   CLARITY UA  Turbid*   PH, URINE  8.0   SPECIFIC GRAVITY, URINE  1.017   GLUCOSE UA  Negative   KETONES UA  Negative   BILIRUBIN UA  Small (1+)*   PROTEIN UA  >=300 mg/dL (3+)*   BLOOD UA  Large (3+)*   LEUKOCYTES UA  Large (3+)*   NITRITE UA  Positive*     Results from last 7 days   Lab Units 03/17/23  1830   ALK PHOS U/L 120*   BILIRUBIN mg/dL 0.5   ALT (SGPT) U/L 16   AST (SGOT) U/L 23     Estimated Creatinine Clearance: 12.2 mL/min (A) (by C-G formula based on SCr of 5.88 mg/dL (H)).    Radiology:      Impression: ESRD ON M-W-F HD. ANEMIA WITH FE SAT 14%. HEMATURIA WITH H/O OBSTRUCTION.       PLAN: Thank you for asking us to see Jun Young, I recommend the following: GIVE IV FE AND EPO. NEXT HD TX LIKELY 3/20/23. WILL FOLLOW. D/W ICU NURSE.       Zion Hardin MD  3/18/2023  09:49 EDT                    Electronically signed by Zion Hardin MD at 03/18/23 1002

## 2023-03-20 NOTE — PROGRESS NOTES
[facility]   Urology Progress Note    Patient Name: Jun Young  : 1960  MRN: [id number]  Primary Care Physician:  Provider, No Known  Date of admission: 3/17/2023    Subjective   Subjective     Chief Complaint: Hematuria    HPI:  Patient resting in bed. Denies pain. CBI on slow drip with urine output yellow/clear to caldwell drainage. TMAX overnight 99.8.     Review of Systems   As above    Objective   Objective     Vitals:   Temp:  [96.2 °F (35.7 °C)-99.8 °F (37.7 °C)] 97.6 °F (36.4 °C)  Heart Rate:  [64-81] 77  Resp:  [18] 18  BP: (120-154)/(68-87) 148/79  Physical Exam    Constitutional: Awake in bed, alert   Eyes: PERRLA, sclerae anicteric, no conjunctival injection   HENT: Normocephalic, atraumatic, mucous membranes moist   Neck: Supple, trachea midline   Respiratory: Equal chest rise, non-labored respirations    Cardiovascular: RRR   Gastrointestinal: Soft   Genitourinary: CBI on slow drip with yellow/clear urine output to caldwell drainage   Musculoskeletal: No lower extremity edema bilaterally, no clubbing or cyanosis to extremities   Psychiatric: Appropriate affect, cooperative   Neurologic: Oriented x 3,  Cranial Nerves grossly intact, speech clear   Skin: No rashes     Result Review    Result Review:  I have personally reviewed the results from the time of this admission to 3/20/2023 09:16 EDT and agree with these findings:  [x]  Laboratory  []  Microbiology  []  Radiology  []  EKG/Telemetry   []  Cardiology/Vascular   []  Pathology  []  Old records  [x]  Other:Vital signs, operative report    Most notable findings include: Cr 8.50    Assessment & Plan   Assessment / Plan     Brief Patient Summary:  Jun Young is a 62 y.o. male who is POD 2 s/p Clot evacuation, fulguration, resection of prostate and caldwell catheter insertion with Dr. Christianson.     Active Hospital Problems:  Active Hospital Problems    Diagnosis    • **Gross hematuria    • ESRD (end stage renal disease)  (HCC)    • Acute cystitis with hematuria    • Symptomatic anemia    • Type 2 diabetes mellitus (HCC)        CBI clamped at bedside. Monitor UOP.  Whitfield catheter will remain in place at discharge with plans for outpatient follow up with Dr. Damico to discuss possible need for suprapubic tube. He is understanding and agreeable with plan of care.   D/w Dr. Christianson.     DVT prophylaxis:  Mechanical DVT prophylaxis orders are present.    CODE STATUS:   Code Status (Patient has no pulse and is not breathing): CPR (Attempt to Resuscitate)  Medical Interventions (Patient has pulse or is breathing): Full Support  Release to patient: Routine Release    Disposition:  I expect patient to remain inpatient.    Electronically signed by FRANKY Krause, 03/20/23, 9:16 AM EDT.

## 2023-03-20 NOTE — PROGRESS NOTES
"   LOS: 2 days    Patient Care Team:  Provider, No Known as PCP - General    Reason For Visit:  F/U ESRD.   Subjective           Review of Systems:    Pulm: No soa   CV:  No CP      Objective     b complex-vitamin c-folic acid, 1 tablet, Oral, Daily  epoetin sloan/sloan-epbx, 20,000 Units, Subcutaneous, Once per day on Mon Wed Fri  ferric gluconate, 125 mg, Intravenous, Daily Before Supper  insulin detemir, 15 Units, Subcutaneous, Daily  insulin lispro, 0-9 Units, Subcutaneous, TID AC  pantoprazole, 40 mg, Oral, Q AM  piperacillin-tazobactam, 3.375 g, Intravenous, Q12H  sodium bicarbonate, 650 mg, Oral, BID             Vital Signs:  Blood pressure 148/79, pulse 77, temperature 97.6 °F (36.4 °C), temperature source Oral, resp. rate 18, height 170.2 cm (67\"), weight 68.2 kg (150 lb 5.7 oz), SpO2 96 %.    Flowsheet Rows    Flowsheet Row First Filed Value   Admission Height 170.2 cm (67\") Documented at 03/17/2023 1814   Admission Weight 63.5 kg (140 lb) Documented at 03/17/2023 1814          03/19 0701 - 03/20 0700  In: 7644.1 [P.O.:717; I.V.:27.1]  Out: 6850 [Urine:6850]    Physical Exam:    General Appearance: NAD, alert and cooperative, THD CATH  Eyes: PER, conjunctivae and sclerae normal, no icterus  Lungs: respirations regular and unlabored, no crepitus, clear to auscultation  Heart/CV: regular rhythm & normal rate, no murmur, no gallop, no rub and no edema  Abdomen: not distended, soft, non-tender, no masses,  bowel sounds present  Skin: No rash, Warm and dry    Radiology:      Renal Imaging:        Labs:  Results from last 7 days   Lab Units 03/20/23  0535 03/19/23  0502 03/18/23  2339 03/18/23  1029 03/18/23  0820   WBC 10*3/mm3 7.95 5.10  --   --  4.05   HEMOGLOBIN g/dL 8.3* 8.3* 8.3*   < > 6.6*   HEMOGLOBIN, POC   --   --   --    < >  --    HEMATOCRIT % 25.5* 25.5* 25.4*   < > 20.8*   HEMATOCRIT POC   --   --   --    < >  --    PLATELETS 10*3/mm3 174 157  --   --  135*    < > = values in this interval not " displayed.     Results from last 7 days   Lab Units 03/20/23  0535 03/19/23  0502 03/18/23  0820 03/17/23  1830   SODIUM mmol/L 141 134* 138 136   POTASSIUM mmol/L 4.7 4.6 4.3 3.7   CHLORIDE mmol/L 101 96* 99 94*   CO2 mmol/L 21.0* 22.0 26.0 27.0   BUN mg/dL 84* 60* 35* 24*   CREATININE mg/dL 8.50* 7.52* 5.88* 3.58*   CALCIUM mg/dL 7.4* 8.2* 8.4* 9.3   PHOSPHORUS mg/dL 5.7* 7.6* 5.4*  --    MAGNESIUM mg/dL 2.3  --  2.2  --    ALBUMIN g/dL 2.7* 2.9* 3.0* 3.9     Results from last 7 days   Lab Units 03/20/23  0535   GLUCOSE mg/dL 154*       Results from last 7 days   Lab Units 03/17/23  1830   ALK PHOS U/L 120*   BILIRUBIN mg/dL 0.5   ALT (SGPT) U/L 16   AST (SGOT) U/L 23     Results from last 7 days   Lab Units 03/18/23  1029   PH, ARTERIAL pH units 7.44       Results from last 7 days   Lab Units 03/18/23  0636   COLOR UA  Red*   CLARITY UA  Turbid*   PH, URINE  8.0   SPECIFIC GRAVITY, URINE  1.017   GLUCOSE UA  Negative   KETONES UA  Negative   BILIRUBIN UA  Small (1+)*   PROTEIN UA  >=300 mg/dL (3+)*   BLOOD UA  Large (3+)*   LEUKOCYTES UA  Large (3+)*   NITRITE UA  Positive*       Estimated Creatinine Clearance: 8.5 mL/min (A) (by C-G formula based on SCr of 8.5 mg/dL (H)).      Assessment       Gross hematuria    Symptomatic anemia    Type 2 diabetes mellitus (HCC)    ESRD (end stage renal disease) (HCC)    Acute cystitis with hematuria            Impression: ESRD. ANEMIA.             Recommendations: HD TODAY 3/20/23.    Zion Hardin MD  03/20/23  10:21 EDT

## 2023-03-20 NOTE — CASE MANAGEMENT/SOCIAL WORK
Discharge Planning Assessment  [facility]     Patient Name: Jun Young  MRN: [id number]  Today's Date: 3/20/2023    Admit Date: 3/17/2023    Plan: Home with 4 children   Discharge Needs Assessment     Row Name 03/20/23 0854       Living Environment    People in Home child(jennifer), adult    Name(s) of People in Home Althea Santillan (daughter) [phone]    Current Living Arrangements home    Potentially Unsafe Housing Conditions none    Primary Care Provided by self    Provides Primary Care For no one    Family Caregiver if Needed child(jennifer), adult    Able to Return to Prior Arrangements yes       Resource/Environmental Concerns    Resource/Environmental Concerns none    Transportation Concerns none       Transition Planning    Patient/Family Anticipates Transition to home with family    Patient/Family Anticipated Services at Transition none    Transportation Anticipated family or friend will provide       Discharge Needs Assessment    Readmission Within the Last 30 Days no previous admission in last 30 days    Equipment Currently Used at Home none    Concerns to be Addressed denies needs/concerns at this time    Anticipated Changes Related to Illness none    Equipment Needed After Discharge none               Discharge Plan     Row Name 03/20/23 0855       Plan    Plan Home with 4 children    Patient/Family in Agreement with Plan yes    Plan Comments Spoke to patient at bedside. Lives with Sylvia Santillan (daughter) [phone] in [place]. Is independent with ADL's. No problems with [employer] Medicare or medications. Uses no DME at home. Has no advanced directives. No PCP. Plan is home with children. Family will transport. CM will continue to follow.    Final Discharge Disposition Code 01 - home or self-care              Continued Care and Services - Admitted Since 3/17/2023    Coordination has not been started for this encounter.          Demographic Summary     Row Name 03/20/23 0853       General  Information    Admission Type inpatient    Arrived From emergency department    Referral Source admission list    Reason for Consult discharge planning    Preferred Language English;Turkish       Contact Information    Permission Granted to Share Info With     Contact Information Obtained for                Functional Status     Row Name 03/20/23 0853       Functional Status    Usual Activity Tolerance moderate    Current Activity Tolerance moderate       Functional Status, IADL    Medications independent    Meal Preparation independent    Housekeeping independent    Laundry independent    Shopping independent       Mental Status    General Appearance WDL WDL       Mental Status Summary    Recent Changes in Mental Status/Cognitive Functioning no changes       Employment/    Employment Status retired               Psychosocial    No documentation.                Abuse/Neglect    No documentation.                Legal    No documentation.                Substance Abuse    No documentation.                Patient Forms    No documentation.                   Sanford Bejarano RN

## 2023-03-20 NOTE — PROGRESS NOTES
[facility]  PROGRESS NOTE    Patient Name: Jun Young  : 1960  MRN: [id number]    Date of Admission: 3/17/2023  Primary Care Physician: Provider, No Known    Subjective   Subjective     CC:  F/u hematuria, prostate resection    HPI:  Patient resting in bed. He has no complaints. Urine clear.    ROS:  Gen- No fevers, chills  CV- No chest pain, palpitations  Resp- No cough, dyspnea  GI- No N/V/D, abd pain    Objective   Objective     Vital Signs:   Temp:  [96.2 °F (35.7 °C)-99.8 °F (37.7 °C)] 97.6 °F (36.4 °C)  Heart Rate:  [66-81] 77  Resp:  [18] 18  BP: (120-154)/(68-87) 148/79     Physical Exam:  Constitutional: No acute distress, awake, alert  HENT: NCAT, mucous membranes moist  Respiratory: Clear to auscultation bilaterally, respiratory effort normal   Cardiovascular: RRR, no murmurs, rubs, or gallops  Gastrointestinal:  soft, nontender, nondistended  Musculoskeletal: No bilateral ankle edema  Psychiatric: Appropriate affect, cooperative  Neurologic: Oriented x 3, speech clear, no focal deficits  Skin: No rashes  : CBI in place, urine clear      Results Reviewed:  LAB RESULTS:      Lab 23  0535 23  0502 23  2339 23  1758 23  1314 23  1029 23  0820 23  2211 23  1830   WBC 7.95 5.10  --   --   --   --  4.05  --  5.86   HEMOGLOBIN 8.3* 8.3* 8.3* 8.3* 7.8*  --  6.6* 5.3* 7.2*   HEMOGLOBIN, POC  --   --   --   --   --    < >  --   --   --    HEMATOCRIT 25.5* 25.5* 25.4* 25.7* 24.4*  --  20.8* 17.0* 21.9*   HEMATOCRIT POC  --   --   --   --   --    < >  --   --   --    PLATELETS 174 157  --   --   --   --  135*  --  151   NEUTROS ABS  --   --   --   --   --   --   --   --  4.75   EOS ABS  --   --   --   --   --   --   --   --  0.23   MCV 91.1 92.1  --   --   --   --  94.1  --  92.0   PROCALCITONIN  --   --   --   --   --   --   --   --  54.10*   LACTATE  --   --   --   --   --   --   --  0.9 2.6*     < > = values in this interval not displayed.         Lab 03/20/23  0535 03/19/23  0502 03/18/23  0820 03/17/23  2211 03/17/23  1830   SODIUM 141 134* 138  --  136   POTASSIUM 4.7 4.6 4.3  --  3.7   CHLORIDE 101 96* 99  --  94*   CO2 21.0* 22.0 26.0  --  27.0   ANION GAP 19.0* 16.0* 13.0  --  15.0   BUN 84* 60* 35*  --  24*   CREATININE 8.50* 7.52* 5.88*  --  3.58*   EGFR 6.4* 7.5* 10.0*  --  18.2*   GLUCOSE 154* 230* 82  --  154*   CALCIUM 7.4* 8.2* 8.4*  --  9.3   IONIZED CALCIUM  --   --  1.12  --   --    MAGNESIUM 2.3  --  2.2  --   --    PHOSPHORUS 5.7* 7.6* 5.4*  --   --    HEMOGLOBIN A1C  --   --   --  5.00  --          Lab 03/20/23  0535 03/19/23  0502 03/18/23  0820 03/17/23  1830   TOTAL PROTEIN  --   --   --  7.9   ALBUMIN 2.7* 2.9* 3.0* 3.9   GLOBULIN  --   --   --  4.0   ALT (SGPT)  --   --   --  16   AST (SGOT)  --   --   --  23   BILIRUBIN  --   --   --  0.5   ALK PHOS  --   --   --  120*   LIPASE  --   --   --  25         Lab 03/17/23 2118 03/17/23  1830   HSTROP T 62* 67*             Lab 03/18/23  0820 03/18/23  0057 03/17/23 2118   IRON 24*  --   --    IRON SATURATION 14*  --   --    TIBC 173*  --   --    TRANSFERRIN 116*  --   --    ABO TYPING  --  O O   RH TYPING  --  Positive Positive   ANTIBODY SCREEN  --   --  Negative         Lab 03/18/23  1029   PH, ARTERIAL 7.44     Brief Urine Lab Results  (Last result in the past 365 days)      Color   Clarity   Blood   Leuk Est   Nitrite   Protein   CREAT   Urine HCG        03/18/23 0636 Red   Turbid   Large (3+)   Large (3+)   Positive   >=300 mg/dL (3+)                 Microbiology Results Abnormal     Procedure Component Value - Date/Time    Blood Culture - Blood, Arm, Right [[id number]]  (Normal) Collected: 03/17/23 2030    Lab Status: Preliminary result Specimen: Blood from Arm, Right Updated: 03/19/23 2100     Blood Culture No growth at 2 days    Blood Culture - Blood, Arm, Left [[id number]]  (Normal) Collected: 03/17/23 2015    Lab Status:  Preliminary result Specimen: Blood from Arm, Left Updated: 03/19/23 2100     Blood Culture No growth at 2 days    MRSA Screen, PCR (Inpatient) - Swab, Nares [[id number]]  (Normal) Collected: 03/18/23 0555    Lab Status: Final result Specimen: Swab from Nares Updated: 03/18/23 0737     MRSA PCR Negative    Narrative:      The negative predictive value of this diagnostic test is high and should only be used to consider de-escalating anti-MRSA therapy. A positive result may indicate colonization with MRSA and must be correlated clinically.  MRSA Negative    COVID PRE-OP / PRE-PROCEDURE SCREENING ORDER (NO ISOLATION) - Swab, Nasopharynx [[id number]]  (Normal) Collected: 03/17/23 1919    Lab Status: Final result Specimen: Swab from Nasopharynx Updated: 03/17/23 2029    Narrative:      The following orders were created for panel order COVID PRE-OP / PRE-PROCEDURE SCREENING ORDER (NO ISOLATION) - Swab, Nasopharynx.  Procedure                               Abnormality         Status                     ---------                               -----------         ------                     Respiratory Panel PCR w/...[[id number]]  Normal              Final result                 Please view results for these tests on the individual orders.    Respiratory Panel PCR w/COVID-19(SARS-CoV-2) INNA/ANGELLA/MILLICENT/PAD/COR/MAD/QUIQUE In-House, NP Swab in UTM/VTM, 3-4 HR TAT - Swab, Nasopharynx [[id number]]  (Normal) Collected: 03/17/23 1919    Lab Status: Final result Specimen: Swab from Nasopharynx Updated: 03/17/23 2029     ADENOVIRUS, PCR Not Detected     Coronavirus 229E Not Detected     Coronavirus HKU1 Not Detected     Coronavirus NL63 Not Detected     Coronavirus OC43 Not Detected     COVID19 Not Detected     Human Metapneumovirus Not Detected     Human Rhinovirus/Enterovirus Not Detected     Influenza A PCR Not Detected     Influenza B PCR Not Detected     Parainfluenza Virus 1 Not Detected     Parainfluenza Virus 2 Not Detected      Parainfluenza Virus 3 Not Detected     Parainfluenza Virus 4 Not Detected     RSV, PCR Not Detected     Bordetella pertussis pcr Not Detected     Bordetella parapertussis PCR Not Detected     Chlamydophila pneumoniae PCR Not Detected     Mycoplasma pneumo by PCR Not Detected    Narrative:      In the setting of a positive respiratory panel with a viral infection PLUS a negative procalcitonin without other underlying concern for bacterial infection, consider observing off antibiotics or discontinuation of antibiotics and continue supportive care. If the respiratory panel is positive for atypical bacterial infection (Bordetella pertussis, Chlamydophila pneumoniae, or Mycoplasma pneumoniae), consider antibiotic de-escalation to target atypical bacterial infection.          No radiology results from the last 24 hrs    Results for orders placed during the hospital encounter of 03/17/23    Adult Transthoracic Echo Limited W/ Cont if Necessary Per Protocol    Interpretation Summary  •  Left ventricular systolic function is normal. Calculated left ventricular EF = 61.7% Left ventricular ejection fraction appears to be 61 - 65%.  •  Left ventricular wall thickness is consistent with mild concentric hypertrophy.  •  There is a small (<1cm) pericardial effusion.      Current medications:  Scheduled Meds:b complex-vitamin c-folic acid, 1 tablet, Oral, Daily  epoetin sloan/sloan-epbx, 20,000 Units, Subcutaneous, Once per day on Mon Wed Fri  ferric gluconate, 125 mg, Intravenous, Daily Before Supper  insulin detemir, 15 Units, Subcutaneous, Daily  insulin lispro, 0-9 Units, Subcutaneous, TID AC  pantoprazole, 40 mg, Oral, Q AM  piperacillin-tazobactam, 3.375 g, Intravenous, Q12H  sodium bicarbonate, 650 mg, Oral, BID      Continuous Infusions:   PRN Meds:.•  acetaminophen  •  dextrose  •  dextrose  •  glucagon (human recombinant)  •  Lidocaine HCl gel  •  Sodium Chloride (PF)    Assessment & Plan   Assessment & Plan     Active  Hospital Problems    Diagnosis  POA   • **Gross hematuria [R31.0]  Yes   • ESRD (end stage renal disease) (HCC) [N18.6]  Yes   • Acute cystitis with hematuria [N30.01]  Yes   • Symptomatic anemia [D64.9]  Yes   • Type 2 diabetes mellitus (HCC) [E11.9]  Yes      Resolved Hospital Problems   No resolved problems to display.        Brief Hospital Course to date:  Jun Young is a 62 y.o. male with PMHx significant for DMII, HTN, ESRD on HD M/W/F, HLD who presented with gross hematuria and severe anemia. He was initially managed in the ICU and transferred to the floor on 3/20.    Gross Hematuria  -s/p cystoscopy with clot evacuation and transuretheral resection of the prostate with caldwell catheter placement on 3/18 with Dr. Christianson  - s/p 2 units of PRBCs, hemoglobin remains stable, hematuria resolved- CBI in place, Urology following, post-operative management per their service  - continue Zosyn    DMII  - levemir 15 units daily with SSI coverage    ESRD:  - dialysis per nephrology on MWF    HTN  HLD  - continue home regimen      Expected Discharge Location and Transportation: Home  Expected Discharge 1-2 days, or when okay with Urology       DVT prophylaxis:  Mechanical DVT prophylaxis orders are present.          CODE STATUS:   Code Status and Medical Interventions:   Ordered at: 03/19/23 1140     Code Status (Patient has no pulse and is not breathing):    CPR (Attempt to Resuscitate)     Medical Interventions (Patient has pulse or is breathing):    Full Support     Release to patient:    Routine Release       Joslyn Pollard,   03/20/23

## 2023-03-21 ENCOUNTER — READMISSION MANAGEMENT (OUTPATIENT)
Dept: CALL CENTER | Facility: HOSPITAL | Age: 63
End: 2023-03-21
Payer: COMMERCIAL

## 2023-03-21 VITALS
TEMPERATURE: 98.1 F | WEIGHT: 150.35 LBS | SYSTOLIC BLOOD PRESSURE: 143 MMHG | DIASTOLIC BLOOD PRESSURE: 70 MMHG | HEART RATE: 84 BPM | RESPIRATION RATE: 16 BRPM | BODY MASS INDEX: 23.6 KG/M2 | HEIGHT: 67 IN | OXYGEN SATURATION: 99 %

## 2023-03-21 LAB
BASOPHILS # BLD MANUAL: 0 10*3/MM3 (ref 0–0.2)
BASOPHILS NFR BLD MANUAL: 0 % (ref 0–1.5)
BH BB BLOOD EXPIRATION DATE: NORMAL
BH BB BLOOD EXPIRATION DATE: NORMAL
BH BB BLOOD TYPE BARCODE: 5100
BH BB BLOOD TYPE BARCODE: 5100
BH BB DISPENSE STATUS: NORMAL
BH BB DISPENSE STATUS: NORMAL
BH BB PRODUCT CODE: NORMAL
BH BB PRODUCT CODE: NORMAL
BH BB UNIT NUMBER: NORMAL
BH BB UNIT NUMBER: NORMAL
CLUMPED PLATELETS: PRESENT
CROSSMATCH INTERPRETATION: NORMAL
CROSSMATCH INTERPRETATION: NORMAL
DEPRECATED RDW RBC AUTO: 50.1 FL (ref 37–54)
EOSINOPHIL # BLD MANUAL: 0.39 10*3/MM3 (ref 0–0.4)
EOSINOPHIL NFR BLD MANUAL: 4 % (ref 0.3–6.2)
ERYTHROCYTE [DISTWIDTH] IN BLOOD BY AUTOMATED COUNT: 14.6 % (ref 12.3–15.4)
GLUCOSE BLDC GLUCOMTR-MCNC: 102 MG/DL (ref 70–130)
GLUCOSE BLDC GLUCOMTR-MCNC: 133 MG/DL (ref 70–130)
HCT VFR BLD AUTO: 26.9 % (ref 37.5–51)
HGB BLD-MCNC: 8.7 G/DL (ref 13–17.7)
LYMPHOCYTES # BLD MANUAL: 0.87 10*3/MM3 (ref 0.7–3.1)
LYMPHOCYTES NFR BLD MANUAL: 2 % (ref 5–12)
MCH RBC QN AUTO: 30.4 PG (ref 26.6–33)
MCHC RBC AUTO-ENTMCNC: 32.3 G/DL (ref 31.5–35.7)
MCV RBC AUTO: 94.1 FL (ref 79–97)
METAMYELOCYTES NFR BLD MANUAL: 3 % (ref 0–0)
MONOCYTES # BLD: 0.19 10*3/MM3 (ref 0.1–0.9)
MYELOCYTES NFR BLD MANUAL: 3 % (ref 0–0)
NEUTROPHILS # BLD AUTO: 7.53 10*3/MM3 (ref 1.7–7)
NEUTROPHILS NFR BLD MANUAL: 74 % (ref 42.7–76)
NEUTS BAND NFR BLD MANUAL: 4 % (ref 0–5)
NRBC SPEC MANUAL: 0 /100 WBC (ref 0–0.2)
PLASMA CELL PREC NFR BLD MANUAL: 1 % (ref 0–0)
PLATELET # BLD AUTO: 181 10*3/MM3 (ref 140–450)
PMV BLD AUTO: 9.3 FL (ref 6–12)
RBC # BLD AUTO: 2.86 10*6/MM3 (ref 4.14–5.8)
RBC MORPH BLD: NORMAL
SMALL PLATELETS BLD QL SMEAR: ADEQUATE
UNIT  ABO: NORMAL
UNIT  ABO: NORMAL
UNIT  RH: NORMAL
UNIT  RH: NORMAL
VARIANT LYMPHS NFR BLD MANUAL: 9 % (ref 19.6–45.3)
WBC MORPH BLD: NORMAL
WBC NRBC COR # BLD: 9.65 10*3/MM3 (ref 3.4–10.8)

## 2023-03-21 PROCEDURE — 99024 POSTOP FOLLOW-UP VISIT: CPT | Performed by: NURSE PRACTITIONER

## 2023-03-21 PROCEDURE — 85025 COMPLETE CBC W/AUTO DIFF WBC: CPT | Performed by: INTERNAL MEDICINE

## 2023-03-21 PROCEDURE — 63710000001 INSULIN DETEMIR PER 5 UNITS: Performed by: INTERNAL MEDICINE

## 2023-03-21 PROCEDURE — 99239 HOSP IP/OBS DSCHRG MGMT >30: CPT | Performed by: INTERNAL MEDICINE

## 2023-03-21 PROCEDURE — 82962 GLUCOSE BLOOD TEST: CPT

## 2023-03-21 PROCEDURE — 85007 BL SMEAR W/DIFF WBC COUNT: CPT | Performed by: INTERNAL MEDICINE

## 2023-03-21 RX ORDER — CEFUROXIME AXETIL 250 MG/1
250 TABLET ORAL
Status: DISCONTINUED | OUTPATIENT
Start: 2023-03-21 | End: 2023-03-21 | Stop reason: HOSPADM

## 2023-03-21 RX ORDER — CEFUROXIME AXETIL 250 MG/1
250 TABLET ORAL
Qty: 5 TABLET | Refills: 0 | Status: SHIPPED | OUTPATIENT
Start: 2023-03-21 | End: 2023-03-26

## 2023-03-21 RX ADMIN — SODIUM BICARBONATE 650 MG TABLET 650 MG: at 08:00

## 2023-03-21 RX ADMIN — Medication 1 TABLET: at 08:00

## 2023-03-21 RX ADMIN — AMLODIPINE BESYLATE 10 MG: 10 TABLET ORAL at 08:00

## 2023-03-21 RX ADMIN — INSULIN DETEMIR 15 UNITS: 100 INJECTION, SOLUTION SUBCUTANEOUS at 08:00

## 2023-03-21 RX ADMIN — PANTOPRAZOLE SODIUM 40 MG: 40 TABLET, DELAYED RELEASE ORAL at 05:07

## 2023-03-21 NOTE — PROGRESS NOTES
[facility]   Urology Progress Note    Patient Name: Jun Young  : 1960  MRN: [id number]  Primary Care Physician:  Provider, No Known  Date of admission: 3/17/2023    Subjective   Subjective     Chief Complaint: Hematuria    HPI:  Patient resting in bed. He denies pain. CBI off x 24 hours, urine yellow/clear with sediment. HCT stable. TMAX overnight 100.4.     Urine culture >100,000 Proteus.     Review of Systems   As above    Objective   Objective     Vitals:   Temp:  [98 °F (36.7 °C)-100.4 °F (38 °C)] 99.2 °F (37.3 °C)  Heart Rate:  [56-86] 81  Resp:  [15-18] 18  BP: (140-169)/() 140/79  Physical Exam    Constitutional: Awake in bed, alert   Eyes: PERRLA, sclerae anicteric, no conjunctival injection   HENT: Normocephalic, atraumatic, mucous membranes moist   Neck: Supple, trachea midline   Respiratory: Equal chest rise, non-labored respirations    Cardiovascular: RRR   Gastrointestinal: Soft, nontender, non-distended   Genitourinary: 3 way caldwell catheter with yellow urine output with sediment.   Musculoskeletal: No lower extremity edema bilaterally, no clubbing or cyanosis to extremities   Psychiatric: Appropriate affect, cooperative   Neurologic: Oriented x 3,  Cranial Nerves grossly intact, speech clear   Skin: No rashes     Result Review    Result Review:  I have personally reviewed the results from the time of this admission to 3/21/2023 09:07 EDT and agree with these findings:  [x]  Laboratory  []  Microbiology  []  Radiology  []  EKG/Telemetry   []  Cardiology/Vascular   []  Pathology  []  Old records  [x]  Other: Vital signs    Most notable findings include: TMAX 100.4.     Assessment & Plan   Assessment / Plan     Brief Patient Summary:  Jun Young is a 62 y.o. male who is POD 3 s/p Clot evacuation, fulguration, resection of prostate and caldwell catheter insertion with Dr. Christianson.     Active Hospital Problems:  Active Hospital Problems    Diagnosis    • **Gross  hematuria    • ESRD (end stage renal disease) (HCC)    • Acute cystitis with hematuria    • Symptomatic anemia    • Type 2 diabetes mellitus (HCC)        Plan:   -Whitfield catheter will remain in place at discharge with plans for outpatient follow up with Dr. Damico to discuss possible need for suprapubic tube. He is understanding and agreeable with plan.    will be available, please call if any questions.   D/w Dr. Christianson.     DVT prophylaxis:  Mechanical DVT prophylaxis orders are present.    CODE STATUS:   Code Status (Patient has no pulse and is not breathing): CPR (Attempt to Resuscitate)  Medical Interventions (Patient has pulse or is breathing): Full Support  Release to patient: Routine Release    Disposition:  I expect patient to discharge within 24-48 hours.     Electronically signed by FRANKY Krause, 03/21/23, 9:07 AM EDT.

## 2023-03-21 NOTE — PLAN OF CARE
Problem: Adult Inpatient Plan of Care  Goal: Absence of Hospital-Acquired Illness or Injury  Intervention: Identify and Manage Fall Risk  Recent Flowsheet Documentation  Taken 3/21/2023 1000 by Georgette Casey RN  Safety Promotion/Fall Prevention:   activity supervised   toileting scheduled   safety round/check completed   room organization consistent   nonskid shoes/slippers when out of bed  Taken 3/21/2023 0800 by Georgette Casey RN  Safety Promotion/Fall Prevention:   activity supervised   toileting scheduled   safety round/check completed   room organization consistent   nonskid shoes/slippers when out of bed  Intervention: Prevent Skin Injury  Recent Flowsheet Documentation  Taken 3/21/2023 1000 by Georgette Casey RN  Body Position: position changed independently  Skin Protection:   adhesive use limited   tubing/devices free from skin contact   transparent dressing maintained   skin-to-skin areas padded  Taken 3/21/2023 0800 by Georgette Casey RN  Body Position: position changed independently  Skin Protection:   adhesive use limited   tubing/devices free from skin contact   transparent dressing maintained  Intervention: Prevent and Manage VTE (Venous Thromboembolism) Risk  Recent Flowsheet Documentation  Taken 3/21/2023 1000 by Georgette Casey RN  Activity Management:   activity adjusted per tolerance   activity encouraged  Taken 3/21/2023 0800 by Georgette Casey RN  Activity Management: activity adjusted per tolerance  VTE Prevention/Management: sequential compression devices off  Range of Motion: active ROM (range of motion) encouraged  Intervention: Prevent Infection  Recent Flowsheet Documentation  Taken 3/21/2023 1000 by Georgette Casey RN  Infection Prevention:   environmental surveillance performed   equipment surfaces disinfected   hand hygiene promoted   single patient room provided  Taken 3/21/2023 0800 by Georgette Casey RN  Infection Prevention:   environmental surveillance  performed   equipment surfaces disinfected   hand hygiene promoted   single patient room provided     Problem: Anemia  Goal: Anemia Symptom Improvement  Intervention: Monitor and Manage Anemia  Recent Flowsheet Documentation  Taken 3/21/2023 1000 by Georgette Casey RN  Safety Promotion/Fall Prevention:   activity supervised   toileting scheduled   safety round/check completed   room organization consistent   nonskid shoes/slippers when out of bed  Fatigue Management: activity schedule adjusted  Taken 3/21/2023 0800 by Georgette Casey RN  Safety Promotion/Fall Prevention:   activity supervised   toileting scheduled   safety round/check completed   room organization consistent   nonskid shoes/slippers when out of bed  Fatigue Management: activity schedule adjusted     Problem: Device-Related Complication Risk (Hemodialysis)  Goal: Safe, Effective Therapy Delivery  Intervention: Optimize Device Care and Function  Recent Flowsheet Documentation  Taken 3/21/2023 1000 by Georgette Casey RN  Medication Review/Management: medications reviewed  Taken 3/21/2023 0800 by Georgette Casey RN  Medication Review/Management: medications reviewed   Goal Outcome Evaluation:

## 2023-03-21 NOTE — DISCHARGE SUMMARY
[facility] Medicine Services  DISCHARGE SUMMARY    Patient Name: Jun Young  : 1960  MRN: [id number]    Date of Admission: 3/17/2023  7:56 PM  Date of Discharge:  3/21/2023  Primary Care Physician: Provider, No Known    Consults     Date and Time Order Name Status Description    3/18/2023 12:34 AM Inpatient Urology Consult Completed     3/18/2023 12:34 AM Inpatient Nephrology Consult            Hospital Course     Presenting Problem:   Hematuria [R31.9]    Active Hospital Problems    Diagnosis  POA   • **Gross hematuria [R31.0]  Yes   • ESRD (end stage renal disease) (HCC) [N18.6]  Yes   • Acute cystitis with hematuria [N30.01]  Yes   • Symptomatic anemia [D64.9]  Yes   • Type 2 diabetes mellitus (HCC) [E11.9]  Yes      Resolved Hospital Problems   No resolved problems to display.          Hospital Course:  Jun Young is a 62 y.o. male with PMHx significant for DMII, HTN, ESRD on HD M/W/F, HLD who presented with gross hematuria and severe anemia. He was initially managed in the ICU and transferred to the floor on 3/20.     Gross Hematuria - resolved  Proteus UTI  -s/p cystoscopy with clot evacuation and transuretheral resection of the prostate with caldwell catheter placement on 3/18 with Dr. Christianson  - s/p 2 units of PRBCs, hemoglobin remains stable, hematuria resolved, CBI discontinued  - transition to PO ceftin (HD dosing) for additional 5 days     DMII  - continue home regimen     ESRD:  -resume previous outpatient HD     HTN  HLD  - continue home regimen      Discharge Follow Up Recommendations for outpatient labs/diagnostics:   - f/u with Dr. Damico, office to call with appointment, caldwell to remain in place at discharge    Day of Discharge     HPI:   Patient resting in bed. No complaints this morning. Caldwell in place, no  Hematuria.    Review of Systems  Gen- No fevers, chills  CV- No chest pain, palpitations  Resp- No cough, dyspnea  GI- No N/V/D,  abd pain    Vital Signs:   Temp:  [98 °F (36.7 °C)-100.4 °F (38 °C)] 99.2 °F (37.3 °C)  Heart Rate:  [56-86] 84  Resp:  [15-18] 18  BP: (140-169)/() 140/79      Physical Exam:  Constitutional: No acute distress, awake, alert  HENT: NCAT, mucous membranes moist  Respiratory: Clear to auscultation bilaterally, respiratory effort normal   Cardiovascular: RRR, no murmurs, rubs, or gallops  Gastrointestinal:  soft, nontender, nondistended  Musculoskeletal: No bilateral ankle edema  Psychiatric: Appropriate affect, cooperative  Neurologic: Oriented x 3, speech clear, no focal deficits  Skin: No rashes  : caldwell in place, urine clear    Pertinent  and/or Most Recent Results     LAB RESULTS:      Lab 03/21/23  0358 03/20/23  0535 03/19/23  0502 03/18/23  2339 03/18/23  1758 03/18/23  1029 03/18/23  0820 03/17/23  2211 03/17/23  1830   WBC 9.65 7.95 5.10  --   --   --  4.05  --  5.86   HEMOGLOBIN 8.7* 8.3* 8.3* 8.3* 8.3*   < > 6.6* 5.3* 7.2*   HEMOGLOBIN, POC  --   --   --   --   --    < >  --   --   --    HEMATOCRIT 26.9* 25.5* 25.5* 25.4* 25.7*   < > 20.8* 17.0* 21.9*   HEMATOCRIT POC  --   --   --   --   --    < >  --   --   --    PLATELETS 181 174 157  --   --   --  135*  --  151   NEUTROS ABS 7.53*  --   --   --   --   --   --   --  4.75   EOS ABS 0.39  --   --   --   --   --   --   --  0.23   MCV 94.1 91.1 92.1  --   --   --  94.1  --  92.0   PROCALCITONIN  --   --   --   --   --   --   --   --  54.10*   LACTATE  --   --   --   --   --   --   --  0.9 2.6*    < > = values in this interval not displayed.         Lab 03/20/23  0535 03/19/23  0502 03/18/23  0820 03/17/23  2211 03/17/23  1830   SODIUM 141 134* 138  --  136   POTASSIUM 4.7 4.6 4.3  --  3.7   CHLORIDE 101 96* 99  --  94*   CO2 21.0* 22.0 26.0  --  27.0   ANION GAP 19.0* 16.0* 13.0  --  15.0   BUN 84* 60* 35*  --  24*   CREATININE 8.50* 7.52* 5.88*  --  3.58*   EGFR 6.4* 7.5* 10.0*  --  18.2*   GLUCOSE 154* 230* 82  --  154*   CALCIUM 7.4* 8.2* 8.4*  --   9.3   IONIZED CALCIUM  --   --  1.12  --   --    MAGNESIUM 2.3  --  2.2  --   --    PHOSPHORUS 5.7* 7.6* 5.4*  --   --    HEMOGLOBIN A1C  --   --   --  5.00  --          Lab 03/20/23  0535 03/19/23  0502 03/18/23  0820 03/17/23  1830   TOTAL PROTEIN  --   --   --  7.9   ALBUMIN 2.7* 2.9* 3.0* 3.9   GLOBULIN  --   --   --  4.0   ALT (SGPT)  --   --   --  16   AST (SGOT)  --   --   --  23   BILIRUBIN  --   --   --  0.5   ALK PHOS  --   --   --  120*   LIPASE  --   --   --  25         Lab 03/17/23 2118 03/17/23  1830   HSTROP T 62* 67*             Lab 03/18/23  0820 03/18/23  0057 03/17/23 2118   IRON 24*  --   --    IRON SATURATION 14*  --   --    TIBC 173*  --   --    TRANSFERRIN 116*  --   --    ABO TYPING  --  O O   RH TYPING  --  Positive Positive   ANTIBODY SCREEN  --   --  Negative         Lab 03/18/23  1029   PH, ARTERIAL 7.44     Brief Urine Lab Results  (Last result in the past 365 days)      Color   Clarity   Blood   Leuk Est   Nitrite   Protein   CREAT   Urine HCG        03/18/23 0636 Red   Turbid   Large (3+)   Large (3+)   Positive   >=300 mg/dL (3+)               Microbiology Results (last 10 days)     Procedure Component Value - Date/Time    Urine Culture - Urine, Urine, Clean Catch [[id number]]  (Abnormal)  (Susceptibility) Collected: 03/18/23 0636    Lab Status: Final result Specimen: Urine, Clean Catch Updated: 03/20/23 0531     Urine Culture >100,000 CFU/mL Proteus mirabilis    Narrative:      Colonization of the urinary tract without infection is common. Treatment is discouraged unless the patient is symptomatic, pregnant, or undergoing an invasive urologic procedure.    Susceptibility      Proteus mirabilis      BERT      Ampicillin Susceptible      Ampicillin + Sulbactam Susceptible      Cefazolin Susceptible      Cefepime Susceptible      Ceftazidime Susceptible      Ceftriaxone Susceptible      Gentamicin Susceptible      Levofloxacin Susceptible      Nitrofurantoin Resistant     Piperacillin  + Tazobactam Susceptible      Trimethoprim + Sulfamethoxazole Susceptible                           MRSA Screen, PCR (Inpatient) - Swab, Nares [[id number]]  (Normal) Collected: 03/18/23 0555    Lab Status: Final result Specimen: Swab from Nares Updated: 03/18/23 0737     MRSA PCR Negative    Narrative:      The negative predictive value of this diagnostic test is high and should only be used to consider de-escalating anti-MRSA therapy. A positive result may indicate colonization with MRSA and must be correlated clinically.  MRSA Negative    Blood Culture - Blood, Arm, Right [[id number]]  (Normal) Collected: 03/17/23 2030    Lab Status: Preliminary result Specimen: Blood from Arm, Right Updated: 03/20/23 2100     Blood Culture No growth at 3 days    Blood Culture - Blood, Arm, Left [[id number]]  (Normal) Collected: 03/17/23 2015    Lab Status: Preliminary result Specimen: Blood from Arm, Left Updated: 03/20/23 2100     Blood Culture No growth at 3 days    COVID PRE-OP / PRE-PROCEDURE SCREENING ORDER (NO ISOLATION) - Swab, Nasopharynx [[id number]]  (Normal) Collected: 03/17/23 1919    Lab Status: Final result Specimen: Swab from Nasopharynx Updated: 03/17/23 2029    Narrative:      The following orders were created for panel order COVID PRE-OP / PRE-PROCEDURE SCREENING ORDER (NO ISOLATION) - Swab, Nasopharynx.  Procedure                               Abnormality         Status                     ---------                               -----------         ------                     Respiratory Panel PCR w/...[[id number]]  Normal              Final result                 Please view results for these tests on the individual orders.    Respiratory Panel PCR w/COVID-19(SARS-CoV-2) INNA/ANGELLA/MILLICENT/PAD/COR/MAD/QUIQUE In-House, NP Swab in UTM/VTM, 3-4 HR TAT - Swab, Nasopharynx [[id number]]  (Normal) Collected: 03/17/23 1919    Lab Status: Final result Specimen: Swab from Nasopharynx Updated: 03/17/23 2029     ADENOVIRUS, PCR Not  Detected     Coronavirus 229E Not Detected     Coronavirus HKU1 Not Detected     Coronavirus NL63 Not Detected     Coronavirus OC43 Not Detected     COVID19 Not Detected     Human Metapneumovirus Not Detected     Human Rhinovirus/Enterovirus Not Detected     Influenza A PCR Not Detected     Influenza B PCR Not Detected     Parainfluenza Virus 1 Not Detected     Parainfluenza Virus 2 Not Detected     Parainfluenza Virus 3 Not Detected     Parainfluenza Virus 4 Not Detected     RSV, PCR Not Detected     Bordetella pertussis pcr Not Detected     Bordetella parapertussis PCR Not Detected     Chlamydophila pneumoniae PCR Not Detected     Mycoplasma pneumo by PCR Not Detected    Narrative:      In the setting of a positive respiratory panel with a viral infection PLUS a negative procalcitonin without other underlying concern for bacterial infection, consider observing off antibiotics or discontinuation of antibiotics and continue supportive care. If the respiratory panel is positive for atypical bacterial infection (Bordetella pertussis, Chlamydophila pneumoniae, or Mycoplasma pneumoniae), consider antibiotic de-escalation to target atypical bacterial infection.          CT Abdomen Pelvis Without Contrast    Result Date: 3/17/2023  CT ABDOMEN PELVIS WO CONTRAST Date of Exam: 3/17/2023 7:35 PM EDT Indication: mid back pain, hematuria, headachelk. Comparison: 2/7/2023 Technique: Axial CT images were obtained of the abdomen and pelvis without the administration of contrast. Reconstructed coronal and sagittal images were also obtained. Automated exposure control and iterative construction methods were used. FINDINGS: Abdomen/Pelvis: Lower Chest: Platelike and dependent opacities are noted at the lung bases. No free air noted below the diaphragm Organs: Large stone noted within the gallbladder. Limited noncontrast enhanced gallbladder is otherwise grossly unremarkable in appearance. Limited noncontrast imaging liver is a small  low-attenuation lesion posteriorly right hepatic lobe similar to the prior study likely small cyst or. The pancreas, spleen and adrenal glands are grossly unremarkable There is severe bilateral hydronephrosis cortical thinning of the renal parenchyma bilaterally. Dilation of the ureters is noted extending to the bladder. GI/Bowel: Noncontrast imaging of the stomach and small bowel demonstrate no acute abnormality. Ileocecal valve and appendix are grossly unremarkable. Colon demonstrates no acute abnormality Pelvis: Enlarged irregular appearance of the prostate noted with diffuse wall thickening of the bladder similar to the prior study is noted. No suspicious adenopathy appreciated within the pelvis. Bilateral fat-containing hernias noted Peritoneum/Retroperitoneum: The aorta is normal in caliber. There is a changes. No suspicious retroperitoneal adenopathy Bones/Soft Tissues: No destructive bone lesion. Multilevel degenerative changes spine. Probable bone islands again noted within the spine and proximal femur bilaterally     1. Moderate to severe hydroureteronephrosis again noted with bladder distention. Findings likely related to chronic outlet obstruction related to prostamegaly. Consider consultation with urology for further management 2. Comparison the prostate appears grossly stable and may represent either benign or malignant enlargement. 3. Cholelithiasis 4. Other findings as above.  Electronically Signed: Josué Clinton  3/17/2023 8:03 PM EDT  Workstation ID: [id number]    Adult Transthoracic Echo Limited W/ Cont if Necessary Per Protocol    Result Date: 3/18/2023  •  Left ventricular systolic function is normal. Calculated left ventricular EF = 61.7% Left ventricular ejection fraction appears to be 61 - 65%. •  Left ventricular wall thickness is consistent with mild concentric hypertrophy. •  There is a small (<1cm) pericardial effusion.     Arterial Line    Result Date: 3/18/2023  Candy Noble,       3/18/2023 10:16 AM Arterial Line Patient reassessed immediately prior to procedure Patient location during procedure: pre-op Line placed for hemodynamic monitoring. Performed By Anesthesiologist: Candy Noble DO Preanesthetic Checklist Completed: patient identified, IV checked, site marked, risks and benefits discussed, surgical consent, monitors and equipment checked, pre-op evaluation and timeout performed Arterial Line Prep  Sterile Tech: cap, gloves and sterile barriers Prep: ChloraPrep Patient monitoring: blood pressure monitoring, continuous pulse oximetry and EKG Arterial Line Procedure Laterality:right Location:  radial artery Catheter size: 20 G Guidance: ultrasound guided and palpation technique Number of attempts: 1 Successful placement: yes Post Assessment Dressing Type: line sutured, occlusive dressing applied, secured with tape and wrist guard applied. Complications no Circ/Move/Sens Assessment: normal and unchanged. Patient Tolerance: patient tolerated the procedure well with no apparent complications               Results for orders placed during the hospital encounter of 03/17/23    Adult Transthoracic Echo Limited W/ Cont if Necessary Per Protocol    Interpretation Summary  •  Left ventricular systolic function is normal. Calculated left ventricular EF = 61.7% Left ventricular ejection fraction appears to be 61 - 65%.  •  Left ventricular wall thickness is consistent with mild concentric hypertrophy.  •  There is a small (<1cm) pericardial effusion.      Plan for Follow-up of Pending Labs/Results:   Pending Labs     Order Current Status    Blood Culture - Blood, Arm, Left Preliminary result    Blood Culture - Blood, Arm, Right Preliminary result        Discharge Details        Discharge Medications      New Medications      Instructions Start Date   cefuroxime 250 MG tablet  Commonly known as: CEFTIN   250 mg, Oral, Every 24 Hours Scheduled         Continue These Medications      Instructions  Start Date   amLODIPine 10 MG tablet  Commonly known as: NORVASC   10 mg, Oral, Daily      bacitracin-polymyxin b 500-40300 UNIT/GM ointment  Commonly known as: POLYSPORIN   1 application, Topical, 2 Times Daily      HYDROcodone-acetaminophen 5-325 MG per tablet  Commonly known as: NORCO   1 tablet, Oral, Every 8 Hours PRN      pantoprazole 40 MG EC tablet  Commonly known as: PROTONIX   40 mg, Oral, Daily      KAYLEIGH-LASHON PO   1 tablet, Oral, Daily      sevelamer 800 MG tablet  Commonly known as: RENVELA   800 mg, Oral, 3 Times Daily With Meals      SITagliptin 25 MG tablet  Commonly known as: JANUVIA   25 mg, Oral, Daily      sodium bicarbonate 650 MG tablet   650 mg, Oral, 4 Times Daily             No Known Allergies      Discharge Disposition:  Home or Self Care    Diet:  Hospital:  Diet Order   Procedures   • Diet: Renal Diets, Diabetic Diets; Consistent Carbohydrate; Low Phosphorus, Low Potassium; Texture: Regular Texture (IDDSI 7); Fluid Consistency: Thin (IDDSI 0)       Activity:  - as tolerated    Restrictions or Other Recommendations:  - none       CODE STATUS:    Code Status and Medical Interventions:   Ordered at: 03/19/23 1140     Code Status (Patient has no pulse and is not breathing):    CPR (Attempt to Resuscitate)     Medical Interventions (Patient has pulse or is breathing):    Full Support     Release to patient:    Routine Release       No future appointments.              Joslyn Pollard DO  03/21/23      Time Spent on Discharge:  I spent  35  minutes on this discharge activity which included: face-to-face encounter with the patient, reviewing the data in the system, coordination of the care with the nursing staff as well as consultants, documentation, and entering orders.

## 2023-03-21 NOTE — CASE MANAGEMENT/SOCIAL WORK
Continued Stay Note   [facility]     Patient Name: Jun Young  MRN: [id number]  Today's Date: 3/21/2023    Admit Date: 3/17/2023    Plan: Home with family   Discharge Plan     Row Name 03/21/23 0911       Plan    Plan Home with family    Patient/Family in Agreement with Plan yes    Plan Comments Spoke with patient at bedside. Plan is home with family. Patient denies any discharge needs at this time. CM will continue to follow.    Final Discharge Disposition Code 01 - home or self-care               Discharge Codes    No documentation.                     Sanford Bejarano RN

## 2023-03-21 NOTE — PAYOR COMM NOTE
"Jun Young (62 y.o. Male)     Ludy Garcia, MATT  Utilization Review  [phone]  [phone]      Date of Birth   1960    Social Security Number       Address   [address] [place] [address]    Home Phone   [phone]    MRN   [id number]       Confucianism   Worship    Marital Status   Single                            Admission Date   3/17/23    Admission Type   Emergency    Admitting Provider   Joslyn Pollard DO    Attending Provider       Department, Room/Bed   [facility] 5H, S569/1       Discharge Date   3/21/2023    Discharge Disposition   Home or Self Care    Discharge Destination                               Attending Provider: (none)   Allergies: No Known Allergies    Isolation: None   Infection: None   Code Status: CPR    Ht: 170.2 cm (67\")   Wt: 68.2 kg (150 lb 5.7 oz)    Admission Cmt: None   Principal Problem: Gross hematuria [R31.0]                 Active Insurance as of 3/17/2023     Primary Coverage     Payor Plan Insurance Group Employer/Plan Group    [employer] [place] [employer] [place]ICA[place]      Payor Plan Address Payor Plan Phone Number Payor Plan Fax Number Effective Dates    [address] [address] [phone]  3/17/2023 - None Entered    [facility] [id number]       Subscriber Name Subscriber Birth Date Member ID       JUN YOUNG 1960 [id number]                 Emergency Contacts      (Rel.) Home Phone Work Phone Mobile Phone    CAMPOSVENECIA DOWD (Daughter) [phone] -- --    TAWANDA CAMPOS (Son) [phone] -- --    CORTEZ CAMPOS (Son) [phone] -- --               Discharge Summary      Joslyn Pollard DO at [facility]              [facility] Medicine Services  DISCHARGE SUMMARY    Patient Name: Jun Young  : 1960  MRN: [id number]    Date of Admission: 3/17/2023  7:56 PM  Date of Discharge:  3/21/2023  Primary Care Physician: Provider, No Known    Consults     " Date and Time Order Name Status Description    3/18/2023 12:34 AM Inpatient Urology Consult Completed     3/18/2023 12:34 AM Inpatient Nephrology Consult            Hospital Course     Presenting Problem:   Hematuria [R31.9]    Active Hospital Problems    Diagnosis  POA   • **Gross hematuria [R31.0]  Yes   • ESRD (end stage renal disease) (HCC) [N18.6]  Yes   • Acute cystitis with hematuria [N30.01]  Yes   • Symptomatic anemia [D64.9]  Yes   • Type 2 diabetes mellitus (HCC) [E11.9]  Yes      Resolved Hospital Problems   No resolved problems to display.          Hospital Course:  Jun Young is a 62 y.o. male with PMHx significant for DMII, HTN, ESRD on HD M/W/F, HLD who presented with gross hematuria and severe anemia. He was initially managed in the ICU and transferred to the floor on 3/20.     Gross Hematuria - resolved  Proteus UTI  -s/p cystoscopy with clot evacuation and transuretheral resection of the prostate with caldwell catheter placement on 3/18 with Dr. Christianson  - s/p 2 units of PRBCs, hemoglobin remains stable, hematuria resolved, CBI discontinued  - transition to PO ceftin (HD dosing) for additional 5 days     DMII  - continue home regimen     ESRD:  -resume previous outpatient HD     HTN  HLD  - continue home regimen      Discharge Follow Up Recommendations for outpatient labs/diagnostics:   - f/u with Dr. Damico, office to call with appointment, caldwell to remain in place at discharge    Day of Discharge     HPI:   Patient resting in bed. No complaints this morning. Caldwell in place, no  Hematuria.    Review of Systems  Gen- No fevers, chills  CV- No chest pain, palpitations  Resp- No cough, dyspnea  GI- No N/V/D, abd pain    Vital Signs:   Temp:  [98 °F (36.7 °C)-100.4 °F (38 °C)] 99.2 °F (37.3 °C)  Heart Rate:  [56-86] 84  Resp:  [15-18] 18  BP: (140-169)/() 140/79      Physical Exam:  Constitutional: No acute distress, awake, alert  HENT: NCAT, mucous membranes moist  Respiratory:  Clear to auscultation bilaterally, respiratory effort normal   Cardiovascular: RRR, no murmurs, rubs, or gallops  Gastrointestinal:  soft, nontender, nondistended  Musculoskeletal: No bilateral ankle edema  Psychiatric: Appropriate affect, cooperative  Neurologic: Oriented x 3, speech clear, no focal deficits  Skin: No rashes  : caldwell in place, urine clear    Pertinent  and/or Most Recent Results     LAB RESULTS:      Lab 03/21/23  0358 03/20/23  0535 03/19/23  0502 03/18/23  2339 03/18/23  1758 03/18/23  1029 03/18/23  0820 03/17/23 2211 03/17/23  1830   WBC 9.65 7.95 5.10  --   --   --  4.05  --  5.86   HEMOGLOBIN 8.7* 8.3* 8.3* 8.3* 8.3*   < > 6.6* 5.3* 7.2*   HEMOGLOBIN, POC  --   --   --   --   --    < >  --   --   --    HEMATOCRIT 26.9* 25.5* 25.5* 25.4* 25.7*   < > 20.8* 17.0* 21.9*   HEMATOCRIT POC  --   --   --   --   --    < >  --   --   --    PLATELETS 181 174 157  --   --   --  135*  --  151   NEUTROS ABS 7.53*  --   --   --   --   --   --   --  4.75   EOS ABS 0.39  --   --   --   --   --   --   --  0.23   MCV 94.1 91.1 92.1  --   --   --  94.1  --  92.0   PROCALCITONIN  --   --   --   --   --   --   --   --  54.10*   LACTATE  --   --   --   --   --   --   --  0.9 2.6*    < > = values in this interval not displayed.         Lab 03/20/23  0535 03/19/23  0502 03/18/23  0820 03/17/23 2211 03/17/23  1830   SODIUM 141 134* 138  --  136   POTASSIUM 4.7 4.6 4.3  --  3.7   CHLORIDE 101 96* 99  --  94*   CO2 21.0* 22.0 26.0  --  27.0   ANION GAP 19.0* 16.0* 13.0  --  15.0   BUN 84* 60* 35*  --  24*   CREATININE 8.50* 7.52* 5.88*  --  3.58*   EGFR 6.4* 7.5* 10.0*  --  18.2*   GLUCOSE 154* 230* 82  --  154*   CALCIUM 7.4* 8.2* 8.4*  --  9.3   IONIZED CALCIUM  --   --  1.12  --   --    MAGNESIUM 2.3  --  2.2  --   --    PHOSPHORUS 5.7* 7.6* 5.4*  --   --    HEMOGLOBIN A1C  --   --   --  5.00  --          Lab 03/20/23  0535 03/19/23  0502 03/18/23  0820 03/17/23  1830   TOTAL PROTEIN  --   --   --  7.9   ALBUMIN  2.7* 2.9* 3.0* 3.9   GLOBULIN  --   --   --  4.0   ALT (SGPT)  --   --   --  16   AST (SGOT)  --   --   --  23   BILIRUBIN  --   --   --  0.5   ALK PHOS  --   --   --  120*   LIPASE  --   --   --  25         Lab 03/17/23 2118 03/17/23  1830   HSTROP T 62* 67*             Lab 03/18/23  0820 03/18/23  0057 03/17/23 2118   IRON 24*  --   --    IRON SATURATION 14*  --   --    TIBC 173*  --   --    TRANSFERRIN 116*  --   --    ABO TYPING  --  O O   RH TYPING  --  Positive Positive   ANTIBODY SCREEN  --   --  Negative         Lab 03/18/23  1029   PH, ARTERIAL 7.44     Brief Urine Lab Results  (Last result in the past 365 days)      Color   Clarity   Blood   Leuk Est   Nitrite   Protein   CREAT   Urine HCG        03/18/23 0636 Red   Turbid   Large (3+)   Large (3+)   Positive   >=300 mg/dL (3+)               Microbiology Results (last 10 days)     Procedure Component Value - Date/Time    Urine Culture - Urine, Urine, Clean Catch [[id number]]  (Abnormal)  (Susceptibility) Collected: 03/18/23 0636    Lab Status: Final result Specimen: Urine, Clean Catch Updated: 03/20/23 0531     Urine Culture >100,000 CFU/mL Proteus mirabilis    Narrative:      Colonization of the urinary tract without infection is common. Treatment is discouraged unless the patient is symptomatic, pregnant, or undergoing an invasive urologic procedure.    Susceptibility      Proteus mirabilis      BERT      Ampicillin Susceptible      Ampicillin + Sulbactam Susceptible      Cefazolin Susceptible      Cefepime Susceptible      Ceftazidime Susceptible      Ceftriaxone Susceptible      Gentamicin Susceptible      Levofloxacin Susceptible      Nitrofurantoin Resistant     Piperacillin + Tazobactam Susceptible      Trimethoprim + Sulfamethoxazole Susceptible                           MRSA Screen, PCR (Inpatient) - Swab, Nares [[id number]]  (Normal) Collected: 03/18/23 0555    Lab Status: Final result Specimen: Swab from Nares Updated: 03/18/23 0737     MRSA PCR  Negative    Narrative:      The negative predictive value of this diagnostic test is high and should only be used to consider de-escalating anti-MRSA therapy. A positive result may indicate colonization with MRSA and must be correlated clinically.  MRSA Negative    Blood Culture - Blood, Arm, Right [[id number]]  (Normal) Collected: 03/17/23 2030    Lab Status: Preliminary result Specimen: Blood from Arm, Right Updated: 03/20/23 2100     Blood Culture No growth at 3 days    Blood Culture - Blood, Arm, Left [[id number]]  (Normal) Collected: 03/17/23 2015    Lab Status: Preliminary result Specimen: Blood from Arm, Left Updated: 03/20/23 2100     Blood Culture No growth at 3 days    COVID PRE-OP / PRE-PROCEDURE SCREENING ORDER (NO ISOLATION) - Swab, Nasopharynx [[id number]]  (Normal) Collected: 03/17/23 1919    Lab Status: Final result Specimen: Swab from Nasopharynx Updated: 03/17/23 2029    Narrative:      The following orders were created for panel order COVID PRE-OP / PRE-PROCEDURE SCREENING ORDER (NO ISOLATION) - Swab, Nasopharynx.  Procedure                               Abnormality         Status                     ---------                               -----------         ------                     Respiratory Panel PCR w/...[[id number]]  Normal              Final result                 Please view results for these tests on the individual orders.    Respiratory Panel PCR w/COVID-19(SARS-CoV-2) INNA/ANGELLA/MILLICENT/PAD/COR/MAD/QUIQUE In-House, NP Swab in UTM/VTM, 3-4 HR TAT - Swab, Nasopharynx [[id number]]  (Normal) Collected: 03/17/23 1919    Lab Status: Final result Specimen: Swab from Nasopharynx Updated: 03/17/23 2029     ADENOVIRUS, PCR Not Detected     Coronavirus 229E Not Detected     Coronavirus HKU1 Not Detected     Coronavirus NL63 Not Detected     Coronavirus OC43 Not Detected     COVID19 Not Detected     Human Metapneumovirus Not Detected     Human Rhinovirus/Enterovirus Not Detected     Influenza A PCR Not  Detected     Influenza B PCR Not Detected     Parainfluenza Virus 1 Not Detected     Parainfluenza Virus 2 Not Detected     Parainfluenza Virus 3 Not Detected     Parainfluenza Virus 4 Not Detected     RSV, PCR Not Detected     Bordetella pertussis pcr Not Detected     Bordetella parapertussis PCR Not Detected     Chlamydophila pneumoniae PCR Not Detected     Mycoplasma pneumo by PCR Not Detected    Narrative:      In the setting of a positive respiratory panel with a viral infection PLUS a negative procalcitonin without other underlying concern for bacterial infection, consider observing off antibiotics or discontinuation of antibiotics and continue supportive care. If the respiratory panel is positive for atypical bacterial infection (Bordetella pertussis, Chlamydophila pneumoniae, or Mycoplasma pneumoniae), consider antibiotic de-escalation to target atypical bacterial infection.          CT Abdomen Pelvis Without Contrast    Result Date: 3/17/2023  CT ABDOMEN PELVIS WO CONTRAST Date of Exam: 3/17/2023 7:35 PM EDT Indication: mid back pain, hematuria, headachelk. Comparison: 2/7/2023 Technique: Axial CT images were obtained of the abdomen and pelvis without the administration of contrast. Reconstructed coronal and sagittal images were also obtained. Automated exposure control and iterative construction methods were used. FINDINGS: Abdomen/Pelvis: Lower Chest: Platelike and dependent opacities are noted at the lung bases. No free air noted below the diaphragm Organs: Large stone noted within the gallbladder. Limited noncontrast enhanced gallbladder is otherwise grossly unremarkable in appearance. Limited noncontrast imaging liver is a small low-attenuation lesion posteriorly right hepatic lobe similar to the prior study likely small cyst or. The pancreas, spleen and adrenal glands are grossly unremarkable There is severe bilateral hydronephrosis cortical thinning of the renal parenchyma bilaterally. Dilation of  the ureters is noted extending to the bladder. GI/Bowel: Noncontrast imaging of the stomach and small bowel demonstrate no acute abnormality. Ileocecal valve and appendix are grossly unremarkable. Colon demonstrates no acute abnormality Pelvis: Enlarged irregular appearance of the prostate noted with diffuse wall thickening of the bladder similar to the prior study is noted. No suspicious adenopathy appreciated within the pelvis. Bilateral fat-containing hernias noted Peritoneum/Retroperitoneum: The aorta is normal in caliber. There is a changes. No suspicious retroperitoneal adenopathy Bones/Soft Tissues: No destructive bone lesion. Multilevel degenerative changes spine. Probable bone islands again noted within the spine and proximal femur bilaterally     1. Moderate to severe hydroureteronephrosis again noted with bladder distention. Findings likely related to chronic outlet obstruction related to prostamegaly. Consider consultation with urology for further management 2. Comparison the prostate appears grossly stable and may represent either benign or malignant enlargement. 3. Cholelithiasis 4. Other findings as above.  Electronically Signed: Josué Clinton  3/17/2023 8:03 PM EDT  Workstation ID: [id number]    Adult Transthoracic Echo Limited W/ Cont if Necessary Per Protocol    Result Date: 3/18/2023  •  Left ventricular systolic function is normal. Calculated left ventricular EF = 61.7% Left ventricular ejection fraction appears to be 61 - 65%. •  Left ventricular wall thickness is consistent with mild concentric hypertrophy. •  There is a small (<1cm) pericardial effusion.     Arterial Line    Result Date: 3/18/2023  Candy Noble DO     3/18/2023 10:16 AM Arterial Line Patient reassessed immediately prior to procedure Patient location during procedure: pre-op Line placed for hemodynamic monitoring. Performed By Anesthesiologist: Candy Noble DO Preanesthetic Checklist Completed: patient identified, IV  checked, site marked, risks and benefits discussed, surgical consent, monitors and equipment checked, pre-op evaluation and timeout performed Arterial Line Prep  Sterile Tech: cap, gloves and sterile barriers Prep: ChloraPrep Patient monitoring: blood pressure monitoring, continuous pulse oximetry and EKG Arterial Line Procedure Laterality:right Location:  radial artery Catheter size: 20 G Guidance: ultrasound guided and palpation technique Number of attempts: 1 Successful placement: yes Post Assessment Dressing Type: line sutured, occlusive dressing applied, secured with tape and wrist guard applied. Complications no Circ/Move/Sens Assessment: normal and unchanged. Patient Tolerance: patient tolerated the procedure well with no apparent complications               Results for orders placed during the hospital encounter of 03/17/23    Adult Transthoracic Echo Limited W/ Cont if Necessary Per Protocol    Interpretation Summary  •  Left ventricular systolic function is normal. Calculated left ventricular EF = 61.7% Left ventricular ejection fraction appears to be 61 - 65%.  •  Left ventricular wall thickness is consistent with mild concentric hypertrophy.  •  There is a small (<1cm) pericardial effusion.      Plan for Follow-up of Pending Labs/Results:   Pending Labs     Order Current Status    Blood Culture - Blood, Arm, Left Preliminary result    Blood Culture - Blood, Arm, Right Preliminary result        Discharge Details        Discharge Medications      New Medications      Instructions Start Date   cefuroxime 250 MG tablet  Commonly known as: CEFTIN   250 mg, Oral, Every 24 Hours Scheduled         Continue These Medications      Instructions Start Date   amLODIPine 10 MG tablet  Commonly known as: NORVASC   10 mg, Oral, Daily      bacitracin-polymyxin b [id number] UNIT/GM ointment  Commonly known as: POLYSPORIN   1 application, Topical, 2 Times Daily      HYDROcodone-acetaminophen 5-325 MG per tablet  Commonly  known as: NORCO   1 tablet, Oral, Every 8 Hours PRN      pantoprazole 40 MG EC tablet  Commonly known as: PROTONIX   40 mg, Oral, Daily      KAYLEIGH-LASHON PO   1 tablet, Oral, Daily      sevelamer 800 MG tablet  Commonly known as: RENVELA   800 mg, Oral, 3 Times Daily With Meals      SITagliptin 25 MG tablet  Commonly known as: JANUVIA   25 mg, Oral, Daily      sodium bicarbonate 650 MG tablet   650 mg, Oral, 4 Times Daily             No Known Allergies      Discharge Disposition:  Home or Self Care    Diet:  Hospital:  Diet Order   Procedures   • Diet: Renal Diets, Diabetic Diets; Consistent Carbohydrate; Low Phosphorus, Low Potassium; Texture: Regular Texture (IDDSI 7); Fluid Consistency: Thin (IDDSI 0)       Activity:  - as tolerated    Restrictions or Other Recommendations:  - none       CODE STATUS:    Code Status and Medical Interventions:   Ordered at: 03/19/23 1140     Code Status (Patient has no pulse and is not breathing):    CPR (Attempt to Resuscitate)     Medical Interventions (Patient has pulse or is breathing):    Full Support     Release to patient:    Routine Release       No future appointments.              Joslyn Pollard DO  03/21/23      Time Spent on Discharge:  I spent  35  minutes on this discharge activity which included: face-to-face encounter with the patient, reviewing the data in the system, coordination of the care with the nursing staff as well as consultants, documentation, and entering orders.            Electronically signed by Joslyn Pollard DO at 03/21/23 1729

## 2023-03-22 LAB
BACTERIA SPEC AEROBE CULT: NORMAL
BACTERIA SPEC AEROBE CULT: NORMAL

## 2023-03-22 NOTE — OUTREACH NOTE
Prep Survey    Flowsheet Row Responses   [facility] facility patient discharged from? [facility]   Is LACE score < 7 ? No   Eligibility Readm Mgmt   Discharge diagnosis  transuretheral resection of the prostate    Does the patient have one of the following disease processes/diagnoses(primary or secondary)? General Surgery   Does the patient have Home health ordered? No   Is there a DME ordered? No   Prep survey completed? Yes          Michelle DOWD - Registered Nurse

## 2023-03-29 ENCOUNTER — READMISSION MANAGEMENT (OUTPATIENT)
Dept: CALL CENTER | Facility: HOSPITAL | Age: 63
End: 2023-03-29
Payer: COMMERCIAL

## 2023-03-29 NOTE — OUTREACH NOTE
General Surgery Week 2 Survey    Flowsheet Row Responses   [facility] patient discharged from? [facility]   Does the patient have one of the following disease processes/diagnoses(primary or secondary)? General Surgery   Week 2 attempt successful? Yes   Call start time 1231   Call end time 1237   Discharge diagnosis  transuretheral resection of the prostate    Person spoke with today (if not patient) and relationship Patient   Meds reviewed with patient/caregiver? Yes  [Reports that he took all of antibiotic]   Does the patient have all medications related to this admission filled (includes all antibiotics, pain medications, etc.) Yes   Is the patient taking all medications as directed (includes completed medication regime)? Yes   Does the patient have a follow up appointment scheduled with their surgeon? Yes  [Patient reports office is supposed to call. Advised since it has already been a week, to call office himself to arrange f/u appt. ]   Has the patient kept scheduled appointments due by today? N/A   Has home health visited the patient within 72 hours of discharge? N/A   Psychosocial issues? No   Comments Patient home with caldwell cath   Did the patient receive a copy of their discharge instructions? Yes   Nursing interventions Reviewed instructions with patient   What is the patient's perception of their health status since discharge? Improving   Is the patient/caregiver able to teach back signs and symptoms of incisional infection? Increased drainage or bleeding, Fever   If the patient is a current smoker, are they able to teach back resources for cessation? Not a smoker   Is the patient/caregiver able to teach back the hierarchy of who to call/visit for symptoms/problems? PCP, Specialist, Home health nurse, Urgent Care, ED, 911 Yes   Week 2 call completed? Yes   Wrap up additional comments Patient reports urine color pink, still some blood with urine in caldwell bag.           MANPREET JAY - Registered Nurse

## 2023-04-06 ENCOUNTER — OFFICE VISIT (OUTPATIENT)
Dept: UROLOGY | Facility: CLINIC | Age: 63
End: 2023-04-06
Payer: COMMERCIAL

## 2023-04-06 VITALS
HEIGHT: 67 IN | OXYGEN SATURATION: 97 % | SYSTOLIC BLOOD PRESSURE: 124 MMHG | HEART RATE: 93 BPM | BODY MASS INDEX: 24.48 KG/M2 | DIASTOLIC BLOOD PRESSURE: 74 MMHG | WEIGHT: 156 LBS

## 2023-04-06 DIAGNOSIS — R33.9 URINARY RETENTION: ICD-10-CM

## 2023-04-06 DIAGNOSIS — D63.1 ANEMIA DUE TO CHRONIC KIDNEY DISEASE, ON CHRONIC DIALYSIS: ICD-10-CM

## 2023-04-06 DIAGNOSIS — Z99.2 ANEMIA DUE TO CHRONIC KIDNEY DISEASE, ON CHRONIC DIALYSIS: ICD-10-CM

## 2023-04-06 DIAGNOSIS — N18.6 ANEMIA DUE TO CHRONIC KIDNEY DISEASE, ON CHRONIC DIALYSIS: ICD-10-CM

## 2023-04-06 DIAGNOSIS — N13.8 BPH WITH OBSTRUCTION/LOWER URINARY TRACT SYMPTOMS: Primary | ICD-10-CM

## 2023-04-06 DIAGNOSIS — N40.1 BPH WITH OBSTRUCTION/LOWER URINARY TRACT SYMPTOMS: Primary | ICD-10-CM

## 2023-04-06 PROCEDURE — 1159F MED LIST DOCD IN RCRD: CPT | Performed by: STUDENT IN AN ORGANIZED HEALTH CARE EDUCATION/TRAINING PROGRAM

## 2023-04-06 PROCEDURE — 1160F RVW MEDS BY RX/DR IN RCRD: CPT | Performed by: STUDENT IN AN ORGANIZED HEALTH CARE EDUCATION/TRAINING PROGRAM

## 2023-04-06 PROCEDURE — 99215 OFFICE O/P EST HI 40 MIN: CPT | Performed by: STUDENT IN AN ORGANIZED HEALTH CARE EDUCATION/TRAINING PROGRAM

## 2023-04-06 NOTE — H&P (VIEW-ONLY)
LUTS Male Office Visit      Patient Name: Jun Young  : 1960   MRN: [id number]     Chief Complaint:  Lower Urinary Tract Symptoms.   Chief Complaint   Patient presents with   • Acute renal failure superimposed on stage 4 chronic kidney         Referring Provider: No ref. provider found    History of Present Illness: Mr. Young is a 62 y.o. male with history of end-stage renal disease on dialysis, extreme BPH with urinary retention presents for follow-up.    Patient originally evaluated in early February at which point the patient was admitted with weakness, nausea and weight loss.  Multiple CT scans performed demonstrated severe bilateral hydroureteronephrosis, chronic renal atrophy and distended bladder with large intravesical median lobe protrusion in the setting of extreme BPH enlargement.  He is now dialysis dependent, we presume likely as a result of chronic BPH and retention.    Patient has been previously admitted to  for similar presentation and inpatient TURP was considered but patient left A.  He transitioned his care to [facility], operated by [facility].    Patient has now been in and out of the hospital and catheter dependent for retention.  During initial admission, developed significant clot retention and required cystoscopy, fulguration of oozing prostate and and bladder neck mucosa with Dr Velasco on 2023.    Eventually discharged from the hospital and saw FRANKY Raza in clinic on 2023, catheter was apparently removed by urology nursing staff however there is no documentation in the chart regarding this, and the patient was presumably taught intermittent catheterization by nursing staff.    Dr. Christianson took the patient back to the operating room on 3/18/2023 at which point he was back in urinary retention with hematuria and anemia.  He required a TURP of median lobe and fulguration of bleeding prostatic and bladder neck mucosa once again.    He was discharged from the  hospital on 3/21/2023.  He now returns in postoperative follow-up.  Catheter remains in place draining light pink urine.  Hemoglobin on discharge was 8.7 with normal platelets.    Today the patient states he is not willing to consider intermittent catheterization or continued indwelling Whitfield catheter long-term.  He is also not willing to consider suprapubic catheter placement which is likely his best option for minimally invasive therapy given his ongoing retention issues.        Subjective      Review of System: Review of Systems   Genitourinary: Positive for difficulty urinating, hematuria and penile pain.      I have reviewed the ROS documented by my clinical staff, I have updated appropriately and I agree. Pawan Damico MD    Past Medical History:  Past Medical History:   Diagnosis Date   • Acute on chronic renal failure    • Bilateral hydronephrosis    • BPH (benign prostatic hyperplasia)    • Cholelithiasis    • Diabetes mellitus    • Dyslipidemia    • Elevated cholesterol    • GERD (gastroesophageal reflux disease)    • Gross hematuria    • Hemodialysis patient    • History of transfusion    • Hyperlipidemia    • Hypertension    • Pericardial effusion     echo 2/2023 - 1-2 cm preserved LV function   • Stroke        Past Surgical History:  Past Surgical History:   Procedure Laterality Date   • CYSTOSCOPY BLADDER BIOPSY N/A 2/9/2023    Procedure: CYSTOSCOPY CLOT EVACUATION WITH FULGURATION, RETROGRADE PYELOGRAM;  Surgeon: Mandi Velasco MD;  Location: [facility];  Service: Urology;  Laterality: N/A;   • CYSTOSCOPY BLADDER BIOPSY N/A 3/18/2023    Procedure: CYSTOSCOPY WITH FULGURATION OF BLEEDERS;  Surgeon: Santana Christianson MD;  Location: [facility] OR;  Service: Urology;  Laterality: N/A;   • CYSTOSCOPY WITH CLOT EVACUATION      fulguration - 3 way catheter placement   • INSERTION HEMODIALYSIS CATHETER         Medications:    Current Outpatient Medications:   •  acetaminophen (TYLENOL) 325 MG tablet, Take 2  tablets by mouth Every 4 (Four) Hours As Needed for Mild Pain., Disp: , Rfl:   •  amLODIPine (NORVASC) 10 MG tablet, Take 1 tablet by mouth Daily., Disp: , Rfl:   •  B Complex-C-Folic Acid (KAYLEIGH-LASHON PO), Take 1 tablet by mouth Daily., Disp: , Rfl:   •  b complex-vitamin c-folic acid (NEPHRO-LASHON) 0.8 MG tablet tablet, Take 1 tablet by mouth Daily., Disp: 30 tablet, Rfl: 1  •  bacitracin-polymyxin b (POLYSPORIN) 500-68485 UNIT/GM ointment, Apply to tip of penis/catheter twice daily for irritation, Disp: 30 g, Rfl: 0  •  bacitracin-polymyxin b (POLYSPORIN) 500-31075 UNIT/GM ointment, Apply 1 application topically to the appropriate area as directed 2 (Two) Times a Day., Disp: , Rfl:   •  HYDROcodone-acetaminophen (NORCO) 5-325 MG per tablet, Take 1 tablet by mouth Every 8 (Eight) Hours As Needed for Moderate Pain., Disp: 6 tablet, Rfl: 0  •  HYDROcodone-acetaminophen (NORCO) 5-325 MG per tablet, Take 1 tablet by mouth Every 8 (Eight) Hours As Needed., Disp: , Rfl:   •  miconazole (MICOTIN) 2 % cream, Apply 1 application topically to the appropriate area as directed Every 12 (Twelve) Hours., Disp: , Rfl:   •  pantoprazole (PROTONIX) 40 MG EC tablet, Take 1 tablet by mouth Every Morning., Disp: 30 tablet, Rfl: 1  •  pantoprazole (PROTONIX) 40 MG EC tablet, Take 1 tablet by mouth Daily., Disp: , Rfl:   •  sevelamer (RENVELA) 800 MG tablet, Take 1 tablet by mouth 3 (Three) Times a Day With Meals., Disp: , Rfl:   •  SITagliptin (Januvia) 25 MG tablet, Take 1 tablet by mouth Daily., Disp: 30 tablet, Rfl: 1  •  SITagliptin (JANUVIA) 25 MG tablet, Take 1 tablet by mouth Daily., Disp: , Rfl:   •  sodium bicarbonate 650 MG tablet, Take 1 tablet by mouth 3 (Three) Times a Day., Disp: 90 tablet, Rfl: 1  •  sodium bicarbonate 650 MG tablet, Take 1 tablet by mouth 4 (Four) Times a Day., Disp: , Rfl:     Allergies:  No Known Allergies    Social History:  Social History     Socioeconomic History   • Marital status: Single   Tobacco  "Use   • Smoking status: Never     Passive exposure: Never   • Smokeless tobacco: Never   Vaping Use   • Vaping Use: Never used   Substance and Sexual Activity   • Alcohol use: Never   • Drug use: Never   • Sexual activity: Not Currently       Family History:  Family History   Family history unknown: Yes          Objective     Physical Exam:   Vital Signs:   Vitals:    04/06/23 1403   BP: 124/74   Pulse: 93   SpO2: 97%   Weight: 70.8 kg (156 lb)   Height: 170.2 cm (67\")     Body mass index is 24.43 kg/m².     Physical Exam  Constitutional:       Appearance: Normal appearance.   HENT:      Head: Normocephalic and atraumatic.      Nose: Nose normal.   Eyes:      Extraocular Movements: Extraocular movements intact.      Conjunctiva/sclera: Conjunctivae normal.      Pupils: Pupils are equal, round, and reactive to light.   Genitourinary:     Comments: Uncircumcised phallus, orthotopic meatus, bilateral descended testicles without masses or lesions, Whitfield catheter in place draining pink urine.  Musculoskeletal:         General: Normal range of motion.      Cervical back: Normal range of motion and neck supple.   Skin:     General: Skin is warm and dry.      Findings: No lesion or rash.   Neurological:      General: No focal deficit present.      Mental Status: He is alert and oriented to person, place, and time. Mental status is at baseline.   Psychiatric:         Mood and Affect: Mood normal.         Behavior: Behavior normal.         Labs:   No results found for: PSA    Brief Urine Lab Results  (Last result in the past 365 days)      Color   Clarity   Blood   Leuk Est   Nitrite   Protein   CREAT   Urine HCG        03/18/23 0636 Red   Turbid   Large (3+)   Large (3+)   Positive   >=300 mg/dL (3+)                 Urine Culture    Urine Culture 3/18/23   Urine Culture >100,000 CFU/mL Proteus mirabilis (A)   (A) Abnormal value               Lab Results   Component Value Date    GLUCOSE 154 (H) 03/20/2023    CALCIUM 7.4 (L) " 03/20/2023     03/20/2023    K 4.7 03/20/2023    CO2 21.0 (L) 03/20/2023     03/20/2023    BUN 84 (H) 03/20/2023    CREATININE 8.50 (H) 03/20/2023    BCR 9.9 03/20/2023    ANIONGAP 19.0 (H) 03/20/2023       Lab Results   Component Value Date    WBC 9.65 03/21/2023    HGB 8.7 (L) 03/21/2023    HCT 26.9 (L) 03/21/2023    MCV 94.1 03/21/2023     03/21/2023       Images:   CT Abdomen Pelvis Without Contrast    Result Date: 3/17/2023  1. Moderate to severe hydroureteronephrosis again noted with bladder distention. Findings likely related to chronic outlet obstruction related to prostamegaly. Consider consultation with urology for further management 2. Comparison the prostate appears grossly stable and may represent either benign or malignant enlargement. 3. Cholelithiasis 4. Other findings as above.  Electronically Signed: Josué Clinton  3/17/2023 8:03 PM EDT  Workstation ID: [id number]    CT Abdomen Pelvis Without Contrast    Result Date: 2/7/2023  Impression: 1.Moderate to severe bilateral hydronephrosis with ureteral dilatation and prominent bladder distention. The prostate appears enlarged with irregular protrusion into the urinary bladder. Findings are suspicious for bladder outlet obstruction due to the prostate. Recommend urology consultation and catheter placement. 2.Prostate enlargement is indeterminate and could be related to benign hypertrophy or malignancy. 3.Large calcified gallstone without CT findings of acute cholecystitis. 4.Trace pericardial effusion. 5.Small sclerotic foci in the proximal femurs and at L2 are indeterminate and could be benign bone islands, but sclerotic metastases, which are commonly seen with prostate malignancy, cannot be excluded, given the above findings. Electronically Signed: Chaz Rodriguez  2/7/2023 5:51 PM EST  Workstation ID: [id number]    IR Tunneled Catheter    Result Date: 2/10/2023  Impression:    Successful ultrasound and fluoroscopic guided Right internal  jugular vein route cuffed tunneled hemodialysis catheter placement as described above.  Thank you for the opportunity to assist in the care of your patient.  This report was finalized on 2/10/2023 8:51 PM by Aidan Perdomo MD.      XR Chest 1 View    Result Date: 2/7/2023  Impression: No active cardiopulmonary disease Electronically Signed: Martin Marinelli  2/7/2023 2:38 PM EST  Workstation ID: [id number]    XR Pyelogram Retrograde    Result Date: 2/10/2023  Impression: Bilateral hydronephrosis with filling defects within the right renal pelvis which may be an air bubble. Clinical correlation recommended. Electronically Signed: Moi Holden  2/10/2023 7:57 AM EST  Workstation ID: [id number]      Measures:   Tobacco:   Jun Young  reports that he has never smoked. He has never been exposed to tobacco smoke. He has never used smokeless tobacco.        Assessment / Plan      Assessment:  Mr. Young is a 62 y.o. male who presented today with chronic urinary retention secondary to extreme BPH.  He has bilateral severe hydroureteronephrosis and chronic renal atrophy and end-stage renal disease now on Monday Wednesday Friday dialysis, we presume likely as result of chronic urinary retention.    Multiple recent admissions for hematuria, bleeding prostatic vessels requiring to take backs to the operating room for cystoscopy and fulguration.  Dr. Christianson recently performed TURP of bleeding median lobe prostatic tissue.  Catheter remains in place.  His catheter was replaced just 2 weeks ago.      He defers catheter exchange today and we will need to perform a catheter exchange in clinic within 2 weeks time.    It is unclear how much urine the patient makes but he is clearly producing enough urine to develop distended bladder on multiple CT scans and therefore ongoing retention as well as chronic bleeding prostate remains a concern.  Options for management of his retention include chronic indwelling urethral  catheter, intermittent catheterization (CIC), suprapubic catheter, or aggressive attempted prostatic resection and last attempt to preserve natural voiding.    Patient is unwilling to accept a suprapubic catheter, indwelling catheter or intermittent catheterization.  He would like to proceed with a bladder outlet surgery as he would like to attempt natural urination long-term.  He is likely best served with TURP given the size of his prostate and need for efficient tissue removal.  He would not be a good candidate for robotic simple prostatectomy.    Risks of TURP include bleeding, infection, ongoing hematuria concerns, bladder neck contracture, urethral stricture, anesthetic related complications including DVT, PE, MI, CVA, death.    The patient understands the associated risks and he is willing to proceed.  Discussed he will need admission to the hospital likely for CBI and dialysis managed by the internal medicine team.  He will likely require a catheter for some time with an outpatient voiding trial depending on his progress.    We will ask him to repeat a CBC and BMP prior to the operation to ensure stability of his anemia.    The patient is at higher risk of bleeding complications intraoperatively and postoperatively as we suspect he likely has some platelet dysfunction related to his end-stage renal disease and chronic anemia.    I have reviewed all of his recent notes from recent inpatient admissions, operative reports, multiple recent CT scans, lab values and I have reviewed all of these with the patient and his son today.  Greater than 30 minutes spent in consultation with the patient today, more than 15 minutes spent reviewing chart and completing documentation.    Diagnoses and all orders for this visit:    1. BPH with obstruction/lower urinary tract symptoms (Primary)  -     Case Request; Standing  -     CBC (No Diff); Future  -     Basic Metabolic Panel; Future  -     Urinalysis without microscopic (no  culture) - Urine, Clean Catch; Future  -     ceFAZolin (ANCEF) 2 g in sodium chloride 0.9 % 100 mL IVPB  -     Type & Screen; Future  -     ECG 12 Lead; Future  -     Case Request    2. Urinary retention  -     Case Request; Standing  -     CBC (No Diff); Future  -     Basic Metabolic Panel; Future  -     Urinalysis without microscopic (no culture) - Urine, Clean Catch; Future  -     ceFAZolin (ANCEF) 2 g in sodium chloride 0.9 % 100 mL IVPB  -     Type & Screen; Future  -     ECG 12 Lead; Future  -     Case Request    Other orders  -     Follow Anesthesia Guidelines / Protocol; Future  -     Obtain Informed Consent; Future  -     Provide NPO Instructions to Patient; Future  -     Provide Hydration Instructions to Patient; Future  -     Provide Chlorhexidine Skin Prep Wipes and Instructions; Future  -     Nursing to Order Blood Glucose on all Inpatients >18 years Old with not BMP Ordered; Future  -     Nursing to Place Order for HgbA1c on Adult Patients >18 Years Old (HgbA1c Within One Month of Admission Acceptable); Future  -     Follow Anesthesia Guidelines / Protocol; Standing  -     Verify NPO Status; Standing  -     Verify the Time Patient Completed [place] / G2; Standing  -     Inpatient Admission; Standing  -     Nurse to Contact Surgeon for Cardiology Consult if Indicated; Future  -     Nurse to Consult  Anesthesia if Indicated; Future           Follow Up:   No follow-ups on file.    I spent approximately 40 minutes providing clinical care for this patient; including review of patient's chart and provider documentation, face to face time spent with patient in examination room (obtaining history, performing physical exam, discussing diagnosis and management options), placing orders, and completing patient documentation.     Pawan Damico MD  [facility]

## 2023-04-06 NOTE — PROGRESS NOTES
LUTS Male Office Visit      Patient Name: Jun Young  : 1960   MRN: [id number]     Chief Complaint:  Lower Urinary Tract Symptoms.   Chief Complaint   Patient presents with   • Acute renal failure superimposed on stage 4 chronic kidney         Referring Provider: No ref. provider found    History of Present Illness: Mr. Young is a 62 y.o. male with history of end-stage renal disease on dialysis, extreme BPH with urinary retention presents for follow-up.    Patient originally evaluated in early February at which point the patient was admitted with weakness, nausea and weight loss.  Multiple CT scans performed demonstrated severe bilateral hydroureteronephrosis, chronic renal atrophy and distended bladder with large intravesical median lobe protrusion in the setting of extreme BPH enlargement.  He is now dialysis dependent, we presume likely as a result of chronic BPH and retention.    Patient has been previously admitted to  for similar presentation and inpatient TURP was considered but patient left A.  He transitioned his care to [facility].    Patient has now been in and out of the hospital and catheter dependent for retention.  During initial admission, developed significant clot retention and required cystoscopy, fulguration of oozing prostate and and bladder neck mucosa with Dr Velasco on 2023.    Eventually discharged from the hospital and saw FRANKY Raza in clinic on 2023, catheter was apparently removed by urology nursing staff however there is no documentation in the chart regarding this, and the patient was presumably taught intermittent catheterization by nursing staff.    Dr. Christianson took the patient back to the operating room on 3/18/2023 at which point he was back in urinary retention with hematuria and anemia.  He required a TURP of median lobe and fulguration of bleeding prostatic and bladder neck mucosa once again.    He was discharged from the  hospital on 3/21/2023.  He now returns in postoperative follow-up.  Catheter remains in place draining light pink urine.  Hemoglobin on discharge was 8.7 with normal platelets.    Today the patient states he is not willing to consider intermittent catheterization or continued indwelling Whitfield catheter long-term.  He is also not willing to consider suprapubic catheter placement which is likely his best option for minimally invasive therapy given his ongoing retention issues.        Subjective      Review of System: Review of Systems   Genitourinary: Positive for difficulty urinating, hematuria and penile pain.      I have reviewed the ROS documented by my clinical staff, I have updated appropriately and I agree. Pawan Damico MD    Past Medical History:  Past Medical History:   Diagnosis Date   • Acute on chronic renal failure    • Bilateral hydronephrosis    • BPH (benign prostatic hyperplasia)    • Cholelithiasis    • Diabetes mellitus    • Dyslipidemia    • Elevated cholesterol    • GERD (gastroesophageal reflux disease)    • Gross hematuria    • Hemodialysis patient    • History of transfusion    • Hyperlipidemia    • Hypertension    • Pericardial effusion     echo 2/2023 - 1-2 cm preserved LV function   • Stroke        Past Surgical History:  Past Surgical History:   Procedure Laterality Date   • CYSTOSCOPY BLADDER BIOPSY N/A 2/9/2023    Procedure: CYSTOSCOPY CLOT EVACUATION WITH FULGURATION, RETROGRADE PYELOGRAM;  Surgeon: Mandi Velasco MD;  Location: [facility];  Service: Urology;  Laterality: N/A;   • CYSTOSCOPY BLADDER BIOPSY N/A 3/18/2023    Procedure: CYSTOSCOPY WITH FULGURATION OF BLEEDERS;  Surgeon: Santana Christianson MD;  Location: [facility] OR;  Service: Urology;  Laterality: N/A;   • CYSTOSCOPY WITH CLOT EVACUATION      fulguration - 3 way catheter placement   • INSERTION HEMODIALYSIS CATHETER         Medications:    Current Outpatient Medications:   •  acetaminophen (TYLENOL) 325 MG tablet, Take 2  tablets by mouth Every 4 (Four) Hours As Needed for Mild Pain., Disp: , Rfl:   •  amLODIPine (NORVASC) 10 MG tablet, Take 1 tablet by mouth Daily., Disp: , Rfl:   •  B Complex-C-Folic Acid (KAYLEIGH-LASHON PO), Take 1 tablet by mouth Daily., Disp: , Rfl:   •  b complex-vitamin c-folic acid (NEPHRO-LASHON) 0.8 MG tablet tablet, Take 1 tablet by mouth Daily., Disp: 30 tablet, Rfl: 1  •  bacitracin-polymyxin b (POLYSPORIN) 500-60873 UNIT/GM ointment, Apply to tip of penis/catheter twice daily for irritation, Disp: 30 g, Rfl: 0  •  bacitracin-polymyxin b (POLYSPORIN) 500-19573 UNIT/GM ointment, Apply 1 application topically to the appropriate area as directed 2 (Two) Times a Day., Disp: , Rfl:   •  HYDROcodone-acetaminophen (NORCO) 5-325 MG per tablet, Take 1 tablet by mouth Every 8 (Eight) Hours As Needed for Moderate Pain., Disp: 6 tablet, Rfl: 0  •  HYDROcodone-acetaminophen (NORCO) 5-325 MG per tablet, Take 1 tablet by mouth Every 8 (Eight) Hours As Needed., Disp: , Rfl:   •  miconazole (MICOTIN) 2 % cream, Apply 1 application topically to the appropriate area as directed Every 12 (Twelve) Hours., Disp: , Rfl:   •  pantoprazole (PROTONIX) 40 MG EC tablet, Take 1 tablet by mouth Every Morning., Disp: 30 tablet, Rfl: 1  •  pantoprazole (PROTONIX) 40 MG EC tablet, Take 1 tablet by mouth Daily., Disp: , Rfl:   •  sevelamer (RENVELA) 800 MG tablet, Take 1 tablet by mouth 3 (Three) Times a Day With Meals., Disp: , Rfl:   •  SITagliptin (Januvia) 25 MG tablet, Take 1 tablet by mouth Daily., Disp: 30 tablet, Rfl: 1  •  SITagliptin (JANUVIA) 25 MG tablet, Take 1 tablet by mouth Daily., Disp: , Rfl:   •  sodium bicarbonate 650 MG tablet, Take 1 tablet by mouth 3 (Three) Times a Day., Disp: 90 tablet, Rfl: 1  •  sodium bicarbonate 650 MG tablet, Take 1 tablet by mouth 4 (Four) Times a Day., Disp: , Rfl:     Allergies:  No Known Allergies    Social History:  Social History     Socioeconomic History   • Marital status: Single   Tobacco  "Use   • Smoking status: Never     Passive exposure: Never   • Smokeless tobacco: Never   Vaping Use   • Vaping Use: Never used   Substance and Sexual Activity   • Alcohol use: Never   • Drug use: Never   • Sexual activity: Not Currently       Family History:  Family History   Family history unknown: Yes          Objective     Physical Exam:   Vital Signs:   Vitals:    04/06/23 1403   BP: 124/74   Pulse: 93   SpO2: 97%   Weight: 70.8 kg (156 lb)   Height: 170.2 cm (67\")     Body mass index is 24.43 kg/m².     Physical Exam  Constitutional:       Appearance: Normal appearance.   HENT:      Head: Normocephalic and atraumatic.      Nose: Nose normal.   Eyes:      Extraocular Movements: Extraocular movements intact.      Conjunctiva/sclera: Conjunctivae normal.      Pupils: Pupils are equal, round, and reactive to light.   Genitourinary:     Comments: Uncircumcised phallus, orthotopic meatus, bilateral descended testicles without masses or lesions, Whitfield catheter in place draining pink urine.  Musculoskeletal:         General: Normal range of motion.      Cervical back: Normal range of motion and neck supple.   Skin:     General: Skin is warm and dry.      Findings: No lesion or rash.   Neurological:      General: No focal deficit present.      Mental Status: He is alert and oriented to person, place, and time. Mental status is at baseline.   Psychiatric:         Mood and Affect: Mood normal.         Behavior: Behavior normal.         Labs:   No results found for: PSA    Brief Urine Lab Results  (Last result in the past 365 days)      Color   Clarity   Blood   Leuk Est   Nitrite   Protein   CREAT   Urine HCG        03/18/23 0636 Red   Turbid   Large (3+)   Large (3+)   Positive   >=300 mg/dL (3+)                 Urine Culture    Urine Culture 3/18/23   Urine Culture >100,000 CFU/mL Proteus mirabilis (A)   (A) Abnormal value               Lab Results   Component Value Date    GLUCOSE 154 (H) 03/20/2023    CALCIUM 7.4 (L) " 03/20/2023     03/20/2023    K 4.7 03/20/2023    CO2 21.0 (L) 03/20/2023     03/20/2023    BUN 84 (H) 03/20/2023    CREATININE 8.50 (H) 03/20/2023    BCR 9.9 03/20/2023    ANIONGAP 19.0 (H) 03/20/2023       Lab Results   Component Value Date    WBC 9.65 03/21/2023    HGB 8.7 (L) 03/21/2023    HCT 26.9 (L) 03/21/2023    MCV 94.1 03/21/2023     03/21/2023       Images:   CT Abdomen Pelvis Without Contrast    Result Date: 3/17/2023  1. Moderate to severe hydroureteronephrosis again noted with bladder distention. Findings likely related to chronic outlet obstruction related to prostamegaly. Consider consultation with urology for further management 2. Comparison the prostate appears grossly stable and may represent either benign or malignant enlargement. 3. Cholelithiasis 4. Other findings as above.  Electronically Signed: Josué Clinton  3/17/2023 8:03 PM EDT  Workstation ID: [id number]    CT Abdomen Pelvis Without Contrast    Result Date: 2/7/2023  Impression: 1.Moderate to severe bilateral hydronephrosis with ureteral dilatation and prominent bladder distention. The prostate appears enlarged with irregular protrusion into the urinary bladder. Findings are suspicious for bladder outlet obstruction due to the prostate. Recommend urology consultation and catheter placement. 2.Prostate enlargement is indeterminate and could be related to benign hypertrophy or malignancy. 3.Large calcified gallstone without CT findings of acute cholecystitis. 4.Trace pericardial effusion. 5.Small sclerotic foci in the proximal femurs and at L2 are indeterminate and could be benign bone islands, but sclerotic metastases, which are commonly seen with prostate malignancy, cannot be excluded, given the above findings. Electronically Signed: Chaz Rodriguez  2/7/2023 5:51 PM EST  Workstation ID: [id number]    IR Tunneled Catheter    Result Date: 2/10/2023  Impression:    Successful ultrasound and fluoroscopic guided Right internal  jugular vein route cuffed tunneled hemodialysis catheter placement as described above.  Thank you for the opportunity to assist in the care of your patient.  This report was finalized on 2/10/2023 8:51 PM by Aidan Perdomo MD.      XR Chest 1 View    Result Date: 2/7/2023  Impression: No active cardiopulmonary disease Electronically Signed: Martin Marinelli  2/7/2023 2:38 PM EST  Workstation ID: [id number]    XR Pyelogram Retrograde    Result Date: 2/10/2023  Impression: Bilateral hydronephrosis with filling defects within the right renal pelvis which may be an air bubble. Clinical correlation recommended. Electronically Signed: Moi Holden  2/10/2023 7:57 AM EST  Workstation ID: [id number]      Measures:   Tobacco:   Jun Young  reports that he has never smoked. He has never been exposed to tobacco smoke. He has never used smokeless tobacco.        Assessment / Plan      Assessment:  Mr. Young is a 62 y.o. male who presented today with chronic urinary retention secondary to extreme BPH.  He has bilateral severe hydroureteronephrosis and chronic renal atrophy and end-stage renal disease now on Monday Wednesday Friday dialysis, we presume likely as result of chronic urinary retention.    Multiple recent admissions for hematuria, bleeding prostatic vessels requiring to take backs to the operating room for cystoscopy and fulguration.  Dr. Christianson recently performed TURP of bleeding median lobe prostatic tissue.  Catheter remains in place.  His catheter was replaced just 2 weeks ago.      He defers catheter exchange today and we will need to perform a catheter exchange in clinic within 2 weeks time.    It is unclear how much urine the patient makes but he is clearly producing enough urine to develop distended bladder on multiple CT scans and therefore ongoing retention as well as chronic bleeding prostate remains a concern.  Options for management of his retention include chronic indwelling urethral  catheter, intermittent catheterization (CIC), suprapubic catheter, or aggressive attempted prostatic resection and last attempt to preserve natural voiding.    Patient is unwilling to accept a suprapubic catheter, indwelling catheter or intermittent catheterization.  He would like to proceed with a bladder outlet surgery as he would like to attempt natural urination long-term.  He is likely best served with TURP given the size of his prostate and need for efficient tissue removal.  He would not be a good candidate for robotic simple prostatectomy.    Risks of TURP include bleeding, infection, ongoing hematuria concerns, bladder neck contracture, urethral stricture, anesthetic related complications including DVT, PE, MI, CVA, death.    The patient understands the associated risks and he is willing to proceed.  Discussed he will need admission to the hospital likely for CBI and dialysis managed by the internal medicine team.  He will likely require a catheter for some time with an outpatient voiding trial depending on his progress.    We will ask him to repeat a CBC and BMP prior to the operation to ensure stability of his anemia.    The patient is at higher risk of bleeding complications intraoperatively and postoperatively as we suspect he likely has some platelet dysfunction related to his end-stage renal disease and chronic anemia.    I have reviewed all of his recent notes from recent inpatient admissions, operative reports, multiple recent CT scans, lab values and I have reviewed all of these with the patient and his son today.  Greater than 30 minutes spent in consultation with the patient today, more than 15 minutes spent reviewing chart and completing documentation.    Diagnoses and all orders for this visit:    1. BPH with obstruction/lower urinary tract symptoms (Primary)  -     Case Request; Standing  -     CBC (No Diff); Future  -     Basic Metabolic Panel; Future  -     Urinalysis without microscopic (no  culture) - Urine, Clean Catch; Future  -     ceFAZolin (ANCEF) 2 g in sodium chloride 0.9 % 100 mL IVPB  -     Type & Screen; Future  -     ECG 12 Lead; Future  -     Case Request    2. Urinary retention  -     Case Request; Standing  -     CBC (No Diff); Future  -     Basic Metabolic Panel; Future  -     Urinalysis without microscopic (no culture) - Urine, Clean Catch; Future  -     ceFAZolin (ANCEF) 2 g in sodium chloride 0.9 % 100 mL IVPB  -     Type & Screen; Future  -     ECG 12 Lead; Future  -     Case Request    Other orders  -     Follow Anesthesia Guidelines / Protocol; Future  -     Obtain Informed Consent; Future  -     Provide NPO Instructions to Patient; Future  -     Provide Hydration Instructions to Patient; Future  -     Provide Chlorhexidine Skin Prep Wipes and Instructions; Future  -     Nursing to Order Blood Glucose on all Inpatients >18 years Old with not BMP Ordered; Future  -     Nursing to Place Order for HgbA1c on Adult Patients >18 Years Old (HgbA1c Within One Month of Admission Acceptable); Future  -     Follow Anesthesia Guidelines / Protocol; Standing  -     Verify NPO Status; Standing  -     Verify the Time Patient Completed Gatorade / G2; Standing  -     Inpatient Admission; Standing  -     Nurse to Contact Surgeon for Cardiology Consult if Indicated; Future  -     Nurse to Consult  Anesthesia if Indicated; Future           Follow Up:   No follow-ups on file.    I spent approximately 40 minutes providing clinical care for this patient; including review of patient's chart and provider documentation, face to face time spent with patient in examination room (obtaining history, performing physical exam, discussing diagnosis and management options), placing orders, and completing patient documentation.     Pawan Damico MD  [facility]

## 2023-04-28 ENCOUNTER — PRE-ADMISSION TESTING (OUTPATIENT)
Dept: PREADMISSION TESTING | Facility: HOSPITAL | Age: 63
End: 2023-04-28
Payer: COMMERCIAL

## 2023-04-28 VITALS — HEIGHT: 67 IN | WEIGHT: 165.46 LBS | BODY MASS INDEX: 25.97 KG/M2

## 2023-04-28 DIAGNOSIS — N40.1 BPH WITH OBSTRUCTION/LOWER URINARY TRACT SYMPTOMS: ICD-10-CM

## 2023-04-28 DIAGNOSIS — R33.9 URINARY RETENTION: ICD-10-CM

## 2023-04-28 DIAGNOSIS — N13.8 BPH WITH OBSTRUCTION/LOWER URINARY TRACT SYMPTOMS: ICD-10-CM

## 2023-04-28 LAB
ANION GAP SERPL CALCULATED.3IONS-SCNC: 19 MMOL/L (ref 5–15)
BILIRUB UR QL STRIP: NEGATIVE
BUN SERPL-MCNC: 81 MG/DL (ref 8–23)
BUN/CREAT SERPL: 10.4 (ref 7–25)
CALCIUM SPEC-SCNC: 8.8 MG/DL (ref 8.6–10.5)
CHLORIDE SERPL-SCNC: 98 MMOL/L (ref 98–107)
CLARITY UR: ABNORMAL
CO2 SERPL-SCNC: 25 MMOL/L (ref 22–29)
COLOR UR: YELLOW
CREAT SERPL-MCNC: 7.79 MG/DL (ref 0.76–1.27)
DEPRECATED RDW RBC AUTO: 49.7 FL (ref 37–54)
EGFRCR SERPLBLD CKD-EPI 2021: 7.2 ML/MIN/1.73
ERYTHROCYTE [DISTWIDTH] IN BLOOD BY AUTOMATED COUNT: 14 % (ref 12.3–15.4)
GLUCOSE SERPL-MCNC: 112 MG/DL (ref 65–99)
GLUCOSE UR STRIP-MCNC: ABNORMAL MG/DL
HBA1C MFR BLD: 4.6 % (ref 4.8–5.6)
HCT VFR BLD AUTO: 34.9 % (ref 37.5–51)
HGB BLD-MCNC: 11.2 G/DL (ref 13–17.7)
HGB UR QL STRIP.AUTO: ABNORMAL
KETONES UR QL STRIP: NEGATIVE
LEUKOCYTE ESTERASE UR QL STRIP.AUTO: ABNORMAL
MCH RBC QN AUTO: 30.9 PG (ref 26.6–33)
MCHC RBC AUTO-ENTMCNC: 32.1 G/DL (ref 31.5–35.7)
MCV RBC AUTO: 96.1 FL (ref 79–97)
NITRITE UR QL STRIP: NEGATIVE
PH UR STRIP.AUTO: 7.5 [PH] (ref 5–8)
PLATELET # BLD AUTO: 155 10*3/MM3 (ref 140–450)
PMV BLD AUTO: 8.9 FL (ref 6–12)
POTASSIUM SERPL-SCNC: 4.7 MMOL/L (ref 3.5–5.2)
PROT UR QL STRIP: ABNORMAL
RBC # BLD AUTO: 3.63 10*6/MM3 (ref 4.14–5.8)
SODIUM SERPL-SCNC: 142 MMOL/L (ref 136–145)
SP GR UR STRIP: 1.01 (ref 1–1.03)
UROBILINOGEN UR QL STRIP: ABNORMAL
WBC NRBC COR # BLD: 7.59 10*3/MM3 (ref 3.4–10.8)

## 2023-04-28 PROCEDURE — 81003 URINALYSIS AUTO W/O SCOPE: CPT

## 2023-04-28 PROCEDURE — 85027 COMPLETE CBC AUTOMATED: CPT

## 2023-04-28 PROCEDURE — 80048 BASIC METABOLIC PNL TOTAL CA: CPT

## 2023-04-28 PROCEDURE — 36415 COLL VENOUS BLD VENIPUNCTURE: CPT

## 2023-04-28 PROCEDURE — 83036 HEMOGLOBIN GLYCOSYLATED A1C: CPT

## 2023-04-28 NOTE — PAT
Patient did not review general PAT education video as instructed in their preoperative information received from their surgeon.  One-on-one Pre Admission Testing general education provided during PAT visit.  Copies of PAT general education handouts (Incentive Spirometry, Meds to Beds Program, Patient Belongings, Pre-op skin preparation instructions, Blood Glucose testing, Visitor policy, Surgery FAQ, Code H) distributed to patient. Encouraged patient/family to read PAT general education handouts thoroughly and notify PAT staff with any questions or concerns. Patient instructed to bring PAT pass and completed skin prep sheet (if applicable) on the day of procedure. Patient verbalized understanding of all information and priority content.     Patient instructed to drink 20 ounces of Gatorade and it needs to be completed 1 hour (for Main OR patients) or 2 hours (scheduled  section & [facility] patients) before given arrival time for procedure (NO RED Gatorade)    Patient verbalized understanding.    Too early to draw type and screen in PAT.  Please obtain blood bank specimen in pre-op on the day of surgery.  [place] verification tube drawn in PAT.     EKG and ECHO from 3- cleared by Dr. Domínguez.

## 2023-05-05 ENCOUNTER — ANESTHESIA EVENT (OUTPATIENT)
Dept: PERIOP | Facility: HOSPITAL | Age: 63
End: 2023-05-05
Payer: COMMERCIAL

## 2023-05-05 ENCOUNTER — ANESTHESIA (OUTPATIENT)
Dept: PERIOP | Facility: HOSPITAL | Age: 63
End: 2023-05-05
Payer: COMMERCIAL

## 2023-05-05 ENCOUNTER — HOSPITAL ENCOUNTER (OUTPATIENT)
Facility: HOSPITAL | Age: 63
Discharge: HOME | End: 2023-05-09
Attending: STUDENT IN AN ORGANIZED HEALTH CARE EDUCATION/TRAINING PROGRAM | Admitting: STUDENT IN AN ORGANIZED HEALTH CARE EDUCATION/TRAINING PROGRAM
Payer: COMMERCIAL

## 2023-05-05 DIAGNOSIS — R33.9 URINARY RETENTION: ICD-10-CM

## 2023-05-05 DIAGNOSIS — N13.8 BPH WITH OBSTRUCTION/LOWER URINARY TRACT SYMPTOMS: ICD-10-CM

## 2023-05-05 DIAGNOSIS — N40.1 BPH WITH OBSTRUCTION/LOWER URINARY TRACT SYMPTOMS: ICD-10-CM

## 2023-05-05 DIAGNOSIS — N17.9 ACUTE RENAL FAILURE SUPERIMPOSED ON STAGE 4 CHRONIC KIDNEY DISEASE, UNSPECIFIED ACUTE RENAL FAILURE TYPE: ICD-10-CM

## 2023-05-05 DIAGNOSIS — N18.4 ACUTE RENAL FAILURE SUPERIMPOSED ON STAGE 4 CHRONIC KIDNEY DISEASE, UNSPECIFIED ACUTE RENAL FAILURE TYPE: ICD-10-CM

## 2023-05-05 PROBLEM — I10 HYPERTENSION: Status: ACTIVE | Noted: 2023-05-05

## 2023-05-05 LAB
ABO GROUP BLD: NORMAL
ANION GAP SERPL CALCULATED.3IONS-SCNC: 22 MMOL/L (ref 5–15)
BLD GP AB SCN SERPL QL: NEGATIVE
BUN SERPL-MCNC: 66 MG/DL (ref 8–23)
BUN/CREAT SERPL: 7.4 (ref 7–25)
CALCIUM SPEC-SCNC: 8.4 MG/DL (ref 8.6–10.5)
CHLORIDE SERPL-SCNC: 95 MMOL/L (ref 98–107)
CO2 SERPL-SCNC: 17 MMOL/L (ref 22–29)
CREAT SERPL-MCNC: 8.96 MG/DL (ref 0.76–1.27)
DEPRECATED RDW RBC AUTO: 47.1 FL (ref 37–54)
EGFRCR SERPLBLD CKD-EPI 2021: 6 ML/MIN/1.73
ERYTHROCYTE [DISTWIDTH] IN BLOOD BY AUTOMATED COUNT: 14.1 % (ref 12.3–15.4)
GLUCOSE BLDC GLUCOMTR-MCNC: 167 MG/DL (ref 70–130)
GLUCOSE BLDC GLUCOMTR-MCNC: 179 MG/DL (ref 70–130)
GLUCOSE BLDC GLUCOMTR-MCNC: 92 MG/DL (ref 70–130)
GLUCOSE SERPL-MCNC: 214 MG/DL (ref 65–99)
HBA1C MFR BLD: 4.9 % (ref 4.8–5.6)
HCT VFR BLD AUTO: 30.9 % (ref 37.5–51)
HGB BLD-MCNC: 10.2 G/DL (ref 13–17.7)
MCH RBC QN AUTO: 30.9 PG (ref 26.6–33)
MCHC RBC AUTO-ENTMCNC: 33 G/DL (ref 31.5–35.7)
MCV RBC AUTO: 93.6 FL (ref 79–97)
PLATELET # BLD AUTO: 169 10*3/MM3 (ref 140–450)
PMV BLD AUTO: 9 FL (ref 6–12)
POTASSIUM SERPL-SCNC: 4.3 MMOL/L (ref 3.5–5.2)
POTASSIUM SERPL-SCNC: 6.1 MMOL/L (ref 3.5–5.2)
POTASSIUM SERPL-SCNC: 7 MMOL/L (ref 3.5–5.2)
RBC # BLD AUTO: 3.3 10*6/MM3 (ref 4.14–5.8)
RH BLD: POSITIVE
SODIUM SERPL-SCNC: 134 MMOL/L (ref 136–145)
T&S EXPIRATION DATE: NORMAL
WBC NRBC COR # BLD: 11.03 10*3/MM3 (ref 3.4–10.8)

## 2023-05-05 PROCEDURE — 84132 ASSAY OF SERUM POTASSIUM: CPT | Performed by: ANESTHESIOLOGY

## 2023-05-05 PROCEDURE — 25010000002 HYDROMORPHONE 1 MG/ML SOLUTION

## 2023-05-05 PROCEDURE — 86901 BLOOD TYPING SEROLOGIC RH(D): CPT | Performed by: STUDENT IN AN ORGANIZED HEALTH CARE EDUCATION/TRAINING PROGRAM

## 2023-05-05 PROCEDURE — 25010000002 FENTANYL CITRATE (PF) 50 MCG/ML SOLUTION

## 2023-05-05 PROCEDURE — 84132 ASSAY OF SERUM POTASSIUM: CPT | Performed by: NURSE PRACTITIONER

## 2023-05-05 PROCEDURE — 99233 SBSQ HOSP IP/OBS HIGH 50: CPT | Performed by: INTERNAL MEDICINE

## 2023-05-05 PROCEDURE — 86923 COMPATIBILITY TEST ELECTRIC: CPT

## 2023-05-05 PROCEDURE — 25010000002 ONDANSETRON PER 1 MG

## 2023-05-05 PROCEDURE — 25010000002 CALCIUM GLUCONATE-NACL 1-0.675 GM/50ML-% SOLUTION: Performed by: NURSE PRACTITIONER

## 2023-05-05 PROCEDURE — 25010000002 ONDANSETRON PER 1 MG: Performed by: STUDENT IN AN ORGANIZED HEALTH CARE EDUCATION/TRAINING PROGRAM

## 2023-05-05 PROCEDURE — 52601 PROSTATECTOMY (TURP): CPT | Performed by: STUDENT IN AN ORGANIZED HEALTH CARE EDUCATION/TRAINING PROGRAM

## 2023-05-05 PROCEDURE — 83036 HEMOGLOBIN GLYCOSYLATED A1C: CPT | Performed by: INTERNAL MEDICINE

## 2023-05-05 PROCEDURE — 82948 REAGENT STRIP/BLOOD GLUCOSE: CPT

## 2023-05-05 PROCEDURE — 86900 BLOOD TYPING SEROLOGIC ABO: CPT | Performed by: STUDENT IN AN ORGANIZED HEALTH CARE EDUCATION/TRAINING PROGRAM

## 2023-05-05 PROCEDURE — 25010000002 DEXAMETHASONE PER 1 MG

## 2023-05-05 PROCEDURE — 25010000002 CEFAZOLIN IN DEXTROSE 2-4 GM/100ML-% SOLUTION: Performed by: STUDENT IN AN ORGANIZED HEALTH CARE EDUCATION/TRAINING PROGRAM

## 2023-05-05 PROCEDURE — 25010000002 PROPOFOL 10 MG/ML EMULSION

## 2023-05-05 PROCEDURE — 88305 TISSUE EXAM BY PATHOLOGIST: CPT | Performed by: STUDENT IN AN ORGANIZED HEALTH CARE EDUCATION/TRAINING PROGRAM

## 2023-05-05 PROCEDURE — 25010000002 FENTANYL CITRATE (PF) 100 MCG/2ML SOLUTION

## 2023-05-05 PROCEDURE — 85027 COMPLETE CBC AUTOMATED: CPT | Performed by: STUDENT IN AN ORGANIZED HEALTH CARE EDUCATION/TRAINING PROGRAM

## 2023-05-05 PROCEDURE — 25010000002 NEOSTIGMINE 10 MG/10ML SOLUTION

## 2023-05-05 PROCEDURE — 63710000001 INSULIN REGULAR HUMAN PER 5 UNITS: Performed by: NURSE PRACTITIONER

## 2023-05-05 PROCEDURE — 86850 RBC ANTIBODY SCREEN: CPT | Performed by: STUDENT IN AN ORGANIZED HEALTH CARE EDUCATION/TRAINING PROGRAM

## 2023-05-05 PROCEDURE — 80048 BASIC METABOLIC PNL TOTAL CA: CPT | Performed by: STUDENT IN AN ORGANIZED HEALTH CARE EDUCATION/TRAINING PROGRAM

## 2023-05-05 PROCEDURE — 93005 ELECTROCARDIOGRAM TRACING: CPT | Performed by: NURSE PRACTITIONER

## 2023-05-05 RX ORDER — SODIUM CHLORIDE 0.9 % (FLUSH) 0.9 %
10 SYRINGE (ML) INJECTION EVERY 12 HOURS SCHEDULED
Status: CANCELLED | OUTPATIENT
Start: 2023-05-05

## 2023-05-05 RX ORDER — SODIUM CHLORIDE 9 MG/ML
50 INJECTION, SOLUTION INTRAVENOUS CONTINUOUS
Status: DISCONTINUED | OUTPATIENT
Start: 2023-05-05 | End: 2023-05-05

## 2023-05-05 RX ORDER — ALBUTEROL SULFATE 2.5 MG/3ML
10 SOLUTION RESPIRATORY (INHALATION) ONCE
Status: COMPLETED | OUTPATIENT
Start: 2023-05-06 | End: 2023-05-06

## 2023-05-05 RX ORDER — MIDAZOLAM HYDROCHLORIDE 1 MG/ML
1 INJECTION INTRAMUSCULAR; INTRAVENOUS
Status: DISCONTINUED | OUTPATIENT
Start: 2023-05-05 | End: 2023-05-05 | Stop reason: HOSPADM

## 2023-05-05 RX ORDER — MAGNESIUM HYDROXIDE 1200 MG/15ML
LIQUID ORAL AS NEEDED
Status: DISCONTINUED | OUTPATIENT
Start: 2023-05-05 | End: 2023-05-05 | Stop reason: HOSPADM

## 2023-05-05 RX ORDER — FENTANYL CITRATE 50 UG/ML
INJECTION, SOLUTION INTRAMUSCULAR; INTRAVENOUS
Status: COMPLETED
Start: 2023-05-05 | End: 2023-05-05

## 2023-05-05 RX ORDER — LIDOCAINE HYDROCHLORIDE 10 MG/ML
INJECTION, SOLUTION EPIDURAL; INFILTRATION; INTRACAUDAL; PERINEURAL AS NEEDED
Status: DISCONTINUED | OUTPATIENT
Start: 2023-05-05 | End: 2023-05-05 | Stop reason: SURG

## 2023-05-05 RX ORDER — SODIUM CHLORIDE 9 MG/ML
40 INJECTION, SOLUTION INTRAVENOUS AS NEEDED
Status: DISCONTINUED | OUTPATIENT
Start: 2023-05-05 | End: 2023-05-05 | Stop reason: HOSPADM

## 2023-05-05 RX ORDER — SODIUM CHLORIDE, SODIUM LACTATE, POTASSIUM CHLORIDE, CALCIUM CHLORIDE 600; 310; 30; 20 MG/100ML; MG/100ML; MG/100ML; MG/100ML
9 INJECTION, SOLUTION INTRAVENOUS CONTINUOUS
Status: DISCONTINUED | OUTPATIENT
Start: 2023-05-05 | End: 2023-05-05

## 2023-05-05 RX ORDER — FAMOTIDINE 20 MG/1
20 TABLET, FILM COATED ORAL ONCE
Status: COMPLETED | OUTPATIENT
Start: 2023-05-05 | End: 2023-05-05

## 2023-05-05 RX ORDER — SEVELAMER CARBONATE 800 MG/1
800 TABLET, FILM COATED ORAL
Status: DISCONTINUED | OUTPATIENT
Start: 2023-05-06 | End: 2023-05-09 | Stop reason: HOSPADM

## 2023-05-05 RX ORDER — PHENYLEPHRINE HCL IN 0.9% NACL 1 MG/10 ML
SYRINGE (ML) INTRAVENOUS AS NEEDED
Status: DISCONTINUED | OUTPATIENT
Start: 2023-05-05 | End: 2023-05-05 | Stop reason: SURG

## 2023-05-05 RX ORDER — EPHEDRINE SULFATE 50 MG/ML
INJECTION INTRAVENOUS AS NEEDED
Status: DISCONTINUED | OUTPATIENT
Start: 2023-05-05 | End: 2023-05-05 | Stop reason: SURG

## 2023-05-05 RX ORDER — SODIUM CHLORIDE 9 MG/ML
INJECTION, SOLUTION INTRAVENOUS CONTINUOUS PRN
Status: DISCONTINUED | OUTPATIENT
Start: 2023-05-05 | End: 2023-05-05 | Stop reason: SURG

## 2023-05-05 RX ORDER — SODIUM BICARBONATE 650 MG/1
650 TABLET ORAL 3 TIMES DAILY
Status: DISCONTINUED | OUTPATIENT
Start: 2023-05-05 | End: 2023-05-08

## 2023-05-05 RX ORDER — ONDANSETRON 4 MG/1
4 TABLET, FILM COATED ORAL EVERY 6 HOURS PRN
Status: DISCONTINUED | OUTPATIENT
Start: 2023-05-05 | End: 2023-05-09 | Stop reason: HOSPADM

## 2023-05-05 RX ORDER — NICOTINE POLACRILEX 4 MG
15 LOZENGE BUCCAL
Status: DISCONTINUED | OUTPATIENT
Start: 2023-05-05 | End: 2023-05-09 | Stop reason: HOSPADM

## 2023-05-05 RX ORDER — ROCURONIUM BROMIDE 10 MG/ML
INJECTION, SOLUTION INTRAVENOUS AS NEEDED
Status: DISCONTINUED | OUTPATIENT
Start: 2023-05-05 | End: 2023-05-05 | Stop reason: SURG

## 2023-05-05 RX ORDER — PANTOPRAZOLE SODIUM 40 MG/1
40 TABLET, DELAYED RELEASE ORAL
Status: DISCONTINUED | OUTPATIENT
Start: 2023-05-06 | End: 2023-05-09 | Stop reason: HOSPADM

## 2023-05-05 RX ORDER — DEXAMETHASONE SODIUM PHOSPHATE 4 MG/ML
INJECTION, SOLUTION INTRA-ARTICULAR; INTRALESIONAL; INTRAMUSCULAR; INTRAVENOUS; SOFT TISSUE AS NEEDED
Status: DISCONTINUED | OUTPATIENT
Start: 2023-05-05 | End: 2023-05-05 | Stop reason: SURG

## 2023-05-05 RX ORDER — DEXTROSE MONOHYDRATE 25 G/50ML
50 INJECTION, SOLUTION INTRAVENOUS ONCE
Status: COMPLETED | OUTPATIENT
Start: 2023-05-06 | End: 2023-05-05

## 2023-05-05 RX ORDER — AMOXICILLIN 250 MG
2 CAPSULE ORAL 2 TIMES DAILY
Status: DISCONTINUED | OUTPATIENT
Start: 2023-05-05 | End: 2023-05-09 | Stop reason: HOSPADM

## 2023-05-05 RX ORDER — CEFAZOLIN SODIUM 2 G/100ML
2 INJECTION, SOLUTION INTRAVENOUS ONCE
Status: COMPLETED | OUTPATIENT
Start: 2023-05-05 | End: 2023-05-05

## 2023-05-05 RX ORDER — FENTANYL CITRATE 50 UG/ML
50 INJECTION, SOLUTION INTRAMUSCULAR; INTRAVENOUS
Status: DISCONTINUED | OUTPATIENT
Start: 2023-05-05 | End: 2023-05-05 | Stop reason: HOSPADM

## 2023-05-05 RX ORDER — HYDROCODONE BITARTRATE AND ACETAMINOPHEN 5; 325 MG/1; MG/1
1 TABLET ORAL EVERY 6 HOURS PRN
Status: DISCONTINUED | OUTPATIENT
Start: 2023-05-05 | End: 2023-05-09 | Stop reason: HOSPADM

## 2023-05-05 RX ORDER — AMLODIPINE BESYLATE 10 MG/1
10 TABLET ORAL DAILY
Status: DISCONTINUED | OUTPATIENT
Start: 2023-05-06 | End: 2023-05-09 | Stop reason: HOSPADM

## 2023-05-05 RX ORDER — SODIUM CHLORIDE 0.9 % (FLUSH) 0.9 %
10 SYRINGE (ML) INJECTION AS NEEDED
Status: DISCONTINUED | OUTPATIENT
Start: 2023-05-05 | End: 2023-05-05 | Stop reason: HOSPADM

## 2023-05-05 RX ORDER — PROPOFOL 10 MG/ML
VIAL (ML) INTRAVENOUS AS NEEDED
Status: DISCONTINUED | OUTPATIENT
Start: 2023-05-05 | End: 2023-05-05 | Stop reason: SURG

## 2023-05-05 RX ORDER — FENTANYL CITRATE 50 UG/ML
INJECTION, SOLUTION INTRAMUSCULAR; INTRAVENOUS AS NEEDED
Status: DISCONTINUED | OUTPATIENT
Start: 2023-05-05 | End: 2023-05-05 | Stop reason: SURG

## 2023-05-05 RX ORDER — CALCIUM GLUCONATE 20 MG/ML
1 INJECTION, SOLUTION INTRAVENOUS ONCE
Status: COMPLETED | OUTPATIENT
Start: 2023-05-06 | End: 2023-05-05

## 2023-05-05 RX ORDER — LIDOCAINE HYDROCHLORIDE 10 MG/ML
0.5 INJECTION, SOLUTION EPIDURAL; INFILTRATION; INTRACAUDAL; PERINEURAL ONCE AS NEEDED
Status: COMPLETED | OUTPATIENT
Start: 2023-05-05 | End: 2023-05-05

## 2023-05-05 RX ORDER — NEOSTIGMINE METHYLSULFATE 1 MG/ML
INJECTION, SOLUTION INTRAVENOUS AS NEEDED
Status: DISCONTINUED | OUTPATIENT
Start: 2023-05-05 | End: 2023-05-05 | Stop reason: SURG

## 2023-05-05 RX ORDER — DEXTROSE MONOHYDRATE 25 G/50ML
25 INJECTION, SOLUTION INTRAVENOUS
Status: DISCONTINUED | OUTPATIENT
Start: 2023-05-05 | End: 2023-05-09 | Stop reason: HOSPADM

## 2023-05-05 RX ORDER — ONDANSETRON 2 MG/ML
INJECTION INTRAMUSCULAR; INTRAVENOUS AS NEEDED
Status: DISCONTINUED | OUTPATIENT
Start: 2023-05-05 | End: 2023-05-05 | Stop reason: SURG

## 2023-05-05 RX ORDER — FAMOTIDINE 10 MG/ML
20 INJECTION, SOLUTION INTRAVENOUS ONCE
Status: CANCELLED | OUTPATIENT
Start: 2023-05-05 | End: 2023-05-05

## 2023-05-05 RX ORDER — ONDANSETRON 2 MG/ML
4 INJECTION INTRAMUSCULAR; INTRAVENOUS EVERY 6 HOURS PRN
Status: DISCONTINUED | OUTPATIENT
Start: 2023-05-05 | End: 2023-05-09 | Stop reason: HOSPADM

## 2023-05-05 RX ORDER — GLYCOPYRROLATE 0.2 MG/ML
INJECTION INTRAMUSCULAR; INTRAVENOUS AS NEEDED
Status: DISCONTINUED | OUTPATIENT
Start: 2023-05-05 | End: 2023-05-05 | Stop reason: SURG

## 2023-05-05 RX ORDER — INSULIN LISPRO 100 [IU]/ML
0-7 INJECTION, SOLUTION INTRAVENOUS; SUBCUTANEOUS
Status: DISCONTINUED | OUTPATIENT
Start: 2023-05-06 | End: 2023-05-09 | Stop reason: HOSPADM

## 2023-05-05 RX ORDER — LIDOCAINE HYDROCHLORIDE 20 MG/ML
JELLY TOPICAL AS NEEDED
Status: DISCONTINUED | OUTPATIENT
Start: 2023-05-05 | End: 2023-05-05 | Stop reason: HOSPADM

## 2023-05-05 RX ORDER — SODIUM CHLORIDE 9 MG/ML
9 INJECTION, SOLUTION INTRAVENOUS ONCE
Status: COMPLETED | OUTPATIENT
Start: 2023-05-05 | End: 2023-05-05

## 2023-05-05 RX ADMIN — Medication 100 MCG: at 13:53

## 2023-05-05 RX ADMIN — CEFAZOLIN SODIUM 2 G: 2 INJECTION, SOLUTION INTRAVENOUS at 12:45

## 2023-05-05 RX ADMIN — SENNOSIDES AND DOCUSATE SODIUM 2 TABLET: 8.6; 5 TABLET ORAL at 21:57

## 2023-05-05 RX ADMIN — FENTANYL CITRATE 50 MCG: 50 INJECTION, SOLUTION INTRAMUSCULAR; INTRAVENOUS at 14:53

## 2023-05-05 RX ADMIN — LIDOCAINE HYDROCHLORIDE 0.5 ML: 10 INJECTION, SOLUTION EPIDURAL; INFILTRATION; INTRACAUDAL; PERINEURAL at 10:30

## 2023-05-05 RX ADMIN — SODIUM ZIRCONIUM CYCLOSILICATE 10 G: 10 POWDER, FOR SUSPENSION ORAL at 23:27

## 2023-05-05 RX ADMIN — DEXAMETHASONE SODIUM PHOSPHATE 4 MG: 4 INJECTION, SOLUTION INTRAMUSCULAR; INTRAVENOUS at 12:44

## 2023-05-05 RX ADMIN — HYDROMORPHONE HYDROCHLORIDE 0.5 MG: 1 INJECTION, SOLUTION INTRAMUSCULAR; INTRAVENOUS; SUBCUTANEOUS at 17:22

## 2023-05-05 RX ADMIN — SODIUM BICARBONATE 50 MEQ: 84 INJECTION, SOLUTION INTRAVENOUS at 23:28

## 2023-05-05 RX ADMIN — DEXTROSE MONOHYDRATE 50 ML: 25 INJECTION, SOLUTION INTRAVENOUS at 23:28

## 2023-05-05 RX ADMIN — PROPOFOL 150 MG: 10 INJECTION, EMULSION INTRAVENOUS at 12:39

## 2023-05-05 RX ADMIN — LIDOCAINE HYDROCHLORIDE 50 MG: 10 INJECTION, SOLUTION EPIDURAL; INFILTRATION; INTRACAUDAL; PERINEURAL at 12:39

## 2023-05-05 RX ADMIN — EPHEDRINE SULFATE 10 MG: 50 INJECTION INTRAVENOUS at 13:21

## 2023-05-05 RX ADMIN — ONDANSETRON 4 MG: 2 INJECTION INTRAMUSCULAR; INTRAVENOUS at 13:45

## 2023-05-05 RX ADMIN — SODIUM CHLORIDE: 9 INJECTION, SOLUTION INTRAVENOUS at 12:39

## 2023-05-05 RX ADMIN — NEOSTIGMINE 3 MG: 1 INJECTION INTRAVENOUS at 14:05

## 2023-05-05 RX ADMIN — SODIUM CHLORIDE 9 ML/HR: 9 INJECTION, SOLUTION INTRAVENOUS at 10:30

## 2023-05-05 RX ADMIN — EPHEDRINE SULFATE 10 MG: 50 INJECTION INTRAVENOUS at 12:51

## 2023-05-05 RX ADMIN — ROCURONIUM BROMIDE 10 MG: 10 INJECTION, SOLUTION INTRAVENOUS at 13:11

## 2023-05-05 RX ADMIN — ONDANSETRON 4 MG: 2 INJECTION INTRAMUSCULAR; INTRAVENOUS at 20:59

## 2023-05-05 RX ADMIN — Medication 0.5 MG: at 14:44

## 2023-05-05 RX ADMIN — HYDROCODONE BITARTRATE AND ACETAMINOPHEN 1 TABLET: 5; 325 TABLET ORAL at 21:57

## 2023-05-05 RX ADMIN — HYDROMORPHONE HYDROCHLORIDE 0.5 MG: 1 INJECTION, SOLUTION INTRAMUSCULAR; INTRAVENOUS; SUBCUTANEOUS at 14:44

## 2023-05-05 RX ADMIN — Medication 0.5 MG: at 17:22

## 2023-05-05 RX ADMIN — CALCIUM GLUCONATE 1 G: 20 INJECTION, SOLUTION INTRAVENOUS at 23:28

## 2023-05-05 RX ADMIN — FENTANYL CITRATE 50 MCG: 50 INJECTION, SOLUTION INTRAMUSCULAR; INTRAVENOUS at 14:39

## 2023-05-05 RX ADMIN — FENTANYL CITRATE 100 MCG: 50 INJECTION, SOLUTION INTRAMUSCULAR; INTRAVENOUS at 12:39

## 2023-05-05 RX ADMIN — Medication 50 MCG: at 13:22

## 2023-05-05 RX ADMIN — GLYCOPYRROLATE 0.4 MCG: 0.2 INJECTION INTRAMUSCULAR; INTRAVENOUS at 14:05

## 2023-05-05 RX ADMIN — FAMOTIDINE 20 MG: 20 TABLET ORAL at 10:30

## 2023-05-05 RX ADMIN — SODIUM BICARBONATE 650 MG TABLET 650 MG: at 21:57

## 2023-05-05 RX ADMIN — Medication 0.5 MG: at 19:04

## 2023-05-05 RX ADMIN — INSULIN HUMAN 10 UNITS: 100 INJECTION, SOLUTION PARENTERAL at 23:27

## 2023-05-05 RX ADMIN — ROCURONIUM BROMIDE 30 MG: 10 INJECTION, SOLUTION INTRAVENOUS at 12:39

## 2023-05-05 RX ADMIN — HYDROMORPHONE HYDROCHLORIDE 0.5 MG: 1 INJECTION, SOLUTION INTRAMUSCULAR; INTRAVENOUS; SUBCUTANEOUS at 19:04

## 2023-05-05 NOTE — ANESTHESIA PROCEDURE NOTES
Airway  Urgency: elective    Date/Time: 5/5/2023 12:42 PM  Airway not difficult    General Information and Staff    Patient location during procedure: OR  CRNA/CAA: Vaibhav Cuellar CRNA    Indications and Patient Condition  Indications for airway management: airway protection    Preoxygenated: yes  MILS not maintained throughout  Mask difficulty assessment: 1 - vent by mask    Final Airway Details  Final airway type: endotracheal airway      Successful airway: ETT  Cuffed: yes   Successful intubation technique: video laryngoscopy  Endotracheal tube insertion site: oral  Blade: Harris  Blade size: 3  ETT size (mm): 7.5  Cormack-Lehane Classification: grade I - full view of glottis  Placement verified by: chest auscultation and capnometry   Cuff volume (mL): 10  Measured from: lips  ETT/EBT  to lips (cm): 22  Number of attempts at approach: 1  Assessment: lips, teeth, and gum same as pre-op and atraumatic intubation    Additional Comments  Negative epigastric sounds, Breath sound equal bilaterally with symmetric chest rise and fall

## 2023-05-05 NOTE — CASE MANAGEMENT/SOCIAL WORK
"Continued Stay Note  [facility]     Patient Name: Jun Young  MRN: [id number]  Today's Date: 5/5/2023    Admit Date: 5/5/2023    Plan: Januvia assistance   Discharge Plan     Row Name 05/05/23 1606       Plan    Plan Januvia assistance    Plan Comments SW reviewed pt's 2023 [facility] Medicaid formulary.  Januvia is an approved medication with a quantity limit of 1 tablet daily.  It's considered a \"Tier 2\" drug with a \"medium copayment.\"  There was no indication that a Prior Auth is requried.     [facility] has also arranged a new PCP appointment for Thursday, May 11th at 10:15am with Dr. Chow at [address] [facility].  Pt will need to bring his insurance card, photo ID, and medications to the appointment.  Pt was requested to arrive 30 minutes early.    Final Discharge Disposition Code 01 - home or self-care               Discharge Codes    No documentation.                     DEB Antonio    "

## 2023-05-05 NOTE — ANESTHESIA POSTPROCEDURE EVALUATION
Patient: Jun Young    Procedure Summary     Date: 05/05/23 Room / Location:  [facility]    Anesthesia Start: 1232 Anesthesia Stop: 1420    Procedure: CYSTOSCOPY TRANSURETHRAL RESECTION OF PROSTATE (BIPOLAR) Diagnosis:       BPH with obstruction/lower urinary tract symptoms      Urinary retention      (BPH with obstruction/lower urinary tract symptoms [N40.1, N13.8])      (Urinary retention [R33.9])    Surgeons: Pawan Damico MD Provider: Jerad Samuels Jr., MD    Anesthesia Type: general ASA Status: 3          Anesthesia Type: general    Vitals  Vitals Value Taken Time   /93 05/05/23 1419   Temp 97 °F (36.1 °C) 05/05/23 1419   Pulse 84 05/05/23 1419   Resp 14 05/05/23 1419   SpO2 99 % 05/05/23 1419           Post Anesthesia Care and Evaluation    Patient location during evaluation: PACU  Patient participation: complete - patient participated  Level of consciousness: awake and alert  Pain score: 0  Pain management: adequate    Airway patency: patent  Anesthetic complications: No anesthetic complications  PONV Status: none  Cardiovascular status: hemodynamically stable and acceptable  Respiratory status: nonlabored ventilation, acceptable and nasal cannula  Hydration status: acceptable    Comments: Pt arrived to PACU with no issues during transport. Pt placed directly to monitors. Vital signs are within parameters. Pt maintaining ventilation spontaneously. No changes to dental. Report given to PACU and all question and concerns were addressed as well as the plan of care.

## 2023-05-05 NOTE — PROGRESS NOTES
Continued Stay Note  [facility]     Patient Name: Jun Young  MRN: [id number]  Today's Date: 5/5/2023    Admit Date: 5/5/2023    Plan: Medication affordability   Discharge Plan     Row Name 05/05/23 1057       Plan    Plan Medication affordability    Plan Comments SW received a call from pt's pre-op RN explaining pt reported having difficulty getting prescriptions covered by his insurance.  SW called pt's [facility] pharmacy to discuss medications with pharmacist.  Pharmacist reported pt's copay costs are $0. There were two medications (Protonix and Renavite) that pt wasn't able to get filled as the request was too soon.  Pharmacist is able to fill these today for pt.  Pharmacist did observe that the medication Renvela requires a prior auth.  Cash price with a GoodRx coupon is $29.35 if pt needed this medication urgently.  Pharmacist will submit the PA request to medication prescriber again.  SW will provide update and give coupon information to pt following surgery.    Final Discharge Disposition Code 01 - home or self-care               Discharge Codes    No documentation.                     DEB Antonio

## 2023-05-05 NOTE — NURSING NOTE
Updated Dr. Damico on patients status that had to irrigate once due to large stringy clot.  CBI has been running wide open since arrival to PACU and since he was at bedside.  UIrine continues to be cherry colored.  Dr. Damico had me take the tape traction off.  Will continue to monitor.  Will call again if he continues to clot off.

## 2023-05-05 NOTE — OP NOTE
CYSTOSCOPY TRANSURETHRAL RESECTION OF PROSTATE WITH SALINE  Procedure Report    Patient Name:  Jun Young  YOB: 1960    Date of Surgery:  5/5/2023     Indications: 62-year-old male with history of chronic BPH and outlet obstruction resulting and bilateral severe hydroureteronephrosis, chronic renal atrophy and end-stage renal disease managing with dialysis, he still makes significant enough urine that he has been in and out of urinary retention, he has been managed with an indwelling Maher catheter.  We have discussed options including intermittent catheterization which he is unwilling to perform and he elects to proceed to the operating room today for transurethral resection of prostate.  He has a massive prostate with significant intravesical median lobe.  Risks benefits and alternatives discussed.    Pre-op Diagnosis:   BPH with obstruction/lower urinary tract symptoms [N40.1, N13.8]  Urinary retention [R33.9]       Post-Op Diagnosis Codes:     * BPH with obstruction/lower urinary tract symptoms [N40.1, N13.8]     * Urinary retention [R33.9]      Procedure(s):  CYSTOSCOPY   TRANSURETHRAL RESECTION OF PROSTATE (BIPOLAR)  MAHER CATHETER PLACEMENT    Staff:  Surgeon(s):  Pawan Damico MD         Anesthesia: General    Estimated Blood Loss: minimal    Implants:    Nothing was implanted during the procedure    Specimen:          Specimens     ID Source Type Tests Collected By Collected At Frozen?    A Prostate Tissue · TISSUE PATHOLOGY EXAM   Pawan Damico MD 5/5/23 0945 No    Description: PROSTATE CHIPS          Drains: 22 Fr 3-way maher       Findings: Extreme BPH with trilobar hyperplasia, significant intravesical median lobe, intravesical anterior and lateral lobes, extending well into the bladder lumen.  Prolonged TURP, challenging procedure.  22 Hebrew three-way Maher catheter on CBI.    Complications: None    Description of Procedure:   After informed consent was  obtained and signed, the patient was taken back to the operating suite. A time-out was performed to verify procedure details and the patient's identity. Next, the patient was placed supine on the operating table. Preoperative antibiotics were infused. The patient then underwent smooth induction of general endotracheal anesthesia. Bilateral lower extremity sequential compression devices were cycling. The patient was then moved to a dorsal lithotomy position. All pressure points were carefully padded. The genitals were then prepped and draped in usual sterile fashion. Next, a 26-Samoan continuous flow resectoscope was assembled and inserted atraumatically into the urethra and into the bladder.     There was severe trilobar hyperplasia with a massive intravesical median lobe, massive intravesical lateral lobe component and massive intravesical anterior prostatic component all pushing well into the bladder.  Difficult to identify the bilateral ureters initially.  Eventually these were identified.    Using the bipolar loop on cut cautery we began resecting the bladder neck adenoma down to the muscle fibers from 3 to 9 o'clock position.  Given the size of the median lobe this took a long while.  The prostatic fossa was extremely long.  I spent another significant portion of time just resecting out bladder neck adenoma in circumferential fashion given the significant intravesical component of the prostate.  Significant bleeding vessels were identified and cauterized once encountered.      Visualization was challenging secondary to persistent oozing from the prostate and the massive amount of prostate tissue present.  I began working resecting at the right lateral lobe, given the extent of prostate tissue, the prostatic capsule was not able to be identified but I did remove a significant amount of tissue and opened this widely to the level of the verumontanum.  I then performed identical resection on the left lateral side,  again difficult dissection, appear to be very pillowy prostatic tissue throughout the resection.  A large swath of the left lateral lobe was removed, the prostatic capsule was not able to be identified given the size of the prostate.  However at this point 90 minutes plus had passed with resection there what appeared to be a large channel from the bladder neck all the way to the verumontanum and there was significant oozing.  A bipolar cautery ball was then used to obtain hemostasis throughout the resection area.  I identified the ureters again which were fairly close to the bladder neck but avoided during cauterization.    I then used a Rigo syringe to remove all prostate tissue pieces and sent these to pathology.  The bladder was then emptied.    Given that there was good hemostasis under no-flow and all prostatic chips had been removed, the patient's bladder was drained. A 22 Fr 3-way catheter was placed on slight traction, with CBI initiated. The procedure was then concluded. The patient was awoken from anesthesia and taken to PACU in good condition.      Disposition/Follow Up: Admit to [facility], hospitalist primary team, needs likely hemodialysis while admitted.  Maintain CBI, wean CBI and discharge tomorrow with catheter in place with plan to follow-up next week for cath removal and trial of void.      Pawan Damico MD     Date: 5/5/2023  Time: 17:02 EDT

## 2023-05-05 NOTE — ANESTHESIA PREPROCEDURE EVALUATION
Anesthesia Evaluation     Patient summary reviewed and Nursing notes reviewed   no history of anesthetic complications:  NPO Solid Status: > 8 hours  NPO Liquid Status: > 2 hours           Airway   Mallampati: II  TM distance: >3 FB  Neck ROM: full  No difficulty expected  Dental    (+) poor dentition    Pulmonary - normal exam   (-) asthma, sleep apnea, not a smoker  Cardiovascular - normal exam    ECG reviewed    (+) hypertension,     ROS comment: ECHO 3/23:  Interpretation Summary       •  Left ventricular systolic function is normal. Calculated left ventricular EF = 61.7% Left ventricular ejection fraction appears to be 61 - 65%.  •  Left ventricular wall thickness is consistent with mild concentric hypertrophy.  •  There is a small (<1cm) pericardial effusion.      Neuro/Psych  (-) seizures, CVA  GI/Hepatic/Renal/Endo    (+)  GERD well controlled,  renal disease ESRD and dialysis, diabetes mellitus type 2,     Musculoskeletal     Abdominal    Substance History - negative use     OB/GYN          Other   blood dyscrasia anemia,     ROS/Med Hx Other: troponins elevated                  Anesthesia Plan    ASA 3     general     intravenous induction     Anesthetic plan, risks, benefits, and alternatives have been provided, discussed and informed consent has been obtained with: patient.    Use of blood products discussed with patient  Consented to blood products.   Plan discussed with CRNA.        CODE STATUS:

## 2023-05-05 NOTE — BRIEF OP NOTE
CYSTOSCOPY TRANSURETHRAL RESECTION OF PROSTATE WITH SALINE  Progress Note    Jun Young  5/5/2023    Pre-op Diagnosis:   BPH with obstruction/lower urinary tract symptoms [N40.1, N13.8]  Urinary retention [R33.9]       Post-Op Diagnosis Codes:     * BPH with obstruction/lower urinary tract symptoms [N40.1, N13.8]     * Urinary retention [R33.9]     Procedure(s):  CYSTOSCOPY TRANSURETHRAL RESECTION OF PROSTATE (BIPOLAR)        Surgeon(s):  Pawan Damico MD    Anesthesia: General    Staff:   Circulator: Rosanna Douglas, MATT; Nita Dunn, MATT; Annette Willett, MATT; Milka Greene RN  Scrub Person: Joselin Iraheta; Daisy Mccrary         Estimated Blood Loss: 100ml    Urine Voided: * No values recorded between 5/5/2023 12:32 PM and 5/5/2023  2:15 PM *    Specimens:                Specimens     ID Source Type Tests Collected By Collected At Frozen?    A Prostate Tissue · TISSUE PATHOLOGY EXAM   Pawan Damico MD 5/5/23 1309 No    Description: PROSTATE CHIPS                Drains:   Urethral Catheter Latex 22 Fr. (Active)       [REMOVED] Urethral Catheter Non-latex;Silicone;Straight-tip 22 Fr. (Removed)       [REMOVED] Continuous Bladder Irrigation Triple-lumen 22 Fr (Removed)       Findings: extreme bph with trilobar obstruction, 22 Fr 3-way on CBI. Prolonged resection given extreme prostate volume.         Complications: None          Pawan Damico MD     Date: 5/5/2023  Time: 14:32 EDT

## 2023-05-05 NOTE — CONSULTS
[facility] Medicine Services  CONSULT NOTE      Patient Name: Jun Young  : 1960  MRN: [id number]    Primary Care Physician: Provider, No Known  Provider requesting consultation: No Known Provider    Subjective   Subjective     Reason for Consultation:  Med management     HPI:  Jun Young is a 62 y.o. male with history of DM, HTN, ESRD with dialysis MWF, obstructive uropathy who presents for a TURP with Dr. Damico. States he has been feeling ok. Last dialysis on Wednesday. He has been out of his diabetic medication for 2 months. Currently denies SOA, CP, abd pain.       Review of Systems  Gen- No fevers, chills  CV- No chest pain, palpitations  Resp- No cough, dyspnea  GI- No N/V/D, abd pain    All other systems reviewed and are negative.     Personal History     Past Medical History:   Diagnosis Date   • Acute on chronic renal failure    • Bilateral hydronephrosis    • BPH (benign prostatic hyperplasia)    • Cholelithiasis    • Diabetes mellitus    • Dyslipidemia    • Elevated cholesterol    • GERD (gastroesophageal reflux disease)    • Gross hematuria    • Hemodialysis patient    • History of transfusion     [facility], no reactions per pt   • Hyperlipidemia    • Hypertension    • Pericardial effusion     echo 2023 - 1-2 cm preserved LV function   • Stroke     no residual effects       Past Surgical History:   Procedure Laterality Date   • COLONOSCOPY     • CYSTOSCOPY BLADDER BIOPSY N/A 2023    Procedure: CYSTOSCOPY CLOT EVACUATION WITH FULGURATION, RETROGRADE PYELOGRAM;  Surgeon: Mandi Velasco MD;  Location: [facility];  Service: Urology;  Laterality: N/A;   • CYSTOSCOPY BLADDER BIOPSY N/A 2023    Procedure: CYSTOSCOPY WITH FULGURATION OF BLEEDERS;  Surgeon: Santana Christianson MD;  Location: [facility];  Service: Urology;  Laterality: N/A;   • CYSTOSCOPY WITH CLOT EVACUATION      fulguration - 3 way catheter placement   • INSERTION  HEMODIALYSIS CATHETER         Family History:  Family history is unknown by patient. Otherwise pertinent FHx was reviewed and unremarkable.     Social History:  reports that he has never smoked. He has never been exposed to tobacco smoke. He has never used smokeless tobacco. He reports that he does not drink alcohol and does not use drugs.    Medications:  B Complex-C-Folic Acid, HYDROcodone-acetaminophen, SITagliptin, acetaminophen, amLODIPine, b complex-vitamin c-folic acid, bacitracin-polymyxin b, miconazole, pantoprazole, sevelamer, and sodium bicarbonate    Scheduled Meds:amLODIPine, 10 mg, Oral, Daily  [START ON 5/6/2023] pantoprazole, 40 mg, Oral, Q AM  senna-docusate sodium, 2 tablet, Oral, BID  sevelamer, 800 mg, Oral, TID With Meals  sodium bicarbonate, 650 mg, Oral, TID      Continuous Infusions:   PRN Meds:.•  fentanyl  •  HYDROmorphone  •  lactated ringers  •  ondansetron **OR** ondansetron    No Known Allergies    Objective   Objective     Vital Signs:   Temp:  [97 °F (36.1 °C)-98 °F (36.7 °C)] 97 °F (36.1 °C)  Heart Rate:  [68-94] 93  Resp:  [14-18] 16  BP: (119-169)/() 169/88  Flow (L/min):  [2-5] 2    Physical Exam  Constitutional: Awake, alert  Eyes: PERRLA, sclerae anicteric, no conjunctival injection  HENT: NCAT, mucous membranes moist  Neck: Supple, no thyromegaly, no lymphadenopathy, trachea midline  Respiratory: Clear to auscultation bilaterally, nonlabored respirations   Cardiovascular: RRR, no murmurs, rubs, or gallops, palpable pedal pulses bilaterally  Gastrointestinal: Positive bowel sounds, soft, nontender, nondistended  : caldwell with CBI; red tinged urine  Musculoskeletal: No bilateral ankle edema, no clubbing or cyanosis to extremities  Psychiatric: Appropriate affect, cooperative  Neurologic: Oriented x 3, strength symmetric in all extremities, Cranial Nerves grossly intact to confrontation, speech clear  Skin: No rashes      Result Review:  I have personally reviewed the  results from the time of admission and agree with these findings:  []  Laboratory  []  Radiology  []  EKG/Telemetry   []  Pathology  []  Old records  []  Other:  Most notable findings include:    LAB RESULTS:          Lab 05/05/23  1058   POTASSIUM 4.3                     Lab 05/05/23  0953   ABO TYPING O   RH TYPING Positive   ANTIBODY SCREEN Negative         Brief Urine Lab Results  (Last result in the past 365 days)      Color   Clarity   Blood   Leuk Est   Nitrite   Protein   CREAT   Urine HCG        04/28/23 0837 Yellow   Cloudy   Moderate (2+)   Large (3+)   Negative   >=300 mg/dL (3+)               Microbiology Results (last 10 days)     ** No results found for the last 240 hours. **          No radiology results from the last 24 hrs    Results for orders placed during the hospital encounter of 03/17/23    Adult Transthoracic Echo Limited W/ Cont if Necessary Per Protocol    Interpretation Summary  •  Left ventricular systolic function is normal. Calculated left ventricular EF = 61.7% Left ventricular ejection fraction appears to be 61 - 65%.  •  Left ventricular wall thickness is consistent with mild concentric hypertrophy.  •  There is a small (<1cm) pericardial effusion.      Assessment & Plan   Assessment & Plan     Active Hospital Problems    Diagnosis  POA   • **BPH with obstruction/lower urinary tract symptoms [N40.1, N13.8]  Yes   • Hypertension [I10]  Unknown   • Urinary retention [R33.9]  Yes   • ESRD (end stage renal disease) [N18.6]  Yes   • Diabetes mellitus [E11.9]  Yes      Resolved Hospital Problems   No resolved problems to display.       Jun Young is a 62 y.o. male with history of DM, HTN, ESRD with dialysis MWF, obstructive uropathy who presents for a TURP with Dr. Damico. Medicine consulted for medical management    Obstructive uropathy s/p TURP with Dr. Damico   - Post op and caldwell management per Dr. Damico   - PRN pain control     ESRD dialysis MWF   - Last  dialysis on Wednesday. Currently with no urgent need for dialysis  - Nephrology consult     HTN   - Continue amlodipine     DM   - previously on Januvia. States he has been out of medication x 2 months   - A1C pending  - SSI   - Will need new medications Rx and PCP at discharge       Thank you for allowing [facility] Medicine Service to provide consultative care for your patient, we will continue to follow while clinically appropriate.    Lora Dickey,   05/05/23

## 2023-05-05 NOTE — INTERVAL H&P NOTE
"[facility] Pre-op    Full history and physical note from office is attached.    /89 (BP Location: Right arm, Patient Position: Lying)   Pulse 68   Temp 98 °F (36.7 °C) (Temporal)   Resp 18   Ht 170.2 cm (67.01\")   Wt 75 kg (165 lb 5.5 oz)   SpO2 98%   BMI 25.89 kg/m²       LAB Results:  Lab Results   Component Value Date    WBC 7.59 04/28/2023    HGB 11.2 (L) 04/28/2023    HCT 34.9 (L) 04/28/2023    MCV 96.1 04/28/2023     04/28/2023    NEUTROABS 7.53 (H) 03/21/2023    GLUCOSE 112 (H) 04/28/2023    BUN 81 (H) 04/28/2023    CREATININE 7.79 (H) 04/28/2023     04/28/2023    K 4.7 04/28/2023    CL 98 04/28/2023    CO2 25.0 04/28/2023    MG 2.3 03/20/2023    PHOS 5.7 (H) 03/20/2023    CALCIUM 8.8 04/28/2023    ALBUMIN 2.7 (L) 03/20/2023    AST 23 03/17/2023    ALT 16 03/17/2023    BILITOT 0.5 03/17/2023    PTT 38.1 (L) 02/07/2023    INR 1.17 (H) 02/07/2023       Cancer Staging (if applicable)  Cancer Patient: __ yes __no __unknown__N/A; If yes, clinical stage T:__ N:__M:__, stage group or __N/A      Impression: BPH with obstruction/lower urinary tract symptoms       Plan: CYSTOSCOPY TRANSURETHRAL RESECTION OF PROSTATE (BIPOLAR)      FRANKY Dozier   5/5/2023   10:15 EDT   "

## 2023-05-06 ENCOUNTER — APPOINTMENT (OUTPATIENT)
Dept: NEPHROLOGY | Facility: HOSPITAL | Age: 63
End: 2023-05-06
Payer: COMMERCIAL

## 2023-05-06 LAB
ANION GAP SERPL CALCULATED.3IONS-SCNC: 23 MMOL/L (ref 5–15)
BUN SERPL-MCNC: 77 MG/DL (ref 8–23)
BUN/CREAT SERPL: 8.3 (ref 7–25)
CALCIUM SPEC-SCNC: 8.7 MG/DL (ref 8.6–10.5)
CHLORIDE SERPL-SCNC: 95 MMOL/L (ref 98–107)
CO2 SERPL-SCNC: 19 MMOL/L (ref 22–29)
CREAT SERPL-MCNC: 9.3 MG/DL (ref 0.76–1.27)
DEPRECATED RDW RBC AUTO: 47.5 FL (ref 37–54)
EGFRCR SERPLBLD CKD-EPI 2021: 5.8 ML/MIN/1.73
ERYTHROCYTE [DISTWIDTH] IN BLOOD BY AUTOMATED COUNT: 14.1 % (ref 12.3–15.4)
GLUCOSE BLDC GLUCOMTR-MCNC: 134 MG/DL (ref 70–130)
GLUCOSE BLDC GLUCOMTR-MCNC: 154 MG/DL (ref 70–130)
GLUCOSE BLDC GLUCOMTR-MCNC: 160 MG/DL (ref 70–130)
GLUCOSE BLDC GLUCOMTR-MCNC: 323 MG/DL (ref 70–130)
GLUCOSE SERPL-MCNC: 168 MG/DL (ref 65–99)
HCT VFR BLD AUTO: 29.7 % (ref 37.5–51)
HGB BLD-MCNC: 9.8 G/DL (ref 13–17.7)
MCH RBC QN AUTO: 30.9 PG (ref 26.6–33)
MCHC RBC AUTO-ENTMCNC: 33 G/DL (ref 31.5–35.7)
MCV RBC AUTO: 93.7 FL (ref 79–97)
PLATELET # BLD AUTO: 156 10*3/MM3 (ref 140–450)
PMV BLD AUTO: 8.9 FL (ref 6–12)
POTASSIUM SERPL-SCNC: 5 MMOL/L (ref 3.5–5.2)
RBC # BLD AUTO: 3.17 10*6/MM3 (ref 4.14–5.8)
SODIUM SERPL-SCNC: 137 MMOL/L (ref 136–145)
WBC NRBC COR # BLD: 10.09 10*3/MM3 (ref 3.4–10.8)

## 2023-05-06 PROCEDURE — 80048 BASIC METABOLIC PNL TOTAL CA: CPT | Performed by: STUDENT IN AN ORGANIZED HEALTH CARE EDUCATION/TRAINING PROGRAM

## 2023-05-06 PROCEDURE — 82948 REAGENT STRIP/BLOOD GLUCOSE: CPT

## 2023-05-06 PROCEDURE — 99232 SBSQ HOSP IP/OBS MODERATE 35: CPT | Performed by: INTERNAL MEDICINE

## 2023-05-06 PROCEDURE — 94761 N-INVAS EAR/PLS OXIMETRY MLT: CPT

## 2023-05-06 PROCEDURE — 99024 POSTOP FOLLOW-UP VISIT: CPT | Performed by: UROLOGY

## 2023-05-06 PROCEDURE — G0257 UNSCHED DIALYSIS ESRD PT HOS: HCPCS

## 2023-05-06 PROCEDURE — 25010000002 HEPARIN (PORCINE) PER 1000 UNITS: Performed by: INTERNAL MEDICINE

## 2023-05-06 PROCEDURE — 63710000001 INSULIN LISPRO (HUMAN) PER 5 UNITS: Performed by: INTERNAL MEDICINE

## 2023-05-06 PROCEDURE — 25010000002 ALBUMIN HUMAN 25% PER 50 ML

## 2023-05-06 PROCEDURE — P9047 ALBUMIN (HUMAN), 25%, 50ML: HCPCS

## 2023-05-06 PROCEDURE — 85027 COMPLETE CBC AUTOMATED: CPT | Performed by: STUDENT IN AN ORGANIZED HEALTH CARE EDUCATION/TRAINING PROGRAM

## 2023-05-06 PROCEDURE — 25010000002 DIPHENHYDRAMINE PER 50 MG: Performed by: INTERNAL MEDICINE

## 2023-05-06 RX ORDER — DIPHENHYDRAMINE HYDROCHLORIDE 50 MG/ML
25 INJECTION INTRAMUSCULAR; INTRAVENOUS ONCE
Status: COMPLETED | OUTPATIENT
Start: 2023-05-06 | End: 2023-05-06

## 2023-05-06 RX ORDER — ALBUMIN (HUMAN) 12.5 G/50ML
12.5 SOLUTION INTRAVENOUS AS NEEDED
Status: ACTIVE | OUTPATIENT
Start: 2023-05-06 | End: 2023-05-07

## 2023-05-06 RX ORDER — HEPARIN SODIUM 1000 [USP'U]/ML
2000 INJECTION, SOLUTION INTRAVENOUS; SUBCUTANEOUS AS NEEDED
Status: DISCONTINUED | OUTPATIENT
Start: 2023-05-06 | End: 2023-05-09 | Stop reason: HOSPADM

## 2023-05-06 RX ORDER — HEPARIN SODIUM 1000 [USP'U]/ML
2100 INJECTION, SOLUTION INTRAVENOUS; SUBCUTANEOUS AS NEEDED
Status: DISCONTINUED | OUTPATIENT
Start: 2023-05-06 | End: 2023-05-06

## 2023-05-06 RX ORDER — ALBUMIN (HUMAN) 12.5 G/50ML
SOLUTION INTRAVENOUS
Status: COMPLETED
Start: 2023-05-06 | End: 2023-05-06

## 2023-05-06 RX ADMIN — SEVELAMER CARBONATE 800 MG: 800 TABLET, FILM COATED ORAL at 08:53

## 2023-05-06 RX ADMIN — ALBUMIN (HUMAN) 25 G: 0.25 INJECTION, SOLUTION INTRAVENOUS at 10:46

## 2023-05-06 RX ADMIN — INSULIN LISPRO 2 UNITS: 100 INJECTION, SOLUTION INTRAVENOUS; SUBCUTANEOUS at 12:35

## 2023-05-06 RX ADMIN — PANTOPRAZOLE SODIUM 40 MG: 40 TABLET, DELAYED RELEASE ORAL at 05:37

## 2023-05-06 RX ADMIN — SENNOSIDES AND DOCUSATE SODIUM 2 TABLET: 8.6; 5 TABLET ORAL at 08:54

## 2023-05-06 RX ADMIN — SODIUM BICARBONATE 650 MG TABLET 650 MG: at 08:54

## 2023-05-06 RX ADMIN — INSULIN LISPRO 2 UNITS: 100 INJECTION, SOLUTION INTRAVENOUS; SUBCUTANEOUS at 08:54

## 2023-05-06 RX ADMIN — SEVELAMER CARBONATE 800 MG: 800 TABLET, FILM COATED ORAL at 17:35

## 2023-05-06 RX ADMIN — AMLODIPINE BESYLATE 10 MG: 10 TABLET ORAL at 08:53

## 2023-05-06 RX ADMIN — SEVELAMER CARBONATE 800 MG: 800 TABLET, FILM COATED ORAL at 12:35

## 2023-05-06 RX ADMIN — DIPHENHYDRAMINE HYDROCHLORIDE 25 MG: 50 INJECTION INTRAMUSCULAR; INTRAVENOUS at 11:25

## 2023-05-06 RX ADMIN — SODIUM BICARBONATE 650 MG TABLET 650 MG: at 17:35

## 2023-05-06 RX ADMIN — ALBUTEROL SULFATE 10 MG: 2.5 SOLUTION RESPIRATORY (INHALATION) at 00:29

## 2023-05-06 RX ADMIN — HYDROCODONE BITARTRATE AND ACETAMINOPHEN 1 TABLET: 5; 325 TABLET ORAL at 12:35

## 2023-05-06 RX ADMIN — HEPARIN SODIUM 2000 UNITS: 1000 INJECTION INTRAVENOUS; SUBCUTANEOUS at 10:51

## 2023-05-06 RX ADMIN — ALBUMIN (HUMAN) 25 G: 12.5 SOLUTION INTRAVENOUS at 10:46

## 2023-05-06 RX ADMIN — SENNOSIDES AND DOCUSATE SODIUM 2 TABLET: 8.6; 5 TABLET ORAL at 20:00

## 2023-05-06 RX ADMIN — SODIUM BICARBONATE 650 MG TABLET 650 MG: at 20:00

## 2023-05-06 NOTE — CONSULTS
Referring Provider: Lora Dickey   Reason for Consultation: ESRD    Subjective     Chief complaint LUTS    History of present illness:  This is 62 year old man with past medical hx of ESRD on MWF, HTN, HLD, BPH with urinary retention who presented to LUTS. He has chronic obstructive uropathy from extreme BPH. He has refused indwelling catheter and supra pubic catheter in the past. He underwent Cystoscopy and TURP 5/5/23. Nephrology service has been consulted of ESRD management.     History  Past Medical History:   Diagnosis Date   • Acute on chronic renal failure    • Bilateral hydronephrosis    • BPH (benign prostatic hyperplasia)    • Cholelithiasis    • Diabetes mellitus    • Dyslipidemia    • Elevated cholesterol    • GERD (gastroesophageal reflux disease)    • Gross hematuria    • Hemodialysis patient    • History of transfusion     [facility], no reactions per pt   • Hyperlipidemia    • Hypertension    • Pericardial effusion     echo 2/2023 - 1-2 cm preserved LV function   • Stroke     no residual effects   ,   Past Surgical History:   Procedure Laterality Date   • COLONOSCOPY     • CYSTOSCOPY BLADDER BIOPSY N/A 02/09/2023    Procedure: CYSTOSCOPY CLOT EVACUATION WITH FULGURATION, RETROGRADE PYELOGRAM;  Surgeon: Mandi Velasco MD;  Location: [facility];  Service: Urology;  Laterality: N/A;   • CYSTOSCOPY BLADDER BIOPSY N/A 03/18/2023    Procedure: CYSTOSCOPY WITH FULGURATION OF BLEEDERS;  Surgeon: Santana Christianson MD;  Location: [facility];  Service: Urology;  Laterality: N/A;   • CYSTOSCOPY WITH CLOT EVACUATION      fulguration - 3 way catheter placement   • INSERTION HEMODIALYSIS CATHETER     ,   Family History   Family history unknown: Yes   ,   Social History     Socioeconomic History   • Marital status: Single   Tobacco Use   • Smoking status: Never     Passive exposure: Never   • Smokeless tobacco: Never   Vaping Use   • Vaping Use: Never used   Substance and Sexual Activity   • Alcohol use: Never   • Drug  use: Never   • Sexual activity: Not Currently     E-cigarette/Vaping   • E-cigarette/Vaping Use Never User    • Passive Exposure No    • Counseling Given No      E-cigarette/Vaping Substances   • Nicotine No    • THC No    • CBD No    • Flavoring No      E-cigarette/Vaping Devices   • Disposable No    • Pre-filled or Refillable Cartridge No    • Refillable Tank No    • Pre-filled Pod No        ,   Medications Prior to Admission   Medication Sig Dispense Refill Last Dose   • SITagliptin (Januvia) 25 MG tablet Take 1 tablet by mouth Daily. 30 tablet 1 5/4/2023   • SITagliptin (JANUVIA) 25 MG tablet Take 1 tablet by mouth Daily.   5/4/2023   • acetaminophen (TYLENOL) 325 MG tablet Take 2 tablets by mouth Every 4 (Four) Hours As Needed for Mild Pain.   More than a month   • amLODIPine (NORVASC) 10 MG tablet Take 1 tablet by mouth Daily.   More than a month   • B Complex-C-Folic Acid (KAYLEIGH-LASHON PO) Take 1 tablet by mouth Daily.   More than a month   • b complex-vitamin c-folic acid (NEPHRO-LASHON) 0.8 MG tablet tablet Take 1 tablet by mouth Daily. 30 tablet 1 More than a month   • bacitracin-polymyxin b (POLYSPORIN) 500-50013 UNIT/GM ointment Apply to tip of penis/catheter twice daily for irritation 30 g 0 More than a month   • bacitracin-polymyxin b (POLYSPORIN) 500-21255 UNIT/GM ointment Apply 1 application topically to the appropriate area as directed 2 (Two) Times a Day.   More than a month   • HYDROcodone-acetaminophen (NORCO) 5-325 MG per tablet Take 1 tablet by mouth Every 8 (Eight) Hours As Needed for Moderate Pain. 6 tablet 0 More than a month   • HYDROcodone-acetaminophen (NORCO) 5-325 MG per tablet Take 1 tablet by mouth Every 8 (Eight) Hours As Needed.   More than a month   • miconazole (MICOTIN) 2 % cream Apply 1 application topically to the appropriate area as directed Every 12 (Twelve) Hours.   More than a month   • pantoprazole (PROTONIX) 40 MG EC tablet Take 1 tablet by mouth Every Morning. 30 tablet 1  More than a month   • pantoprazole (PROTONIX) 40 MG EC tablet Take 1 tablet by mouth Daily.   More than a month   • sevelamer (RENVELA) 800 MG tablet Take 1 tablet by mouth 3 (Three) Times a Day With Meals.   More than a month   • sodium bicarbonate 650 MG tablet Take 1 tablet by mouth 3 (Three) Times a Day. 90 tablet 1 More than a month at 1700   • sodium bicarbonate 650 MG tablet Take 1 tablet by mouth 4 (Four) Times a Day.   More than a month at 1700   , Scheduled Meds:  amLODIPine, 10 mg, Oral, Daily  insulin lispro, 0-7 Units, Subcutaneous, TID With Meals  pantoprazole, 40 mg, Oral, Q AM  senna-docusate sodium, 2 tablet, Oral, BID  sevelamer, 800 mg, Oral, TID With Meals  sodium bicarbonate, 650 mg, Oral, TID    , Continuous Infusions:   , PRN Meds:  •  dextrose  •  dextrose  •  glucagon (human recombinant)  •  HYDROcodone-acetaminophen  •  ondansetron **OR** ondansetron and Allergies:  Patient has no known allergies.    Review of Systems  Pertinent items are noted in HPI    Objective     Vital Signs  Temp:  [97 °F (36.1 °C)-98 °F (36.7 °C)] 97.7 °F (36.5 °C)  Heart Rate:  [] 80  Resp:  [14-18] 18  BP: (119-169)/() 130/86    No intake/output data recorded.  I/O last 3 completed shifts:  In: 800 [P.O.:300; I.V.:500]  Out: 3375 [Urine:3375]    Physical Exam:  General Appearance:    Alert, cooperative, in no acute distress   Head:    Normocephalic, without obvious abnormality, atraumatic   Eyes:            Conjunctivae and sclerae normal, no   icterus, no pallor, corneas clear, PERRLA           Neck:   Supple, trachea midline, no thyromegaly,  no JVD       Lungs:     Clear to auscultation,respirations regular, even and               unlabored    Heart:    Regular rhythm and normal rate, normal S1 and S2, no       murmur, no gallop, no rub, no click       Abdomen:     Normal bowel sounds,soft, non-tender, non-distended, no guarding, no rebound tenderness       Extremities:   Moves all extremities well,  no edema, no cyanosis, no         redness   Pulses:   Pulses palpable and equal bilaterally           Neurologic:   Cranial nerves 2 - 12 grossly intact, no focal deficit         Results Review:   I reviewed the patient's new clinical results.    WBC WBC   Date Value Ref Range Status   05/06/2023 10.09 3.40 - 10.80 10*3/mm3 Final   05/05/2023 11.03 (H) 3.40 - 10.80 10*3/mm3 Final      HGB Hemoglobin   Date Value Ref Range Status   05/06/2023 9.8 (L) 13.0 - 17.7 g/dL Final   05/05/2023 10.2 (L) 13.0 - 17.7 g/dL Final      HCT Hematocrit   Date Value Ref Range Status   05/06/2023 29.7 (L) 37.5 - 51.0 % Final   05/05/2023 30.9 (L) 37.5 - 51.0 % Final      Platlets No results found for: LABPLAT   MCV MCV   Date Value Ref Range Status   05/06/2023 93.7 79.0 - 97.0 fL Final   05/05/2023 93.6 79.0 - 97.0 fL Final          Sodium Sodium   Date Value Ref Range Status   05/06/2023 137 136 - 145 mmol/L Final   05/05/2023 134 (L) 136 - 145 mmol/L Final      Potassium Potassium   Date Value Ref Range Status   05/06/2023 5.0 3.5 - 5.2 mmol/L Final   05/05/2023 7.0 (C) 3.5 - 5.2 mmol/L Final   05/05/2023 6.1 (C) 3.5 - 5.2 mmol/L Final   05/05/2023 4.3 3.5 - 5.2 mmol/L Final     Comment:     Slight hemolysis detected by analyzer. Results may be affected.      Chloride Chloride   Date Value Ref Range Status   05/06/2023 95 (L) 98 - 107 mmol/L Final   05/05/2023 95 (L) 98 - 107 mmol/L Final      CO2 CO2   Date Value Ref Range Status   05/06/2023 19.0 (L) 22.0 - 29.0 mmol/L Final   05/05/2023 17.0 (L) 22.0 - 29.0 mmol/L Final      BUN BUN   Date Value Ref Range Status   05/06/2023 77 (H) 8 - 23 mg/dL Final   05/05/2023 66 (H) 8 - 23 mg/dL Final      Creatinine Creatinine   Date Value Ref Range Status   05/06/2023 9.30 (H) 0.76 - 1.27 mg/dL Final   05/05/2023 8.96 (H) 0.76 - 1.27 mg/dL Final      Calcium Calcium   Date Value Ref Range Status   05/06/2023 8.7 8.6 - 10.5 mg/dL Final   05/05/2023 8.4 (L) 8.6 - 10.5 mg/dL Final      PO4 No  results found for: CAPO4   Albumin No results found for: ALBUMIN   Magnesium No results found for: MG   Uric Acid No results found for: URICACID         amLODIPine, 10 mg, Oral, Daily  insulin lispro, 0-7 Units, Subcutaneous, TID With Meals  pantoprazole, 40 mg, Oral, Q AM  senna-docusate sodium, 2 tablet, Oral, BID  sevelamer, 800 mg, Oral, TID With Meals  sodium bicarbonate, 650 mg, Oral, TID           Assessment & Plan       BPH with obstruction/lower urinary tract symptoms    Diabetes mellitus    ESRD (end stage renal disease)    Urinary retention    Hypertension      1- ESRD -MWF   2-HTN   3- Anemia of chronic disease   4- Hyperkalemia     Plan:  - HD today, UF as tolerated.   - Renal diet   - MARTÍN with HD   - Electrolytes will be corrected with HD       I discussed the patients findings and my recommendations with patient and consulting provider    Domenica Leary MD  05/06/23

## 2023-05-06 NOTE — PLAN OF CARE
Goal Outcome Evaluation:  Plan of Care Reviewed With: patient        Progress: improving  Outcome Evaluation: VSS on RA. CBI infusing and slowed down to moderate and urine is pink and clear. Pain relieved with PRN meds. Discussed plan of care with patient who verbalizes understanding.

## 2023-05-06 NOTE — PLAN OF CARE
Goal Outcome Evaluation:  Plan of Care Reviewed With: patient           Outcome Evaluation: CBI continues to go at a moderate rate, occassional clots still noticed. Pt denies pain. AM Potassium level now 5.0, down from 7.0 after interventions. NAD noted.

## 2023-05-06 NOTE — PROGRESS NOTES
[facility]   Urology Progress Note    Patient Name: Jun Young  : 1960  MRN: [id number]  Date of admission: 2023  Surgical Procedures Since Admission:  Procedure(s):  CYSTOSCOPY TRANSURETHRAL RESECTION OF PROSTATE (BIPOLAR)  Surgeon:  Pawan Damico MD  Status:  1 Day Post-Op  -------------------    Subjective   Subjective     Chief Complaint: Urinary retention    History of Present Illness   Pt did well overnight.  Required manual irrigation overnight with few small clots removed.  CBI remained on brisk drip and unable to wean down.  He reports he has minimal pain or discomfort.  He is in dialysis currently.     CBI clamped and caldwell became cherry red.  Catheter placed on traction and CBI restarted at moderate drip and cleared.       Objective   Objective     Vitals:   Temp:  [97 °F (36.1 °C)-98.1 °F (36.7 °C)] 98.1 °F (36.7 °C)  Heart Rate:  [] 91  Resp:  [12-18] 12  BP: ()/() 114/90  Flow (L/min):  [2-5] 2  Output by Drain (mL) 23 0701 - 23 1900 23 1901 - 23 0700 23 0701 - 23 1152 Range Total   Continuous Bladder Irrigation Triple-lumen 22 Fr -[id number] [id number]       Physical Exam  Vitals and nursing note reviewed.   Constitutional:       General: He is awake. He is not in acute distress.     Appearance: Normal appearance. He is well-developed.   HENT:      Head: Normocephalic and atraumatic.      Right Ear: External ear normal.      Left Ear: External ear normal.      Nose: Nose normal.   Eyes:      Conjunctiva/sclera: Conjunctivae normal.   Pulmonary:      Effort: Pulmonary effort is normal.   Abdominal:      General: There is no distension.      Palpations: Abdomen is soft. There is no mass.      Tenderness: There is no abdominal tenderness. There is no right CVA tenderness, left CVA tenderness, guarding or rebound.      Hernia: No hernia is present. There is no hernia in the left inguinal area or right inguinal area.    Genitourinary:     Pubic Area: No rash.       Penis: Normal.       Testes: Normal.      Rectum: No mass or tenderness. Normal anal tone.      Comments: Caldwell with light pink urine on moderate CBI and traction   Musculoskeletal:      Cervical back: Normal range of motion.   Lymphadenopathy:      Lower Body: No right inguinal adenopathy. No left inguinal adenopathy.   Skin:     General: Skin is warm.   Neurological:      General: No focal deficit present.      Mental Status: He is alert and oriented to person, place, and time.   Psychiatric:         Behavior: Behavior normal. Behavior is cooperative.             Result Review    Result Review:  I have personally reviewed the results from the time of this admission to 5/6/2023 11:52 EDT and agree with these findings:  []  Laboratory  []  Microbiology  []  Radiology  []  EKG/Telemetry   []  Cardiology/Vascular   []  Pathology  []  Old records  []  Other:  Most notable findings include: K 7 overnight    Assessment & Plan   Assessment / Plan     Brief Patient Summary:  Jun Young is a 62 y.o. male who underwent TURP.  Currently light pink on moderate CBI.      Active Hospital Problems:  Active Hospital Problems    Diagnosis    • **BPH with obstruction/lower urinary tract symptoms    • Hypertension    • Urinary retention    • ESRD (end stage renal disease)    • Diabetes mellitus      Plan:   1.  Will plan on continuation of his CBI.  Titrate to light pink   2.  Irrigate caldwell PRN for clots/low return

## 2023-05-06 NOTE — PROGRESS NOTES
[facility] Medicine Services  PROGRESS NOTE    Patient Name: Jun Young  : 1960  MRN: [id number]    Date of Admission: 2023  Primary Care Physician: Provider, No Known    Subjective   Subjective     CC:  S/p TURP    HPI:  Doing ok. Sitting in bed without complaints. CBI ongoing with hematuria noted.     ROS:  Gen- No fevers, chills  CV- No chest pain, palpitations  Resp- No cough, dyspnea  GI- No N/V/D, abd pain        Objective   Objective     Vital Signs:   Temp:  [97 °F (36.1 °C)-97.8 °F (36.6 °C)] 97.7 °F (36.5 °C)  Heart Rate:  [] 105  Resp:  [14-18] 18  BP: (119-169)/() 130/86  Flow (L/min):  [2-5] 2     Physical Exam:  GEN: NAD, resting in bed, awake  HEENT: on room air, atraumatic, normocephalic  NECK: supple, no masses  RESP: on room air, normal effort  CV: on tele, sinus rhythm  PSYCH: normal affect, appropriate  NEURO: awake, alert, no focal deficits noted  MSK: no edema noted  : CBI ongoing with hematuria noted   SKIN: no rashes noted       Results Reviewed:  LAB RESULTS:      Lab 23   WBC 10.09 11.03*   HEMOGLOBIN 9.8* 10.2*   HEMATOCRIT 29.7* 30.9*   PLATELETS 156 169   MCV 93.7 93.6         Lab 23  1058   SODIUM 137  --  134*  --    POTASSIUM 5.0 7.0* 6.1* 4.3   CHLORIDE 95*  --  95*  --    CO2 19.0*  --  17.0*  --    ANION GAP 23.0*  --  22.0*  --    BUN 77*  --  66*  --    CREATININE 9.30*  --  8.96*  --    EGFR 5.8*  --  6.0*  --    GLUCOSE 168*  --  214*  --    CALCIUM 8.7  --  8.4*  --    HEMOGLOBIN A1C  --   --  4.90  --                      Lab 23  0953   ABO TYPING O   RH TYPING Positive   ANTIBODY SCREEN Negative         Brief Urine Lab Results  (Last result in the past 365 days)      Color   Clarity   Blood   Leuk Est   Nitrite   Protein   CREAT   Urine HCG        23 0837 Yellow   Cloudy   Moderate (2+)   Large (3+)   Negative   >=300  mg/dL (3+)                 Microbiology Results Abnormal     None          No radiology results from the last 24 hrs    Results for orders placed during the hospital encounter of 03/17/23    Adult Transthoracic Echo Limited W/ Cont if Necessary Per Protocol    Interpretation Summary  •  Left ventricular systolic function is normal. Calculated left ventricular EF = 61.7% Left ventricular ejection fraction appears to be 61 - 65%.  •  Left ventricular wall thickness is consistent with mild concentric hypertrophy.  •  There is a small (<1cm) pericardial effusion.      Current medications:  Scheduled Meds:amLODIPine, 10 mg, Oral, Daily  insulin lispro, 0-7 Units, Subcutaneous, TID With Meals  pantoprazole, 40 mg, Oral, Q AM  senna-docusate sodium, 2 tablet, Oral, BID  sevelamer, 800 mg, Oral, TID With Meals  sodium bicarbonate, 650 mg, Oral, TID      Continuous Infusions:   PRN Meds:.•  dextrose  •  dextrose  •  glucagon (human recombinant)  •  HYDROcodone-acetaminophen  •  ondansetron **OR** ondansetron    Assessment & Plan   Assessment & Plan     Active Hospital Problems    Diagnosis  POA   • **BPH with obstruction/lower urinary tract symptoms [N40.1, N13.8]  Yes   • Hypertension [I10]  Unknown   • Urinary retention [R33.9]  Yes   • ESRD (end stage renal disease) [N18.6]  Yes   • Diabetes mellitus [E11.9]  Yes      Resolved Hospital Problems   No resolved problems to display.        Brief Hospital Course to date:  Jun Young is a 62 y.o. male with history of DM, HTN, ESRD with dialysis MWF, obstructive uropathy who presents for a TURP with Dr. Damico. Medicine consulted for medical management     Obstructive uropathy s/p TURP with Dr. Damico   - Post op and caldwell management per Dr. Damico   - PRN pain control   - will need to wean though using sparingly     ESRD dialysis MWF   - Last dialysis on Wednesday. Currently with no urgent need for dialysis  - Nephrology consult , planned HD today       HTN   - Continue amlodipine      DM   - previously on Januvia. States he has been out of medication x 2 months   - A1C 4.6   - SSI   - Will need new medications Rx and PCP at discharge , patient has been arranged with Dr Chow - Thursday, May 11th at 10:15am with Dr. Chow at [address] [facility]         Expected Discharge Date: 5/7/2023; Expected Discharge Time:      DVT prophylaxis:  Mechanical DVT prophylaxis orders are present.     AM-PAC 6 Clicks Score (PT): 18 (05/06/23 5758)    CODE STATUS:   Code Status and Medical Interventions:   Ordered at: 05/05/23 1436     Level Of Support Discussed With:    Patient     Code Status (Patient has no pulse and is not breathing):    CPR (Attempt to Resuscitate)     Medical Interventions (Patient has pulse or is breathing):    Full Support     Release to patient:    Routine Release       Katie Lau MD  05/06/23

## 2023-05-07 LAB
ALBUMIN SERPL-MCNC: 3.9 G/DL (ref 3.5–5.2)
ALBUMIN/GLOB SERPL: 1.9 G/DL
ALP SERPL-CCNC: 72 U/L (ref 39–117)
ALT SERPL W P-5'-P-CCNC: <5 U/L (ref 1–41)
ANION GAP SERPL CALCULATED.3IONS-SCNC: 19 MMOL/L (ref 5–15)
AST SERPL-CCNC: 12 U/L (ref 1–40)
BASOPHILS # BLD AUTO: 0.05 10*3/MM3 (ref 0–0.2)
BASOPHILS NFR BLD AUTO: 0.6 % (ref 0–1.5)
BILIRUB SERPL-MCNC: 0.2 MG/DL (ref 0–1.2)
BUN SERPL-MCNC: 66 MG/DL (ref 8–23)
BUN/CREAT SERPL: 6.3 (ref 7–25)
CALCIUM SPEC-SCNC: 8.2 MG/DL (ref 8.6–10.5)
CHLORIDE SERPL-SCNC: 93 MMOL/L (ref 98–107)
CO2 SERPL-SCNC: 22 MMOL/L (ref 22–29)
CREAT SERPL-MCNC: 10.43 MG/DL (ref 0.76–1.27)
DEPRECATED RDW RBC AUTO: 49.2 FL (ref 37–54)
EGFRCR SERPLBLD CKD-EPI 2021: 5 ML/MIN/1.73
EOSINOPHIL # BLD AUTO: 0.51 10*3/MM3 (ref 0–0.4)
EOSINOPHIL NFR BLD AUTO: 6.4 % (ref 0.3–6.2)
ERYTHROCYTE [DISTWIDTH] IN BLOOD BY AUTOMATED COUNT: 14.4 % (ref 12.3–15.4)
GLOBULIN UR ELPH-MCNC: 2.1 GM/DL
GLUCOSE BLDC GLUCOMTR-MCNC: 114 MG/DL (ref 70–130)
GLUCOSE BLDC GLUCOMTR-MCNC: 123 MG/DL (ref 70–130)
GLUCOSE BLDC GLUCOMTR-MCNC: 146 MG/DL (ref 70–130)
GLUCOSE SERPL-MCNC: 110 MG/DL (ref 65–99)
HCT VFR BLD AUTO: 22.1 % (ref 37.5–51)
HGB BLD-MCNC: 7.3 G/DL (ref 13–17.7)
IMM GRANULOCYTES # BLD AUTO: 0.06 10*3/MM3 (ref 0–0.05)
IMM GRANULOCYTES NFR BLD AUTO: 0.7 % (ref 0–0.5)
LYMPHOCYTES # BLD AUTO: 0.61 10*3/MM3 (ref 0.7–3.1)
LYMPHOCYTES NFR BLD AUTO: 7.6 % (ref 19.6–45.3)
MCH RBC QN AUTO: 31.2 PG (ref 26.6–33)
MCHC RBC AUTO-ENTMCNC: 33 G/DL (ref 31.5–35.7)
MCV RBC AUTO: 94.4 FL (ref 79–97)
MONOCYTES # BLD AUTO: 0.66 10*3/MM3 (ref 0.1–0.9)
MONOCYTES NFR BLD AUTO: 8.2 % (ref 5–12)
NEUTROPHILS NFR BLD AUTO: 6.13 10*3/MM3 (ref 1.7–7)
NEUTROPHILS NFR BLD AUTO: 76.5 % (ref 42.7–76)
NRBC BLD AUTO-RTO: 0 /100 WBC (ref 0–0.2)
PLATELET # BLD AUTO: 140 10*3/MM3 (ref 140–450)
PMV BLD AUTO: 9 FL (ref 6–12)
POTASSIUM SERPL-SCNC: 5.3 MMOL/L (ref 3.5–5.2)
PROT SERPL-MCNC: 6 G/DL (ref 6–8.5)
RBC # BLD AUTO: 2.34 10*6/MM3 (ref 4.14–5.8)
SODIUM SERPL-SCNC: 134 MMOL/L (ref 136–145)
WBC NRBC COR # BLD: 8.02 10*3/MM3 (ref 3.4–10.8)

## 2023-05-07 PROCEDURE — 85025 COMPLETE CBC W/AUTO DIFF WBC: CPT | Performed by: INTERNAL MEDICINE

## 2023-05-07 PROCEDURE — 99024 POSTOP FOLLOW-UP VISIT: CPT | Performed by: UROLOGY

## 2023-05-07 PROCEDURE — 99232 SBSQ HOSP IP/OBS MODERATE 35: CPT | Performed by: INTERNAL MEDICINE

## 2023-05-07 PROCEDURE — 82948 REAGENT STRIP/BLOOD GLUCOSE: CPT

## 2023-05-07 PROCEDURE — 80053 COMPREHEN METABOLIC PANEL: CPT | Performed by: INTERNAL MEDICINE

## 2023-05-07 RX ORDER — AMOXICILLIN 250 MG
2 CAPSULE ORAL 2 TIMES DAILY PRN
Status: DISCONTINUED | OUTPATIENT
Start: 2023-05-07 | End: 2023-05-09 | Stop reason: HOSPADM

## 2023-05-07 RX ORDER — SIMETHICONE 80 MG
80 TABLET,CHEWABLE ORAL 4 TIMES DAILY PRN
Status: DISCONTINUED | OUTPATIENT
Start: 2023-05-07 | End: 2023-05-09 | Stop reason: HOSPADM

## 2023-05-07 RX ADMIN — PANTOPRAZOLE SODIUM 40 MG: 40 TABLET, DELAYED RELEASE ORAL at 05:20

## 2023-05-07 RX ADMIN — SODIUM BICARBONATE 650 MG TABLET 650 MG: at 15:04

## 2023-05-07 RX ADMIN — AMLODIPINE BESYLATE 10 MG: 10 TABLET ORAL at 08:07

## 2023-05-07 RX ADMIN — SENNOSIDES AND DOCUSATE SODIUM 2 TABLET: 8.6; 5 TABLET ORAL at 20:20

## 2023-05-07 RX ADMIN — SODIUM BICARBONATE 650 MG TABLET 650 MG: at 20:20

## 2023-05-07 RX ADMIN — SIMETHICONE 80 MG: 80 TABLET, CHEWABLE ORAL at 15:04

## 2023-05-07 RX ADMIN — SENNOSIDES AND DOCUSATE SODIUM 2 TABLET: 8.6; 5 TABLET ORAL at 08:06

## 2023-05-07 RX ADMIN — DOCUSATE SODIUM 50MG AND SENNOSIDES 8.6MG 2 TABLET: 8.6; 5 TABLET, FILM COATED ORAL at 11:56

## 2023-05-07 RX ADMIN — SEVELAMER CARBONATE 800 MG: 800 TABLET, FILM COATED ORAL at 11:56

## 2023-05-07 RX ADMIN — SEVELAMER CARBONATE 800 MG: 800 TABLET, FILM COATED ORAL at 16:49

## 2023-05-07 RX ADMIN — SEVELAMER CARBONATE 800 MG: 800 TABLET, FILM COATED ORAL at 08:07

## 2023-05-07 RX ADMIN — SODIUM BICARBONATE 650 MG TABLET 650 MG: at 08:06

## 2023-05-07 NOTE — PROGRESS NOTES
"   LOS: 1 day    Patient Care Team:  Provider, No Known as PCP - General  Provider, No Known as PCP - Family Medicine  Provider, No Known  Lora Dickey DO as Consulting Physician (Hospitalist)  Katie Lau MD as Consulting Physician (Hospitalist)    Chief Complaint:  Urinary retention    Subjective     Interval History:     No acute events overnight. No new complaints       Review of Systems:   No CP or SOA , fever, chills, rigors, rash, N/V, Constipation.       Objective     Vital Sign Min/Max for last 24 hours  Temp  Min: 97.8 °F (36.6 °C)  Max: 98.6 °F (37 °C)   BP  Min: 92/68  Max: 145/86   Pulse  Min: 85  Max: 127   Resp  Min: 12  Max: 20   SpO2  Min: 97 %  Max: 100 %   No data recorded   No data recorded     Flowsheet Rows    Flowsheet Row First Filed Value   Admission Height 170.2 cm (67.01\") Documented at 05/05/2023 1004   Admission Weight 75 kg (165 lb 5.5 oz) Documented at 05/05/2023 1004          I/O this shift:  In: 240 [P.O.:240]  Out: -   I/O last 3 completed shifts:  In: 400 [P.O.:300; IV Piggyback:100]  Out: 8175 [Urine:7175; Other:1000]    Physical Exam:    Gen: Alert, NAD   HENT: NC, AT, EOMI   NECK: Supple, no JVD, Trachea midline   LUNGS: CTA bilaterally, non labored respirtation   CVS: S1/S2 audible, RRR, no murmur   Abd: Soft, NT, ND, BS+   Ext: No pedal edema, no cyanosis   CNS: Alert, No focal deficit noted grossly  Psy: Cooperative  Skin: Warm, dry and intact      WBC WBC   Date Value Ref Range Status   05/06/2023 10.09 3.40 - 10.80 10*3/mm3 Final   05/05/2023 11.03 (H) 3.40 - 10.80 10*3/mm3 Final      HGB Hemoglobin   Date Value Ref Range Status   05/06/2023 9.8 (L) 13.0 - 17.7 g/dL Final   05/05/2023 10.2 (L) 13.0 - 17.7 g/dL Final      HCT Hematocrit   Date Value Ref Range Status   05/06/2023 29.7 (L) 37.5 - 51.0 % Final   05/05/2023 30.9 (L) 37.5 - 51.0 % Final      Platlets No results found for: LABPLAT   MCV MCV   Date Value Ref Range Status   05/06/2023 93.7 79.0 - 97.0 fL " Final   05/05/2023 93.6 79.0 - 97.0 fL Final          Sodium Sodium   Date Value Ref Range Status   05/06/2023 137 136 - 145 mmol/L Final   05/05/2023 134 (L) 136 - 145 mmol/L Final      Potassium Potassium   Date Value Ref Range Status   05/06/2023 5.0 3.5 - 5.2 mmol/L Final   05/05/2023 7.0 (C) 3.5 - 5.2 mmol/L Final   05/05/2023 6.1 (C) 3.5 - 5.2 mmol/L Final   05/05/2023 4.3 3.5 - 5.2 mmol/L Final     Comment:     Slight hemolysis detected by analyzer. Results may be affected.      Chloride Chloride   Date Value Ref Range Status   05/06/2023 95 (L) 98 - 107 mmol/L Final   05/05/2023 95 (L) 98 - 107 mmol/L Final      CO2 CO2   Date Value Ref Range Status   05/06/2023 19.0 (L) 22.0 - 29.0 mmol/L Final   05/05/2023 17.0 (L) 22.0 - 29.0 mmol/L Final      BUN BUN   Date Value Ref Range Status   05/06/2023 77 (H) 8 - 23 mg/dL Final   05/05/2023 66 (H) 8 - 23 mg/dL Final      Creatinine Creatinine   Date Value Ref Range Status   05/06/2023 9.30 (H) 0.76 - 1.27 mg/dL Final   05/05/2023 8.96 (H) 0.76 - 1.27 mg/dL Final      Calcium Calcium   Date Value Ref Range Status   05/06/2023 8.7 8.6 - 10.5 mg/dL Final   05/05/2023 8.4 (L) 8.6 - 10.5 mg/dL Final      PO4 No results found for: CAPO4   Albumin No results found for: ALBUMIN   Magnesium No results found for: MG   Uric Acid No results found for: URICACID        Results Review:     I reviewed the patient's new clinical results.    amLODIPine, 10 mg, Oral, Daily  insulin lispro, 0-7 Units, Subcutaneous, TID With Meals  pantoprazole, 40 mg, Oral, Q AM  senna-docusate sodium, 2 tablet, Oral, BID  sevelamer, 800 mg, Oral, TID With Meals  sodium bicarbonate, 650 mg, Oral, TID           Medication Review:     Results from last 7 days   Lab Units 05/06/23  0312 05/05/23 2214 05/05/23 2114 05/05/23  1058   SODIUM mmol/L 137  --  134*  --    POTASSIUM mmol/L 5.0 7.0* 6.1* 4.3   CHLORIDE mmol/L 95*  --  95*  --    CO2 mmol/L 19.0*  --  17.0*  --    BUN mg/dL 77*  --  66*  --     CREATININE mg/dL 9.30*  --  8.96*  --    CALCIUM mg/dL 8.7  --  8.4*  --    WBC 10*3/mm3 10.09  --  11.03*  --    HEMOGLOBIN g/dL 9.8*  --  10.2*  --    PLATELETS 10*3/mm3 156  --  169  --            Assessment & Plan       BPH with obstruction/lower urinary tract symptoms    Diabetes mellitus    ESRD (end stage renal disease)    Urinary retention    Hypertension    1- ESRD -MWF   2- HTN   3- Anemia of chronic disease   4- Hyperkalemia   5- BPH with obstruction and LUTS - Urology following - On CBI     Plan:  - HD tomorrow per schedule, UF as tolerated. Dropped BP with UF yesterday  - Renal diet   - MARTÍN with HD   - Electrolytes will be corrected with HD     Domenica Leary MD  05/07/23  09:37 EDT

## 2023-05-07 NOTE — PROGRESS NOTES
[facility] Medicine Services  PROGRESS NOTE    Patient Name: Jun Young  : 1960  MRN: [id number]    Date of Admission: 2023  Primary Care Physician: Provider, No Known    Subjective   Subjective     CC:  S/p TURP    HPI:  Doing ok. Sitting in bed and reports he is constipated and has not pooped since admission. Also reports that he is unable to eat much without feeling extremely full. Is passing gas. CBI ongoing with hematuria noted     ROS:  Gen- No fevers, chills  CV- No chest pain, palpitations  Resp- No cough, dyspnea  GI- as above        Objective   Objective     Vital Signs:   Temp:  [97.8 °F (36.6 °C)-98.6 °F (37 °C)] 97.8 °F (36.6 °C)  Heart Rate:  [] 105  Resp:  [12-20] 16  BP: ()/(64-90) 118/66     Physical Exam:  GEN: NAD, resting in bed, awake  HEENT: on room air, atraumatic, normocephalic  NECK: supple, no masses  RESP: on room air, normal effort  CV: on tele, sinus rhythm  GI: soft, +BS, nontender  PSYCH: normal affect, appropriate  NEURO: awake, alert, no focal deficits noted  MSK: no edema noted  : CBI ongoing with hematuria noted   SKIN: no rashes noted       Results Reviewed:  LAB RESULTS:      Lab 23   WBC 10.09 11.03*   HEMOGLOBIN 9.8* 10.2*   HEMATOCRIT 29.7* 30.9*   PLATELETS 156 169   MCV 93.7 93.6         Lab 234 23  1058   SODIUM 137  --  134*  --    POTASSIUM 5.0 7.0* 6.1* 4.3   CHLORIDE 95*  --  95*  --    CO2 19.0*  --  17.0*  --    ANION GAP 23.0*  --  22.0*  --    BUN 77*  --  66*  --    CREATININE 9.30*  --  8.96*  --    EGFR 5.8*  --  6.0*  --    GLUCOSE 168*  --  214*  --    CALCIUM 8.7  --  8.4*  --    HEMOGLOBIN A1C  --   --  4.90  --                      Lab 23  0953   ABO TYPING O   RH TYPING Positive   ANTIBODY SCREEN Negative         Brief Urine Lab Results  (Last result in the past 365 days)      Color   Clarity   Blood   Leuk Est    Nitrite   Protein   CREAT   Urine HCG        04/28/23 0837 Yellow   Cloudy   Moderate (2+)   Large (3+)   Negative   >=300 mg/dL (3+)                 Microbiology Results Abnormal     None          No radiology results from the last 24 hrs    Results for orders placed during the hospital encounter of 03/17/23    Adult Transthoracic Echo Limited W/ Cont if Necessary Per Protocol    Interpretation Summary  •  Left ventricular systolic function is normal. Calculated left ventricular EF = 61.7% Left ventricular ejection fraction appears to be 61 - 65%.  •  Left ventricular wall thickness is consistent with mild concentric hypertrophy.  •  There is a small (<1cm) pericardial effusion.      Current medications:  Scheduled Meds:amLODIPine, 10 mg, Oral, Daily  insulin lispro, 0-7 Units, Subcutaneous, TID With Meals  pantoprazole, 40 mg, Oral, Q AM  senna-docusate sodium, 2 tablet, Oral, BID  sevelamer, 800 mg, Oral, TID With Meals  sodium bicarbonate, 650 mg, Oral, TID      Continuous Infusions:   PRN Meds:.•  albumin human  •  dextrose  •  dextrose  •  glucagon (human recombinant)  •  heparin (porcine)  •  HYDROcodone-acetaminophen  •  ondansetron **OR** ondansetron    Assessment & Plan   Assessment & Plan     Active Hospital Problems    Diagnosis  POA   • **BPH with obstruction/lower urinary tract symptoms [N40.1, N13.8]  Yes   • Hypertension [I10]  Unknown   • Urinary retention [R33.9]  Yes   • ESRD (end stage renal disease) [N18.6]  Yes   • Diabetes mellitus [E11.9]  Yes      Resolved Hospital Problems   No resolved problems to display.        Brief Hospital Course to date:  Jun Young is a 62 y.o. male with history of DM, HTN, ESRD with dialysis MWF, obstructive uropathy who presents for a TURP with Dr. Damico. Medicine consulted for medical management but asked to take over due to medical issues.     Obstructive uropathy s/p TURP with Dr. Damico   - Post op and caldwell management per Dr. Damico    - PRN pain control     Constipation  -- bowel regimen- was given meds this AM  -- passing flatus, monitor     ESRD dialysis MWF   - s/p HD inpatient 5/6  - Nephrology following      HTN   - Continue amlodipine      DM   - previously on Januvia. States he has been out of medication x 2 months   - A1C 4.6   - SSI   - Will need new medications Rx and PCP at discharge , patient has been arranged with Dr Chow - Thursday, May 11th at 10:15am with Dr. Chow at [address] [facility]         Expected Discharge Date: 5/7/2023; Expected Discharge Time:      DVT prophylaxis:  Medical and mechanical DVT prophylaxis orders are present.     AM-PAC 6 Clicks Score (PT): 24 (05/07/23 0800)    CODE STATUS:   Code Status and Medical Interventions:   Ordered at: 05/05/23 1436     Level Of Support Discussed With:    Patient     Code Status (Patient has no pulse and is not breathing):    CPR (Attempt to Resuscitate)     Medical Interventions (Patient has pulse or is breathing):    Full Support     Release to patient:    Routine Release       Katie Lau MD  05/07/23

## 2023-05-07 NOTE — PLAN OF CARE
Goal Outcome Evaluation:  Plan of Care Reviewed With: patient           Outcome Evaluation: CBI continues, urine is clear and light pink going at a slower rate than previous, pt. denies pain, NAD noted.

## 2023-05-07 NOTE — PLAN OF CARE
Goal Outcome Evaluation:  Plan of Care Reviewed With: patient        Progress: improving  Outcome Evaluation: VSS. RA. CBI clamped this morning at 0800. Output red without clots. No complaints of pain. Passing gas. Scheduled and PRN medication administered for bowels. Ambulating. 1000 on IS.     1430 - CBI unclamped running slow d/t few small clots in caldwell tubing hand irrigated few mod size clots.    Pt states he had a small BM this afternoon

## 2023-05-07 NOTE — PROGRESS NOTES
[facility]   Urology Progress Note    Patient Name: Jun Young  : 1960  MRN: [id number]  Date of admission: 2023  Surgical Procedures Since Admission:  Procedure(s):  CYSTOSCOPY TRANSURETHRAL RESECTION OF PROSTATE (BIPOLAR)  Surgeon:  Pawan Damico MD  Status:  2 Days Post-Op  -------------------    Subjective   Subjective       History of Present Illness   Reports he did well overnight.  No issues with CBI overnight.  His urine has remained light pink to clear on slow CBI.  He does reports significant constipation and feeling bloated.  He also reports he had blood per rectum.    I irrigated his catheter with no return of clots and return of clear to light pink urine.        Objective   Objective     Vitals:   Temp:  [97.8 °F (36.6 °C)-98.6 °F (37 °C)] 97.8 °F (36.6 °C)  Heart Rate:  [] 96  Resp:  [12-20] 16  BP: ()/(64-90) 118/66  Output by Drain (mL) 23 0701 - 23 1900 23 1901 - 23 0700 23 0701 - 23 1018 Range Total   Continuous Bladder Irrigation Triple-lumen 22 Fr 950 1450  2400       Physical Exam  Vitals and nursing note reviewed.   Constitutional:       General: He is awake. He is not in acute distress.     Appearance: Normal appearance. He is well-developed.   HENT:      Head: Normocephalic and atraumatic.      Right Ear: External ear normal.      Left Ear: External ear normal.      Nose: Nose normal.   Eyes:      Conjunctiva/sclera: Conjunctivae normal.   Pulmonary:      Effort: Pulmonary effort is normal.   Abdominal:      General: There is no distension.      Palpations: Abdomen is soft. There is no mass.      Tenderness: There is no abdominal tenderness. There is no right CVA tenderness, left CVA tenderness, guarding or rebound.      Hernia: No hernia is present. There is no hernia in the left inguinal area or right inguinal area.   Genitourinary:     Pubic Area: No rash.       Penis: Normal.       Testes: Normal.       Rectum: No mass or tenderness. Normal anal tone.      Comments: Catheter light pink on very slow CBI  Musculoskeletal:      Cervical back: Normal range of motion.   Lymphadenopathy:      Lower Body: No right inguinal adenopathy. No left inguinal adenopathy.   Skin:     General: Skin is warm.   Neurological:      General: No focal deficit present.      Mental Status: He is alert and oriented to person, place, and time.   Psychiatric:         Behavior: Behavior normal. Behavior is cooperative.             Result Review    Result Review:  I have personally reviewed the results from the time of this admission to 5/7/2023 10:18 EDT and agree with these findings:  []  Laboratory  []  Microbiology  []  Radiology  []  EKG/Telemetry   []  Cardiology/Vascular   []  Pathology  []  Old records  []  Other:  Most notable findings include:     Assessment & Plan   Assessment / Plan     Brief Patient Summary:  Jun Young is a 62 y.o. male who underwent TURP POD 2.  Urine has been clear to light pink overnight on slow CBI.     Active Hospital Problems:  Active Hospital Problems    Diagnosis    • **BPH with obstruction/lower urinary tract symptoms    • Hypertension    • Urinary retention    • ESRD (end stage renal disease)    • Diabetes mellitus      Plan:   1.  CBI clamped this morning  2.  Instructions given to irrigate and restart CBI for worsening hematuria or clots  3.  Will plan on catheter removal on 5/12

## 2023-05-08 ENCOUNTER — APPOINTMENT (OUTPATIENT)
Dept: NEPHROLOGY | Facility: HOSPITAL | Age: 63
End: 2023-05-08
Payer: COMMERCIAL

## 2023-05-08 PROBLEM — Z90.79 S/P TURP (STATUS POST TRANSURETHRAL RESECTION OF PROSTATE): Status: ACTIVE | Noted: 2023-05-08

## 2023-05-08 LAB
ALBUMIN SERPL-MCNC: 3.7 G/DL (ref 3.5–5.2)
ALBUMIN/GLOB SERPL: 1.3 G/DL
ALP SERPL-CCNC: 86 U/L (ref 39–117)
ALT SERPL W P-5'-P-CCNC: <5 U/L (ref 1–41)
ANION GAP SERPL CALCULATED.3IONS-SCNC: 20 MMOL/L (ref 5–15)
AST SERPL-CCNC: 13 U/L (ref 1–40)
BASOPHILS # BLD AUTO: 0.05 10*3/MM3 (ref 0–0.2)
BASOPHILS NFR BLD AUTO: 0.5 % (ref 0–1.5)
BILIRUB SERPL-MCNC: 0.3 MG/DL (ref 0–1.2)
BUN SERPL-MCNC: 73 MG/DL (ref 8–23)
BUN/CREAT SERPL: 5.8 (ref 7–25)
CALCIUM SPEC-SCNC: 8.1 MG/DL (ref 8.6–10.5)
CHLORIDE SERPL-SCNC: 94 MMOL/L (ref 98–107)
CO2 SERPL-SCNC: 21 MMOL/L (ref 22–29)
CREAT SERPL-MCNC: 12.56 MG/DL (ref 0.76–1.27)
DEPRECATED RDW RBC AUTO: 47.9 FL (ref 37–54)
DEPRECATED RDW RBC AUTO: 49.3 FL (ref 37–54)
EGFRCR SERPLBLD CKD-EPI 2021: 4 ML/MIN/1.73
EOSINOPHIL # BLD AUTO: 0.5 10*3/MM3 (ref 0–0.4)
EOSINOPHIL NFR BLD AUTO: 5 % (ref 0.3–6.2)
ERYTHROCYTE [DISTWIDTH] IN BLOOD BY AUTOMATED COUNT: 13.9 % (ref 12.3–15.4)
ERYTHROCYTE [DISTWIDTH] IN BLOOD BY AUTOMATED COUNT: 14.3 % (ref 12.3–15.4)
GLOBULIN UR ELPH-MCNC: 2.8 GM/DL
GLUCOSE BLDC GLUCOMTR-MCNC: 115 MG/DL (ref 70–130)
GLUCOSE BLDC GLUCOMTR-MCNC: 95 MG/DL (ref 70–130)
GLUCOSE SERPL-MCNC: 93 MG/DL (ref 65–99)
HCT VFR BLD AUTO: 21.4 % (ref 37.5–51)
HCT VFR BLD AUTO: 24.3 % (ref 37.5–51)
HGB BLD-MCNC: 6.8 G/DL (ref 13–17.7)
HGB BLD-MCNC: 8.1 G/DL (ref 13–17.7)
IMM GRANULOCYTES # BLD AUTO: 0.05 10*3/MM3 (ref 0–0.05)
IMM GRANULOCYTES NFR BLD AUTO: 0.5 % (ref 0–0.5)
IRON 24H UR-MRATE: 21 MCG/DL (ref 59–158)
IRON SATN MFR SERPL: 10 % (ref 20–50)
LYMPHOCYTES # BLD AUTO: 0.62 10*3/MM3 (ref 0.7–3.1)
LYMPHOCYTES NFR BLD AUTO: 6.2 % (ref 19.6–45.3)
MAGNESIUM SERPL-MCNC: 2.7 MG/DL (ref 1.6–2.4)
MCH RBC QN AUTO: 30.8 PG (ref 26.6–33)
MCH RBC QN AUTO: 30.8 PG (ref 26.6–33)
MCHC RBC AUTO-ENTMCNC: 31.8 G/DL (ref 31.5–35.7)
MCHC RBC AUTO-ENTMCNC: 33.3 G/DL (ref 31.5–35.7)
MCV RBC AUTO: 92.4 FL (ref 79–97)
MCV RBC AUTO: 96.8 FL (ref 79–97)
MONOCYTES # BLD AUTO: 0.9 10*3/MM3 (ref 0.1–0.9)
MONOCYTES NFR BLD AUTO: 8.9 % (ref 5–12)
NEUTROPHILS NFR BLD AUTO: 7.95 10*3/MM3 (ref 1.7–7)
NEUTROPHILS NFR BLD AUTO: 78.9 % (ref 42.7–76)
NRBC BLD AUTO-RTO: 0 /100 WBC (ref 0–0.2)
PHOSPHATE SERPL-MCNC: 9.6 MG/DL (ref 2.5–4.5)
PLATELET # BLD AUTO: 130 10*3/MM3 (ref 140–450)
PLATELET # BLD AUTO: 144 10*3/MM3 (ref 140–450)
PMV BLD AUTO: 10.3 FL (ref 6–12)
PMV BLD AUTO: 9.2 FL (ref 6–12)
POTASSIUM SERPL-SCNC: 4.9 MMOL/L (ref 3.5–5.2)
PROT SERPL-MCNC: 6.5 G/DL (ref 6–8.5)
RBC # BLD AUTO: 2.21 10*6/MM3 (ref 4.14–5.8)
RBC # BLD AUTO: 2.63 10*6/MM3 (ref 4.14–5.8)
SODIUM SERPL-SCNC: 135 MMOL/L (ref 136–145)
TIBC SERPL-MCNC: 213 MCG/DL (ref 298–536)
TRANSFERRIN SERPL-MCNC: 143 MG/DL (ref 200–360)
WBC NRBC COR # BLD: 10.07 10*3/MM3 (ref 3.4–10.8)
WBC NRBC COR # BLD: 9.39 10*3/MM3 (ref 3.4–10.8)

## 2023-05-08 PROCEDURE — 84100 ASSAY OF PHOSPHORUS: CPT | Performed by: INTERNAL MEDICINE

## 2023-05-08 PROCEDURE — 25010000002 EPOETIN ALFA-EPBX 10000 UNIT/ML SOLUTION: Performed by: INTERNAL MEDICINE

## 2023-05-08 PROCEDURE — 85027 COMPLETE CBC AUTOMATED: CPT | Performed by: INTERNAL MEDICINE

## 2023-05-08 PROCEDURE — 83540 ASSAY OF IRON: CPT | Performed by: INTERNAL MEDICINE

## 2023-05-08 PROCEDURE — 86900 BLOOD TYPING SEROLOGIC ABO: CPT

## 2023-05-08 PROCEDURE — 85025 COMPLETE CBC W/AUTO DIFF WBC: CPT | Performed by: INTERNAL MEDICINE

## 2023-05-08 PROCEDURE — 82948 REAGENT STRIP/BLOOD GLUCOSE: CPT

## 2023-05-08 PROCEDURE — G0378 HOSPITAL OBSERVATION PER HR: HCPCS

## 2023-05-08 PROCEDURE — P9016 RBC LEUKOCYTES REDUCED: HCPCS

## 2023-05-08 PROCEDURE — 80053 COMPREHEN METABOLIC PANEL: CPT | Performed by: INTERNAL MEDICINE

## 2023-05-08 PROCEDURE — 84466 ASSAY OF TRANSFERRIN: CPT | Performed by: INTERNAL MEDICINE

## 2023-05-08 PROCEDURE — G0257 UNSCHED DIALYSIS ESRD PT HOS: HCPCS

## 2023-05-08 PROCEDURE — 25010000002 HEPARIN (PORCINE) PER 1000 UNITS: Performed by: INTERNAL MEDICINE

## 2023-05-08 PROCEDURE — 99024 POSTOP FOLLOW-UP VISIT: CPT | Performed by: NURSE PRACTITIONER

## 2023-05-08 PROCEDURE — 83735 ASSAY OF MAGNESIUM: CPT | Performed by: INTERNAL MEDICINE

## 2023-05-08 PROCEDURE — 99233 SBSQ HOSP IP/OBS HIGH 50: CPT | Performed by: INTERNAL MEDICINE

## 2023-05-08 RX ORDER — FOLIC ACID/VIT B COMPLEX AND C 0.8 MG
1 TABLET ORAL DAILY
Status: DISCONTINUED | OUTPATIENT
Start: 2023-05-08 | End: 2023-05-09 | Stop reason: HOSPADM

## 2023-05-08 RX ORDER — ALBUMIN (HUMAN) 12.5 G/50ML
12.5 SOLUTION INTRAVENOUS AS NEEDED
Status: DISCONTINUED | OUTPATIENT
Start: 2023-05-08 | End: 2023-05-09 | Stop reason: HOSPADM

## 2023-05-08 RX ADMIN — HYDROCODONE BITARTRATE AND ACETAMINOPHEN 1 TABLET: 5; 325 TABLET ORAL at 11:42

## 2023-05-08 RX ADMIN — AMLODIPINE BESYLATE 10 MG: 10 TABLET ORAL at 11:39

## 2023-05-08 RX ADMIN — SIMETHICONE 80 MG: 80 TABLET, CHEWABLE ORAL at 15:59

## 2023-05-08 RX ADMIN — SEVELAMER CARBONATE 800 MG: 800 TABLET, FILM COATED ORAL at 11:40

## 2023-05-08 RX ADMIN — HEPARIN SODIUM 2000 UNITS: 1000 INJECTION INTRAVENOUS; SUBCUTANEOUS at 09:26

## 2023-05-08 RX ADMIN — SENNOSIDES AND DOCUSATE SODIUM 2 TABLET: 8.6; 5 TABLET ORAL at 11:40

## 2023-05-08 RX ADMIN — EPOETIN ALFA-EPBX 10000 UNITS: 10000 INJECTION, SOLUTION INTRAVENOUS; SUBCUTANEOUS at 10:14

## 2023-05-08 RX ADMIN — Medication 1 TABLET: at 11:39

## 2023-05-08 RX ADMIN — SEVELAMER CARBONATE 800 MG: 800 TABLET, FILM COATED ORAL at 16:49

## 2023-05-08 NOTE — CASE MANAGEMENT/SOCIAL WORK
Discharge Planning Assessment  [facility]     Patient Name: Jun Young  MRN: [id number]  Today's Date: 5/8/2023    Admit Date: 5/5/2023    Plan: CM Update   Discharge Needs Assessment    No documentation.                Discharge Plan     Row Name 05/08/23 1606       Plan    Plan CM Update    Plan Comments Spoke with patient at bedside regarding discharge planning- patient is independent of ADLs and denies the use of DME at home. He confirmed he goes to [facility] on [address] every MWF. CM will fax them his dc summary upon discharge. CBI is clamped. Goal is home - no dc needs identified at this time- CM will continue to follow- candida [phone]    Final Discharge Disposition Code 01 - home or self-care              Continued Care and Services - Admitted Since 5/5/2023    Coordination has not been started for this encounter.     Selected Continued Care - Prior Encounters Includes continued care and service providers with selected services from prior encounters from 2/4/2023 to 5/8/2023    Discharged on 2/14/2023 Admission date: 2/7/2023 - Discharge disposition: Home or Self Care    Dialysis/Infusion     Service Provider Selected Services Address Phone Fax Patient Preferred    [facility] [address], [place] [id number] [phone] --                    Expected Discharge Date and Time     Expected Discharge Date Expected Discharge Time    May 7, 2023          Demographic Summary    No documentation.                Functional Status    No documentation.                Psychosocial    No documentation.                Abuse/Neglect    No documentation.                Legal    No documentation.                Substance Abuse    No documentation.                Patient Forms    No documentation.                   Candida Dean RN

## 2023-05-08 NOTE — PROGRESS NOTES
[facility]   Urology Progress Note    Patient Name: Jun Young  : 1960  MRN: [id number]  Primary Care Physician:  Provider, No Known  Date of admission: 2023    Subjective   Subjective     Chief Complaint: TURP    HPI:  Patient resting in bed. Seen in Dialysis. CBI on slow/minimal drip with light pink tinged urine output in caldwell drainage bag. Clear/yellow urine output in caldwell drainage tubing. He reports mild discomfort at caldwell catheter insertion site.     H&H 6.8, 21.4. Planning to receive 1 unit PRBC this AM.     Review of Systems   All systems were reviewed and negative except for: Hematuria    Objective   Objective     Vitals:   Temp:  [97.6 °F (36.4 °C)-98.8 °F (37.1 °C)] 98.3 °F (36.8 °C)  Heart Rate:  [] 100  Resp:  [16-18] 18  BP: (108-119)/(64-82) 116/73  Physical Exam    Constitutional: Awake in bed, alert   Eyes: PERRLA, sclerae anicteric, no conjunctival injection   HENT: Normocephalic, atraumatic, mucous membranes moist   Neck: Supple, trachea midline   Respiratory: Equal chest rise, non-labored respirations    Cardiovascular: RRR   Gastrointestinal: Soft, nontender, non-distended   Genitourinary: 3 way caldwell catheter with CBI on minimal drip. Pink tinged urine in caldwell bag. Clear/yellow urine output in caldwell tubing.    Musculoskeletal: No lower extremity edema bilaterally, no clubbing or cyanosis to extremities   Psychiatric: Appropriate affect, cooperative   Neurologic: Oriented x 3,  Cranial Nerves grossly intact, speech clear   Skin: No rashes     Result Review    Result Review:  I have personally reviewed the results from the time of this admission to 2023 08:24 EDT and agree with these findings:  [x]  Laboratory  []  Microbiology  []  Radiology  []  EKG/Telemetry   []  Cardiology/Vascular   []  Pathology  []  Old records  [x]  Other: Vital signs    Most notable findings include: H&H 6.8, 21.4.     Assessment & Plan   Assessment / Plan     Brief Patient  Summary:  Jun Young is a 62 y.o. male who is POD 3 s/p Cystoscopy, TURP with Dr. Damico.     Active Hospital Problems:  Active Hospital Problems    Diagnosis    • **BPH with obstruction/lower urinary tract symptoms    • Hypertension    • Urinary retention    • ESRD (end stage renal disease)    • Diabetes mellitus        Plan:   -I clamped CBI at bedside. Restart CBI if urine becomes grossly bloody.  -1 unit PRBC this AM.  -Keep NPO for now for possible need to return to OR for ongoing hematuria.   D/w Dr. Damico.      DVT prophylaxis:  Medical and mechanical DVT prophylaxis orders are present.    CODE STATUS:   Level Of Support Discussed With: Patient  Code Status (Patient has no pulse and is not breathing): CPR (Attempt to Resuscitate)  Medical Interventions (Patient has pulse or is breathing): Full Support  Release to patient: Routine Release    Disposition:  I expect patient to remain inpatient.     Electronically signed by FRANKY Krause, 05/08/23, 8:24 AM EDT.

## 2023-05-08 NOTE — PLAN OF CARE
Goal Outcome Evaluation:  Plan of Care Reviewed With: patient           Outcome Evaluation: Pt NPO since midnight per  for possible procedure today. CBI continues at low gtt rate, urine clear and pink. Pt stated he had large BM last night.

## 2023-05-08 NOTE — PROGRESS NOTES
"   LOS: 1 day    Patient Care Team:  Provider, No Known as PCP - General  Provider, No Known as PCP - Family Medicine  Provider, No Known  Lora Dickey DO as Consulting Physician (Hospitalist)  Katie Lau MD as Consulting Physician (Hospitalist)    Subjective     Stable overnight.      Objective     Vital Signs:  Blood pressure 111/73, pulse 102, temperature 97.9 °F (36.6 °C), temperature source Oral, resp. rate 20, height 170.2 cm (67.01\"), weight 74.9 kg (165 lb 1.6 oz), SpO2 100 %.      Intake/Output Summary (Last 24 hours) at 5/8/2023 0930  Last data filed at 5/8/2023 0600  Gross per 24 hour   Intake --   Output 625 ml   Net -625 ml        05/07 0701 - 05/08 0700  In: 240 [P.O.:240]  Out: 625 [Urine:625]    Physical Exam:        GENERAL: WD HTN NAD  NEURO: Awake and alert, oriented. No focal deficit  PSYCHIATRIC: NMA. Cooperative with PE  EYE: PE, no icterus, no conjunctivitis  ENT: ommm, dentition intact,  Hearing intact  NECK: Supple , No JVD discernable,  Trachea midline  CV: No edema, RRR  LUNGS:  Quiet,  Nonlabored resp.  Symmetrical expansion  ABDOMEN: Nondistended, soft nontender.  : + [place], no palp bladder  SKIN: Warm and dry without rash      Labs:  Results from last 7 days   Lab Units 05/08/23  0650 05/07/23  1126 05/06/23  0312   WBC 10*3/mm3 10.07 8.02 10.09   HEMOGLOBIN g/dL 6.8* 7.3* 9.8*   PLATELETS 10*3/mm3 144 140 156     Results from last 7 days   Lab Units 05/08/23  0650 05/07/23  1126 05/06/23  0312 05/05/23  2214 05/05/23  2114   SODIUM mmol/L 135* 134* 137  --  134*   POTASSIUM mmol/L 4.9 5.3* 5.0 7.0* 6.1*   CHLORIDE mmol/L 94* 93* 95*  --  95*   CO2 mmol/L 21.0* 22.0 19.0*  --  17.0*   BUN mg/dL 73* 66* 77*  --  66*   CREATININE mg/dL 12.56* 10.43* 9.30*  --  8.96*   CALCIUM mg/dL 8.1* 8.2* 8.7  --  8.4*   MAGNESIUM mg/dL 2.7*  --   --   --   --    ALBUMIN g/dL 3.7 3.9  --   --   --      Results from last 7 days   Lab Units 05/08/23  0650   ALK PHOS U/L 86   BILIRUBIN mg/dL " 0.3   ALT (SGPT) U/L <5   AST (SGOT) U/L 13                   Estimated Creatinine Clearance: 6.3 mL/min (A) (by C-G formula based on SCr of 12.56 mg/dL (H)).         A/P:    ESRD: Getting HD MWF.  Continue current schedule    HTN: Blood pressure low side.  UF stopped today.  Patient getting packed red blood cells.  Albumin as needed for blood pressure support.    Anemia: Hemoglobin down to 6.8 today.  Patient getting transfusion with HD today.  Check iron studies and start Epogen.    Hyperkalemia: Potassium stable today at 4.9 prior to HD.  Continue 2K bath.  Monitor for now.    Hyponatremia: Mild.  Monitor.  Adjust with HD.    Metabolic acidosis: Patient getting p.o. bicarb.  Will adjust with HD.  May be able to stop p.o. bicarb.    Hyperphosphatemia: Continue binders and low Phos diet.  We will get a phosphorus level.    Nutrition: Continue low Phos low potassium high-protein dialysis diet.  Add renal vitamin.    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  Urology following - On CBI for hematuria      Shantanu Brown MD  05/08/23  09:30 EDT

## 2023-05-08 NOTE — PLAN OF CARE
Problem: Device-Related Complication Risk (Hemodialysis)  Goal: Safe, Effective Therapy Delivery  Outcome: Ongoing, Progressing  Intervention: Optimize Device Care and Function  Recent Flowsheet Documentation  Taken 5/8/2023 0000 by Jacque Reid RN  Medication Review/Management:   medications reviewed   high-risk medications identified     Problem: Hemodynamic Instability (Hemodialysis)  Goal: Effective Tissue Perfusion  Outcome: Ongoing, Progressing     Problem: Infection (Hemodialysis)  Goal: Absence of Infection Signs and Symptoms  Outcome: Ongoing, Progressing   Goal Outcome Evaluation:            HD complete, blood reinfused, UF goal changed to minimum UFR for rest of tx because of short episodes of increasing HR range from 140s-200s,. Pt asymptomatic.  aware and ordered no UF. I unit PRBC given during tx. Report given to RN April.

## 2023-05-08 NOTE — PLAN OF CARE
Goal Outcome Evaluation:  Plan of Care Reviewed With: patient        Progress: improving  Outcome Evaluation: VSS. RA. PRN administered as requested for pain. CBI clamped. UOP pink. No clots. HD completed today with 1unit PRBC administered.  Pt educated on the importance of ambulation. Pt states he will ambulate this afternoon.

## 2023-05-08 NOTE — PROGRESS NOTES
Urine output pink tinged in caldwell catheter bag. Clear/yellow urine output in caldwell tubing. CBI clamped x 2 hours.  Keep CBI clamped, restart CBI if urine becomes grossly bloody.  Ok for diet. No Urologic surgical intervention at this time.   D/w Dr. Damico.

## 2023-05-08 NOTE — PROGRESS NOTES
[facility] Medicine Services  PROGRESS NOTE    Patient Name: Jun Young  : 1960  MRN: [id number]    Date of Admission: 2023  Primary Care Physician: Provider, No Known    Subjective   Subjective     CC:  S/p TURP    HPI:  Seen in HD this morning. Some clots noted over course of last 24h causing CBI to be resumed. Patient requiring 1 unit pRBC this morning, denies bleeding otherwise aside from urinary source. Patient with BM yesterday, abd pain improved    ROS:  Gen- No fevers, chills  CV- No chest pain, palpitations  Resp- No cough, dyspnea  GI- as above   as above      Objective   Objective     Vital Signs:   Temp:  [97.6 °F (36.4 °C)-98.8 °F (37.1 °C)] 98.3 °F (36.8 °C)  Heart Rate:  [] 100  Resp:  [16-18] 18  BP: (108-119)/(64-82) 116/73     Physical Exam:  GEN: NAD, resting in bed, awake  HEENT: on room air, atraumatic, normocephalic  NECK: supple, no masses  RESP: on room air, normal effort  CV: on tele, sinus rhythm  GI: soft, +BS, nontender  PSYCH: normal affect, appropriate  NEURO: awake, alert, no focal deficits noted  MSK: no edema noted  : CBI ongoing with hematuria noted   SKIN: no rashes noted       Results Reviewed:  LAB RESULTS:      Lab 23  1126 23   WBC 8.02 10.09 11.03*   HEMOGLOBIN 7.3* 9.8* 10.2*   HEMATOCRIT 22.1* 29.7* 30.9*   PLATELETS 140 156 169   NEUTROS ABS 6.13  --   --    IMMATURE GRANS (ABS) 0.06*  --   --    LYMPHS ABS 0.61*  --   --    MONOS ABS 0.66  --   --    EOS ABS 0.51*  --   --    MCV 94.4 93.7 93.6         Lab 23  1126 234 23  1058   SODIUM 134* 137  --  134*  --    POTASSIUM 5.3* 5.0 7.0* 6.1* 4.3   CHLORIDE 93* 95*  --  95*  --    CO2 22.0 19.0*  --  17.0*  --    ANION GAP 19.0* 23.0*  --  22.0*  --    BUN 66* 77*  --  66*  --    CREATININE 10.43* 9.30*  --  8.96*  --    EGFR 5.0* 5.8*  --  6.0*  --    GLUCOSE 110* 168*  --   214*  --    CALCIUM 8.2* 8.7  --  8.4*  --    HEMOGLOBIN A1C  --   --   --  4.90  --          Lab 05/07/23  1126   TOTAL PROTEIN 6.0   ALBUMIN 3.9   GLOBULIN 2.1   ALT (SGPT) <5   AST (SGOT) 12   BILIRUBIN 0.2   ALK PHOS 72                 Lab 05/05/23  0953   ABO TYPING O   RH TYPING Positive   ANTIBODY SCREEN Negative         Brief Urine Lab Results  (Last result in the past 365 days)      Color   Clarity   Blood   Leuk Est   Nitrite   Protein   CREAT   Urine HCG        04/28/23 0837 Yellow   Cloudy   Moderate (2+)   Large (3+)   Negative   >=300 mg/dL (3+)                 Microbiology Results Abnormal     None          No radiology results from the last 24 hrs    Results for orders placed during the hospital encounter of 03/17/23    Adult Transthoracic Echo Limited W/ Cont if Necessary Per Protocol    Interpretation Summary  •  Left ventricular systolic function is normal. Calculated left ventricular EF = 61.7% Left ventricular ejection fraction appears to be 61 - 65%.  •  Left ventricular wall thickness is consistent with mild concentric hypertrophy.  •  There is a small (<1cm) pericardial effusion.      Current medications:  Scheduled Meds:amLODIPine, 10 mg, Oral, Daily  insulin lispro, 0-7 Units, Subcutaneous, TID With Meals  pantoprazole, 40 mg, Oral, Q AM  senna-docusate sodium, 2 tablet, Oral, BID  sevelamer, 800 mg, Oral, TID With Meals  sodium bicarbonate, 650 mg, Oral, TID      Continuous Infusions:   PRN Meds:.•  dextrose  •  dextrose  •  glucagon (human recombinant)  •  heparin (porcine)  •  HYDROcodone-acetaminophen  •  ondansetron **OR** ondansetron  •  senna-docusate sodium  •  simethicone    Assessment & Plan   Assessment & Plan     Active Hospital Problems    Diagnosis  POA   • **BPH with obstruction/lower urinary tract symptoms [N40.1, N13.8]  Yes   • Hypertension [I10]  Unknown   • Urinary retention [R33.9]  Yes   • ESRD (end stage renal disease) [N18.6]  Yes   • Diabetes mellitus [E11.9]  Yes       Resolved Hospital Problems   No resolved problems to display.        Brief Hospital Course to date:  Jun Young is a 62 y.o. male with history of DM, HTN, ESRD with dialysis MWF, obstructive uropathy who presents for a TURP with Dr. Damico. Medicine consulted for medical management but asked to take over due to medical issues.     Obstructive uropathy s/p TURP with Dr. Damico   - Post op and caldwell management per Dr. Damico   - CBI initially clamped 5/7 AM but requiring unclamping due to small clots in the caldwell tubing  - PRN pain control   - keeping NPO per urology recs for possible return to OR if clots continue    Anemia  - suspect due to blood loss combined- down to 7.3--6.8 this AM  - will transfuse 1 unit pRBC with HD today, type and screen monitor and transfuse <7    Constipation  -- bowel regimen- s/p small BM 5/8   -- passing flatus, monitor     ESRD dialysis MWF   - s/p HD inpatient 5/6  - Nephrology following      HTN   - Continue amlodipine      DM   - previously on Januvia. States he has been out of medication x 2 months   - A1C 4.6   - SSI   - Will need new medications Rx and PCP at discharge , patient has been arranged with Dr Chow - Thursday, May 11th at 10:15am with Dr. Chow at [address] [facility]         Expected Discharge Date: 5/7/2023; Expected Discharge Time:      DVT prophylaxis:  Medical and mechanical DVT prophylaxis orders are present.     AM-PAC 6 Clicks Score (PT): 24 (05/07/23 2000)    CODE STATUS:   Code Status and Medical Interventions:   Ordered at: 05/05/23 1436     Level Of Support Discussed With:    Patient     Code Status (Patient has no pulse and is not breathing):    CPR (Attempt to Resuscitate)     Medical Interventions (Patient has pulse or is breathing):    Full Support     Release to patient:    Routine Release       Katie Lau MD  05/08/23

## 2023-05-09 ENCOUNTER — READMISSION MANAGEMENT (OUTPATIENT)
Dept: CALL CENTER | Facility: HOSPITAL | Age: 63
End: 2023-05-09
Payer: COMMERCIAL

## 2023-05-09 VITALS
DIASTOLIC BLOOD PRESSURE: 81 MMHG | OXYGEN SATURATION: 95 % | BODY MASS INDEX: 25.91 KG/M2 | WEIGHT: 165.1 LBS | RESPIRATION RATE: 18 BRPM | HEIGHT: 67 IN | TEMPERATURE: 98.4 F | HEART RATE: 84 BPM | SYSTOLIC BLOOD PRESSURE: 139 MMHG

## 2023-05-09 LAB
ALBUMIN SERPL-MCNC: 3.8 G/DL (ref 3.5–5.2)
ALBUMIN/GLOB SERPL: 1.7 G/DL
ALP SERPL-CCNC: 113 U/L (ref 39–117)
ALT SERPL W P-5'-P-CCNC: <5 U/L (ref 1–41)
ANION GAP SERPL CALCULATED.3IONS-SCNC: 16 MMOL/L (ref 5–15)
AST SERPL-CCNC: 14 U/L (ref 1–40)
BASOPHILS # BLD AUTO: 0.05 10*3/MM3 (ref 0–0.2)
BASOPHILS NFR BLD AUTO: 0.6 % (ref 0–1.5)
BH BB BLOOD EXPIRATION DATE: NORMAL
BH BB BLOOD TYPE BARCODE: 5100
BH BB DISPENSE STATUS: NORMAL
BH BB PRODUCT CODE: NORMAL
BH BB UNIT NUMBER: NORMAL
BILIRUB SERPL-MCNC: 0.3 MG/DL (ref 0–1.2)
BUN SERPL-MCNC: 46 MG/DL (ref 8–23)
BUN/CREAT SERPL: 5.7 (ref 7–25)
CALCIUM SPEC-SCNC: 8.3 MG/DL (ref 8.6–10.5)
CHLORIDE SERPL-SCNC: 97 MMOL/L (ref 98–107)
CO2 SERPL-SCNC: 24 MMOL/L (ref 22–29)
CREAT SERPL-MCNC: 8.05 MG/DL (ref 0.76–1.27)
CROSSMATCH INTERPRETATION: NORMAL
CYTO UR: NORMAL
DEPRECATED RDW RBC AUTO: 50.1 FL (ref 37–54)
EGFRCR SERPLBLD CKD-EPI 2021: 6.9 ML/MIN/1.73
EOSINOPHIL # BLD AUTO: 0.62 10*3/MM3 (ref 0–0.4)
EOSINOPHIL NFR BLD AUTO: 6.9 % (ref 0.3–6.2)
ERYTHROCYTE [DISTWIDTH] IN BLOOD BY AUTOMATED COUNT: 14.5 % (ref 12.3–15.4)
GLOBULIN UR ELPH-MCNC: 2.3 GM/DL
GLUCOSE BLDC GLUCOMTR-MCNC: 98 MG/DL (ref 70–130)
GLUCOSE SERPL-MCNC: 96 MG/DL (ref 65–99)
HCT VFR BLD AUTO: 25.6 % (ref 37.5–51)
HGB BLD-MCNC: 8.2 G/DL (ref 13–17.7)
IMM GRANULOCYTES # BLD AUTO: 0.07 10*3/MM3 (ref 0–0.05)
IMM GRANULOCYTES NFR BLD AUTO: 0.8 % (ref 0–0.5)
LAB AP CASE REPORT: NORMAL
LAB AP CLINICAL INFORMATION: NORMAL
LYMPHOCYTES # BLD AUTO: 1.04 10*3/MM3 (ref 0.7–3.1)
LYMPHOCYTES NFR BLD AUTO: 11.5 % (ref 19.6–45.3)
MCH RBC QN AUTO: 30 PG (ref 26.6–33)
MCHC RBC AUTO-ENTMCNC: 32 G/DL (ref 31.5–35.7)
MCV RBC AUTO: 93.8 FL (ref 79–97)
MONOCYTES # BLD AUTO: 0.92 10*3/MM3 (ref 0.1–0.9)
MONOCYTES NFR BLD AUTO: 10.2 % (ref 5–12)
NEUTROPHILS NFR BLD AUTO: 6.32 10*3/MM3 (ref 1.7–7)
NEUTROPHILS NFR BLD AUTO: 70 % (ref 42.7–76)
NRBC BLD AUTO-RTO: 0 /100 WBC (ref 0–0.2)
PATH REPORT.FINAL DX SPEC: NORMAL
PATH REPORT.GROSS SPEC: NORMAL
PLATELET # BLD AUTO: 171 10*3/MM3 (ref 140–450)
PMV BLD AUTO: 9.1 FL (ref 6–12)
POTASSIUM SERPL-SCNC: 4.4 MMOL/L (ref 3.5–5.2)
PROT SERPL-MCNC: 6.1 G/DL (ref 6–8.5)
RBC # BLD AUTO: 2.73 10*6/MM3 (ref 4.14–5.8)
SODIUM SERPL-SCNC: 137 MMOL/L (ref 136–145)
UNIT  ABO: NORMAL
UNIT  RH: NORMAL
WBC NRBC COR # BLD: 9.02 10*3/MM3 (ref 3.4–10.8)

## 2023-05-09 PROCEDURE — 99239 HOSP IP/OBS DSCHRG MGMT >30: CPT | Performed by: INTERNAL MEDICINE

## 2023-05-09 PROCEDURE — 85025 COMPLETE CBC W/AUTO DIFF WBC: CPT | Performed by: INTERNAL MEDICINE

## 2023-05-09 PROCEDURE — 80053 COMPREHEN METABOLIC PANEL: CPT | Performed by: INTERNAL MEDICINE

## 2023-05-09 PROCEDURE — 82948 REAGENT STRIP/BLOOD GLUCOSE: CPT

## 2023-05-09 PROCEDURE — G0378 HOSPITAL OBSERVATION PER HR: HCPCS

## 2023-05-09 RX ORDER — BACITRACIN ZINC AND POLYMYXIN B SULFATE 500; 1000 [USP'U]/G; [USP'U]/G
OINTMENT TOPICAL
Qty: 30 G | Refills: 0 | Status: SHIPPED | OUTPATIENT
Start: 2023-05-09

## 2023-05-09 RX ORDER — SIMETHICONE 80 MG
80 TABLET,CHEWABLE ORAL 4 TIMES DAILY PRN
Qty: 30 TABLET | Refills: 1 | Status: SHIPPED | OUTPATIENT
Start: 2023-05-09

## 2023-05-09 RX ORDER — SEVELAMER CARBONATE 800 MG/1
800 TABLET, FILM COATED ORAL
Qty: 90 TABLET | Refills: 1 | Status: SHIPPED | OUTPATIENT
Start: 2023-05-09

## 2023-05-09 RX ORDER — BACITRACIN ZINC AND POLYMYXIN B SULFATE 500; 1000 [USP'U]/G; [USP'U]/G
1 OINTMENT TOPICAL 2 TIMES DAILY
Qty: 15 G | Refills: 1 | Status: SHIPPED | OUTPATIENT
Start: 2023-05-09

## 2023-05-09 RX ORDER — BACITRACIN ZINC AND POLYMYXIN B SULFATE 500; 1000 [USP'U]/G; [USP'U]/G
1 OINTMENT TOPICAL 2 TIMES DAILY
Qty: 15 G | Refills: 1 | Status: SHIPPED | OUTPATIENT
Start: 2023-05-09 | End: 2023-05-09 | Stop reason: SDUPTHER

## 2023-05-09 RX ORDER — FOLIC ACID/VIT B COMPLEX AND C 0.8 MG
1 TABLET ORAL DAILY
Qty: 30 TABLET | Refills: 1 | Status: SHIPPED | OUTPATIENT
Start: 2023-05-09 | End: 2023-05-09 | Stop reason: SDUPTHER

## 2023-05-09 RX ORDER — AMLODIPINE BESYLATE 10 MG/1
10 TABLET ORAL DAILY
Qty: 30 TABLET | Refills: 1 | Status: SHIPPED | OUTPATIENT
Start: 2023-05-09

## 2023-05-09 RX ORDER — PANTOPRAZOLE SODIUM 40 MG/1
40 TABLET, DELAYED RELEASE ORAL DAILY
Qty: 30 TABLET | Refills: 2 | Status: SHIPPED | OUTPATIENT
Start: 2023-05-09

## 2023-05-09 RX ORDER — SEVELAMER CARBONATE 800 MG/1
800 TABLET, FILM COATED ORAL
Qty: 90 TABLET | Refills: 1 | Status: SHIPPED | OUTPATIENT
Start: 2023-05-09 | End: 2023-05-09 | Stop reason: SDUPTHER

## 2023-05-09 RX ORDER — CLOTRIMAZOLE 1 %
1 CREAM (GRAM) TOPICAL 2 TIMES DAILY
Qty: 15 G | Refills: 1 | Status: SHIPPED | OUTPATIENT
Start: 2023-05-09

## 2023-05-09 RX ORDER — FOLIC ACID/VIT B COMPLEX AND C 0.8 MG
1 TABLET ORAL DAILY
Qty: 30 TABLET | Refills: 1 | Status: SHIPPED | OUTPATIENT
Start: 2023-05-09

## 2023-05-09 RX ADMIN — Medication 1 TABLET: at 08:16

## 2023-05-09 RX ADMIN — PANTOPRAZOLE SODIUM 40 MG: 40 TABLET, DELAYED RELEASE ORAL at 05:10

## 2023-05-09 RX ADMIN — AMLODIPINE BESYLATE 10 MG: 10 TABLET ORAL at 08:16

## 2023-05-09 RX ADMIN — SENNOSIDES AND DOCUSATE SODIUM 2 TABLET: 8.6; 5 TABLET ORAL at 08:15

## 2023-05-09 RX ADMIN — SEVELAMER CARBONATE 800 MG: 800 TABLET, FILM COATED ORAL at 08:16

## 2023-05-09 NOTE — PLAN OF CARE
Problem: Adult Inpatient Plan of Care  Goal: Absence of Hospital-Acquired Illness or Injury  Intervention: Identify and Manage Fall Risk  Description: Perform standard risk assessment on admission using a validated tool or comprehensive approach appropriate to the patient; reassess fall risk frequently, with change in status or transfer to another level of care.  Communicate fall injury risk to interprofessional healthcare team.  Determine need for increased observation, equipment and environmental modification, such as low bed, signage and supportive, nonskid footwear.  Adjust safety measures to individual developmental age, stage and identified risk factors.  Reinforce the importance of safety and physical activity with patient and family.  Perform regular intentional rounding to assess need for position change, pain assessment and personal needs, including assistance with toileting.  Recent Flowsheet Documentation  Taken 5/9/2023 0000 by Moi Montiel, RN  Safety Promotion/Fall Prevention:   activity supervised   clutter free environment maintained   assistive device/personal items within reach   elopement precautions   fall prevention program maintained   nonskid shoes/slippers when out of bed   safety round/check completed  Taken 5/8/2023 2200 by Moi Montiel, RN  Safety Promotion/Fall Prevention:   activity supervised   assistive device/personal items within reach   clutter free environment maintained   elopement precautions   fall prevention program maintained   nonskid shoes/slippers when out of bed   safety round/check completed  Taken 5/8/2023 2000 by Moi Montiel, RN  Safety Promotion/Fall Prevention:   activity supervised   clutter free environment maintained   assistive device/personal items within reach   elopement precautions   fall prevention program maintained   nonskid shoes/slippers when out of bed   safety round/check completed  Intervention: Prevent Skin Injury  Description: Perform a screening  for skin injury risk, such as pressure or moisture associated skin damage on admission and at regular intervals throughout hospital stay.  Keep all areas of skin (especially folds) clean and dry.  Maintain adequate skin hydration.  Relieve and redistribute pressure and protect bony prominences; implement measures based on patient-specific risk factors.  Match turning and repositioning schedule to clinical condition.  Encourage weight shift frequently; assist with reposition if unable to complete independently.  Float heels off bed; avoid pressure on the Achilles tendon.  Keep skin free from extended contact with medical devices.  Encourage functional activity and mobility, as early as tolerated.  Use aids (e.g., slide boards, mechanical lift) during transfer.  Recent Flowsheet Documentation  Taken 5/9/2023 0000 by Moi Montiel RN  Body Position: position changed independently  Taken 5/8/2023 2200 by Moi Montiel RN  Body Position: position changed independently  Taken 5/8/2023 2000 by Moi Montiel RN  Body Position: position changed independently  Skin Protection:   adhesive use limited   incontinence pads utilized   protective footwear used   skin-to-skin areas padded   tubing/devices free from skin contact  Intervention: Prevent and Manage VTE (Venous Thromboembolism) Risk  Description: Assess for VTE (venous thromboembolism) risk.  Encourage and assist with early ambulation.  Initiate and maintain compression or other therapy, as indicated, based on identified risk in accordance with organizational protocol and provider order.  Encourage both active and passive leg exercises while in bed, if unable to ambulate.  Recent Flowsheet Documentation  Taken 5/9/2023 0000 by Moi Montiel RN  Activity Management:   dorsiflexion/plantar flexion performed   up ad nemo  VTE Prevention/Management:   bilateral   sequential compression devices off   patient refused intervention  Range of Motion: ROM (range of motion)  performed  Taken 5/8/2023 2200 by Moi Montiel RN  Activity Management:   dorsiflexion/plantar flexion performed   up ad nemo  Taken 5/8/2023 2000 by Moi Montiel RN  Activity Management:   dorsiflexion/plantar flexion performed   up ad nemo  VTE Prevention/Management:   bilateral   patient refused intervention   sequential compression devices off  Range of Motion: ROM (range of motion) performed  Intervention: Prevent Infection  Description: Maintain skin and mucous membrane integrity; promote hand, oral and pulmonary hygiene.  Optimize fluid balance, nutrition, sleep and glycemic control to maximize infection resistance.  Identify potential sources of infection early to prevent or mitigate progression of infection (e.g., wound, lines, devices).  Evaluate ongoing need for invasive devices; remove promptly when no longer indicated.  Recent Flowsheet Documentation  Taken 5/8/2023 2000 by Moi Montiel RN  Infection Prevention:   environmental surveillance performed   hand hygiene promoted   single patient room provided   rest/sleep promoted  Goal: Optimal Comfort and Wellbeing  Intervention: Provide Person-Centered Care  Description: Use a family-focused approach to care.  Develop trust and rapport by proactively providing information, encouraging questions, addressing concerns and offering reassurance.  Acknowledge emotional response to hospitalization.  Recognize and utilize personal coping strategies.  Honor spiritual and cultural preferences.  Recent Flowsheet Documentation  Taken 5/8/2023 2000 by Moi Montiel RN  Trust Relationship/Rapport:   care explained   choices provided   empathic listening provided   emotional support provided   questions answered   reassurance provided   questions encouraged   thoughts/feelings acknowledged     Problem: Pain Acute  Goal: Acceptable Pain Control and Functional Ability  Outcome: Ongoing, Progressing  Intervention: Prevent or Manage Pain  Description: Evaluate pain level,  effect of treatment and patient response at regular intervals.  Minimize painful stimuli; coordinate care and adjust environment (e.g., light, noise, unnecessary movement); promote sleep/rest.  Match pharmacologic analgesia to severity and type of pain mechanism (e.g., neuropathic, muscle, inflammatory); consider multimodal approach (e.g., nonopioid, opioid, adjuvant).  Provide medication at regular intervals; titrate to patient response; premedicate for painful procedures.  Manage breakthrough pain with additional doses; consider rotation or switching medication.  Monitor for signs of substance tolerance (increased dose to reach desired effect, decreased effect with same dose).  Manage medication-induced effects, such as constipation, nausea, pruritus, urinary retention, somnolence and dizziness.  Provide multimodal interventions, such as as physical activity, therapeutic exercise, yoga, TENS (transcutaneous electrical nerve stimulation) and manual therapy.  Train in functional activity modifications, such as body mechanics, posture, ergonomics, energy conservation and activity pacing.  Consider addition of complementary or alternative therapy, such as acupuncture, hypnosis or therapeutic touch.  Flowsheets  Taken 5/9/2023 0013  Sleep/Rest Enhancement:   relaxation techniques promoted   regular sleep/rest pattern promoted  Taken 5/8/2023 2000  Sensory Stimulation Regulation:   care clustered   quiet environment promoted  Bowel Elimination Promotion:   adequate fluid intake promoted   ambulation promoted   privacy promoted   chewing gum  Medication Review/Management:   medications reviewed   high-risk medications identified  Intervention: Optimize Psychosocial Wellbeing  Description: Facilitate patient’s self-control over pain by providing pain information and allowing choices in treatment.  Consider and address emotional response to pain.  Explore and promote use of coping strategies; address barriers to successful  coping.  Evaluate and assist with psychosocial, cultural and spiritual factors impacting pain.  Modify pain perception using techniques, such as distraction, mindfulness, guided imagery, meditation or music.  Assess for risk factors for developing chronic pain, such as depression, fear, pain avoidance and pain catastrophizing.  Consider referral for ongoing coping support, such as education, relaxation training and role of thoughts.  Recent Flowsheet Documentation  Taken 5/8/2023 2000 by Moi Montiel RN  Supportive Measures:   active listening utilized   problem-solving facilitated   relaxation techniques promoted   self-care encouraged   self-responsibility promoted   verbalization of feelings encouraged   self-reflection promoted   positive reinforcement provided  Diversional Activities: television     Problem: Device-Related Complication Risk (Hemodialysis)  Goal: Safe, Effective Therapy Delivery  Intervention: Optimize Device Care and Function  Description: Maintain flow rate, anticoagulation parameters, pressure ranges and prescribed fluid balance with gradual adjustments to maintain hemodynamic stability and achieve therapy goals.  Monitor laboratory results (e.g., electrolytes, glucose, albumin, coagulation studies, hemoglobin, hematocrit) and clinical status (e.g., cramping; nausea, vomiting, blood pressure fluctuations) for response to therapy; advocate for treatment or adju  Assess vascular access site for patency, securement and position to meet flow demands. Assess perfusion distal to access site to ensure adequate tissue oxygenation.  For arteriovenous fistula, assess presence of thrill to ensure presence of blood flow. Avoid blood pressure readings, lab draws, tight clothing or jewelry on the AV (arteriovenous) fistula extremity to prevent trauma.  Maintain circuit monitoring (e.g., arterial, venous and transmembrane pressure; ultrafiltrate removal; dialysate flow, conductivity and temperature);  address alarms promptly to decrease risk to patient and preserve circuit function.  Evaluate circuit for disconnections, cracks, clotting, malfunction, leaks or rupture of hemofilter; intervene promptly to prevent risk to patient.  Consider distal vascular access for antibiotic or vasoactive medication administration to prevent filtration of medication effect prior to being delivered systemically to the patient.  Maintain infusion of anticoagulation; adjust to keep laboratory values within ordered range.  Provide emergency equipment, such as clamps, replacement devices and resuscitative supplies, if malfunction, tubing rupture, clot formation or migration occur.  Recent Flowsheet Documentation  Taken 5/8/2023 2000 by Moi Montiel, RN  Medication Review/Management:   medications reviewed   high-risk medications identified   Goal Outcome Evaluation:

## 2023-05-09 NOTE — CASE MANAGEMENT/SOCIAL WORK
Discharge Planning Assessment  [facility]     Patient Name: Jun Young  MRN: [id number]  Today's Date: 5/9/2023    Admit Date: 5/5/2023    Plan: Home at discharge   Discharge Needs Assessment    No documentation.                Discharge Plan     Row Name 05/09/23 1549       Plan    Plan Home at discharge    Final Discharge Disposition Code 01 - home or self-care    Final Note Patient is being discharged home today. He will return home with assistance from his daughter and he remains independent of ADLs. Meds to Bed was utilized for prescriptions, PA submitted through [facility] as well - [facility] pharmacy is following for PA approval and will forward to his preferred pharmacy once approval given. He will return to HD at [facility] on MWF - CM will fax DC summary to [facility]. No other dc needs identified- candida [phone]              Continued Care and Services - Discharged on 5/9/2023 Admission date: 5/5/2023 - Discharge disposition: Home or Self Care    Dialysis/Infusion     Service Provider Request Status Selected Services Address Phone Fax Patient Preferred    [facility]  Selected Dialysis [address] [address] [phone] --            Selected Continued Care - Prior Encounters Includes continued care and service providers with selected services from prior encounters from 2/4/2023 to 5/9/2023    Discharged on 2/14/2023 Admission date: 2/7/2023 - Discharge disposition: Home or Self Care    Dialysis/Infusion     Service Provider Selected Services Address Phone Fax Patient Preferred    [facility] [address] [address] [phone] --                    Expected Discharge Date and Time     Expected Discharge Date Expected Discharge Time    May 9, 2023          Demographic Summary    No documentation.                Functional Status    No documentation.                 Psychosocial    No documentation.                Abuse/Neglect    No documentation.                Legal    No documentation.                Substance Abuse    No documentation.                Patient Forms    No documentation.                   Sophy Dean RN

## 2023-05-09 NOTE — DISCHARGE PLACEMENT REQUEST
"  Patient has been discharged today   Will continue to his regular MWF schedule     Thank you     Sophy Dean   [phone]      Jun Young (62 y.o. Male)     Date of Birth   1960    Social Security Number       Address   [address] [place] [address]    Home Phone   [phone]    MRN   [id number]       Mandaeism   Baptism    Marital Status   Single                            Admission Date   23    Admission Type   Elective    Admitting Provider   Katie Lau MD    Attending Provider       Department, Room/Bed   [facility] 2F, S209/       Discharge Date   2023    Discharge Disposition   Home or Self Care    Discharge Destination                               Attending Provider: (none)   Allergies: No Known Allergies    Isolation: None   Infection: None   Code Status: CPR    Ht: 170.2 cm (67.01\")   Wt: 74.9 kg (165 lb 1.6 oz)    Admission Cmt: None   Principal Problem: BPH with obstruction/lower urinary tract symptoms [N40.1,N13.8]                 Active Insurance as of 2023     Primary Coverage     Payor Plan Insurance Group Employer/Plan Group    [employer] [place] [employer] [place]ICA[place]      Payor Plan Address Payor Plan Phone Number Payor Plan Fax Number Effective Dates    [address] [address] [phone]  2023 - None Entered    [facility] [id number]       Subscriber Name Subscriber Birth Date Member ID       JUN YOUNG 1960 [id number]                 Emergency Contacts      (Rel.) Home Phone Work Phone Mobile Phone    CAMPOSVENECIA DOWD (Daughter) [phone] -- [phone]    ANDRESTAWANDA (Son) [phone] -- [phone]    ANDRESCORTEZ (Son) [phone] -- --               Discharge Summary      Katie Lau MD at 23 0945              [facility] Medicine Services  DISCHARGE SUMMARY    Patient Name: Jun Young  : 1960  MRN: [id number]    Date of Admission: 2023  " 9:25 AM  Date of Discharge:  5/9/23  Primary Care Physician: Provider, No Known    Consults     Date and Time Order Name Status Description    5/5/2023  2:49 PM Inpatient Nephrology Consult Completed     5/5/2023  2:36 PM Inpatient Internal Medicine Consult            Hospital Course     Presenting Problem:   BPH with obstruction/lower urinary tract symptoms [N40.1, N13.8]  Urinary retention [R33.9]  S/P TURP (status post transurethral resection of prostate) [Z90.79]    Active Hospital Problems    Diagnosis  POA   • **BPH with obstruction/lower urinary tract symptoms [N40.1, N13.8]  Yes   • S/P TURP (status post transurethral resection of prostate) [Z90.79]  Not Applicable   • Hypertension [I10]  Unknown   • Urinary retention [R33.9]  Yes   • ESRD (end stage renal disease) [N18.6]  Yes   • Diabetes mellitus [E11.9]  Yes      Resolved Hospital Problems   No resolved problems to display.          Hospital Course:  Jun Young is a 62 y.o. male with history of DM, HTN, ESRD with dialysis MWF, obstructive uropathy who presents for a TURP with Dr. Damico. Medicine consulted for medical management but asked to take over due to medical issues.     Obstructive uropathy s/p TURP with Dr. Damico   - Post op and caldwell management per Dr. Damico   - CBI initially clamped 5/7 AM but requiring unclamping due to small clots in the caldwell tubing  - Ultimately patient was cleared for discharge by urology with plan to f/u 5/11 with voiding trial and caldwell removal  - patient dc with caldwell in place      Anemia  - suspect due to blood loss combined- down to 7.3--6.8 this AM  - will transfuse 1 unit pRBC with HD 5/8, H/H stable at time of discharge     Constipation  Resolved, continue OTC bowel regimen as needed     ESRD dialysis MWF   - s/p HD inpatient 5/6  - Nephrology following , patient to resume outpatient HD as scheduled      HTN   - Continue amlodipine      DM   - previously on Januvia. States he has been out  of medication x 2 months   - A1C 4.6   - SSI   - Will need new medications Rx and PCP at discharge , patient has been arranged with Dr Chow - Thursday, May 11th at 10:15am with Dr. Chow at [address]      Patient was given Rx for all medications needed at discharge to our pharmacy as he states that he had no medications. He will f/u closely with PCP as above and urology      Discharge Follow Up Recommendations for outpatient labs/diagnostics:  PCP as above Dr Chow  Urology 5.11     Day of Discharge     HPI:   Doing well. Would like to go home. No complaints     Review of Systems  Gen- No fevers, chills  CV- No chest pain, palpitations  Resp- No cough, dyspnea  GI- No N/V/D, abd pain         Vital Signs:   Temp:  [96.6 °F (35.9 °C)-98.8 °F (37.1 °C)] 98.5 °F (36.9 °C)  Heart Rate:  [] 94  Resp:  [16-20] 16  BP: (100-124)/(67-76) 120/71      Physical Exam:  GEN: NAD, resting in bed, awake  HEENT: on room air, atraumatic, normocephalic  NECK: supple, no masses  RESP: on room air, normal effort  CV: on tele, sinus rhythm  PSYCH: normal affect, appropriate  NEURO: awake, alert, no focal deficits noted  MSK: no edema noted  SKIN: no rashes noted   : Whitfield in place       Pertinent  and/or Most Recent Results     LAB RESULTS:      Lab 05/09/23  0514 05/08/23  1152 05/08/23  0650 05/07/23  1126 05/06/23  0312   WBC 9.02 9.39 10.07 8.02 10.09   HEMOGLOBIN 8.2* 8.1* 6.8* 7.3* 9.8*   HEMATOCRIT 25.6* 24.3* 21.4* 22.1* 29.7*   PLATELETS 171 130* 144 140 156   NEUTROS ABS 6.32  --  7.95* 6.13  --    IMMATURE GRANS (ABS) 0.07*  --  0.05 0.06*  --    LYMPHS ABS 1.04  --  0.62* 0.61*  --    MONOS ABS 0.92*  --  0.90 0.66  --    EOS ABS 0.62*  --  0.50* 0.51*  --    MCV 93.8 92.4 96.8 94.4 93.7         Lab 05/09/23  0514 05/08/23  0650 05/07/23  1126 05/06/23  0312 05/05/23  2214 05/05/23  2114   SODIUM 137 135* 134* 137  --  134*   POTASSIUM 4.4 4.9 5.3* 5.0 7.0* 6.1*   CHLORIDE 97* 94* 93* 95*  --  95*   CO2 24.0  21.0* 22.0 19.0*  --  17.0*   ANION GAP 16.0* 20.0* 19.0* 23.0*  --  22.0*   BUN 46* 73* 66* 77*  --  66*   CREATININE 8.05* 12.56* 10.43* 9.30*  --  8.96*   EGFR 6.9* 4.0* 5.0* 5.8*  --  6.0*   GLUCOSE 96 93 110* 168*  --  214*   CALCIUM 8.3* 8.1* 8.2* 8.7  --  8.4*   MAGNESIUM  --  2.7*  --   --   --   --    PHOSPHORUS  --  9.6*  --   --   --   --    HEMOGLOBIN A1C  --   --   --   --   --  4.90         Lab 05/09/23  0514 05/08/23  0650 05/07/23  1126   TOTAL PROTEIN 6.1 6.5 6.0   ALBUMIN 3.8 3.7 3.9   GLOBULIN 2.3 2.8 2.1   ALT (SGPT) <5 <5 <5   AST (SGOT) 14 13 12   BILIRUBIN 0.3 0.3 0.2   ALK PHOS 113 86 72                 Lab 05/08/23  0650 05/05/23  0953   IRON 21*  --    IRON SATURATION 10*  --    TIBC 213*  --    TRANSFERRIN 143*  --    ABO TYPING  --  O   RH TYPING  --  Positive   ANTIBODY SCREEN  --  Negative         Brief Urine Lab Results  (Last result in the past 365 days)      Color   Clarity   Blood   Leuk Est   Nitrite   Protein   CREAT   Urine HCG        04/28/23 0837 Yellow   Cloudy   Moderate (2+)   Large (3+)   Negative   >=300 mg/dL (3+)               Microbiology Results (last 10 days)     ** No results found for the last 240 hours. **          No radiology results for the last 10 days    Results for orders placed during the hospital encounter of 02/07/23    Duplex Vein Mapping Study Upper Extremity - Left CAR    Interpretation Summary  •  Inadequate/small left cephalic vein  •  Adequate/good basilic vein.  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      Results for orders placed during the hospital encounter of 02/07/23    Duplex Vein Mapping Study Upper Extremity - Left CAR    Interpretation Summary  •  Inadequate/small left cephalic vein  •  Adequate/good basilic vein.  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      Results for orders placed during the hospital encounter of 03/17/23    Adult  Transthoracic Echo Limited W/ Cont if Necessary Per Protocol    Interpretation Summary  •  Left ventricular systolic function is normal. Calculated left ventricular EF = 61.7% Left ventricular ejection fraction appears to be 61 - 65%.  •  Left ventricular wall thickness is consistent with mild concentric hypertrophy.  •  There is a small (<1cm) pericardial effusion.      Plan for Follow-up of Pending Labs/Results: will be called with any abnormal results which require f/u    Pending Labs     Order Current Status    Tissue Pathology Exam In process        Discharge Details        Discharge Medications      New Medications      Instructions Start Date   clotrimazole 1 % cream  Commonly known as: LOTRIMIN  Replaces: miconazole 2 % cream   Topical, 2 Times Daily      simethicone 80 MG chewable tablet  Commonly known as: MYLICON   Chew and swallow 1 tablet by mouth 4 (Four) Times a Day As Needed for Flatulence.         Changes to Medications      Instructions Start Date   pantoprazole 40 MG EC tablet  Commonly known as: PROTONIX  What changed: Another medication with the same name was removed. Continue taking this medication, and follow the directions you see here.   40 mg, Oral, Daily      SITagliptin 25 MG tablet  Commonly known as: Januvia  What changed: Another medication with the same name was removed. Continue taking this medication, and follow the directions you see here.   25 mg, Oral, Daily         Continue These Medications      Instructions Start Date   acetaminophen 325 MG tablet  Commonly known as: TYLENOL   650 mg, Oral, Every 4 Hours PRN      amLODIPine 10 MG tablet  Commonly known as: NORVASC   10 mg, Oral, Daily      bacitracin-polymyxin b 500-37282 UNIT/GM ointment  Commonly known as: POLYSPORIN   Apply to tip of penis/catheter twice daily for irritation      bacitracin-polymyxin b 500-92681 UNIT/GM ointment  Commonly known as: POLYSPORIN   1 application, Topical, 2 Times Daily      KAYLEIGH-LASHON PO   1  tablet, Oral, Daily      b complex-vitamin c-folic acid 0.8 MG tablet tablet   1 tablet, Oral, Daily      sevelamer 800 MG tablet  Commonly known as: RENVELA   800 mg, Oral, 3 Times Daily With Meals         Stop These Medications    HYDROcodone-acetaminophen 5-325 MG per tablet  Commonly known as: NORCO     miconazole 2 % cream  Commonly known as: MICOTIN  Replaced by: clotrimazole 1 % cream     sodium bicarbonate 650 MG tablet            No Known Allergies      Discharge Disposition:  Home or Self Care    Diet:  Hospital:  Diet Order   Procedures   • Diet: Renal Diets, Diabetic Diets, High Protein Diet; Consistent Carbohydrate; Low Phosphorus, Low Potassium; Texture: Regular Texture (IDDSI 7); Fluid Consistency: Thin (IDDSI 0)       Activity:  As tolerated      Restrictions or Other Recommendations:  As tolerated       CODE STATUS:    Code Status and Medical Interventions:   Ordered at: 05/05/23 2486     Level Of Support Discussed With:    Patient     Code Status (Patient has no pulse and is not breathing):    CPR (Attempt to Resuscitate)     Medical Interventions (Patient has pulse or is breathing):    Full Support     Release to patient:    Routine Release       Future Appointments   Date Time Provider Department Center   5/11/2023 10:15 AM Samir Chow DO [facility]   5/11/2023 11:00 AM NURSE/MA [facility]   6/8/2023  9:00 AM Pawan Damico MD [facility]       Additional Instructions for the Follow-ups that You Need to Schedule     Discharge Follow-up with PCP   As directed       Currently Documented PCP:    Provider, No Known    PCP Phone Number:    None     Follow Up Details: 5/11 with Dr Chow as scheduled         Discharge Follow-up with Specified Provider: 5/11 with urology for catheter removal and voiding trial   As directed      To: 5/11 with urology for catheter removal and voiding trial                     Katie Lua MD  05/09/23      Time Spent on  Discharge:  I spent 35 minutes on this discharge activity which included: face-to-face encounter with the patient, reviewing the data in the system, coordination of the care with the nursing staff as well as consultants, documentation, and entering orders.            Electronically signed by Katie Lau MD at 05/09/23 0925

## 2023-05-09 NOTE — DISCHARGE SUMMARY
[facility] Medicine Services  DISCHARGE SUMMARY    Patient Name: Jun Young  : 1960  MRN: [id number]    Date of Admission: 2023  9:25 AM  Date of Discharge:  23  Primary Care Physician: Provider, No Known    Consults     Date and Time Order Name Status Description    2023  2:49 PM Inpatient Nephrology Consult Completed     2023  2:36 PM Inpatient Internal Medicine Consult            Hospital Course     Presenting Problem:   BPH with obstruction/lower urinary tract symptoms [N40.1, N13.8]  Urinary retention [R33.9]  S/P TURP (status post transurethral resection of prostate) [Z90.79]    Active Hospital Problems    Diagnosis  POA   • **BPH with obstruction/lower urinary tract symptoms [N40.1, N13.8]  Yes   • S/P TURP (status post transurethral resection of prostate) [Z90.79]  Not Applicable   • Hypertension [I10]  Unknown   • Urinary retention [R33.9]  Yes   • ESRD (end stage renal disease) [N18.6]  Yes   • Diabetes mellitus [E11.9]  Yes      Resolved Hospital Problems   No resolved problems to display.          Hospital Course:  Jun Young is a 62 y.o. male with history of DM, HTN, ESRD with dialysis MWF, obstructive uropathy who presents for a TURP with Dr. Damico. Medicine consulted for medical management but asked to take over due to medical issues.     Obstructive uropathy s/p TURP with Dr. Damico   - Post op and caldwell management per Dr. Damico   - CBI initially clamped 5/7 AM but requiring unclamping due to small clots in the caldwell tubing  - Ultimately patient was cleared for discharge by urology with plan to f/u  with voiding trial and caldwell removal  - patient dc with caldwell in place      Anemia  - suspect due to blood loss combined- down to 7.3--6.8 this AM  - will transfuse 1 unit pRBC with HD , H/H stable at time of discharge     Constipation  Resolved, continue OTC bowel regimen as needed     ESRD dialysis MWF    - s/p HD inpatient 5/6  - Nephrology following , patient to resume outpatient HD as scheduled      HTN   - Continue amlodipine      DM   - previously on Januvia. States he has been out of medication x 2 months   - A1C 4.6   - SSI   - Will need new medications Rx and PCP at discharge , patient has been arranged with Dr Chow - Thursday, May 11th at 10:15am with Dr. Chow at [address]      Patient was given Rx for all medications needed at discharge to our pharmacy as he states that he had no medications. He will f/u closely with PCP as above and urology      Discharge Follow Up Recommendations for outpatient labs/diagnostics:  PCP as above Dr Chow  Urology 5.11     Day of Discharge     HPI:   Doing well. Would like to go home. No complaints     Review of Systems  Gen- No fevers, chills  CV- No chest pain, palpitations  Resp- No cough, dyspnea  GI- No N/V/D, abd pain         Vital Signs:   Temp:  [96.6 °F (35.9 °C)-98.8 °F (37.1 °C)] 98.5 °F (36.9 °C)  Heart Rate:  [] 94  Resp:  [16-20] 16  BP: (100-124)/(67-76) 120/71      Physical Exam:  GEN: NAD, resting in bed, awake  HEENT: on room air, atraumatic, normocephalic  NECK: supple, no masses  RESP: on room air, normal effort  CV: on tele, sinus rhythm  PSYCH: normal affect, appropriate  NEURO: awake, alert, no focal deficits noted  MSK: no edema noted  SKIN: no rashes noted   : Whitfield in place       Pertinent  and/or Most Recent Results     LAB RESULTS:      Lab 05/09/23  0514 05/08/23  1152 05/08/23  0650 05/07/23  1126 05/06/23  0312   WBC 9.02 9.39 10.07 8.02 10.09   HEMOGLOBIN 8.2* 8.1* 6.8* 7.3* 9.8*   HEMATOCRIT 25.6* 24.3* 21.4* 22.1* 29.7*   PLATELETS 171 130* 144 140 156   NEUTROS ABS 6.32  --  7.95* 6.13  --    IMMATURE GRANS (ABS) 0.07*  --  0.05 0.06*  --    LYMPHS ABS 1.04  --  0.62* 0.61*  --    MONOS ABS 0.92*  --  0.90 0.66  --    EOS ABS 0.62*  --  0.50* 0.51*  --    MCV 93.8 92.4 96.8 94.4 93.7         Lab 05/09/23  0514  05/08/23  0650 05/07/23  1126 05/06/23  0312 05/05/23  2214 05/05/23  2114   SODIUM 137 135* 134* 137  --  134*   POTASSIUM 4.4 4.9 5.3* 5.0 7.0* 6.1*   CHLORIDE 97* 94* 93* 95*  --  95*   CO2 24.0 21.0* 22.0 19.0*  --  17.0*   ANION GAP 16.0* 20.0* 19.0* 23.0*  --  22.0*   BUN 46* 73* 66* 77*  --  66*   CREATININE 8.05* 12.56* 10.43* 9.30*  --  8.96*   EGFR 6.9* 4.0* 5.0* 5.8*  --  6.0*   GLUCOSE 96 93 110* 168*  --  214*   CALCIUM 8.3* 8.1* 8.2* 8.7  --  8.4*   MAGNESIUM  --  2.7*  --   --   --   --    PHOSPHORUS  --  9.6*  --   --   --   --    HEMOGLOBIN A1C  --   --   --   --   --  4.90         Lab 05/09/23  0514 05/08/23  0650 05/07/23  1126   TOTAL PROTEIN 6.1 6.5 6.0   ALBUMIN 3.8 3.7 3.9   GLOBULIN 2.3 2.8 2.1   ALT (SGPT) <5 <5 <5   AST (SGOT) 14 13 12   BILIRUBIN 0.3 0.3 0.2   ALK PHOS 113 86 72                 Lab 05/08/23  0650 05/05/23  0953   IRON 21*  --    IRON SATURATION 10*  --    TIBC 213*  --    TRANSFERRIN 143*  --    ABO TYPING  --  O   RH TYPING  --  Positive   ANTIBODY SCREEN  --  Negative         Brief Urine Lab Results  (Last result in the past 365 days)      Color   Clarity   Blood   Leuk Est   Nitrite   Protein   CREAT   Urine HCG        04/28/23 0837 Yellow   Cloudy   Moderate (2+)   Large (3+)   Negative   >=300 mg/dL (3+)               Microbiology Results (last 10 days)     ** No results found for the last 240 hours. **          No radiology results for the last 10 days    Results for orders placed during the hospital encounter of 02/07/23    Duplex Vein Mapping Study Upper Extremity - Left CAR    Interpretation Summary  •  Inadequate/small left cephalic vein  •  Adequate/good basilic vein.  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      Results for orders placed during the hospital encounter of 02/07/23    Duplex Vein Mapping Study Upper Extremity - Left CAR    Interpretation Summary  •  Inadequate/small left cephalic vein  •   Adequate/good basilic vein.  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      Results for orders placed during the hospital encounter of 03/17/23    Adult Transthoracic Echo Limited W/ Cont if Necessary Per Protocol    Interpretation Summary  •  Left ventricular systolic function is normal. Calculated left ventricular EF = 61.7% Left ventricular ejection fraction appears to be 61 - 65%.  •  Left ventricular wall thickness is consistent with mild concentric hypertrophy.  •  There is a small (<1cm) pericardial effusion.      Plan for Follow-up of Pending Labs/Results: will be called with any abnormal results which require f/u    Pending Labs     Order Current Status    Tissue Pathology Exam In process        Discharge Details        Discharge Medications      New Medications      Instructions Start Date   clotrimazole 1 % cream  Commonly known as: LOTRIMIN  Replaces: miconazole 2 % cream   Topical, 2 Times Daily      simethicone 80 MG chewable tablet  Commonly known as: MYLICON   Chew and swallow 1 tablet by mouth 4 (Four) Times a Day As Needed for Flatulence.         Changes to Medications      Instructions Start Date   pantoprazole 40 MG EC tablet  Commonly known as: PROTONIX  What changed: Another medication with the same name was removed. Continue taking this medication, and follow the directions you see here.   40 mg, Oral, Daily      SITagliptin 25 MG tablet  Commonly known as: Januvia  What changed: Another medication with the same name was removed. Continue taking this medication, and follow the directions you see here.   25 mg, Oral, Daily         Continue These Medications      Instructions Start Date   acetaminophen 325 MG tablet  Commonly known as: TYLENOL   650 mg, Oral, Every 4 Hours PRN      amLODIPine 10 MG tablet  Commonly known as: NORVASC   10 mg, Oral, Daily      bacitracin-polymyxin b 500-55475 UNIT/GM ointment  Commonly known as: POLYSPORIN   Apply to  tip of penis/catheter twice daily for irritation      bacitracin-polymyxin b 500-30043 UNIT/GM ointment  Commonly known as: POLYSPORIN   1 application, Topical, 2 Times Daily      KAYLEIGH-LASHON PO   1 tablet, Oral, Daily      b complex-vitamin c-folic acid 0.8 MG tablet tablet   1 tablet, Oral, Daily      sevelamer 800 MG tablet  Commonly known as: RENVELA   800 mg, Oral, 3 Times Daily With Meals         Stop These Medications    HYDROcodone-acetaminophen 5-325 MG per tablet  Commonly known as: NORCO     miconazole 2 % cream  Commonly known as: MICOTIN  Replaced by: clotrimazole 1 % cream     sodium bicarbonate 650 MG tablet            No Known Allergies      Discharge Disposition:  Home or Self Care    Diet:  Hospital:  Diet Order   Procedures   • Diet: Renal Diets, Diabetic Diets, High Protein Diet; Consistent Carbohydrate; Low Phosphorus, Low Potassium; Texture: Regular Texture (IDDSI 7); Fluid Consistency: Thin (IDDSI 0)       Activity:  As tolerated      Restrictions or Other Recommendations:  As tolerated       CODE STATUS:    Code Status and Medical Interventions:   Ordered at: 05/05/23 6686     Level Of Support Discussed With:    Patient     Code Status (Patient has no pulse and is not breathing):    CPR (Attempt to Resuscitate)     Medical Interventions (Patient has pulse or is breathing):    Full Support     Release to patient:    Routine Release       Future Appointments   Date Time Provider Department Center   5/11/2023 10:15 AM Samir Chow DO [facility]   5/11/2023 11:00 AM NURSE/MA [facility]   6/8/2023  9:00 AM Pawan Damico MD [facility]       Additional Instructions for the Follow-ups that You Need to Schedule     Discharge Follow-up with PCP   As directed       Currently Documented PCP:    Provider, No Known    PCP Phone Number:    None     Follow Up Details: 5/11 with Dr Chow as scheduled         Discharge Follow-up with Specified Provider: 5/11 with  urology for catheter removal and voiding trial   As directed      To: 5/11 with urology for catheter removal and voiding trial                     Katie Lau MD  05/09/23      Time Spent on Discharge:  I spent 35 minutes on this discharge activity which included: face-to-face encounter with the patient, reviewing the data in the system, coordination of the care with the nursing staff as well as consultants, documentation, and entering orders.

## 2023-05-09 NOTE — PROGRESS NOTES
"   LOS: 1 day    Patient Care Team:  Provider, No Known as PCP - General  Provider, No Known as PCP - Family Medicine  Provider, No Known  Lora Dickey DO as Consulting Physician (Hospitalist)  Katie Lau MD as Consulting Physician (Hospitalist)    Subjective     Stable overnight.      Objective     Vital Signs:  Blood pressure 120/71, pulse 94, temperature 98.5 °F (36.9 °C), temperature source Oral, resp. rate 16, height 170.2 cm (67.01\"), weight 74.9 kg (165 lb 1.6 oz), SpO2 97 %.      Intake/Output Summary (Last 24 hours) at 5/9/2023 1031  Last data filed at 5/8/2023 1600  Gross per 24 hour   Intake 480 ml   Output 650 ml   Net -170 ml        05/08 0701 - 05/09 0700  In: 768 [P.O.:480]  Out: 650     Physical Exam:        GENERAL: WDWM NAD  NEURO: Awake and alert,  PSYCHIATRIC: NMA  CV: No edema  LUNGS:  Quiet,  Nonlabored resp  ABDOMEN: Nondistended.  : + [place]  SKIN: Warm and dry without rash  + RIJ TDC.    Labs:  Results from last 7 days   Lab Units 05/09/23  0514 05/08/23  1152 05/08/23  0650   WBC 10*3/mm3 9.02 9.39 10.07   HEMOGLOBIN g/dL 8.2* 8.1* 6.8*   PLATELETS 10*3/mm3 171 130* 144     Results from last 7 days   Lab Units 05/09/23  0514 05/08/23  0650 05/07/23  1126 05/06/23  0312   SODIUM mmol/L 137 135* 134* 137   POTASSIUM mmol/L 4.4 4.9 5.3* 5.0   CHLORIDE mmol/L 97* 94* 93* 95*   CO2 mmol/L 24.0 21.0* 22.0 19.0*   BUN mg/dL 46* 73* 66* 77*   CREATININE mg/dL 8.05* 12.56* 10.43* 9.30*   CALCIUM mg/dL 8.3* 8.1* 8.2* 8.7   PHOSPHORUS mg/dL  --  9.6*  --   --    MAGNESIUM mg/dL  --  2.7*  --   --    ALBUMIN g/dL 3.8 3.7 3.9  --      Results from last 7 days   Lab Units 05/09/23  0514   ALK PHOS U/L 113   BILIRUBIN mg/dL 0.3   ALT (SGPT) U/L <5   AST (SGOT) U/L 14                   Estimated Creatinine Clearance: 9.8 mL/min (A) (by C-G formula based on SCr of 8.05 mg/dL (H)).         A/P:    ESRD: Getting HD MWF.  Continue current schedule    HTN: Blood pressure stable.  Dropped yesterday " with dialysis.  Improved with albumin and packed red blood cells.  Monitor for now.      Anemia: Hemoglobin improved to 8.2 post transfusion yesterday.  Started Epogen.  Iron studies showed a TSAT of 10%.  Will need IV iron.    Hyperkalemia: Stable.  Continue 2K bath    Hyponatremia: Mild.  Monitor.  Adjust with HD.    Metabolic acidosis:Will adjust with HD.  Hold p.o. bicarb for now.    Hyperphosphatemia: Phosphorus 9.6.  Doubt binder and diet compliance.    Nutrition: Continue low Phos low potassium high-protein dialysis diet.  Added renal vitamin.    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  Urology following.  Off CBI      Shantanu Brown MD  05/09/23  10:31 EDT

## 2023-05-09 NOTE — PLAN OF CARE
Goal Outcome Evaluation:  Plan of Care Reviewed With: patient        Progress: improving  Outcome Evaluation: VSS, up ad nemo. Pt denies pain this AM. Urine pink tinged.  Caldwell in place- per urology pt to go home with caldwell. F/u out pt on 5/11/23. Pt changed to leg bag for drainage.

## 2023-05-09 NOTE — PLAN OF CARE
Goal Outcome Evaluation:  Plan of Care Reviewed With: patient        Progress: improving  Outcome Evaluation: VSS, up ad nemo. Pt denies pain this AM. Urine pink tinged.  Caldwell in place- per urology pt to go home with caldwell. F/u out pt on 5/11/23. Pt

## 2023-05-10 ENCOUNTER — TRANSITIONAL CARE MANAGEMENT TELEPHONE ENCOUNTER (OUTPATIENT)
Dept: CALL CENTER | Facility: HOSPITAL | Age: 63
End: 2023-05-10
Payer: COMMERCIAL

## 2023-05-10 NOTE — OUTREACH NOTE
Prep Survey    Flowsheet Row Responses   [facility] patient discharged from? [place]   Is LACE score < 7 ? No   Eligibility [facility]   Date of Admission 05/05/23   Date of Discharge 05/09/23   Discharge Disposition Home or Self Care   Discharge diagnosis BPH with obstruction/lower urinary tract symptoms s/p TURP    Does the patient have one of the following disease processes/diagnoses(primary or secondary)? Other   Does the patient have Home health ordered? No   Is there a DME ordered? No   Comments regarding appointments new patient appt   Prep survey completed? Yes          Darlyn POE - Registered Nurse

## 2023-05-10 NOTE — OUTREACH NOTE
Call Center TCM Note    Flowsheet Row Responses   [facility] patient discharged from? [place]   Does the patient have one of the following disease processes/diagnoses(primary or secondary)? Other   TCM attempt successful? Yes   Call start time 1141   Call end time 1142   Discharge diagnosis BPH with obstruction/lower urinary tract symptoms s/p TURP    Does the patient have all medications ordered at discharge? Yes   Is the patient taking all medications as directed (includes completed medication regime)? Yes   Comments New patient appt with Dr. Chow for tomorrow (5/11)   Does the patient have an appointment with their PCP within 7 days of discharge? Yes   Has home health visited the patient within 72 hours of discharge? N/A   Psychosocial issues? No   Did the patient receive a copy of their discharge instructions? Yes   What is the patient's perception of their health status since discharge? Improving   Is the patient/caregiver able to teach back the hierarchy of who to call/visit for symptoms/problems? PCP, Specialist, Home health nurse, Urgent Care, ED, 911 Yes   TCM call completed? Yes   Wrap up additional comments Brief call, doing well, no questions, confirmed new patient appt with Dr. Chow for tomorrow (5/11).   Call end time 1142   Would this patient benefit from a Referral to Amb Social Work? No   Is the patient interested in additional calls from an ambulatory ?  NOTE:  applies to high risk patients requiring additional follow-up. No          Molly Leggett RN    5/10/2023, 11:42 EDT

## 2023-05-11 ENCOUNTER — CLINICAL SUPPORT (OUTPATIENT)
Dept: UROLOGY | Facility: CLINIC | Age: 63
End: 2023-05-11
Payer: COMMERCIAL

## 2023-05-11 ENCOUNTER — OFFICE VISIT (OUTPATIENT)
Dept: FAMILY MEDICINE CLINIC | Facility: CLINIC | Age: 63
End: 2023-05-11
Payer: COMMERCIAL

## 2023-05-11 VITALS
HEART RATE: 99 BPM | DIASTOLIC BLOOD PRESSURE: 70 MMHG | WEIGHT: 157 LBS | OXYGEN SATURATION: 98 % | BODY MASS INDEX: 24.64 KG/M2 | SYSTOLIC BLOOD PRESSURE: 126 MMHG | HEIGHT: 67 IN

## 2023-05-11 DIAGNOSIS — E11.22 TYPE 2 DIABETES MELLITUS WITH CHRONIC KIDNEY DISEASE ON CHRONIC DIALYSIS, WITHOUT LONG-TERM CURRENT USE OF INSULIN: Primary | ICD-10-CM

## 2023-05-11 DIAGNOSIS — N18.6 TYPE 2 DIABETES MELLITUS WITH CHRONIC KIDNEY DISEASE ON CHRONIC DIALYSIS, WITHOUT LONG-TERM CURRENT USE OF INSULIN: Primary | ICD-10-CM

## 2023-05-11 DIAGNOSIS — Z99.2 TYPE 2 DIABETES MELLITUS WITH CHRONIC KIDNEY DISEASE ON CHRONIC DIALYSIS, WITHOUT LONG-TERM CURRENT USE OF INSULIN: Primary | ICD-10-CM

## 2023-05-11 DIAGNOSIS — N40.1 BPH WITH OBSTRUCTION/LOWER URINARY TRACT SYMPTOMS: ICD-10-CM

## 2023-05-11 DIAGNOSIS — Z90.79 S/P TURP (STATUS POST TRANSURETHRAL RESECTION OF PROSTATE): ICD-10-CM

## 2023-05-11 DIAGNOSIS — N18.6 ESRD (END STAGE RENAL DISEASE): ICD-10-CM

## 2023-05-11 DIAGNOSIS — N13.8 BPH WITH OBSTRUCTION/LOWER URINARY TRACT SYMPTOMS: ICD-10-CM

## 2023-05-11 NOTE — PROGRESS NOTES
Chief Complaint   Patient presents with   • Establish Care          • Benign Prostatic Hypertrophy     [facility] ED F/u admission 5/5/2023 - discharge 5/9/2023    Current outpatient and discharge medications have been reconciled for the patient.  Reviewed by: Samir Chow DO      HPI:  Jun Young is a 62 y.o. male who presents today for hospital follow-up TURP and establish care.  No acute concerns today.    ROS:  Constitutional: no fevers, night sweats or unexplained weight loss  Eyes: no vision changes  ENT: no runny nose, ear pain, sore throat  Cardio: no chest pain, palpitations  Pulm: no shortness of breath, wheezing, or cough  GI: no abdominal pain or changes in bowel movements  : + difficulty urinating  MSK: no difficulty ambulating, no joint pain  Neuro: no weakness, dizziness or headache  Psych: no trouble sleeping  Endo: no change in appetite      Past Medical History:   Diagnosis Date   • Acute on chronic renal failure    • Bilateral hydronephrosis    • BPH (benign prostatic hyperplasia)    • Cholelithiasis    • Diabetes mellitus    • Dyslipidemia    • Elevated cholesterol    • GERD (gastroesophageal reflux disease)    • Gross hematuria    • Hemodialysis patient    • History of transfusion     [facility], no reactions per pt   • Hyperlipidemia    • Hypertension    • Pericardial effusion     echo 2/2023 - 1-2 cm preserved LV function   • Stroke     no residual effects      Family History   Family history unknown: Yes      Social History     Socioeconomic History   • Marital status: Single   Tobacco Use   • Smoking status: Never     Passive exposure: Never   • Smokeless tobacco: Never   Vaping Use   • Vaping Use: Never used   Substance and Sexual Activity   • Alcohol use: Never   • Drug use: Never   • Sexual activity: Not Currently      No Known Allergies     There is no immunization history on file for this patient.     PE:  Vitals:    05/11/23 1016   BP: 126/70   Pulse: 99   SpO2: 98%       Body mass index is 24.58 kg/m².    Gen Appearance: NAD  HEENT: Normocephalic, PERRLA, no thyromegaly, trache midline  Heart: RRR, normal S1 and S2, no murmur  Lungs: CTA b/l, no wheezing, no crackles  Abdomen: Soft, non-tender, non-distended, no guarding and BSx4  MSK: Moves all extremities well, normal gait, no peripheral edema  Pulses: Palpable and equal b/l  Lymph nodes: No palpable lymphadenopathy   Neuro: No focal deficits      Current Outpatient Medications   Medication Sig Dispense Refill   • acetaminophen (TYLENOL) 325 MG tablet Take 2 tablets by mouth Every 4 (Four) Hours As Needed for Mild Pain.     • amLODIPine (NORVASC) 10 MG tablet Take 1 tablet by mouth Daily. 30 tablet 1   • b complex-vitamin c-folic acid (NEPHRO-LASHON) 0.8 MG tablet tablet Take 1 tablet by mouth Daily. 30 tablet 1   • bacitracin-polymyxin b (POLYSPORIN) 500-82820 UNIT/GM ointment Apply 1 application topically to the appropriate area as directed 2 (Two) Times a Day. 15 g 1   • clotrimazole (LOTRIMIN) 1 % cream Apply 1 application topically to the appropriate area as directed 2 (Two) Times a Day. 15 g 1   • pantoprazole (PROTONIX) 40 MG EC tablet Take 1 tablet by mouth Daily. 30 tablet 2   • sevelamer (RENVELA) 800 MG tablet Take 1 tablet by mouth 3 (Three) Times a Day With Meals. 90 tablet 1   • simethicone (MYLICON) 80 MG chewable tablet Chew and swallow 1 tablet by mouth 4 (Four) Times a Day As Needed for Flatulence. 30 tablet 1   • SITagliptin (Januvia) 25 MG tablet Take 1 tablet by mouth Daily. 30 tablet 2   • B Complex-C-Folic Acid (KAYLEIGH-LASHON PO) Take 1 tablet by mouth Daily.     • bacitracin-polymyxin b (POLYSPORIN) 500-31208 UNIT/GM ointment Apply to tip of penis/catheter twice daily for irritation 30 g 0     No current facility-administered medications for this visit.      Has follow-up with urology on June 8.  Reports he gets dialysis on [address]. A1c recently well controlled in hospital, only taking Januvia daily.  No  change in prescription medication today.  Recommend annual physical and blood work in 3 months.    Diagnoses and all orders for this visit:    1. Type 2 diabetes mellitus with chronic kidney disease on chronic dialysis, without long-term current use of insulin (Primary)    2. ESRD (end stage renal disease)    3. S/P TURP (status post transurethral resection of prostate)         Return in about 3 months (around 8/11/2023) for Annual.     Dictated Utilizing Dragon Dictation    Please note that portions of this note were completed with a voice recognition program.    Part of this note may be an electronic transcription/translation of spoken language to printed text using the Dragon Dictation System.

## 2023-05-12 NOTE — PROGRESS NOTES
Jun came in for a voiding trial on 05/11/2023. I had the patient lay down while I pulled the catheter bag out of his catheter, I then began to fill his bladder up with 180mL hernandez then I pulled the balloon down which had 30mL inside, I began to fill him up again with 120mL and the patient urinated out 100mL and had 23mL left inside the bladder. I let the patient know to drink plenty of fluids and let us know by 1pm if he couldn't urinate.

## 2023-05-15 LAB
QT INTERVAL: 384 MS
QT INTERVAL: 392 MS
QTC INTERVAL: 467 MS
QTC INTERVAL: 487 MS

## 2023-05-18 ENCOUNTER — READMISSION MANAGEMENT (OUTPATIENT)
Dept: CALL CENTER | Facility: HOSPITAL | Age: 63
End: 2023-05-18
Payer: COMMERCIAL

## 2023-05-18 NOTE — OUTREACH NOTE
Medical Week 2 Survey    Flowsheet Row Responses   [facility] patient discharged from? [place]   Does the patient have one of the following disease processes/diagnoses(primary or secondary)? Other   Week 2 attempt successful? Yes   Call start time 1014   Discharge diagnosis BPH with obstruction/lower urinary tract symptoms s/p TURP    Call end time 1018   Person spoke with today (if not patient) and relationship Patient   Meds reviewed with patient/caregiver? Yes   Is the patient taking all medications as directed (includes completed medication regime)? Yes   Comments regarding PCP Patient seen his pcp on 05/11 and will see his urologist on 06/08   Has the patient kept scheduled appointments due by today? N/A   Has home health visited the patient within 72 hours of discharge? N/A   Psychosocial issues? No   Did the patient receive a copy of their discharge instructions? Yes   Nursing interventions Reviewed instructions with patient   What is the patient's perception of their health status since discharge? Improving   Is the patient/caregiver able to teach back signs and symptoms related to disease process for when to call PCP? Yes   Is the patient/caregiver able to teach back signs and symptoms related to disease process for when to call 911? Yes   Is the patient/caregiver able to teach back the hierarchy of who to call/visit for symptoms/problems? PCP, Specialist, Home health nurse, Urgent Care, ED, 911 Yes   Week 2 Call Completed? Yes   Graduated Yes   Is the patient interested in additional calls from an ambulatory ?  NOTE:  applies to high risk patients requiring additional follow-up. No   Wrap up additional comments Patient states he is doing well. Aware of post op appt- patient requested a text message of the address to appt- no capability to provide a text with address. However the system should send address with appt prior to appt- Patient was driving could not write address- advised  patient he could call back for address when he gets parked. He stated he will call if he does not recieve message before appt. no other concerns or questions noted.          Katherine CRONIN - Registered Nurse

## 2023-05-26 ENCOUNTER — APPOINTMENT (OUTPATIENT)
Dept: CT IMAGING | Facility: HOSPITAL | Age: 63
DRG: 690 | End: 2023-05-26
Payer: COMMERCIAL

## 2023-05-26 ENCOUNTER — HOSPITAL ENCOUNTER (INPATIENT)
Facility: HOSPITAL | Age: 63
LOS: 2 days | Discharge: HOME | DRG: 690 | End: 2023-05-28
Attending: STUDENT IN AN ORGANIZED HEALTH CARE EDUCATION/TRAINING PROGRAM | Admitting: INTERNAL MEDICINE
Payer: COMMERCIAL

## 2023-05-26 DIAGNOSIS — R31.0 GROSS HEMATURIA: ICD-10-CM

## 2023-05-26 DIAGNOSIS — D64.9 SYMPTOMATIC ANEMIA: ICD-10-CM

## 2023-05-26 DIAGNOSIS — N12 PYELONEPHRITIS: Primary | ICD-10-CM

## 2023-05-26 DIAGNOSIS — N18.4 TYPE 2 DIABETES MELLITUS WITH STAGE 4 CHRONIC KIDNEY DISEASE, WITHOUT LONG-TERM CURRENT USE OF INSULIN: ICD-10-CM

## 2023-05-26 DIAGNOSIS — Z90.79 S/P TURP (STATUS POST TRANSURETHRAL RESECTION OF PROSTATE): ICD-10-CM

## 2023-05-26 DIAGNOSIS — N18.6 ESRD (END STAGE RENAL DISEASE): ICD-10-CM

## 2023-05-26 DIAGNOSIS — E11.22 TYPE 2 DIABETES MELLITUS WITH STAGE 4 CHRONIC KIDNEY DISEASE, WITHOUT LONG-TERM CURRENT USE OF INSULIN: ICD-10-CM

## 2023-05-26 LAB
ABO GROUP BLD: NORMAL
ALBUMIN SERPL-MCNC: 4.6 G/DL (ref 3.5–5.2)
ALBUMIN/GLOB SERPL: 1.2 G/DL
ALP SERPL-CCNC: 105 U/L (ref 39–117)
ALT SERPL W P-5'-P-CCNC: 9 U/L (ref 1–41)
ANION GAP SERPL CALCULATED.3IONS-SCNC: 15 MMOL/L (ref 5–15)
APTT PPP: 44.2 SECONDS (ref 60–90)
AST SERPL-CCNC: 19 U/L (ref 1–40)
BACTERIA UR QL AUTO: ABNORMAL /HPF
BASOPHILS # BLD AUTO: 0.05 10*3/MM3 (ref 0–0.2)
BASOPHILS # BLD AUTO: 0.08 10*3/MM3 (ref 0–0.2)
BASOPHILS NFR BLD AUTO: 0.6 % (ref 0–1.5)
BASOPHILS NFR BLD AUTO: 0.8 % (ref 0–1.5)
BILIRUB SERPL-MCNC: 0.2 MG/DL (ref 0–1.2)
BILIRUB UR QL STRIP: ABNORMAL
BLD GP AB SCN SERPL QL: NEGATIVE
BUN SERPL-MCNC: 26 MG/DL (ref 8–23)
BUN/CREAT SERPL: 5.6 (ref 7–25)
CALCIUM SPEC-SCNC: 9.2 MG/DL (ref 8.6–10.5)
CHLORIDE SERPL-SCNC: 95 MMOL/L (ref 98–107)
CLARITY UR: CLEAR
CO2 SERPL-SCNC: 28 MMOL/L (ref 22–29)
COLOR UR: YELLOW
CREAT SERPL-MCNC: 4.65 MG/DL (ref 0.76–1.27)
CRP SERPL-MCNC: 1.3 MG/DL (ref 0–0.5)
D-LACTATE SERPL-SCNC: 1.5 MMOL/L (ref 0.5–2)
DEPRECATED RDW RBC AUTO: 45.1 FL (ref 37–54)
DEPRECATED RDW RBC AUTO: 45.2 FL (ref 37–54)
EGFRCR SERPLBLD CKD-EPI 2021: 13.3 ML/MIN/1.73
EOSINOPHIL # BLD AUTO: 0.32 10*3/MM3 (ref 0–0.4)
EOSINOPHIL # BLD AUTO: 0.36 10*3/MM3 (ref 0–0.4)
EOSINOPHIL NFR BLD AUTO: 3.6 % (ref 0.3–6.2)
EOSINOPHIL NFR BLD AUTO: 4.1 % (ref 0.3–6.2)
ERYTHROCYTE [DISTWIDTH] IN BLOOD BY AUTOMATED COUNT: 13.2 % (ref 12.3–15.4)
ERYTHROCYTE [DISTWIDTH] IN BLOOD BY AUTOMATED COUNT: 13.2 % (ref 12.3–15.4)
GLOBULIN UR ELPH-MCNC: 3.9 GM/DL
GLUCOSE SERPL-MCNC: 193 MG/DL (ref 65–99)
GLUCOSE UR STRIP-MCNC: ABNORMAL MG/DL
HCT VFR BLD AUTO: 25.3 % (ref 37.5–51)
HCT VFR BLD AUTO: 30.4 % (ref 37.5–51)
HGB BLD-MCNC: 8.1 G/DL (ref 13–17.7)
HGB BLD-MCNC: 9.6 G/DL (ref 13–17.7)
HGB UR QL STRIP.AUTO: ABNORMAL
HYALINE CASTS UR QL AUTO: ABNORMAL /LPF
IMM GRANULOCYTES # BLD AUTO: 0.09 10*3/MM3 (ref 0–0.05)
IMM GRANULOCYTES # BLD AUTO: 0.18 10*3/MM3 (ref 0–0.05)
IMM GRANULOCYTES NFR BLD AUTO: 1.2 % (ref 0–0.5)
IMM GRANULOCYTES NFR BLD AUTO: 1.8 % (ref 0–0.5)
INR PPP: 0.98 (ref 0.89–1.12)
IRON 24H UR-MRATE: 111 MCG/DL (ref 59–158)
IRON SATN MFR SERPL: 37 % (ref 20–50)
KETONES UR QL STRIP: ABNORMAL
LEUKOCYTE ESTERASE UR QL STRIP.AUTO: ABNORMAL
LYMPHOCYTES # BLD AUTO: 1.23 10*3/MM3 (ref 0.7–3.1)
LYMPHOCYTES # BLD AUTO: 1.25 10*3/MM3 (ref 0.7–3.1)
LYMPHOCYTES NFR BLD AUTO: 12.4 % (ref 19.6–45.3)
LYMPHOCYTES NFR BLD AUTO: 16.2 % (ref 19.6–45.3)
MAGNESIUM SERPL-MCNC: 2.3 MG/DL (ref 1.6–2.4)
MCH RBC QN AUTO: 29.5 PG (ref 26.6–33)
MCH RBC QN AUTO: 29.9 PG (ref 26.6–33)
MCHC RBC AUTO-ENTMCNC: 31.6 G/DL (ref 31.5–35.7)
MCHC RBC AUTO-ENTMCNC: 32 G/DL (ref 31.5–35.7)
MCV RBC AUTO: 93.4 FL (ref 79–97)
MCV RBC AUTO: 93.5 FL (ref 79–97)
MONOCYTES # BLD AUTO: 0.65 10*3/MM3 (ref 0.1–0.9)
MONOCYTES # BLD AUTO: 0.72 10*3/MM3 (ref 0.1–0.9)
MONOCYTES NFR BLD AUTO: 7.3 % (ref 5–12)
MONOCYTES NFR BLD AUTO: 8.4 % (ref 5–12)
NEUTROPHILS NFR BLD AUTO: 5.36 10*3/MM3 (ref 1.7–7)
NEUTROPHILS NFR BLD AUTO: 69.5 % (ref 42.7–76)
NEUTROPHILS NFR BLD AUTO: 7.31 10*3/MM3 (ref 1.7–7)
NEUTROPHILS NFR BLD AUTO: 74.1 % (ref 42.7–76)
NITRITE UR QL STRIP: POSITIVE
NRBC BLD AUTO-RTO: 0 /100 WBC (ref 0–0.2)
NRBC BLD AUTO-RTO: 0 /100 WBC (ref 0–0.2)
PH UR STRIP.AUTO: 8.5 [PH] (ref 5–8)
PHOSPHATE SERPL-MCNC: 4 MG/DL (ref 2.5–4.5)
PLATELET # BLD AUTO: 303 10*3/MM3 (ref 140–450)
PLATELET # BLD AUTO: 363 10*3/MM3 (ref 140–450)
PMV BLD AUTO: 8.3 FL (ref 6–12)
PMV BLD AUTO: 8.5 FL (ref 6–12)
POTASSIUM SERPL-SCNC: 3.7 MMOL/L (ref 3.5–5.2)
PROCALCITONIN SERPL-MCNC: 0.71 NG/ML (ref 0–0.25)
PROT SERPL-MCNC: 8.5 G/DL (ref 6–8.5)
PROT UR QL STRIP: ABNORMAL
PROTHROMBIN TIME: 13.1 SECONDS (ref 12.2–14.5)
RBC # BLD AUTO: 2.71 10*6/MM3 (ref 4.14–5.8)
RBC # BLD AUTO: 3.25 10*6/MM3 (ref 4.14–5.8)
RBC # UR STRIP: ABNORMAL /HPF
REF LAB TEST METHOD: ABNORMAL
RETICS # AUTO: 0.09 10*6/MM3 (ref 0.02–0.13)
RETICS/RBC NFR AUTO: 2.83 % (ref 0.7–1.9)
RH BLD: POSITIVE
SODIUM SERPL-SCNC: 138 MMOL/L (ref 136–145)
SP GR UR STRIP: 1.01 (ref 1–1.03)
SQUAMOUS #/AREA URNS HPF: ABNORMAL /HPF
T&S EXPIRATION DATE: NORMAL
TIBC SERPL-MCNC: 304 MCG/DL (ref 298–536)
TRANSFERRIN SERPL-MCNC: 204 MG/DL (ref 200–360)
TSH SERPL DL<=0.05 MIU/L-ACNC: 1.42 UIU/ML (ref 0.27–4.2)
UROBILINOGEN UR QL STRIP: ABNORMAL
WBC # UR STRIP: ABNORMAL /HPF
WBC NRBC COR # BLD: 7.72 10*3/MM3 (ref 3.4–10.8)
WBC NRBC COR # BLD: 9.88 10*3/MM3 (ref 3.4–10.8)

## 2023-05-26 PROCEDURE — 74176 CT ABD & PELVIS W/O CONTRAST: CPT

## 2023-05-26 PROCEDURE — 86850 RBC ANTIBODY SCREEN: CPT | Performed by: STUDENT IN AN ORGANIZED HEALTH CARE EDUCATION/TRAINING PROGRAM

## 2023-05-26 PROCEDURE — 82607 VITAMIN B-12: CPT | Performed by: NURSE PRACTITIONER

## 2023-05-26 PROCEDURE — 83605 ASSAY OF LACTIC ACID: CPT | Performed by: STUDENT IN AN ORGANIZED HEALTH CARE EDUCATION/TRAINING PROGRAM

## 2023-05-26 PROCEDURE — 84145 PROCALCITONIN (PCT): CPT | Performed by: STUDENT IN AN ORGANIZED HEALTH CARE EDUCATION/TRAINING PROGRAM

## 2023-05-26 PROCEDURE — 82746 ASSAY OF FOLIC ACID SERUM: CPT | Performed by: NURSE PRACTITIONER

## 2023-05-26 PROCEDURE — 93005 ELECTROCARDIOGRAM TRACING: CPT | Performed by: STUDENT IN AN ORGANIZED HEALTH CARE EDUCATION/TRAINING PROGRAM

## 2023-05-26 PROCEDURE — 85610 PROTHROMBIN TIME: CPT | Performed by: STUDENT IN AN ORGANIZED HEALTH CARE EDUCATION/TRAINING PROGRAM

## 2023-05-26 PROCEDURE — 87086 URINE CULTURE/COLONY COUNT: CPT | Performed by: STUDENT IN AN ORGANIZED HEALTH CARE EDUCATION/TRAINING PROGRAM

## 2023-05-26 PROCEDURE — 80050 GENERAL HEALTH PANEL: CPT | Performed by: STUDENT IN AN ORGANIZED HEALTH CARE EDUCATION/TRAINING PROGRAM

## 2023-05-26 PROCEDURE — 85730 THROMBOPLASTIN TIME PARTIAL: CPT | Performed by: STUDENT IN AN ORGANIZED HEALTH CARE EDUCATION/TRAINING PROGRAM

## 2023-05-26 PROCEDURE — 86140 C-REACTIVE PROTEIN: CPT | Performed by: STUDENT IN AN ORGANIZED HEALTH CARE EDUCATION/TRAINING PROGRAM

## 2023-05-26 PROCEDURE — 87040 BLOOD CULTURE FOR BACTERIA: CPT | Performed by: STUDENT IN AN ORGANIZED HEALTH CARE EDUCATION/TRAINING PROGRAM

## 2023-05-26 PROCEDURE — 83735 ASSAY OF MAGNESIUM: CPT | Performed by: STUDENT IN AN ORGANIZED HEALTH CARE EDUCATION/TRAINING PROGRAM

## 2023-05-26 PROCEDURE — 85025 COMPLETE CBC W/AUTO DIFF WBC: CPT | Performed by: NURSE PRACTITIONER

## 2023-05-26 PROCEDURE — 99284 EMERGENCY DEPT VISIT MOD MDM: CPT

## 2023-05-26 PROCEDURE — 80048 BASIC METABOLIC PNL TOTAL CA: CPT | Performed by: NURSE PRACTITIONER

## 2023-05-26 PROCEDURE — 36415 COLL VENOUS BLD VENIPUNCTURE: CPT

## 2023-05-26 PROCEDURE — 86900 BLOOD TYPING SEROLOGIC ABO: CPT | Performed by: STUDENT IN AN ORGANIZED HEALTH CARE EDUCATION/TRAINING PROGRAM

## 2023-05-26 PROCEDURE — 84100 ASSAY OF PHOSPHORUS: CPT | Performed by: STUDENT IN AN ORGANIZED HEALTH CARE EDUCATION/TRAINING PROGRAM

## 2023-05-26 PROCEDURE — 82728 ASSAY OF FERRITIN: CPT | Performed by: NURSE PRACTITIONER

## 2023-05-26 PROCEDURE — 85610 PROTHROMBIN TIME: CPT | Performed by: NURSE PRACTITIONER

## 2023-05-26 PROCEDURE — 86901 BLOOD TYPING SEROLOGIC RH(D): CPT | Performed by: STUDENT IN AN ORGANIZED HEALTH CARE EDUCATION/TRAINING PROGRAM

## 2023-05-26 PROCEDURE — 99223 1ST HOSP IP/OBS HIGH 75: CPT | Performed by: NURSE PRACTITIONER

## 2023-05-26 PROCEDURE — 85045 AUTOMATED RETICULOCYTE COUNT: CPT | Performed by: NURSE PRACTITIONER

## 2023-05-26 PROCEDURE — 84466 ASSAY OF TRANSFERRIN: CPT | Performed by: NURSE PRACTITIONER

## 2023-05-26 PROCEDURE — 83540 ASSAY OF IRON: CPT | Performed by: NURSE PRACTITIONER

## 2023-05-26 PROCEDURE — 25010000002 CEFTRIAXONE PER 250 MG: Performed by: STUDENT IN AN ORGANIZED HEALTH CARE EDUCATION/TRAINING PROGRAM

## 2023-05-26 PROCEDURE — 81001 URINALYSIS AUTO W/SCOPE: CPT | Performed by: STUDENT IN AN ORGANIZED HEALTH CARE EDUCATION/TRAINING PROGRAM

## 2023-05-26 RX ORDER — SODIUM CHLORIDE 9 MG/ML
40 INJECTION, SOLUTION INTRAVENOUS AS NEEDED
Status: DISCONTINUED | OUTPATIENT
Start: 2023-05-26 | End: 2023-05-28 | Stop reason: HOSPADM

## 2023-05-26 RX ORDER — ACETAMINOPHEN 160 MG/5ML
650 SOLUTION ORAL EVERY 4 HOURS PRN
Status: DISCONTINUED | OUTPATIENT
Start: 2023-05-26 | End: 2023-05-28 | Stop reason: HOSPADM

## 2023-05-26 RX ORDER — AMLODIPINE BESYLATE 10 MG/1
10 TABLET ORAL DAILY
Status: DISCONTINUED | OUTPATIENT
Start: 2023-05-27 | End: 2023-05-28 | Stop reason: HOSPADM

## 2023-05-26 RX ORDER — NITROGLYCERIN 0.4 MG/1
0.4 TABLET SUBLINGUAL
Status: DISCONTINUED | OUTPATIENT
Start: 2023-05-26 | End: 2023-05-28 | Stop reason: HOSPADM

## 2023-05-26 RX ORDER — SEVELAMER CARBONATE 800 MG/1
800 TABLET, FILM COATED ORAL
Status: DISCONTINUED | OUTPATIENT
Start: 2023-05-27 | End: 2023-05-28 | Stop reason: HOSPADM

## 2023-05-26 RX ORDER — POLYETHYLENE GLYCOL 3350 17 G/17G
17 POWDER, FOR SOLUTION ORAL DAILY PRN
Status: DISCONTINUED | OUTPATIENT
Start: 2023-05-26 | End: 2023-05-28 | Stop reason: HOSPADM

## 2023-05-26 RX ORDER — CHOLECALCIFEROL (VITAMIN D3) 125 MCG
5 CAPSULE ORAL NIGHTLY PRN
Status: DISCONTINUED | OUTPATIENT
Start: 2023-05-26 | End: 2023-05-28 | Stop reason: HOSPADM

## 2023-05-26 RX ORDER — ACETAMINOPHEN 325 MG/1
650 TABLET ORAL EVERY 4 HOURS PRN
Status: DISCONTINUED | OUTPATIENT
Start: 2023-05-26 | End: 2023-05-26

## 2023-05-26 RX ORDER — NICOTINE POLACRILEX 4 MG
15 LOZENGE BUCCAL
Status: DISCONTINUED | OUTPATIENT
Start: 2023-05-26 | End: 2023-05-28 | Stop reason: HOSPADM

## 2023-05-26 RX ORDER — BISACODYL 5 MG/1
5 TABLET, DELAYED RELEASE ORAL DAILY PRN
Status: DISCONTINUED | OUTPATIENT
Start: 2023-05-26 | End: 2023-05-28 | Stop reason: HOSPADM

## 2023-05-26 RX ORDER — FOLIC ACID/VIT B COMPLEX AND C 0.8 MG
1 TABLET ORAL DAILY
Status: DISCONTINUED | OUTPATIENT
Start: 2023-05-27 | End: 2023-05-28 | Stop reason: HOSPADM

## 2023-05-26 RX ORDER — AMOXICILLIN 250 MG
2 CAPSULE ORAL 2 TIMES DAILY
Status: DISCONTINUED | OUTPATIENT
Start: 2023-05-26 | End: 2023-05-28 | Stop reason: HOSPADM

## 2023-05-26 RX ORDER — BISACODYL 10 MG
10 SUPPOSITORY, RECTAL RECTAL DAILY PRN
Status: DISCONTINUED | OUTPATIENT
Start: 2023-05-26 | End: 2023-05-28 | Stop reason: HOSPADM

## 2023-05-26 RX ORDER — INSULIN LISPRO 100 [IU]/ML
2-7 INJECTION, SOLUTION INTRAVENOUS; SUBCUTANEOUS
Status: DISCONTINUED | OUTPATIENT
Start: 2023-05-27 | End: 2023-05-28 | Stop reason: HOSPADM

## 2023-05-26 RX ORDER — ACETAMINOPHEN 650 MG/1
650 SUPPOSITORY RECTAL EVERY 4 HOURS PRN
Status: DISCONTINUED | OUTPATIENT
Start: 2023-05-26 | End: 2023-05-28 | Stop reason: HOSPADM

## 2023-05-26 RX ORDER — SODIUM CHLORIDE 0.9 % (FLUSH) 0.9 %
10 SYRINGE (ML) INJECTION EVERY 12 HOURS SCHEDULED
Status: DISCONTINUED | OUTPATIENT
Start: 2023-05-26 | End: 2023-05-28 | Stop reason: HOSPADM

## 2023-05-26 RX ORDER — DEXTROSE MONOHYDRATE 25 G/50ML
25 INJECTION, SOLUTION INTRAVENOUS
Status: DISCONTINUED | OUTPATIENT
Start: 2023-05-26 | End: 2023-05-28 | Stop reason: HOSPADM

## 2023-05-26 RX ORDER — ACETAMINOPHEN 325 MG/1
650 TABLET ORAL EVERY 4 HOURS PRN
Status: DISCONTINUED | OUTPATIENT
Start: 2023-05-26 | End: 2023-05-28 | Stop reason: HOSPADM

## 2023-05-26 RX ORDER — PANTOPRAZOLE SODIUM 40 MG/1
40 TABLET, DELAYED RELEASE ORAL DAILY
Status: DISCONTINUED | OUTPATIENT
Start: 2023-05-27 | End: 2023-05-28 | Stop reason: HOSPADM

## 2023-05-26 RX ORDER — SODIUM CHLORIDE 0.9 % (FLUSH) 0.9 %
10 SYRINGE (ML) INJECTION AS NEEDED
Status: DISCONTINUED | OUTPATIENT
Start: 2023-05-26 | End: 2023-05-28 | Stop reason: HOSPADM

## 2023-05-26 RX ADMIN — Medication 10 ML: at 23:25

## 2023-05-26 RX ADMIN — DOCUSATE SODIUM 50 MG AND SENNOSIDES 8.6 MG 2 TABLET: 8.6; 5 TABLET, FILM COATED ORAL at 23:25

## 2023-05-26 RX ADMIN — SODIUM CHLORIDE 1 G: 900 INJECTION INTRAVENOUS at 20:13

## 2023-05-27 LAB
ANION GAP SERPL CALCULATED.3IONS-SCNC: 16 MMOL/L (ref 5–15)
BACTERIA SPEC AEROBE CULT: NO GROWTH
BUN SERPL-MCNC: 36 MG/DL (ref 8–23)
BUN/CREAT SERPL: 6.7 (ref 7–25)
CALCIUM SPEC-SCNC: 8.6 MG/DL (ref 8.6–10.5)
CHLORIDE SERPL-SCNC: 98 MMOL/L (ref 98–107)
CO2 SERPL-SCNC: 28 MMOL/L (ref 22–29)
CREAT SERPL-MCNC: 5.4 MG/DL (ref 0.76–1.27)
EGFRCR SERPLBLD CKD-EPI 2021: 11.1 ML/MIN/1.73
FERRITIN SERPL-MCNC: 2115 NG/ML (ref 30–400)
FOLATE SERPL-MCNC: >20 NG/ML (ref 4.78–24.2)
GLUCOSE BLDC GLUCOMTR-MCNC: 108 MG/DL (ref 70–130)
GLUCOSE BLDC GLUCOMTR-MCNC: 130 MG/DL (ref 70–130)
GLUCOSE BLDC GLUCOMTR-MCNC: 153 MG/DL (ref 70–130)
GLUCOSE BLDC GLUCOMTR-MCNC: 87 MG/DL (ref 70–130)
GLUCOSE SERPL-MCNC: 82 MG/DL (ref 65–99)
INR PPP: 0.99 (ref 0.89–1.12)
POTASSIUM SERPL-SCNC: 4.1 MMOL/L (ref 3.5–5.2)
PROTHROMBIN TIME: 13.2 SECONDS (ref 12.2–14.5)
SODIUM SERPL-SCNC: 142 MMOL/L (ref 136–145)
VIT B12 BLD-MCNC: 759 PG/ML (ref 211–946)

## 2023-05-27 PROCEDURE — 99232 SBSQ HOSP IP/OBS MODERATE 35: CPT | Performed by: INTERNAL MEDICINE

## 2023-05-27 PROCEDURE — 25010000002 CEFTRIAXONE PER 250 MG: Performed by: NURSE PRACTITIONER

## 2023-05-27 PROCEDURE — 99222 1ST HOSP IP/OBS MODERATE 55: CPT | Performed by: UROLOGY

## 2023-05-27 PROCEDURE — 63710000001 INSULIN LISPRO (HUMAN) PER 5 UNITS: Performed by: NURSE PRACTITIONER

## 2023-05-27 PROCEDURE — 82948 REAGENT STRIP/BLOOD GLUCOSE: CPT

## 2023-05-27 RX ADMIN — INSULIN LISPRO 2 UNITS: 100 INJECTION, SOLUTION INTRAVENOUS; SUBCUTANEOUS at 13:10

## 2023-05-27 RX ADMIN — PANTOPRAZOLE SODIUM 40 MG: 40 TABLET, DELAYED RELEASE ORAL at 08:25

## 2023-05-27 RX ADMIN — Medication 1 TABLET: at 08:25

## 2023-05-27 RX ADMIN — AMLODIPINE BESYLATE 10 MG: 10 TABLET ORAL at 08:25

## 2023-05-27 RX ADMIN — SODIUM CHLORIDE 1 G: 900 INJECTION INTRAVENOUS at 21:32

## 2023-05-27 RX ADMIN — SEVELAMER CARBONATE 800 MG: 800 TABLET, FILM COATED ORAL at 08:25

## 2023-05-27 RX ADMIN — SEVELAMER CARBONATE 800 MG: 800 TABLET, FILM COATED ORAL at 17:26

## 2023-05-27 RX ADMIN — Medication 10 ML: at 08:30

## 2023-05-27 RX ADMIN — DOCUSATE SODIUM 50 MG AND SENNOSIDES 8.6 MG 2 TABLET: 8.6; 5 TABLET, FILM COATED ORAL at 08:25

## 2023-05-27 RX ADMIN — DOCUSATE SODIUM 50 MG AND SENNOSIDES 8.6 MG 2 TABLET: 8.6; 5 TABLET, FILM COATED ORAL at 21:34

## 2023-05-27 RX ADMIN — SEVELAMER CARBONATE 800 MG: 800 TABLET, FILM COATED ORAL at 13:10

## 2023-05-27 RX ADMIN — Medication 10 ML: at 21:35

## 2023-05-27 NOTE — PLAN OF CARE
Goal Outcome Evaluation:  Plan of Care Reviewed With: patient        Progress: improving  Outcome Evaluation: Patient alert, oriented. VSS. RA. Denied pain, discomfort. Encouraged patient to void in urinal for accurate assessment of output. Patient voiding without complications per patient. Urine still dark, bloody. Per patient has been like this since recent TURP. Plan of care discussed with pt. Verbalized understanding.

## 2023-05-27 NOTE — H&P
[facility] Medicine Services  HISTORY AND PHYSICAL    Patient Name: Jun Young  : 1960  MRN: [id number]  Primary Care Physician: Samir Chow DO  Date of admission: 2023    Subjective   Subjective     Chief Complaint:  hematuria    HPI:  Jun Young is a 62 y.o. male with a history of DM, HTN, ESRD on HD MWF, obstructive uropathy s/p TURP with Dr. Damico on 2023, presents to the ED with complaints of hematuria and dysuria.  Patient states that he was sent to the ED from dialysis today due to having an abnormal lab.  He endorses some dysuria and hematuria.  No fevers, chills, shortness of air, chest pain, nausea, vomiting, abdominal pain, diarrhea, or any other complaints at this time.  UA consistent with a UTI.  CT abdomen pelvis shows marked diffuse bladder wall thickening with mild surrounding fat stranding, mild bilateral hydronephrosis process decreased compared to prior exam.  Patient states that he was able to complete his dialysis today prior to coming to the ED.  Patient was started on Rocephin, and is being admitted to the hospital medicine service for further evaluation and management.        Review of Systems   Constitutional: Negative.    HENT: Negative.    Eyes: Negative.    Respiratory: Negative.    Cardiovascular: Negative.    Gastrointestinal: Negative.    Endocrine: Negative.    Genitourinary: Positive for dysuria and hematuria.   Musculoskeletal: Negative.    Skin: Negative.    Allergic/Immunologic: Negative.    Neurological: Negative.    Hematological: Negative.    Psychiatric/Behavioral: Negative.             Personal History     Past Medical History:   Diagnosis Date   • Acute on chronic renal failure    • Bilateral hydronephrosis    • BPH (benign prostatic hyperplasia)    • Cholelithiasis    • Diabetes mellitus    • Dyslipidemia    • Elevated cholesterol    • GERD (gastroesophageal reflux disease)    • Gross  hematuria    • Hemodialysis patient    • History of transfusion     [facility], no reactions per pt   • Hyperlipidemia    • Hypertension    • Pericardial effusion     echo 2/2023 - 1-2 cm preserved LV function   • Stroke     no residual effects             Past Surgical History:   Procedure Laterality Date   • COLONOSCOPY     • CYSTOSCOPY BLADDER BIOPSY N/A 02/09/2023    Procedure: CYSTOSCOPY CLOT EVACUATION WITH FULGURATION, RETROGRADE PYELOGRAM;  Surgeon: Mandi Velasco MD;  Location: [facility] OR;  Service: Urology;  Laterality: N/A;   • CYSTOSCOPY BLADDER BIOPSY N/A 03/18/2023    Procedure: CYSTOSCOPY WITH FULGURATION OF BLEEDERS;  Surgeon: Santana Christianson MD;  Location:  [facility] OR;  Service: Urology;  Laterality: N/A;   • CYSTOSCOPY TRANSURETHRAL RESECTION OF PROSTATE N/A 5/5/2023    Procedure: CYSTOSCOPY TRANSURETHRAL RESECTION OF PROSTATE (BIPOLAR);  Surgeon: Pawan Damico MD;  Location: [facility] OR;  Service: Urology;  Laterality: N/A;   • CYSTOSCOPY WITH CLOT EVACUATION      fulguration - 3 way catheter placement   • INSERTION HEMODIALYSIS CATHETER         Family History:  Family history is unknown by patient.     Social History:  reports that he has never smoked. He has never been exposed to tobacco smoke. He has never used smokeless tobacco. He reports that he does not drink alcohol and does not use drugs.  Social History     Social History Narrative    ** Merged History Encounter **            Medications:  B Complex-C-Folic Acid, SITagliptin, acetaminophen, amLODIPine, b complex-vitamin c-folic acid, bacitracin-polymyxin b, clotrimazole, pantoprazole, sevelamer, and simethicone    No Known Allergies    Objective   Objective     Vital Signs:   Temp:  [98.5 °F (36.9 °C)-98.8 °F (37.1 °C)] 98.5 °F (36.9 °C)  Heart Rate:  [] 81  Resp:  [16-18] 18  BP: (133-143)/(72-87) 133/72    Physical Exam   Constitutional: Awake, alert, resting in bed  Eyes: PERRLA, sclerae anicteric, no conjunctival injection  HENT:  NCAT, mucous membranes moist  Neck: Supple, no thyromegaly, no lymphadenopathy, trachea midline  Respiratory: Clear to auscultation bilaterally, nonlabored respirations   Cardiovascular: tachycardia, no murmurs, rubs, or gallops, palpable pedal pulses bilaterally  Gastrointestinal: Positive bowel sounds, soft, nontender, nondistended  Musculoskeletal: No bilateral ankle edema, no clubbing or cyanosis to extremities  Psychiatric: Appropriate affect, cooperative  Neurologic: Oriented x 3, strength symmetric in all extremities, Cranial Nerves grossly intact to confrontation, speech clear  Skin: No rashes       Result Review:  I have personally reviewed the results from the time of this admission to 5/27/2023 04:00 EDT and agree with these findings:  [x]  Laboratory list / accordion  []  Microbiology  [x]  Radiology  []  EKG/Telemetry   []  Cardiology/Vascular   []  Pathology  [x]  Old records  []  Other:  Most notable findings include:     LAB RESULTS:      Lab 05/26/23 2336 05/26/23 1754   WBC 7.72 9.88   HEMOGLOBIN 8.1* 9.6*   HEMATOCRIT 25.3* 30.4*   PLATELETS 303 363   NEUTROS ABS 5.36 7.31*   IMMATURE GRANS (ABS) 0.09* 0.18*   LYMPHS ABS 1.25 1.23   MONOS ABS 0.65 0.72   EOS ABS 0.32 0.36   MCV 93.4 93.5   CRP  --  1.30*   PROCALCITONIN  --  0.71*   LACTATE  --  1.5   PROTIME 13.2 13.1   APTT  --  44.2*         Lab 05/26/23 2336 05/26/23  1754   SODIUM 142 138   POTASSIUM 4.1 3.7   CHLORIDE 98 95*   CO2 28.0 28.0   ANION GAP 16.0* 15.0   BUN 36* 26*   CREATININE 5.40* 4.65*   EGFR 11.1* 13.3*   GLUCOSE 82 193*   CALCIUM 8.6 9.2   MAGNESIUM  --  2.3   PHOSPHORUS  --  4.0   TSH  --  1.420         Lab 05/26/23 1754   TOTAL PROTEIN 8.5   ALBUMIN 4.6   GLOBULIN 3.9   ALT (SGPT) 9   AST (SGOT) 19   BILIRUBIN 0.2   ALK PHOS 105         Lab 05/26/23 2336 05/26/23  1754   PROTIME 13.2 13.1   INR 0.99 0.98             Lab 05/26/23  1754   IRON 111   IRON SATURATION 37   TIBC 304   TRANSFERRIN 204   FERRITIN 2,115.00*    ABO TYPING O   RH TYPING Positive   ANTIBODY SCREEN Negative         Brief Urine Lab Results  (Last result in the past 365 days)      Color   Clarity   Blood   Leuk Est   Nitrite   Protein   CREAT   Urine HCG        05/26/23 1747 Yellow   Clear   Large (3+)   Large (3+)   Positive   >=300 mg/dL (3+)               Microbiology Results (last 10 days)     ** No results found for the last 240 hours. **          CT Abdomen Pelvis Without Contrast    Result Date: 5/26/2023  CT ABDOMEN PELVIS WO CONTRAST Date of Exam: 5/26/2023 7:34 PM EDT Indication: hematuria since prostate surgery, ESRD on dialysis, transfusion level anemia. Comparison: March 17, 2023 Technique: Axial CT images were obtained of the abdomen and pelvis without the administration of contrast. Reconstructed coronal and sagittal images were also obtained. Automated exposure control and iterative construction methods were used. Findings: No definite suspicious infiltrate is seen in the lung bases. Heart size appears within normal limits. There is a trace amount of pericardial fluid. There is calcific atherosclerosis of the coronary arteries. No definite ectopic bowel gas is seen. There is a large calcified gallstone, as before. Gallbladder is nondistended without definite pericholecystic inflammation on this exam. No definite biliary ductal dilatation. No definite suspicious hepatic, splenic, adrenal, or pancreatic abnormality is visualized on this limited noncontrast exam. Previously seen hydronephrosis is improved. There appears to be mild residual hydronephrosis of the bilateral kidneys. There is bilateral renal atrophy/scarring with mild perinephric fat stranding. No significant renal or ureteral calculus is identified.  Aorta appears normal in caliber. There is mild calcific atherosclerosis of the aorta and branch vessels. No significant lymphadenopathy is seen. No acute gastric abnormality or bowel dilatation is seen. The appendix measures up to 10 mm  which appears to  be an interval change. There does not appear to be significant surrounding inflammation on this exam. No definite acute colonic or rectal abnormality is seen. The bladder is mildly distended. There is marked diffuse bladder wall thickening which represents an interval change. There is surrounding fat stranding. There are small fat-containing inguinal hernias. No definite pelvic lymphadenopathy or significant free fluid is seen. The prostate appears enlarged. Patient has reported interval TURP. No definite aggressive osseous lesion is seen.     Impression: Impression: 1.Marked diffuse bladder wall thickening with mild surrounding fat stranding which represents an interval change. This may represent cystitis. 2.Appendix is abnormal in size at approximately 10 mm in diameter which appears to be an interval change. There does not appear to be significant surrounding inflammation, but appendicitis cannot be completely excluded on this exam. Correlate clinically. 3.Mild bilateral hydronephrosis, decreased compared to the prior exam. There is suspected renal atrophy and scarring. 4.Cholelithiasis. 5.Prostatomegaly. Electronically Signed: Chaz Rodriguez  5/26/2023 8:05 PM EDT  Workstation ID: [id number]      Results for orders placed during the hospital encounter of 03/17/23    Adult Transthoracic Echo Limited W/ Cont if Necessary Per Protocol    Interpretation Summary  •  Left ventricular systolic function is normal. Calculated left ventricular EF = 61.7% Left ventricular ejection fraction appears to be 61 - 65%.  •  Left ventricular wall thickness is consistent with mild concentric hypertrophy.  •  There is a small (<1cm) pericardial effusion.      Assessment & Plan   Assessment & Plan       Pyelonephritis    Elevated lipase    Type 2 diabetes mellitus    Hematuria    ESRD (end stage renal disease)    Hypertension    S/P TURP (status post transurethral resection of prostate)    Jun Young is a  62 y.o. male with a history of DM, HTN, ESRD on HD MWF, obstructive uropathy s/p TURP with Dr. Damico on 5/5/2023, presents to the ED with complaints of hematuria and dysuria.       Assessment and plan:    Pyelonephritis  Hematuria  Recent obstructive uropathy s/p TURP with Dr. Damico  -- Procalcitonin 0.71  -- UA: Glucose 250, ketones 15, blood large, nitrite positive, leukocytes large, protein >= 300, bilirubin large, urobilinogen 4.0, RBC TNTC, WBC 3-5, bacteria 1+  --CT abdomen and pelvis shows marked diffuse bladder wall thickening with mild surrounding fat stranding which represents an interval change.  Mild bilateral hydronephrosis.  Prostatomegaly  -- Urine culture pending  -- BC x2 pending  -- Rocephin  -- A.m. lab  -- Urology consultation in the a.m.    ESRD  -- On HD MWF  -- completed dialysis today PTA  -- Consult nephrology on Monday if still here for dialysis    Hypertension  -- Continue amlodipine with hold parameters    T2DM  -- Recent A1c 4.6  -- FSBG with SSI    Anemia  Hematuria  -- Hemoglobin 9.6, hematocrit 30.4  -- improved since discharge  -- anemia profile  -- cbc in the am      DVT prophylaxis:  SCDS    CODE STATUS:    Level Of Support Discussed With: Patient  Code Status (Patient has no pulse and is not breathing): CPR (Attempt to Resuscitate)  Medical Interventions (Patient has pulse or is breathing): Full Support      Expected Discharge  Expected Discharge Date: 5/29/2023; Expected Discharge Time:       This note has been completed as part of a split-shared workflow.     Signature: Electronically signed by FRANKY Ross, 05/26/23, 10:47 PM EDT.  Total time spent: 45 minutes  Time spent includes time reviewing chart, face-to-face time, counseling patient/family/caregiver, ordering medications/tests/procedures, communicating with other health care professionals, documenting clinical information in the electronic health record, and coordination of care.                Attending   Admission Attestation       I have performed an independent face-to-face diagnostic evaluation including performing an independent physical examination as documented here.  The documented plan of care above was reviewed and developed with the advanced practice clinician (APC).      Brief Summary Statement:   Jun Young is a 62 y.o. male with a history of essential hypertension, type 2 diabetes, end-stage renal disease on dialysis, obstructive uropathy status post recent TURP on 5/5/2023 by Dr. Damico who presented to the hospital with abnormal lab, dysuria and hematuria.  Patient denied any fever chills.  No shortness of breath.  No abdominal pain.  In ER, urine is showing gross hematuria, 1+ bacteriuria and 3-5 WBCs consistent with UTI.  CT abdomen showed acute cystitis and improved bilateral hydronephrosis compared to previous exam.  He was started on Rocephin and will be admitted to hospital service    Remainder of detailed HPI is as noted by APC and has been reviewed and/or edited by me for completeness.    Attending Physical Exam:  Temp:  [98.5 °F (36.9 °C)-98.8 °F (37.1 °C)] 98.5 °F (36.9 °C)  Heart Rate:  [] 81  Resp:  [16-18] 18  BP: (133-143)/(72-87) 133/72    General: Appears comfortable., not in distress, conversant and cooperative  Head: Atraumatic and normocephalic  Eyes: No Icterus. No pallor  Ears:  Ears appear intact with no abnormalities noted  Throat: No oral lesions, no thrush  Neck: Supple, trachea midline  Lungs: Clear to auscultation bilaterally, equal air entry, no wheezing or crackles  Heart:  Normal S1 and S2, no murmur, no gallop, No JVD, no lower extremity swelling  Abdomen:  Soft, no tenderness, no organomegaly, normal bowel sounds, no organomegaly  Extremities: pulses equal bilaterally  Skin: No bleeding, bruising or rash, normal skin turgor and elasticity  Neurologic: Cranial nerves appear intact with no evidence of facial asymmetry,  normal motor and sensory functions in all 4 extremities  Psych: Alert and oriented x 3, normal mood    Brief Assessment/Plan :  See detailed assessment and plan developed with APC which I have reviewed and/or edited for completeness.            Nazanin Meier MD  05/27/23

## 2023-05-27 NOTE — CONSULTS
Urology Hospital Consult Note     Date of Consult: 2023     Patient Name: Jun Young  MRN: [id number]   : 1960     Referring Provider: * No referring provider recorded for this case *    Care Team: Patient Care Team:  Samir Chow DO as PCP - General (Internal Medicine)  Provider, No Known as PCP - Family Medicine  Provider, No Known  Lora Dickey DO as Consulting Physician (Hospitalist)  Katie Lau MD as Consulting Physician (Hospitalist)     Reason for Hospitalization: Hematuria/ UTI    History of Present Illness: Was contacted for hospital consultation on Jun Young a 62 y.o. male who presents to the hospital for hematria.  He was sent to the ER from dialysis due to low hemaglobin value.  However, on repeat here his Hgb was 9.6.  He reports since his catheter was removed on  he has been able to void but with worsening dysuria.  He reports the past few days he has found it more difficult to void but has been able to do so.  He underwent TURP approximately 3-4 weeks ago.  He states his symptoms are much better than prior to surgery.  He was started on rocephin last night and this morning he reports he is urinating without issue and his burning has ceased.       I reviewed his CT scan which shows a diffusely thickened bladder consistent with his history and unchanged from previous imaging.  His bladder is decompressed.  He has mild bilateral hydronephrosis but also decreased from prior imaging.     Subjective      Pertinent items are noted in HPI.     Past Medical History:   Past Medical History:   Diagnosis Date   • Acute on chronic renal failure    • Bilateral hydronephrosis    • BPH (benign prostatic hyperplasia)    • Cholelithiasis    • Diabetes mellitus    • Dyslipidemia    • Elevated cholesterol    • GERD (gastroesophageal reflux disease)    • Gross hematuria    • Hemodialysis patient    • History of transfusion     [facility], no reactions  per pt   • Hyperlipidemia    • Hypertension    • Pericardial effusion     echo 2/2023 - 1-2 cm preserved LV function   • Stroke     no residual effects       Past Surgical History:   Past Surgical History:   Procedure Laterality Date   • COLONOSCOPY     • CYSTOSCOPY BLADDER BIOPSY N/A 02/09/2023    Procedure: CYSTOSCOPY CLOT EVACUATION WITH FULGURATION, RETROGRADE PYELOGRAM;  Surgeon: Mandi Velasco MD;  [facility];  Service: Urology;  Laterality: N/A;   • CYSTOSCOPY BLADDER BIOPSY N/A 03/18/2023    Procedure: CYSTOSCOPY WITH FULGURATION OF BLEEDERS;  Surgeon: Santana Christianson MD;  Location:  [facility] OR;  Service: Urology;  Laterality: N/A;   • CYSTOSCOPY TRANSURETHRAL RESECTION OF PROSTATE N/A 5/5/2023    Procedure: CYSTOSCOPY TRANSURETHRAL RESECTION OF PROSTATE (BIPOLAR);  Surgeon: Pawan Damico MD;  Location:  [facility] OR;  Service: Urology;  Laterality: N/A;   • CYSTOSCOPY WITH CLOT EVACUATION      fulguration - 3 way catheter placement   • INSERTION HEMODIALYSIS CATHETER         Family History:   Family History   Family history unknown: Yes       Social History:   Social History     Socioeconomic History   • Marital status: Single   Tobacco Use   • Smoking status: Never     Passive exposure: Never   • Smokeless tobacco: Never   Vaping Use   • Vaping Use: Never used   Substance and Sexual Activity   • Alcohol use: Never   • Drug use: Never   • Sexual activity: Not Currently       Medications:     Current Facility-Administered Medications:   •  acetaminophen (TYLENOL) tablet 650 mg, 650 mg, Oral, Q4H PRN **OR** acetaminophen (TYLENOL) 160 MG/5ML solution 650 mg, 650 mg, Oral, Q4H PRN **OR** acetaminophen (TYLENOL) suppository 650 mg, 650 mg, Rectal, Q4H PRN, Sophy Borges, APRN  •  amLODIPine (NORVASC) tablet 10 mg, 10 mg, Oral, Daily, Sophy Borges APRN, 10 mg at 05/27/23 0825  •  b complex-vitamin c-folic acid (NEPHRO-LASHON) tablet 1 tablet, 1 tablet, Oral, Daily, Sophy Borges,  APRN, 1 tablet at 05/27/23 0825  •  sennosides-docusate (PERICOLACE) 8.6-50 MG per tablet 2 tablet, 2 tablet, Oral, BID, 2 tablet at 05/27/23 0825 **AND** polyethylene glycol (MIRALAX) packet 17 g, 17 g, Oral, Daily PRN **AND** bisacodyl (DULCOLAX) EC tablet 5 mg, 5 mg, Oral, Daily PRN **AND** bisacodyl (DULCOLAX) suppository 10 mg, 10 mg, Rectal, Daily PRN, Sophy Borges APRN  •  cefTRIAXone (ROCEPHIN) 1 g/100 mL 0.9% NS (MBP), 1 g, Intravenous, Q24H, Sophy Borges APRN  •  dextrose (D50W) (25 g/50 mL) IV injection 25 g, 25 g, Intravenous, Q15 Min PRN, Sophy Borges APRN  •  dextrose (GLUTOSE) oral gel 15 g, 15 g, Oral, Q15 Min PRN, Sophy Borges APRN  •  glucagon (GLUCAGEN) injection 1 mg, 1 mg, Intramuscular, Q15 Min PRN, Sophy Borges APRN  •  Insulin Lispro (humaLOG) injection 2-7 Units, 2-7 Units, Subcutaneous, 4x Daily AC & at Bedtime, Sophy Borges APRN  •  melatonin tablet 5 mg, 5 mg, Oral, Nightly PRN, Sophy Borges APRN  •  nitroglycerin (NITROSTAT) SL tablet 0.4 mg, 0.4 mg, Sublingual, Q5 Min PRN, Sophy Borges APRN  •  pantoprazole (PROTONIX) EC tablet 40 mg, 40 mg, Oral, Daily, Sophy Borges APRN, 40 mg at 05/27/23 0825  •  sevelamer (RENVELA) tablet 800 mg, 800 mg, Oral, TID With Meals, Sophy Borges APRN, 800 mg at 05/27/23 0825  •  sodium chloride 0.9 % flush 10 mL, 10 mL, Intravenous, Q12H, Sophy Borges, APRN, 10 mL at 05/27/23 0830  •  sodium chloride 0.9 % flush 10 mL, 10 mL, Intravenous, PRN, Sophy Borges, FRANKY  •  sodium chloride 0.9 % infusion 40 mL, 40 mL, Intravenous, PRN, Sophy Borges, FRANKY    Allergies:   No Known Allergies    Problem:   Patient Active Problem List   Diagnosis   • Acute renal failure superimposed on stage 4 chronic kidney disease, unspecified acute renal failure type   • Metabolic acidosis   • Hyperkalemia   • Elevated troponin   • Elevated lipase   • Diabetes mellitus    • Symptomatic anemia   • Gross hematuria   • CKD (chronic kidney disease) stage 5, GFR less than 15 ml/min   • Type 2 diabetes mellitus   • Hematuria   • ESRD (end stage renal disease)   • Acute cystitis with hematuria   • BPH with obstruction/lower urinary tract symptoms   • Urinary retention   • Hypertension   • S/P TURP (status post transurethral resection of prostate)   • Pyelonephritis       Review of Systems:   Review of Systems   Constitutional: Negative.    HENT: Negative.    Eyes: Negative.    Respiratory: Negative.    Cardiovascular: Negative.    Gastrointestinal: Negative.    Endocrine: Negative.    Genitourinary: Positive for dysuria and hematuria.   Musculoskeletal: Negative.    Skin: Negative.    Allergic/Immunologic: Negative.    Neurological: Negative.    Hematological: Negative.    Psychiatric/Behavioral: Negative.        Objective     Physical Exam:  Vital Signs:   Temp:  [98 °F (36.7 °C)-98.8 °F (37.1 °C)] 98 °F (36.7 °C)  Heart Rate:  [] 72  Resp:  [16-18] 18  BP: (123-143)/(72-87) 125/75  68 kg (150 lb)  Body mass index is 23.49 kg/m².     Physical Exam  Vitals and nursing note reviewed.   Constitutional:       General: He is awake. He is not in acute distress.     Appearance: Normal appearance. He is well-developed.   HENT:      Head: Normocephalic and atraumatic.      Right Ear: External ear normal.      Left Ear: External ear normal.      Nose: Nose normal.   Eyes:      Conjunctiva/sclera: Conjunctivae normal.   Pulmonary:      Effort: Pulmonary effort is normal.   Abdominal:      General: There is no distension.      Palpations: Abdomen is soft. There is no mass.      Tenderness: There is no abdominal tenderness. There is no right CVA tenderness, left CVA tenderness, guarding or rebound.      Hernia: No hernia is present. There is no hernia in the left inguinal area or right inguinal area.   Genitourinary:     Pubic Area: No rash.       Penis: Normal.       Testes: Normal.       Rectum: No mass or tenderness. Normal anal tone.   Musculoskeletal:      Cervical back: Normal range of motion.   Lymphadenopathy:      Lower Body: No right inguinal adenopathy. No left inguinal adenopathy.   Skin:     General: Skin is warm.   Neurological:      General: No focal deficit present.      Mental Status: He is alert and oriented to person, place, and time.   Psychiatric:         Behavior: Behavior normal. Behavior is cooperative.         I/O last 3 completed shifts:  In: 100 [IV Piggyback:100]  Out: -     Labs  Lab Results   Component Value Date    GLUCOSE 82 05/26/2023    CALCIUM 8.6 05/26/2023     05/26/2023    K 4.1 05/26/2023    CO2 28.0 05/26/2023    CL 98 05/26/2023    BUN 36 (H) 05/26/2023    CREATININE 5.40 (H) 05/26/2023    BCR 6.7 (L) 05/26/2023    ANIONGAP 16.0 (H) 05/26/2023       Lab Results   Component Value Date    WBC 7.72 05/26/2023    HGB 8.1 (L) 05/26/2023    HCT 25.3 (L) 05/26/2023    MCV 93.4 05/26/2023     05/26/2023       No results found for: URINECX    Brief Urine Lab Results  (Last result in the past 365 days)      Color   Clarity   Blood   Leuk Est   Nitrite   Protein   CREAT   Urine HCG        05/26/23 1747 Yellow   Clear   Large (3+)   Large (3+)   Positive   >=300 mg/dL (3+)                 Radiographic Studies  CT Abdomen Pelvis Without Contrast    Result Date: 5/26/2023  Impression: 1.Marked diffuse bladder wall thickening with mild surrounding fat stranding which represents an interval change. This may represent cystitis. 2.Appendix is abnormal in size at approximately 10 mm in diameter which appears to be an interval change. There does not appear to be significant surrounding inflammation, but appendicitis cannot be completely excluded on this exam. Correlate clinically. 3.Mild bilateral hydronephrosis, decreased compared to the prior exam. There is suspected renal atrophy and scarring. 4.Cholelithiasis. 5.Prostatomegaly. Electronically Signed: Chaz Rodriguez   5/26/2023 8:05 PM EDT  Workstation ID: [id number]      Results Review:   I reviewed the patient's new clinical results.     Imaging Results (Last 72 Hours)     Procedure Component Value Units Date/Time    CT Abdomen Pelvis Without Contrast [[id number]] Collected: 05/26/23 1956     Updated: 05/26/23 2008    Narrative:      CT ABDOMEN PELVIS WO CONTRAST    Date of Exam: 5/26/2023 7:34 PM EDT    Indication: hematuria since prostate surgery, ESRD on dialysis, transfusion level anemia.    Comparison: March 17, 2023    Technique: Axial CT images were obtained of the abdomen and pelvis without the administration of contrast. Reconstructed coronal and sagittal images were also obtained. Automated exposure control and iterative construction methods were used.      Findings:  No definite suspicious infiltrate is seen in the lung bases. Heart size appears within normal limits. There is a trace amount of pericardial fluid. There is calcific atherosclerosis of the coronary arteries. No definite ectopic bowel gas is seen. There   is a large calcified gallstone, as before. Gallbladder is nondistended without definite pericholecystic inflammation on this exam. No definite biliary ductal dilatation. No definite suspicious hepatic, splenic, adrenal, or pancreatic abnormality is   visualized on this limited noncontrast exam.    Previously seen hydronephrosis is improved. There appears to be mild residual hydronephrosis of the bilateral kidneys. There is bilateral renal atrophy/scarring with mild perinephric fat stranding. No significant renal or ureteral calculus is identified.   Aorta appears normal in caliber. There is mild calcific atherosclerosis of the aorta and branch vessels. No significant lymphadenopathy is seen. No acute gastric abnormality or bowel dilatation is seen. The appendix measures up to 10 mm which appears to   be an interval change. There does not appear to be significant surrounding inflammation on this exam. No  definite acute colonic or rectal abnormality is seen.    The bladder is mildly distended. There is marked diffuse bladder wall thickening which represents an interval change. There is surrounding fat stranding. There are small fat-containing inguinal hernias. No definite pelvic lymphadenopathy or significant   free fluid is seen. The prostate appears enlarged. Patient has reported interval TURP.    No definite aggressive osseous lesion is seen.      Impression:      Impression:  1.Marked diffuse bladder wall thickening with mild surrounding fat stranding which represents an interval change. This may represent cystitis.  2.Appendix is abnormal in size at approximately 10 mm in diameter which appears to be an interval change. There does not appear to be significant surrounding inflammation, but appendicitis cannot be completely excluded on this exam. Correlate clinically.  3.Mild bilateral hydronephrosis, decreased compared to the prior exam. There is suspected renal atrophy and scarring.  4.Cholelithiasis.  5.Prostatomegaly.          Electronically Signed: Chaz Rodriguez    5/26/2023 8:05 PM EDT    Workstation ID: [id number]          Assessment / Plan      Assessment/Plan:  Mr. Tyrell Zuniga is a 63 yo gentleman who underwent TURP with Dr. Damico on 5/5/23.  He has done well overall since then.  He comes back with a UTI.  He is urinating well and his bladder is decompressed based on CT and his hydronephrosis has improved.     1.  Maintain rocephin for now.  No need for caldwell catheter at this point  2.  Follow up urine culture and deescalate antibiotic coverage based on sensitivites  3.  Would recommend 14 days of antibiotic coverage for complicated UTI  4.  Pt scheduled for follow up with Dr. Damico on 6/8/23 - pt advised to keep appointment      I discussed the patients findings and my recommendations with the patient.     Time: Total face-to-face/floor time. 60 min    Mandi Velasco MD   05/27/23  10:26 EDT

## 2023-05-27 NOTE — PROGRESS NOTES
[facility] Medicine Services  PROGRESS NOTE    Patient Name: Jun Young  : 1960  MRN: [id number]    Date of Admission: 2023  Primary Care Physician: Samir Chow DO    Subjective   Subjective     CC:  anemia    HPI:  Says he feels okay, is voiding okay, no complaints.  Some hematuria since surgery    ROS:      Objective   Objective     Vital Signs:   Temp:  [98 °F (36.7 °C)-98.8 °F (37.1 °C)] 98 °F (36.7 °C)  Heart Rate:  [] 82  Resp:  [16-18] 18  BP: (123-143)/(72-87) 125/75     Physical Exam:  Gen:  WD/WN in bed  Neuro: alert and oriented, clear speech, follows commands, grossly nonfocal  HEENT:  NC/AT PERRL, OP benign  Neck:  Supple, no LAD  Heart RRR no murmur, rub, or gallop  Abd:  Soft, nontender, no rebound or guarding, pos BS  Extrem:  No c/c/e      Results Reviewed:  LAB RESULTS:      Lab 23  2336 23  1754   WBC 7.72 9.88   HEMOGLOBIN 8.1* 9.6*   HEMATOCRIT 25.3* 30.4*   PLATELETS 303 363   NEUTROS ABS 5.36 7.31*   IMMATURE GRANS (ABS) 0.09* 0.18*   LYMPHS ABS 1.25 1.23   MONOS ABS 0.65 0.72   EOS ABS 0.32 0.36   MCV 93.4 93.5   CRP  --  1.30*   PROCALCITONIN  --  0.71*   LACTATE  --  1.5   PROTIME 13.2 13.1   APTT  --  44.2*         Lab 23  2336 23  1754   SODIUM 142 138   POTASSIUM 4.1 3.7   CHLORIDE 98 95*   CO2 28.0 28.0   ANION GAP 16.0* 15.0   BUN 36* 26*   CREATININE 5.40* 4.65*   EGFR 11.1* 13.3*   GLUCOSE 82 193*   CALCIUM 8.6 9.2   MAGNESIUM  --  2.3   PHOSPHORUS  --  4.0   TSH  --  1.420         Lab 23  1754   TOTAL PROTEIN 8.5   ALBUMIN 4.6   GLOBULIN 3.9   ALT (SGPT) 9   AST (SGOT) 19   BILIRUBIN 0.2   ALK PHOS 105         Lab 23  2336 23  1754   PROTIME 13.2 13.1   INR 0.99 0.98             Lab 23  1754   IRON 111   IRON SATURATION 37   TIBC 304   TRANSFERRIN 204   FERRITIN 2,115.00*   ABO TYPING O   RH TYPING Positive   ANTIBODY SCREEN Negative         Brief Urine Lab  Results  (Last result in the past 365 days)      Color   Clarity   Blood   Leuk Est   Nitrite   Protein   CREAT   Urine HCG        05/26/23 1747 Yellow   Clear   Large (3+)   Large (3+)   Positive   >=300 mg/dL (3+)                 Microbiology Results Abnormal     None          CT Abdomen Pelvis Without Contrast    Result Date: 5/26/2023  CT ABDOMEN PELVIS WO CONTRAST Date of Exam: 5/26/2023 7:34 PM EDT Indication: hematuria since prostate surgery, ESRD on dialysis, transfusion level anemia. Comparison: March 17, 2023 Technique: Axial CT images were obtained of the abdomen and pelvis without the administration of contrast. Reconstructed coronal and sagittal images were also obtained. Automated exposure control and iterative construction methods were used. Findings: No definite suspicious infiltrate is seen in the lung bases. Heart size appears within normal limits. There is a trace amount of pericardial fluid. There is calcific atherosclerosis of the coronary arteries. No definite ectopic bowel gas is seen. There is a large calcified gallstone, as before. Gallbladder is nondistended without definite pericholecystic inflammation on this exam. No definite biliary ductal dilatation. No definite suspicious hepatic, splenic, adrenal, or pancreatic abnormality is visualized on this limited noncontrast exam. Previously seen hydronephrosis is improved. There appears to be mild residual hydronephrosis of the bilateral kidneys. There is bilateral renal atrophy/scarring with mild perinephric fat stranding. No significant renal or ureteral calculus is identified.  Aorta appears normal in caliber. There is mild calcific atherosclerosis of the aorta and branch vessels. No significant lymphadenopathy is seen. No acute gastric abnormality or bowel dilatation is seen. The appendix measures up to 10 mm which appears to  be an interval change. There does not appear to be significant surrounding inflammation on this exam. No definite  acute colonic or rectal abnormality is seen. The bladder is mildly distended. There is marked diffuse bladder wall thickening which represents an interval change. There is surrounding fat stranding. There are small fat-containing inguinal hernias. No definite pelvic lymphadenopathy or significant free fluid is seen. The prostate appears enlarged. Patient has reported interval TURP. No definite aggressive osseous lesion is seen.     Impression: Impression: 1.Marked diffuse bladder wall thickening with mild surrounding fat stranding which represents an interval change. This may represent cystitis. 2.Appendix is abnormal in size at approximately 10 mm in diameter which appears to be an interval change. There does not appear to be significant surrounding inflammation, but appendicitis cannot be completely excluded on this exam. Correlate clinically. 3.Mild bilateral hydronephrosis, decreased compared to the prior exam. There is suspected renal atrophy and scarring. 4.Cholelithiasis. 5.Prostatomegaly. Electronically Signed: Chaz Rodriguez  5/26/2023 8:05 PM EDT  Workstation ID: [id number]      Results for orders placed during the hospital encounter of 03/17/23    Adult Transthoracic Echo Limited W/ Cont if Necessary Per Protocol    Interpretation Summary  •  Left ventricular systolic function is normal. Calculated left ventricular EF = 61.7% Left ventricular ejection fraction appears to be 61 - 65%.  •  Left ventricular wall thickness is consistent with mild concentric hypertrophy.  •  There is a small (<1cm) pericardial effusion.      Current medications:  Scheduled Meds:amLODIPine, 10 mg, Oral, Daily  b complex-vitamin c-folic acid, 1 tablet, Oral, Daily  cefTRIAXone, 1 g, Intravenous, Q24H  insulin lispro, 2-7 Units, Subcutaneous, 4x Daily AC & at Bedtime  pantoprazole, 40 mg, Oral, Daily  senna-docusate sodium, 2 tablet, Oral, BID  sevelamer, 800 mg, Oral, TID With Meals  sodium chloride, 10 mL, Intravenous,  Q12H      Continuous Infusions:   PRN Meds:.•  acetaminophen **OR** acetaminophen **OR** acetaminophen  •  senna-docusate sodium **AND** polyethylene glycol **AND** bisacodyl **AND** bisacodyl  •  dextrose  •  dextrose  •  glucagon (human recombinant)  •  melatonin  •  nitroglycerin  •  sodium chloride  •  sodium chloride    Assessment & Plan   Assessment & Plan     Active Hospital Problems    Diagnosis  POA   • **Pyelonephritis [N12]  Yes   • S/P TURP (status post transurethral resection of prostate) [Z90.79]  Not Applicable   • Hypertension [I10]  Yes   • ESRD (end stage renal disease) [N18.6]  Yes   • Type 2 diabetes mellitus [E11.9]  Yes   • Hematuria [R31.9]  Yes   • Elevated lipase [R74.8]  Yes      Resolved Hospital Problems   No resolved problems to display.        Brief Hospital Course to date:  Jun Young is a 62 y.o. male w a history of DM, HTN, ESRD on HD MWF, obstructive uropathy s/p TURP with Dr. Damico on 5/5/2023, presents to the ED with complaints of hematuria and dysuria.  Patient states that he was sent to the ED from dialysis today due to having an abnormal lab, dysuria and hematuria.      Pyelonephritis  Hematuria  Recent obstructive uropathy s/p TURP with Dr. Damico  -- Procalcitonin 0.71  --CT abdomen and pelvis shows marked diffuse bladder wall thickening with mild surrounding fat stranding which represents an interval change.  Mild bilateral hydronephrosis.  Prostatomegaly  -- UA abnormal.  Urine culture pending  -- Rocephin day 2   -- Urology consultation     ESRD  -- On HD MWF  -- completed dialysis today PTA  -- Consult nephrology on Monday if still here for dialysis     Hypertension  -- Continue amlodipine with hold parameters     T2DM  -- Recent A1c 4.6  -- FSBG with SSI     Anemia  Hematuria  -- Hemoglobin 9.6 on admission (after HD), now 8.1   -- improved since discharge       Expected Discharge Location and Transportation: home by car  Expected Discharge    Expected Discharge Date: 5/29/2023; Expected Discharge Time:      DVT prophylaxis:  Mechanical DVT prophylaxis orders are present.          CODE STATUS:   Code Status and Medical Interventions:   Ordered at: 05/26/23 3904     Level Of Support Discussed With:    Patient     Code Status (Patient has no pulse and is not breathing):    CPR (Attempt to Resuscitate)     Medical Interventions (Patient has pulse or is breathing):    Full Support       Jaqueline Fisher MD  05/27/23

## 2023-05-28 ENCOUNTER — READMISSION MANAGEMENT (OUTPATIENT)
Dept: CALL CENTER | Facility: HOSPITAL | Age: 63
End: 2023-05-28
Payer: COMMERCIAL

## 2023-05-28 VITALS
OXYGEN SATURATION: 99 % | DIASTOLIC BLOOD PRESSURE: 74 MMHG | RESPIRATION RATE: 16 BRPM | HEIGHT: 67 IN | WEIGHT: 150 LBS | SYSTOLIC BLOOD PRESSURE: 138 MMHG | TEMPERATURE: 97.6 F | BODY MASS INDEX: 23.54 KG/M2 | HEART RATE: 70 BPM

## 2023-05-28 LAB
DEPRECATED RDW RBC AUTO: 45.4 FL (ref 37–54)
ERYTHROCYTE [DISTWIDTH] IN BLOOD BY AUTOMATED COUNT: 13.2 % (ref 12.3–15.4)
GLUCOSE BLDC GLUCOMTR-MCNC: 106 MG/DL (ref 70–130)
GLUCOSE BLDC GLUCOMTR-MCNC: 113 MG/DL (ref 70–130)
HCT VFR BLD AUTO: 24.4 % (ref 37.5–51)
HGB BLD-MCNC: 7.7 G/DL (ref 13–17.7)
MCH RBC QN AUTO: 30.1 PG (ref 26.6–33)
MCHC RBC AUTO-ENTMCNC: 31.6 G/DL (ref 31.5–35.7)
MCV RBC AUTO: 95.3 FL (ref 79–97)
PLATELET # BLD AUTO: 292 10*3/MM3 (ref 140–450)
PMV BLD AUTO: 8.7 FL (ref 6–12)
RBC # BLD AUTO: 2.56 10*6/MM3 (ref 4.14–5.8)
WBC NRBC COR # BLD: 7.76 10*3/MM3 (ref 3.4–10.8)

## 2023-05-28 PROCEDURE — 85027 COMPLETE CBC AUTOMATED: CPT | Performed by: INTERNAL MEDICINE

## 2023-05-28 PROCEDURE — 99239 HOSP IP/OBS DSCHRG MGMT >30: CPT | Performed by: INTERNAL MEDICINE

## 2023-05-28 PROCEDURE — 82948 REAGENT STRIP/BLOOD GLUCOSE: CPT

## 2023-05-28 RX ORDER — CEFUROXIME AXETIL 500 MG/1
500 TABLET ORAL 2 TIMES DAILY
Qty: 24 TABLET | Refills: 0 | Status: SHIPPED | OUTPATIENT
Start: 2023-05-28 | End: 2023-06-09

## 2023-05-28 RX ORDER — AMOXICILLIN 250 MG
2 CAPSULE ORAL 2 TIMES DAILY PRN
Qty: 60 TABLET | Refills: 1 | Status: SHIPPED | OUTPATIENT
Start: 2023-05-28 | End: 2023-05-28 | Stop reason: SDUPTHER

## 2023-05-28 RX ORDER — AMOXICILLIN 250 MG
2 CAPSULE ORAL 2 TIMES DAILY PRN
Qty: 60 TABLET | Refills: 1 | Status: SHIPPED | OUTPATIENT
Start: 2023-05-28

## 2023-05-28 RX ORDER — CEFUROXIME AXETIL 500 MG/1
500 TABLET ORAL 2 TIMES DAILY
Qty: 24 TABLET | Refills: 0 | Status: SHIPPED | OUTPATIENT
Start: 2023-05-28 | End: 2023-05-28 | Stop reason: SDUPTHER

## 2023-05-28 RX ADMIN — Medication 10 ML: at 09:34

## 2023-05-28 RX ADMIN — SEVELAMER CARBONATE 800 MG: 800 TABLET, FILM COATED ORAL at 11:34

## 2023-05-28 RX ADMIN — AMLODIPINE BESYLATE 10 MG: 10 TABLET ORAL at 09:31

## 2023-05-28 RX ADMIN — SEVELAMER CARBONATE 800 MG: 800 TABLET, FILM COATED ORAL at 09:32

## 2023-05-28 RX ADMIN — PANTOPRAZOLE SODIUM 40 MG: 40 TABLET, DELAYED RELEASE ORAL at 09:31

## 2023-05-28 RX ADMIN — Medication 1 TABLET: at 09:31

## 2023-05-28 NOTE — PLAN OF CARE
Problem: Adult Inpatient Plan of Care  Goal: Plan of Care Review  Outcome: Ongoing, Progressing  Goal: Patient-Specific Goal (Individualized)  Outcome: Ongoing, Progressing  Goal: Absence of Hospital-Acquired Illness or Injury  Outcome: Ongoing, Progressing  Intervention: Identify and Manage Fall Risk  Recent Flowsheet Documentation  Taken 5/28/2023 0600 by Chalino Tong RN  Safety Promotion/Fall Prevention:   safety round/check completed   room organization consistent   nonskid shoes/slippers when out of bed   clutter free environment maintained   assistive device/personal items within reach   activity supervised  Taken 5/28/2023 0400 by Chalino Tong RN  Safety Promotion/Fall Prevention:   safety round/check completed   room organization consistent   nonskid shoes/slippers when out of bed   clutter free environment maintained   assistive device/personal items within reach   activity supervised  Taken 5/28/2023 0200 by Chalino Tong RN  Safety Promotion/Fall Prevention:   activity supervised   assistive device/personal items within reach   clutter free environment maintained   lighting adjusted   nonskid shoes/slippers when out of bed   room organization consistent   safety round/check completed   toileting scheduled  Taken 5/28/2023 0000 by Chalino Tong RN  Safety Promotion/Fall Prevention:   activity supervised   assistive device/personal items within reach   clutter free environment maintained   lighting adjusted   nonskid shoes/slippers when out of bed   room organization consistent   safety round/check completed   toileting scheduled  Taken 5/27/2023 2200 by Chalino Tong RN  Safety Promotion/Fall Prevention:   activity supervised   assistive device/personal items within reach   clutter free environment maintained   lighting adjusted   nonskid shoes/slippers when out of bed   room organization consistent   safety round/check completed   toileting scheduled  Taken 5/27/2023 2000 by Alycia  MATT Allred  Safety Promotion/Fall Prevention:   activity supervised   assistive device/personal items within reach   clutter free environment maintained   lighting adjusted   nonskid shoes/slippers when out of bed   room organization consistent   safety round/check completed   toileting scheduled  Intervention: Prevent Skin Injury  Recent Flowsheet Documentation  Taken 5/28/2023 0600 by Chalino Tong RN  Body Position:   position changed independently   neutral body alignment  Taken 5/28/2023 0400 by Chalino Tong RN  Body Position:   position changed independently   neutral body alignment  Taken 5/28/2023 0200 by Chalino Tong RN  Body Position:   position changed independently   neutral body alignment  Taken 5/28/2023 0055 by Chalino Tong RN  Skin Protection:   adhesive use limited   tubing/devices free from skin contact  Taken 5/28/2023 0000 by Chalino Tong RN  Body Position:   position changed independently   neutral body alignment  Taken 5/27/2023 2200 by Chalino Tong RN  Body Position:   position changed independently   neutral body alignment  Taken 5/27/2023 2000 by Chalino Tong RN  Body Position:   position changed independently   neutral body alignment  Taken 5/27/2023 1926 by Chalino Tong RN  Skin Protection: adhesive use limited  Intervention: Prevent and Manage VTE (Venous Thromboembolism) Risk  Recent Flowsheet Documentation  Taken 5/28/2023 0600 by Chalino Tong RN  Activity Management: up ad nemo  Taken 5/28/2023 0400 by Chalino Tong RN  Activity Management: up ad nemo  Taken 5/28/2023 0200 by Chalino Tong RN  Activity Management: up ad nemo  Taken 5/28/2023 0000 by Chalino Tong RN  Activity Management: up ad nemo  Taken 5/27/2023 2200 by Chalino Tong RN  Activity Management: up ad nemo  Taken 5/27/2023 2000 by Chalino Tong RN  Activity Management: up ad nemo  Taken 5/27/2023 1926 by Chalino Tong RN  VTE Prevention/Management: sequential compression  devices off  Intervention: Prevent Infection  Recent Flowsheet Documentation  Taken 5/28/2023 0200 by Chalino Tong RN  Infection Prevention:   environmental surveillance performed   rest/sleep promoted   single patient room provided  Taken 5/28/2023 0000 by Chalino Tong RN  Infection Prevention:   environmental surveillance performed   rest/sleep promoted   single patient room provided  Taken 5/27/2023 2200 by Chalino Tong RN  Infection Prevention:   environmental surveillance performed   rest/sleep promoted   single patient room provided  Taken 5/27/2023 2000 by Chalino Tong RN  Infection Prevention:   environmental surveillance performed   rest/sleep promoted   single patient room provided  Goal: Optimal Comfort and Wellbeing  Outcome: Ongoing, Progressing  Intervention: Provide Person-Centered Care  Recent Flowsheet Documentation  Taken 5/27/2023 1926 by Chalino Tong RN  Trust Relationship/Rapport: care explained  Goal: Readiness for Transition of Care  Outcome: Ongoing, Progressing     Problem: Diabetes Comorbidity  Goal: Blood Glucose Level Within Targeted Range  Outcome: Ongoing, Progressing     Problem: Hypertension Comorbidity  Goal: Blood Pressure in Desired Range  Outcome: Ongoing, Progressing  Intervention: Maintain Blood Pressure Management  Recent Flowsheet Documentation  Taken 5/28/2023 0200 by Chalino Tong RN  Medication Review/Management: medications reviewed  Taken 5/28/2023 0000 by Chalino Tong RN  Medication Review/Management: medications reviewed  Taken 5/27/2023 2200 by Chalino Tong RN  Medication Review/Management: medications reviewed  Taken 5/27/2023 2000 by Chalino Tong RN  Medication Review/Management: medications reviewed   Goal Outcome Evaluation:            Urine remains light red, however the patient reports decreased pain with urination.

## 2023-05-28 NOTE — OUTREACH NOTE
Prep Survey    Flowsheet Row Responses   [facility] patient discharged from? [place]   Is LACE score < 7 ? No   Eligibility [facility]   Date of Admission 05/26/23   Date of Discharge 05/28/23   Discharge Disposition Home or Self Care   Discharge diagnosis Pyelonephritis   Does the patient have one of the following disease processes/diagnoses(primary or secondary)? Other   Does the patient have Home health ordered? No   Is there a DME ordered? No   Prep survey completed? Yes          Darlyn POE - Registered Nurse

## 2023-05-28 NOTE — DISCHARGE SUMMARY
[facility] Medicine Services  DISCHARGE SUMMARY    Patient Name: Jun Young  : 1960  MRN: [id number]    Date of Admission: 2023  6:52 PM  Date of Discharge:  2023  Primary Care Physician: Samir Chow DO    Consults     Date and Time Order Name Status Description    2023  3:57 AM Inpatient Urology Consult Completed     2023  2:49 PM Inpatient Nephrology Consult Completed           Hospital Course     Presenting Problem: dysuria     Active Hospital Problems    Diagnosis  POA   • **Pyelonephritis [N12]  Yes   • S/P TURP (status post transurethral resection of prostate) [Z90.79]  Not Applicable   • Hypertension [I10]  Yes   • ESRD (end stage renal disease) [N18.6]  Yes   • Type 2 diabetes mellitus [E11.9]  Yes   • Hematuria [R31.9]  Yes   • Elevated lipase [R74.8]  Yes      Resolved Hospital Problems   No resolved problems to display.          Hospital Course:  Jun Young is a 62 y.o. male with ESRD via TDC, DM, HTN, obstructive uropathy s/p TURP with Dr. Damico on 2023.  He was referred to [facility] from ED due to recent dysuria and hematuria since surgery.     UTI,complicated  - CT scan showed improvement in bilateral hydronephrosis  - UA showed blood and a few WBCs.    - Urine culture was no growth  - symptoms did resolve with ceftriaxone in house:  No more dysuria, and urine now clear.   - Dr Velasco saw him and recommended 14 day course for presumed UTI.  Give lack of culture result but improvement on CTX, will use cefuroxime   - FU with urologist    ESRD  - continue per routine  - pt needs AF fistula planning:  Defer to nephrology    Anemia  - stable from previous.   - ESRD and recent hematuria contribute.   - hematuria resolved.  iron level acceptable. No report of melena/BRBPR.    - defer Epo to nephrologist   - needs outpt monitoring   .  Discharge Follow Up Recommendations for outpatient labs/diagnostics:   *FU  with urologist:  Has appt on 6/8    *FU with surgeon for fistula placement - nephrologist to arrange    *Continue hemodialysis     Day of Discharge     HPI:   No pain, no dysuria.  Hematuria has cleared.      Review of Systems  No BM x 2 days     Vital Signs:   Temp:  [97.5 °F (36.4 °C)-98.5 °F (36.9 °C)] 97.6 °F (36.4 °C)  Heart Rate:  [68-84] 70  Resp:  [16-20] 16  BP: (130-141)/(66-74) 138/74      Physical Exam:  Constitutional: Awake, alert in bed NAD   Eyes: PER  HENT: NCAT, mucous membranes moist  Neck: Supple,   Respiratory:  nonlabored respirations   Cardiovascular: RRR,  TDC in R chest is not tender   Gastrointestinal: Positive bowel sounds, soft, nontender, nondistended  Musculoskeletal: No bilateral ankle edema,   Psychiatric: Appropriate affect, cooperative  Neurologic: Oriented x 3, strength symmetric in all extremities, Cranial Nerves grossly intact to confrontation, speech clear  Skin: No rashes      Pertinent  and/or Most Recent Results     LAB RESULTS:      Lab 05/28/23  0336 05/26/23  2336 05/26/23  1754   WBC 7.76 7.72 9.88   HEMOGLOBIN 7.7* 8.1* 9.6*   HEMATOCRIT 24.4* 25.3* 30.4*   PLATELETS 292 303 363   NEUTROS ABS  --  5.36 7.31*   IMMATURE GRANS (ABS)  --  0.09* 0.18*   LYMPHS ABS  --  1.25 1.23   MONOS ABS  --  0.65 0.72   EOS ABS  --  0.32 0.36   MCV 95.3 93.4 93.5   CRP  --   --  1.30*   PROCALCITONIN  --   --  0.71*   LACTATE  --   --  1.5   PROTIME  --  13.2 13.1   APTT  --   --  44.2*         Lab 05/26/23  2336 05/26/23  1754   SODIUM 142 138   POTASSIUM 4.1 3.7   CHLORIDE 98 95*   CO2 28.0 28.0   ANION GAP 16.0* 15.0   BUN 36* 26*   CREATININE 5.40* 4.65*   EGFR 11.1* 13.3*   GLUCOSE 82 193*   CALCIUM 8.6 9.2   MAGNESIUM  --  2.3   PHOSPHORUS  --  4.0   TSH  --  1.420         Lab 05/26/23  1754   TOTAL PROTEIN 8.5   ALBUMIN 4.6   GLOBULIN 3.9   ALT (SGPT) 9   AST (SGOT) 19   BILIRUBIN 0.2   ALK PHOS 105         Lab 05/26/23  2336 05/26/23  1754   PROTIME 13.2 13.1   INR 0.99 0.98              Lab 05/26/23  2336 05/26/23  1754   IRON  --  111   IRON SATURATION  --  37   TIBC  --  304   TRANSFERRIN  --  204   FERRITIN  --  2,115.00*   FOLATE >20.00  --    VITAMIN B 12 759  --    ABO TYPING  --  O   RH TYPING  --  Positive   ANTIBODY SCREEN  --  Negative         Brief Urine Lab Results  (Last result in the past 365 days)      Color   Clarity   Blood   Leuk Est   Nitrite   Protein   CREAT   Urine HCG        05/26/23 1747 Yellow   Clear   Large (3+)   Large (3+)   Positive   >=300 mg/dL (3+)               Microbiology Results (last 10 days)     Procedure Component Value - Date/Time    Blood Culture - Blood, Arm, Right [[id number]]  (Normal) Collected: 05/26/23 2010    Lab Status: Preliminary result Specimen: Blood from Arm, Right Updated: 05/27/23 2047     Blood Culture No growth at 24 hours    Blood Culture - Blood, Arm, Left [[id number]]  (Normal) Collected: 05/26/23 2010    Lab Status: Preliminary result Specimen: Blood from Arm, Left Updated: 05/27/23 2047     Blood Culture No growth at 24 hours    Urine Culture - Urine, Urine, Clean Catch [[id number]]  (Normal) Collected: 05/26/23 1747    Lab Status: Final result Specimen: Urine, Clean Catch Updated: 05/27/23 2257     Urine Culture No growth          CT Abdomen Pelvis Without Contrast    Result Date: 5/26/2023  CT ABDOMEN PELVIS WO CONTRAST Date of Exam: 5/26/2023 7:34 PM EDT Indication: hematuria since prostate surgery, ESRD on dialysis, transfusion level anemia. Comparison: March 17, 2023 Technique: Axial CT images were obtained of the abdomen and pelvis without the administration of contrast. Reconstructed coronal and sagittal images were also obtained. Automated exposure control and iterative construction methods were used. Findings: No definite suspicious infiltrate is seen in the lung bases. Heart size appears within normal limits. There is a trace amount of pericardial fluid. There is calcific atherosclerosis of the coronary arteries. No  definite ectopic bowel gas is seen. There is a large calcified gallstone, as before. Gallbladder is nondistended without definite pericholecystic inflammation on this exam. No definite biliary ductal dilatation. No definite suspicious hepatic, splenic, adrenal, or pancreatic abnormality is visualized on this limited noncontrast exam. Previously seen hydronephrosis is improved. There appears to be mild residual hydronephrosis of the bilateral kidneys. There is bilateral renal atrophy/scarring with mild perinephric fat stranding. No significant renal or ureteral calculus is identified.  Aorta appears normal in caliber. There is mild calcific atherosclerosis of the aorta and branch vessels. No significant lymphadenopathy is seen. No acute gastric abnormality or bowel dilatation is seen. The appendix measures up to 10 mm which appears to  be an interval change. There does not appear to be significant surrounding inflammation on this exam. No definite acute colonic or rectal abnormality is seen. The bladder is mildly distended. There is marked diffuse bladder wall thickening which represents an interval change. There is surrounding fat stranding. There are small fat-containing inguinal hernias. No definite pelvic lymphadenopathy or significant free fluid is seen. The prostate appears enlarged. Patient has reported interval TURP. No definite aggressive osseous lesion is seen.     Impression: 1.Marked diffuse bladder wall thickening with mild surrounding fat stranding which represents an interval change. This may represent cystitis. 2.Appendix is abnormal in size at approximately 10 mm in diameter which appears to be an interval change. There does not appear to be significant surrounding inflammation, but appendicitis cannot be completely excluded on this exam. Correlate clinically. 3.Mild bilateral hydronephrosis, decreased compared to the prior exam. There is suspected renal atrophy and scarring. 4.Cholelithiasis.  5.Prostatomegaly. Electronically Signed: Chaz Rodriguez  5/26/2023 8:05 PM EDT  Workstation ID: [id number]      Results for orders placed during the hospital encounter of 02/07/23    Duplex Vein Mapping Study Upper Extremity - Left CAR    Interpretation Summary  •  Inadequate/small left cephalic vein  •  Adequate/good basilic vein.  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      Results for orders placed during the hospital encounter of 02/07/23    Duplex Vein Mapping Study Upper Extremity - Left CAR    Interpretation Summary  •  Inadequate/small left cephalic vein  •  Adequate/good basilic vein.  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      Results for orders placed during the hospital encounter of 03/17/23    Adult Transthoracic Echo Limited W/ Cont if Necessary Per Protocol    Interpretation Summary  •  Left ventricular systolic function is normal. Calculated left ventricular EF = 61.7% Left ventricular ejection fraction appears to be 61 - 65%.  •  Left ventricular wall thickness is consistent with mild concentric hypertrophy.  •  There is a small (<1cm) pericardial effusion.      Plan for Follow-up of Pending Labs/Results: I will follow   Pending Labs     Order Current Status    Blood Culture - Blood, Arm, Left Preliminary result    Blood Culture - Blood, Arm, Right Preliminary result        Discharge Details        Discharge Medications      New Medications      Instructions Start Date   cefuroxime 500 MG tablet  Commonly known as: CEFTIN   500 mg, Oral, 2 Times Daily      sennosides-docusate 8.6-50 MG per tablet  Commonly known as: PERICOLACE   2 tablets, Oral, 2 Times Daily PRN         Changes to Medications      Instructions Start Date   ADAIR GUTIERREZ  What changed: Another medication with the same name was removed. Continue taking this medication, and follow the directions you see here.   1 tablet, Oral, Daily          Continue These Medications      Instructions Start Date   acetaminophen 325 MG tablet  Commonly known as: TYLENOL   650 mg, Oral, Every 4 Hours PRN      amLODIPine 10 MG tablet  Commonly known as: NORVASC   10 mg, Oral, Daily      pantoprazole 40 MG EC tablet  Commonly known as: PROTONIX   40 mg, Oral, Daily      sevelamer 800 MG tablet  Commonly known as: RENVELA   800 mg, Oral, 3 Times Daily With Meals      simethicone 80 MG chewable tablet  Commonly known as: MYLICON   Chew and swallow 1 tablet by mouth 4 (Four) Times a Day As Needed for Flatulence.      SITagliptin 25 MG tablet  Commonly known as: Januvia   25 mg, Oral, Daily         Stop These Medications    bacitracin-polymyxin b 500-36912 UNIT/GM ointment  Commonly known as: POLYSPORIN     clotrimazole 1 % cream  Commonly known as: LOTRIMIN            No Known Allergies      Discharge Disposition:  Home or Self Care    Diet:  Hospital:  Diet Order   Procedures   • Diet: Cardiac Diets, Diabetic Diets, Renal Diets; Healthy Heart (2-3 Na+); Consistent Carbohydrate; Low Sodium (2-3g), Low Potassium, Low Phosphorus; Texture: Regular Texture (IDDSI 7); Fluid Consistency: Thin (IDDSI 0)       Activity:  Routine          CODE STATUS:    Code Status and Medical Interventions:   Ordered at: 05/26/23 1990     Level Of Support Discussed With:    Patient     Code Status (Patient has no pulse and is not breathing):    CPR (Attempt to Resuscitate)     Medical Interventions (Patient has pulse or is breathing):    Full Support       Future Appointments   Date Time Provider Department Center   6/8/2023  9:00 AM Pawan Damico MD [facility]   8/15/2023  2:00 PM Samir Chow DO [facility]       Additional Instructions for the Follow-ups that You Need to Schedule     Discharge Follow-up with Specialty: [facility] urology; 3 Weeks   As directed      Specialty: [facility] urology    Follow Up: 3 Weeks                     Jaqueline Fisher MD  05/28/23      Time Spent on  Discharge:  I spent  40  minutes on this discharge activity which included: face-to-face encounter with the patient, reviewing the data in the system, coordination of the care with the nursing staff as well as consultants, documentation, and entering orders.

## 2023-05-29 LAB
QT INTERVAL: 378 MS
QTC INTERVAL: 492 MS

## 2023-05-29 NOTE — ED PROVIDER NOTES
EMERGENCY DEPARTMENT ENCOUNTER    Pt Name: Jun Young  MRN: [id number]  Pt :   1960  Room Number:  S555/1  Date of encounter:  2023  PCP: Samir Chow DO  ED Provider: Benigno Moya MD    Historian: Hematuria, anemia      HPI:  Chief Complaint: Patient, daughter        Context: Jun Young is a 62-year-old man with history of hypertension, end-stage renal disease on dialysis,, prostate cancer status post surgery with Dr. Damico 2 weeks ago who presents with persistent hematuria and severe anemia.  His PCP found that he has hemoglobin 7.6.  He says he always has blood in his urine sometimes its very thick and sometimes he gets lighter but it has been persistent since the surgery 2 weeks ago.  No appreciated fevers or systemic symptoms.  He is having some associated dysuria.  No other complaints at this time.  PAST MEDICAL HISTORY  Past Medical History:   Diagnosis Date   • Acute on chronic renal failure    • Bilateral hydronephrosis    • BPH (benign prostatic hyperplasia)    • Cholelithiasis    • Diabetes mellitus    • Dyslipidemia    • Elevated cholesterol    • GERD (gastroesophageal reflux disease)    • Gross hematuria    • Hemodialysis patient    • History of transfusion     [facility], no reactions per pt   • Hyperlipidemia    • Hypertension    • Pericardial effusion     echo 2023 - 1-2 cm preserved LV function   • Stroke     no residual effects         PAST SURGICAL HISTORY  Past Surgical History:   Procedure Laterality Date   • COLONOSCOPY     • CYSTOSCOPY BLADDER BIOPSY N/A 2023    Procedure: CYSTOSCOPY CLOT EVACUATION WITH FULGURATION, RETROGRADE PYELOGRAM;  Surgeon: Mandi Velasco MD;  Location: [facility];  Service: Urology;  Laterality: N/A;   • CYSTOSCOPY BLADDER BIOPSY N/A 2023    Procedure: CYSTOSCOPY WITH FULGURATION OF BLEEDERS;  Surgeon: Santana Christianson MD;  Location: [facility] OR;  Service: Urology;  Laterality: N/A;   • CYSTOSCOPY  TRANSURETHRAL RESECTION OF PROSTATE N/A 5/5/2023    Procedure: CYSTOSCOPY TRANSURETHRAL RESECTION OF PROSTATE (BIPOLAR);  Surgeon: Pawan Damico MD;  Location: [facility];  Service: Urology;  Laterality: N/A;   • CYSTOSCOPY WITH CLOT EVACUATION      fulguration - 3 way catheter placement   • INSERTION HEMODIALYSIS CATHETER           FAMILY HISTORY  Family History   Family history unknown: Yes         SOCIAL HISTORY  Social History     Socioeconomic History   • Marital status: Single   Tobacco Use   • Smoking status: Never     Passive exposure: Never   • Smokeless tobacco: Never   Vaping Use   • Vaping Use: Never used   Substance and Sexual Activity   • Alcohol use: Never   • Drug use: Never   • Sexual activity: Not Currently         ALLERGIES  Patient has no known allergies.        REVIEW OF SYSTEMS  Review of Systems       All systems reviewed and negative except for those discussed in HPI.       PHYSICAL EXAM    I have reviewed the triage vital signs and nursing notes.    ED Triage Vitals [05/26/23 1728]   Temp Heart Rate Resp BP SpO2   98.8 °F (37.1 °C) 104 16 143/87 97 %      Temp src Heart Rate Source Patient Position BP Location FiO2 (%)   Oral Monitor Sitting Left arm --       Physical Exam  GENERAL:   Appears chronically ill, mildly uncomfortable  HENT: Nares patent.  EYES: No scleral icterus.  CV: Borderline tachycardia without appreciated murmurs rubs or gallops  RESPIRATORY: Normal effort.  No audible wheezes, rales or rhonchi.  ABDOMEN: Soft, nontender  MUSCULOSKELETAL: No deformities.   NEURO: Alert, moves all extremities, follows commands.  SKIN: Warm, dry, no rash visualized.      LAB RESULTS  No results found for this or any previous visit (from the past 24 hour(s)).    If labs were ordered, I independently reviewed the results and considered them in treating the patient.        RADIOLOGY  No Radiology Exams Resulted Within Past 24 Hours    I ordered and independently reviewed the above noted  radiographic studies.      I viewed images of noncontrasted CT of the abdomen pelvis which shows impressive bladder wall thickening as well as bilateral hydronephrosis which shows increased from prior.    See radiologist's dictation for official interpretation.        PROCEDURES    Procedures    ECG 12 Lead QT Measurement   Final Result   Test Reason : QT Measurement   Blood Pressure :   */*   mmHG   Vent. Rate : 102 BPM     Atrial Rate : 102 BPM      P-R Int : 188 ms          QRS Dur : 102 ms       QT Int : 378 ms       P-R-T Axes :  47  69  47 degrees      QTc Int : 492 ms      Sinus tachycardia   Incomplete right bundle branch block   Possible Right ventricular hypertrophy   Abnormal ECG   When compared with ECG of 05-MAY-2023 23:10,   ST no longer elevated in Anterior leads   Confirmed by TANVIR ERAZO ([id number]) on 5/29/2023 6:20:45 PM      Referred By:            Confirmed By: TANVIR ERAZO          MEDICATIONS GIVEN IN ER    Medications   cefTRIAXone (ROCEPHIN) 1 g/100 mL 0.9% NS (MBP) (0 g Intravenous Stopped 5/26/23 2154)         MEDICAL DECISION MAKING, PROGRESS, and CONSULTS    All labs have been independently reviewed by me.  All radiology studies have been reviewed by me and the radiologist dictating the report.  All EKG's have been independently viewed and interpreted by me.      Discussion below represents my analysis of pertinent findings related to patient's condition, differential diagnosis, treatment plan and final disposition.      Differential diagnosis:    Acute blood loss anemia, acute urinary obstruction, cystitis, pyelonephritis, sepsis, anemia, electrolyte abnormality, bacteremia      Additional sources:    - Discussed/ obtained information from independent historians: Daughter    - External (non-ED) record review: Recent hospital admission for obstructive uropathy requiring TURP, anemia, end-stage renal disease, diabetes    - Chronic or social conditions impacting care: End-stage  renal disease    - Shared decision making: Agreeable to hospital admission      Orders placed during this visit:  Orders Placed This Encounter   Procedures   • Blood Culture - Blood,   • Blood Culture - Blood,   • Urine Culture - Urine,   • CT Abdomen Pelvis Without Contrast   • Comprehensive Metabolic Panel   • aPTT   • Protime-INR   • Urinalysis With Microscopic If Indicated (No Culture) - Urine, Clean Catch   • Lactic Acid, Plasma   • C-reactive Protein   • Procalcitonin   • Magnesium   • Phosphorus   • TSH   • CBC Auto Differential   • Urinalysis, Microscopic Only - Urine, Clean Catch   • CBC Auto Differential   • Basic Metabolic Panel   • Protime-INR   • Ferritin   • Iron Profile   • Reticulocytes   • Vitamin B12   • Folate   • CBC (No Diff)   • Place Sequential Compression Device   • Discharge Follow-up with Specialty: [facility] urology; 3 Weeks   • Inpatient Consult to Advance Care Planning   • Inpatient Urology Consult   • POC Glucose Once   • POC Glucose Once   • POC Glucose Once   • POC Glucose Once   • POC Glucose Once   • POC Glucose Once   • ECG 12 Lead QT Measurement   • Type & Screen   • Inpatient Admission   • Discharge patient   • CBC & Differential         Additional orders considered but not ordered:      ED Course:    Consultants:      ED Course as of 05/29/23 1914   Fri May 26, 2023   1739 This is a 62-year-old man with history of hypertension, end-stage renal disease on dialysis,, prostate cancer status post surgery with Dr. Colon of 2 weeks ago who presents with persistent hematuria and severe anemia.  His PCP found that he has hemoglobin 7.6.  He says he always has blood in his urine sometimes its very thick and sometimes he gets lighter but it has been persistent since the surgery 2 weeks ago.  No appreciated fevers or systemic symptoms.  He is having some associated dysuria.  No other complaints at this time. [CC]      ED Course User Index  [CC] Benigno Moya MD     He arrived awake and  alert but borderline tachycardic and chronically ill-appearing.  Obtaining stat CBC as well as coags and type and screen.  Reassuringly CBC actually shows hemoglobin to be slightly improved today at 9.6.  Urinalysis shows gross hematuria but also pyuria and bacteria.  He also has elevated procalcitonin and CRP.  Starting on IV ceftriaxone.  CMP as expected shows significantly elevated creatinine of 4.6 as well as hyperglycemia at 193.  Blood cultures have been sent.  CT scan of the abdomen pelvis shows impressive bladder wall thickening as well as bilateral hydronephrosis concerning for pyelonephritis.  He is able to urinate spontaneously right now I spoke with Dr. Jono Jordan the on-call urologist who does not recommend placing a [place] catheter but will continue to follow along.  Patient is agreeable to hospital admission for treatment of pyelonephritis.  Medicine team consulted for admission.             AS OF 19:14 EDT VITALS:    BP - 138/74  HR - 70  TEMP - 97.6 °F (36.4 °C) (Oral)  O2 SATS - 99%                  DIAGNOSIS  Final diagnoses:   Pyelonephritis   ESRD (end stage renal disease)   Symptomatic anemia   Type 2 diabetes mellitus with stage 4 chronic kidney disease, without long-term current use of insulin   S/P TURP (status post transurethral resection of prostate)   Gross hematuria         DISPOSITION  Admit      Please note that portions of this document were completed with voice recognition software.        Benigno Moya MD  05/29/23 1918

## 2023-05-30 ENCOUNTER — TRANSITIONAL CARE MANAGEMENT TELEPHONE ENCOUNTER (OUTPATIENT)
Dept: CALL CENTER | Facility: HOSPITAL | Age: 63
End: 2023-05-30

## 2023-05-30 NOTE — OUTREACH NOTE
Call Center TCM Note    Flowsheet Row Responses   [facility] patient discharged from? [place]   Does the patient have one of the following disease processes/diagnoses(primary or secondary)? Other   TCM attempt successful? Yes   Call start time 1425   Call end time 1428   Discharge diagnosis Pyelonephritis   Person spoke with today (if not patient) and relationship Patient   Meds reviewed with patient/caregiver? Yes   Is the patient having any side effects they believe may be caused by any medication additions or changes? No   Does the patient have all medications ordered at discharge? Yes   Is the patient taking all medications as directed (includes completed medication regime)? Yes   Comments Patient has a hospital followup on 6/8/2023 with urology   Does the patient have an appointment with their PCP within 7 days of discharge? No   Nursing Interventions Patient desires to follow up with specialty only   Has home health visited the patient within 72 hours of discharge? N/A   Psychosocial issues? No   Did the patient receive a copy of their discharge instructions? Yes   Nursing interventions Reviewed instructions with patient   What is the patient's perception of their health status since discharge? Improving   Is the patient/caregiver able to teach back signs and symptoms related to disease process for when to call PCP? Yes   Is the patient/caregiver able to teach back signs and symptoms related to disease process for when to call 911? Yes   Is the patient/caregiver able to teach back the hierarchy of who to call/visit for symptoms/problems? PCP, Specialist, Home health nurse, Urgent Care, ED, 911 Yes   If the patient is a current smoker, are they able to teach back resources for cessation? Not a smoker   TCM call completed? Yes   Wrap up additional comments Patient reports he is doing well.   Call end time 1428   Would this patient benefit from a Referral to [facility] Social Work? No   Is the patient interested  in additional calls from an ambulatory ?  NOTE:  applies to high risk patients requiring additional follow-up. No          Priyank Berry RN    5/30/2023, 14:29 EDT

## 2023-05-31 LAB
BACTERIA SPEC AEROBE CULT: NORMAL
BACTERIA SPEC AEROBE CULT: NORMAL

## 2023-06-01 NOTE — PROGRESS NOTES
Enter Query Response Below      Query Response: No              If applicable, please update the problem list.   Patient: Jun Garcia        : 1960  Account: [id number]           Admit Date: 2023        How to Respond to this query:       a. Click New Note     b. Answer query within the yellow box.                c. Update the Problem List, if applicable.      If you have any questions about this query contact me at: [phone]     :    62-year-old patient admitted with hydronephrosis and UTI on 2023 also has a diagnosis of acute kidney injury this admit. Patient has ESRD and is dialysis dependent.  Creatinine this admit:  23 17:54-Creatinine: 4.65   23 23:36-Creatinine: 5.40   Baseline creatinine is not mentioned.   Treatment has included monitoring labs, intake and output, and IV Rocephin.  After study, was the diagnosis of acute kidney injury clinically supported during this admission?    Yes, please include additional clinical indicators: __________________  No  Other, please specify: ______________________  Unable to clinically determine     By submitting this query, we are merely seeking further clarification of documentation to accurately reflect all conditions that you are monitoring, evaluating, treating or that extend the hospitalization or utilize additional resources of care. Please utilize your independent clinical judgment when addressing the question(s) above.     This query and your response, once completed, will be entered into the legal medical record.    Sincerely,  Annel Mckay RN, BSN, CCDS  Clinical Documentation Integrity Program   [email or web address]

## 2023-06-07 ENCOUNTER — READMISSION MANAGEMENT (OUTPATIENT)
Dept: CALL CENTER | Facility: HOSPITAL | Age: 63
End: 2023-06-07
Payer: COMMERCIAL

## 2023-06-07 NOTE — OUTREACH NOTE
Medical Week 2 Survey      Flowsheet Row Responses   [facility] patient discharged from? [place]   Does the patient have one of the following disease processes/diagnoses(primary or secondary)? Other   Week 2 attempt successful? Yes  [Pt hung up so called dtr]   Call start time 1407   Discharge diagnosis Pyelonephritis   Call end time 1410   Person spoke with today (if not patient) and relationship Althea, victor manuel   Meds reviewed with patient/caregiver? Yes   Prescription comments Dtr uncertain of medications   Comments regarding appointments Dtr reports that pt is scheduled for surgery tomorrow 6/8/23--appears he is to have procedure at [facility]   Has the patient kept scheduled appointments due by today? Yes   Comments Patient has a hospital followup on 6/8/2023 with urology--reminded   Has home health visited the patient within 72 hours of discharge? N/A   Psychosocial issues? No   Did the patient receive a copy of their discharge instructions? Yes   Nursing interventions Reviewed instructions with patient   What is the patient's perception of their health status since discharge? Same  [Dtr vague in responses--reports he is the same, still has hematuria and will have procedure completed tomorrow.  Unable to provide information or answer questions, pt hung up phone when called.]   Week 2 Call Completed? Yes   Graduated Yes            ALFRED LEMUS - Registered Nurse

## 2023-06-08 ENCOUNTER — OFFICE VISIT (OUTPATIENT)
Dept: UROLOGY | Facility: CLINIC | Age: 63
End: 2023-06-08
Payer: COMMERCIAL

## 2023-06-08 VITALS
WEIGHT: 150 LBS | HEIGHT: 67 IN | DIASTOLIC BLOOD PRESSURE: 78 MMHG | SYSTOLIC BLOOD PRESSURE: 124 MMHG | OXYGEN SATURATION: 99 % | HEART RATE: 67 BPM | BODY MASS INDEX: 23.54 KG/M2

## 2023-06-08 DIAGNOSIS — N40.1 BPH WITH OBSTRUCTION/LOWER URINARY TRACT SYMPTOMS: Primary | ICD-10-CM

## 2023-06-08 DIAGNOSIS — R33.9 URINARY RETENTION: ICD-10-CM

## 2023-06-08 DIAGNOSIS — N13.8 BPH WITH OBSTRUCTION/LOWER URINARY TRACT SYMPTOMS: Primary | ICD-10-CM

## 2023-06-08 LAB
BILIRUB BLD-MCNC: NEGATIVE MG/DL
CLARITY, POC: ABNORMAL
COLOR UR: ABNORMAL
EXPIRATION DATE: ABNORMAL
GLUCOSE UR STRIP-MCNC: NEGATIVE MG/DL
KETONES UR QL: NEGATIVE
LEUKOCYTE EST, POC: ABNORMAL
Lab: ABNORMAL
NITRITE UR-MCNC: NEGATIVE MG/ML
PH UR: 8.5 [PH] (ref 5–8)
PROT UR STRIP-MCNC: ABNORMAL MG/DL
RBC # UR STRIP: ABNORMAL /UL
SP GR UR: 1.01 (ref 1–1.03)
UROBILINOGEN UR QL: NORMAL

## 2023-06-08 NOTE — PROGRESS NOTES
Patient presented to clinic for postoperative follow-up however he left immediately after being roomed and I was unable to see him.  He stated he had other obligations at different physicians office.    Pawan Damico MD

## 2024-01-13 ENCOUNTER — HOSPITAL ENCOUNTER (INPATIENT)
Facility: HOSPITAL | Age: 64
LOS: 3 days | Discharge: HOME | End: 2024-01-16
Attending: STUDENT IN AN ORGANIZED HEALTH CARE EDUCATION/TRAINING PROGRAM | Admitting: HOSPITALIST
Payer: MEDICARE

## 2024-01-13 DIAGNOSIS — N18.6 ESRD (END STAGE RENAL DISEASE) ON DIALYSIS: ICD-10-CM

## 2024-01-13 DIAGNOSIS — T82.898A PROBLEM WITH DIALYSIS SHUNT, INITIAL ENCOUNTER: ICD-10-CM

## 2024-01-13 DIAGNOSIS — R78.81 POSITIVE BLOOD CULTURES: Primary | ICD-10-CM

## 2024-01-13 DIAGNOSIS — Z99.2 ESRD (END STAGE RENAL DISEASE) ON DIALYSIS: ICD-10-CM

## 2024-01-13 PROBLEM — T82.7XXA INFECTION OF HEMODIALYSIS TUNNELED CATHETER: Status: ACTIVE | Noted: 2024-01-13

## 2024-01-13 LAB
ALBUMIN SERPL-MCNC: 4.5 G/DL (ref 3.5–5.2)
ALBUMIN/GLOB SERPL: 1.3 G/DL
ALP SERPL-CCNC: 77 U/L (ref 39–117)
ALT SERPL W P-5'-P-CCNC: 10 U/L (ref 1–41)
ANION GAP SERPL CALCULATED.3IONS-SCNC: 14 MMOL/L (ref 5–15)
AST SERPL-CCNC: 19 U/L (ref 1–40)
BASOPHILS # BLD AUTO: 0.06 10*3/MM3 (ref 0–0.2)
BASOPHILS NFR BLD AUTO: 0.8 % (ref 0–1.5)
BILIRUB SERPL-MCNC: 0.3 MG/DL (ref 0–1.2)
BUN SERPL-MCNC: 33 MG/DL (ref 8–23)
BUN/CREAT SERPL: 4.4 (ref 7–25)
CALCIUM SPEC-SCNC: 9.6 MG/DL (ref 8.6–10.5)
CHLORIDE SERPL-SCNC: 100 MMOL/L (ref 98–107)
CO2 SERPL-SCNC: 29 MMOL/L (ref 22–29)
CREAT SERPL-MCNC: 7.48 MG/DL (ref 0.76–1.27)
D-LACTATE SERPL-SCNC: 1.1 MMOL/L (ref 0.5–2)
DEPRECATED RDW RBC AUTO: 45 FL (ref 37–54)
EGFRCR SERPLBLD CKD-EPI 2021: 7.5 ML/MIN/1.73
EOSINOPHIL # BLD AUTO: 0.66 10*3/MM3 (ref 0–0.4)
EOSINOPHIL NFR BLD AUTO: 9.2 % (ref 0.3–6.2)
ERYTHROCYTE [DISTWIDTH] IN BLOOD BY AUTOMATED COUNT: 13.8 % (ref 12.3–15.4)
GLOBULIN UR ELPH-MCNC: 3.4 GM/DL
GLUCOSE SERPL-MCNC: 113 MG/DL (ref 65–99)
HCT VFR BLD AUTO: 31.8 % (ref 37.5–51)
HGB BLD-MCNC: 10.5 G/DL (ref 13–17.7)
HOLD SPECIMEN: NORMAL
IMM GRANULOCYTES # BLD AUTO: 0.05 10*3/MM3 (ref 0–0.05)
IMM GRANULOCYTES NFR BLD AUTO: 0.7 % (ref 0–0.5)
LYMPHOCYTES # BLD AUTO: 1 10*3/MM3 (ref 0.7–3.1)
LYMPHOCYTES NFR BLD AUTO: 13.9 % (ref 19.6–45.3)
MCH RBC QN AUTO: 30.7 PG (ref 26.6–33)
MCHC RBC AUTO-ENTMCNC: 33 G/DL (ref 31.5–35.7)
MCV RBC AUTO: 93 FL (ref 79–97)
MONOCYTES # BLD AUTO: 0.57 10*3/MM3 (ref 0.1–0.9)
MONOCYTES NFR BLD AUTO: 7.9 % (ref 5–12)
NEUTROPHILS NFR BLD AUTO: 4.86 10*3/MM3 (ref 1.7–7)
NEUTROPHILS NFR BLD AUTO: 67.5 % (ref 42.7–76)
NRBC BLD AUTO-RTO: 0.3 /100 WBC (ref 0–0.2)
PLATELET # BLD AUTO: 196 10*3/MM3 (ref 140–450)
PMV BLD AUTO: 8.6 FL (ref 6–12)
POTASSIUM SERPL-SCNC: 3.7 MMOL/L (ref 3.5–5.2)
PROCALCITONIN SERPL-MCNC: 0.76 NG/ML (ref 0–0.25)
PROT SERPL-MCNC: 7.9 G/DL (ref 6–8.5)
RBC # BLD AUTO: 3.42 10*6/MM3 (ref 4.14–5.8)
SODIUM SERPL-SCNC: 143 MMOL/L (ref 136–145)
WBC NRBC COR # BLD AUTO: 7.2 10*3/MM3 (ref 3.4–10.8)
WHOLE BLOOD HOLD COAG: NORMAL
WHOLE BLOOD HOLD SPECIMEN: NORMAL

## 2024-01-13 PROCEDURE — 87040 BLOOD CULTURE FOR BACTERIA: CPT

## 2024-01-13 PROCEDURE — 80053 COMPREHEN METABOLIC PANEL: CPT

## 2024-01-13 PROCEDURE — 25810000003 SODIUM CHLORIDE 0.9 % SOLUTION

## 2024-01-13 PROCEDURE — 83605 ASSAY OF LACTIC ACID: CPT

## 2024-01-13 PROCEDURE — 25010000002 HEPARIN (PORCINE) PER 1000 UNITS: Performed by: FAMILY MEDICINE

## 2024-01-13 PROCEDURE — 85025 COMPLETE CBC W/AUTO DIFF WBC: CPT

## 2024-01-13 PROCEDURE — 63710000001 DIPHENHYDRAMINE PER 50 MG: Performed by: NURSE PRACTITIONER

## 2024-01-13 PROCEDURE — 82948 REAGENT STRIP/BLOOD GLUCOSE: CPT

## 2024-01-13 PROCEDURE — 84145 PROCALCITONIN (PCT): CPT | Performed by: PHYSICIAN ASSISTANT

## 2024-01-13 PROCEDURE — 25010000002 CEFTRIAXONE PER 250 MG: Performed by: FAMILY MEDICINE

## 2024-01-13 PROCEDURE — 36415 COLL VENOUS BLD VENIPUNCTURE: CPT

## 2024-01-13 PROCEDURE — 25010000002 VANCOMYCIN 10 G RECONSTITUTED SOLUTION

## 2024-01-13 PROCEDURE — 99285 EMERGENCY DEPT VISIT HI MDM: CPT

## 2024-01-13 PROCEDURE — 99222 1ST HOSP IP/OBS MODERATE 55: CPT | Performed by: FAMILY MEDICINE

## 2024-01-13 PROCEDURE — 25010000002 CEFTRIAXONE PER 250 MG: Performed by: PHYSICIAN ASSISTANT

## 2024-01-13 RX ORDER — VANCOMYCIN/0.9 % SOD CHLORIDE 1.5G/250ML
20 PLASTIC BAG, INJECTION (ML) INTRAVENOUS ONCE
Status: DISCONTINUED | OUTPATIENT
Start: 2024-01-13 | End: 2024-01-13

## 2024-01-13 RX ORDER — AMOXICILLIN 250 MG
2 CAPSULE ORAL 2 TIMES DAILY
Status: DISCONTINUED | OUTPATIENT
Start: 2024-01-13 | End: 2024-01-16 | Stop reason: HOSPADM

## 2024-01-13 RX ORDER — VANCOMYCIN/0.9 % SOD CHLORIDE 1.5G/250ML
20 PLASTIC BAG, INJECTION (ML) INTRAVENOUS ONCE
Status: COMPLETED | OUTPATIENT
Start: 2024-01-13 | End: 2024-01-13

## 2024-01-13 RX ORDER — HEPARIN SODIUM 5000 [USP'U]/ML
5000 INJECTION, SOLUTION INTRAVENOUS; SUBCUTANEOUS EVERY 12 HOURS SCHEDULED
Status: DISCONTINUED | OUTPATIENT
Start: 2024-01-13 | End: 2024-01-16 | Stop reason: HOSPADM

## 2024-01-13 RX ORDER — BISACODYL 10 MG
10 SUPPOSITORY, RECTAL RECTAL DAILY PRN
Status: DISCONTINUED | OUTPATIENT
Start: 2024-01-13 | End: 2024-01-16 | Stop reason: HOSPADM

## 2024-01-13 RX ORDER — POLYETHYLENE GLYCOL 3350 17 G/17G
17 POWDER, FOR SOLUTION ORAL DAILY PRN
Status: DISCONTINUED | OUTPATIENT
Start: 2024-01-13 | End: 2024-01-16 | Stop reason: HOSPADM

## 2024-01-13 RX ORDER — SODIUM CHLORIDE 0.9 % (FLUSH) 0.9 %
10 SYRINGE (ML) INJECTION AS NEEDED
Status: DISCONTINUED | OUTPATIENT
Start: 2024-01-13 | End: 2024-01-16 | Stop reason: HOSPADM

## 2024-01-13 RX ORDER — SODIUM CHLORIDE 9 MG/ML
40 INJECTION, SOLUTION INTRAVENOUS AS NEEDED
Status: DISCONTINUED | OUTPATIENT
Start: 2024-01-13 | End: 2024-01-16 | Stop reason: HOSPADM

## 2024-01-13 RX ORDER — DIPHENHYDRAMINE HCL 25 MG
25 CAPSULE ORAL ONCE
Status: COMPLETED | OUTPATIENT
Start: 2024-01-14 | End: 2024-01-13

## 2024-01-13 RX ORDER — BISACODYL 5 MG/1
5 TABLET, DELAYED RELEASE ORAL DAILY PRN
Status: DISCONTINUED | OUTPATIENT
Start: 2024-01-13 | End: 2024-01-16 | Stop reason: HOSPADM

## 2024-01-13 RX ORDER — SODIUM CHLORIDE 0.9 % (FLUSH) 0.9 %
10 SYRINGE (ML) INJECTION EVERY 12 HOURS SCHEDULED
Status: DISCONTINUED | OUTPATIENT
Start: 2024-01-13 | End: 2024-01-16 | Stop reason: HOSPADM

## 2024-01-13 RX ORDER — NITROGLYCERIN 0.4 MG/1
0.4 TABLET SUBLINGUAL
Status: DISCONTINUED | OUTPATIENT
Start: 2024-01-13 | End: 2024-01-16 | Stop reason: HOSPADM

## 2024-01-13 RX ADMIN — CEFTRIAXONE SODIUM 2000 MG: 2 INJECTION, POWDER, FOR SOLUTION INTRAMUSCULAR; INTRAVENOUS at 18:03

## 2024-01-13 RX ADMIN — SENNOSIDES AND DOCUSATE SODIUM 2 TABLET: 8.6; 5 TABLET ORAL at 21:29

## 2024-01-13 RX ADMIN — HEPARIN SODIUM 5000 UNITS: 5000 INJECTION INTRAVENOUS; SUBCUTANEOUS at 21:29

## 2024-01-13 RX ADMIN — SODIUM CHLORIDE 1000 MG: 900 INJECTION INTRAVENOUS at 23:11

## 2024-01-13 RX ADMIN — VANCOMYCIN HYDROCHLORIDE 1500 MG: 10 INJECTION, POWDER, LYOPHILIZED, FOR SOLUTION INTRAVENOUS at 21:29

## 2024-01-13 RX ADMIN — DIPHENHYDRAMINE HYDROCHLORIDE 25 MG: 25 CAPSULE ORAL at 23:51

## 2024-01-13 NOTE — ED NOTES
R[facility] Barahona [place]    Nursing Report ED to Floor:  Mental status:AOX4  Ambulatory status: UP AS TOLERATED  Oxygen Therapy:  RA  Cardiac Rhythm: NSR  Admitted from: HOME  Safety Concerns:  NONE  Social Issues:   ED Room #:  31    ED Nurse Phone Extension - [phone] or may call [phone].      HPI:   Chief Complaint   Patient presents with    HD cath infection       Past Medical History:  Past Medical History:   Diagnosis Date    Acute on chronic renal failure     Bilateral hydronephrosis     BPH (benign prostatic hyperplasia)     Cholelithiasis     Diabetes mellitus     Dyslipidemia     Elevated cholesterol     GERD (gastroesophageal reflux disease)     Gross hematuria     Hemodialysis patient     History of transfusion     [facility], no reactions per pt    Hyperlipidemia     Hypertension     Pericardial effusion     echo 2/2023 - 1-2 cm preserved LV function    Stroke     no residual effects        Past Surgical History:  Past Surgical History:   Procedure Laterality Date    COLONOSCOPY      CYSTOSCOPY BLADDER BIOPSY N/A 02/09/2023    Procedure: CYSTOSCOPY CLOT EVACUATION WITH FULGURATION, RETROGRADE PYELOGRAM;  Surgeon: Mandi Velasco MD;  Location: [facility] OR;  Service: Urology;  Laterality: N/A;    CYSTOSCOPY BLADDER BIOPSY N/A 03/18/2023    Procedure: CYSTOSCOPY WITH FULGURATION OF BLEEDERS;  Surgeon: Santana Christianson MD;  Location: [facility] OR;  Service: Urology;  Laterality: N/A;    CYSTOSCOPY TRANSURETHRAL RESECTION OF PROSTATE N/A 5/5/2023    Procedure: CYSTOSCOPY TRANSURETHRAL RESECTION OF PROSTATE (BIPOLAR);  Surgeon: Pawan Damico MD;  Location: [facility] OR;  Service: Urology;  Laterality: N/A;    CYSTOSCOPY WITH CLOT EVACUATION      fulguration - 3 way catheter placement    INSERTION HEMODIALYSIS CATHETER          Admitting Doctor:   Roma Del Rosario MD    Consulting Provider(s):  Consults       No orders found from 12/15/2023 to 1/14/2024.             Admitting Diagnosis:   The primary encounter diagnosis was  "Positive blood cultures. Diagnoses of Problem with dialysis shunt, initial encounter and ESRD (end stage renal disease) on dialysis were also pertinent to this visit.    Most Recent Vitals:   Vitals:    01/13/24 1513 01/13/24 1800   BP: 170/91 168/82   BP Location: Right arm Right arm   Patient Position: Sitting Lying   Pulse: 87 71   Resp: 18 18   Temp: 97.9 °F (36.6 °C)    TempSrc: Oral    SpO2: 97% 97%   Weight: 74 kg (163 lb 2.3 oz)    Height: 170.2 cm (67\")        Active LDAs/IV Access:   Lines, Drains & Airways       Active LDAs       Name Placement date Placement time Site Days    Peripheral IV 01/13/24 1802 Anterior;Distal;Right Forearm 01/13/24  1802  Forearm  less than 1    Hemodialysis Cath Double 03/19/23  1421  Chest  300                    Labs (abnormal labs have a star):   Labs Reviewed   COMPREHENSIVE METABOLIC PANEL - Abnormal; Notable for the following components:       Result Value    Glucose 113 (*)     BUN 33 (*)     Creatinine 7.48 (*)     BUN/Creatinine Ratio 4.4 (*)     eGFR 7.5 (*)     All other components within normal limits    Narrative:     GFR Normal >60  Chronic Kidney Disease <60  Kidney Failure <15     CBC WITH AUTO DIFFERENTIAL - Abnormal; Notable for the following components:    RBC 3.42 (*)     Hemoglobin 10.5 (*)     Hematocrit 31.8 (*)     Lymphocyte % 13.9 (*)     Eosinophil % 9.2 (*)     Immature Grans % 0.7 (*)     Eosinophils, Absolute 0.66 (*)     nRBC 0.3 (*)     All other components within normal limits   PROCALCITONIN - Abnormal; Notable for the following components:    Procalcitonin 0.76 (*)     All other components within normal limits    Narrative:     As a Marker for Sepsis (Non-Neonates):    1. <0.5 ng/mL represents a low risk of severe sepsis and/or septic shock.  2. >2 ng/mL represents a high risk of severe sepsis and/or septic shock.    As a Marker for Lower Respiratory Tract Infections that require antibiotic therapy:    PCT on Admission    Antibiotic Therapy   " "    6-12 Hrs later    >0.5                Strongly Recommended  >0.25 - <0.5        Recommended   0.1 - 0.25          Discouraged              Remeasure/reassess PCT  <0.1                Strongly Discouraged     Remeasure/reassess PCT    As 28 day mortality risk marker: \"Change in Procalcitonin Result\" (>80% or <=80%) if Day 0 (or Day 1) and Day 4 values are available. Refer to http://www.[email or web address]-pct-calculator.com    Change in PCT <=80%  A decrease of PCT levels below or equal to 80% defines a positive change in PCT test result representing a higher risk for 28-day all-cause mortality of patients diagnosed with severe sepsis for septic shock.    Change in PCT >80%  A decrease of PCT levels of more than 80% defines a negative change in PCT result representing a lower risk for 28-day all-cause mortality of patients diagnosed with severe sepsis or septic shock.      LACTIC ACID, PLASMA - Normal   BLOOD CULTURE   BLOOD CULTURE   RAINBOW DRAW    Narrative:     The following orders were created for panel order [place] Draw.  Procedure                               Abnormality         Status                     ---------                               -----------         ------                     Green Top (Gel)[[id number]]                                  Final result               Lavender Top[[id number]]                                     Final result               Gold Top - SST[[id number]]                                   Final result               Gray Top[[id number]]                                         In process                 Light Blue Top[[id number]]                                   Final result                 Please view results for these tests on the individual orders.   CBC AND DIFFERENTIAL    Narrative:     The following orders were created for panel order CBC & Differential.  Procedure                               Abnormality         Status                     ---------                               " -----------         ------                     CBC Auto Differential[[id number]]        Abnormal            Final result                 Please view results for these tests on the individual orders.   GREEN TOP   LAVENDER TOP   GOLD TOP - SST   LIGHT BLUE TOP   GRAY TOP       Meds Given in ED:   Medications   sodium chloride 0.9 % flush 10 mL (has no administration in time range)   vancomycin IVPB 1500 mg in 0.9% NaCl (Premix) 500 mL (has no administration in time range)   cefTRIAXone (ROCEPHIN) 2,000 mg in sodium chloride 0.9 % 100 mL IVPB (2,000 mg Intravenous New Bag 1/13/24 3470)

## 2024-01-13 NOTE — Clinical Note
Level of Care: Telemetry [5]   Diagnosis: Infection of hemodialysis tunneled catheter [[id number]]   Admitting Physician: JORGE BARRIOS [[id number]]

## 2024-01-13 NOTE — ED PROVIDER NOTES
Subjective   History of Present Illness  Pt is a 64 yo male presenting to ED with complaints of dialysis cath problem. PMHx significant for ESRD on dialysis, HTN, DM, Prostate cancer and CVA. Pt reports being told to come to ED for infected HD port on right chest. He reports this was placed 2 months ago after dialysis port on left arm stopped working and a new one placed but hasn't been used yet. He reports oozing and itching around the site. He denies fever, chills, CP, SOB, cough, N/V/D or abdominal pain. He last had dialysis yesterday. He does not know the names of his Nephrologist or Surgeon.     Pt brought in labs noting gram positive cocci in pairs and clusters in aerobic and anaerobic bottles including corynebacterium and coagulase negative staphylococcus.     History provided by:  Medical records and patient      Review of Systems   Constitutional:  Negative for chills and fever.   Respiratory:  Negative for cough and shortness of breath.    Cardiovascular:  Negative for chest pain.   Gastrointestinal:  Negative for abdominal pain, diarrhea and vomiting.   Skin:  Positive for color change (redness, drainage and itching around right chest HD port).   Neurological:  Negative for dizziness, weakness and headaches.       Past Medical History:   Diagnosis Date    Acute on chronic renal failure     Bilateral hydronephrosis     BPH (benign prostatic hyperplasia)     Cholelithiasis     Diabetes mellitus     Dyslipidemia     Elevated cholesterol     GERD (gastroesophageal reflux disease)     Gross hematuria     Hemodialysis patient     History of transfusion     BHL, no reactions per pt    Hyperlipidemia     Hypertension     Pericardial effusion     echo 2/2023 - 1-2 cm preserved LV function    Stroke     no residual effects       No Known Allergies    Past Surgical History:   Procedure Laterality Date    COLONOSCOPY      CYSTOSCOPY BLADDER BIOPSY N/A 02/09/2023    Procedure: CYSTOSCOPY CLOT EVACUATION WITH  FULGURATION, RETROGRADE PYELOGRAM;  Surgeon: Mandi Velasco MD;  Location:  [facility];  Service: Urology;  Laterality: N/A;    CYSTOSCOPY BLADDER BIOPSY N/A 03/18/2023    Procedure: CYSTOSCOPY WITH FULGURATION OF BLEEDERS;  Surgeon: Santana Christianson MD;  Location:  [facility];  Service: Urology;  Laterality: N/A;    CYSTOSCOPY TRANSURETHRAL RESECTION OF PROSTATE N/A 5/5/2023    Procedure: CYSTOSCOPY TRANSURETHRAL RESECTION OF PROSTATE (BIPOLAR);  Surgeon: Pawan Damico MD;  Location:  [facility];  Service: Urology;  Laterality: N/A;    CYSTOSCOPY WITH CLOT EVACUATION      fulguration - 3 way catheter placement    INSERTION HEMODIALYSIS CATHETER         Family History   Family history unknown: Yes       Social History     Socioeconomic History    Marital status: Single   Tobacco Use    Smoking status: Never     Passive exposure: Never    Smokeless tobacco: Never   Vaping Use    Vaping Use: Never used   Substance and Sexual Activity    Alcohol use: Never    Drug use: Never    Sexual activity: Not Currently           Objective   Physical Exam  Vitals and nursing note reviewed.   Constitutional:       General: He is not in acute distress.  HENT:      Head: Atraumatic.   Eyes:      Extraocular Movements: Extraocular movements intact.      Conjunctiva/sclera: Conjunctivae normal.   Cardiovascular:      Rate and Rhythm: Normal rate.      Heart sounds: Normal heart sounds.   Pulmonary:      Effort: Pulmonary effort is normal. No respiratory distress.   Abdominal:      Palpations: Abdomen is soft.      Tenderness: There is no abdominal tenderness.   Musculoskeletal:         General: Normal range of motion.      Cervical back: Normal range of motion and neck supple.   Skin:     General: Skin is warm.      Comments: Dirty hands   Neurological:      General: No focal deficit present.      Mental Status: He is alert and oriented to person, place, and time.   Psychiatric:         Mood and Affect: Mood normal.          Procedures           ED Course  ED Course as of 01/13/24 2049   Sat Jan 13, 2024   1600 Procalcitonin(!): 0.76  Noted elevated  [RT]   2049 Lactate: 1.1 [RT]   2049 WBC: 7.20 [RT]   2049 Creatinine(!): 7.48  ESRD patient [RT]   2049 Hemoglobin(!): 10.5 [RT]   2049 Hematocrit(!): 31.8  Improved from baseline H and H [RT]      ED Course User Index  [RT] Layne Frazier PA      Recent Results (from the past 24 hour(s))   Comprehensive Metabolic Panel    Collection Time: 01/13/24  3:57 PM    Specimen: Arm, Right; Blood   Result Value Ref Range    Glucose 113 (H) 65 - 99 mg/dL    BUN 33 (H) 8 - 23 mg/dL    Creatinine 7.48 (H) 0.76 - 1.27 mg/dL    Sodium 143 136 - 145 mmol/L    Potassium 3.7 3.5 - 5.2 mmol/L    Chloride 100 98 - 107 mmol/L    CO2 29.0 22.0 - 29.0 mmol/L    Calcium 9.6 8.6 - 10.5 mg/dL    Total Protein 7.9 6.0 - 8.5 g/dL    Albumin 4.5 3.5 - 5.2 g/dL    ALT (SGPT) 10 1 - 41 U/L    AST (SGOT) 19 1 - 40 U/L    Alkaline Phosphatase 77 39 - 117 U/L    Total Bilirubin 0.3 0.0 - 1.2 mg/dL    Globulin 3.4 gm/dL    A/G Ratio 1.3 g/dL    BUN/Creatinine Ratio 4.4 (L) 7.0 - 25.0    Anion Gap 14.0 5.0 - 15.0 mmol/L    eGFR 7.5 (L) >60.0 mL/min/1.73   Lactic Acid, Plasma    Collection Time: 01/13/24  3:57 PM    Specimen: Arm, Right; Blood   Result Value Ref Range    Lactate 1.1 0.5 - 2.0 mmol/L   CBC Auto Differential    Collection Time: 01/13/24  3:57 PM    Specimen: Arm, Right; Blood   Result Value Ref Range    WBC 7.20 3.40 - 10.80 10*3/mm3    RBC 3.42 (L) 4.14 - 5.80 10*6/mm3    Hemoglobin 10.5 (L) 13.0 - 17.7 g/dL    Hematocrit 31.8 (L) 37.5 - 51.0 %    MCV 93.0 79.0 - 97.0 fL    MCH 30.7 26.6 - 33.0 pg    MCHC 33.0 31.5 - 35.7 g/dL    RDW 13.8 12.3 - 15.4 %    RDW-SD 45.0 37.0 - 54.0 fl    MPV 8.6 6.0 - 12.0 fL    Platelets 196 140 - 450 10*3/mm3    Neutrophil % 67.5 42.7 - 76.0 %    Lymphocyte % 13.9 (L) 19.6 - 45.3 %    Monocyte % 7.9 5.0 - 12.0 %    Eosinophil % 9.2 (H) 0.3 - 6.2 %    Basophil % 0.8  "0.0 - 1.5 %    Immature Grans % 0.7 (H) 0.0 - 0.5 %    Neutrophils, Absolute 4.86 1.70 - 7.00 10*3/mm3    Lymphocytes, Absolute 1.00 0.70 - 3.10 10*3/mm3    Monocytes, Absolute 0.57 0.10 - 0.90 10*3/mm3    Eosinophils, Absolute 0.66 (H) 0.00 - 0.40 10*3/mm3    Basophils, Absolute 0.06 0.00 - 0.20 10*3/mm3    Immature Grans, Absolute 0.05 0.00 - 0.05 10*3/mm3    nRBC 0.3 (H) 0.0 - 0.2 /100 WBC   Green Top (Gel)    Collection Time: 01/13/24  3:57 PM   Result Value Ref Range    Extra Tube Hold for add-ons.    Lavender Top    Collection Time: 01/13/24  3:57 PM   Result Value Ref Range    Extra Tube hold for add-on    Gold Top - SST    Collection Time: 01/13/24  3:57 PM   Result Value Ref Range    Extra Tube Hold for add-ons.    Gray Top    Collection Time: 01/13/24  3:57 PM   Result Value Ref Range    Extra Tube Hold for add-ons.    Light Blue Top    Collection Time: 01/13/24  3:57 PM   Result Value Ref Range    Extra Tube Hold for add-ons.    Procalcitonin    Collection Time: 01/13/24  3:57 PM    Specimen: Arm, Right; Blood   Result Value Ref Range    Procalcitonin 0.76 (H) 0.00 - 0.25 ng/mL     Note: In addition to lab results from this visit, the labs listed above may include labs taken at another facility or during a different encounter within the last 24 hours. Please correlate lab times with ED admission and discharge times for further clarification of the services performed during this visit.    No orders to display     Vitals:    01/13/24 1513 01/13/24 1800 01/13/24 1830 01/13/24 1901   BP: 170/91 168/82 170/77 170/80   BP Location: Right arm Right arm Right arm Right arm   Patient Position: Sitting Lying Lying Lying   Pulse: 87 71 70 74   Resp: 18 18 18 18   Temp: 97.9 °F (36.6 °C)   97.8 °F (36.6 °C)   TempSrc: Oral   Oral   SpO2: 97% 97% 97% 98%   Weight: 74 kg (163 lb 2.3 oz)   98.9 kg (218 lb 0.6 oz)   Height: 170.2 cm (67\")   170.2 cm (67.01\")     Medications   sodium chloride 0.9 % flush 10 mL (has no " administration in time range)   vancomycin IVPB 1500 mg in 0.9% NaCl (Premix) 500 mL (has no administration in time range)   sodium chloride 0.9 % flush 10 mL (has no administration in time range)   sodium chloride 0.9 % flush 10 mL (has no administration in time range)   sodium chloride 0.9 % infusion 40 mL (has no administration in time range)   sennosides-docusate (PERICOLACE) 8.6-50 MG per tablet 2 tablet (has no administration in time range)     And   polyethylene glycol (MIRALAX) packet 17 g (has no administration in time range)     And   bisacodyl (DULCOLAX) EC tablet 5 mg (has no administration in time range)     And   bisacodyl (DULCOLAX) suppository 10 mg (has no administration in time range)   heparin (porcine) 5000 UNIT/ML injection 5,000 Units (has no administration in time range)   nitroglycerin (NITROSTAT) SL tablet 0.4 mg (has no administration in time range)   cefTRIAXone (ROCEPHIN) 1,000 mg in sodium chloride 0.9 % 100 mL IVPB (has no administration in time range)   Pharmacy to dose vancomycin (has no administration in time range)   cefTRIAXone (ROCEPHIN) 2,000 mg in sodium chloride 0.9 % 100 mL IVPB (0 mg Intravenous Stopped 1/13/24 1904)     ECG/EMG Results (last 24 hours)       ** No results found for the last 24 hours. **          No orders to display                                              Medical Decision Making  Pt is a 62 yo male presenting to ED with complaints of positive blood cultures and infected dialysis port. Labs in ED notable for WBC 7.2, Lactic 1.1, Cr 7.48, Glucose 113, K 3.7 and Na 143. H and H stable at 10.5/31.8. Blood cultures sent off. Started on Vancomycin and Rocephin in ED. Discussed tx plan for admission with patient.     Discussed patient with Dr. Moya who is agreeable with ED course.   Discussed admission with hospitalist Dr. Del Rosario    DDx  Bacteremia, Infected dialysis port, Electrolyte abnormality, Cellulitis, Contaminated cultures    Problems Addressed:  ESRD  (end stage renal disease) on dialysis: chronic illness or injury  Positive blood cultures: complicated acute illness or injury  Problem with dialysis shunt, initial encounter: complicated acute illness or injury    Amount and/or Complexity of Data Reviewed  External Data Reviewed: labs and notes.  Labs:  Decision-making details documented in ED Course.    Risk  Prescription drug management.  Decision regarding hospitalization.        Final diagnoses:   Positive blood cultures   Problem with dialysis shunt, initial encounter   ESRD (end stage renal disease) on dialysis       ED Disposition  ED Disposition       ED Disposition   Decision to Admit    Condition   --    Comment   Level of Care: Telemetry [5]   Diagnosis: Infection of hemodialysis tunneled catheter [[id number]]   Admitting Physician: JORGE BARRIOS [[id number]]   Certification: I Certify That Inpatient Hospital Services Are Medically Necessary For Greater Than 2 Midnights                 No follow-up provider specified.       Medication List      No changes were made to your prescriptions during this visit.            Layne Frazier PA  01/13/24 2050

## 2024-01-14 ENCOUNTER — APPOINTMENT (OUTPATIENT)
Dept: CARDIOLOGY | Facility: HOSPITAL | Age: 64
End: 2024-01-14
Payer: MEDICARE

## 2024-01-14 LAB
ALBUMIN SERPL-MCNC: 3.8 G/DL (ref 3.5–5.2)
ALBUMIN/GLOB SERPL: 1.7 G/DL
ALP SERPL-CCNC: 64 U/L (ref 39–117)
ALT SERPL W P-5'-P-CCNC: 8 U/L (ref 1–41)
ANION GAP SERPL CALCULATED.3IONS-SCNC: 14 MMOL/L (ref 5–15)
ASCENDING AORTA: 2.7 CM
AST SERPL-CCNC: 14 U/L (ref 1–40)
BASOPHILS # BLD AUTO: 0.04 10*3/MM3 (ref 0–0.2)
BASOPHILS NFR BLD AUTO: 0.6 % (ref 0–1.5)
BH CV ECHO MEAS - AO MAX PG: 12.7 MMHG
BH CV ECHO MEAS - AO MEAN PG: 7 MMHG
BH CV ECHO MEAS - AO ROOT DIAM: 3 CM
BH CV ECHO MEAS - AO V2 MAX: 178 CM/SEC
BH CV ECHO MEAS - AO V2 VTI: 39.4 CM
BH CV ECHO MEAS - AVA(I,D): 2.27 CM2
BH CV ECHO MEAS - EDV(CUBED): 91.1 ML
BH CV ECHO MEAS - EDV(MOD-SP2): 60 ML
BH CV ECHO MEAS - EDV(MOD-SP4): 57.9 ML
BH CV ECHO MEAS - EF(MOD-BP): 71.4 %
BH CV ECHO MEAS - EF(MOD-SP2): 71.5 %
BH CV ECHO MEAS - EF(MOD-SP4): 71 %
BH CV ECHO MEAS - ESV(CUBED): 19.7 ML
BH CV ECHO MEAS - ESV(MOD-SP2): 17.1 ML
BH CV ECHO MEAS - ESV(MOD-SP4): 16.8 ML
BH CV ECHO MEAS - FS: 40 %
BH CV ECHO MEAS - IVS/LVPW: 1 CM
BH CV ECHO MEAS - IVSD: 1.2 CM
BH CV ECHO MEAS - LA DIMENSION: 4 CM
BH CV ECHO MEAS - LAT PEAK E' VEL: 7.8 CM/SEC
BH CV ECHO MEAS - LV MASS(C)D: 198.1 GRAMS
BH CV ECHO MEAS - LV MAX PG: 3.9 MMHG
BH CV ECHO MEAS - LV MEAN PG: 3 MMHG
BH CV ECHO MEAS - LV V1 MAX: 98.4 CM/SEC
BH CV ECHO MEAS - LV V1 VTI: 28.5 CM
BH CV ECHO MEAS - LVIDD: 4.5 CM
BH CV ECHO MEAS - LVIDS: 2.7 CM
BH CV ECHO MEAS - LVOT AREA: 3.1 CM2
BH CV ECHO MEAS - LVOT DIAM: 2 CM
BH CV ECHO MEAS - LVPWD: 1.2 CM
BH CV ECHO MEAS - MED PEAK E' VEL: 6 CM/SEC
BH CV ECHO MEAS - MV A MAX VEL: 114 CM/SEC
BH CV ECHO MEAS - MV DEC SLOPE: 366 CM/SEC2
BH CV ECHO MEAS - MV DEC TIME: 0.22 SEC
BH CV ECHO MEAS - MV E MAX VEL: 96.3 CM/SEC
BH CV ECHO MEAS - MV E/A: 0.84
BH CV ECHO MEAS - MV MAX PG: 4.8 MMHG
BH CV ECHO MEAS - MV MEAN PG: 2 MMHG
BH CV ECHO MEAS - MV P1/2T: 85.6 MSEC
BH CV ECHO MEAS - MV V2 VTI: 32.8 CM
BH CV ECHO MEAS - MVA(P1/2T): 2.6 CM2
BH CV ECHO MEAS - MVA(VTI): 2.7 CM2
BH CV ECHO MEAS - PA ACC TIME: 0.12 SEC
BH CV ECHO MEAS - RAP SYSTOLE: 3 MMHG
BH CV ECHO MEAS - RVSP: 30.5 MMHG
BH CV ECHO MEAS - SV(LVOT): 89.5 ML
BH CV ECHO MEAS - SV(MOD-SP2): 42.9 ML
BH CV ECHO MEAS - SV(MOD-SP4): 41.1 ML
BH CV ECHO MEAS - TAPSE (>1.6): 2.19 CM
BH CV ECHO MEAS - TR MAX PG: 27.5 MMHG
BH CV ECHO MEAS - TR MAX VEL: 262 CM/SEC
BH CV ECHO MEASUREMENTS AVERAGE E/E' RATIO: 13.96
BH CV VAS BP RIGHT ARM: NORMAL MMHG
BH CV XLRA - RV BASE: 4 CM
BH CV XLRA - RV LENGTH: 8.4 CM
BH CV XLRA - RV MID: 3.5 CM
BH CV XLRA - TDI S': 14.4 CM/SEC
BILIRUB SERPL-MCNC: 0.2 MG/DL (ref 0–1.2)
BUN SERPL-MCNC: 39 MG/DL (ref 8–23)
BUN/CREAT SERPL: 5 (ref 7–25)
CALCIUM SPEC-SCNC: 8.8 MG/DL (ref 8.6–10.5)
CHLORIDE SERPL-SCNC: 101 MMOL/L (ref 98–107)
CO2 SERPL-SCNC: 25 MMOL/L (ref 22–29)
CREAT SERPL-MCNC: 7.83 MG/DL (ref 0.76–1.27)
DEPRECATED RDW RBC AUTO: 43.8 FL (ref 37–54)
EGFRCR SERPLBLD CKD-EPI 2021: 7.1 ML/MIN/1.73
EOSINOPHIL # BLD AUTO: 0.66 10*3/MM3 (ref 0–0.4)
EOSINOPHIL NFR BLD AUTO: 9.5 % (ref 0.3–6.2)
ERYTHROCYTE [DISTWIDTH] IN BLOOD BY AUTOMATED COUNT: 13.6 % (ref 12.3–15.4)
GLOBULIN UR ELPH-MCNC: 2.3 GM/DL
GLUCOSE SERPL-MCNC: 90 MG/DL (ref 65–99)
HCT VFR BLD AUTO: 27.2 % (ref 37.5–51)
HGB BLD-MCNC: 8.9 G/DL (ref 13–17.7)
IMM GRANULOCYTES # BLD AUTO: 0.03 10*3/MM3 (ref 0–0.05)
IMM GRANULOCYTES NFR BLD AUTO: 0.4 % (ref 0–0.5)
IVRT: 88 MS
LEFT ATRIUM VOLUME INDEX: 22.5 ML/M2
LEFT ATRIUM VOLUME: 44.5 ML
LYMPHOCYTES # BLD AUTO: 1.09 10*3/MM3 (ref 0.7–3.1)
LYMPHOCYTES NFR BLD AUTO: 15.7 % (ref 19.6–45.3)
MCH RBC QN AUTO: 30.1 PG (ref 26.6–33)
MCHC RBC AUTO-ENTMCNC: 32.7 G/DL (ref 31.5–35.7)
MCV RBC AUTO: 91.9 FL (ref 79–97)
MONOCYTES # BLD AUTO: 0.56 10*3/MM3 (ref 0.1–0.9)
MONOCYTES NFR BLD AUTO: 8.1 % (ref 5–12)
NEUTROPHILS NFR BLD AUTO: 4.56 10*3/MM3 (ref 1.7–7)
NEUTROPHILS NFR BLD AUTO: 65.7 % (ref 42.7–76)
NRBC BLD AUTO-RTO: 0 /100 WBC (ref 0–0.2)
PLATELET # BLD AUTO: 167 10*3/MM3 (ref 140–450)
PMV BLD AUTO: 9 FL (ref 6–12)
POTASSIUM SERPL-SCNC: 3.6 MMOL/L (ref 3.5–5.2)
PROT SERPL-MCNC: 6.1 G/DL (ref 6–8.5)
RBC # BLD AUTO: 2.96 10*6/MM3 (ref 4.14–5.8)
SODIUM SERPL-SCNC: 140 MMOL/L (ref 136–145)
WBC NRBC COR # BLD AUTO: 6.94 10*3/MM3 (ref 3.4–10.8)

## 2024-01-14 PROCEDURE — 93306 TTE W/DOPPLER COMPLETE: CPT | Performed by: INTERNAL MEDICINE

## 2024-01-14 PROCEDURE — 85025 COMPLETE CBC W/AUTO DIFF WBC: CPT | Performed by: FAMILY MEDICINE

## 2024-01-14 PROCEDURE — 25010000002 VANCOMYCIN PER 500 MG

## 2024-01-14 PROCEDURE — 25010000002 HEPARIN (PORCINE) PER 1000 UNITS: Performed by: FAMILY MEDICINE

## 2024-01-14 PROCEDURE — 99232 SBSQ HOSP IP/OBS MODERATE 35: CPT | Performed by: HOSPITALIST

## 2024-01-14 PROCEDURE — 93306 TTE W/DOPPLER COMPLETE: CPT

## 2024-01-14 PROCEDURE — 80053 COMPREHEN METABOLIC PANEL: CPT | Performed by: FAMILY MEDICINE

## 2024-01-14 RX ORDER — PANTOPRAZOLE SODIUM 40 MG/1
40 TABLET, DELAYED RELEASE ORAL
Status: DISCONTINUED | OUTPATIENT
Start: 2024-01-14 | End: 2024-01-16 | Stop reason: HOSPADM

## 2024-01-14 RX ORDER — VANCOMYCIN HYDROCHLORIDE 750 MG/150ML
750 INJECTION, SOLUTION INTRAVENOUS ONCE
Status: COMPLETED | OUTPATIENT
Start: 2024-01-14 | End: 2024-01-14

## 2024-01-14 RX ORDER — ALBUMIN (HUMAN) 12.5 G/50ML
12.5 SOLUTION INTRAVENOUS AS NEEDED
Status: CANCELLED | OUTPATIENT
Start: 2024-01-15 | End: 2024-01-15

## 2024-01-14 RX ADMIN — HEPARIN SODIUM 5000 UNITS: 5000 INJECTION INTRAVENOUS; SUBCUTANEOUS at 20:08

## 2024-01-14 RX ADMIN — Medication 10 ML: at 20:09

## 2024-01-14 RX ADMIN — VANCOMYCIN HYDROCHLORIDE 750 MG: 750 INJECTION, SOLUTION INTRAVENOUS at 15:53

## 2024-01-14 RX ADMIN — SENNOSIDES AND DOCUSATE SODIUM 2 TABLET: 8.6; 5 TABLET ORAL at 09:36

## 2024-01-14 RX ADMIN — PANTOPRAZOLE SODIUM 40 MG: 40 TABLET, DELAYED RELEASE ORAL at 13:54

## 2024-01-14 RX ADMIN — HEPARIN SODIUM 5000 UNITS: 5000 INJECTION INTRAVENOUS; SUBCUTANEOUS at 09:36

## 2024-01-14 RX ADMIN — Medication 10 ML: at 09:00

## 2024-01-14 NOTE — CONSULTS
INFECTIOUS DISEASE CONSULT/INITIAL HOSPITAL VISIT    Jun Young  1960  [id number]    Date of Consult: 1/14/2024    Admission Date: 1/13/2024      Requesting Provider: No ref. provider found  Evaluating Physician: Fredy Butts MD    Reason for Consultation: bacteremia     History of present illness:    Patient is a 63 y.o. male, with PMH ESRD/HD, DM, GERD, HTN, prostate cancer, CVA, seen today for bacteremia. His left AVF and right chest wall HD catheter placed 12/8.  He developed some itching of his right chest HD catheter and last week at HD, blood cultures were obtained, and reportedly positive and he was notified to present to the ED. He brought the lab result, apparently lost in ED, positive for GPC in pairs, in clusters,  Corynebacterium and Staph epidermidis. His wife is bringing back the copy of blood cultures. Admitting labs with WBC 7.2,. PCT 0.76, lactate 1.1, Scr 7.48, and repeat cultures pending. He denies fever, chills, sweats.  We were consulted for evaluation and treatment.   Records brought in by the family this afternoon reveal Staph epidermidis/corynebacterium on dialysis catheter exit site cultures and a blood culture with gram-positive cocci that has not yet been identified.      Past Medical History:   Diagnosis Date    Acute on chronic renal failure     Bilateral hydronephrosis     BPH (benign prostatic hyperplasia)     Cholelithiasis     Diabetes mellitus     Dyslipidemia     Elevated cholesterol     GERD (gastroesophageal reflux disease)     Gross hematuria     Hemodialysis patient     History of transfusion     BHL, no reactions per pt    Hyperlipidemia     Hypertension     Pericardial effusion     echo 2/2023 - 1-2 cm preserved LV function    Stroke     no residual effects       Past Surgical History:   Procedure Laterality Date    COLONOSCOPY      CYSTOSCOPY BLADDER BIOPSY N/A 02/09/2023    Procedure: CYSTOSCOPY CLOT EVACUATION WITH FULGURATION, RETROGRADE  PYELOGRAM;  Surgeon: Mandi Velasco MD;  Location:  [facility];  Service: Urology;  Laterality: N/A;    CYSTOSCOPY BLADDER BIOPSY N/A 03/18/2023    Procedure: CYSTOSCOPY WITH FULGURATION OF BLEEDERS;  Surgeon: Santana Christianson MD;  Location:  [facility];  Service: Urology;  Laterality: N/A;    CYSTOSCOPY TRANSURETHRAL RESECTION OF PROSTATE N/A 5/5/2023    Procedure: CYSTOSCOPY TRANSURETHRAL RESECTION OF PROSTATE (BIPOLAR);  Surgeon: Pawan Damico MD;  Location:  [facility];  Service: Urology;  Laterality: N/A;    CYSTOSCOPY WITH CLOT EVACUATION      fulguration - 3 way catheter placement    INSERTION HEMODIALYSIS CATHETER         Family History   Family history unknown: Yes       Social History     Socioeconomic History    Marital status: Single   Tobacco Use    Smoking status: Never     Passive exposure: Never    Smokeless tobacco: Never   Vaping Use    Vaping Use: Never used   Substance and Sexual Activity    Alcohol use: Never    Drug use: Never    Sexual activity: Not Currently       No Known Allergies      Medication:    Current Facility-Administered Medications:     sennosides-docusate (PERICOLACE) 8.6-50 MG per tablet 2 tablet, 2 tablet, Oral, BID, 2 tablet at 01/14/24 0936 **AND** polyethylene glycol (MIRALAX) packet 17 g, 17 g, Oral, Daily PRN **AND** bisacodyl (DULCOLAX) EC tablet 5 mg, 5 mg, Oral, Daily PRN **AND** bisacodyl (DULCOLAX) suppository 10 mg, 10 mg, Rectal, Daily PRN, Syed Phillips DO    heparin (porcine) 5000 UNIT/ML injection 5,000 Units, 5,000 Units, Subcutaneous, Q12H, ySed Phillips DO, 5,000 Units at 01/14/24 0936    nitroglycerin (NITROSTAT) SL tablet 0.4 mg, 0.4 mg, Sublingual, Q5 Min PRN, Syed Phillips DO    pantoprazole (PROTONIX) EC tablet 40 mg, 40 mg, Oral, Q AM, Libertad Lin P, DO, 40 mg at 01/14/24 1354    Pharmacy to dose vancomycin, , Does not apply, Continuous PRN, Syed Phillips DO    sodium chloride 0.9 % flush 10 mL, 10 mL, Intravenous, PRN, Emergency, Triage  Protocol, MD    sodium chloride 0.9 % flush 10 mL, 10 mL, Intravenous, Q12H, Syed Phillips DO, 10 mL at 01/14/24 0900    sodium chloride 0.9 % flush 10 mL, 10 mL, Intravenous, PRN, Syed Phillips DO    sodium chloride 0.9 % infusion 40 mL, 40 mL, Intravenous, PRN, Syed Phillips DO    vancomycin (dosing per levels), , Does not apply, Daily, Yonathan Dillon RPH    vancomycin in dextrose 5% 150 mL (VANCOCIN) IVPB 750 mg, 750 mg, Intravenous, Once, Yonathan Dillon RPH, 750 mg at 01/14/24 1553    Antibiotics:  Anti-Infectives (From admission, onward)      Ordered     Dose/Rate Route Frequency Start Stop    01/14/24 1333  vancomycin in dextrose 5% 150 mL (VANCOCIN) IVPB 750 mg        Ordering Provider: Yonathan Dillon RPH    750 mg  over 45 Minutes Intravenous Once 01/14/24 1500      01/14/24 0920  vancomycin (dosing per levels)        Ordering Provider: Yonathan Dillon RPH     Does not apply Daily 01/14/24 1015 01/21/24 0859    01/13/24 1852  vancomycin IVPB 1500 mg in 0.9% NaCl (Premix) 500 mL        Ordering Provider: Zion Bustillos IV, PharmD    20 mg/kg × 74 kg  333.3 mL/hr over 90 Minutes Intravenous Once 01/13/24 1945 01/13/24 2259    01/13/24 1940  Pharmacy to dose vancomycin        Ordering Provider: Syed Phillips DO     Does not apply Continuous PRN 01/13/24 1940 01/20/24 1939    01/13/24 1726  cefTRIAXone (ROCEPHIN) 2,000 mg in sodium chloride 0.9 % 100 mL IVPB        Ordering Provider: Layne Frazier PA    2,000 mg  200 mL/hr over 30 Minutes Intravenous Once 01/13/24 1742 01/13/24 1904              Review of Systems:  Constitutional-- No Fever, chills or sweats.  Appetite good, and no malaise. No fatigue.  HEENT-- No new vision, hearing or throat complaints.  No epistaxis or oral sores.  Denies odynophagia or dysphagia. No headache, photophobia or neck stiffness.  CV-- No chest pain, palpitation or syncope  Resp-- No SOB/cough/Hemoptysis  GI- No nausea, vomiting, or diarrhea.  No  hematochezia, melena, or hematemesis. Denies jaundice or chronic liver disease.  -- No dysuria, hematuria, or flank pain.  Denies hesitancy, urgency or flank pain.  Heme- No active bruising or bleeding; no Hx of DVT or PE.  MS-- no swelling or pain in the bones or joints of arms/legs.  No new back pain.  Neuro-- No acute focal weakness or numbness in the arms or legs.  No seizures.  Skin--No diffuse rash      Physical Exam:   Vital Signs  Temp (24hrs), Av °F (36.1 °C), Min:95.8 °F (35.4 °C), Max:97.8 °F (36.6 °C)    Temp  Min: 95.8 °F (35.4 °C)  Max: 97.8 °F (36.6 °C)  BP  Min: 143/79  Max: 170/80  Pulse  Min: 65  Max: 78  Resp  Min: 16  Max: 18  SpO2  Min: 94 %  Max: 98 %    GENERAL: Awake and alert, in no acute distress.   HEENT: Normocephalic, atraumatic.  PERRL. EOMI. No conjunctival injection. No icterus. Oropharynx clear without evidence of thrush or exudate.   NECK: Supple   HEART: RRR; No murmur, rubs, gallops.   LUNGS: Clear to auscultation bilaterally without wheezing, rales, rhonchi. Normal respiratory effort. Nonlabored.   ABDOMEN: Soft, nontender, nondistended. Positive bowel sounds. No rebound or guarding. NO mass or HSM.  EXT:  No cyanosis, clubbing or edema. No cord.  :  Without Whitfield catheter.  MSK: No joint effusions or erythema  SKIN: Warm and dry   right HD cath site with slight erythema and some crusting at exit site.  Left AVF without redness   NEURO: Oriented to PPT.  Motor 5/5 strength  PSYCHIATRIC: Normal insight and judgment. Cooperative with PE    Laboratory Data    Results from last 7 days   Lab Units 24  0435 24  1557   WBC 10*3/mm3 6.94 7.20   HEMOGLOBIN g/dL 8.9* 10.5*   HEMATOCRIT % 27.2* 31.8*   PLATELETS 10*3/mm3 167 196     Results from last 7 days   Lab Units 24  0435   SODIUM mmol/L 140   POTASSIUM mmol/L 3.6   CHLORIDE mmol/L 101   CO2 mmol/L 25.0   BUN mg/dL 39*   CREATININE mg/dL 7.83*   GLUCOSE mg/dL 90   CALCIUM mg/dL 8.8     Results from last 7  "days   Lab Units 01/14/24  0435   ALK PHOS U/L 64   BILIRUBIN mg/dL 0.2   ALT (SGPT) U/L 8   AST (SGOT) U/L 14             Results from last 7 days   Lab Units 01/13/24  1557   LACTATE mmol/L 1.1             Estimated Creatinine Clearance: 10.5 mL/min (A) (by C-G formula based on SCr of 7.83 mg/dL (H)).      Microbiology:  No results found for: \"ACANTHNAEG\", \"AFBCX\", \"BPERTUSSISCX\", \"BLOODCX\"  No results found for: \"BCIDPCR\", \"CXREFLEX\", \"CSFCX\", \"CULTURETIS\"  No results found for: \"CULTURES\", \"HSVCX\", \"URCX\"  No results found for: \"EYECULTURE\", \"GCCX\", \"HSVCULTURE\", \"LABHSV\"  No results found for: \"LEGIONELLA\", \"MRSACX\", \"MUMPSCX\", \"MYCOPLASCX\"  No results found for: \"NOCARDIACX\", \"STOOLCX\"  No results found for: \"THROATCX\", \"UNSTIMCULT\", \"URINECX\", \"CULTURE\", \"VZVCULTUR\"  No results found for: \"VIRALCULTU\", \"WOUNDCX\"        Radiology:  Imaging Results (Last 72 Hours)       ** No results found for the last 72 hours. **              Impression:  1.  Gram-positive bacteremia/?  Hemo-dialysis catheter infection-this could be a pathogen causing a dialysis catheter infection or contaminant.  We will attempt to obtain the identification of this organism.  We will leave him on intravenous vancomycin pending further data.  The patient indicates that the tunneled dialysis catheter can be removed since he now has a matured fistula.  If this is the case, we should proceed with removal of the dialysis catheter tomorrow.  2.  Corynebacterium/staph epidermidis tunneled hemodialysis catheter infection-versus colonization  3.  End-stage renal disease-on hemodialysis  4.  Type 2 diabetes mellitus  5.  History of CVA      PLAN/RECOMMENDATIONS:   Thank you for asking us to see Jun Young, I recommend the following:  Repeat blood cultures, pending   Continue Vancomycin with hemodialysis  Discontinue ceftriaxone   4.   Echocardiogram   5.  Remove the tunneled hemodialysis catheter      Dr. Butts has obtained the " history, performed the PE, formulated the above treatment plan       Fredy Butts MD  1/14/2024  16:17 EST

## 2024-01-14 NOTE — H&P
[facility] Medicine Services  HISTORY AND PHYSICAL    Patient Name: Jun Young  : 1960  MRN: [id number]  Primary Care Physician: Samir Chow DO  Date of admission: 2024      Subjective   Subjective     Chief Complaint:  Infected tunneled catheter    HPI:  Jun Young is a 63 y.o. male with past medical history of end-stage renal disease on hemodialysis, GERD, hyperlipidemia, diabetes mellitus, hypertension, history of CVA who presented to the emergency department because of a skin culture of an infected tunneled dialysis catheter.  There were 2 pansensitive organisms including Corynebacterium and coagulase-negative Staphylococcus.  Because of the significant redness and drainage around the HD port as well as the positive cultures we have to assume that the patient is most likely bacteremic.  The patient denies fever, chills, body aches, chest pain, shortness of breath or other associated symptoms.  He will be admitted to the hospitalist service for IV antibiotics      Personal History     Past Medical History:   Diagnosis Date    Acute on chronic renal failure     Bilateral hydronephrosis     BPH (benign prostatic hyperplasia)     Cholelithiasis     Diabetes mellitus     Dyslipidemia     Elevated cholesterol     GERD (gastroesophageal reflux disease)     Gross hematuria     Hemodialysis patient     History of transfusion     [facility], no reactions per pt    Hyperlipidemia     Hypertension     Pericardial effusion     echo 2023 - 1-2 cm preserved LV function    Stroke     no residual effects           Past Surgical History:   Procedure Laterality Date    COLONOSCOPY      CYSTOSCOPY BLADDER BIOPSY N/A 2023    Procedure: CYSTOSCOPY CLOT EVACUATION WITH FULGURATION, RETROGRADE PYELOGRAM;  Surgeon: Mandi Velasoc MD;  Location: [facility];  Service: Urology;  Laterality: N/A;    CYSTOSCOPY BLADDER BIOPSY N/A 2023    Procedure:  CYSTOSCOPY WITH FULGURATION OF BLEEDERS;  Surgeon: Santana Christianson MD;  Location: [facility] OR;  Service: Urology;  Laterality: N/A;    CYSTOSCOPY TRANSURETHRAL RESECTION OF PROSTATE N/A 5/5/2023    Procedure: CYSTOSCOPY TRANSURETHRAL RESECTION OF PROSTATE (BIPOLAR);  Surgeon: Pawan Damico MD;  Location:  [facility];  Service: Urology;  Laterality: N/A;    CYSTOSCOPY WITH CLOT EVACUATION      fulguration - 3 way catheter placement    INSERTION HEMODIALYSIS CATHETER         Family History: Family history is unknown by patient.     Social History:  reports that he has never smoked. He has never been exposed to tobacco smoke. He has never used smokeless tobacco. He reports that he does not drink alcohol and does not use drugs.  Social History     Social History Narrative    ** Merged History Encounter **            Medications:  Available home medication information reviewed.  B Complex-C-Folic Acid, SITagliptin, acetaminophen, amLODIPine, pantoprazole, sennosides-docusate, sevelamer, and simethicone    No Known Allergies    Objective   Objective     Vital Signs:   Temp:  [97.8 °F (36.6 °C)-97.9 °F (36.6 °C)] 97.8 °F (36.6 °C)  Heart Rate:  [70-87] 74  Resp:  [18] 18  BP: (168-170)/(77-91) 170/80       Physical Exam   Constitutional:  Well-developed and well-nourished.  No acute distress.      HENT:  Head:  Normocephalic and atraumatic.  Mouth:  Moist mucous membranes.    Eyes:  Conjunctivae and EOM are normal. No scleral icterus.    Neck:  Neck supple.  No JVD present.    Cardiovascular:  Normal rate, regular rhythm and normal heart sounds with no murmur.  Pulmonary/Chest:  No respiratory distress, no wheezes, no crackles, with normal breath sounds and good air movement.  Abdominal:  Soft. No distension and no tenderness.   Musculoskeletal:  No tenderness, and no deformity.  No red or swollen joints anywhere.    Neurological:  Alert and oriented to person, place, and time.  No cranial nerve deficit.    Skin: There  is redness and thin purulent drainage around the right chest HD port site.  Otherwise skin is warm and dry. No rash noted. No pallor.   Peripheral vascular:  No clubbing, no cyanosis, no edema.  Psychiatric: Appropriate mood and affect  :      Result Review:  I have personally reviewed the results from the time of this admission to 1/13/2024 19:42 EST and agree with these findings:  [x]  Laboratory list / accordion  []  Microbiology  [x]  Radiology  []  EKG/Telemetry   []  Cardiology/Vascular   []  Pathology  []  Old records  []  Other:  Most notable findings include:       LAB RESULTS:      Lab 01/13/24  1557   WBC 7.20   HEMOGLOBIN 10.5*   HEMATOCRIT 31.8*   PLATELETS 196   NEUTROS ABS 4.86   IMMATURE GRANS (ABS) 0.05   LYMPHS ABS 1.00   MONOS ABS 0.57   EOS ABS 0.66*   MCV 93.0   PROCALCITONIN 0.76*   LACTATE 1.1         Lab 01/13/24  1557   SODIUM 143   POTASSIUM 3.7   CHLORIDE 100   CO2 29.0   ANION GAP 14.0   BUN 33*   CREATININE 7.48*   EGFR 7.5*   GLUCOSE 113*   CALCIUM 9.6         Lab 01/13/24  1557   TOTAL PROTEIN 7.9   ALBUMIN 4.5   GLOBULIN 3.4   ALT (SGPT) 10   AST (SGOT) 19   BILIRUBIN 0.3   ALK PHOS 77                     UA          4/28/2023    08:37 5/26/2023    17:47 6/8/2023    08:38   Urinalysis   Squamous Epithelial Cells, UA  0-2     Specific Gravity, UA 1.012  1.010     Ketones, UA Negative  15 mg/dL (1+)  Negative    Blood, UA Moderate (2+)  Large (3+)     Leukocytes, UA Large (3+)  Large (3+)  Large (3+)    Nitrite, UA Negative  Positive     RBC, UA  Too Numerous to Count     WBC, UA  3-5     Bacteria, UA  1+         Microbiology Results (last 10 days)       ** No results found for the last 240 hours. **            No radiology results from the last 24 hrs    Results for orders placed during the hospital encounter of 03/17/23    Adult Transthoracic Echo Limited W/ Cont if Necessary Per Protocol    Interpretation Summary    Left ventricular systolic function is normal. Calculated left  ventricular EF = 61.7% Left ventricular ejection fraction appears to be 61 - 65%.    Left ventricular wall thickness is consistent with mild concentric hypertrophy.    There is a small (<1cm) pericardial effusion.      Assessment & Plan   Assessment & Plan     1.  Cellulitis associated with infected tunneled HD catheter  Rule out bacteremia  -Patient will be admitted to the hospital service  - Patient was started on Rocephin and vancomycin in the emergency department based on the cultures brought in by the patient  -Blood cultures have been drawn we will await those results before making any significant changes to the IV antibiotic regimen  - HD catheter will most likely need to be pulled and we will need to probably find another site for temporary catheter  - Patient has been On telemetry for closer monitoring  - Patient continues to make urine we will very gently hydrate watching for fluid overload    2.  End-stage renal disease on hemodialysis  -Nephrology has been consulted and we will defer to them for plans for hemodialysis, as of now the patient's electrolytes have remained within normal limits  - We will repeat his lab work in the a.m.    3.  Diabetes mellitus  - The patient will be started on the subcutaneous insulin protocol  -Patient has been placed on renal diet for now    4.  Hypertension  - I will review and reconcile the patient's home medications once those are available and verified.    DVT prophylaxis:  Medical DVT prophylaxis orders are present.      CODE STATUS:    Code Status and Medical Interventions:   Ordered at: 01/13/24 1940     Code Status (Patient has no pulse and is not breathing):    CPR (Attempt to Resuscitate)     Medical Interventions (Patient has pulse or is breathing):    Full Support       Expected Discharge   Expected discharge date/ time has not been documented.     Syed Phillips, DO  01/13/24

## 2024-01-14 NOTE — PROGRESS NOTES
[facility] Medicine Services  PROGRESS NOTE    Patient Name: Jun Young  : 1960  MRN: [id number]    Date of Admission: 2024  Primary Care Physician: Samir Chow DO    Subjective   Subjective     CC:  Infected tunneled catheter     HPI:  Patient resting in bed, states he is feeling fine. Patient states he is itching around the catheter site but no other complaints.      Objective   Objective     Vital Signs:   Temp:  [95.8 °F (35.4 °C)-97.9 °F (36.6 °C)] 96.9 °F (36.1 °C)  Heart Rate:  [65-87] 73  Resp:  [16-18] 18  BP: (143-170)/(77-91) 167/81     Physical Exam:  Constitutional: No acute distress, awake, alert  HENT: NCAT, mucous membranes moist  Respiratory: Clear to auscultation bilaterally, respiratory effort normal   Cardiovascular: RRR, no murmurs, rubs, or gallops  Gastrointestinal: Positive bowel sounds, soft, nontender, nondistended  Musculoskeletal: No bilateral ankle edema  Psychiatric: Appropriate affect, cooperative  Neurologic: Oriented x 3, strength symmetric in all extremities, Cranial Nerves grossly intact to confrontation, speech clear  Skin: No rashes, RUE catheter in place- c/d with no surrounding erythema or bleeding    Results Reviewed:  LAB RESULTS:      Lab 24  0435 24  1557   WBC 6.94 7.20   HEMOGLOBIN 8.9* 10.5*   HEMATOCRIT 27.2* 31.8*   PLATELETS 167 196   NEUTROS ABS 4.56 4.86   IMMATURE GRANS (ABS) 0.03 0.05   LYMPHS ABS 1.09 1.00   MONOS ABS 0.56 0.57   EOS ABS 0.66* 0.66*   MCV 91.9 93.0   PROCALCITONIN  --  0.76*   LACTATE  --  1.1         Lab 24  0435 24  1557   SODIUM 140 143   POTASSIUM 3.6 3.7   CHLORIDE 101 100   CO2 25.0 29.0   ANION GAP 14.0 14.0   BUN 39* 33*   CREATININE 7.83* 7.48*   EGFR 7.1* 7.5*   GLUCOSE 90 113*   CALCIUM 8.8 9.6         Lab 24  0435 24  1557   TOTAL PROTEIN 6.1 7.9   ALBUMIN 3.8 4.5   GLOBULIN 2.3 3.4   ALT (SGPT) 8 10   AST (SGOT) 14 19   BILIRUBIN  0.2 0.3   ALK PHOS 64 77                     Brief Urine Lab Results  (Last result in the past 365 days)        Color   Clarity   Blood   Leuk Est   Nitrite   Protein   CREAT   Urine HCG        06/08/23 0838 Other  Comment: light pink   Cloudy  Comment: has foul smell   3+   Large (3+)   Negative   3+                   Microbiology Results Abnormal       None            No radiology results from the last 24 hrs    Results for orders placed during the hospital encounter of 03/17/23    Adult Transthoracic Echo Limited W/ Cont if Necessary Per Protocol    Interpretation Summary    Left ventricular systolic function is normal. Calculated left ventricular EF = 61.7% Left ventricular ejection fraction appears to be 61 - 65%.    Left ventricular wall thickness is consistent with mild concentric hypertrophy.    There is a small (<1cm) pericardial effusion.      Current medications:  Scheduled Meds:cefTRIAXone, 1,000 mg, Intravenous, Q24H  heparin (porcine), 5,000 Units, Subcutaneous, Q12H  senna-docusate sodium, 2 tablet, Oral, BID  sodium chloride, 10 mL, Intravenous, Q12H  vancomycin (dosing per levels), , Does not apply, Daily      Continuous Infusions:Pharmacy to dose vancomycin,       PRN Meds:.  senna-docusate sodium **AND** polyethylene glycol **AND** bisacodyl **AND** bisacodyl    nitroglycerin    Pharmacy to dose vancomycin    sodium chloride    sodium chloride    sodium chloride    Assessment & Plan   Assessment & Plan     Active Hospital Problems    Diagnosis  POA    **Infection of hemodialysis tunneled catheter [T82.7XXA]  Yes      Resolved Hospital Problems   No resolved problems to display.        Brief Hospital Course to date:  Jun Young is a 63 y.o. male with past medical history of end-stage renal disease on hemodialysis, GERD, hyperlipidemia, diabetes mellitus, hypertension, history of CVA who presented to the emergency department because of an abnormal culture of an infected tunneled  dialysis catheter.  There were 2 pansensitive organisms including Corynebacterium and coagulase-negative Staphylococcus.     This patient's problems and plans were partially entered by my partner and updated as appropriate by me 01/14/24.    All problems are new to me today.    Cellulitis associated with infected tunneled HD catheter  Rule out bacteremia  - continue Rocephin and Vancomycin  - blood cultures pending  -- ID consulted in AM  -- Nephrology consulted  - HD catheter removed  -- 2 pansensitive organisms including Corynebacterium and coagulase-negative Staphylococcus on patient's outpatient catheter culture.     End-stage renal disease on hemodialysis  -Nephrology has been consulted and we will defer to them for plans for hemodialysis, as of now the patient's electrolytes have remained within normal limits  - HD M/W/F     Diabetes mellitus  - The patient will be started on the subcutaneous insulin protocol  -Patient has been placed on renal diet for now     Hypertension  -no home meds- no longer on amlodipine    Expected Discharge Location and Transportation: home  Expected Discharge   Expected Discharge Date: 1/16/2024; Expected Discharge Time:      DVT prophylaxis:  Medical DVT prophylaxis orders are present.     AM-PAC 6 Clicks Score (PT): 23 (01/14/24 0800)    CODE STATUS:   Code Status and Medical Interventions:   Ordered at: 01/13/24 1940     Code Status (Patient has no pulse and is not breathing):    CPR (Attempt to Resuscitate)     Medical Interventions (Patient has pulse or is breathing):    Full Support       Libertad Lin, DO  01/14/24

## 2024-01-14 NOTE — PLAN OF CARE
Goal Outcome Evaluation:  Plan of Care Reviewed With: patient        Progress: no change         Problem: Adult Inpatient Plan of Care  Goal: Absence of Hospital-Acquired Illness or Injury  Intervention: Identify and Manage Fall Risk  Recent Flowsheet Documentation  Taken 1/13/2024 2000 by Jaylen Duque RN  Safety Promotion/Fall Prevention: activity supervised  Intervention: Prevent Skin Injury  Recent Flowsheet Documentation  Taken 1/13/2024 2000 by Jaylen Duque RN  Body Position: position changed independently  Intervention: Prevent and Manage VTE (Venous Thromboembolism) Risk  Recent Flowsheet Documentation  Taken 1/13/2024 2000 by Jaylen Duque RN  Activity Management: activity encouraged     Problem: Adult Inpatient Plan of Care  Goal: Absence of Hospital-Acquired Illness or Injury  Intervention: Prevent Skin Injury  Recent Flowsheet Documentation  Taken 1/13/2024 2000 by Jaylen Duque RN  Body Position: position changed independently     Problem: Adult Inpatient Plan of Care  Goal: Absence of Hospital-Acquired Illness or Injury  Intervention: Prevent and Manage VTE (Venous Thromboembolism) Risk  Recent Flowsheet Documentation  Taken 1/13/2024 2000 by Jaylen Duque RN  Activity Management: activity encouraged

## 2024-01-14 NOTE — PROGRESS NOTES
Pharmacy Consult-Vancomycin Dosing  Jun Young is a  63 y.o. male receiving vancomycin therapy.     Indication: Bacteremia  Consulting Provider: Hospitalist  ID Consult: N    Goal Trough: 15-20    Current Antimicrobial Therapy  Anti-Infectives (From admission, onward)      Ordered     Dose/Rate Route Frequency Start Stop    01/14/24 1333  vancomycin in dextrose 5% 150 mL (VANCOCIN) IVPB 750 mg        Ordering Provider: Yonathan Dillon RPH    750 mg  over 45 Minutes Intravenous Once 01/14/24 1500      01/14/24 0920  vancomycin (dosing per levels)        Ordering Provider: Yonathan Dillon RPH     Does not apply Daily 01/14/24 1015 01/21/24 0859    01/13/24 1940  cefTRIAXone (ROCEPHIN) 1,000 mg in sodium chloride 0.9 % 100 mL IVPB        Ordering Provider: Syed Phillips DO    1,000 mg  200 mL/hr over 30 Minutes Intravenous Every 24 Hours 01/13/24 2030 01/20/24 2029 01/13/24 1852  vancomycin IVPB 1500 mg in 0.9% NaCl (Premix) 500 mL        Ordering Provider: Zion Bustillos IV, PharmD    20 mg/kg × 74 kg  333.3 mL/hr over 90 Minutes Intravenous Once 01/13/24 1945 01/13/24 2259    01/13/24 1940  Pharmacy to dose vancomycin        Ordering Provider: Syed Phillips DO     Does not apply Continuous PRN 01/13/24 1940 01/20/24 1939    01/13/24 1726  cefTRIAXone (ROCEPHIN) 2,000 mg in sodium chloride 0.9 % 100 mL IVPB        Ordering Provider: Layne Frazier PA    2,000 mg  200 mL/hr over 30 Minutes Intravenous Once 01/13/24 1742 01/13/24 1904            Allergies  Allergies as of 01/13/2024    (No Known Allergies)       Labs    Results from last 7 days   Lab Units 01/14/24  0435 01/13/24  1557   BUN mg/dL 39* 33*   CREATININE mg/dL 7.83* 7.48*       Results from last 7 days   Lab Units 01/14/24  0435 01/13/24  1557   WBC 10*3/mm3 6.94 7.20       Evaluation of Dosing     Last Dose Received in the ED/Outside Facility: 1500mg IV scheduled 1945 1/13  Is Patient on Dialysis or Renal Replacement:  "HD    Ht - 170.2 cm (67.01\")  Wt - 98.9 kg (218 lb 0.6 oz)    Estimated Creatinine Clearance: 10.5 mL/min (A) (by C-G formula based on SCr of 7.83 mg/dL (H)).    Intake & Output (last 3 days)         01/11 0701  01/12 0700 01/12 0701  01/13 0700 01/13 0701 01/14 0700    IV Piggyback   100    Total Intake(mL/kg)   100 (1)    Net   +100                   Microbiology and Radiology  Microbiology Results (last 10 days)       ** No results found for the last 240 hours. **            Vancomycin Levels:                        Assessment/Plan:     1. Pharmacy to dose vancomycin for cellulitis, potential bacteremia. Patient is on HD MWF. Goal pre-HD level 15-20 mg/L  2. Patient received a loading dose of vancomycin 1500mg (~15mg/kg) IV on 1/13 @ 2129. Will re-load with vancomycin 750mg IV on 1/14 @ 1500 to reach goal load of 20-25mg/kg.  3. Assess clearance by pre-HD vancomycin level on 1/15 @ 0600.  4. Pharmacy will continue to monitor renal function, cultures and sensitivities, and clinical status to adjust regimen as necessary.    Yonathan Dillon, PharmD  Pharmacy Resident  1/14/2024   13:39 EST     "

## 2024-01-15 ENCOUNTER — APPOINTMENT (OUTPATIENT)
Dept: INTERVENTIONAL RADIOLOGY/VASCULAR | Facility: HOSPITAL | Age: 64
End: 2024-01-15
Payer: MEDICARE

## 2024-01-15 ENCOUNTER — APPOINTMENT (OUTPATIENT)
Dept: NEPHROLOGY | Facility: HOSPITAL | Age: 64
End: 2024-01-15
Payer: MEDICARE

## 2024-01-15 LAB
ALBUMIN SERPL-MCNC: 3.8 G/DL (ref 3.5–5.2)
ALBUMIN/GLOB SERPL: 1.7 G/DL
ALP SERPL-CCNC: 69 U/L (ref 39–117)
ALT SERPL W P-5'-P-CCNC: 9 U/L (ref 1–41)
ANION GAP SERPL CALCULATED.3IONS-SCNC: 15 MMOL/L (ref 5–15)
AST SERPL-CCNC: 14 U/L (ref 1–40)
BILIRUB SERPL-MCNC: 0.2 MG/DL (ref 0–1.2)
BUN SERPL-MCNC: 54 MG/DL (ref 8–23)
BUN/CREAT SERPL: 5.3 (ref 7–25)
CALCIUM SPEC-SCNC: 8.6 MG/DL (ref 8.6–10.5)
CHLORIDE SERPL-SCNC: 103 MMOL/L (ref 98–107)
CO2 SERPL-SCNC: 23 MMOL/L (ref 22–29)
CREAT SERPL-MCNC: 10.19 MG/DL (ref 0.76–1.27)
DEPRECATED RDW RBC AUTO: 45.8 FL (ref 37–54)
EGFRCR SERPLBLD CKD-EPI 2021: 5.2 ML/MIN/1.73
ERYTHROCYTE [DISTWIDTH] IN BLOOD BY AUTOMATED COUNT: 14.5 % (ref 12.3–15.4)
GLOBULIN UR ELPH-MCNC: 2.2 GM/DL
GLUCOSE BLDC GLUCOMTR-MCNC: 84 MG/DL (ref 70–130)
GLUCOSE SERPL-MCNC: 94 MG/DL (ref 65–99)
HCT VFR BLD AUTO: 27.6 % (ref 37.5–51)
HGB BLD-MCNC: 9 G/DL (ref 13–17.7)
INR PPP: 0.96 (ref 0.89–1.12)
MCH RBC QN AUTO: 30.5 PG (ref 26.6–33)
MCHC RBC AUTO-ENTMCNC: 32.6 G/DL (ref 31.5–35.7)
MCV RBC AUTO: 93.6 FL (ref 79–97)
PHOSPHATE SERPL-MCNC: 5.5 MG/DL (ref 2.5–4.5)
PLATELET # BLD AUTO: 156 10*3/MM3 (ref 140–450)
PMV BLD AUTO: 9.1 FL (ref 6–12)
POTASSIUM SERPL-SCNC: 4.4 MMOL/L (ref 3.5–5.2)
PROT SERPL-MCNC: 6 G/DL (ref 6–8.5)
PROTHROMBIN TIME: 12.9 SECONDS (ref 12.2–14.5)
RBC # BLD AUTO: 2.95 10*6/MM3 (ref 4.14–5.8)
SODIUM SERPL-SCNC: 141 MMOL/L (ref 136–145)
VANCOMYCIN SERPL-MCNC: 28.3 MCG/ML (ref 5–40)
WBC NRBC COR # BLD AUTO: 6.71 10*3/MM3 (ref 3.4–10.8)

## 2024-01-15 PROCEDURE — 25010000002 VANCOMYCIN PER 500 MG: Performed by: INTERNAL MEDICINE

## 2024-01-15 PROCEDURE — 25010000002 HEPARIN (PORCINE) PER 1000 UNITS: Performed by: FAMILY MEDICINE

## 2024-01-15 PROCEDURE — 49422 REMOVE TUNNELED IP CATH: CPT

## 2024-01-15 PROCEDURE — 80202 ASSAY OF VANCOMYCIN: CPT

## 2024-01-15 PROCEDURE — 85027 COMPLETE CBC AUTOMATED: CPT | Performed by: HOSPITALIST

## 2024-01-15 PROCEDURE — 99232 SBSQ HOSP IP/OBS MODERATE 35: CPT | Performed by: HOSPITALIST

## 2024-01-15 PROCEDURE — 0WPGX3Z REMOVAL OF INFUSION DEVICE FROM PERITONEAL CAVITY, EXTERNAL APPROACH: ICD-10-PCS | Performed by: RADIOLOGY

## 2024-01-15 PROCEDURE — 87070 CULTURE OTHR SPECIMN AEROBIC: CPT | Performed by: HOSPITALIST

## 2024-01-15 PROCEDURE — 84100 ASSAY OF PHOSPHORUS: CPT | Performed by: INTERNAL MEDICINE

## 2024-01-15 PROCEDURE — 5A1D70Z PERFORMANCE OF URINARY FILTRATION, INTERMITTENT, LESS THAN 6 HOURS PER DAY: ICD-10-PCS | Performed by: INTERNAL MEDICINE

## 2024-01-15 PROCEDURE — 80053 COMPREHEN METABOLIC PANEL: CPT | Performed by: HOSPITALIST

## 2024-01-15 PROCEDURE — 85610 PROTHROMBIN TIME: CPT | Performed by: INTERNAL MEDICINE

## 2024-01-15 RX ORDER — FOLIC ACID/VIT B COMPLEX AND C 0.8 MG
1 TABLET ORAL DAILY
Status: DISCONTINUED | OUTPATIENT
Start: 2024-01-15 | End: 2024-01-16 | Stop reason: HOSPADM

## 2024-01-15 RX ORDER — LIDOCAINE 40 MG/G
1 CREAM TOPICAL AS NEEDED
Status: CANCELLED | OUTPATIENT
Start: 2024-01-15

## 2024-01-15 RX ORDER — LIDOCAINE HYDROCHLORIDE 10 MG/ML
4 INJECTION, SOLUTION INFILTRATION; PERINEURAL ONCE
Status: DISCONTINUED | OUTPATIENT
Start: 2024-01-15 | End: 2024-01-16

## 2024-01-15 RX ORDER — SEVELAMER CARBONATE 800 MG/1
800 TABLET, FILM COATED ORAL
Status: DISCONTINUED | OUTPATIENT
Start: 2024-01-15 | End: 2024-01-16 | Stop reason: HOSPADM

## 2024-01-15 RX ADMIN — HEPARIN SODIUM 5000 UNITS: 5000 INJECTION INTRAVENOUS; SUBCUTANEOUS at 21:47

## 2024-01-15 RX ADMIN — VANCOMYCIN HYDROCHLORIDE 500 MG: 500 INJECTION, POWDER, LYOPHILIZED, FOR SOLUTION INTRAVENOUS at 15:02

## 2024-01-15 RX ADMIN — Medication 10 ML: at 21:48

## 2024-01-15 RX ADMIN — SEVELAMER CARBONATE 800 MG: 800 TABLET, FILM COATED ORAL at 18:30

## 2024-01-15 RX ADMIN — PANTOPRAZOLE SODIUM 40 MG: 40 TABLET, DELAYED RELEASE ORAL at 05:19

## 2024-01-15 RX ADMIN — Medication 1 TABLET: at 18:30

## 2024-01-15 RX ADMIN — SENNOSIDES AND DOCUSATE SODIUM 2 TABLET: 8.6; 5 TABLET ORAL at 21:47

## 2024-01-15 NOTE — PROGRESS NOTES
"   LOS: 2 days    Patient Care Team:  Samir Chow DO as PCP - General (Internal Medicine)  Provider, No Known as PCP - Family Medicine  Provider, No Known  Lora Dickey DO as Consulting Physician (Hospitalist)  Katie Lau MD as Consulting Physician (Hospitalist)    Subjective     Stable overnight.    Objective     Vital Signs:  Blood pressure 142/71, pulse 77, temperature 97.6 °F (36.4 °C), temperature source Oral, resp. rate 14, height 170.2 cm (67.01\"), weight 98.9 kg (218 lb 0.6 oz), SpO2 96%.      Intake/Output Summary (Last 24 hours) at 1/15/2024 1141  Last data filed at 1/14/2024 1900  Gross per 24 hour   Intake 400 ml   Output 100 ml   Net 300 ml        01/14 0701 - 01/15 0700  In: 640 [P.O.:640]  Out: 250 [Urine:250]    Physical Exam:        GENERAL: WD HM NAD  NEURO: Awake and alert,  PSYCHIATRIC: Calm, cooperative with PE  CV: No edema,  LUNGS:  Quiet,  Nonlabored resp  ABDOMEN: Nondistended  : No [place]  SKIN: Warm and dry without rash  + RIJ TDC  .+ RUE AVG    Labs:  Results from last 7 days   Lab Units 01/15/24  0727 01/14/24  0435 01/13/24  1557   WBC 10*3/mm3 6.71 6.94 7.20   HEMOGLOBIN g/dL 9.0* 8.9* 10.5*   PLATELETS 10*3/mm3 156 167 196     Results from last 7 days   Lab Units 01/15/24  0727 01/14/24  0435 01/13/24  1557   SODIUM mmol/L 141 140 143   POTASSIUM mmol/L 4.4 3.6 3.7   CHLORIDE mmol/L 103 101 100   CO2 mmol/L 23.0 25.0 29.0   BUN mg/dL 54* 39* 33*   CREATININE mg/dL 10.19* 7.83* 7.48*   CALCIUM mg/dL 8.6 8.8 9.6   ALBUMIN g/dL 3.8 3.8 4.5     Results from last 7 days   Lab Units 01/15/24  0727   ALK PHOS U/L 69   BILIRUBIN mg/dL 0.2   ALT (SGPT) U/L 9   AST (SGOT) U/L 14                   Estimated Creatinine Clearance: 8.1 mL/min (A) (by C-G formula based on SCr of 10.19 mg/dL (H)).         A/P:    ESRD: On HD MWF at [facility] with [facility]..  Continue outpatient schedule.    HTN: Blood pressure stable.  Watch with HD.    Anemia: Hemoglobin below goal at 9.0.  Give " Epogen.  Transfuse as needed for Hgb <7.    Volume: Stable.  UF with HD as tolerated to EDW.    Possible line infection: Patient has RIJ TDC with culture positive for possible staph epi resistant to methicillin.  + Positive blood cultures.  Graft is functioning.  Will have tunneled dialysis catheter removed.  Continue antibiotics.  ID evaluating.    Shantanu Brown MD  01/15/24  11:41 EST

## 2024-01-15 NOTE — PROGRESS NOTES
INFECTIOUS DISEASE Progress Note    Jun Young  1960  [id number]      Admission Date: 1/13/2024      Requesting Provider: No ref. provider found  Evaluating Physician: Fredy Butts MD    Reason for Consultation: bacteremia     History of present illness:    1/14/24: Patient is a 63 y.o. male, with PMH ESRD/HD, DM, GERD, HTN, prostate cancer, CVA, seen today for bacteremia. His left AVF and right chest wall HD catheter placed 12/8.  He developed some itching of his right chest HD catheter and last week at HD, blood cultures were obtained, and reportedly positive and he was notified to present to the ED. He brought the lab result, apparently lost in ED, positive for GPC in pairs, in clusters,  Corynebacterium and Staph epidermidis. His wife is bringing back the copy of blood cultures. Admitting labs with WBC 7.2,. PCT 0.76, lactate 1.1, Scr 7.48, and repeat cultures pending. He denies fever, chills, sweats.  We were consulted for evaluation and treatment.   Records brought in by the family this afternoon reveal Staph epidermidis/corynebacterium on dialysis catheter exit site cultures and a blood culture with gram-positive cocci that has not yet been identified.    1/15/24: He has remained afebrile. The positive blood culture from 1/10 was identified as coagulase-negative staph.  The isolate was resistant to oxacillin and clindamycin but sensitive to most other antibiotics.  Blood cultures here are negative.His tunneled dialysis catheter has now been removed.  He has been able to utilize his left arm fistula.    Past Medical History:   Diagnosis Date    Acute on chronic renal failure     Bilateral hydronephrosis     BPH (benign prostatic hyperplasia)     Cholelithiasis     Diabetes mellitus     Dyslipidemia     Elevated cholesterol     GERD (gastroesophageal reflux disease)     Gross hematuria     Hemodialysis patient     History of transfusion     BHL, no reactions per pt     Hyperlipidemia     Hypertension     Pericardial effusion     echo 2/2023 - 1-2 cm preserved LV function    Stroke     no residual effects       Past Surgical History:   Procedure Laterality Date    COLONOSCOPY      CYSTOSCOPY BLADDER BIOPSY N/A 02/09/2023    Procedure: CYSTOSCOPY CLOT EVACUATION WITH FULGURATION, RETROGRADE PYELOGRAM;  Surgeon: Mandi Velasco MD;  Location: [facility] OR;  Service: Urology;  Laterality: N/A;    CYSTOSCOPY BLADDER BIOPSY N/A 03/18/2023    Procedure: CYSTOSCOPY WITH FULGURATION OF BLEEDERS;  Surgeon: Santana Christianson MD;  Location:  [facility] OR;  Service: Urology;  Laterality: N/A;    CYSTOSCOPY TRANSURETHRAL RESECTION OF PROSTATE N/A 5/5/2023    Procedure: CYSTOSCOPY TRANSURETHRAL RESECTION OF PROSTATE (BIPOLAR);  Surgeon: Pawan Damico MD;  Location:  [facility] OR;  Service: Urology;  Laterality: N/A;    CYSTOSCOPY WITH CLOT EVACUATION      fulguration - 3 way catheter placement    INSERTION HEMODIALYSIS CATHETER         Family History   Family history unknown: Yes       Social History     Socioeconomic History    Marital status: Single   Tobacco Use    Smoking status: Never     Passive exposure: Never    Smokeless tobacco: Never   Vaping Use    Vaping Use: Never used   Substance and Sexual Activity    Alcohol use: Never    Drug use: Never    Sexual activity: Not Currently       No Known Allergies      Medication:    Current Facility-Administered Medications:     sennosides-docusate (PERICOLACE) 8.6-50 MG per tablet 2 tablet, 2 tablet, Oral, BID, 2 tablet at 01/14/24 0936 **AND** polyethylene glycol (MIRALAX) packet 17 g, 17 g, Oral, Daily PRN **AND** bisacodyl (DULCOLAX) EC tablet 5 mg, 5 mg, Oral, Daily PRN **AND** bisacodyl (DULCOLAX) suppository 10 mg, 10 mg, Rectal, Daily PRN, Syed Phillips DO    heparin (porcine) 5000 UNIT/ML injection 5,000 Units, 5,000 Units, Subcutaneous, Q12H, Syed Phillips DO, 5,000 Units at 01/14/24 2008    nitroglycerin (NITROSTAT) SL tablet 0.4 mg, 0.4  mg, Sublingual, Q5 Min PRN, Syed Phillips DO    pantoprazole (PROTONIX) EC tablet 40 mg, 40 mg, Oral, Q AM, Libertad Lin DO, 40 mg at 01/15/24 0519    Pharmacy to dose vancomycin, , Does not apply, Continuous PRN, Syed Phillips DO    sodium chloride 0.9 % flush 10 mL, 10 mL, Intravenous, PRN, Emergency, Triage Protocol, MD    sodium chloride 0.9 % flush 10 mL, 10 mL, Intravenous, Q12H, Syed Phillips DO, 10 mL at 24    sodium chloride 0.9 % flush 10 mL, 10 mL, Intravenous, PRN, Syed Phillips DO    sodium chloride 0.9 % infusion 40 mL, 40 mL, Intravenous, PRN, Syed Phillips DO    vancomycin (dosing per levels), , Does not apply, Daily, Yonathan Dillon RPH    Antibiotics:  Anti-Infectives (From admission, onward)      Ordered     Dose/Rate Route Frequency Start Stop    24 1333  vancomycin in dextrose 5% 150 mL (VANCOCIN) IVPB 750 mg        Ordering Provider: Yonathan Dillon RPH    750 mg  over 45 Minutes Intravenous Once 24 1500 24 1638    24 0920  vancomycin (dosing per levels)        Ordering Provider: Yonathan Dillon RPH     Does not apply Daily 24 1015 24 0859    24 1852  vancomycin IVPB 1500 mg in 0.9% NaCl (Premix) 500 mL        Ordering Provider: Zion Bustillos IV, PharmD    20 mg/kg × 74 kg  333.3 mL/hr over 90 Minutes Intravenous Once 24 1945 24 2259    24 1940  Pharmacy to dose vancomycin        Ordering Provider: Syed Phillips DO     Does not apply Continuous PRN 24 1940 24 1939    24 1726  cefTRIAXone (ROCEPHIN) 2,000 mg in sodium chloride 0.9 % 100 mL IVPB        Ordering Provider: Layne Frazier PA    2,000 mg  200 mL/hr over 30 Minutes Intravenous Once 24 1742 24 1904              Review of Systems:  See HPI      Physical Exam:   Vital Signs  Temp (24hrs), Av.1 °F (36.2 °C), Min:96.3 °F (35.7 °C), Max:97.9 °F (36.6 °C)    Temp  Min: 96.3 °F (35.7 °C)  Max: 97.9 °F  "(36.6 °C)  BP  Min: 149/80  Max: 167/81  Pulse  Min: 68  Max: 85  Resp  Min: 16  Max: 18  SpO2  Min: 94 %  Max: 97 %    GENERAL: Awake and alert, in no acute distress.   HEENT: Normocephalic, atraumatic.  PERRL. EOMI. No conjunctival injection. No icterus. Oropharynx clear without evidence of thrush or exudate.   NECK: Supple   Chest Wall: His right anterior chest wall dialysis catheter has been removed  HEART: RRR; No murmur, rubs, gallops.   LUNGS: Clear to auscultation bilaterally without wheezing, rales, rhonchi. Normal respiratory effort. Nonlabored.   ABDOMEN: Soft, nontender, nondistended. No rebound or guarding. NO mass or HSM.  EXT:  No cyanosis, clubbing or edema. No cord.  :  Without Whitfield catheter.  MSK: No joint effusions or erythema  SKIN: No diffuse rash  NEURO: Oriented to PPT.  Motor 5/5 strength  PSYCHIATRIC: Normal insight and judgment. Cooperative with PE    Laboratory Data    Results from last 7 days   Lab Units 01/15/24  0727 01/14/24  0435 01/13/24  1557   WBC 10*3/mm3 6.71 6.94 7.20   HEMOGLOBIN g/dL 9.0* 8.9* 10.5*   HEMATOCRIT % 27.6* 27.2* 31.8*   PLATELETS 10*3/mm3 156 167 196     Results from last 7 days   Lab Units 01/15/24  0727   SODIUM mmol/L 141   POTASSIUM mmol/L 4.4   CHLORIDE mmol/L 103   CO2 mmol/L 23.0   BUN mg/dL 54*   CREATININE mg/dL 10.19*   GLUCOSE mg/dL 94   CALCIUM mg/dL 8.6     Results from last 7 days   Lab Units 01/15/24  0727   ALK PHOS U/L 69   BILIRUBIN mg/dL 0.2   ALT (SGPT) U/L 9   AST (SGOT) U/L 14             Results from last 7 days   Lab Units 01/13/24  1557   LACTATE mmol/L 1.1         Results from last 7 days   Lab Units 01/15/24  0727   VANCOMYCIN RM mcg/mL 28.30     Estimated Creatinine Clearance: 8.1 mL/min (A) (by C-G formula based on SCr of 10.19 mg/dL (H)).      Microbiology:  No results found for: \"ACANTHNAEG\", \"AFBCX\", \"BPERTUSSISCX\", \"BLOODCX\"  No results found for: \"BCIDPCR\", \"CXREFLEX\", \"CSFCX\", \"CULTURETIS\"  No results found for: \"CULTURES\", " "\"HSVCX\", \"URCX\"  No results found for: \"EYECULTURE\", \"GCCX\", \"HSVCULTURE\", \"LABHSV\"  No results found for: \"LEGIONELLA\", \"MRSACX\", \"MUMPSCX\", \"MYCOPLASCX\"  No results found for: \"NOCARDIACX\", \"STOOLCX\"  No results found for: \"THROATCX\", \"UNSTIMCULT\", \"URINECX\", \"CULTURE\", \"VZVCULTUR\"  No results found for: \"VIRALCULTU\", \"WOUNDCX\"        Radiology:  Imaging Results (Last 72 Hours)       ** No results found for the last 72 hours. **              Impression:  1.  Coagulase-negative staphylococcal bacteremia-most likely secondary to hemo-dialysis catheter infection. His hemodialysis catheter is now been removed.  2.  Corynebacterium/staph epidermidis tunneled hemodialysis catheter infection-versus colonization  3.  End-stage renal disease-on hemodialysis  4.  Type 2 diabetes mellitus  5.  History of CVA      PLAN/RECOMMENDATIONS:   Tunneled hemodialysis cath removal-done  Discharge to home tomorrow  Vancomycin 500 mg IV at each dialysis as an outpatient ×3 doses    I discussed his complex situation with Dr. Micky Brown today.      Fredy Butts MD  1/15/2024  09:06 EST                "

## 2024-01-15 NOTE — PLAN OF CARE
Goal Outcome Evaluation:     Hemodialysis treatment completed.  Patient tolerated well.  Access functioned well.     Problem: Device-Related Complication Risk (Hemodialysis)  Goal: Safe, Effective Therapy Delivery  Outcome: Ongoing, Progressing     Problem: Hemodynamic Instability (Hemodialysis)  Goal: Effective Tissue Perfusion  Outcome: Ongoing, Progressing     Problem: Infection (Hemodialysis)  Goal: Absence of Infection Signs and Symptoms  Outcome: Ongoing, Progressing

## 2024-01-15 NOTE — CONSULTS
Patient Care Team:  Samir Chow DO as PCP - General (Internal Medicine)  Provider, No Known as PCP - Family Medicine  Provider, No Known  Lora Dickey DO as Consulting Physician (Hospitalist)  Katie Lau MD as Consulting Physician (Hospitalist)    Chief complaint: dialysis access infection    History of Present Illness: Mr Barahona is a 64 yo gentleman with hx of ESRD on MWF dialysis. He is admitted from HD clinic after he was found to have + cx from the Cath site w Coagulase negative staph sp and corynebacterium sp. Later he was also found to have + blood cx for gram possitive cocci in pain and chains. Patient cee any fever, chills or weakness. No pain noted at HD cath site. He has AVG reportedly placed 2 months ago. Good thrill noted. His last HD session was on Friday.     Review of Systems   Constitutional: Negative.    HENT: Negative.     Respiratory: Negative.     Cardiovascular: Negative.    Gastrointestinal: Negative.    Genitourinary: Negative.    Musculoskeletal: Negative.    Neurological: Negative.    Hematological: Negative.    Psychiatric/Behavioral: Negative.          Past Medical History:   Diagnosis Date    Acute on chronic renal failure     Bilateral hydronephrosis     BPH (benign prostatic hyperplasia)     Cholelithiasis     Diabetes mellitus     Dyslipidemia     Elevated cholesterol     GERD (gastroesophageal reflux disease)     Gross hematuria     Hemodialysis patient     History of transfusion     BHL, no reactions per pt    Hyperlipidemia     Hypertension     Pericardial effusion     echo 2/2023 - 1-2 cm preserved LV function    Stroke     no residual effects   ,   Past Surgical History:   Procedure Laterality Date    COLONOSCOPY      CYSTOSCOPY BLADDER BIOPSY N/A 02/09/2023    Procedure: CYSTOSCOPY CLOT EVACUATION WITH FULGURATION, RETROGRADE PYELOGRAM;  Surgeon: Mandi Velasco MD;  Location: [facility];  Service: Urology;  Laterality: N/A;    CYSTOSCOPY BLADDER BIOPSY N/A  03/18/2023    Procedure: CYSTOSCOPY WITH FULGURATION OF BLEEDERS;  Surgeon: Santana Christianson MD;  Location:  [facility];  Service: Urology;  Laterality: N/A;    CYSTOSCOPY TRANSURETHRAL RESECTION OF PROSTATE N/A 5/5/2023    Procedure: CYSTOSCOPY TRANSURETHRAL RESECTION OF PROSTATE (BIPOLAR);  Surgeon: Pawan Damico MD;  Location:  [facility];  Service: Urology;  Laterality: N/A;    CYSTOSCOPY WITH CLOT EVACUATION      fulguration - 3 way catheter placement    INSERTION HEMODIALYSIS CATHETER     ,   Family History   Family history unknown: Yes   ,   Social History     Socioeconomic History    Marital status: Single   Tobacco Use    Smoking status: Never     Passive exposure: Never    Smokeless tobacco: Never   Vaping Use    Vaping Use: Never used   Substance and Sexual Activity    Alcohol use: Never    Drug use: Never    Sexual activity: Not Currently     E-cigarette/Vaping    E-cigarette/Vaping Use Never User     Passive Exposure No     Counseling Given No      E-cigarette/Vaping Substances    Nicotine No     THC No     CBD No     Flavoring No      E-cigarette/Vaping Devices    Disposable No     Pre-filled or Refillable Cartridge No     Refillable Tank No     Pre-filled Pod No          ,   Medications Prior to Admission   Medication Sig Dispense Refill Last Dose    pantoprazole (PROTONIX) 40 MG EC tablet Take 1 tablet by mouth Daily. 30 tablet 2 1/13/2024    sevelamer (RENVELA) 800 MG tablet Take 1 tablet by mouth 3 (Three) Times a Day With Meals. 90 tablet 1 1/13/2024    acetaminophen (TYLENOL) 325 MG tablet Take 2 tablets by mouth Every 4 (Four) Hours As Needed for Mild Pain.       B Complex-C-Folic Acid (KAYLEIGH-LASHON PO) Take 1 tablet by mouth Daily.       sennosides-docusate (PERICOLACE) 8.6-50 MG per tablet Take 2 tablets by mouth 2 (Two) Times a Day As Needed for Constipation. 60 tablet 1     simethicone (MYLICON) 80 MG chewable tablet Chew and swallow 1 tablet by mouth 4 (Four) Times a Day As Needed for  "Flatulence. 30 tablet 1    , and Scheduled Meds:  heparin (porcine), 5,000 Units, Subcutaneous, Q12H  pantoprazole, 40 mg, Oral, Q AM  senna-docusate sodium, 2 tablet, Oral, BID  sodium chloride, 10 mL, Intravenous, Q12H  vancomycin (dosing per levels), , Does not apply, Daily       Objective     Vital Signs  Temp:  [95.8 °F (35.4 °C)-97.5 °F (36.4 °C)] 96.9 °F (36.1 °C)  Heart Rate:  [65-85] 85  Resp:  [16-18] 18  BP: (143-167)/(78-81) 167/81    No intake/output data recorded.  I/O last 3 completed shifts:  In: 740 [P.O.:640; IV Piggyback:100]  Out: 250 [Urine:250]    Physical Exam    Results Review:    I reviewed the patient's new clinical results.    WBC WBC   Date Value Ref Range Status   01/14/2024 6.94 3.40 - 10.80 10*3/mm3 Final   01/13/2024 7.20 3.40 - 10.80 10*3/mm3 Final      HGB Hemoglobin   Date Value Ref Range Status   01/14/2024 8.9 (L) 13.0 - 17.7 g/dL Final   01/13/2024 10.5 (L) 13.0 - 17.7 g/dL Final      HCT Hematocrit   Date Value Ref Range Status   01/14/2024 27.2 (L) 37.5 - 51.0 % Final   01/13/2024 31.8 (L) 37.5 - 51.0 % Final      Platlets No results found for: \"LABPLAT\"   MCV MCV   Date Value Ref Range Status   01/14/2024 91.9 79.0 - 97.0 fL Final   01/13/2024 93.0 79.0 - 97.0 fL Final          Sodium Sodium   Date Value Ref Range Status   01/14/2024 140 136 - 145 mmol/L Final   01/13/2024 143 136 - 145 mmol/L Final      Potassium Potassium   Date Value Ref Range Status   01/14/2024 3.6 3.5 - 5.2 mmol/L Final   01/13/2024 3.7 3.5 - 5.2 mmol/L Final      Chloride Chloride   Date Value Ref Range Status   01/14/2024 101 98 - 107 mmol/L Final   01/13/2024 100 98 - 107 mmol/L Final      CO2 CO2   Date Value Ref Range Status   01/14/2024 25.0 22.0 - 29.0 mmol/L Final   01/13/2024 29.0 22.0 - 29.0 mmol/L Final      BUN BUN   Date Value Ref Range Status   01/14/2024 39 (H) 8 - 23 mg/dL Final   01/13/2024 33 (H) 8 - 23 mg/dL Final      Creatinine Creatinine   Date Value Ref Range Status   01/14/2024 " "7.83 (H) 0.76 - 1.27 mg/dL Final   01/13/2024 7.48 (H) 0.76 - 1.27 mg/dL Final      Calcium Calcium   Date Value Ref Range Status   01/14/2024 8.8 8.6 - 10.5 mg/dL Final   01/13/2024 9.6 8.6 - 10.5 mg/dL Final      PO4 No results found for: \"CAPO4\"   Albumin Albumin   Date Value Ref Range Status   01/14/2024 3.8 3.5 - 5.2 g/dL Final   01/13/2024 4.5 3.5 - 5.2 g/dL Final      Magnesium No results found for: \"MG\"   Uric Acid No results found for: \"URICACID\"         Assessment & Plan       Infection of hemodialysis tunneled catheter      Assessment & Plan    ESRD: HD per [facility] [place]. [facility]. Follows with [facility].     Access: Right IJ TDC. New AVG placed 6-8 weeks ago per the patient. Never used. + thrill noted.     Exit site infection: CX positive for possible staph epi resistant to methicillin. Blood cx also positive for Gram positive cocci in pairs and cluster. Final read is pending.     Volume status: Stable.     Anemia: ACD due to CKD    BMD: Continue phos binders.     Recs  HD per [facility] [place]. Discussed with dialysis RN to follow up with final blood cx report tomorrow. Abx per ID recs. AVG has never been used. Good thrill on exam. Will try to use the AV access if working well. Will remove HD cath.          I discussed the patients findings and my recommendations with patient    Jordan Marquez MD  01/14/24  19:31 EST            "

## 2024-01-15 NOTE — PLAN OF CARE
Problem: Adult Inpatient Plan of Care  Goal: Plan of Care Review  Outcome: Ongoing, Progressing  Flowsheets (Taken 1/15/2024 0335)  Progress: improving  Plan of Care Reviewed With: patient  Goal: Patient-Specific Goal (Individualized)  Outcome: Ongoing, Progressing  Goal: Absence of Hospital-Acquired Illness or Injury  Outcome: Ongoing, Progressing  Intervention: Identify and Manage Fall Risk  Recent Flowsheet Documentation  Taken 1/15/2024 0200 by Bertram England RN  Safety Promotion/Fall Prevention:   safety round/check completed   nonskid shoes/slippers when out of bed   clutter free environment maintained   assistive device/personal items within reach  Taken 1/15/2024 0000 by Bertram England RN  Safety Promotion/Fall Prevention:   safety round/check completed   nonskid shoes/slippers when out of bed   clutter free environment maintained   assistive device/personal items within reach  Taken 1/14/2024 2200 by Bertram England RN  Safety Promotion/Fall Prevention:   safety round/check completed   nonskid shoes/slippers when out of bed   clutter free environment maintained   assistive device/personal items within reach  Taken 1/14/2024 2000 by Bertram England RN  Safety Promotion/Fall Prevention:   safety round/check completed   nonskid shoes/slippers when out of bed   clutter free environment maintained   assistive device/personal items within reach  Intervention: Prevent Infection  Recent Flowsheet Documentation  Taken 1/15/2024 0200 by Bertram England RN  Infection Prevention: environmental surveillance performed  Taken 1/15/2024 0000 by Bertram England RN  Infection Prevention: environmental surveillance performed  Taken 1/14/2024 2200 by Bertram England RN  Infection Prevention: environmental surveillance performed  Taken 1/14/2024 2000 by Bertram England RN  Infection Prevention: environmental surveillance performed  Goal: Optimal Comfort and Wellbeing  Outcome: Ongoing, Progressing  Goal: Readiness for Transition of  Care  Outcome: Ongoing, Progressing   Goal Outcome Evaluation:  Plan of Care Reviewed With: patient        Progress: improving

## 2024-01-15 NOTE — PROGRESS NOTES
Pharmacy Consult-Vancomycin Dosing  Jun Young is a  63 y.o. male receiving vancomycin therapy.     Indication: Bacteremia, TDC infection   Consulting Provider: Hospitalist  ID Consult: Yes (JUDI Butts)     Goal Trough: 15-20 mcg/mL    Current Antimicrobial Therapy  Anti-Infectives (From admission, onward)      Ordered     Dose/Rate Route Frequency Start Stop    01/15/24 1236  vancomycin 500 mg IVPB in 100 mL NS (MBP)        Ordering Provider: Fredy Butts MD    500 mg  200 mL/hr over 30 Minutes Intravenous Once 01/15/24 1500      01/14/24 1333  vancomycin in dextrose 5% 150 mL (VANCOCIN) IVPB 750 mg        Ordering Provider: Yonathan Dillon RPH    750 mg  over 45 Minutes Intravenous Once 01/14/24 1500 01/14/24 1638    01/14/24 0920  vancomycin (dosing per levels)        Ordering Provider: Yonathan Dillon RPH     Does not apply Daily 01/14/24 1015 01/21/24 0859    01/13/24 1852  vancomycin IVPB 1500 mg in 0.9% NaCl (Premix) 500 mL        Ordering Provider: Zion Bustillos IV, PharmD    20 mg/kg × 74 kg  333.3 mL/hr over 90 Minutes Intravenous Once 01/13/24 1945 01/13/24 2259    01/13/24 1940  Pharmacy to dose vancomycin        Ordering Provider: Syed Phillips DO     Does not apply Continuous PRN 01/13/24 1940 01/20/24 1939    01/13/24 1726  cefTRIAXone (ROCEPHIN) 2,000 mg in sodium chloride 0.9 % 100 mL IVPB        Ordering Provider: Layne Frazier PA    2,000 mg  200 mL/hr over 30 Minutes Intravenous Once 01/13/24 1742 01/13/24 1904          Allergies  Allergies as of 01/13/2024    (No Known Allergies)     Labs  Results from last 7 days   Lab Units 01/15/24  0727 01/14/24  0435 01/13/24  1557   BUN mg/dL 54* 39* 33*   CREATININE mg/dL 10.19* 7.83* 7.48*     Results from last 7 days   Lab Units 01/15/24  0727 01/14/24  0435 01/13/24  1557   WBC 10*3/mm3 6.71 6.94 7.20     Evaluation of Dosing     Last Dose Received in the ED/Outside Facility: Vancomycin 1500mg (~15mg/kg) IV on  RN called to convey that the CXR showed bilateral atelectasis. Patient's breathing was unchanged from earlier that day. Ordered q2hr incentive spirometry while awake and to sit patient up and have her ambulate when possible.     Antionette Hector MD  Family Medicine PGY-2 "1/13 @ 2129  Is Patient on Dialysis or Renal Replacement: Yes, HD MWF.     Ht - 170.2 cm (67.01\")  Wt - 98.9 kg (218 lb 0.6 oz)    Estimated Creatinine Clearance: 8.1 mL/min (A) (by C-G formula based on SCr of 10.19 mg/dL (H)).    Intake & Output (last 3 days)         01/11 0701 01/12 0700 01/12 0701 01/13 0700 01/13 0701 01/14 0700    IV Piggyback   100    Total Intake(mL/kg)   100 (1)    Net   +100                 Microbiology and Radiology  Microbiology Results (last 10 days)       Procedure Component Value - Date/Time    Blood Culture - Blood, Wrist, Right [[id number]]  (Normal) Collected: 01/13/24 1603    Lab Status: Preliminary result Specimen: Blood from Wrist, Right Updated: 01/14/24 1645     Blood Culture No growth at 24 hours    Narrative:      Less than seven (7) mL's of blood was collected.  Insufficient quantity may yield false negative results.    Blood Culture - Blood, Arm, Right [[id number]]  (Normal) Collected: 01/13/24 1557    Lab Status: Preliminary result Specimen: Blood from Arm, Right Updated: 01/14/24 1645     Blood Culture No growth at 24 hours          Vancomycin Levels:  Results from last 7 days   Lab Units 01/15/24  0727   VANCOMYCIN RM mcg/mL 28.30             Assessment/Plan:   Pharmacy to dose vancomycin for bacteremia, TDC infection. Goal trough 15-20mcg/mL. Patient has ESRD on HD MWF, so dosing vancomycin per pre-HD levels.   Patient received loading dose of vancomycin 1500mg (~15mg/kg) IV on 1/13 @ 2129 and was given supplemental dose of vancomycin 750mg (~8mg/kg) IV on 1/14 @ 1553.  Random pre-HD vanc level this AM was 28.3mcg/mL. Anticipate ~30-50% vancomycin clearance from HD session today. Will give 1x supplemental dose post-HD of vancomycin 500mg (~5mg/kg) IV on 1/15 @ 1500.   Reassess clearance by random pre-HD level on 1/17 @ 0600, prior to next HD session on Wed.   Pharmacy will continue to monitor renal function, cultures and sensitivities, and clinical status to adjust " regimen as necessary.    Taylor Riedel-Rogers, PharmD, BCPS  1/15/2024  12:38 EST

## 2024-01-15 NOTE — PROGRESS NOTES
[facility] Medicine Services  PROGRESS NOTE    Patient Name: Jun Young  : 1960  MRN: [id number]    Date of Admission: 2024  Primary Care Physician: Samir Chow DO    Subjective   Subjective     CC:  Infected tunneled catheter     HPI:  Patient resting in bed, HD this am. No new issues.      Objective   Objective     Vital Signs:   Temp:  [96.3 °F (35.7 °C)-97.9 °F (36.6 °C)] 97.6 °F (36.4 °C)  Heart Rate:  [65-85] 79  Resp:  [12-18] 14  BP: (142-159)/(71-84) 154/84     Physical Exam:  Constitutional: No acute distress, resting  HENT: NCAT, mucous membranes moist  Respiratory: Clear to auscultation bilaterally, respiratory effort normal   Cardiovascular: RRR, no murmurs, rubs, or gallops  Gastrointestinal: Positive bowel sounds, soft, nontender, nondistended  Musculoskeletal: No bilateral ankle edema  Psychiatric: Appropriate affect, cooperative  Neurologic: Oriented x 3, strength symmetric in all extremities, Cranial Nerves grossly intact to confrontation, speech clear  Skin: No rashes, RUE catheter in place- mild erythema surrounding    Results Reviewed:  LAB RESULTS:      Lab 01/15/24  0727 01/14/24  0435 01/13/24  1557   WBC 6.71 6.94 7.20   HEMOGLOBIN 9.0* 8.9* 10.5*   HEMATOCRIT 27.6* 27.2* 31.8*   PLATELETS 156 167 196   NEUTROS ABS  --  4.56 4.86   IMMATURE GRANS (ABS)  --  0.03 0.05   LYMPHS ABS  --  1.09 1.00   MONOS ABS  --  0.56 0.57   EOS ABS  --  0.66* 0.66*   MCV 93.6 91.9 93.0   PROCALCITONIN  --   --  0.76*   LACTATE  --   --  1.1         Lab 01/15/24  0727 01/14/24  04324  1557   SODIUM 141 140 143   POTASSIUM 4.4 3.6 3.7   CHLORIDE 103 101 100   CO2 23.0 25.0 29.0   ANION GAP 15.0 14.0 14.0   BUN 54* 39* 33*   CREATININE 10.19* 7.83* 7.48*   EGFR 5.2* 7.1* 7.5*   GLUCOSE 94 90 113*   CALCIUM 8.6 8.8 9.6         Lab 01/15/24  0727 24  0435 24  1557   TOTAL PROTEIN 6.0 6.1 7.9   ALBUMIN 3.8 3.8 4.5   GLOBULIN 2.2  2.3 3.4   ALT (SGPT) 9 8 10   AST (SGOT) 14 14 19   BILIRUBIN 0.2 0.2 0.3   ALK PHOS 69 64 77                     Brief Urine Lab Results  (Last result in the past 365 days)        Color   Clarity   Blood   Leuk Est   Nitrite   Protein   CREAT   Urine HCG        06/08/23 0838 Other  Comment: light pink   Cloudy  Comment: has foul smell   3+   Large (3+)   Negative   3+                   Microbiology Results Abnormal       Procedure Component Value - Date/Time    Blood Culture - Blood, Wrist, Right [[id number]]  (Normal) Collected: 01/13/24 1603    Lab Status: Preliminary result Specimen: Blood from Wrist, Right Updated: 01/14/24 1645     Blood Culture No growth at 24 hours    Narrative:      Less than seven (7) mL's of blood was collected.  Insufficient quantity may yield false negative results.    Blood Culture - Blood, Arm, Right [[id number]]  (Normal) Collected: 01/13/24 1557    Lab Status: Preliminary result Specimen: Blood from Arm, Right Updated: 01/14/24 1645     Blood Culture No growth at 24 hours            Adult Transthoracic Echo Complete W/ Cont if Necessary Per Protocol    Result Date: 1/14/2024    Left ventricular systolic function is hyperdynamic (EF > 70%). Left ventricular ejection fraction appears to be greater than 70%.   Left ventricular wall thickness is consistent with mild concentric hypertrophy.   Left ventricular diastolic function was normal.   Estimated right ventricular systolic pressure from tricuspid regurgitation is normal (<35 mmHg).   There is a trivial pericardial effusion.   Cannot exclude small mobile echogenic mass on the anterior papillary muscle.  Consider TIEN.      Results for orders placed during the hospital encounter of 01/13/24    Adult Transthoracic Echo Complete W/ Cont if Necessary Per Protocol    Interpretation Summary    Left ventricular systolic function is hyperdynamic (EF > 70%). Left ventricular ejection fraction appears to be greater than 70%.    Left ventricular  wall thickness is consistent with mild concentric hypertrophy.    Left ventricular diastolic function was normal.    Estimated right ventricular systolic pressure from tricuspid regurgitation is normal (<35 mmHg).    There is a trivial pericardial effusion.    Cannot exclude small mobile echogenic mass on the anterior papillary muscle.  Consider TIEN.      Current medications:  Scheduled Meds:heparin (porcine), 5,000 Units, Subcutaneous, Q12H  pantoprazole, 40 mg, Oral, Q AM  senna-docusate sodium, 2 tablet, Oral, BID  sodium chloride, 10 mL, Intravenous, Q12H  vancomycin (dosing per levels), , Does not apply, Daily  vancomycin, 500 mg, Intravenous, Once      Continuous Infusions:Pharmacy to dose vancomycin,       PRN Meds:.  senna-docusate sodium **AND** polyethylene glycol **AND** bisacodyl **AND** bisacodyl    nitroglycerin    Pharmacy to dose vancomycin    sodium chloride    sodium chloride    sodium chloride    Assessment & Plan   Assessment & Plan     Active Hospital Problems    Diagnosis  POA    **Infection of hemodialysis tunneled catheter [T82.7XXA]  Yes      Resolved Hospital Problems   No resolved problems to display.        Brief Hospital Course to date:  Jun Young is a 63 y.o. male with past medical history of end-stage renal disease on hemodialysis, GERD, hyperlipidemia, diabetes mellitus, hypertension, history of CVA who presented to the emergency department because of an abnormal culture of an infected tunneled dialysis catheter.  There were 2 pansensitive organisms including Corynebacterium and coagulase-negative Staphylococcus.     This patient's problems and plans were partially entered by my partner and updated as appropriate by me 01/15/24.    All problems are new to me today.    Cellulitis associated with infected tunneled HD catheter  Rule out bacteremia  - continue Rocephin and Vancomycin  - blood cultures pending  -- ID following  -- Nephrology following  - tunneled dialysis  catheter removal  -- 2 pansensitive organisms including Corynebacterium and coagulase-negative Staphylococcus on patient's outpatient blood/cath culture.  -- ECHO: EF 70%; Cannot exclude small mobile echogenic mass on the anterior papillary muscle.  Consider TIEN      End-stage renal disease on hemodialysis  - Nephrology following  - HD M/W/F  -- HD today     Diabetes mellitus Type 2  - SSI  --A1C pending     Hypertension  -no home meds- no longer on amlodipine    Expected Discharge Location and Transportation: home  Expected Discharge   Expected Discharge Date: 1/17/2024; Expected Discharge Time:      DVT prophylaxis:  Medical DVT prophylaxis orders are present.     AM-PAC 6 Clicks Score (PT): 24 (01/15/24 0300)    CODE STATUS:   Code Status and Medical Interventions:   Ordered at: 01/13/24 1940     Code Status (Patient has no pulse and is not breathing):    CPR (Attempt to Resuscitate)     Medical Interventions (Patient has pulse or is breathing):    Full Support       Libertad Lin,   01/15/24

## 2024-01-15 NOTE — CASE MANAGEMENT/SOCIAL WORK
Discharge Planning Assessment  [facility]     Patient Name: Jun Young  MRN: [id number]  Today's Date: 1/15/2024    Admit Date: 1/13/2024    Plan: Initial Discharge Plan Assessemtn   Discharge Needs Assessment       Row Name 01/15/24 1449       Living Environment    People in Home other relative(s)    Unique Family Situation Pt states he lives with family    Current Living Arrangements home    Primary Care Provided by self    Provides Primary Care For no one    Family Caregiver if Needed child(jennifer), adult;other (see comments)  family    Quality of Family Relationships unable to assess    Able to Return to Prior Arrangements yes       Transition Planning    Patient/Family Anticipates Transition to home with family    Transportation Anticipated family or friend will provide       Discharge Needs Assessment    Equipment Currently Used at Home none                   Discharge Plan       Row Name 01/15/24 4303       Plan    Plan Initial Discharge Plan Assessemtn    Patient/Family in Agreement with Plan yes    Plan Comments  met with Mr. Young, at the bedside. CM asked pt if he lives with his daughter. Pt states that he lives with family in [facility]. He is independent with ADL's, still drives. Pt is current for Hemodialysis at [facility] on MWF at 1300. Pt states that they removed his infected HD catheter today and started using his AV Fistula for HD. Pt plans to discharge home. No HH/OPPT. Pt states that family can transport him home. No needs voiced or identified. PCP-Samir Chow. Confirmed Medicare A&B and prescriptions filled at [facility].  will continue to follow.    Final Discharge Disposition Code 01 - home or self-care                  Continued Care and Services - Admitted Since 1/13/2024       Dialysis/Infusion       Service Provider Request Status Selected Services Address Phone Fax Patient Preferred    [facility]  Selected In-Center Hemodialysis  [address] [facility] [address], [place] [id number] [phone] --       Internal Comment last updated by Karmen Adan RN 1/15/2024 0840    1/15- Confirmed with Current MWF at   [phone]                             Expected Discharge Date and Time       Expected Discharge Date Expected Discharge Time    Jan 17, 2024            Demographic Summary       Row Name 01/15/24 1449       General Information    Admission Type inpatient    Reason for Consult discharge planning    Preferred Language English       Contact Information    Permission Granted to Share Info With                    Functional Status       Row Name 01/15/24 1449       Functional Status    Usual Activity Tolerance good       Functional Status, IADL    Medications independent    Meal Preparation independent    Housekeeping independent    Laundry independent    Shopping independent                   Psychosocial    No documentation.                  Abuse/Neglect    No documentation.                  Legal    No documentation.                  Substance Abuse    No documentation.                  Patient Forms    No documentation.                     Karmen Adan RN

## 2024-01-16 ENCOUNTER — READMISSION MANAGEMENT (OUTPATIENT)
Dept: CALL CENTER | Facility: HOSPITAL | Age: 64
End: 2024-01-16
Payer: MEDICARE

## 2024-01-16 VITALS
RESPIRATION RATE: 18 BRPM | WEIGHT: 218.03 LBS | HEIGHT: 67 IN | HEART RATE: 76 BPM | SYSTOLIC BLOOD PRESSURE: 146 MMHG | OXYGEN SATURATION: 100 % | BODY MASS INDEX: 34.22 KG/M2 | DIASTOLIC BLOOD PRESSURE: 75 MMHG | TEMPERATURE: 98.6 F

## 2024-01-16 LAB
ALBUMIN SERPL-MCNC: 4.1 G/DL (ref 3.5–5.2)
ALBUMIN/GLOB SERPL: 1.7 G/DL
ALP SERPL-CCNC: 75 U/L (ref 39–117)
ALT SERPL W P-5'-P-CCNC: 8 U/L (ref 1–41)
ANION GAP SERPL CALCULATED.3IONS-SCNC: 14 MMOL/L (ref 5–15)
AST SERPL-CCNC: 14 U/L (ref 1–40)
BILIRUB SERPL-MCNC: 0.3 MG/DL (ref 0–1.2)
BUN SERPL-MCNC: 36 MG/DL (ref 8–23)
BUN/CREAT SERPL: 4.3 (ref 7–25)
CALCIUM SPEC-SCNC: 8.7 MG/DL (ref 8.6–10.5)
CHLORIDE SERPL-SCNC: 104 MMOL/L (ref 98–107)
CO2 SERPL-SCNC: 22 MMOL/L (ref 22–29)
CREAT SERPL-MCNC: 8.28 MG/DL (ref 0.76–1.27)
DEPRECATED RDW RBC AUTO: 47.8 FL (ref 37–54)
EGFRCR SERPLBLD CKD-EPI 2021: 6.6 ML/MIN/1.73
ERYTHROCYTE [DISTWIDTH] IN BLOOD BY AUTOMATED COUNT: 15.2 % (ref 12.3–15.4)
GLOBULIN UR ELPH-MCNC: 2.4 GM/DL
GLUCOSE SERPL-MCNC: 92 MG/DL (ref 65–99)
HCT VFR BLD AUTO: 31.2 % (ref 37.5–51)
HGB BLD-MCNC: 9.9 G/DL (ref 13–17.7)
MCH RBC QN AUTO: 29.9 PG (ref 26.6–33)
MCHC RBC AUTO-ENTMCNC: 31.7 G/DL (ref 31.5–35.7)
MCV RBC AUTO: 94.3 FL (ref 79–97)
PLATELET # BLD AUTO: 161 10*3/MM3 (ref 140–450)
PMV BLD AUTO: 9.2 FL (ref 6–12)
POTASSIUM SERPL-SCNC: 4.6 MMOL/L (ref 3.5–5.2)
PROT SERPL-MCNC: 6.5 G/DL (ref 6–8.5)
RBC # BLD AUTO: 3.31 10*6/MM3 (ref 4.14–5.8)
SODIUM SERPL-SCNC: 140 MMOL/L (ref 136–145)
WBC NRBC COR # BLD AUTO: 5.96 10*3/MM3 (ref 3.4–10.8)

## 2024-01-16 PROCEDURE — 80053 COMPREHEN METABOLIC PANEL: CPT | Performed by: HOSPITALIST

## 2024-01-16 PROCEDURE — 99239 HOSP IP/OBS DSCHRG MGMT >30: CPT | Performed by: HOSPITALIST

## 2024-01-16 PROCEDURE — 25010000002 HEPARIN (PORCINE) PER 1000 UNITS: Performed by: FAMILY MEDICINE

## 2024-01-16 PROCEDURE — 85027 COMPLETE CBC AUTOMATED: CPT | Performed by: HOSPITALIST

## 2024-01-16 RX ADMIN — SENNOSIDES AND DOCUSATE SODIUM 2 TABLET: 8.6; 5 TABLET ORAL at 08:04

## 2024-01-16 RX ADMIN — Medication 1 TABLET: at 08:04

## 2024-01-16 RX ADMIN — PANTOPRAZOLE SODIUM 40 MG: 40 TABLET, DELAYED RELEASE ORAL at 05:46

## 2024-01-16 RX ADMIN — SEVELAMER CARBONATE 800 MG: 800 TABLET, FILM COATED ORAL at 08:04

## 2024-01-16 RX ADMIN — HEPARIN SODIUM 5000 UNITS: 5000 INJECTION INTRAVENOUS; SUBCUTANEOUS at 08:04

## 2024-01-16 NOTE — PROGRESS NOTES
"   LOS: 3 days    Patient Care Team:  Samir Chow DO as PCP - General (Internal Medicine)  Provider, No Known as PCP - Family Medicine  Provider, No Known  Lora Dickey DO as Consulting Physician (Hospitalist)  Katie Lau MD as Consulting Physician (Hospitalist)    Subjective     Stable overnight.    Objective     Vital Signs:  Blood pressure 146/75, pulse 76, temperature 98.6 °F (37 °C), temperature source Oral, resp. rate 18, height 170.2 cm (67.01\"), weight 98.9 kg (218 lb 0.6 oz), SpO2 100%.      Intake/Output Summary (Last 24 hours) at 1/16/2024 1143  Last data filed at 1/16/2024 0854  Gross per 24 hour   Intake 340 ml   Output 1550 ml   Net -1210 ml        01/15 0701 - 01/16 0700  In: 100 [P.O.:100]  Out: 1550     Physical Exam:        GENERAL: WD HM NAD  NEURO: Awake and alert,  PSYCHIATRIC: Calm, cooperative with PE  CV: No edema,  LUNGS:  Quiet,  Nonlabored resp  ABDOMEN: Nondistended  : No [place]  SKIN: Warm and dry without rash  + RIJ TDC  .+ RUE AVG    Labs:  Results from last 7 days   Lab Units 01/16/24  0850 01/15/24  0727 01/14/24  0435   WBC 10*3/mm3 5.96 6.71 6.94   HEMOGLOBIN g/dL 9.9* 9.0* 8.9*   PLATELETS 10*3/mm3 161 156 167     Results from last 7 days   Lab Units 01/16/24  0850 01/15/24  0727 01/14/24  0435 01/13/24  1557   SODIUM mmol/L 140 141 140 143   POTASSIUM mmol/L 4.6 4.4 3.6 3.7   CHLORIDE mmol/L 104 103 101 100   CO2 mmol/L 22.0 23.0 25.0 29.0   BUN mg/dL 36* 54* 39* 33*   CREATININE mg/dL 8.28* 10.19* 7.83* 7.48*   CALCIUM mg/dL 8.7 8.6 8.8 9.6   PHOSPHORUS mg/dL  --  5.5*  --   --    ALBUMIN g/dL 4.1 3.8 3.8 4.5     Results from last 7 days   Lab Units 01/16/24  0850   ALK PHOS U/L 75   BILIRUBIN mg/dL 0.3   ALT (SGPT) U/L 8   AST (SGOT) U/L 14                   Estimated Creatinine Clearance: 10 mL/min (A) (by C-G formula based on SCr of 8.28 mg/dL (H)).         A/P:    ESRD: On HD MWF at [facility] with NAL..  Continue outpatient schedule.    HTN: Blood " pressure stable.  Watch with HD.    Anemia: Hemoglobin below goal at 9.0.  Give Epogen.  Transfuse as needed for Hgb <7.    Volume: Stable.  UF with HD as tolerated to EDW.    Possible line infection: Patient has RIJ TDC with culture positive for possible staph epi resistant to methicillin.  + Positive blood cultures.  Graft is functioning.  Tunneled dialysis catheter removed 1/15/2024.    Shantanu Brown MD  01/16/24  11:43 EST

## 2024-01-16 NOTE — DISCHARGE PLACEMENT REQUEST
" [phone]    Jun Young (63 y.o. Male)       Date of Birth   1960    Social Security Number       Address   [address] [place] [address]    Home Phone   [phone]    MRN   [id number]       Judaism   None    Marital Status   Single                            Admission Date   1/13/24    Admission Type   Emergency    Admitting Provider   Libertad Lin DO    Attending Provider   Libertad Lin DO    Department, Room/Bed   [facility] 5H, S581/1       Discharge Date       Discharge Disposition   Home or Self Care    Discharge Destination                                 Attending Provider: Libertad Lin DO    Allergies: No Known Allergies    Isolation: None   Infection: None   Code Status: CPR    Ht: 170.2 cm (67.01\")   Wt: 98.9 kg (218 lb 0.6 oz)    Admission Cmt: None   Principal Problem: Infection of hemodialysis tunneled catheter [T82.7XXA]                   Active Insurance as of 1/13/2024       Primary Coverage       Payor Plan Insurance Group Employer/Plan Group    MEDICARE MEDICARE A & B        Payor Plan Address Payor Plan Phone Number Payor Plan Fax Number Effective Dates    [address] [phone]  11/1/2023 - None Entered    [place] [address]         Subscriber Name Subscriber Birth Date Member ID       JUN YOUNG 1960 [id number]                     Emergency Contacts        (Rel.) Home Phone Work Phone Mobile Phone    VENECIA CAMPOS (Daughter) [phone] -- [phone]    TAWANDA CAMPOS (Son) [phone] -- [phone]    ANDRESTIENO (Son) [phone] -- --           Vancomycin HCl (VANCOMYCIN - NO SCHEDULED DOSE) [[id number]] (Order [id number])  Order  Date: 1/16/2024 Department: [address] Ordering/Authorizing: Libertad Lin DO     Medication  Vancomycin HCl (VANCOMYCIN - NO SCHEDULED DOSE) [[id number]]  Order History  Outpatient  Date/Time Action Taken User " Additional Information   01/16/24 0934 Sign Libertad Lin DO Reorder from Order:[id number]   01/16/24 0934 Taking Flag Checked Libertad Lin DO [id number]     Outpatient Morphine Milligram Equivalents Per Day  Expand All  Collapse All  No orders with morphine equivalence     Vancomycin HCl (VANCOMYCIN - NO SCHEDULED DOSE) [[id number]]    Order Details  Dose: -- Route: Does not apply Frequency: Every 48 Hours   Dispense Quantity: -- Refills: --          Sig: Use Every Other Day for 3 doses.         Start Date: 01/16/24 End Date: 01/21/24 after 3 doses   Written Date: 01/16/24 Expiration Date: 01/15/25   Providers    Ordering Provider and Authorizing Provider:  Libertad Lin DO  [address] [facility] [address]  Phone: [phone]   Fax: [phone]  NPI: [id number]        Ordering User: Libertad Lin DO          Orders with any of the following pharmaceutical classes: Misc. Antiinfectives    Name Dose Frequency Start Date End Date Medication Warnings Interventions? Order Mode    vancomycin (dosing per levels)  Daily 01/14/24 1015 01/21/24 0859   Inpatient    Pharmacy to dose vancomycin  Continuous PRN 01/13/24 1940 01/20/24 1939  Yes Inpatient    vancomycin 500 mg IVPB in 100 mL NS (MBP) 500 mg Once 01/15/24 1500 01/15/24 1532   Inpatient    vancomycin in dextrose 5% 150 mL (VANCOCIN) IVPB 750 mg 750 mg Once 01/14/24 1500 01/14/24 1638   Inpatient    vancomycin IVPB 1500 mg in 0.9% NaCl (Premix) 500 mL 20 mg/kg Once 01/13/24 1945 01/13/24 2259   Inpatient      Warnings Override History    No Interaction Warnings Shown      Pharmacist Clinical Review History    This prescription has not been clinically reviewed.     Order Reconciliation Actions       Order Reconciliation Actions     E-Prescribing Status    Outpatient Medication Detail    Vancomycin HCl (VANCOMYCIN - NO SCHEDULED DOSE)        Sig: Use Every Other Day for 3 doses.        Class: No Print        Route: Does not apply           Event History       Event History     Tracking Reports    Cosign Tracking Order Transmittal Tracking            Discharge Summary        Libertad Lin DO at 24 0939              [facility] Medicine Services  DISCHARGE SUMMARY    Patient Name: Jun Young  : 1960  MRN: [id number]    Date of Admission: 2024  3:59 PM  Date of Discharge:  2024  Primary Care Physician: Samir Chow DO    Consults       Date and Time Order Name Status Description    2024 12:24 PM Inpatient Infectious Diseases Consult Completed     2024  7:56 PM Inpatient Nephrology Consult              Hospital Course     Presenting Problem: Infected tunneled catheter      Active Hospital Problems    Diagnosis  POA    **Infection of hemodialysis tunneled catheter [T82.7XXA]  Yes      Resolved Hospital Problems   No resolved problems to display.          Hospital Course:  Jun Young is a 63 y.o. male with past medical history of end-stage renal disease on hemodialysis, GERD, hyperlipidemia, diabetes mellitus, hypertension, history of CVA who presented to the emergency department because of an abnormal culture of an infected tunneled dialysis catheter.  There were 2 pansensitive organisms including Corynebacterium and coagulase-negative Staphylococcus.      This patient's problems and plans were partially entered by my partner and updated as appropriate by me 01/15/24.     All problems are new to me today.     Cellulitis associated with infected tunneled HD catheter  Coagulase-negative staphylococcal bacteremia- POA  Corynebacterium/staph epidermidis tunneled hemodialysis catheter infection-versus colonization- POA  - s/p Rocephin and Vancomycin  -- ID followed  -- Nephrology followed  - tunneled dialysis catheter removed  -- ECHO: EF 70%; Cannot exclude small mobile echogenic mass on the anterior papillary muscle.   -- Vancomycin 500mg IV at each  dialysis session as an outpatient x 3 doses     End-stage renal disease on hemodialysis  - Nephrology followed  - HD M/W/F     Diabetes mellitus Type 2  --continue home meds     Hypertension  -no home meds- no longer on amlodipine      Discharge Follow Up Recommendations for outpatient labs/diagnostics:  -- Vancomycin 500mg IV at each dialysis session as an outpatient x 3 doses    Day of Discharge     HPI:   Patient resting, stable overnight. Ok for d/c home.    Review of Systems  Gen- No fevers, chills  CV- No chest pain, palpitations  Resp- No cough, dyspnea  GI- No N/V/D, abd pain    Vital Signs:   Temp:  [97.4 °F (36.3 °C)-98.7 °F (37.1 °C)] 98.6 °F (37 °C)  Heart Rate:  [65-80] 76  Resp:  [12-18] 18  BP: (142-157)/(71-84) 146/75      Physical Exam:  Constitutional: No acute distress, awake, alert  HENT: NCAT, mucous membranes moist  Respiratory: Clear to auscultation bilaterally, respiratory effort normal   Cardiovascular: RRR, no murmurs, rubs, or gallops  Gastrointestinal: Positive bowel sounds, soft, nontender, nondistended  Musculoskeletal: No bilateral ankle edema  Psychiatric: Appropriate affect, cooperative  Neurologic: Oriented x 3, strength symmetric in all extremities, Cranial Nerves grossly intact to confrontation, speech clear  Skin: No rashes    Pertinent  and/or Most Recent Results     LAB RESULTS:      Lab 01/15/24  1237 01/15/24  0727 01/14/24  0435 01/13/24  1557   WBC  --  6.71 6.94 7.20   HEMOGLOBIN  --  9.0* 8.9* 10.5*   HEMATOCRIT  --  27.6* 27.2* 31.8*   PLATELETS  --  156 167 196   NEUTROS ABS  --   --  4.56 4.86   IMMATURE GRANS (ABS)  --   --  0.03 0.05   LYMPHS ABS  --   --  1.09 1.00   MONOS ABS  --   --  0.56 0.57   EOS ABS  --   --  0.66* 0.66*   MCV  --  93.6 91.9 93.0   PROCALCITONIN  --   --   --  0.76*   LACTATE  --   --   --  1.1   PROTIME 12.9  --   --   --          Lab 01/15/24  0727 01/14/24  0435 01/13/24  1557   SODIUM 141 140 143   POTASSIUM 4.4 3.6 3.7   CHLORIDE 103 101  100   CO2 23.0 25.0 29.0   ANION GAP 15.0 14.0 14.0   BUN 54* 39* 33*   CREATININE 10.19* 7.83* 7.48*   EGFR 5.2* 7.1* 7.5*   GLUCOSE 94 90 113*   CALCIUM 8.6 8.8 9.6   PHOSPHORUS 5.5*  --   --          Lab 01/15/24  0727 01/14/24  0435 01/13/24  1557   TOTAL PROTEIN 6.0 6.1 7.9   ALBUMIN 3.8 3.8 4.5   GLOBULIN 2.2 2.3 3.4   ALT (SGPT) 9 8 10   AST (SGOT) 14 14 19   BILIRUBIN 0.2 0.2 0.3   ALK PHOS 69 64 77         Lab 01/15/24  1237   PROTIME 12.9   INR 0.96                 Brief Urine Lab Results  (Last result in the past 365 days)        Color   Clarity   Blood   Leuk Est   Nitrite   Protein   CREAT   Urine HCG        06/08/23 0838 Other  Comment: light pink   Cloudy  Comment: has foul smell   3+   Large (3+)   Negative   3+                 Microbiology Results (last 10 days)       Procedure Component Value - Date/Time    Catheter Culture - Cath Tip, Chest Wall [[id number]] Collected: 01/15/24 1342    Lab Status: Preliminary result Specimen: Cath Tip from Chest Wall Updated: 01/16/24 0819     CATHETER CULTURE No growth    Blood Culture - Blood, Wrist, Right [[id number]]  (Normal) Collected: 01/13/24 1603    Lab Status: Preliminary result Specimen: Blood from Wrist, Right Updated: 01/15/24 1645     Blood Culture No growth at 2 days    Narrative:      Less than seven (7) mL's of blood was collected.  Insufficient quantity may yield false negative results.    Blood Culture - Blood, Arm, Right [[id number]]  (Normal) Collected: 01/13/24 1557    Lab Status: Preliminary result Specimen: Blood from Arm, Right Updated: 01/15/24 1645     Blood Culture No growth at 2 days            IR Tunneled Intraperitoneal Catheter Removal    Result Date: 1/15/2024  DATE OF EXAM: 1/15/2024 1:30 PM EST PROCEDURE: IR TUNNELED INTRAPERITONEAL CATHETER REMOVAL INDICATIONS: Line infection.  Patient now with functional graft. COMPARISON: No Comparisons Available FLUOROSCOPIC TIME: None PHYSICIAN MONITORED CONSCIOUS SEDATION TIME: None minutes  TECHNIQUE: A detailed explanation of the procedure, including the risks, benefits, and alternatives was provided. A preprocedure timeout was performed. The patient was placed supine in the bed and the pre-existing PermCath and skin was prepped and draped in the usual sterile fashion. The skin was anesthetized with 1% lidocaine. Next, using sharp and blunt dissection the PermCath was freed from the subcutaneous tissues and removed in toto. The patient tolerated the procedure well without immediate complication. FINDINGS: See above.     1. Successful right IJ PermCath removal. Electronically Signed: Moi Bowers MD  1/15/2024 2:09 PM EST  Workstation ID: [id number]    Adult Transthoracic Echo Complete W/ Cont if Necessary Per Protocol    Result Date: 1/14/2024    Left ventricular systolic function is hyperdynamic (EF > 70%). Left ventricular ejection fraction appears to be greater than 70%.   Left ventricular wall thickness is consistent with mild concentric hypertrophy.   Left ventricular diastolic function was normal.   Estimated right ventricular systolic pressure from tricuspid regurgitation is normal (<35 mmHg).   There is a trivial pericardial effusion.   Cannot exclude small mobile echogenic mass on the anterior papillary muscle.  Consider TIEN.      Results for orders placed during the hospital encounter of 02/07/23    Duplex Vein Mapping Study Upper Extremity - Left CAR    Interpretation Summary    Inadequate/small left cephalic vein    Adequate/good basilic vein.    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      Results for orders placed during the hospital encounter of 02/07/23    Duplex Vein Mapping Study Upper Extremity - Left CAR    Interpretation Summary    Inadequate/small left cephalic vein    Adequate/good basilic vein.    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      Results for orders placed during the hospital  encounter of 01/13/24    Adult Transthoracic Echo Complete W/ Cont if Necessary Per Protocol    Interpretation Summary    Left ventricular systolic function is hyperdynamic (EF > 70%). Left ventricular ejection fraction appears to be greater than 70%.    Left ventricular wall thickness is consistent with mild concentric hypertrophy.    Left ventricular diastolic function was normal.    Estimated right ventricular systolic pressure from tricuspid regurgitation is normal (<35 mmHg).    There is a trivial pericardial effusion.    Cannot exclude small mobile echogenic mass on the anterior papillary muscle.  Consider TIEN.      Plan for Follow-up of Pending Labs/Results: negative to date  Pending Labs       Order Current Status    CBC (No Diff) In process    Comprehensive Metabolic Panel In process    Blood Culture - Blood, Arm, Right Preliminary result    Blood Culture - Blood, Wrist, Right Preliminary result    Catheter Culture - Cath Tip, Chest Wall Preliminary result          Discharge Details        Discharge Medications        New Medications        Instructions Start Date   VANCOMYCIN - NO SCHEDULED DOSE   Does not apply, Every 48 Hours             Continue These Medications        Instructions Start Date   acetaminophen 325 MG tablet  Commonly known as: TYLENOL   650 mg, Oral, Every 4 Hours PRN      pantoprazole 40 MG EC tablet  Commonly known as: PROTONIX   40 mg, Oral, Daily      KAYLEIGH-LASHON PO   1 tablet, Oral, Daily      sennosides-docusate 8.6-50 MG per tablet  Commonly known as: PERICOLACE   2 tablets, Oral, 2 Times Daily PRN      sevelamer 800 MG tablet  Commonly known as: RENVELA   800 mg, Oral, 3 Times Daily With Meals      simethicone 80 MG chewable tablet  Commonly known as: MYLICON   Chew and swallow 1 tablet by mouth 4 (Four) Times a Day As Needed for Flatulence.               No Known Allergies      Discharge Disposition:  Home or Self Care    Diet:  Hospital:  Diet Order   Procedures    Diet: Renal  Diets; Low Sodium (2-3g), Low Potassium, Low Phosphorus; Texture: Regular Texture (IDDSI 7); Fluid Consistency: Thin (IDDSI 0)            Activity: as tolerated      Restrictions or Other Recommendations:  none       CODE STATUS:    Code Status and Medical Interventions:   Ordered at: 01/13/24 1940     Code Status (Patient has no pulse and is not breathing):    CPR (Attempt to Resuscitate)     Medical Interventions (Patient has pulse or is breathing):    Full Support       Additional Instructions for the Follow-ups that You Need to Schedule       Discharge Follow-up with Specified Provider: IV antibiotics with dialysis x 3 doses.   As directed      To: IV antibiotics with dialysis x 3 doses.                      Libertad Lin DO  01/16/24      Time Spent on Discharge:  I spent  35  minutes on this discharge activity which included: face-to-face encounter with the patient, reviewing the data in the system, coordination of the care with the nursing staff as well as consultants, documentation, and entering orders.            Electronically signed by Libertad Lin DO at 01/16/24 5831

## 2024-01-16 NOTE — OUTREACH NOTE
Prep Survey      Flowsheet Row Responses   [facility] patient discharged from? [place]   Is LACE score < 7 ? No   Eligibility [facility]   Date of Admission 01/13/24   Date of Discharge 01/16/24   Discharge Disposition Home or Self Care   Discharge diagnosis Infection of hemodialysis tunneled catheter   Does the patient have one of the following disease processes/diagnoses(primary or secondary)? Other   Does the patient have Home health ordered? No   Is there a DME ordered? No   Comments regarding appointments [facility]   Medication alerts for this patient IV ABX with Diaysis session OP x 3 doses   Prep survey completed? Yes            Galilea ANTHONY - Registered Nurse

## 2024-01-16 NOTE — CASE MANAGEMENT/SOCIAL WORK
Case Management Discharge Note      Final Note: Pt is being discharged home today.  spoke with [facility] and confirmed pt was actively receiving Hemodialysis at their location. They requested CM fax discharge summary, Dr. Butts's note and Dr. Butts's order for IV Vanc to [phone]. All information has been e-faxed.         Selected Continued Care - Admitted Since 1/13/2024       Destination    No services have been selected for the patient.                Durable Medical Equipment    No services have been selected for the patient.                Dialysis/Infusion Coordination complete.      Service Provider Selected Services Address Phone Fax Patient Preferred    [facility] [address], [place] [address] [phone] --       Internal Comment last updated by Karmen Adan, RN 1/15/2024 0840    1/15- Confirmed with Current [facility] at   [phone]                         Home Medical Care    No services have been selected for the patient.                Therapy    No services have been selected for the patient.                Community Resources    No services have been selected for the patient.                Community & DME    No services have been selected for the patient.                         Final Discharge Disposition Code: 01 - home or self-care

## 2024-01-16 NOTE — DISCHARGE PLACEMENT REQUEST
" [phone]  I will fax D/C summary once it's available  I attached Infectious Disease note here    Jun Young (63 y.o. Male)       Date of Birth   1960    Social Security Number       Address   [address] [place] [address]    Home Phone   [phone]    MRN   [id number]       Worship   None    Marital Status   Single                            Admission Date   1/13/24    Admission Type   Emergency    Admitting Provider   Libertad Lin DO    Attending Provider   Libertad Lin DO    Department, Room/Bed   Psychiatric 5H, S581/1       Discharge Date       Discharge Disposition       Discharge Destination                                 Attending Provider: Libertad Lin DO    Allergies: No Known Allergies    Isolation: None   Infection: None   Code Status: CPR    Ht: 170.2 cm (67.01\")   Wt: 98.9 kg (218 lb 0.6 oz)    Admission Cmt: None   Principal Problem: Infection of hemodialysis tunneled catheter [T82.7XXA]                   Active Insurance as of 1/13/2024       Primary Coverage       Payor Plan Insurance Group Employer/Plan Group    MEDICARE MEDICARE A & B        Payor Plan Address Payor Plan Phone Number Payor Plan Fax Number Effective Dates    [address] [phone]  11/1/2023 - None Entered    [place] [address]         Subscriber Name Subscriber Birth Date Member ID       JUN YOUNG 1960 [id number]                     Emergency Contacts        (Rel.) Home Phone Work Phone Mobile Phone    VENECIA CAMPOS (Daughter) [phone] -- [phone]    ANDRESTAWANDA (Son) [phone] -- [phone]    CAMPOSCORTEZ DOWD (Son) [phone] -- --        INFECTIOUS DISEASE Progress Note     Jun Young  1960  [id number]     Admission Date: 1/13/2024     Requesting Provider: No ref. provider found  Evaluating Physician: Fredy Butts MD  Reason for Consultation: bacteremia    "   History of present illness:    1/14/24: Patient is a 63 y.o. male, with PMH ESRD/HD, DM, GERD, HTN, prostate cancer, CVA, seen today for bacteremia. His left AVF and right chest wall HD catheter placed 12/8.  He developed some itching of his right chest HD catheter and last week at HD, blood cultures were obtained, and reportedly positive and he was notified to present to the ED. He brought the lab result, apparently lost in ED, positive for GPC in pairs, in clusters,  Corynebacterium and Staph epidermidis. His wife is bringing back the copy of blood cultures. Admitting labs with WBC 7.2,. PCT 0.76, lactate 1.1, Scr 7.48, and repeat cultures pending. He denies fever, chills, sweats.  We were consulted for evaluation and treatment.   Records brought in by the family this afternoon reveal Staph epidermidis/corynebacterium on dialysis catheter exit site cultures and a blood culture with gram-positive cocci that has not yet been identified.     1/15/24: He has remained afebrile. The positive blood culture from 1/10 was identified as coagulase-negative staph.  The isolate was resistant to oxacillin and clindamycin but sensitive to most other antibiotics.  Blood cultures here are negative.His tunneled dialysis catheter has now been removed.  He has been able to utilize his left arm fistula.     Medical History        Past Medical History:   Diagnosis Date    Acute on chronic renal failure      Bilateral hydronephrosis      BPH (benign prostatic hyperplasia)      Cholelithiasis      Diabetes mellitus      Dyslipidemia      Elevated cholesterol      GERD (gastroesophageal reflux disease)      Gross hematuria      Hemodialysis patient      History of transfusion       BHL, no reactions per pt    Hyperlipidemia      Hypertension      Pericardial effusion       echo 2/2023 - 1-2 cm preserved LV function    Stroke       no residual effects          Surgical History         Past Surgical History:   Procedure Laterality Date     COLONOSCOPY        CYSTOSCOPY BLADDER BIOPSY N/A 02/09/2023     Procedure: CYSTOSCOPY CLOT EVACUATION WITH FULGURATION, RETROGRADE PYELOGRAM;  Surgeon: Mandi Velasco MD;  Location:  [facility];  Service: Urology;  Laterality: N/A;    CYSTOSCOPY BLADDER BIOPSY N/A 03/18/2023     Procedure: CYSTOSCOPY WITH FULGURATION OF BLEEDERS;  Surgeon: Santana Christianson MD;  Location:  [facility];  Service: Urology;  Laterality: N/A;    CYSTOSCOPY TRANSURETHRAL RESECTION OF PROSTATE N/A 5/5/2023     Procedure: CYSTOSCOPY TRANSURETHRAL RESECTION OF PROSTATE (BIPOLAR);  Surgeon: Pawan Damico MD;  Location:  [facility];  Service: Urology;  Laterality: N/A;    CYSTOSCOPY WITH CLOT EVACUATION         fulguration - 3 way catheter placement    INSERTION HEMODIALYSIS CATHETER              Family History   Family history unknown: Yes         Social History   Social History            Socioeconomic History    Marital status: Single   Tobacco Use    Smoking status: Never       Passive exposure: Never    Smokeless tobacco: Never   Vaping Use    Vaping Use: Never used   Substance and Sexual Activity    Alcohol use: Never    Drug use: Never    Sexual activity: Not Currently         No Known Allergies   Medication:     Current Facility-Administered Medications:     sennosides-docusate (PERICOLACE) 8.6-50 MG per tablet 2 tablet, 2 tablet, Oral, BID, 2 tablet at 01/14/24 0936 **AND** polyethylene glycol (MIRALAX) packet 17 g, 17 g, Oral, Daily PRN **AND** bisacodyl (DULCOLAX) EC tablet 5 mg, 5 mg, Oral, Daily PRN **AND** bisacodyl (DULCOLAX) suppository 10 mg, 10 mg, Rectal, Daily PRN, Syed Phillips DO    heparin (porcine) 5000 UNIT/ML injection 5,000 Units, 5,000 Units, Subcutaneous, Q12H, Syed Phillips DO, 5,000 Units at 01/14/24 2008    nitroglycerin (NITROSTAT) SL tablet 0.4 mg, 0.4 mg, Sublingual, Q5 Min PRN, Syed Phillips DO    pantoprazole (PROTONIX) EC tablet 40 mg, 40 mg, Oral, Q AM, Libertad Lin, DO, 40 mg at 01/15/24  0519    Pharmacy to dose vancomycin, , Does not apply, Continuous PRN, Syed Phillips DO    sodium chloride 0.9 % flush 10 mL, 10 mL, Intravenous, PRN, Emergency, Triage Protocol, MD    sodium chloride 0.9 % flush 10 mL, 10 mL, Intravenous, Q12H, Syed Phillips DO, 10 mL at 24    sodium chloride 0.9 % flush 10 mL, 10 mL, Intravenous, PRN, Syed Phillips DO    sodium chloride 0.9 % infusion 40 mL, 40 mL, Intravenous, PRN, Syed Phillips DO    vancomycin (dosing per levels), , Does not apply, Daily, Yonathan Dillon RPH     Antibiotics:  Anti-Infectives (From admission, onward)        Ordered     Dose/Rate Route Frequency Start Stop     24 1333   vancomycin in dextrose 5% 150 mL (VANCOCIN) IVPB 750 mg        Ordering Provider: Yonathan Dillon RPH    750 mg  over 45 Minutes Intravenous Once 24 1500 24 1638     24 0920   vancomycin (dosing per levels)        Ordering Provider: Yonathan Dillon RPH      Does not apply Daily 24 1015 24 0859     24 1852   vancomycin IVPB 1500 mg in 0.9% NaCl (Premix) 500 mL        Ordering Provider: Zion Bustillos IV, PharmD    20 mg/kg × 74 kg  333.3 mL/hr over 90 Minutes Intravenous Once 24 1945 24 2259     24 1940   Pharmacy to dose vancomycin        Ordering Provider: Syed Phillips DO      Does not apply Continuous PRN 24 1940 24 1939     24 1726   cefTRIAXone (ROCEPHIN) 2,000 mg in sodium chloride 0.9 % 100 mL IVPB        Ordering Provider: Layne Frazier PA    2,000 mg  200 mL/hr over 30 Minutes Intravenous Once 24 1742 24 1904           Review of Systems:  See HPI   Physical Exam:   Vital Signs  Temp (24hrs), Av.1 °F (36.2 °C), Min:96.3 °F (35.7 °C), Max:97.9 °F (36.6 °C)     Temp  Min: 96.3 °F (35.7 °C)  Max: 97.9 °F (36.6 °C)  BP  Min: 149/80  Max: 167/81  Pulse  Min: 68  Max: 85  Resp  Min: 16  Max: 18  SpO2  Min: 94 %  Max: 97 %     GENERAL: Awake and  "alert, in no acute distress.   HEENT: Normocephalic, atraumatic.  PERRL. EOMI. No conjunctival injection. No icterus. Oropharynx clear without evidence of thrush or exudate.   NECK: Supple   Chest Wall: His right anterior chest wall dialysis catheter has been removed  HEART: RRR; No murmur, rubs, gallops.   LUNGS: Clear to auscultation bilaterally without wheezing, rales, rhonchi. Normal respiratory effort. Nonlabored.   ABDOMEN: Soft, nontender, nondistended. No rebound or guarding. NO mass or HSM.  EXT:  No cyanosis, clubbing or edema. No cord.  :  Without Whitfield catheter.  MSK: No joint effusions or erythema  SKIN: No diffuse rash  NEURO: Oriented to PPT.  Motor 5/5 strength  PSYCHIATRIC: Normal insight and judgment. Cooperative with PE   Laboratory Data            Results from last 7 days   Lab Units 01/15/24  0727 01/14/24  0435 01/13/24  1557   WBC 10*3/mm3 6.71 6.94 7.20   HEMOGLOBIN g/dL 9.0* 8.9* 10.5*   HEMATOCRIT % 27.6* 27.2* 31.8*   PLATELETS 10*3/mm3 156 167 196           Results from last 7 days   Lab Units 01/15/24  0727   SODIUM mmol/L 141   POTASSIUM mmol/L 4.4   CHLORIDE mmol/L 103   CO2 mmol/L 23.0   BUN mg/dL 54*   CREATININE mg/dL 10.19*   GLUCOSE mg/dL 94   CALCIUM mg/dL 8.6           Results from last 7 days   Lab Units 01/15/24  0727   ALK PHOS U/L 69   BILIRUBIN mg/dL 0.2   ALT (SGPT) U/L 9   AST (SGOT) U/L 14             Results from last 7 days   Lab Units 01/13/24  1557   LACTATE mmol/L 1.1             Results from last 7 days   Lab Units 01/15/24  0727   VANCOMYCIN RM mcg/mL 28.30      Estimated Creatinine Clearance: 8.1 mL/min (A) (by C-G formula based on SCr of 10.19 mg/dL (H)).  Microbiology:  No results found for: \"ACANTHNAEG\", \"AFBCX\", \"BPERTUSSISCX\", \"BLOODCX\"  No results found for: \"BCIDPCR\", \"CXREFLEX\", \"CSFCX\", \"CULTURETIS\"  No results found for: \"CULTURES\", \"HSVCX\", \"URCX\"  No results found for: \"EYECULTURE\", \"GCCX\", \"HSVCULTURE\", \"LABHSV\"  No results found for: \"LEGIONELLA\", " "\"MRSACX\", \"MUMPSCX\", \"MYCOPLASCX\"  No results found for: \"NOCARDIACX\", \"STOOLCX\"  No results found for: \"THROATCX\", \"UNSTIMCULT\", \"URINECX\", \"CULTURE\", \"VZVCULTUR\"  No results found for: \"VIRALCULTU\", \"WOUNDCX\"   Radiology:  Imaging Results (Last 72 Hours)         ** No results found for the last 72 hours. **           Impression:  1.  Coagulase-negative staphylococcal bacteremia-most likely secondary to hemo-dialysis catheter infection. His hemodialysis catheter is now been removed.  2.  Corynebacterium/staph epidermidis tunneled hemodialysis catheter infection-versus colonization  3.  End-stage renal disease-on hemodialysis  4.  Type 2 diabetes mellitus  5.  History of CVA      PLAN/RECOMMENDATIONS:   Tunneled hemodialysis cath removal-done  Discharge to home tomorrow  Vancomycin 500 mg IV at each dialysis as an outpatient ×3 doses     I discussed his complex situation with Dr. Micky Brown today.     Fredy Butts MD  1/15/2024  09:06 EST              "

## 2024-01-16 NOTE — DISCHARGE SUMMARY
[facility] Medicine Services  DISCHARGE SUMMARY    Patient Name: Jun Young  : 1960  MRN: [id number]    Date of Admission: 2024  3:59 PM  Date of Discharge:  2024  Primary Care Physician: Samir Chow DO    Consults       Date and Time Order Name Status Description    2024 12:24 PM Inpatient Infectious Diseases Consult Completed     2024  7:56 PM Inpatient Nephrology Consult              Hospital Course     Presenting Problem: Infected tunneled catheter      Active Hospital Problems    Diagnosis  POA    **Infection of hemodialysis tunneled catheter [T82.7XXA]  Yes      Resolved Hospital Problems   No resolved problems to display.          Hospital Course:  Jun Young is a 63 y.o. male with past medical history of end-stage renal disease on hemodialysis, GERD, hyperlipidemia, diabetes mellitus, hypertension, history of CVA who presented to the emergency department because of an abnormal culture of an infected tunneled dialysis catheter.  There were 2 pansensitive organisms including Corynebacterium and coagulase-negative Staphylococcus.      This patient's problems and plans were partially entered by my partner and updated as appropriate by me 01/15/24.     All problems are new to me today.     Cellulitis associated with infected tunneled HD catheter  Coagulase-negative staphylococcal bacteremia- POA  Corynebacterium/staph epidermidis tunneled hemodialysis catheter infection-versus colonization- POA  - s/p Rocephin and Vancomycin  -- ID followed  -- Nephrology followed  - tunneled dialysis catheter removed  -- ECHO: EF 70%; Cannot exclude small mobile echogenic mass on the anterior papillary muscle.   -- Vancomycin 500mg IV at each dialysis session as an outpatient x 3 doses     End-stage renal disease on hemodialysis  - Nephrology followed  - HD M/W/F     Diabetes mellitus Type 2  --continue home meds      Hypertension  -no home meds- no longer on amlodipine      Discharge Follow Up Recommendations for outpatient labs/diagnostics:  -- Vancomycin 500mg IV at each dialysis session as an outpatient x 3 doses    Day of Discharge     HPI:   Patient resting, stable overnight. Ok for d/c home.    Review of Systems  Gen- No fevers, chills  CV- No chest pain, palpitations  Resp- No cough, dyspnea  GI- No N/V/D, abd pain    Vital Signs:   Temp:  [97.4 °F (36.3 °C)-98.7 °F (37.1 °C)] 98.6 °F (37 °C)  Heart Rate:  [65-80] 76  Resp:  [12-18] 18  BP: (142-157)/(71-84) 146/75      Physical Exam:  Constitutional: No acute distress, awake, alert  HENT: NCAT, mucous membranes moist  Respiratory: Clear to auscultation bilaterally, respiratory effort normal   Cardiovascular: RRR, no murmurs, rubs, or gallops  Gastrointestinal: Positive bowel sounds, soft, nontender, nondistended  Musculoskeletal: No bilateral ankle edema  Psychiatric: Appropriate affect, cooperative  Neurologic: Oriented x 3, strength symmetric in all extremities, Cranial Nerves grossly intact to confrontation, speech clear  Skin: No rashes    Pertinent  and/or Most Recent Results     LAB RESULTS:      Lab 01/15/24  1237 01/15/24  0727 01/14/24  0435 01/13/24  1557   WBC  --  6.71 6.94 7.20   HEMOGLOBIN  --  9.0* 8.9* 10.5*   HEMATOCRIT  --  27.6* 27.2* 31.8*   PLATELETS  --  156 167 196   NEUTROS ABS  --   --  4.56 4.86   IMMATURE GRANS (ABS)  --   --  0.03 0.05   LYMPHS ABS  --   --  1.09 1.00   MONOS ABS  --   --  0.56 0.57   EOS ABS  --   --  0.66* 0.66*   MCV  --  93.6 91.9 93.0   PROCALCITONIN  --   --   --  0.76*   LACTATE  --   --   --  1.1   PROTIME 12.9  --   --   --          Lab 01/15/24  0727 01/14/24  0435 01/13/24  1557   SODIUM 141 140 143   POTASSIUM 4.4 3.6 3.7   CHLORIDE 103 101 100   CO2 23.0 25.0 29.0   ANION GAP 15.0 14.0 14.0   BUN 54* 39* 33*   CREATININE 10.19* 7.83* 7.48*   EGFR 5.2* 7.1* 7.5*   GLUCOSE 94 90 113*   CALCIUM 8.6 8.8 9.6    PHOSPHORUS 5.5*  --   --          Lab 01/15/24  0727 01/14/24  0435 01/13/24  1557   TOTAL PROTEIN 6.0 6.1 7.9   ALBUMIN 3.8 3.8 4.5   GLOBULIN 2.2 2.3 3.4   ALT (SGPT) 9 8 10   AST (SGOT) 14 14 19   BILIRUBIN 0.2 0.2 0.3   ALK PHOS 69 64 77         Lab 01/15/24  1237   PROTIME 12.9   INR 0.96                 Brief Urine Lab Results  (Last result in the past 365 days)        Color   Clarity   Blood   Leuk Est   Nitrite   Protein   CREAT   Urine HCG        06/08/23 0838 Other  Comment: light pink   Cloudy  Comment: has foul smell   3+   Large (3+)   Negative   3+                 Microbiology Results (last 10 days)       Procedure Component Value - Date/Time    Catheter Culture - Cath Tip, Chest Wall [[id number]] Collected: 01/15/24 1342    Lab Status: Preliminary result Specimen: Cath Tip from Chest Wall Updated: 01/16/24 0819     CATHETER CULTURE No growth    Blood Culture - Blood, Wrist, Right [[id number]]  (Normal) Collected: 01/13/24 1603    Lab Status: Preliminary result Specimen: Blood from Wrist, Right Updated: 01/15/24 1645     Blood Culture No growth at 2 days    Narrative:      Less than seven (7) mL's of blood was collected.  Insufficient quantity may yield false negative results.    Blood Culture - Blood, Arm, Right [[id number]]  (Normal) Collected: 01/13/24 1557    Lab Status: Preliminary result Specimen: Blood from Arm, Right Updated: 01/15/24 1645     Blood Culture No growth at 2 days            IR Tunneled Intraperitoneal Catheter Removal    Result Date: 1/15/2024  DATE OF EXAM: 1/15/2024 1:30 PM EST PROCEDURE: IR TUNNELED INTRAPERITONEAL CATHETER REMOVAL INDICATIONS: Line infection.  Patient now with functional graft. COMPARISON: No Comparisons Available FLUOROSCOPIC TIME: None PHYSICIAN MONITORED CONSCIOUS SEDATION TIME: None minutes TECHNIQUE: A detailed explanation of the procedure, including the risks, benefits, and alternatives was provided. A preprocedure timeout was performed. The patient was  placed supine in the bed and the pre-existing PermCath and skin was prepped and draped in the usual sterile fashion. The skin was anesthetized with 1% lidocaine. Next, using sharp and blunt dissection the PermCath was freed from the subcutaneous tissues and removed in toto. The patient tolerated the procedure well without immediate complication. FINDINGS: See above.     1. Successful right IJ PermCath removal. Electronically Signed: Moi Bowers MD  1/15/2024 2:09 PM EST  Workstation ID: [id number]    Adult Transthoracic Echo Complete W/ Cont if Necessary Per Protocol    Result Date: 1/14/2024    Left ventricular systolic function is hyperdynamic (EF > 70%). Left ventricular ejection fraction appears to be greater than 70%.   Left ventricular wall thickness is consistent with mild concentric hypertrophy.   Left ventricular diastolic function was normal.   Estimated right ventricular systolic pressure from tricuspid regurgitation is normal (<35 mmHg).   There is a trivial pericardial effusion.   Cannot exclude small mobile echogenic mass on the anterior papillary muscle.  Consider TIEN.      Results for orders placed during the hospital encounter of 02/07/23    Duplex Vein Mapping Study Upper Extremity - Left CAR    Interpretation Summary    Inadequate/small left cephalic vein    Adequate/good basilic vein.    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      Results for orders placed during the hospital encounter of 02/07/23    Duplex Vein Mapping Study Upper Extremity - Left CAR    Interpretation Summary    Inadequate/small left cephalic vein    Adequate/good basilic vein.    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      Results for orders placed during the hospital encounter of 01/13/24    Adult Transthoracic Echo Complete W/ Cont if Necessary Per Protocol    Interpretation Summary    Left ventricular systolic function is  hyperdynamic (EF > 70%). Left ventricular ejection fraction appears to be greater than 70%.    Left ventricular wall thickness is consistent with mild concentric hypertrophy.    Left ventricular diastolic function was normal.    Estimated right ventricular systolic pressure from tricuspid regurgitation is normal (<35 mmHg).    There is a trivial pericardial effusion.    Cannot exclude small mobile echogenic mass on the anterior papillary muscle.  Consider TIEN.      Plan for Follow-up of Pending Labs/Results: negative to date  Pending Labs       Order Current Status    CBC (No Diff) In process    Comprehensive Metabolic Panel In process    Blood Culture - Blood, Arm, Right Preliminary result    Blood Culture - Blood, Wrist, Right Preliminary result    Catheter Culture - Cath Tip, Chest Wall Preliminary result          Discharge Details        Discharge Medications        New Medications        Instructions Start Date   VANCOMYCIN - NO SCHEDULED DOSE   Does not apply, Every 48 Hours             Continue These Medications        Instructions Start Date   acetaminophen 325 MG tablet  Commonly known as: TYLENOL   650 mg, Oral, Every 4 Hours PRN      pantoprazole 40 MG EC tablet  Commonly known as: PROTONIX   40 mg, Oral, Daily      KAYLEIGH-LASHON PO   1 tablet, Oral, Daily      sennosides-docusate 8.6-50 MG per tablet  Commonly known as: PERICOLACE   2 tablets, Oral, 2 Times Daily PRN      sevelamer 800 MG tablet  Commonly known as: RENVELA   800 mg, Oral, 3 Times Daily With Meals      simethicone 80 MG chewable tablet  Commonly known as: MYLICON   Chew and swallow 1 tablet by mouth 4 (Four) Times a Day As Needed for Flatulence.               No Known Allergies      Discharge Disposition:  Home or Self Care    Diet:  Hospital:  Diet Order   Procedures    Diet: Renal Diets; Low Sodium (2-3g), Low Potassium, Low Phosphorus; Texture: Regular Texture (IDDSI 7); Fluid Consistency: Thin (IDDSI 0)            Activity: as  tolerated      Restrictions or Other Recommendations:  none       CODE STATUS:    Code Status and Medical Interventions:   Ordered at: 01/13/24 1940     Code Status (Patient has no pulse and is not breathing):    CPR (Attempt to Resuscitate)     Medical Interventions (Patient has pulse or is breathing):    Full Support       Additional Instructions for the Follow-ups that You Need to Schedule       Discharge Follow-up with Specified Provider: IV antibiotics with dialysis x 3 doses.   As directed      To: IV antibiotics with dialysis x 3 doses.                      Libertad Lin,   01/16/24      Time Spent on Discharge:  I spent  35  minutes on this discharge activity which included: face-to-face encounter with the patient, reviewing the data in the system, coordination of the care with the nursing staff as well as consultants, documentation, and entering orders.

## 2024-01-17 ENCOUNTER — TRANSITIONAL CARE MANAGEMENT TELEPHONE ENCOUNTER (OUTPATIENT)
Dept: CALL CENTER | Facility: HOSPITAL | Age: 64
End: 2024-01-17
Payer: MEDICARE

## 2024-01-17 LAB — CATHETER CULTURE: NORMAL

## 2024-01-17 NOTE — OUTREACH NOTE
Call Center TCM Note      Flowsheet Row Responses   [facility] patient discharged from? [place]   Does the patient have one of the following disease processes/diagnoses(primary or secondary)? Other   TCM attempt successful? Yes   Call start time 1412   Call end time 1418   Is patient permission given to speak with other caregiver? Yes   List who call center can speak with [employer] # [id number]   Person spoke with today (if not patient) and relationship patient   Medication alerts for this patient IV ABX with Diaysis session OP x 3 doses   Meds reviewed with patient/caregiver? Yes   Is the patient having any side effects they believe may be caused by any medication additions or changes? No   Does the patient have all medications ordered at discharge? Yes   Is the patient taking all medications as directed (includes completed medication regime)? Yes   Comments Pt is aware of hosp f/u with PCP on 01/20/24.  TCM complete.   Does the patient have an appointment with their PCP within 7-14 days of discharge? Yes   Psychosocial issues? No   Did the patient receive a copy of their discharge instructions? Yes   Nursing interventions Reviewed instructions with patient   What is the patient's perception of their health status since discharge? Improving   Is the patient/caregiver able to teach back the hierarchy of who to call/visit for symptoms/problems? PCP, Specialist, Home health nurse, Urgent Care, ED, 911 Yes   If the patient is a current smoker, are they able to teach back resources for cessation? Not a smoker   TCM call completed? Yes   Wrap up additional comments Pt reports he is ok.  He is at dialysis during this call.   Call end time 1418            Toshia Tolentino RN    1/17/2024, 14:18 EST

## 2024-01-18 LAB
BACTERIA SPEC AEROBE CULT: NORMAL
BACTERIA SPEC AEROBE CULT: NORMAL

## 2024-01-26 ENCOUNTER — READMISSION MANAGEMENT (OUTPATIENT)
Dept: CALL CENTER | Facility: HOSPITAL | Age: 64
End: 2024-01-26
Payer: MEDICARE

## 2024-01-26 NOTE — OUTREACH NOTE
Medical Week 2 Survey      Flowsheet Row Responses   [facility] patient discharged from? [place]   Does the patient have one of the following disease processes/diagnoses(primary or secondary)? Other   Week 2 attempt successful? No  [[employer], ID # [id number]]   Unsuccessful attempts Attempt 1            Priyank GRAF - Registered Nurse

## 2024-01-29 ENCOUNTER — READMISSION MANAGEMENT (OUTPATIENT)
Dept: CALL CENTER | Facility: HOSPITAL | Age: 64
End: 2024-01-29
Payer: MEDICARE

## 2024-01-29 NOTE — OUTREACH NOTE
Medical Week 2 Survey      Flowsheet Row Responses   [facility] patient discharged from? [place]   Does the patient have one of the following disease processes/diagnoses(primary or secondary)? Other   Week 2 attempt successful? Yes   Call start time 1742   Discharge diagnosis Infection of hemodialysis tunneled catheter   Call end time 1748   Does the patient have a primary care provider?  Yes   Does the patient have an appointment with their PCP within 7 days of discharge? No   Nursing Interventions Advised patient to make appointment   Has the patient kept scheduled appointments due by today? N/A   What is the patient's perception of their health status since discharge? Improving   Additional teach back comments Call was brief and states he is doing ok   Week 2 Call Completed? Yes   Graduated Yes   Graduated/Revoked comments No questions or needs at this time. Pt was advised to make f/u appt with PCP.   Call end time 1748            Lucila FLEMING - Licensed Nurse

## 2025-04-11 ENCOUNTER — HOSPITAL ENCOUNTER (INPATIENT)
Facility: HOSPITAL | Age: 65
LOS: 8 days | Discharge: HOME | DRG: 673 | End: 2025-04-19
Attending: EMERGENCY MEDICINE | Admitting: STUDENT IN AN ORGANIZED HEALTH CARE EDUCATION/TRAINING PROGRAM
Payer: MEDICARE

## 2025-04-11 ENCOUNTER — APPOINTMENT (OUTPATIENT)
Dept: CARDIOLOGY | Facility: HOSPITAL | Age: 65
DRG: 673 | End: 2025-04-11
Payer: MEDICARE

## 2025-04-11 ENCOUNTER — APPOINTMENT (OUTPATIENT)
Dept: CT IMAGING | Facility: HOSPITAL | Age: 65
DRG: 673 | End: 2025-04-11
Payer: MEDICARE

## 2025-04-11 DIAGNOSIS — Z99.2 ESRD (END STAGE RENAL DISEASE) ON DIALYSIS: ICD-10-CM

## 2025-04-11 DIAGNOSIS — E87.20 LACTIC ACIDOSIS: ICD-10-CM

## 2025-04-11 DIAGNOSIS — I31.4 PERICARDIAL EFFUSION WITH CARDIAC TAMPONADE: Primary | ICD-10-CM

## 2025-04-11 DIAGNOSIS — N18.6 ESRD (END STAGE RENAL DISEASE) ON DIALYSIS: ICD-10-CM

## 2025-04-11 DIAGNOSIS — R74.8 ELEVATED LIVER ENZYMES: ICD-10-CM

## 2025-04-11 DIAGNOSIS — R10.84 GENERALIZED ABDOMINAL PAIN: ICD-10-CM

## 2025-04-11 DIAGNOSIS — I31.39 PERICARDIAL EFFUSION WITH CARDIAC TAMPONADE: Primary | ICD-10-CM

## 2025-04-11 PROBLEM — N30.01 ACUTE CYSTITIS WITH HEMATURIA: Status: RESOLVED | Noted: 2023-03-19 | Resolved: 2025-04-11

## 2025-04-11 LAB
ALBUMIN SERPL-MCNC: 4 G/DL (ref 3.5–5.2)
ALBUMIN/GLOB SERPL: 1 G/DL
ALP SERPL-CCNC: 336 U/L (ref 39–117)
ALT SERPL W P-5'-P-CCNC: 4663 U/L (ref 1–41)
ANION GAP SERPL CALCULATED.3IONS-SCNC: 26 MMOL/L (ref 5–15)
APAP SERPL-MCNC: 13.3 MCG/ML (ref 0–30)
APPEARANCE FLD: ABNORMAL
AST SERPL-CCNC: 4327 U/L (ref 1–40)
BASOPHILS # BLD AUTO: 0.03 10*3/MM3 (ref 0–0.2)
BASOPHILS NFR BLD AUTO: 0.2 % (ref 0–1.5)
BILIRUB SERPL-MCNC: 1.3 MG/DL (ref 0–1.2)
BUN SERPL-MCNC: 39 MG/DL (ref 8–23)
BUN/CREAT SERPL: 5.2 (ref 7–25)
CALCIUM SPEC-SCNC: 9.1 MG/DL (ref 8.6–10.5)
CHLORIDE SERPL-SCNC: 94 MMOL/L (ref 98–107)
CO2 SERPL-SCNC: 19 MMOL/L (ref 22–29)
COLOR FLD: ABNORMAL
CREAT SERPL-MCNC: 7.53 MG/DL (ref 0.76–1.27)
D-LACTATE SERPL-SCNC: 11.9 MMOL/L (ref 0.5–2)
D-LACTATE SERPL-SCNC: 12.2 MMOL/L (ref 0.5–2)
D-LACTATE SERPL-SCNC: 4.7 MMOL/L (ref 0.5–2)
D-LACTATE SERPL-SCNC: 7.6 MMOL/L (ref 0.5–2)
DEPRECATED RDW RBC AUTO: 50.7 FL (ref 37–54)
EGFRCR SERPLBLD CKD-EPI 2021: 7.4 ML/MIN/1.73
EOSINOPHIL # BLD AUTO: 0.04 10*3/MM3 (ref 0–0.4)
EOSINOPHIL NFR BLD AUTO: 0.2 % (ref 0.3–6.2)
EOSINOPHIL NFR FLD MANUAL: 2 %
ERYTHROCYTE [DISTWIDTH] IN BLOOD BY AUTOMATED COUNT: 14.4 % (ref 12.3–15.4)
GEN 5 1HR TROPONIN T REFLEX: 83 NG/L
GLOBULIN UR ELPH-MCNC: 4 GM/DL
GLUCOSE BLDC GLUCOMTR-MCNC: 69 MG/DL (ref 70–130)
GLUCOSE BLDC GLUCOMTR-MCNC: 80 MG/DL (ref 70–130)
GLUCOSE SERPL-MCNC: 83 MG/DL (ref 65–99)
HAV IGM SERPL QL IA: NORMAL
HBV CORE IGM SERPL QL IA: NORMAL
HBV SURFACE AG SERPL QL IA: NORMAL
HCT VFR BLD AUTO: 31.3 % (ref 37.5–51)
HCV AB SER QL: NORMAL
HGB BLD-MCNC: 9.3 G/DL (ref 13–17.7)
HOLD SPECIMEN: NORMAL
IMM GRANULOCYTES # BLD AUTO: 0.25 10*3/MM3 (ref 0–0.05)
IMM GRANULOCYTES NFR BLD AUTO: 1.5 % (ref 0–0.5)
LIPASE SERPL-CCNC: 57 U/L (ref 13–60)
LYMPHOCYTES # BLD AUTO: 0.76 10*3/MM3 (ref 0.7–3.1)
LYMPHOCYTES NFR BLD AUTO: 4.4 % (ref 19.6–45.3)
LYMPHOCYTES NFR FLD MANUAL: 10 %
MCH RBC QN AUTO: 28.4 PG (ref 26.6–33)
MCHC RBC AUTO-ENTMCNC: 29.7 G/DL (ref 31.5–35.7)
MCV RBC AUTO: 95.7 FL (ref 79–97)
MONOCYTES # BLD AUTO: 0.94 10*3/MM3 (ref 0.1–0.9)
MONOCYTES NFR BLD AUTO: 5.5 % (ref 5–12)
MONOCYTES NFR FLD: 1 %
NEUTROPHILS NFR BLD AUTO: 15.16 10*3/MM3 (ref 1.7–7)
NEUTROPHILS NFR BLD AUTO: 88.2 % (ref 42.7–76)
NEUTROPHILS NFR FLD MANUAL: 87 %
NRBC BLD AUTO-RTO: 0.2 /100 WBC (ref 0–0.2)
PLATELET # BLD AUTO: 210 10*3/MM3 (ref 140–450)
PMV BLD AUTO: 10.3 FL (ref 6–12)
POTASSIUM SERPL-SCNC: 5.3 MMOL/L (ref 3.5–5.2)
PROT SERPL-MCNC: 8 G/DL (ref 6–8.5)
QT INTERVAL: 474 MS
QTC INTERVAL: 635 MS
RBC # BLD AUTO: 3.27 10*6/MM3 (ref 4.14–5.8)
RBC # FLD AUTO: ABNORMAL /MM3
SODIUM SERPL-SCNC: 139 MMOL/L (ref 136–145)
TROPONIN T % DELTA: -6
TROPONIN T NUMERIC DELTA: -5 NG/L
TROPONIN T SERPL HS-MCNC: 88 NG/L
WBC # FLD AUTO: 3500 /MM3
WBC NRBC COR # BLD AUTO: 17.18 10*3/MM3 (ref 3.4–10.8)
WHOLE BLOOD HOLD COAG: NORMAL
WHOLE BLOOD HOLD SPECIMEN: NORMAL

## 2025-04-11 PROCEDURE — 74176 CT ABD & PELVIS W/O CONTRAST: CPT

## 2025-04-11 PROCEDURE — 93308 TTE F-UP OR LMTD: CPT | Performed by: INTERNAL MEDICINE

## 2025-04-11 PROCEDURE — 82948 REAGENT STRIP/BLOOD GLUCOSE: CPT

## 2025-04-11 PROCEDURE — 83615 LACTATE (LD) (LDH) ENZYME: CPT | Performed by: INTERNAL MEDICINE

## 2025-04-11 PROCEDURE — 70450 CT HEAD/BRAIN W/O DYE: CPT

## 2025-04-11 PROCEDURE — 93321 DOPPLER ECHO F-UP/LMTD STD: CPT | Performed by: INTERNAL MEDICINE

## 2025-04-11 PROCEDURE — 87206 SMEAR FLUORESCENT/ACID STAI: CPT | Performed by: INTERNAL MEDICINE

## 2025-04-11 PROCEDURE — 33017 PRCRD DRG 6YR+ W/O CGEN CAR: CPT | Performed by: INTERNAL MEDICINE

## 2025-04-11 PROCEDURE — 25010000002 MIDAZOLAM PER 1 MG: Performed by: INTERNAL MEDICINE

## 2025-04-11 PROCEDURE — 99285 EMERGENCY DEPT VISIT HI MDM: CPT

## 2025-04-11 PROCEDURE — 25010000002 MORPHINE PER 10 MG: Performed by: INTERNAL MEDICINE

## 2025-04-11 PROCEDURE — 25510000001 IOPAMIDOL PER 1 ML: Performed by: EMERGENCY MEDICINE

## 2025-04-11 PROCEDURE — 87015 SPECIMEN INFECT AGNT CONCNTJ: CPT | Performed by: INTERNAL MEDICINE

## 2025-04-11 PROCEDURE — 25010000002 FAMOTIDINE 10 MG/ML SOLUTION: Performed by: NURSE PRACTITIONER

## 2025-04-11 PROCEDURE — 71275 CT ANGIOGRAPHY CHEST: CPT

## 2025-04-11 PROCEDURE — 83605 ASSAY OF LACTIC ACID: CPT | Performed by: EMERGENCY MEDICINE

## 2025-04-11 PROCEDURE — 93321 DOPPLER ECHO F-UP/LMTD STD: CPT

## 2025-04-11 PROCEDURE — 25010000002 CEFAZOLIN PER 500 MG: Performed by: INTERNAL MEDICINE

## 2025-04-11 PROCEDURE — C1887 CATHETER, GUIDING: HCPCS | Performed by: INTERNAL MEDICINE

## 2025-04-11 PROCEDURE — 82945 GLUCOSE OTHER FLUID: CPT | Performed by: INTERNAL MEDICINE

## 2025-04-11 PROCEDURE — 89051 BODY FLUID CELL COUNT: CPT | Performed by: INTERNAL MEDICINE

## 2025-04-11 PROCEDURE — 82042 OTHER SOURCE ALBUMIN QUAN EA: CPT | Performed by: INTERNAL MEDICINE

## 2025-04-11 PROCEDURE — 93005 ELECTROCARDIOGRAM TRACING: CPT | Performed by: NURSE PRACTITIONER

## 2025-04-11 PROCEDURE — 99222 1ST HOSP IP/OBS MODERATE 55: CPT | Performed by: INTERNAL MEDICINE

## 2025-04-11 PROCEDURE — C1729 CATH, DRAINAGE: HCPCS | Performed by: INTERNAL MEDICINE

## 2025-04-11 PROCEDURE — 99223 1ST HOSP IP/OBS HIGH 75: CPT | Performed by: INTERNAL MEDICINE

## 2025-04-11 PROCEDURE — 80053 COMPREHEN METABOLIC PANEL: CPT | Performed by: EMERGENCY MEDICINE

## 2025-04-11 PROCEDURE — 36415 COLL VENOUS BLD VENIPUNCTURE: CPT

## 2025-04-11 PROCEDURE — 99222 1ST HOSP IP/OBS MODERATE 55: CPT | Performed by: THORACIC SURGERY (CARDIOTHORACIC VASCULAR SURGERY)

## 2025-04-11 PROCEDURE — 25010000002 LIDOCAINE PF 1% 1 % SOLUTION: Performed by: INTERNAL MEDICINE

## 2025-04-11 PROCEDURE — 93308 TTE F-UP OR LMTD: CPT

## 2025-04-11 PROCEDURE — 83690 ASSAY OF LIPASE: CPT | Performed by: EMERGENCY MEDICINE

## 2025-04-11 PROCEDURE — 84157 ASSAY OF PROTEIN OTHER: CPT | Performed by: INTERNAL MEDICINE

## 2025-04-11 PROCEDURE — 0W9D30Z DRAINAGE OF PERICARDIAL CAVITY WITH DRAINAGE DEVICE, PERCUTANEOUS APPROACH: ICD-10-PCS | Performed by: INTERNAL MEDICINE

## 2025-04-11 PROCEDURE — 80074 ACUTE HEPATITIS PANEL: CPT | Performed by: NURSE PRACTITIONER

## 2025-04-11 PROCEDURE — 99221 1ST HOSP IP/OBS SF/LOW 40: CPT | Performed by: THORACIC SURGERY (CARDIOTHORACIC VASCULAR SURGERY)

## 2025-04-11 PROCEDURE — 87205 SMEAR GRAM STAIN: CPT | Performed by: INTERNAL MEDICINE

## 2025-04-11 PROCEDURE — 87116 MYCOBACTERIA CULTURE: CPT | Performed by: INTERNAL MEDICINE

## 2025-04-11 PROCEDURE — 84484 ASSAY OF TROPONIN QUANT: CPT | Performed by: NURSE PRACTITIONER

## 2025-04-11 PROCEDURE — C1894 INTRO/SHEATH, NON-LASER: HCPCS | Performed by: INTERNAL MEDICINE

## 2025-04-11 PROCEDURE — 25810000003 SODIUM CHLORIDE 0.9 % SOLUTION

## 2025-04-11 PROCEDURE — 80143 DRUG ASSAY ACETAMINOPHEN: CPT | Performed by: NURSE PRACTITIONER

## 2025-04-11 PROCEDURE — 25010000002 PROCHLORPERAZINE 10 MG/2ML SOLUTION: Performed by: NURSE PRACTITIONER

## 2025-04-11 PROCEDURE — 87070 CULTURE OTHR SPECIMN AEROBIC: CPT | Performed by: INTERNAL MEDICINE

## 2025-04-11 PROCEDURE — 85025 COMPLETE CBC W/AUTO DIFF WBC: CPT | Performed by: EMERGENCY MEDICINE

## 2025-04-11 RX ORDER — IOPAMIDOL 755 MG/ML
100 INJECTION, SOLUTION INTRAVASCULAR
Status: COMPLETED | OUTPATIENT
Start: 2025-04-11 | End: 2025-04-11

## 2025-04-11 RX ORDER — SODIUM CHLORIDE 9 MG/ML
10 INJECTION, SOLUTION INTRAMUSCULAR; INTRAVENOUS; SUBCUTANEOUS AS NEEDED
Status: DISCONTINUED | OUTPATIENT
Start: 2025-04-11 | End: 2025-04-11

## 2025-04-11 RX ORDER — BISACODYL 5 MG/1
5 TABLET, DELAYED RELEASE ORAL DAILY PRN
Status: DISCONTINUED | OUTPATIENT
Start: 2025-04-11 | End: 2025-04-19 | Stop reason: HOSPADM

## 2025-04-11 RX ORDER — SODIUM CHLORIDE 0.9 % (FLUSH) 0.9 %
10 SYRINGE (ML) INJECTION AS NEEDED
Status: DISCONTINUED | OUTPATIENT
Start: 2025-04-11 | End: 2025-04-19 | Stop reason: HOSPADM

## 2025-04-11 RX ORDER — NICOTINE POLACRILEX 4 MG
15 LOZENGE BUCCAL
Status: DISCONTINUED | OUTPATIENT
Start: 2025-04-11 | End: 2025-04-12

## 2025-04-11 RX ORDER — SODIUM CHLORIDE 9 MG/ML
40 INJECTION, SOLUTION INTRAVENOUS AS NEEDED
Status: DISCONTINUED | OUTPATIENT
Start: 2025-04-11 | End: 2025-04-19 | Stop reason: HOSPADM

## 2025-04-11 RX ORDER — MORPHINE SULFATE 4 MG/ML
4 INJECTION, SOLUTION INTRAMUSCULAR; INTRAVENOUS ONCE
Status: COMPLETED | OUTPATIENT
Start: 2025-04-11 | End: 2025-04-11

## 2025-04-11 RX ORDER — LABETALOL HYDROCHLORIDE 5 MG/ML
10 INJECTION, SOLUTION INTRAVENOUS EVERY 4 HOURS PRN
Status: DISCONTINUED | OUTPATIENT
Start: 2025-04-11 | End: 2025-04-19 | Stop reason: HOSPADM

## 2025-04-11 RX ORDER — IBUPROFEN 600 MG/1
1 TABLET ORAL
Status: DISCONTINUED | OUTPATIENT
Start: 2025-04-11 | End: 2025-04-12

## 2025-04-11 RX ORDER — DEXTROSE MONOHYDRATE 25 G/50ML
25 INJECTION, SOLUTION INTRAVENOUS
Status: DISCONTINUED | OUTPATIENT
Start: 2025-04-11 | End: 2025-04-12

## 2025-04-11 RX ORDER — PROCHLORPERAZINE EDISYLATE 5 MG/ML
5 INJECTION INTRAMUSCULAR; INTRAVENOUS ONCE
Status: COMPLETED | OUTPATIENT
Start: 2025-04-11 | End: 2025-04-11

## 2025-04-11 RX ORDER — SODIUM CHLORIDE 0.9 % (FLUSH) 0.9 %
10 SYRINGE (ML) INJECTION EVERY 12 HOURS SCHEDULED
Status: DISCONTINUED | OUTPATIENT
Start: 2025-04-11 | End: 2025-04-11

## 2025-04-11 RX ORDER — BISACODYL 10 MG
10 SUPPOSITORY, RECTAL RECTAL DAILY PRN
Status: DISCONTINUED | OUTPATIENT
Start: 2025-04-11 | End: 2025-04-19 | Stop reason: HOSPADM

## 2025-04-11 RX ORDER — AMOXICILLIN 250 MG
2 CAPSULE ORAL 2 TIMES DAILY
Status: DISCONTINUED | OUTPATIENT
Start: 2025-04-11 | End: 2025-04-19 | Stop reason: HOSPADM

## 2025-04-11 RX ORDER — FAMOTIDINE 10 MG/ML
20 INJECTION, SOLUTION INTRAVENOUS ONCE
Status: COMPLETED | OUTPATIENT
Start: 2025-04-11 | End: 2025-04-11

## 2025-04-11 RX ORDER — LIDOCAINE HYDROCHLORIDE 10 MG/ML
INJECTION, SOLUTION EPIDURAL; INFILTRATION; INTRACAUDAL; PERINEURAL
Status: DISCONTINUED | OUTPATIENT
Start: 2025-04-11 | End: 2025-04-11 | Stop reason: HOSPADM

## 2025-04-11 RX ORDER — MIDAZOLAM HYDROCHLORIDE 1 MG/ML
INJECTION, SOLUTION INTRAMUSCULAR; INTRAVENOUS
Status: DISCONTINUED | OUTPATIENT
Start: 2025-04-11 | End: 2025-04-11 | Stop reason: HOSPADM

## 2025-04-11 RX ORDER — SODIUM CHLORIDE 0.9 % (FLUSH) 0.9 %
10 SYRINGE (ML) INJECTION AS NEEDED
Status: DISCONTINUED | OUTPATIENT
Start: 2025-04-11 | End: 2025-04-11

## 2025-04-11 RX ORDER — NITROGLYCERIN 0.4 MG/1
0.4 TABLET SUBLINGUAL
Status: DISCONTINUED | OUTPATIENT
Start: 2025-04-11 | End: 2025-04-19 | Stop reason: HOSPADM

## 2025-04-11 RX ORDER — POLYETHYLENE GLYCOL 3350 17 G/17G
17 POWDER, FOR SOLUTION ORAL DAILY PRN
Status: DISCONTINUED | OUTPATIENT
Start: 2025-04-11 | End: 2025-04-19 | Stop reason: HOSPADM

## 2025-04-11 RX ADMIN — MORPHINE SULFATE 4 MG: 4 INJECTION, SOLUTION INTRAMUSCULAR; INTRAVENOUS at 15:29

## 2025-04-11 RX ADMIN — SODIUM CHLORIDE 500 ML: 0.9 INJECTION, SOLUTION INTRAVENOUS at 19:57

## 2025-04-11 RX ADMIN — PROCHLORPERAZINE EDISYLATE 5 MG: 5 INJECTION INTRAMUSCULAR; INTRAVENOUS at 11:35

## 2025-04-11 RX ADMIN — Medication 10 MG: at 20:10

## 2025-04-11 RX ADMIN — FAMOTIDINE 20 MG: 10 INJECTION, SOLUTION INTRAVENOUS at 11:35

## 2025-04-11 RX ADMIN — IOPAMIDOL 75 ML: 755 INJECTION, SOLUTION INTRAVENOUS at 13:35

## 2025-04-11 NOTE — CONSULTS
Date of Hospital Visit: 25  Encounter Provider: Braxton Canseco MD  Place of Service: [facility]  Patient Name: Jun Young  :1960  Referral Provider: No ref. provider found  Primary Care Provider: Samir Chow DO    Chief complaint: Pericardial effusion with cardiac tamponade.    History of Present Illness:  The patient is a 64-year-old  male with ESRD/on chronic hemodialysis, hypertension, dyslipidemia, type 2 diabetes and history of CVA.  The patient was at dialysis where he became hypotensive and additionally complained of nausea and had vomiting.  He also had recent diarrhea and was complaining of abdominal pain EMS were called and noted him to be hypotensive, he was resuscitated with fluids and is brought brought to the emergency department.  Further evaluation in the emergency department included a CT scan which revealed a large pericardial effusion.  Subsequently patient had an echocardiogram which confirms a large circumferential pericardial effusion with features of tamponade.  The patient has been intermittently hypertensive and is complaining of shortness of breath, back pain, abdominal pain and some chest discomfort.    At the time of my evaluation he appears little somnolent after receiving morphine, he is a bit restless but not in any significant distress.  His oxygen saturation is 97% on room air, current vital signs are stable.    Past Medical History:   Diagnosis Date    Acute on chronic renal failure     Bilateral hydronephrosis     BPH (benign prostatic hyperplasia)     Cholelithiasis     Diabetes mellitus     Dyslipidemia     Elevated cholesterol     GERD (gastroesophageal reflux disease)     Gross hematuria     Hemodialysis patient     History of transfusion     BHL, no reactions per pt    Hyperlipidemia     Hypertension     Pericardial effusion     echo 2023 - 1-2 cm preserved LV function    Stroke     no residual effects       Past  "Surgical History:   Procedure Laterality Date    COLONOSCOPY      CYSTOSCOPY BLADDER BIOPSY N/A 02/09/2023    Procedure: CYSTOSCOPY CLOT EVACUATION WITH FULGURATION, RETROGRADE PYELOGRAM;  Surgeon: Mandi Velasco MD;  Location:  [facility];  Service: Urology;  Laterality: N/A;    CYSTOSCOPY BLADDER BIOPSY N/A 03/18/2023    Procedure: CYSTOSCOPY WITH FULGURATION OF BLEEDERS;  Surgeon: Santana Christianson MD;  Location:  [facility];  Service: Urology;  Laterality: N/A;    CYSTOSCOPY TRANSURETHRAL RESECTION OF PROSTATE N/A 5/5/2023    Procedure: CYSTOSCOPY TRANSURETHRAL RESECTION OF PROSTATE (BIPOLAR);  Surgeon: Pawan Damico MD;  Location:  [facility];  Service: Urology;  Laterality: N/A;    CYSTOSCOPY WITH CLOT EVACUATION      fulguration - 3 way catheter placement    INSERTION HEMODIALYSIS CATHETER         (Not in a hospital admission)      Social History     Socioeconomic History    Marital status: Single   Tobacco Use    Smoking status: Never     Passive exposure: Never    Smokeless tobacco: Never   Vaping Use    Vaping status: Never Used   Substance and Sexual Activity    Alcohol use: Never    Drug use: Never    Sexual activity: Not Currently       Family History   Family history unknown: Yes       REVIEW OF SYSTEMS:     12 point ROS was performed and is Negative except as outlined in HPI     Objective:     Vitals:    04/11/25 1434 04/11/25 1500 04/11/25 1511 04/11/25 1530   BP: 92/60 97/55  (!) 165/108   BP Location: Right arm      Patient Position: Lying      Pulse: 106 106  108   Resp: 18      Temp:       TempSrc:       SpO2: 96% 98%  96%   Weight:   67 kg (147 lb 11.3 oz)    Height:   170.2 cm (67.01\")      Body mass index is 23.13 kg/m².  Flowsheet Rows      Flowsheet Row First Filed Value   Admission Height 170.2 cm (67\") Documented at 04/11/2025 1045   Admission Weight 67 kg (147 lb 11.3 oz) Documented at 04/11/2025 1045            Physical Exam   General: Restless but not in any distress.  Skin: Skin is warm " and dry. No obvious cyanosis, erythema or pallor.   HEENT: Atraumatic, normocephalic, no conjunctival pallor, no scleral icterus.   Neck: Supple, no JVD. Normal carotid upstrokes, no bruits.    Chest:No respiratory distress. No chest wall tenderness. Breath sounds are normal. No wheezes, rhonchi or rales.  Cardiovascular: Normal S1 and S2, distant heart sounds.  Pulses:Radial and pedal pulses are 1+.  Abdomen: Soft, nontender, normal bowel sounds.   Musculoskeletal/Extremities:  No clubbing, cyanosis or edema. No gross deformity.   Neurological: Alert and oriented to person, place, and time, no gross focal deficits.     Lab Review:                Results from last 7 days   Lab Units 04/11/25  1116   SODIUM mmol/L 139   POTASSIUM mmol/L 5.3*   CHLORIDE mmol/L 94*   CO2 mmol/L 19.0*   BUN mg/dL 39*   CREATININE mg/dL 7.53*   GLUCOSE mg/dL 83   CALCIUM mg/dL 9.1     Results from last 7 days   Lab Units 04/11/25  1344 04/11/25  1116   HSTROP T ng/L 83* 88*     Results from last 7 days   Lab Units 04/11/25  1116   WBC 10*3/mm3 17.18*   HEMOGLOBIN g/dL 9.3*   HEMATOCRIT % 31.3*   PLATELETS 10*3/mm3 210                 @LABRCNT(bnp)@  Imaging Results (Last 24 Hours)       Procedure Component Value Units Date/Time    CT Angiogram Abdomen Pelvis [[id number]] Resulted: 04/11/25 1532     Updated: 04/11/25 1532    CT Angiogram Chest [[id number]] Collected: 04/11/25 1343     Updated: 04/11/25 1352    Narrative:      CT ANGIOGRAM CHEST    Date of Exam: 4/11/2025 1:26 PM EDT    Indication: eval pericardial effusion/aorta.    Comparison: Chest x-ray 2/7/2023 and CT abdomen and pelvis 4/11/2025    Technique: CTA of the chest was performed before and after the uneventful intravenous administration of 75 mL Isovue-370. Reconstructed coronal and sagittal images were also obtained. In addition, a 3-D volume rendered image was created for   interpretation. Automated exposure control and iterative reconstruction methods were  used.      Findings:  PULMONARY VASCULATURE: Pulmonary arteries are widely patent without evidence of embolus.Main pulmonary artery is normal in size. No evidence of right heart strain.    MEDIASTINUM: The heart is prominent in size due to a large pericardial effusion measuring up to 4.1 cm in maximum diameter. The effusion is uniform throughout measuring slightly greater than water density. The thoracic aorta is normal in caliber   measuring 2.9 cm without evidence for dissection.  CORONARY ARTERIES: Moderate calcified atherosclerotic disease.  LUNGS: Evaluation of lung parenchyma demonstrates some atelectasis within the posterior left upper lung and lingula. There is linear scarring and/or atelectasis at the left lung base. There is a 3 mm juxta fissural nodule noted (image 57 of series 7).  PLEURAL SPACE: No effusion, mass, nor pneumothorax.  LYMPH NODES: There are no pathologically enlarged lymph nodes.    UPPER ABDOMEN: Calcified gallstones are noted.    OSSEOUS STRUCTURES: Appropriate for age with no acute process identified.      Impression:      Impression:    1. Large pericardial effusion measuring up to 4.1 cm. Cardiology consultation recommended.    2. 3 mm juxta fissural nodule in the right middle lung.In a low-risk patient with a solid nodule <6 mm, recommend no follow-up. In a high-risk patient, CT at 12 months is optional with stronger consideration if there is suspicious nodule morphology   and/or upper lobe location.        Electronically Signed: Celsa Jacques MD    4/11/2025 1:49 PM EDT    Workstation ID: [id number]    CT Head Without Contrast [[id number]] Collected: 04/11/25 1337     Updated: 04/11/25 1344    Narrative:      CT HEAD WO CONTRAST    Date of Exam: 4/11/2025 1:26 PM EDT    Indication: headache.    Comparison: None available.    Technique: Axial CT images were obtained of the head without contrast administration.  Automated exposure control and iterative construction methods were  used.      Findings:  Gray-white differentiation is maintained and there is no evidence of intracranial hemorrhage, mass or mass effect. The ventricles are normal in size and configuration. The orbits appear normal. There is some mucosal thickening as well as aerated   secretions noted within the left maxillary sinus. The calvarium is intact.      Impression:      Impression:  No acute intracranial abnormality.    Findings which could reflect left maxillary sinusitis in the correct clinical setting.        Electronically Signed: Jesus Denny MD    4/11/2025 1:41 PM EDT    Workstation ID: [id number]    CT Abdomen Pelvis Without Contrast [[id number]] Collected: 04/11/25 1131     Updated: 04/11/25 1138    Narrative:      CT ABDOMEN PELVIS WO CONTRAST    Date of Exam: 4/11/2025 11:23 AM EDT    Indication: abd pain, N/V/D.    Comparison: 5/26/2023    Technique: Axial CT images were obtained of the abdomen and pelvis without the administration of contrast. Reconstructed coronal and sagittal images were also obtained. Automated exposure control and iterative construction methods were used.      Findings:  LUNG BASES: There is a large pericardial effusion measuring up to 4 cm posteriorly. Cardiology consultation recommended. No evidence of airspace disease. There is linear basal atelectasis/parenchymal scarring.    LIVER:  Unremarkable parenchyma without focal lesion.  BILIARY/GALLBLADDER: There our calcified gallstones  SPLEEN:  Unremarkable  PANCREAS:  Unremarkable  ADRENAL:  Unremarkable  KIDNEYS: Bilateral renal cortical atrophy with no solid mass identified. No obstruction.  No calculus identified.  GASTROINTESTINAL/MESENTERY:  No evidence of obstruction nor acute inflammation.  The appendix remains prominent in size measuring 9 mm maximum diameter. However there is gas in the appendix and there are no surrounding inflammatory changes and this is   likely a normal finding for patient. There is similar fairly diffuse  colonic fatty mural infiltration.  AORTA/IVC:  Normal caliber.    RETROPERITONEUM/LYMPH NODES:  Unremarkable    REPRODUCTIVE: Large prostate gland impresses upon the urinary bladder base.  BLADDER: There is generalized urinary bladder mural thickening, similar to prior examination. May be related to neurogenic bladder, chronic bladder outlet obstruction, and cystitis.    OSSEUS STRUCTURES:  Typical for age with no acute process identified.      Impression:      Impression:  1.Large pericardial effusion measuring up to 4 cm posteriorly. Cardiology consultation recommended.  2.Cholelithiasis.  3.Bilateral renal cortical atrophy.  4.Large prostate gland.  5.Generalized urinary bladder mural thickening, similar to prior examination. May be related to neurogenic bladder, chronic bladder outlet obstruction, and cystitis.  6.The appendix remains prominent in size measuring 9 mm maximum diameter. However there is gas in the appendix and there are no surrounding inflammatory changes and this is likely a normal finding for patient.        Electronically Signed: Manuel Jacques MD    4/11/2025 11:35 AM EDT    Workstation ID: [id number]             EKG: Sinus tachycardia, low QRS voltage, nonspecific ST changes.    Echocardiogram: Large circumferential pericardial effusion with features of cardiac tamponade         Assessment:   Large pericardial effusion with cardiac tamponade and hypertension  ESRD, on hemodialysis.  History of hypertension, currently is hypotensive  History of diabetes.  Abdominal pain nausea vomiting and diarrhea    Plan:   Due to large pericardial effusion with tamponade and hemodynamic compromise patient needs urgent pericardiocentesis.  We will take him to the Cath Lab now.  The procedure was explained to the patient/family extensively. Indications, benefits, risks and alternatives were discussed. The patient understands well, and wishes to proceed.   Sequently he will be admitted to the ICU for further  evaluation management.  Further evaluation of medical issues per ICU team.  We will plan to leave the pericardial drain in overnight and then reassess tomorrow.  Thank you for this consultation.    Braxton Canseco MD, FACC, [facility]

## 2025-04-11 NOTE — Clinical Note
Pericardiocentesis performed by manual aspiration.Bloody fluid removed and 900 mL was sent to lab for analysis..

## 2025-04-11 NOTE — H&P
Chief complaint: Abdominal pain nausea and vomiting    Subjective     64-year-old male with a past medical history significant for ESRD on hemodialysis, type 2 diabetes mellitus, hypertension, prior CVA, GERD, and dyslipidemia.  He gets hemodialysis every Monday Wednesday Friday and was hypotensive after his dialysis today.  EMS was called and he received 1 L of normal saline.  He was hypotensive on arrival but this has improved post fluid administration.  He is complaining of nausea vomiting diarrhea and abdominal pain.    In the ER he was found to have a large pericardial effusion noted on abdominal CT.  In addition he had cholelithiasis.  CT head was negative.  CT of the chest confirmed the presence of the large pericardial effusion.  Bedside echo by the ER staff revealed RV collapse.  Laboratory studies revealed an elevated creatinine and a potassium 5.3.  Liver transaminases were markedly elevated with only minimal elevation of the bilirubin.  Lactate was elevated at 7.6.  Troponin was mildly elevated with no delta.      History  Past Medical History:   Diagnosis Date    Acute on chronic renal failure     Bilateral hydronephrosis     BPH (benign prostatic hyperplasia)     Cholelithiasis     Diabetes mellitus     Dyslipidemia     Elevated cholesterol     GERD (gastroesophageal reflux disease)     Gross hematuria     Hemodialysis patient     History of transfusion     BHL, no reactions per pt    Hyperlipidemia     Hypertension     Pericardial effusion     echo 2/2023 - 1-2 cm preserved LV function    Stroke     no residual effects     Past Surgical History:   Procedure Laterality Date    COLONOSCOPY      CYSTOSCOPY BLADDER BIOPSY N/A 02/09/2023    Procedure: CYSTOSCOPY CLOT EVACUATION WITH FULGURATION, RETROGRADE PYELOGRAM;  Surgeon: Mandi Velasco MD;  Location: [facility];  Service: Urology;  Laterality: N/A;    CYSTOSCOPY BLADDER BIOPSY N/A 03/18/2023    Procedure: CYSTOSCOPY WITH FULGURATION OF BLEEDERS;  " Surgeon: Santana Christianson MD;  Location:  [facility];  Service: Urology;  Laterality: N/A;    CYSTOSCOPY TRANSURETHRAL RESECTION OF PROSTATE N/A 5/5/2023    Procedure: CYSTOSCOPY TRANSURETHRAL RESECTION OF PROSTATE (BIPOLAR);  Surgeon: Pawan Damico MD;  Location:  [facility];  Service: Urology;  Laterality: N/A;    CYSTOSCOPY WITH CLOT EVACUATION      fulguration - 3 way catheter placement    INSERTION HEMODIALYSIS CATHETER       Family History   Family history unknown: Yes     Social History     Tobacco Use    Smoking status: Never     Passive exposure: Never    Smokeless tobacco: Never   Vaping Use    Vaping status: Never Used   Substance Use Topics    Alcohol use: Never    Drug use: Never       Review of Systems   Pertinent items are noted in HPI      Objective     Vital Signs  Temp:  [98.2 °F (36.8 °C)] 98.2 °F (36.8 °C)  Heart Rate:  [] 106  Resp:  [18] 18  BP: ()/(55-93) 97/55    Physical Exam:    Objective:  General Appearance:  In no acute distress.    Vital signs: (most recent): Blood pressure 97/55, pulse 106, temperature 98.2 °F (36.8 °C), temperature source Oral, resp. rate 18, height 170.2 cm (67\"), weight 67 kg (147 lb 11.3 oz), SpO2 98%.    HEENT: Normal HEENT exam.    Lungs:  Normal effort and normal respiratory rate.  Breath sounds clear to auscultation.  He is not in respiratory distress.  No rales, wheezes or rhonchi.    Heart: Tachycardia.  Regular rhythm.  S1 normal and S2 normal.  No murmur, gallop or friction rub.   Chest: Symmetric chest wall expansion.   Abdomen: Abdomen is soft and non-distended.  Bowel sounds are normal.   There is no abdominal tenderness.   There is no mass. There is no splenomegaly. There is no hepatomegaly.   Extremities: There is no deformity or dependent edema.    Neurological: Patient is alert and oriented to person, place and time.    Pupils:  Pupils are equal, round, and reactive to light.    Skin:  Warm and dry.                Results Review:    I " reviewed the patient's new clinical results.  I reviewed the patient's new imaging results and agree with the interpretation.  I reviewed the patient's other test results and agree with the interpretation    Assessment & Plan     Assessment:    Active Hospital Problems    Diagnosis     **Pericardial effusion with cardiac tamponade     Hypertension     ESRD (end stage renal disease)     Type 2 diabetes mellitus        64-year-old male with a past medical history significant for ESRD on hemodialysis, type 2 diabetes mellitus, hypertension, prior CVA, GERD, and dyslipidemia.  He gets hemodialysis every Monday Wednesday Friday and was hypotensive after his dialysis today.    In the ER workup reveals a large pericardial effusion with what appears to be RV collapse by bedside echo.  He also has significant elevation of his transaminases likely consistent with congestive hepatopathy.  He has an elevated lactic acid level.    Plan:    ICU admission  Cardiothoracic surgery and cardiology are evaluating him for the potential of surgical intervention with a window versus pericardial drain  Hold antihypertensives  Fluid loading as a temporizing measure as needed    I discussed the patients findings and my recommendations with patient, nursing staff, and ER provider .     Level of Risk High due to: decision regarding hospitalization or escalation of level of hospital care    Sushil Hoyt MD  Pulmonary and Critical Care Medicine  04/11/25  15:11 EDT

## 2025-04-11 NOTE — ED PROVIDER NOTES
EMERGENCY DEPARTMENT ENCOUNTER    Pt Name: Jun Young  MRN: [id number]  Pt :   1960  Room Number:  S258/1  Date of encounter:  2025  PCP: Samir Chow DO  ED Provider: FRANKY Tijerina    Historian: Patient    HPI:  Chief Complaint: Abdominal pain    Context: Jun Young is a 64 y.o. male who presents to the ED c/o abdominal pain.  Patient complains of diffuse abdominal discomfort.  Reports that the pain started 3 days ago.  Complains of nausea, vomiting and diarrhea.  Patient was at dialysis today.  He gets dialyzed on  and .  Patient received his full treatment.  After being dialyzed his blood pressure dropped.  He was complaining of abdominal pain.  EMS arrived they administered a liter of normal saline.  Patient denies any surgical history of his abdomen.  Denies fevers, chills.  Patient did does urinate he denies any issues with urination.  He reports that he just does not feel well.  HPI     REVIEW OF SYSTEMS  A chief complaint appropriate review of systems was completed and is negative except as noted in the HPI.     PAST MEDICAL HISTORY  Past Medical History:   Diagnosis Date    Acute on chronic renal failure     Bilateral hydronephrosis     BPH (benign prostatic hyperplasia)     Cholelithiasis     Diabetes mellitus     Dyslipidemia     Elevated cholesterol     GERD (gastroesophageal reflux disease)     Gross hematuria     Hemodialysis patient     History of transfusion     BHL, no reactions per pt    Hyperlipidemia     Hypertension     Pericardial effusion     echo 2023 - 1-2 cm preserved LV function    Stroke     no residual effects       PAST SURGICAL HISTORY  Past Surgical History:   Procedure Laterality Date    COLONOSCOPY      CYSTOSCOPY BLADDER BIOPSY N/A 2023    Procedure: CYSTOSCOPY CLOT EVACUATION WITH FULGURATION, RETROGRADE PYELOGRAM;  Surgeon: Mandi Velasco MD;  Location: [facility];  Service: Urology;   Laterality: N/A;    CYSTOSCOPY BLADDER BIOPSY N/A 03/18/2023    Procedure: CYSTOSCOPY WITH FULGURATION OF BLEEDERS;  Surgeon: Santana Christianson MD;  Location: [facility] OR;  Service: Urology;  Laterality: N/A;    CYSTOSCOPY TRANSURETHRAL RESECTION OF PROSTATE N/A 5/5/2023    Procedure: CYSTOSCOPY TRANSURETHRAL RESECTION OF PROSTATE (BIPOLAR);  Surgeon: Pawan Damico MD;  Location: [facility] OR;  Service: Urology;  Laterality: N/A;    CYSTOSCOPY WITH CLOT EVACUATION      fulguration - 3 way catheter placement    INSERTION HEMODIALYSIS CATHETER         FAMILY HISTORY  Family History   Family history unknown: Yes       SOCIAL HISTORY  Social History     Socioeconomic History    Marital status: Single   Tobacco Use    Smoking status: Never     Passive exposure: Never    Smokeless tobacco: Never   Vaping Use    Vaping status: Never Used   Substance and Sexual Activity    Alcohol use: Never    Drug use: Never    Sexual activity: Not Currently       ALLERGIES  Patient has no known allergies.    PHYSICAL EXAM  Physical Exam  Vitals and nursing note reviewed.   Constitutional:       General: He is not in acute distress.     Appearance: He is well-developed. He is ill-appearing. He is not toxic-appearing.   HENT:      Head: Normocephalic and atraumatic.   Eyes:      General: No scleral icterus.     Pupils: Pupils are equal, round, and reactive to light.   Cardiovascular:      Rate and Rhythm: Regular rhythm. Tachycardia present.      Heart sounds: Normal heart sounds.   Pulmonary:      Effort: Pulmonary effort is normal. No respiratory distress.      Breath sounds: Normal breath sounds.   Abdominal:      General: Bowel sounds are normal.      Palpations: Abdomen is soft.      Tenderness: There is generalized abdominal tenderness.   Skin:     General: Skin is warm and dry.   Neurological:      General: No focal deficit present.      Mental Status: He is alert and oriented to person, place, and time.   Psychiatric:         Mood  and Affect: Mood normal.         Behavior: Behavior normal.           LAB RESULTS  Results for orders placed or performed during the hospital encounter of 04/11/25   Comprehensive Metabolic Panel    Collection Time: 04/11/25 11:16 AM    Specimen: Blood   Result Value Ref Range    Glucose 83 65 - 99 mg/dL    BUN 39 (H) 8 - 23 mg/dL    Creatinine 7.53 (H) 0.76 - 1.27 mg/dL    Sodium 139 136 - 145 mmol/L    Potassium 5.3 (H) 3.5 - 5.2 mmol/L    Chloride 94 (L) 98 - 107 mmol/L    CO2 19.0 (L) 22.0 - 29.0 mmol/L    Calcium 9.1 8.6 - 10.5 mg/dL    Total Protein 8.0 6.0 - 8.5 g/dL    Albumin 4.0 3.5 - 5.2 g/dL    ALT (SGPT) 4,663 (H) 1 - 41 U/L    AST (SGOT) 4,327 (H) 1 - 40 U/L    Alkaline Phosphatase 336 (H) 39 - 117 U/L    Total Bilirubin 1.3 (H) 0.0 - 1.2 mg/dL    Globulin 4.0 gm/dL    A/G Ratio 1.0 g/dL    BUN/Creatinine Ratio 5.2 (L) 7.0 - 25.0    Anion Gap 26.0 (H) 5.0 - 15.0 mmol/L    eGFR 7.4 (L) >60.0 mL/min/1.73   Lipase    Collection Time: 04/11/25 11:16 AM    Specimen: Blood   Result Value Ref Range    Lipase 57 13 - 60 U/L   Lactic Acid, Plasma    Collection Time: 04/11/25 11:16 AM    Specimen: Blood   Result Value Ref Range    Lactate 7.6 (C) 0.5 - 2.0 mmol/L   CBC Auto Differential    Collection Time: 04/11/25 11:16 AM    Specimen: Blood   Result Value Ref Range    WBC 17.18 (H) 3.40 - 10.80 10*3/mm3    RBC 3.27 (L) 4.14 - 5.80 10*6/mm3    Hemoglobin 9.3 (L) 13.0 - 17.7 g/dL    Hematocrit 31.3 (L) 37.5 - 51.0 %    MCV 95.7 79.0 - 97.0 fL    MCH 28.4 26.6 - 33.0 pg    MCHC 29.7 (L) 31.5 - 35.7 g/dL    RDW 14.4 12.3 - 15.4 %    RDW-SD 50.7 37.0 - 54.0 fl    MPV 10.3 6.0 - 12.0 fL    Platelets 210 140 - 450 10*3/mm3    Neutrophil % 88.2 (H) 42.7 - 76.0 %    Lymphocyte % 4.4 (L) 19.6 - 45.3 %    Monocyte % 5.5 5.0 - 12.0 %    Eosinophil % 0.2 (L) 0.3 - 6.2 %    Basophil % 0.2 0.0 - 1.5 %    Immature Grans % 1.5 (H) 0.0 - 0.5 %    Neutrophils, Absolute 15.16 (H) 1.70 - 7.00 10*3/mm3    Lymphocytes, Absolute 0.76  0.70 - 3.10 10*3/mm3    Monocytes, Absolute 0.94 (H) 0.10 - 0.90 10*3/mm3    Eosinophils, Absolute 0.04 0.00 - 0.40 10*3/mm3    Basophils, Absolute 0.03 0.00 - 0.20 10*3/mm3    Immature Grans, Absolute 0.25 (H) 0.00 - 0.05 10*3/mm3    nRBC 0.2 0.0 - 0.2 /100 WBC   High Sensitivity Troponin T    Collection Time: 04/11/25 11:16 AM    Specimen: Blood   Result Value Ref Range    HS Troponin T 88 (C) <22 ng/L   Acetaminophen Level    Collection Time: 04/11/25 11:16 AM    Specimen: Blood   Result Value Ref Range    Acetaminophen 13.3 0.0 - 30.0 mcg/mL   Green Top (Gel)    Collection Time: 04/11/25 11:16 AM   Result Value Ref Range    Extra Tube Hold for add-ons.    Lavender Top    Collection Time: 04/11/25 11:16 AM   Result Value Ref Range    Extra Tube hold for add-on    Gray Top    Collection Time: 04/11/25 11:16 AM   Result Value Ref Range    Extra Tube Hold for add-ons.    Gold Top - SST    Collection Time: 04/11/25 12:56 PM   Result Value Ref Range    Extra Tube Hold for add-ons.    Light Blue Top    Collection Time: 04/11/25 12:56 PM   Result Value Ref Range    Extra Tube Hold for add-ons.    STAT Lactic Acid, Reflex    Collection Time: 04/11/25 12:57 PM    Specimen: Blood   Result Value Ref Range    Lactate 11.9 (C) 0.5 - 2.0 mmol/L   Hepatitis Panel, Acute    Collection Time: 04/11/25 12:57 PM    Specimen: Blood   Result Value Ref Range    Hepatitis B Surface Ag Non-Reactive Non-Reactive    Hep A IgM Non-Reactive Non-Reactive    Hep B C IgM Non-Reactive Non-Reactive    Hepatitis C Ab Non-Reactive Non-Reactive   High Sensitivity Troponin T 1Hr    Collection Time: 04/11/25  1:44 PM    Specimen: Blood   Result Value Ref Range    HS Troponin T 83 (C) <22 ng/L    Troponin T Numeric Delta -5 ng/L    Troponin T % Delta -6 Abnormal if >/= 20%   ECG 12 Lead Tachycardia    Collection Time: 04/11/25  1:50 PM   Result Value Ref Range    QT Interval 474 ms    QTC Interval 635 ms   POC Glucose Once    Collection Time: 04/11/25   5:12 PM    Specimen: Blood   Result Value Ref Range    Glucose 69 (L) 70 - 130 mg/dL   POC Glucose Once    Collection Time: 04/11/25  6:11 PM    Specimen: Blood   Result Value Ref Range    Glucose 80 70 - 130 mg/dL       If labs were ordered, I independently reviewed the results and considered them in treating the patient.    RADIOLOGY  CT Angiogram Abdomen Pelvis         CT Angiogram Chest   Final Result   Impression:      1. Large pericardial effusion measuring up to 4.1 cm. Cardiology consultation recommended.      2. 3 mm juxta fissural nodule in the right middle lung.In a low-risk patient with a solid nodule <6 mm, recommend no follow-up. In a high-risk patient, CT at 12 months is optional with stronger consideration if there is suspicious nodule morphology    and/or upper lobe location.            Electronically Signed: Celsa Jacques MD     4/11/2025 1:49 PM EDT     Workstation ID: [id number]      CT Head Without Contrast   Final Result   Impression:   No acute intracranial abnormality.      Findings which could reflect left maxillary sinusitis in the correct clinical setting.            Electronically Signed: Jesus Denny MD     4/11/2025 1:41 PM EDT     Workstation ID: [id number]      CT Abdomen Pelvis Without Contrast   Final Result   Impression:   1.Large pericardial effusion measuring up to 4 cm posteriorly. Cardiology consultation recommended.   2.Cholelithiasis.   3.Bilateral renal cortical atrophy.   4.Large prostate gland.   5.Generalized urinary bladder mural thickening, similar to prior examination. May be related to neurogenic bladder, chronic bladder outlet obstruction, and cystitis.   6.The appendix remains prominent in size measuring 9 mm maximum diameter. However there is gas in the appendix and there are no surrounding inflammatory changes and this is likely a normal finding for patient.            Electronically Signed: Manuel Jacques MD     4/11/2025 11:35 AM EDT     Workstation ID: [id number]       XR Chest 1 View    (Results Pending)     [] Radiologist's Report Reviewed:  I ordered and independently interpreted the above noted radiographic studies.  See radiologist's dictation for official interpretation.      PROCEDURES    Procedures    ECG 12 Lead Tachycardia   Preliminary Result   Test Reason : Tachycardia   Blood Pressure :   */*   mmHG   Vent. Rate : 108 BPM     Atrial Rate : 127 BPM      P-R Int :   * ms          QRS Dur :  94 ms       QT Int : 474 ms       P-R-T Axes :   *  86  63 degrees     QTcB Int : 635 ms      Accelerated Junctional rhythm   Incomplete right bundle branch block   Abnormal ECG   When compared with ECG of 26-May-2023 17:58,   Junctional rhythm has replaced Sinus rhythm   Nonspecific T wave abnormality now evident in Lateral leads   QT has lengthened      Referred By: EDMD           Confirmed By:           MEDICATIONS GIVEN IN ER    Medications   nitroglycerin (NITROSTAT) SL tablet 0.4 mg ( Sublingual MAR Unhold 4/11/25 1711)   sodium chloride 0.9 % flush 10 mL ( Intravenous MAR Unhold 4/11/25 1711)   sodium chloride 0.9 % infusion 40 mL ( Intravenous MAR Unhold 4/11/25 1711)   mupirocin (BACTROBAN) 2 % nasal ointment 1 Application ( Each Nare MAR Unhold 4/11/25 1711)   sennosides-docusate (PERICOLACE) 8.6-50 MG per tablet 2 tablet ( Oral MAR Unhold 4/11/25 1711)     And   polyethylene glycol (MIRALAX) packet 17 g ( Oral MAR Unhold 4/11/25 1711)     And   bisacodyl (DULCOLAX) EC tablet 5 mg ( Oral MAR Unhold 4/11/25 1711)     And   bisacodyl (DULCOLAX) suppository 10 mg ( Rectal MAR Unhold 4/11/25 1711)   dextrose (GLUTOSE) oral gel 15 g ( Oral MAR Unhold 4/11/25 1711)   dextrose (D50W) (25 g/50 mL) IV injection 25 g ( Intravenous MAR Unhold 4/11/25 1711)   glucagon (GLUCAGEN) injection 1 mg ( Intramuscular MAR Unhold 4/11/25 1711)   insulin regular (humuLIN R,novoLIN R) injection 2-9 Units ( Subcutaneous Not Given 4/11/25 1813)   prochlorperazine (COMPAZINE) injection 5 mg (5  mg Intravenous Given 4/11/25 1135)   famotidine (PEPCID) injection 20 mg (20 mg Intravenous Given 4/11/25 1135)   iopamidol (ISOVUE-370) 76 % injection 100 mL (75 mL Intravenous Given 4/11/25 1335)   Morphine sulfate (PF) injection 4 mg (4 mg Intravenous Given 4/11/25 1529)   ceFAZolin 1000 mg IVPB in 100 mL NS (MBP) (2,000 mg Intravenous New Bag 4/11/25 1602)       MEDICAL DECISION MAKING, PROGRESS, and CONSULTS   Medical Decision Making   Jun Young is a 64 y.o. male who presents to the ED c/o abdominal pain.  Patient complains of diffuse abdominal discomfort.  Reports that the pain started 3 days ago.  Complains of nausea, vomiting and diarrhea.  Patient was at dialysis today.  He gets dialyzed on Mondays and Fridays.  Patient received his full treatment.  After being dialyzed his blood pressure dropped.  He was complaining of abdominal pain.  EMS arrived they administered a liter of normal saline.  Patient denies any surgical history of his abdomen.  Denies fevers, chills.  Patient did does urinate he denies any issues with urination.  He reports that he just does not feel well.      Problems Addressed:  ESRD (end stage renal disease) on dialysis: chronic illness or injury  Generalized abdominal pain: complicated acute illness or injury     Details: Ct imaging cholelithiasis no acute cholecystitis  Pericardial effusion: complicated acute illness or injury     Details: Imaging reveals large pericardial effusion CT surgery was consulted, cardiology consulted.  Patient will be admitted to the ICU.  Patient will go to Cath Lab.    Amount and/or Complexity of Data Reviewed  Labs: ordered. Decision-making details documented in ED Course.  Radiology: ordered. Decision-making details documented in ED Course.  ECG/medicine tests: ordered. Decision-making details documented in ED Course.    Risk  Prescription drug management.  Decision regarding hospitalization.        Discussion below represents my  analysis of pertinent findings related to patient's condition, differential diagnosis, treatment plan and final disposition.    Differential diagnosis: Colitis, gastroenteritis, cholecystitis, diverticulitis, electrolyte imbalance, dehydration  Additional differential diagnosis include but are not limited to:     Additional sources  Discussed/ obtained information from independent historians:   [] Spouse  [] Parent  [] Family member  [] Friend  [] EMS   [] Other:  External (non-ED) record review:   [] Inpatient record:   [] Office record:   [x] Outpatient record:   [x] Prior Outpatient labs:   [x] Prior Outpatient radiology:   [] Primary Care record:   [] Outside ED record:   [] Other:   Patient's care impacted by:   [x] Diabetes  [x] Hypertension  [x] Hyperlipidemia  [] Hypothyroidism   [] Coronary Artery Disease  [] Congestive Heart Failure   [] COPD   [] Cancer   [] Obesity  [x] GERD   [] Tobacco Abuse   [] Substance Abuse    [] Anxiety   [] Depression   [x] Other: ESRD  Care significantly affected by Social Determinants of Health (housing and economic circumstances, unemployment)    [] Yes     [x] No   If yes, Patient's care significantly limited by  Social Determinants of Health including:   [] Inadequate housing   [] Low income   [] Alcoholism and drug addiction in family   [] Problems related to primary support group   [] Unemployment   [] Problems related to employment   [] Other Social Determinants of Health:     Orders placed during this visit:  Orders Placed This Encounter   Procedures    Body Fluid Culture - Body Fluid, Pericardium    AFB Culture - Body Fluid, Pericardium    CT Abdomen Pelvis Without Contrast    CT Angiogram Chest    CT Head Without Contrast    XR Chest 1 View    CT Angiogram Abdomen Pelvis    Bucklin Draw    Comprehensive Metabolic Panel    Lipase    Lactic Acid, Plasma    CBC Auto Differential    STAT Lactic Acid, Reflex    High Sensitivity Troponin T    Hepatitis Panel, Acute     Acetaminophen Level    High Sensitivity Troponin T 1Hr    STAT Lactic Acid, Reflex    Comprehensive Metabolic Panel    Magnesium    Phosphorus    Body Fluid Cell Count With Differential - Body Fluid, Pericardium    Glucose, Body Fluid - Pericardial Fluid, Pericardium    Lactate Dehydrogenase, Body Fluid - Pericardial Fluid, Pericardium    Protein, Body Fluid - Pericardial Fluid, Pericardium    Albumin, Fluid - Pericardial Fluid, Pericardium    Body fluid cell count - Body Fluid, Pericardium    Diet: Cardiac, Diabetic, Regular/House, Renal; Healthy Heart (2-3 Na+); Consistent Carbohydrate; Low Sodium (2-3g); Fluid Consistency: Thin (IDDSI 0)    Undress & Gown    Vital Signs Every Hour and Per Hospital Policy Based on Patient Condition    Telemetry - Place Orders & Notify Provider of Results When Patient Experiences Acute Chest Pain, Dysrhythmia or Respiratory Distress    Height & Weight    Daily Weights    Intake & Output    Oral Care - Patient Not on NPPV & Not Intubated    Target Arousal Level RASS 0 to -2    Use Mobility Guidelines for Advancement of Activity    Daily CHG Bath While in Critical Care    Place Sequential Compression Device    Maintain Sequential Compression Device    Vital Signs    Maintain IV Access    Telemetry - Place Orders & Notify Provider of Results When Patient Experiences Acute Chest Pain, Dysrhythmia or Respiratory Distress    May Be Off Telemetry for Tests    No IM injections, hold pressure on venipuncture sites for 5 minutes.    Encourage fluids    Activity - Ad Shabana    Continuous Pulse Oximetry    Notify MD of hypotension (SBP less than 95), bleeding, or dysrythmia and follow Sheath Removal Policy if needed.    Hold metFORMIN (GLUCOPHAGE) for 48 Hours    Keep Arterial Sheath in Place    Apply / Maintain Pressure Bag to Arterial Sheath While In Place    Head of Bed: 30 degrees (or less); Activity Level: Strict Bed Rest; (May Log Roll); Total Bedrest Time? Other; Length of Time: 24    Keep  Affected Extremity Straight While Sheath in Place    Vital Signs    Neurovascular Checks    Sheath Site Assessment    Code Status and Medical Interventions: CPR (Attempt to Resuscitate); Full Support    Oxygen Therapy- Nasal Cannula; Titrate 1-6 LPM Per SpO2; 90 - 95%    POC Glucose Q6H    POC Glucose Once    POC Glucose Once    ECG 12 Lead Tachycardia    Adult Transthoracic Echo Limited W/ Cont if Necessary Per Protocol    Adult Transthoracic Echo Limited W/ Cont if Necessary Per Protocol    Insert Peripheral IV    Insert Peripheral IV    Insert Peripheral IV    Inpatient Admission    ICU / CCU Bed Request ([facility]LLICENT / ANGELLA / [facility] ONLY)    CBC & Differential    Green Top (Gel)    Lavender Top    Gold Top - SST    Gray Top    Light Blue Top    CBC & Differential       I considered prescription management  with:   [] Pain medication  [] Antiviral  [] Antibiotic   [] Other:   Rationale:  Additional orders considered but not ordered:  The following testing was considered but ultimately not selected after discussion with patient/family:  ED Course:    ED Course as of 04/11/25 1843   Fri Apr 11, 2025   1202 Lipase: 57 [KG]   1205 Lactate(!!): 7.6 [KG]   1218 Creatinine(!): 7.53 [KG]   1218 Potassium(!): 5.3 [KG]   1218 Dr. Canseco consulted , he suggest a stat echo, he suggest consult with CT surgery.  He advises that it is possible he will do better with a pericardial window.  Dr. Godoy emergency room physician consulted made aware of recommendation per interventional cardiology.  We will add stat echo.  Liver enzymes ALT 4600, AST 4300 alk phos 336, total bili 1.3.  We will add acetaminophen level and hepatitis panel at this time.  We will go ahead and contact CT surgery. [KG]   1221 ALT (SGPT)(!): 4,663 [KG]   1222 AST (SGOT)(!): 4,327 [KG]   1222 Alkaline Phosphatase(!): 336 [KG]   1222 Total Bilirubin(!): 1.3 [KG]   1227 CT surgery paged at this time.  [KG]   1231 Consulted with Dr. Dorman with CT surgery.  He is aware of  patient he is going to speak with interventional cardiology. [KG]   1300 Specialist at bedside to assess patient.  CTA of the chest added at this time. [KG]   1322 Patient in CT at this time.  Patient complains of headache.  CT of the head ordered. [KG]   1426 CT angio of the chest again shows the large pericardial effusion. [KG]   1433 ICU intensivist Dr. Hoyt consulted.   [KG]   1436 Dr. Fernández at bedside.  [KG]      ED Course User Index  [KG] Sole Lin APRN            DIAGNOSIS  Final diagnoses:   Pericardial effusion with cardiac tamponade   Generalized abdominal pain   ESRD (end stage renal disease) on dialysis   Lactic acidosis   Elevated liver enzymes       DISPOSITION    ED Disposition       ED Disposition   Decision to Admit    Condition   --    Comment   Level of Care: Critical Care [6]   Diagnosis: Pericardial effusion with cardiac tamponade [[id number]]   Certification: I Certify That Inpatient Hospital Services Are Medically Necessary For Greater Than 2 Midnights                 Please note that portions of this document were completed with voice recognition software.       Sole Lin APRN  04/11/25 0025

## 2025-04-11 NOTE — Clinical Note
Drainage catheter secured with suture and Accordion drain.Catheter attached to Sachse Drain for drainage.

## 2025-04-11 NOTE — Clinical Note
Allergies reviewed.  H&P note has been confirmed for the patient. Procedural consent has not been signed. Staff has reviewed the patient's labs.

## 2025-04-11 NOTE — CONSULTS
[facility] Cardiothoracic Surgery In-Patient Consult    Name:  Jun Young  MRN Number:  [id number]  Date of Admission:  4/11/2025  Date of Consultation: 4/11/2025    Consulting Provider:    PCP: Samir Chow DO  IP Care Team:  Patient Care Team:  Samir Chow DO as PCP - General (Internal Medicine)  Provider, No Known as PCP - Family Medicine  Provider, No Known  Lora Dickey DO as Consulting Physician (Hospitalist)  Katie Lau MD as Consulting Physician (Hospitalist)    Reason for Consultation: Pericardial effusion    History of Present Illness:    Jun Young is a 64 y.o. male with a history of ESRD on hemodialysis via left upper arm AV graft, hypertension, hyperlipidemia, CVA, BPH, questionable history of diabetes mellitus, who presented to the emergency department complaining of a 3-day history of diffuse abdominal pain, with associated nausea, vomiting and diarrhea.  He received a full treatment of dialysis this morning.  Following dialysis, the patient developed hypotension.  EMS administered 1 L of normal saline.   In the ED, the patient is afebrile with stable BP and mild tachycardia.  Labs revealed leukocytosis with lactic acid 7.6, HS troponin 88, WBC 7.18, Hb 9.3, creatinine 7.53/GFR 7.4, potassium 5.3, AST 4327, , .  Lipase within normal limits.  CT abdomen/pelvis without contrast revealed a large pericardial effusion measuring up to 4 cm posteriorly, cholelithiasis, generalized urinary bladder mural thickening similar to prior image, appendix is prominent with 9 mm maximal diameter and no surrounding inflammatory changes-likely normal finding for patient.  Cardiothoracic surgery is consulted.  The patient reports his symptoms began immediately after starting a new medication (he does not know the name).  Upon review of care everywhere records, it appears the patient was recently started on Sucroferric Oxyhydroxide.  He  complains of diffuse chest and abdominal discomfort, nausea, vomiting, diarrhea, dark stools, and states he has had decreased appetite x 3 days.  He denies shortness of breath or palpitations at this time.      Review of Systems:  Review of Systems   Constitutional:  Positive for appetite change. Negative for chills and fever.   Respiratory:  Negative for shortness of breath and wheezing.    Cardiovascular:  Positive for chest pain. Negative for palpitations and leg swelling.   Gastrointestinal:  Positive for abdominal pain, diarrhea, nausea and vomiting.        Dark stool   Genitourinary:  Negative for dysuria and hematuria.       Hospital Problems:   * No active hospital problems. *       Past Medical History:    Past Medical History:   Diagnosis Date    Acute on chronic renal failure     Bilateral hydronephrosis     BPH (benign prostatic hyperplasia)     Cholelithiasis     Diabetes mellitus     Dyslipidemia     Elevated cholesterol     GERD (gastroesophageal reflux disease)     Gross hematuria     Hemodialysis patient     History of transfusion     BHL, no reactions per pt    Hyperlipidemia     Hypertension     Pericardial effusion     echo 2/2023 - 1-2 cm preserved LV function    Stroke     no residual effects       Past Surgical History:    Past Surgical History:   Procedure Laterality Date    COLONOSCOPY      CYSTOSCOPY BLADDER BIOPSY N/A 02/09/2023    Procedure: CYSTOSCOPY CLOT EVACUATION WITH FULGURATION, RETROGRADE PYELOGRAM;  Surgeon: Mandi Velasco MD;  Location: [facility] OR;  Service: Urology;  Laterality: N/A;    CYSTOSCOPY BLADDER BIOPSY N/A 03/18/2023    Procedure: CYSTOSCOPY WITH FULGURATION OF BLEEDERS;  Surgeon: Santana Christianson MD;  Location: [facility] OR;  Service: Urology;  Laterality: N/A;    CYSTOSCOPY TRANSURETHRAL RESECTION OF PROSTATE N/A 5/5/2023    Procedure: CYSTOSCOPY TRANSURETHRAL RESECTION OF PROSTATE (BIPOLAR);  Surgeon: Pawan Damico MD;  Location: [facility] OR;  Service: Urology;   Laterality: N/A;    CYSTOSCOPY WITH CLOT EVACUATION      fulguration - 3 way catheter placement    INSERTION HEMODIALYSIS CATHETER         Family History:    Family History   Family history unknown: Yes       Social History:    Social History     Socioeconomic History    Marital status: Single   Tobacco Use    Smoking status: Never     Passive exposure: Never    Smokeless tobacco: Never   Vaping Use    Vaping status: Never Used   Substance and Sexual Activity    Alcohol use: Never    Drug use: Never    Sexual activity: Not Currently       Allergies:  No Known Allergies      Physical Exam:  Vital Signs:    Temp:  [98.2 °F (36.8 °C)] 98.2 °F (36.8 °C)  Heart Rate:  [] 108  Resp:  [18] 18  BP: (103-126)/(71-93) 114/71  Body mass index is 23.13 kg/m².     Physical Exam  Constitutional:       General: He is not in acute distress.     Appearance: He is not diaphoretic.   HENT:      Head: Normocephalic and atraumatic.   Eyes:      Conjunctiva/sclera: Conjunctivae normal.   Cardiovascular:      Rate and Rhythm: Regular rhythm. Tachycardia present.      Heart sounds: Heart sounds are distant. No murmur heard.  Pulmonary:      Effort: Pulmonary effort is normal. No respiratory distress.      Breath sounds: No wheezing, rhonchi or rales.   Abdominal:      General: Bowel sounds are normal. There is distension.      Palpations: Abdomen is soft.      Tenderness: There is no guarding or rebound.   Musculoskeletal:         General: No swelling. Normal range of motion.      Cervical back: Neck supple.   Skin:     General: Skin is warm and dry.   Neurological:      General: No focal deficit present.      Mental Status: He is alert and oriented to person, place, and time.         Labs/Imaging/Procedures:   Results from last 7 days   Lab Units 04/11/25  1116   SODIUM mmol/L 139   POTASSIUM mmol/L 5.3*   CHLORIDE mmol/L 94*   CO2 mmol/L 19.0*   BUN mg/dL 39*   CREATININE mg/dL 7.53*   CALCIUM mg/dL 9.1   BILIRUBIN mg/dL 1.3*   ALK  PHOS U/L 336*   ALT (SGPT) U/L 4,663*   AST (SGOT) U/L 4,327*   GLUCOSE mg/dL 83     Results from last 7 days   Lab Units 04/11/25  1116   HSTROP T ng/L 88*     Results from last 7 days   Lab Units 04/11/25  1116   WBC 10*3/mm3 17.18*   HEMOGLOBIN g/dL 9.3*   HEMATOCRIT % 31.3*   PLATELETS 10*3/mm3 210                 CT Abdomen Pelvis Without Contrast  Result Date: 4/11/2025  Impression: 1.Large pericardial effusion measuring up to 4 cm posteriorly. Cardiology consultation recommended. 2.Cholelithiasis. 3.Bilateral renal cortical atrophy. 4.Large prostate gland. 5.Generalized urinary bladder mural thickening, similar to prior examination. May be related to neurogenic bladder, chronic bladder outlet obstruction, and cystitis. 6.The appendix remains prominent in size measuring 9 mm maximum diameter. However there is gas in the appendix and there are no surrounding inflammatory changes and this is likely a normal finding for patient. Electronically Signed: Manuel Jacques MD  4/11/2025 11:35 AM EDT  Workstation ID: [id number]     [facility]: No results found for this or any previous visit.     Echo: Results for orders placed during the hospital encounter of 01/13/24    Adult Transthoracic Echo Complete W/ Cont if Necessary Per Protocol    Interpretation Summary    Left ventricular systolic function is hyperdynamic (EF > 70%). Left ventricular ejection fraction appears to be greater than 70%.    Left ventricular wall thickness is consistent with mild concentric hypertrophy.    Left ventricular diastolic function was normal.    Estimated right ventricular systolic pressure from tricuspid regurgitation is normal (<35 mmHg).    There is a trivial pericardial effusion.    Cannot exclude small mobile echogenic mass on the anterior papillary muscle.  Consider TIEN.     Carotid Duplex: Results for orders placed during the hospital encounter of 02/07/23    Duplex Vein Mapping Study Upper Extremity - Left CAR    Interpretation Summary     Inadequate/small left cephalic vein    Adequate/good basilic vein.    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      Assessment:  Pericardial effusion noted on noncontrast CT abdomen pelvis  Diffuse abdominal pain  ESRD on hemodialysis  Hypertension  Hyperlipidemia      Plan:  Formal evaluation with STAT CTA chest, and echocardiogram  Keep n.p.o.  Further CT surgical recommendations pending imaging results.       Usha Herrera PA-C  13:25 EDT  04/11/25     Thank you for allowing us to participate in the care of your patient. Please do not hesitate to contact us with additional questions or concerns.     I have personally evaluated and examined the patient.  The above documentation has been reviewed and I agree.      64M with ESRD transferred from dialysis center with hypotension, c/o abd pain, CT abd demonstrates large pericardial effusion, echo pending, ordered CTA chest r/o dissection as also c/o chest/back pain, if negative would rec pericardiocentesis, BP remains stable although tachycardic,  consider thoracoscopic pericardial window if drain remains high output despite drainage and medical tx    ERIK Dorman MD  Cardiothoracic Surgery

## 2025-04-11 NOTE — Clinical Note
Level of Care: Critical Care [6]   Admitting Physician: ANUP SALES [[id number]]   Attending Physician: ANUP SALES [[id number]]

## 2025-04-12 ENCOUNTER — APPOINTMENT (OUTPATIENT)
Dept: GENERAL RADIOLOGY | Facility: HOSPITAL | Age: 65
DRG: 673 | End: 2025-04-12
Payer: MEDICARE

## 2025-04-12 ENCOUNTER — APPOINTMENT (OUTPATIENT)
Dept: NEPHROLOGY | Facility: HOSPITAL | Age: 65
DRG: 673 | End: 2025-04-12
Payer: MEDICARE

## 2025-04-12 LAB
ALBUMIN SERPL-MCNC: 3.3 G/DL (ref 3.5–5.2)
ALBUMIN/GLOB SERPL: 1.2 G/DL
ALP SERPL-CCNC: 267 U/L (ref 39–117)
ALT SERPL W P-5'-P-CCNC: 5702 U/L (ref 1–41)
ANION GAP SERPL CALCULATED.3IONS-SCNC: 23 MMOL/L (ref 5–15)
AST SERPL-CCNC: 6968 U/L (ref 1–40)
BASOPHILS # BLD MANUAL: 0 10*3/MM3 (ref 0–0.2)
BASOPHILS NFR BLD MANUAL: 0 % (ref 0–1.5)
BILIRUB SERPL-MCNC: 0.6 MG/DL (ref 0–1.2)
BUN SERPL-MCNC: 63 MG/DL (ref 8–23)
BUN/CREAT SERPL: 6.6 (ref 7–25)
CALCIUM SPEC-SCNC: 8.2 MG/DL (ref 8.6–10.5)
CHLORIDE SERPL-SCNC: 94 MMOL/L (ref 98–107)
CO2 SERPL-SCNC: 21 MMOL/L (ref 22–29)
CREAT SERPL-MCNC: 9.6 MG/DL (ref 0.76–1.27)
D-LACTATE SERPL-SCNC: 1.5 MMOL/L (ref 0.5–2)
D-LACTATE SERPL-SCNC: 2.2 MMOL/L (ref 0.5–2)
DEPRECATED RDW RBC AUTO: 46.9 FL (ref 37–54)
EGFRCR SERPLBLD CKD-EPI 2021: 5.5 ML/MIN/1.73
EOSINOPHIL # BLD MANUAL: 0 10*3/MM3 (ref 0–0.4)
EOSINOPHIL NFR BLD MANUAL: 0 % (ref 0.3–6.2)
ERYTHROCYTE [DISTWIDTH] IN BLOOD BY AUTOMATED COUNT: 14.5 % (ref 12.3–15.4)
GLOBULIN UR ELPH-MCNC: 2.8 GM/DL
GLUCOSE BLDC GLUCOMTR-MCNC: 122 MG/DL (ref 70–130)
GLUCOSE BLDC GLUCOMTR-MCNC: 141 MG/DL (ref 70–130)
GLUCOSE BLDC GLUCOMTR-MCNC: 155 MG/DL (ref 70–130)
GLUCOSE BLDC GLUCOMTR-MCNC: 194 MG/DL (ref 70–130)
GLUCOSE BLDC GLUCOMTR-MCNC: 96 MG/DL (ref 70–130)
GLUCOSE SERPL-MCNC: 125 MG/DL (ref 65–99)
HCT VFR BLD AUTO: 25.4 % (ref 37.5–51)
HGB BLD-MCNC: 8 G/DL (ref 13–17.7)
LYMPHOCYTES # BLD MANUAL: 0.63 10*3/MM3 (ref 0.7–3.1)
LYMPHOCYTES NFR BLD MANUAL: 2 % (ref 5–12)
MAGNESIUM SERPL-MCNC: 3 MG/DL (ref 1.6–2.4)
MCH RBC QN AUTO: 28.3 PG (ref 26.6–33)
MCHC RBC AUTO-ENTMCNC: 31.5 G/DL (ref 31.5–35.7)
MCV RBC AUTO: 89.8 FL (ref 79–97)
METAMYELOCYTES NFR BLD MANUAL: 1 % (ref 0–0)
MONOCYTES # BLD: 0.32 10*3/MM3 (ref 0.1–0.9)
NEUTROPHILS # BLD AUTO: 14.68 10*3/MM3 (ref 1.7–7)
NEUTROPHILS NFR BLD MANUAL: 92 % (ref 42.7–76)
NEUTS BAND NFR BLD MANUAL: 1 % (ref 0–5)
NRBC SPEC MANUAL: 0 /100 WBC (ref 0–0.2)
PHOSPHATE SERPL-MCNC: 10 MG/DL (ref 2.5–4.5)
PLAT MORPH BLD: NORMAL
PLATELET # BLD AUTO: 214 10*3/MM3 (ref 140–450)
PMV BLD AUTO: 9.7 FL (ref 6–12)
POTASSIUM SERPL-SCNC: 5.1 MMOL/L (ref 3.5–5.2)
PROT SERPL-MCNC: 6.1 G/DL (ref 6–8.5)
RBC # BLD AUTO: 2.83 10*6/MM3 (ref 4.14–5.8)
RBC MORPH BLD: NORMAL
SODIUM SERPL-SCNC: 138 MMOL/L (ref 136–145)
VARIANT LYMPHS NFR BLD MANUAL: 4 % (ref 19.6–45.3)
WBC MORPH BLD: NORMAL
WBC NRBC COR # BLD AUTO: 15.78 10*3/MM3 (ref 3.4–10.8)

## 2025-04-12 PROCEDURE — 85027 COMPLETE CBC AUTOMATED: CPT | Performed by: INTERNAL MEDICINE

## 2025-04-12 PROCEDURE — 82948 REAGENT STRIP/BLOOD GLUCOSE: CPT

## 2025-04-12 PROCEDURE — 83605 ASSAY OF LACTIC ACID: CPT | Performed by: EMERGENCY MEDICINE

## 2025-04-12 PROCEDURE — 80053 COMPREHEN METABOLIC PANEL: CPT | Performed by: INTERNAL MEDICINE

## 2025-04-12 PROCEDURE — 83735 ASSAY OF MAGNESIUM: CPT | Performed by: INTERNAL MEDICINE

## 2025-04-12 PROCEDURE — 63710000001 INSULIN LISPRO (HUMAN) PER 5 UNITS: Performed by: INTERNAL MEDICINE

## 2025-04-12 PROCEDURE — 99232 SBSQ HOSP IP/OBS MODERATE 35: CPT | Performed by: INTERNAL MEDICINE

## 2025-04-12 PROCEDURE — 84100 ASSAY OF PHOSPHORUS: CPT | Performed by: INTERNAL MEDICINE

## 2025-04-12 PROCEDURE — 83605 ASSAY OF LACTIC ACID: CPT | Performed by: INTERNAL MEDICINE

## 2025-04-12 PROCEDURE — 85007 BL SMEAR W/DIFF WBC COUNT: CPT | Performed by: INTERNAL MEDICINE

## 2025-04-12 PROCEDURE — 71045 X-RAY EXAM CHEST 1 VIEW: CPT

## 2025-04-12 RX ORDER — DEXTROSE MONOHYDRATE 25 G/50ML
25 INJECTION, SOLUTION INTRAVENOUS
Status: DISCONTINUED | OUTPATIENT
Start: 2025-04-12 | End: 2025-04-19 | Stop reason: HOSPADM

## 2025-04-12 RX ORDER — NICOTINE POLACRILEX 4 MG
15 LOZENGE BUCCAL
Status: DISCONTINUED | OUTPATIENT
Start: 2025-04-12 | End: 2025-04-19 | Stop reason: HOSPADM

## 2025-04-12 RX ORDER — INSULIN LISPRO 100 [IU]/ML
2-7 INJECTION, SOLUTION INTRAVENOUS; SUBCUTANEOUS
Status: DISCONTINUED | OUTPATIENT
Start: 2025-04-12 | End: 2025-04-19 | Stop reason: HOSPADM

## 2025-04-12 RX ORDER — COLCHICINE 0.6 MG/1
0.6 TABLET ORAL DAILY
Status: DISCONTINUED | OUTPATIENT
Start: 2025-04-12 | End: 2025-04-19 | Stop reason: HOSPADM

## 2025-04-12 RX ORDER — IBUPROFEN 600 MG/1
1 TABLET ORAL
Status: DISCONTINUED | OUTPATIENT
Start: 2025-04-12 | End: 2025-04-19 | Stop reason: HOSPADM

## 2025-04-12 RX ADMIN — COLCHICINE 0.6 MG: 0.6 TABLET ORAL at 10:16

## 2025-04-12 RX ADMIN — INSULIN LISPRO 2 UNITS: 100 INJECTION, SOLUTION INTRAVENOUS; SUBCUTANEOUS at 21:10

## 2025-04-12 RX ADMIN — SENNOSIDES AND DOCUSATE SODIUM 2 TABLET: 50; 8.6 TABLET ORAL at 08:32

## 2025-04-12 RX ADMIN — MUPIROCIN 1 APPLICATION: 20 OINTMENT TOPICAL at 08:32

## 2025-04-12 NOTE — CONSULTS
NAL Consult Note    Jun Young  1960  [id number]    Date of Admit:  4/11/2025  Date of Consult: 4/12/2025    Reason for Consultation: ESRD on HD    History of present illness:    Patient is a 64 y.o.  Yr old male with past medical history of end-stage renal disease on hemodialysis Monday and Friday schedule, hypertension, dyslipidemia, history of CVA, BPH, diabetes and multiple medical problems as below who presented to hospital from dialysis unit after he was found to be severely hypotensive.  Patient was complaining of abdominal pain and chest pain.  CT abdomen/pelvis showed large pericardial effusion measuring up to 4 cm with features of tamponade.  Labs notable for elevated liver enzymes.  Patient was urgently taken to Cath Lab for pericardiocentesis.  Over 900 mL of pericardial fluid drained.  Nephrology is consulted for dialysis management while inpatient. .    [facility] and on Monday and Friday dialysis.  Multiple times discussed with him about doing 3 times a week dialysis but because of his work schedule he cannot come to dialysis 3 times.  Currently denies chest pain or shortness of breath.  He thinks that his abdominal discomfort started after he was started on a new Phos binder.     Past Medical History:   Diagnosis Date    Acute on chronic renal failure     Bilateral hydronephrosis     BPH (benign prostatic hyperplasia)     Cholelithiasis     Diabetes mellitus     Dyslipidemia     Elevated cholesterol     GERD (gastroesophageal reflux disease)     Gross hematuria     Hemodialysis patient     History of transfusion     BHL, no reactions per pt    Hyperlipidemia     Hypertension     Pericardial effusion     echo 2/2023 - 1-2 cm preserved LV function    Stroke     no residual effects       Past Surgical History:   Procedure Laterality Date    COLONOSCOPY      CYSTOSCOPY BLADDER BIOPSY N/A 02/09/2023    Procedure: CYSTOSCOPY CLOT EVACUATION WITH  FULGURATION, RETROGRADE PYELOGRAM;  Surgeon: Mandi Velasco MD;  Location:  [facility] OR;  Service: Urology;  Laterality: N/A;    CYSTOSCOPY BLADDER BIOPSY N/A 03/18/2023    Procedure: CYSTOSCOPY WITH FULGURATION OF BLEEDERS;  Surgeon: Santana Christianson MD;  Location:  [facility] OR;  Service: Urology;  Laterality: N/A;    CYSTOSCOPY TRANSURETHRAL RESECTION OF PROSTATE N/A 5/5/2023    Procedure: CYSTOSCOPY TRANSURETHRAL RESECTION OF PROSTATE (BIPOLAR);  Surgeon: Pawan Damico MD;  Location:  [facility] OR;  Service: Urology;  Laterality: N/A;    CYSTOSCOPY WITH CLOT EVACUATION      fulguration - 3 way catheter placement    INSERTION HEMODIALYSIS CATHETER         Social History     Socioeconomic History    Marital status: Single   Tobacco Use    Smoking status: Never     Passive exposure: Never    Smokeless tobacco: Never   Vaping Use    Vaping status: Never Used   Substance and Sexual Activity    Alcohol use: Never    Drug use: Never    Sexual activity: Not Currently       Family history is unknown by patient.    No Known Allergies    Medication:    Current Facility-Administered Medications:     sennosides-docusate (PERICOLACE) 8.6-50 MG per tablet 2 tablet, 2 tablet, Oral, BID, 2 tablet at 04/12/25 0832 **AND** polyethylene glycol (MIRALAX) packet 17 g, 17 g, Oral, Daily PRN **AND** bisacodyl (DULCOLAX) EC tablet 5 mg, 5 mg, Oral, Daily PRN **AND** bisacodyl (DULCOLAX) suppository 10 mg, 10 mg, Rectal, Daily PRN, Braxton Canseco MD    colchicine tablet 0.6 mg, 0.6 mg, Oral, Daily, Josué Dorman MD, 0.6 mg at 04/12/25 1016    dextrose (D50W) (25 g/50 mL) IV injection 25 g, 25 g, Intravenous, Q15 Min PRN, Sushil Hoyt MD    dextrose (GLUTOSE) oral gel 15 g, 15 g, Oral, Q15 Min PRN, Sushil Hoyt MD    glucagon (GLUCAGEN) injection 1 mg, 1 mg, Intramuscular, Q15 Min PRN, Sushil Hoyt MD    Insulin Lispro (humaLOG) injection 2-7 Units, 2-7 Units, Subcutaneous, 4x Daily AC & at Bedtime,  "Sushil Hoyt MD    labetalol (NORMODYNE,TRANDATE) injection 10 mg, 10 mg, Intravenous, Q4H PRN, Zain Ireland, APRN, 10 mg at 04/11/25 2010    mupirocin (BACTROBAN) 2 % nasal ointment 1 Application, 1 Application, Each Nare, BID, Braxton Canseco MD, 1 Application at 04/12/25 0832    nitroglycerin (NITROSTAT) SL tablet 0.4 mg, 0.4 mg, Sublingual, Q5 Min PRN, Braxton Canseco MD    sodium chloride 0.9 % flush 10 mL, 10 mL, Intravenous, PRN, Braxton Canseco MD    sodium chloride 0.9 % infusion 40 mL, 40 mL, Intravenous, PRN, Braxton Canseco MD    Medications Prior to Admission   Medication Sig Dispense Refill Last Dose/Taking    acetaminophen (TYLENOL) 325 MG tablet Take 2 tablets by mouth Every 4 (Four) Hours As Needed for Mild Pain.       B Complex-C-Folic Acid (KAYLEIGH-LASHON PO) Take 1 tablet by mouth Daily.       pantoprazole (PROTONIX) 40 MG EC tablet Take 1 tablet by mouth Daily. 30 tablet 2     sennosides-docusate (PERICOLACE) 8.6-50 MG per tablet Take 2 tablets by mouth 2 (Two) Times a Day As Needed for Constipation. 60 tablet 1     sevelamer (RENVELA) 800 MG tablet Take 1 tablet by mouth 3 (Three) Times a Day With Meals. 90 tablet 1     simethicone (MYLICON) 80 MG chewable tablet Chew and swallow 1 tablet by mouth 4 (Four) Times a Day As Needed for Flatulence. 30 tablet 1        Review of Systems:  Full review of systems reviewed and negative otherwise for acute complaints    Physical Exam:   Vital Signs   Blood pressure 158/78, pulse 71, temperature 98.4 °F (36.9 °C), temperature source Temporal, resp. rate 16, height 170.2 cm (67.01\"), weight 67 kg (147 lb 11.3 oz), SpO2 96%.     GENERAL: Awake and alert, in no acute distress.   HEENT: Normocephalic, atraumatic.    NECK: Supple .  HEART: RRR; No murmur, rubs, gallops. Trace edema  LUNGS:  Clear to auscultation bilaterally without wheezing, rales, rhonchi.  ABDOMEN: Soft, nontender, nondistended.   EXT: Trace edema.  :  No [place]field   SKIN: Warm and dry " without rash  NEURO: No focal neurological deficits. Strength equal bilateral  PSYCHIATRIC:  Cooperative with PE    Laboratory Data  Results from last 7 days   Lab Units 04/12/25  0313 04/11/25  1116   HEMOGLOBIN g/dL 8.0* 9.3*   HEMATOCRIT % 25.4* 31.3*     Results from last 7 days   Lab Units 04/12/25  0313 04/11/25  1116   SODIUM mmol/L 138 139   POTASSIUM mmol/L 5.1 5.3*   CHLORIDE mmol/L 94* 94*   CO2 mmol/L 21.0* 19.0*   BUN mg/dL 63* 39*   CREATININE mg/dL 9.60* 7.53*   CALCIUM mg/dL 8.2* 9.1   PHOSPHORUS mg/dL 10.0*  --    MAGNESIUM mg/dL 3.0*  --    ALBUMIN g/dL 3.3* 4.0     Results from last 7 days   Lab Units 04/12/25  0313   GLUCOSE mg/dL 125*         Results from last 7 days   Lab Units 04/12/25  0313   ALK PHOS U/L 267*   BILIRUBIN mg/dL 0.6   ALT (SGPT) U/L 5,702*   AST (SGOT) U/L 6,968*     Estimated Creatinine Clearance: 7.2 mL/min (A) (by C-G formula based on SCr of 9.6 mg/dL (H)).    Radiology:  CT Chest/Abdomen:    Impression:  1.Large pericardial effusion measuring up to 4 cm posteriorly. Cardiology consultation recommended.  2.Cholelithiasis.  3.Bilateral renal cortical atrophy.  4.Large prostate gland.  5.Generalized urinary bladder mural thickening, similar to prior examination. May be related to neurogenic bladder, chronic bladder outlet obstruction, and cystitis.  6.The appendix remains prominent in size measuring 9 mm maximum diameter. However there is gas in the appendix and there are no surrounding inflammatory changes and this is likely a normal finding for patient.      Impression:     End-stage renal disease:   - On Monday and Friday schedule.  Follows up with [facility].  - Will do hemodialysis today.   - He was supposed to get 3 times a week dialysis but he was not getting it.   - Will do HD today.  Will not be aggressive with fluid removal .     Large pericardial effusion:   - S/p pericardial drain 4/11.     Anemia:   - Epogen on dialysis days.     Hyperkalemia :  - Should get better with  dialysis.     Elevated liver enzymes:  - Secondary to hypotension/shock liver.     Hyperphosphatemia:   - Low phos diet.  Need to restart home binders.     Hypertension:   - Hold BP meds for now.     Thank you for the consult, will follow with you closely.     High risk and critically ill patient.       Jaret Vasques MD  4/12/2025  14:13 EDT

## 2025-04-12 NOTE — PROGRESS NOTES
"[facility]  IP Progress Note      Chief Complaint: Follow-up of pericardial effusion/pericardial tamponade.    Subjective   Sitting up in bed, reports feeling much better.  No current chest pain or shortness of breath.  No more nausea vomiting or diarrhea.  Has not ambulated or walked.  Became hypertensive and required labetalol.  Pericardial drain put out 50 mL overnight, none since last emptied, secretions are clearing.    Objective     Blood pressure 129/72, pulse 68, temperature 98.4 °F (36.9 °C), temperature source Temporal, resp. rate 14, height 170.2 cm (67.01\"), weight 67 kg (147 lb 11.3 oz), SpO2 95%.     Intake/Output Summary (Last 24 hours) at 4/12/2025 0789  Last data filed at 4/12/2025 0328  Gross per 24 hour   Intake 620 ml   Output 50 ml   Net 570 ml       Physical Exam:  General: No acute distress.   Neck: no JVD.  Chest:No respiratory distress, breath sounds are normal. No wheezes,  rhonchi or rales.  Cardiovascular: Normal S1 and S2, no murmur, gallop or rub.    Extremities: No edema.    Results Review:     I reviewed the patient's new clinical results.    Results from last 7 days   Lab Units 04/12/25  0313   WBC 10*3/mm3 15.78*   HEMOGLOBIN g/dL 8.0*   HEMATOCRIT % 25.4*   PLATELETS 10*3/mm3 214     Results from last 7 days   Lab Units 04/12/25  0313   SODIUM mmol/L 138   POTASSIUM mmol/L 5.1   CHLORIDE mmol/L 94*   CO2 mmol/L 21.0*   BUN mg/dL 63*   CREATININE mg/dL 9.60*   CALCIUM mg/dL 8.2*   BILIRUBIN mg/dL 0.6   ALK PHOS U/L 267*   ALT (SGPT) U/L 5,702*   AST (SGOT) U/L 6,968*   GLUCOSE mg/dL 125*     Results from last 7 days   Lab Units 04/12/25  0313   SODIUM mmol/L 138   POTASSIUM mmol/L 5.1   CHLORIDE mmol/L 94*   CO2 mmol/L 21.0*   BUN mg/dL 63*   CREATININE mg/dL 9.60*   GLUCOSE mg/dL 125*   CALCIUM mg/dL 8.2*         Lab Results   Component Value Date    TROPONINT 83 (C) 04/11/2025                   Tele: Sinus Rythym      Assessment:  Cardiogenic " shock.  Improved .  Massive pericardial effusion with cardiac tamponade, status post pericardiocentesis with removal of more than 900 mL hemorrhagic effusion, hemodynamics have improved.  Markedly elevated ALT/AST, probably from shock.  ESRD, on dialysis Monday and Friday.  Presented with nausea vomiting and diarrhea as well as abdominal pain.  Anemia.    Plan:  Monitor pericardial drain, if no significant output we will consider removal later today or tomorrow morning.  Nephrology has been consulted to address his renal failure and assess for need of dialysis.  Patient is clinically quite stable.  Monitor for recovery of liver function.  Increase activities, needs DVT prophylaxis.  Further management of medical issues per ICU team.    Braxton Canseco MD, FACC, [facility]

## 2025-04-12 NOTE — PROGRESS NOTES
"INTENSIVIST NOTE     Hospital Day: 1    Mr. Jun Young, 64 y.o. male is followed for:   Pericardial effusion with tamponade       SUBJECTIVE     Interval history:    Subjectively improved.  Afebrile.  Fluid balance +1.2 L.  Pericardial tube output only 50 mL overnight.    The patient's relevant past medical, surgical and social history were reviewed and updated in Epic as appropriate.       OBJECTIVE     Vital Sign Min/Max for last 24 hours  Temp  Min: 96.9 °F (36.1 °C)  Max: 98.4 °F (36.9 °C)   BP  Min: 97/55  Max: 183/86   Pulse  Min: 68  Max: 112   Resp  Min: 13  Max: 21   SpO2  Min: 87 %  Max: 100 %   No data recorded   Weight  Min: 67 kg (147 lb 11.3 oz)  Max: 67 kg (147 lb 11.3 oz)      Intake/Output Summary (Last 24 hours) at 4/12/2025 1446  Last data filed at 4/12/2025 1400  Gross per 24 hour   Intake 1220 ml   Output 65 ml   Net 1155 ml      Flowsheet Rows      Flowsheet Row First Filed Value   Admission Height 170.2 cm (67\") Documented at 04/11/2025 1045   Admission Weight 67 kg (147 lb 11.3 oz) Documented at 04/11/2025 1045               04/11/25  1045 04/11/25  1511   Weight: 67 kg (147 lb 11.3 oz) 67 kg (147 lb 11.3 oz)            Objective:  General Appearance:  In no acute distress.    Vital signs: (most recent): Blood pressure 152/75, pulse 75, temperature 98.4 °F (36.9 °C), temperature source Temporal, resp. rate 16, height 170.2 cm (67.01\"), weight 67 kg (147 lb 11.3 oz), SpO2 97%.    HEENT: Normal HEENT exam.    Lungs:  Normal effort and normal respiratory rate.  He is not in respiratory distress.  There are rales.  No wheezes or rhonchi.  (Pericardial drain in place)  Heart: Normal rate.  Regular rhythm.  S1 normal and S2 normal.  No murmur, gallop or friction rub.   Chest: Symmetric chest wall expansion.   Abdomen: Abdomen is soft and non-distended.  Bowel sounds are normal.   There is no abdominal tenderness.   There is no mass. There is no splenomegaly. There is no hepatomegaly. "   Extremities: There is no deformity or dependent edema.    Neurological: Patient is alert and oriented to person, place and time.    Pupils:  Pupils are equal, round, and reactive to light.    Skin:  Warm and dry.                I reviewed the patient's new clinical results.  I reviewed the patient's new imaging results/reports including actual images and agree with reports.    XR Chest 1 View  Result Date: 4/12/2025  Stable enlargement of the cardiac silhouette with some mild left basilar atelectasis. Electronically Signed: Ad Taylor MD  4/12/2025 5:41 AM EDT  Workstation ID: [id number]    CT Angiogram Chest  Result Date: 4/11/2025  Impression: 1. Large pericardial effusion measuring up to 4.1 cm. Cardiology consultation recommended. 2. 3 mm juxta fissural nodule in the right middle lung.In a low-risk patient with a solid nodule <6 mm, recommend no follow-up. In a high-risk patient, CT at 12 months is optional with stronger consideration if there is suspicious nodule morphology and/or upper lobe location. Electronically Signed: Celsa Jacques MD  4/11/2025 1:49 PM EDT  Workstation ID: [id number]    CT Head Without Contrast  Result Date: 4/11/2025  Impression: No acute intracranial abnormality. Findings which could reflect left maxillary sinusitis in the correct clinical setting. Electronically Signed: Jesus Denny MD  4/11/2025 1:41 PM EDT  Workstation ID: [id number]    CT Abdomen Pelvis Without Contrast  Result Date: 4/11/2025  Impression: 1.Large pericardial effusion measuring up to 4 cm posteriorly. Cardiology consultation recommended. 2.Cholelithiasis. 3.Bilateral renal cortical atrophy. 4.Large prostate gland. 5.Generalized urinary bladder mural thickening, similar to prior examination. May be related to neurogenic bladder, chronic bladder outlet obstruction, and cystitis. 6.The appendix remains prominent in size measuring 9 mm maximum diameter. However there is gas in the appendix and there are no  surrounding inflammatory changes and this is likely a normal finding for patient. Electronically Signed: Manuel Jacques MD  4/11/2025 11:35 AM EDT  Workstation ID: [id number]       INFUSIONS       Results from last 7 days   Lab Units 04/12/25  0313 04/11/25  1116   WBC 10*3/mm3 15.78* 17.18*   HEMOGLOBIN g/dL 8.0* 9.3*   HEMATOCRIT % 25.4* 31.3*   PLATELETS 10*3/mm3 214 210     Results from last 7 days   Lab Units 04/12/25  0313 04/11/25  1116   SODIUM mmol/L 138 139   POTASSIUM mmol/L 5.1 5.3*   CHLORIDE mmol/L 94* 94*   CO2 mmol/L 21.0* 19.0*   BUN mg/dL 63* 39*   GLUCOSE mg/dL 125* 83   CREATININE mg/dL 9.60* 7.53*   MAGNESIUM mg/dL 3.0*  --    PHOSPHORUS mg/dL 10.0*  --    CALCIUM mg/dL 8.2* 9.1   ALBUMIN g/dL 3.3* 4.0               Patient isn't on Tube Feeding   /h  Patient doesn't have any tube feeding modular orders    Mechanical Ventilator:   Settings: Observed:                                                I reviewed the patient's medications.    Assessment & Plan   ASSESSMENT/PLAN     Active Hospital Problems    Diagnosis     **Pericardial effusion with cardiac tamponade     Hypertension     ESRD (end stage renal disease)     Type 2 diabetes mellitus        64-year-old male with a past medical history significant for ESRD on hemodialysis, type 2 diabetes mellitus, hypertension, prior CVA, GERD, and dyslipidemia.  He gets hemodialysis every Monday Wednesday Friday and was hypotensive after his dialysis today.     He presented to the ER on 4/11 and workup revealed a large pericardial effusion with what appears to be RV collapse by bedside echo.  He also had significant elevation of his transaminases likely consistent with congestive hepatopathy.  His lactic acid level was elevated.    He underwent pericardial drain placement performed by Dr. Canseco.  900 mL of bloody fluid was obtained.  Lactic acid was rapidly reduced.      Today, liver transaminases have continued to elevate.  He is subjectively much better,  however . he is on no vasoactive drips.  He is in sinus rhythm.  His pain is improved.    Plan:    Monitor drain output and discontinue as appropriate tonight or tomorrow morning  CT surgery is recommended colchicine  Await cytology and cultures  Dialysis per renal.  I do not think the patient has been attending all of his dialysis sessions or taking his phosphorus binders appropriately  Follow-up echocardiogram in several weeks and window if needed.     I discussed the patient's findings and my recommendations with patient and nursing staff     Plan of care and goals reviewed with multidisciplinary team at daily rounds.    Any copied data from previous notes included in the (1) History of Present Illness, (2) Physical Examination and (3) Medical Decision Making and/or Assessment and Plan has been reviewed and is accurate as of 04/12/25    .    Sushil Hoyt MD  Pulmonary and Critical Care Medicine  04/12/25 14:46 EDT

## 2025-04-12 NOTE — PROGRESS NOTES
Cardiothoracic Surgery Progress Note      POD # 1 s/p emergent pericardiocentes for tamponade     LOS: 1 day      Subjective:  Feels well, no complaints    Objective:  Vital Signs  Temp:  [96.9 °F (36.1 °C)-98.4 °F (36.9 °C)] 98.4 °F (36.9 °C)  Heart Rate:  [] 75  Resp:  [13-21] 16  BP: ()/() 152/75    Physical Exam:   General Appearance: alert, appears stated age and cooperative   Lungs: clear to auscultation, respirations regular, respirations even, and respirations unlabored   Heart: regular rhythm & normal rate, normal S1, S2, no murmur, no gallop, no rub, and no click   Skin: Incision c/d/i     Results:    Results from last 7 days   Lab Units 04/12/25  0313   WBC 10*3/mm3 15.78*   HEMOGLOBIN g/dL 8.0*   HEMATOCRIT % 25.4*   PLATELETS 10*3/mm3 214     Results from last 7 days   Lab Units 04/12/25  0313   SODIUM mmol/L 138   POTASSIUM mmol/L 5.1   CHLORIDE mmol/L 94*   CO2 mmol/L 21.0*   BUN mg/dL 63*   CREATININE mg/dL 9.60*   GLUCOSE mg/dL 125*   CALCIUM mg/dL 8.2*       Assessment:  64M presented with large pericardial effusion and tamponade, now s/p pericardiocentesis, stable and symptoms resolved with minimal drain output    Plan:  Drain mgmt per cards  Rec colchicine x6 weeks  Repeat echo for surveillance in 2 weeks, if reaccumulation despite appropriate medical tx may need elective thoracoscopic pericardial window    Josué Dorman MD  04/12/25  14:33 EDT

## 2025-04-13 ENCOUNTER — APPOINTMENT (OUTPATIENT)
Dept: GENERAL RADIOLOGY | Facility: HOSPITAL | Age: 65
DRG: 673 | End: 2025-04-13
Payer: MEDICARE

## 2025-04-13 LAB
ALBUMIN SERPL-MCNC: 3.1 G/DL (ref 3.5–5.2)
ALBUMIN/GLOB SERPL: 1 G/DL
ALP SERPL-CCNC: 297 U/L (ref 39–117)
ALT SERPL W P-5'-P-CCNC: 1133 U/L (ref 1–41)
ANION GAP SERPL CALCULATED.3IONS-SCNC: 20 MMOL/L (ref 5–15)
AST SERPL-CCNC: 2081 U/L (ref 1–40)
BASOPHILS # BLD AUTO: 0.07 10*3/MM3 (ref 0–0.2)
BASOPHILS NFR BLD AUTO: 0.6 % (ref 0–1.5)
BILIRUB SERPL-MCNC: 0.6 MG/DL (ref 0–1.2)
BUN SERPL-MCNC: 85 MG/DL (ref 8–23)
BUN/CREAT SERPL: 7 (ref 7–25)
CALCIUM SPEC-SCNC: 8.4 MG/DL (ref 8.6–10.5)
CHLORIDE SERPL-SCNC: 94 MMOL/L (ref 98–107)
CO2 SERPL-SCNC: 20 MMOL/L (ref 22–29)
CREAT SERPL-MCNC: 12.18 MG/DL (ref 0.76–1.27)
DEPRECATED RDW RBC AUTO: 45.8 FL (ref 37–54)
EGFRCR SERPLBLD CKD-EPI 2021: 4.1 ML/MIN/1.73
EOSINOPHIL # BLD AUTO: 1.09 10*3/MM3 (ref 0–0.4)
EOSINOPHIL NFR BLD AUTO: 8.7 % (ref 0.3–6.2)
ERYTHROCYTE [DISTWIDTH] IN BLOOD BY AUTOMATED COUNT: 14.2 % (ref 12.3–15.4)
GLOBULIN UR ELPH-MCNC: 3 GM/DL
GLUCOSE BLDC GLUCOMTR-MCNC: 109 MG/DL (ref 70–130)
GLUCOSE BLDC GLUCOMTR-MCNC: 121 MG/DL (ref 70–130)
GLUCOSE BLDC GLUCOMTR-MCNC: 160 MG/DL (ref 70–130)
GLUCOSE BLDC GLUCOMTR-MCNC: 164 MG/DL (ref 70–130)
GLUCOSE SERPL-MCNC: 104 MG/DL (ref 65–99)
HCT VFR BLD AUTO: 27 % (ref 37.5–51)
HGB BLD-MCNC: 8.5 G/DL (ref 13–17.7)
IMM GRANULOCYTES # BLD AUTO: 0.44 10*3/MM3 (ref 0–0.05)
IMM GRANULOCYTES NFR BLD AUTO: 3.5 % (ref 0–0.5)
LYMPHOCYTES # BLD AUTO: 0.54 10*3/MM3 (ref 0.7–3.1)
LYMPHOCYTES NFR BLD AUTO: 4.3 % (ref 19.6–45.3)
MAGNESIUM SERPL-MCNC: 3.2 MG/DL (ref 1.6–2.4)
MCH RBC QN AUTO: 27.9 PG (ref 26.6–33)
MCHC RBC AUTO-ENTMCNC: 31.5 G/DL (ref 31.5–35.7)
MCV RBC AUTO: 88.5 FL (ref 79–97)
MONOCYTES # BLD AUTO: 0.53 10*3/MM3 (ref 0.1–0.9)
MONOCYTES NFR BLD AUTO: 4.2 % (ref 5–12)
NEUTROPHILS NFR BLD AUTO: 78.7 % (ref 42.7–76)
NEUTROPHILS NFR BLD AUTO: 9.85 10*3/MM3 (ref 1.7–7)
NRBC BLD AUTO-RTO: 0.4 /100 WBC (ref 0–0.2)
PHOSPHATE SERPL-MCNC: 8.9 MG/DL (ref 2.5–4.5)
PLATELET # BLD AUTO: 227 10*3/MM3 (ref 140–450)
PMV BLD AUTO: 9.2 FL (ref 6–12)
POTASSIUM SERPL-SCNC: 4.8 MMOL/L (ref 3.5–5.2)
PROT SERPL-MCNC: 6.1 G/DL (ref 6–8.5)
RBC # BLD AUTO: 3.05 10*6/MM3 (ref 4.14–5.8)
SODIUM SERPL-SCNC: 134 MMOL/L (ref 136–145)
WBC NRBC COR # BLD AUTO: 12.52 10*3/MM3 (ref 3.4–10.8)

## 2025-04-13 PROCEDURE — 82948 REAGENT STRIP/BLOOD GLUCOSE: CPT

## 2025-04-13 PROCEDURE — 84100 ASSAY OF PHOSPHORUS: CPT | Performed by: INTERNAL MEDICINE

## 2025-04-13 PROCEDURE — 71045 X-RAY EXAM CHEST 1 VIEW: CPT

## 2025-04-13 PROCEDURE — 99232 SBSQ HOSP IP/OBS MODERATE 35: CPT | Performed by: INTERNAL MEDICINE

## 2025-04-13 PROCEDURE — 85025 COMPLETE CBC W/AUTO DIFF WBC: CPT | Performed by: INTERNAL MEDICINE

## 2025-04-13 PROCEDURE — 63710000001 INSULIN LISPRO (HUMAN) PER 5 UNITS: Performed by: INTERNAL MEDICINE

## 2025-04-13 PROCEDURE — 83735 ASSAY OF MAGNESIUM: CPT | Performed by: INTERNAL MEDICINE

## 2025-04-13 PROCEDURE — 80053 COMPREHEN METABOLIC PANEL: CPT | Performed by: INTERNAL MEDICINE

## 2025-04-13 RX ADMIN — MUPIROCIN 1 APPLICATION: 20 OINTMENT TOPICAL at 08:14

## 2025-04-13 RX ADMIN — COLCHICINE 0.6 MG: 0.6 TABLET ORAL at 08:14

## 2025-04-13 RX ADMIN — INSULIN LISPRO 2 UNITS: 100 INJECTION, SOLUTION INTRAVENOUS; SUBCUTANEOUS at 20:28

## 2025-04-13 RX ADMIN — MUPIROCIN 1 APPLICATION: 20 OINTMENT TOPICAL at 20:28

## 2025-04-13 RX ADMIN — SENNOSIDES AND DOCUSATE SODIUM 2 TABLET: 50; 8.6 TABLET ORAL at 08:14

## 2025-04-13 RX ADMIN — SENNOSIDES AND DOCUSATE SODIUM 2 TABLET: 50; 8.6 TABLET ORAL at 20:28

## 2025-04-13 RX ADMIN — INSULIN LISPRO 2 UNITS: 100 INJECTION, SOLUTION INTRAVENOUS; SUBCUTANEOUS at 12:28

## 2025-04-13 RX ADMIN — Medication 10 MG: at 21:06

## 2025-04-13 NOTE — PROGRESS NOTES
"[facility]  IP Progress Note      Chief Complaint: Follow-up of pericardial effusion/pericardial tamponade.    Subjective   Resting in bed, hemodynamics are stable, no chest pain or shortness of breath.  Pericardial drain was removed yesterday.  States that his dialysis access is not working and he will need alternate access.  Nephrology is following.    Objective     Blood pressure 139/62, pulse 75, temperature 98.6 °F (37 °C), temperature source Oral, resp. rate 16, height 170.2 cm (67.01\"), weight 67 kg (147 lb 11.3 oz), SpO2 93%.     Intake/Output Summary (Last 24 hours) at 4/13/2025 0822  Last data filed at 4/13/2025 0200  Gross per 24 hour   Intake 840 ml   Output 340 ml   Net 500 ml       Physical Exam:  General: No apparent distress.  Neck: no JVD.  Chest:No respiratory distress, breath sounds are normal. No wheezes,  rhonchi or rales.  Cardiovascular: Normal S1 and S2, no murmur, gallop or rub.    Extremities: No edema.      Results Review:     I reviewed the patient's new clinical results.    Results from last 7 days   Lab Units 04/13/25  0455   WBC 10*3/mm3 12.52*   HEMOGLOBIN g/dL 8.5*   HEMATOCRIT % 27.0*   PLATELETS 10*3/mm3 227     Results from last 7 days   Lab Units 04/13/25  0455   SODIUM mmol/L 134*   POTASSIUM mmol/L 4.8   CHLORIDE mmol/L 94*   CO2 mmol/L 20.0*   BUN mg/dL 85*   CREATININE mg/dL 12.18*   CALCIUM mg/dL 8.4*   BILIRUBIN mg/dL 0.6   ALK PHOS U/L 297*   ALT (SGPT) U/L 1,133*   AST (SGOT) U/L 2,081*   GLUCOSE mg/dL 104*     Results from last 7 days   Lab Units 04/13/25  0455   SODIUM mmol/L 134*   POTASSIUM mmol/L 4.8   CHLORIDE mmol/L 94*   CO2 mmol/L 20.0*   BUN mg/dL 85*   CREATININE mg/dL 12.18*   GLUCOSE mg/dL 104*   CALCIUM mg/dL 8.4*         Lab Results   Component Value Date    TROPONINT 83 (C) 04/11/2025     colchicine, 0.6 mg, Oral, Daily  insulin lispro, 2-7 Units, Subcutaneous, 4x Daily AC & at Bedtime  mupirocin, 1 Application, Each " Nare, BID  senna-docusate sodium, 2 tablet, Oral, BID       Tele: Sinus Rythym      Assessment:  Cardiogenic shock in the setting of massive pericardial effusion.  Resolved, stable blood pressure now with occasional hypertensive spikes.  Massive pericardial effusion with cardiac tamponade, status post pericardiocentesis with removal of more than 900 mL hemorrhagic effusion followed by additional 50 mL in the ICU, drain removed yesterday.  Patient remained stable, will check limited echo today.  Markedly elevated ALT/AST, probably from shock.  ESRD, on dialysis Monday and Friday.  Presented with nausea vomiting and diarrhea as well as abdominal pain.  Anemia.    Plan:  Nephrology following and will decide about timing for reinitiating dialysis.  Monitor for recovery of liver function.  Increase activities, needs DVT prophylaxis.  Further management of medical issues per ICU team.    Braxton Canseco MD, FACC, [facility]

## 2025-04-13 NOTE — PROGRESS NOTES
"INTENSIVIST NOTE     Hospital Day: 2    Mr. Jun Young, 64 y.o. male is followed for:   Pericardial effusion with tamponade       SUBJECTIVE     Interval history:    Room air.  Acceptable hemodynamics.  Afebrile.  Pericardial tube removed due to low output.  Left upper extremity AV fistula no longer working unfortunately.    The patient's relevant past medical, surgical and social history were reviewed and updated in Epic as appropriate.       OBJECTIVE     Vital Sign Min/Max for last 24 hours  Temp  Min: 98.1 °F (36.7 °C)  Max: 98.6 °F (37 °C)   BP  Min: 125/62  Max: 173/88   Pulse  Min: 71  Max: 84   Resp  Min: 16  Max: 18   SpO2  Min: 91 %  Max: 99 %   No data recorded   No data recorded      Intake/Output Summary (Last 24 hours) at 4/13/2025 1351  Last data filed at 4/13/2025 0800  Gross per 24 hour   Intake 720 ml   Output 650 ml   Net 70 ml      Flowsheet Rows      Flowsheet Row First Filed Value   Admission Height 170.2 cm (67\") Documented at 04/11/2025 1045   Admission Weight 67 kg (147 lb 11.3 oz) Documented at 04/11/2025 1045               04/11/25  1045 04/11/25  1511   Weight: 67 kg (147 lb 11.3 oz) 67 kg (147 lb 11.3 oz)            Objective:  General Appearance:  In no acute distress.    Vital signs: (most recent): Blood pressure 173/88, pulse 76, temperature 98.1 °F (36.7 °C), temperature source Oral, resp. rate 16, height 170.2 cm (67.01\"), weight 67 kg (147 lb 11.3 oz), SpO2 99%.    HEENT: Normal HEENT exam.    Lungs:  Normal effort and normal respiratory rate.  He is not in respiratory distress.  There are rales.  No wheezes or rhonchi.    Heart: Normal rate.  Regular rhythm.  S1 normal and S2 normal.  No murmur, gallop or friction rub.   Chest: Symmetric chest wall expansion.   Abdomen: Abdomen is soft and non-distended.  Bowel sounds are normal.   There is no abdominal tenderness.   There is no mass. There is no splenomegaly. There is no hepatomegaly.   Extremities: There is no " deformity or dependent edema.  (Left upper extremity AV fistula without thrill)  Neurological: Patient is alert and oriented to person, place and time.    Pupils:  Pupils are equal, round, and reactive to light.    Skin:  Warm and dry.                I reviewed the patient's new clinical results.  I reviewed the patient's new imaging results/reports including actual images and agree with reports.    XR Chest 1 View  Result Date: 4/13/2025  Impression: 1.Cardiomegaly with globular configuration which could relate to pericardial effusion noted on more recent CT. 2.Bilateral pleural effusions greater on the left not excluded. There may be some bibasilar atelectasis. Electronically Signed: Jorge Nickerson MD  4/13/2025 7:59 AM EDT  Workstation ID: [id number]    XR Chest 1 View  Result Date: 4/12/2025  Stable enlargement of the cardiac silhouette with some mild left basilar atelectasis. Electronically Signed: Ad Taylor MD  4/12/2025 5:41 AM EDT  Workstation ID: [id number]       INFUSIONS       Results from last 7 days   Lab Units 04/13/25  0455 04/12/25  0313 04/11/25  1116   WBC 10*3/mm3 12.52* 15.78* 17.18*   HEMOGLOBIN g/dL 8.5* 8.0* 9.3*   HEMATOCRIT % 27.0* 25.4* 31.3*   PLATELETS 10*3/mm3 227 214 210     Results from last 7 days   Lab Units 04/13/25  0455 04/12/25  0313 04/11/25  1116   SODIUM mmol/L 134* 138 139   POTASSIUM mmol/L 4.8 5.1 5.3*   CHLORIDE mmol/L 94* 94* 94*   CO2 mmol/L 20.0* 21.0* 19.0*   BUN mg/dL 85* 63* 39*   GLUCOSE mg/dL 104* 125* 83   CREATININE mg/dL 12.18* 9.60* 7.53*   MAGNESIUM mg/dL 3.2* 3.0*  --    PHOSPHORUS mg/dL 8.9* 10.0*  --    CALCIUM mg/dL 8.4* 8.2* 9.1   ALBUMIN g/dL 3.1* 3.3* 4.0               Patient isn't on Tube Feeding   /h  Patient doesn't have any tube feeding modular orders    Mechanical Ventilator:   Settings: Observed:                                                I reviewed the patient's medications.    Assessment & Plan   ASSESSMENT/PLAN     Active Hospital Problems     Diagnosis     **Pericardial effusion with cardiac tamponade     Hypertension     ESRD (end stage renal disease)     Type 2 diabetes mellitus        64-year-old male with a past medical history significant for ESRD on hemodialysis, type 2 diabetes mellitus, hypertension, prior CVA, GERD, and dyslipidemia.  He gets hemodialysis every Monday Wednesday Friday and was hypotensive after his dialysis today.     He presented to the ER on 4/11 and workup revealed a large pericardial effusion with what appears to be RV collapse by bedside echo.  He also had significant elevation of his transaminases likely consistent with congestive hepatopathy.  His lactic acid level was elevated.    He underwent pericardial drain placement performed by Dr. Canseco.  900 mL of bloody fluid was obtained.  Lactic acid was rapidly reduced.      Liver transaminases significantly down today.  Mildly hypertensive.  On no blood pressure medications at home to my knowledge.  He is on room air and comfortable and in good spirits.  Main issue today is that his left upper extremity fistula no longer has a thrill.  Suspect this may have occurred at the time of his decompensation on Friday in dialysis as he tells me they put some type of clamp on his AV fistula to keep it from bleeding    Plan:    Colchicine  Await cytology and cultures from pericardial fluid  Will need an alternative route for dialysis.  Possibly a tunneled cath.  May be his AV fistula could be salvaged.  No urgent need for dialysis today.  Defer to renal.  Follow-up echocardiogram in several weeks and window if needed.     I discussed the patient's findings and my recommendations with patient and nursing staff     Plan of care and goals reviewed with multidisciplinary team at daily rounds.    Any copied data from previous notes included in the (1) History of Present Illness, (2) Physical Examination and (3) Medical Decision Making and/or Assessment and Plan has been reviewed and is  accurate as of 04/13/25    .    Sushil Hoyt MD  Pulmonary and Critical Care Medicine  04/13/25 13:51 EDT

## 2025-04-13 NOTE — NURSING NOTE
Pts access (fistula) has no thrill or bruit  Called Dr Lucas to inform of situation. No other orders

## 2025-04-13 NOTE — PROGRESS NOTES
" LOS: 2 days    Patient Care Team:  Samir Chow DO as PCP - General (Internal Medicine)  Provider, No Known as PCP - Family Medicine  Provider, No Known  Lora Dickey DO as Consulting Physician (Hospitalist)  Katie Lau MD as Consulting Physician (Hospitalist)    Subjective     Seen and examined at bedside.  Denies chest pain or shortness of breath.   Not able to get dialysis yesterday as fistula is not having any flow.   Will do fistulogram tomorrow.     Objective     colchicine, 0.6 mg, Oral, Daily  insulin lispro, 2-7 Units, Subcutaneous, 4x Daily AC & at Bedtime  mupirocin, 1 Application, Each Nare, BID  senna-docusate sodium, 2 tablet, Oral, BID       Vital Signs:  Blood pressure 130/83, pulse 70, temperature 98.1 °F (36.7 °C), temperature source Oral, resp. rate 16, height 170.2 cm (67.01\"), weight 67 kg (147 lb 11.3 oz), SpO2 99%.    Flowsheet Rows      Flowsheet Row First Filed Value   Admission Height 170.2 cm (67\") Documented at 04/11/2025 1045   Admission Weight 67 kg (147 lb 11.3 oz) Documented at 04/11/2025 1045            04/12 0701 - 04/13 0700  In: 840 [P.O.:840]  Out: 515 [Urine:500; Drains:15]    Physical Exam:  GENERAL: Awake and alert, in no acute distress.   HEENT: Normocephalic, atraumatic.    NECK: Supple .  HEART: RRR; No murmur, rubs, gallops. Trace edema  LUNGS:  Clear to auscultation bilaterally without wheezing, rales, rhonchi.  ABDOMEN: Soft, nontender, nondistended.   EXT: Trace edema.  :  No [place]   SKIN: Warm and dry without rash  NEURO: No focal neurological deficits. Strength equal bilateral  PSYCHIATRIC:  Cooperative with PE    Labs:  Results from last 7 days   Lab Units 04/13/25  0455 04/12/25  0313 04/11/25  1116   WBC 10*3/mm3 12.52* 15.78* 17.18*   HEMOGLOBIN g/dL 8.5* 8.0* 9.3*   HEMATOCRIT % 27.0* 25.4* 31.3*   PLATELETS 10*3/mm3 227 214 210     Results from last 7 days   Lab Units 04/13/25  0455 04/12/25  0313 04/11/25  1116   SODIUM mmol/L 134* 138 " 139   POTASSIUM mmol/L 4.8 5.1 5.3*   CHLORIDE mmol/L 94* 94* 94*   CO2 mmol/L 20.0* 21.0* 19.0*   BUN mg/dL 85* 63* 39*   CREATININE mg/dL 12.18* 9.60* 7.53*   CALCIUM mg/dL 8.4* 8.2* 9.1   PHOSPHORUS mg/dL 8.9* 10.0*  --    MAGNESIUM mg/dL 3.2* 3.0*  --    ALBUMIN g/dL 3.1* 3.3* 4.0     Results from last 7 days   Lab Units 04/13/25  0455   GLUCOSE mg/dL 104*       Results from last 7 days   Lab Units 04/13/25  0455   ALK PHOS U/L 297*   BILIRUBIN mg/dL 0.6   ALT (SGPT) U/L 1,133*   AST (SGOT) U/L 2,081*       Assessment       Pericardial effusion with cardiac tamponade    Type 2 diabetes mellitus    ESRD (end stage renal disease)    Hypertension    End-stage renal disease:   - On Monday and Friday schedule.  Follows up with [facility].  - Not able to get dialysis yesterday due to access issue.   - Will keep n.p.o. tonight for fistulogram tomorrow.  Need to have dialysis tomorrow.      Large pericardial effusion:   - S/p pericardial drain 4/11.      Anemia:   - Epogen on dialysis days.      Hyperkalemia :  - Should get better with dialysis.      Elevated liver enzymes:  - Secondary to hypotension/shock liver.      Hyperphosphatemia:   - Low phos diet.  Need to restart home binders.      Hypertension:   - Can restart home blood pressure medications.     Jaret Vasques MD  04/13/25  16:21 EDT

## 2025-04-14 ENCOUNTER — APPOINTMENT (OUTPATIENT)
Dept: INTERVENTIONAL RADIOLOGY/VASCULAR | Facility: HOSPITAL | Age: 65
DRG: 673 | End: 2025-04-14
Payer: MEDICARE

## 2025-04-14 ENCOUNTER — APPOINTMENT (OUTPATIENT)
Dept: NEPHROLOGY | Facility: HOSPITAL | Age: 65
DRG: 673 | End: 2025-04-14
Payer: MEDICARE

## 2025-04-14 ENCOUNTER — APPOINTMENT (OUTPATIENT)
Dept: CARDIOLOGY | Facility: HOSPITAL | Age: 65
DRG: 673 | End: 2025-04-14
Payer: MEDICARE

## 2025-04-14 LAB
ALBUMIN FLD-MCNC: 3.4 G/DL
ALBUMIN SERPL-MCNC: 3.1 G/DL (ref 3.5–5.2)
ALBUMIN/GLOB SERPL: 1 G/DL
ALP SERPL-CCNC: 348 U/L (ref 39–117)
ALT SERPL W P-5'-P-CCNC: 177 U/L (ref 1–41)
ANION GAP SERPL CALCULATED.3IONS-SCNC: 21 MMOL/L (ref 5–15)
AST SERPL-CCNC: 673 U/L (ref 1–40)
BACTERIA FLD CULT: NORMAL
BASOPHILS # BLD AUTO: 0.1 10*3/MM3 (ref 0–0.2)
BASOPHILS NFR BLD AUTO: 0.8 % (ref 0–1.5)
BILIRUB SERPL-MCNC: 0.8 MG/DL (ref 0–1.2)
BUN SERPL-MCNC: 93 MG/DL (ref 8–23)
BUN/CREAT SERPL: 8.2 (ref 7–25)
CALCIUM SPEC-SCNC: 8.8 MG/DL (ref 8.6–10.5)
CHLORIDE SERPL-SCNC: 95 MMOL/L (ref 98–107)
CO2 SERPL-SCNC: 20 MMOL/L (ref 22–29)
CREAT SERPL-MCNC: 11.34 MG/DL (ref 0.76–1.27)
DEPRECATED RDW RBC AUTO: 46.9 FL (ref 37–54)
EGFRCR SERPLBLD CKD-EPI 2021: 4.5 ML/MIN/1.73
EOSINOPHIL # BLD AUTO: 2.09 10*3/MM3 (ref 0–0.4)
EOSINOPHIL NFR BLD AUTO: 16.6 % (ref 0.3–6.2)
ERYTHROCYTE [DISTWIDTH] IN BLOOD BY AUTOMATED COUNT: 14.6 % (ref 12.3–15.4)
GLOBULIN UR ELPH-MCNC: 3 GM/DL
GLUCOSE BLDC GLUCOMTR-MCNC: 106 MG/DL (ref 70–130)
GLUCOSE BLDC GLUCOMTR-MCNC: 118 MG/DL (ref 70–130)
GLUCOSE FLD-MCNC: <2 MG/DL
GLUCOSE SERPL-MCNC: 112 MG/DL (ref 65–99)
GRAM STN SPEC: NORMAL
HCT VFR BLD AUTO: 27.1 % (ref 37.5–51)
HGB BLD-MCNC: 8.5 G/DL (ref 13–17.7)
IMM GRANULOCYTES # BLD AUTO: 0.49 10*3/MM3 (ref 0–0.05)
IMM GRANULOCYTES NFR BLD AUTO: 3.9 % (ref 0–0.5)
LDH FLD L TO P-CCNC: 2037 IU/L
LYMPHOCYTES # BLD AUTO: 0.78 10*3/MM3 (ref 0.7–3.1)
LYMPHOCYTES NFR BLD AUTO: 6.2 % (ref 19.6–45.3)
MCH RBC QN AUTO: 28.1 PG (ref 26.6–33)
MCHC RBC AUTO-ENTMCNC: 31.4 G/DL (ref 31.5–35.7)
MCV RBC AUTO: 89.7 FL (ref 79–97)
MONOCYTES # BLD AUTO: 0.78 10*3/MM3 (ref 0.1–0.9)
MONOCYTES NFR BLD AUTO: 6.2 % (ref 5–12)
NEUTROPHILS NFR BLD AUTO: 66.3 % (ref 42.7–76)
NEUTROPHILS NFR BLD AUTO: 8.33 10*3/MM3 (ref 1.7–7)
NRBC BLD AUTO-RTO: 0.2 /100 WBC (ref 0–0.2)
PLATELET # BLD AUTO: 268 10*3/MM3 (ref 140–450)
PMV BLD AUTO: 9.5 FL (ref 6–12)
POTASSIUM SERPL-SCNC: 5.1 MMOL/L (ref 3.5–5.2)
PROT FLD-MCNC: 5.7 G/DL
PROT SERPL-MCNC: 6.1 G/DL (ref 6–8.5)
RBC # BLD AUTO: 3.02 10*6/MM3 (ref 4.14–5.8)
SODIUM SERPL-SCNC: 136 MMOL/L (ref 136–145)
WBC NRBC COR # BLD AUTO: 12.57 10*3/MM3 (ref 3.4–10.8)

## 2025-04-14 PROCEDURE — C1769 GUIDE WIRE: HCPCS

## 2025-04-14 PROCEDURE — C1887 CATHETER, GUIDING: HCPCS

## 2025-04-14 PROCEDURE — 5A1D70Z PERFORMANCE OF URINARY FILTRATION, INTERMITTENT, LESS THAN 6 HOURS PER DAY: ICD-10-PCS | Performed by: STUDENT IN AN ORGANIZED HEALTH CARE EDUCATION/TRAINING PROGRAM

## 2025-04-14 PROCEDURE — 82948 REAGENT STRIP/BLOOD GLUCOSE: CPT

## 2025-04-14 PROCEDURE — 25010000002 HYDRALAZINE PER 20 MG

## 2025-04-14 PROCEDURE — 25510000001 IOPAMIDOL 61 % SOLUTION

## 2025-04-14 PROCEDURE — 99232 SBSQ HOSP IP/OBS MODERATE 35: CPT | Performed by: INTERNAL MEDICINE

## 2025-04-14 PROCEDURE — 36556 INSERT NON-TUNNEL CV CATH: CPT

## 2025-04-14 PROCEDURE — 05CY3ZZ EXTIRPATION OF MATTER FROM UPPER VEIN, PERCUTANEOUS APPROACH: ICD-10-PCS | Performed by: STUDENT IN AN ORGANIZED HEALTH CARE EDUCATION/TRAINING PROGRAM

## 2025-04-14 PROCEDURE — 25010000002 MIDAZOLAM PER 1 MG: Performed by: STUDENT IN AN ORGANIZED HEALTH CARE EDUCATION/TRAINING PROGRAM

## 2025-04-14 PROCEDURE — 76937 US GUIDE VASCULAR ACCESS: CPT

## 2025-04-14 PROCEDURE — B5181ZA FLUOROSCOPY OF SUPERIOR VENA CAVA USING LOW OSMOLAR CONTRAST, GUIDANCE: ICD-10-PCS | Performed by: STUDENT IN AN ORGANIZED HEALTH CARE EDUCATION/TRAINING PROGRAM

## 2025-04-14 PROCEDURE — 99152 MOD SED SAME PHYS/QHP 5/>YRS: CPT

## 2025-04-14 PROCEDURE — C1894 INTRO/SHEATH, NON-LASER: HCPCS

## 2025-04-14 PROCEDURE — 99153 MOD SED SAME PHYS/QHP EA: CPT

## 2025-04-14 PROCEDURE — 77001 FLUOROGUIDE FOR VEIN DEVICE: CPT

## 2025-04-14 PROCEDURE — 25010000002 LIDOCAINE PF 1% 1 % SOLUTION

## 2025-04-14 PROCEDURE — 76937 US GUIDE VASCULAR ACCESS: CPT | Performed by: STUDENT IN AN ORGANIZED HEALTH CARE EDUCATION/TRAINING PROGRAM

## 2025-04-14 PROCEDURE — C1725 CATH, TRANSLUMIN NON-LASER: HCPCS

## 2025-04-14 PROCEDURE — 25010000002 DIPHENHYDRAMINE PER 50 MG: Performed by: STUDENT IN AN ORGANIZED HEALTH CARE EDUCATION/TRAINING PROGRAM

## 2025-04-14 PROCEDURE — 02HV33Z INSERTION OF INFUSION DEVICE INTO SUPERIOR VENA CAVA, PERCUTANEOUS APPROACH: ICD-10-PCS | Performed by: STUDENT IN AN ORGANIZED HEALTH CARE EDUCATION/TRAINING PROGRAM

## 2025-04-14 PROCEDURE — 85025 COMPLETE CBC W/AUTO DIFF WBC: CPT | Performed by: INTERNAL MEDICINE

## 2025-04-14 PROCEDURE — 80053 COMPREHEN METABOLIC PANEL: CPT | Performed by: INTERNAL MEDICINE

## 2025-04-14 PROCEDURE — C1757 CATH, THROMBECTOMY/EMBOLECT: HCPCS

## 2025-04-14 PROCEDURE — 25010000002 FENTANYL CITRATE (PF) 50 MCG/ML SOLUTION: Performed by: STUDENT IN AN ORGANIZED HEALTH CARE EDUCATION/TRAINING PROGRAM

## 2025-04-14 PROCEDURE — 36556 INSERT NON-TUNNEL CV CATH: CPT | Performed by: STUDENT IN AN ORGANIZED HEALTH CARE EDUCATION/TRAINING PROGRAM

## 2025-04-14 PROCEDURE — 77001 FLUOROGUIDE FOR VEIN DEVICE: CPT | Performed by: STUDENT IN AN ORGANIZED HEALTH CARE EDUCATION/TRAINING PROGRAM

## 2025-04-14 PROCEDURE — B51WYZZ FLUOROSCOPY OF DIALYSIS SHUNT/FISTULA USING OTHER CONTRAST: ICD-10-PCS | Performed by: STUDENT IN AN ORGANIZED HEALTH CARE EDUCATION/TRAINING PROGRAM

## 2025-04-14 PROCEDURE — C1752 CATH,HEMODIALYSIS,SHORT-TERM: HCPCS

## 2025-04-14 PROCEDURE — 36902 INTRO CATH DIALYSIS CIRCUIT: CPT | Performed by: STUDENT IN AN ORGANIZED HEALTH CARE EDUCATION/TRAINING PROGRAM

## 2025-04-14 PROCEDURE — 25010000002 HEPARIN (PORCINE) PER 1000 UNITS: Performed by: STUDENT IN AN ORGANIZED HEALTH CARE EDUCATION/TRAINING PROGRAM

## 2025-04-14 PROCEDURE — 057Y3ZZ DILATION OF UPPER VEIN, PERCUTANEOUS APPROACH: ICD-10-PCS | Performed by: STUDENT IN AN ORGANIZED HEALTH CARE EDUCATION/TRAINING PROGRAM

## 2025-04-14 PROCEDURE — 25010000002 HEPARIN (PORCINE) PER 1000 UNITS

## 2025-04-14 PROCEDURE — 25010000002 HYDRALAZINE PER 20 MG: Performed by: STUDENT IN AN ORGANIZED HEALTH CARE EDUCATION/TRAINING PROGRAM

## 2025-04-14 RX ORDER — FENTANYL CITRATE 50 UG/ML
INJECTION, SOLUTION INTRAMUSCULAR; INTRAVENOUS AS NEEDED
Status: COMPLETED | OUTPATIENT
Start: 2025-04-14 | End: 2025-04-14

## 2025-04-14 RX ORDER — FENTANYL CITRATE 50 UG/ML
INJECTION, SOLUTION INTRAMUSCULAR; INTRAVENOUS
Status: DISPENSED
Start: 2025-04-14 | End: 2025-04-15

## 2025-04-14 RX ORDER — HYDRALAZINE HYDROCHLORIDE 20 MG/ML
INJECTION INTRAMUSCULAR; INTRAVENOUS
Status: COMPLETED
Start: 2025-04-14 | End: 2025-04-14

## 2025-04-14 RX ORDER — MIDAZOLAM HYDROCHLORIDE 1 MG/ML
INJECTION, SOLUTION INTRAMUSCULAR; INTRAVENOUS AS NEEDED
Status: COMPLETED | OUTPATIENT
Start: 2025-04-14 | End: 2025-04-14

## 2025-04-14 RX ORDER — HEPARIN SODIUM 1000 [USP'U]/ML
2000 INJECTION, SOLUTION INTRAVENOUS; SUBCUTANEOUS AS NEEDED
Status: DISCONTINUED | OUTPATIENT
Start: 2025-04-14 | End: 2025-04-19 | Stop reason: HOSPADM

## 2025-04-14 RX ORDER — MIDAZOLAM HYDROCHLORIDE 1 MG/ML
INJECTION, SOLUTION INTRAMUSCULAR; INTRAVENOUS
Status: DISPENSED
Start: 2025-04-14 | End: 2025-04-15

## 2025-04-14 RX ORDER — IOPAMIDOL 612 MG/ML
INJECTION, SOLUTION INTRAVASCULAR
Status: COMPLETED
Start: 2025-04-14 | End: 2025-04-14

## 2025-04-14 RX ORDER — HEPARIN SODIUM 200 [USP'U]/100ML
INJECTION, SOLUTION INTRAVENOUS
Status: DISPENSED
Start: 2025-04-14 | End: 2025-04-15

## 2025-04-14 RX ORDER — LIDOCAINE HYDROCHLORIDE 10 MG/ML
INJECTION, SOLUTION EPIDURAL; INFILTRATION; INTRACAUDAL; PERINEURAL
Status: COMPLETED
Start: 2025-04-14 | End: 2025-04-14

## 2025-04-14 RX ORDER — HEPARIN SODIUM 1000 [USP'U]/ML
INJECTION, SOLUTION INTRAVENOUS; SUBCUTANEOUS
Status: COMPLETED
Start: 2025-04-14 | End: 2025-04-14

## 2025-04-14 RX ORDER — DIPHENHYDRAMINE HYDROCHLORIDE 50 MG/ML
12.5 INJECTION, SOLUTION INTRAMUSCULAR; INTRAVENOUS EVERY 6 HOURS PRN
Status: COMPLETED | OUTPATIENT
Start: 2025-04-14 | End: 2025-04-14

## 2025-04-14 RX ORDER — HYDRALAZINE HYDROCHLORIDE 20 MG/ML
INJECTION INTRAMUSCULAR; INTRAVENOUS AS NEEDED
Status: COMPLETED | OUTPATIENT
Start: 2025-04-14 | End: 2025-04-14

## 2025-04-14 RX ADMIN — FENTANYL CITRATE 50 MCG: 50 INJECTION, SOLUTION INTRAMUSCULAR; INTRAVENOUS at 14:33

## 2025-04-14 RX ADMIN — HEPARIN SODIUM 2200 UNITS: 1000 INJECTION INTRAVENOUS; SUBCUTANEOUS at 14:14

## 2025-04-14 RX ADMIN — IOPAMIDOL 50 ML: 612 INJECTION, SOLUTION INTRAVENOUS at 16:02

## 2025-04-14 RX ADMIN — LIDOCAINE HYDROCHLORIDE 4 ML: 10 INJECTION, SOLUTION EPIDURAL; INFILTRATION; INTRACAUDAL; PERINEURAL at 14:15

## 2025-04-14 RX ADMIN — HYDRALAZINE HYDROCHLORIDE 5 MG: 20 INJECTION INTRAMUSCULAR; INTRAVENOUS at 14:18

## 2025-04-14 RX ADMIN — LIDOCAINE HYDROCHLORIDE 4 ML: 10 INJECTION, SOLUTION EPIDURAL; INFILTRATION; INTRACAUDAL; PERINEURAL at 15:51

## 2025-04-14 RX ADMIN — MIDAZOLAM HYDROCHLORIDE 1 MG: 1 INJECTION, SOLUTION INTRAMUSCULAR; INTRAVENOUS at 14:41

## 2025-04-14 RX ADMIN — MIDAZOLAM HYDROCHLORIDE 1 MG: 1 INJECTION, SOLUTION INTRAMUSCULAR; INTRAVENOUS at 14:33

## 2025-04-14 RX ADMIN — MUPIROCIN 1 APPLICATION: 20 OINTMENT TOPICAL at 20:11

## 2025-04-14 RX ADMIN — HEPARIN SODIUM 2000 UNITS: 1000 INJECTION INTRAVENOUS; SUBCUTANEOUS at 19:43

## 2025-04-14 RX ADMIN — COLCHICINE 0.6 MG: 0.6 TABLET ORAL at 09:00

## 2025-04-14 RX ADMIN — HYDRALAZINE HYDROCHLORIDE 5 MG: 20 INJECTION INTRAMUSCULAR; INTRAVENOUS at 13:59

## 2025-04-14 RX ADMIN — DIPHENHYDRAMINE HYDROCHLORIDE 12.5 MG: 50 INJECTION INTRAMUSCULAR; INTRAVENOUS at 17:32

## 2025-04-14 RX ADMIN — Medication 10 MG: at 12:04

## 2025-04-14 RX ADMIN — SENNOSIDES AND DOCUSATE SODIUM 2 TABLET: 50; 8.6 TABLET ORAL at 09:00

## 2025-04-14 RX ADMIN — FENTANYL CITRATE 25 MCG: 50 INJECTION, SOLUTION INTRAMUSCULAR; INTRAVENOUS at 15:25

## 2025-04-14 RX ADMIN — MUPIROCIN 1 APPLICATION: 20 OINTMENT TOPICAL at 08:59

## 2025-04-14 RX ADMIN — SENNOSIDES AND DOCUSATE SODIUM 2 TABLET: 50; 8.6 TABLET ORAL at 20:11

## 2025-04-14 RX ADMIN — FENTANYL CITRATE 50 MCG: 50 INJECTION, SOLUTION INTRAMUSCULAR; INTRAVENOUS at 14:05

## 2025-04-14 RX ADMIN — FENTANYL CITRATE 25 MCG: 50 INJECTION, SOLUTION INTRAMUSCULAR; INTRAVENOUS at 15:43

## 2025-04-14 NOTE — PRE-PROCEDURE NOTE
[facility]   Interventional Radiology H&P    Patient Name: Jun Young  : 1960  MRN: [id number]  Primary Care Physician:  Samir Chow DO  Referring Physician: Sushil Hoyt,*  Date of admission: 2025    Subjective   Subjective     HPI:  Jun Young is a 64 y.o. male with a history of nonfunctional left upper extremity dialysis graft.  Patient is status post recent cardiac tamponade status post pericardial drainage with bloody output.  Patient presents to interventional radiology for temporary dialysis catheter placement and left upper extremity fistula declot.    Review of Systems:   Constitutional no fever,  no weight loss       Otolaryngeal no difficulty swallowing   Cardiovascular no chest pain   Pulmonary no cough, no sputum production   Gastrointestinal no constipation, no diarrhea                         Personal History       Past Medical/Surgical History:   Past Medical History:   Diagnosis Date    Acute on chronic renal failure     Bilateral hydronephrosis     BPH (benign prostatic hyperplasia)     Cholelithiasis     Diabetes mellitus     Dyslipidemia     Elevated cholesterol     GERD (gastroesophageal reflux disease)     Gross hematuria     Hemodialysis patient     History of transfusion     BHL, no reactions per pt    Hyperlipidemia     Hypertension     Pericardial effusion     echo 2023 - 1-2 cm preserved LV function    Stroke     no residual effects     Past Surgical History:   Procedure Laterality Date    COLONOSCOPY      CYSTOSCOPY BLADDER BIOPSY N/A 2023    Procedure: CYSTOSCOPY CLOT EVACUATION WITH FULGURATION, RETROGRADE PYELOGRAM;  Surgeon: Mandi Velasco MD;  Location: [facility];  Service: Urology;  Laterality: N/A;    CYSTOSCOPY BLADDER BIOPSY N/A 2023    Procedure: CYSTOSCOPY WITH FULGURATION OF BLEEDERS;  Surgeon: Santana Christianson MD;  Location: [facility] OR;  Service: Urology;  Laterality: N/A;    CYSTOSCOPY  TRANSURETHRAL RESECTION OF PROSTATE N/A 5/5/2023    Procedure: CYSTOSCOPY TRANSURETHRAL RESECTION OF PROSTATE (BIPOLAR);  Surgeon: Pawan Damico MD;  Location: [facility];  Service: Urology;  Laterality: N/A;    CYSTOSCOPY WITH CLOT EVACUATION      fulguration - 3 way catheter placement    INSERTION HEMODIALYSIS CATHETER         Social History:  reports that he has never smoked. He has never been exposed to tobacco smoke. He has never used smokeless tobacco. He reports that he does not drink alcohol and does not use drugs.    Medications:  Medications Prior to Admission   Medication Sig Dispense Refill Last Dose/Taking    acetaminophen (TYLENOL) 325 MG tablet Take 2 tablets by mouth Every 4 (Four) Hours As Needed for Mild Pain.       B Complex-C-Folic Acid (KAYLEIGH-LASHON PO) Take 1 tablet by mouth Daily.       pantoprazole (PROTONIX) 40 MG EC tablet Take 1 tablet by mouth Daily. 30 tablet 2     sennosides-docusate (PERICOLACE) 8.6-50 MG per tablet Take 2 tablets by mouth 2 (Two) Times a Day As Needed for Constipation. 60 tablet 1     sevelamer (RENVELA) 800 MG tablet Take 1 tablet by mouth 3 (Three) Times a Day With Meals. 90 tablet 1     simethicone (MYLICON) 80 MG chewable tablet Chew and swallow 1 tablet by mouth 4 (Four) Times a Day As Needed for Flatulence. 30 tablet 1      Current medications:  colchicine, 0.6 mg, Oral, Daily  heparin (porcine), , ,   heparin (porcine), , ,   insulin lispro, 2-7 Units, Subcutaneous, 4x Daily AC & at Bedtime  iopamidol, , ,   lidocaine PF 1%, , ,   mupirocin, 1 Application, Each Nare, BID  senna-docusate sodium, 2 tablet, Oral, BID      Current IV drips:       Allergies:  No Known Allergies    Objective    Objective     Vitals:   Temp:  [97.7 °F (36.5 °C)-98.1 °F (36.7 °C)] 98 °F (36.7 °C)  Heart Rate:  [70-83] 72  Resp:  [16-18] 16  BP: (121-179)/() 158/103      Physical Exam:   Constitutional: Awake, alert, No acute distress    Respiratory:nonlabored respirations  "   Cardiovascular:  palpable pedal pulses bilaterally   Gastrointestinal: soft, nontender, nondistended        ASA SCALE ASSESSMENT:  2-Mild to moderate systemic disease, medically well controlled, with no functional limitation    MALLAMPATI CLASSIFICATION:  2-Able to visualize the soft palate, fauces, uvula. The anterior & posterior tonsilar pillars are hidden by the tongue.       Result Review        Result Review:     Sodium   Date Value Ref Range Status   04/14/2025 136 136 - 145 mmol/L Final   04/13/2025 134 (L) 136 - 145 mmol/L Final   04/12/2025 138 136 - 145 mmol/L Final       Potassium   Date Value Ref Range Status   04/14/2025 5.1 3.5 - 5.2 mmol/L Final   04/13/2025 4.8 3.5 - 5.2 mmol/L Final   04/12/2025 5.1 3.5 - 5.2 mmol/L Final       Chloride   Date Value Ref Range Status   04/14/2025 95 (L) 98 - 107 mmol/L Final   04/13/2025 94 (L) 98 - 107 mmol/L Final   04/12/2025 94 (L) 98 - 107 mmol/L Final       No results found for: \"PLASMABICARB\"    BUN   Date Value Ref Range Status   04/14/2025 93 (H) 8 - 23 mg/dL Final   04/13/2025 85 (H) 8 - 23 mg/dL Final   04/12/2025 63 (H) 8 - 23 mg/dL Final       Creatinine   Date Value Ref Range Status   04/14/2025 11.34 (H) 0.76 - 1.27 mg/dL Final   04/13/2025 12.18 (H) 0.76 - 1.27 mg/dL Final   04/12/2025 9.60 (H) 0.76 - 1.27 mg/dL Final       Calcium   Date Value Ref Range Status   04/14/2025 8.8 8.6 - 10.5 mg/dL Final   04/13/2025 8.4 (L) 8.6 - 10.5 mg/dL Final   04/12/2025 8.2 (L) 8.6 - 10.5 mg/dL Final           No components found for: \"GLUCOSE.*\"  Results from last 7 days   Lab Units 04/14/25  0323   WBC 10*3/mm3 12.57*   HEMOGLOBIN g/dL 8.5*   HEMATOCRIT % 27.1*   PLATELETS 10*3/mm3 268                Assessment / Plan     Assesment:  64-year-old male with a history of a clotted left upper extremity graft.  Patient presents to interventional radiology for declot and/or possible intervention.  Possible temp cath placement.      Plan:   Nontunneled hemodialysis " catheter placement.  Left upper extremity fistulogram with possible intervention with local and moderate sedation.    The risks and benefits of the procedure were discussed with the patient.    Electronically signed by Benigno Mcmullen MD, 04/14/25, 1:34 PM EDT.

## 2025-04-14 NOTE — PLAN OF CARE
Goal Outcome Evaluation:Scheduled HD completed 10 mins early r/t poorly functioning IJ dialysis cath. Reversed lines, reposition pt. It would for awhile then Venous pressure would go out of limits. Pt arrived from IR. Dressing had to be reinforced d/t blood seeping from dressing. Pt;s gown, sheet and pillow case was saturated with blood from ir procedure. Goal reached. Blood reinfused back to pt. Called report to MATT Grace      Problem: Hemodialysis  Goal: Safe, Effective Therapy Delivery  Outcome: Progressing  Goal: Effective Tissue Perfusion  Outcome: Progressing  Goal: Absence of Infection Signs and Symptoms  Outcome: Progressing

## 2025-04-14 NOTE — PROGRESS NOTES
" LOS: 3 days    Patient Care Team:  Samir Chow DO as PCP - General (Internal Medicine)  Provider, No Known as PCP - Family Medicine  Provider, No Known  Lora Dickey DO as Consulting Physician (Hospitalist)  Katie Lau MD as Consulting Physician (Hospitalist)    Subjective     Seen and examined at bedside, patient pending fistulogram declot.   Patient denies shortness of breath, complains of some chest pain last night.   Discussed with IR earlier today; he will need temporary dialysis catheter as well.     Objective     colchicine, 0.6 mg, Oral, Daily  fentaNYL citrate (PF), , ,   heparin (porcine), , ,   insulin lispro, 2-7 Units, Subcutaneous, 4x Daily AC & at Bedtime  iopamidol, , ,   midazolam, , ,   mupirocin, 1 Application, Each Nare, BID  senna-docusate sodium, 2 tablet, Oral, BID       Vital Signs:  Blood pressure 146/75, pulse 77, temperature 98 °F (36.7 °C), temperature source Oral, resp. rate 14, height 170.2 cm (67.01\"), weight 67 kg (147 lb 11.3 oz), SpO2 100%.    Flowsheet Rows      Flowsheet Row First Filed Value   Admission Height 170.2 cm (67\") Documented at 04/11/2025 1045   Admission Weight 67 kg (147 lb 11.3 oz) Documented at 04/11/2025 1045            04/13 0701 - 04/14 0700  In: 480 [P.O.:480]  Out: 1275 [Urine:1275]    Physical Exam:  GENERAL: Awake and alert, in no acute distress.   HEENT: Normocephalic, atraumatic.    NECK: Supple .  HEART: RRR; No murmur, rubs, gallops. Trace edema  LUNGS:  Clear to auscultation bilaterally without wheezing, rales, rhonchi.  ABDOMEN: Soft, nontender, nondistended.   EXT: Trace edema.  :  No Whitfield   SKIN: Warm and dry without rash  NEURO: No focal neurological deficits. Strength equal bilateral  PSYCHIATRIC:  Cooperative with PE    Labs:  Results from last 7 days   Lab Units 04/14/25  0323 04/13/25  0455 04/12/25  0313   WBC 10*3/mm3 12.57* 12.52* 15.78*   HEMOGLOBIN g/dL 8.5* 8.5* 8.0*   HEMATOCRIT % 27.1* 27.0* 25.4*   PLATELETS " 10*3/mm3 268 227 214     Results from last 7 days   Lab Units 04/14/25  0323 04/13/25  0455 04/12/25  0313 04/11/25  1116   SODIUM mmol/L 136 134* 138 139   POTASSIUM mmol/L 5.1 4.8 5.1 5.3*   CHLORIDE mmol/L 95* 94* 94* 94*   CO2 mmol/L 20.0* 20.0* 21.0* 19.0*   BUN mg/dL 93* 85* 63* 39*   CREATININE mg/dL 11.34* 12.18* 9.60* 7.53*   CALCIUM mg/dL 8.8 8.4* 8.2* 9.1   PHOSPHORUS mg/dL  --  8.9* 10.0*  --    MAGNESIUM mg/dL  --  3.2* 3.0*  --    ALBUMIN g/dL 3.1* 3.1* 3.3* 4.0     Results from last 7 days   Lab Units 04/14/25  0323   GLUCOSE mg/dL 112*       Results from last 7 days   Lab Units 04/14/25  0323   ALK PHOS U/L 348*   BILIRUBIN mg/dL 0.8   ALT (SGPT) U/L 177*   AST (SGOT) U/L 673*       Assessment       Pericardial effusion with cardiac tamponade    Type 2 diabetes mellitus    ESRD (end stage renal disease)    Hypertension    End-stage renal disease:   - On Monday and Friday schedule.  Follows up with [facility].  - Pending hemodialysis today  Once we have access.     Large pericardial effusion:   - S/p pericardial drain 4/11.      Anemia:   - Epogen on dialysis days.      Hyperkalemia :  - Should get better with dialysis.      Elevated liver enzymes:  - Secondary to hypotension/shock liver.      Hyperphosphatemia:   - Low phos diet.  Need to restart home binders.      Hypertension:   - Start home medications.     Jaret Vasques MD  04/14/25  15:08 EDT

## 2025-04-14 NOTE — PROGRESS NOTES
"[facility] at [facility]  IP Progress Note      Chief Complaint: Follow-up of pericardial effusion/pericardial tamponade.    Subjective   Resting, has ambulated without difficulty.  No chest pain or shortness of breath.  Going for fistulogram and further intervention versus to reinitiate his dialysis, if this does not work out he will need a tunneled catheter.    Objective     Blood pressure 164/85, pulse 75, temperature 97.7 °F (36.5 °C), temperature source Oral, resp. rate 16, height 170.2 cm (67.01\"), weight 67 kg (147 lb 11.3 oz), SpO2 91%.     Intake/Output Summary (Last 24 hours) at 4/14/2025 1102  Last data filed at 4/14/2025 0100  Gross per 24 hour   Intake 240 ml   Output 950 ml   Net -710 ml       Physical Exam:  General: No apparent distress.  Neck: no JVD.  Chest:No respiratory distress, breath sounds are normal. No wheezes,  rhonchi or rales.  Cardiovascular: Normal S1 and S2, no murmur, gallop or rub.    Extremities: No edema.        Results Review:     I reviewed the patient's new clinical results.    Results from last 7 days   Lab Units 04/14/25  0323   WBC 10*3/mm3 12.57*   HEMOGLOBIN g/dL 8.5*   HEMATOCRIT % 27.1*   PLATELETS 10*3/mm3 268     Results from last 7 days   Lab Units 04/14/25  0323   SODIUM mmol/L 136   POTASSIUM mmol/L 5.1   CHLORIDE mmol/L 95*   CO2 mmol/L 20.0*   BUN mg/dL 93*   CREATININE mg/dL 11.34*   CALCIUM mg/dL 8.8   BILIRUBIN mg/dL 0.8   ALK PHOS U/L 348*   ALT (SGPT) U/L 177*   AST (SGOT) U/L 673*   GLUCOSE mg/dL 112*     Results from last 7 days   Lab Units 04/14/25  0323   SODIUM mmol/L 136   POTASSIUM mmol/L 5.1   CHLORIDE mmol/L 95*   CO2 mmol/L 20.0*   BUN mg/dL 93*   CREATININE mg/dL 11.34*   GLUCOSE mg/dL 112*   CALCIUM mg/dL 8.8         Lab Results   Component Value Date    TROPONINT 83 (C) 04/11/2025     colchicine, 0.6 mg, Oral, Daily  insulin lispro, 2-7 Units, Subcutaneous, 4x Daily AC & at Bedtime  mupirocin, 1 Application, Each Nare, " BID  senna-docusate sodium, 2 tablet, Oral, BID       Tele: Sinus Rythym      Assessment:  Cardiogenic shock in the setting of massive pericardial effusion.  Resolved, stable blood pressure now with occasional hypertensive spikes.  Massive pericardial effusion with cardiac tamponade, status post pericardiocentesis with removal of more than 900 mL hemorrhagic effusion followed by additional 50 mL in the ICU, drain removed yesterday.  Patient remained stable, will check limited echo today.  Markedly elevated ALT/AST, probably from shock.  ESRD, on dialysis Monday and Friday.  Presented with nausea vomiting and diarrhea as well as abdominal pain.  Anemia.    Plan:  Nephrology following and will decide about timing for reinitiating dialysis, his blood pressure is elevated may be due to volume overload.  Will not initiate antihypertensive as of yet, if he gets dialysis today and remains hypertensive we can add carvedilol.  Monitor for recovery of liver function.  LFTs are improving.  Increase activities, needs DVT prophylaxis.  ICU team to decide.  Further management of medical issues per ICU team.  We will check limited echo to follow-up on pericardial effusion after the drain has been removed.  Not much more to add from cardiology standpoint, I will revisit on Wednesday, April 16, 2025, please call if needed sooner.    Braxton Canseco MD, FACC, [facility]                                     9

## 2025-04-14 NOTE — PLAN OF CARE
Goal Outcome Evaluation:  Plan of Care Reviewed With: patient        Progress: improving  Outcome Evaluation: Pt neuro intact, SBP increased at 160-170 range, PRN medication given with improvement, audible heart tones, palpable pulses, urine output 800mL via urinal, on room air, activity with standby assist only, NPO since midnight for fistulogram this AM.

## 2025-04-14 NOTE — PROGRESS NOTES
"Intensive Care Follow-up     Hospital:  LOS: 3 days   Mr. Jun Young, 64 y.o. male is followed for:   Pericardial effusion with cardiac tamponade   Postoperative hemodynamic, electrolyte and respiratory management         History of present illness:   64-year-old male with ESRD on hemodialysis MWF, type 2 diabetes mellitus, hypertension, prior CVA, GERD, and dyslipidemia.   He presented to the ER on 4/11 and workup revealed a large pericardial effusion with what appears to be RV collapse by bedside echo.  He also had significant elevation of his transaminases likely consistent with congestive hepatopathy.  His lactic acid level was elevated. He underwent pericardial drain placement performed by Dr. Canseco.  900 mL of bloody fluid was obtained.  Lactic acid was rapidly reduced.  LFTs improved.  Patient's left AV fistula is not working and nephrology team is following and plan is for fistulogram today.      Subjective   Interval History:  No acute events overnight.  Patient remains on room air and hemodynamically stable.               The patient's past medical, surgical and social history were reviewed and updated in Epic as appropriate.       Objective     Infusions:     Medications:  colchicine, 0.6 mg, Oral, Daily  insulin lispro, 2-7 Units, Subcutaneous, 4x Daily AC & at Bedtime  mupirocin, 1 Application, Each Nare, BID  senna-docusate sodium, 2 tablet, Oral, BID        Vital Sign Min/Max for last 24 hours  Temp  Min: 97.7 °F (36.5 °C)  Max: 98.1 °F (36.7 °C)   BP  Min: 121/73  Max: 173/88   Pulse  Min: 70  Max: 83   Resp  Min: 16  Max: 18   SpO2  Min: 89 %  Max: 99 %   No data recorded       Input/Output for last 24 hour shift  04/13 0701 - 04/14 0700  In: 480 [P.O.:480]  Out: 1275 [Urine:1275]      Objective:  Vital signs: (most recent): Blood pressure 164/85, pulse 75, temperature 97.7 °F (36.5 °C), temperature source Oral, resp. rate 16, height 170.2 cm (67.01\"), weight 67 kg (147 lb 11.3 oz), " SpO2 91%.            General Appearance: Awake, alert, in no acute distress  Lungs:   B/L Breath sounds present with decreased breath sounds on bases, no wheezing heard, no crackles.   Heart: S1 and S2 present, no murmur  Abdomen: Soft, nontender, no guarding or rigidity, bowel sounds positive.  Extremities:  no edema, warm to touch.  No thrill noted in AV fistula  Neurologic:  Moving all four extremities. Good strength bilaterally.   Psychological: Normal affect, Cooperative      Results from last 7 days   Lab Units 04/14/25 0323 04/13/25 0455 04/12/25 0313   WBC 10*3/mm3 12.57* 12.52* 15.78*   HEMOGLOBIN g/dL 8.5* 8.5* 8.0*   PLATELETS 10*3/mm3 268 227 214     Results from last 7 days   Lab Units 04/14/25 0323 04/13/25 0455 04/12/25 0313   SODIUM mmol/L 136 134* 138   POTASSIUM mmol/L 5.1 4.8 5.1   CO2 mmol/L 20.0* 20.0* 21.0*   BUN mg/dL 93* 85* 63*   CREATININE mg/dL 11.34* 12.18* 9.60*   MAGNESIUM mg/dL  --  3.2* 3.0*   PHOSPHORUS mg/dL  --  8.9* 10.0*   GLUCOSE mg/dL 112* 104* 125*     Estimated Creatinine Clearance: 6.1 mL/min (A) (by C-G formula based on SCr of 11.34 mg/dL (H)).          Images:   None new    I reviewed the patient's results and images.     Assessment & Plan   Impression        Pericardial effusion with cardiac tamponade    Type 2 diabetes mellitus    ESRD (end stage renal disease)    Hypertension       Plan        1.  Patient status post pericardial tamponade s/p pericardial fluid drainage which was bloody.  Unclear etiology.  Cultures negative.  Cytology pending and will follow-up on the results.  Cardiology team is following and started on colchicine.  Repeat  echocardiogram does not show any significant residual pericardial effusion currently.  Continue to monitor.  2.  Remained hemodynamically stable.  No arrhythmias noted currently.  3.  Course is complicated by AV fistula not working currently.  Plan is for fistulogram and declotting of fistula.  Renal function overall stable.   No emergent need for hemodialysis.  If needs hemodialysis will need temporary dialysis line placement before IR can proceed with further intervention.  4.  Currently NPO.  Oral diet as tolerated otherwise.    No other acute issues.  Will plan on transferring patient out of ICU.    Plan of care and goals reviewed with multidisciplinary/antibiotic stewardship team during rounds.   I discussed the patient's findings and my recommendations with patient and nursing staff     René Guthrie MD, FCCP  Pulmonary, Critical care and Sleep Medicine

## 2025-04-14 NOTE — PRE-SEDATION DOCUMENTATION
Pre-anesthesia evaluation performed: Yes    PRE-OPERATIVE NOTES    Time: 13:36 EDT    Planned Sedation: Moderate sedation    Risks,Benefits, Complications And Alternatives Discussed with Patients: Yes      Patient status classification of the American Society of Anesthesiologists      Patient re-evaluated for appropriateness of Conscious Sedation: Yes      ASA SCALE ASSESSMENT:  2-Mild to moderate systemic disease, medically well controlled, with no functional limitation    MALLAMPATI CLASSIFICATION:  2-Able to visualize the soft palate, fauces, uvula. The anterior & posterior tonsilar pillars are hidden by the tongue.    Provider signature:  Benigno Mcmullen MD  13:36 EDT  04/14/25

## 2025-04-14 NOTE — POST-PROCEDURE NOTE
IR POST OP NOTE    Physician:Benigno Mcmullen MD      Procedure: Nontunneled hemodialysis catheter placement.  Left upper extremity fistulogram and declot with angioplasty.        Pre Op DX: Clotted left upper extremity AV graft.  ESRD.  Need for dialysis access.      Post Op DX: Same      Anesthesia: Local and moderate sedation      Findings: Successful placement of a right EJ nontunneled hemodialysis catheter.      Completely occluded left upper extremity AV graft.  The graft appeared very irregular and was completely occluded to the level of the AV anastomosis.  Penumbra thrombectomy device was utilized which pulled a large number of chronic appearing fibrinous clot fragments and additional fresh clot from the graft.  Minimal angioplasty within 8 mm balloon resulted in a distal venous extravasation within the dialysis circuit.  Subsequently a 10 mm x 10 cm covered stent was placed.  Repeat venography demonstrated a satisfactory result.  Given the venous extravasation and the extremely limited flow through the graft despite multiple rounds of thrombectomy the procedure was terminated.    Complications: No immediate.      Provider Signature: Benigno Mcmullen MD    04/14/25  16:44 EDT

## 2025-04-14 NOTE — NURSING NOTE
Patient to IR for Temp line placement per Winston Mcmullen MD.  Patient was positioned supine on table and graft was ultra sounded per MD Kemi.  Patient prepped sterile to right EJ where temp line was inserted, 14 Sami.  Procedure then turned to the fistulagram/graph, penumbra was used to pull clots, stenosis was present and angioplasty done.  Procedure lasted for 62 minutes, 75 mcg of fentanyl was given and 2 mg of versed.  Patient also received 15mg of Hydraxaline for elevated BP. Purse string closure, 4x4 and Tegaderm used for closure.

## 2025-04-14 NOTE — CASE MANAGEMENT/SOCIAL WORK
Discharge Planning Assessment  [facility]     Patient Name: Jun Young  MRN: [id number]  Today's Date: 4/14/2025    Admit Date: 4/11/2025    Plan: Home with family   Discharge Needs Assessment       Row Name 04/14/25 0845       Living Environment    People in Home other relative(s)    Current Living Arrangements home    Potentially Unsafe Housing Conditions none    In the past 12 months has the electric, gas, oil, or water company threatened to shut off services in your home? No    Primary Care Provided by self    Provides Primary Care For no one    Family Caregiver if Needed child(jennifer), adult    Family Caregiver Names Althea Santillan (daughter) [phone]    Able to Return to Prior Arrangements yes       Resource/Environmental Concerns    Resource/Environmental Concerns none    Transportation Concerns none       Transportation Needs    In the past 12 months, has lack of transportation kept you from medical appointments or from getting medications? no    In the past 12 months, has lack of transportation kept you from meetings, work, or from getting things needed for daily living? No       Food Insecurity    Within the past 12 months, you worried that your food would run out before you got the money to buy more. Never true    Within the past 12 months, the food you bought just didn't last and you didn't have money to get more. Never true       Transition Planning    Patient/Family Anticipates Transition to home with family    Patient/Family Anticipated Services at Transition none    Transportation Anticipated family or friend will provide       Discharge Needs Assessment    Readmission Within the Last 30 Days no previous admission in last 30 days    Equipment Currently Used at Home none    Concerns to be Addressed denies needs/concerns at this time    Do you want help finding or keeping work or a job? I do not need or want help    Do you want help with school or training? For example, starting or  completing job training or getting a high school diploma, GED or equivalent No    Anticipated Changes Related to Illness none    Equipment Needed After Discharge none                   Discharge Plan       Row Name 04/14/25 0846       Plan    Plan Home with family    Patient/Family in Agreement with Plan yes    Plan Comments Spoke to patient at bedside. Live with family in [place]. Contact is Althea Santillan (daughter) [phone]. Is independent with ADL's. No problems affording Medicare or medications. Uses [facility]. Uses no DME. PCP is Samir Chow MD. Plan is home with family. Family will transport. CM will continue to follow.    Final Discharge Disposition Code 01 - home or self-care                       Demographic Summary       Row Name 04/14/25 0844       General Information    Admission Type inpatient    Arrived From emergency department    Referral Source admission list    Reason for Consult discharge planning    Preferred Language English       Contact Information    Permission Granted to Share Info With     Contact Information Obtained for                    Functional Status       Row Name 04/14/25 0844       Functional Status    Usual Activity Tolerance good    Current Activity Tolerance good       Physical Activity    On average, how many days per week do you engage in moderate to strenuous exercise (like a brisk walk)? 0 days    On average, how many minutes do you engage in exercise at this level? 0 min    Number of minutes of exercise per week 0       Functional Status, IADL    Medications independent    Meal Preparation independent    Housekeeping independent    Laundry independent    Shopping independent    If for any reason you need help with day-to-day activities such as bathing, preparing meals, shopping, managing finances, etc., do you get the help you need? I don't need any help       Mental Status    General Appearance WDL WDL       Mental Status Summary     Recent Changes in Mental Status/Cognitive Functioning no changes       Employment/    Employment Status retired                   Psychosocial    No documentation.                  Abuse/Neglect    No documentation.                  Legal    No documentation.                  Substance Abuse    No documentation.                  Patient Forms    No documentation.                     Sanford Bejarano RN

## 2025-04-15 ENCOUNTER — APPOINTMENT (OUTPATIENT)
Dept: CARDIOLOGY | Facility: HOSPITAL | Age: 65
DRG: 673 | End: 2025-04-15
Payer: MEDICARE

## 2025-04-15 LAB
ANION GAP SERPL CALCULATED.3IONS-SCNC: 17 MMOL/L (ref 5–15)
BASOPHILS # BLD AUTO: 0.1 10*3/MM3 (ref 0–0.2)
BASOPHILS NFR BLD AUTO: 0.8 % (ref 0–1.5)
BH CV ECHO MEAS - 2D AUTO EF SIEMENS: 66.5 %
BH CV ECHO MEAS - EDV(CUBED): 117.6 ML
BH CV ECHO MEAS - ESV(CUBED): 29.8 ML
BH CV ECHO MEAS - FS: 36.7 %
BH CV ECHO MEAS - IVS/LVPW: 1 CM
BH CV ECHO MEAS - IVSD: 1.2 CM
BH CV ECHO MEAS - LV MASS(C)D: 226.4 GRAMS
BH CV ECHO MEAS - LVIDD: 4.9 CM
BH CV ECHO MEAS - LVIDS: 3.1 CM
BH CV ECHO MEAS - LVPWD: 1.2 CM
BH CV VAS BP RIGHT ARM: NORMAL MMHG
BUN SERPL-MCNC: 65 MG/DL (ref 8–23)
BUN/CREAT SERPL: 6.4 (ref 7–25)
CALCIUM SPEC-SCNC: 9 MG/DL (ref 8.6–10.5)
CHLORIDE SERPL-SCNC: 99 MMOL/L (ref 98–107)
CO2 SERPL-SCNC: 20 MMOL/L (ref 22–29)
CREAT SERPL-MCNC: 10.23 MG/DL (ref 0.76–1.27)
DEPRECATED RDW RBC AUTO: 48.1 FL (ref 37–54)
EGFRCR SERPLBLD CKD-EPI 2021: 5.1 ML/MIN/1.73
EOSINOPHIL # BLD AUTO: 2.62 10*3/MM3 (ref 0–0.4)
EOSINOPHIL NFR BLD AUTO: 21.6 % (ref 0.3–6.2)
ERYTHROCYTE [DISTWIDTH] IN BLOOD BY AUTOMATED COUNT: 15 % (ref 12.3–15.4)
GLUCOSE BLDC GLUCOMTR-MCNC: 118 MG/DL (ref 70–130)
GLUCOSE BLDC GLUCOMTR-MCNC: 121 MG/DL (ref 70–130)
GLUCOSE BLDC GLUCOMTR-MCNC: 165 MG/DL (ref 70–130)
GLUCOSE BLDC GLUCOMTR-MCNC: 87 MG/DL (ref 70–130)
GLUCOSE BLDC GLUCOMTR-MCNC: 89 MG/DL (ref 70–130)
GLUCOSE SERPL-MCNC: 94 MG/DL (ref 65–99)
HCT VFR BLD AUTO: 29.2 % (ref 37.5–51)
HGB BLD-MCNC: 8.9 G/DL (ref 13–17.7)
IMM GRANULOCYTES # BLD AUTO: 0.42 10*3/MM3 (ref 0–0.05)
IMM GRANULOCYTES NFR BLD AUTO: 3.5 % (ref 0–0.5)
LV EF 2D ECHO EST: 65 %
LYMPHOCYTES # BLD AUTO: 0.76 10*3/MM3 (ref 0.7–3.1)
LYMPHOCYTES NFR BLD AUTO: 6.3 % (ref 19.6–45.3)
MCH RBC QN AUTO: 27.8 PG (ref 26.6–33)
MCHC RBC AUTO-ENTMCNC: 30.5 G/DL (ref 31.5–35.7)
MCV RBC AUTO: 91.3 FL (ref 79–97)
MONOCYTES # BLD AUTO: 1.18 10*3/MM3 (ref 0.1–0.9)
MONOCYTES NFR BLD AUTO: 9.7 % (ref 5–12)
NEUTROPHILS NFR BLD AUTO: 58.1 % (ref 42.7–76)
NEUTROPHILS NFR BLD AUTO: 7.04 10*3/MM3 (ref 1.7–7)
NRBC BLD AUTO-RTO: 0 /100 WBC (ref 0–0.2)
PLATELET # BLD AUTO: 182 10*3/MM3 (ref 140–450)
PMV BLD AUTO: 10 FL (ref 6–12)
POTASSIUM SERPL-SCNC: 5.4 MMOL/L (ref 3.5–5.2)
RBC # BLD AUTO: 3.2 10*6/MM3 (ref 4.14–5.8)
REF LAB TEST METHOD: NORMAL
SODIUM SERPL-SCNC: 136 MMOL/L (ref 136–145)
WBC NRBC COR # BLD AUTO: 12.12 10*3/MM3 (ref 3.4–10.8)

## 2025-04-15 PROCEDURE — 63710000001 INSULIN LISPRO (HUMAN) PER 5 UNITS: Performed by: INTERNAL MEDICINE

## 2025-04-15 PROCEDURE — 80048 BASIC METABOLIC PNL TOTAL CA: CPT | Performed by: INTERNAL MEDICINE

## 2025-04-15 PROCEDURE — 82948 REAGENT STRIP/BLOOD GLUCOSE: CPT

## 2025-04-15 PROCEDURE — 93308 TTE F-UP OR LMTD: CPT

## 2025-04-15 PROCEDURE — 99232 SBSQ HOSP IP/OBS MODERATE 35: CPT | Performed by: INTERNAL MEDICINE

## 2025-04-15 PROCEDURE — 93308 TTE F-UP OR LMTD: CPT | Performed by: INTERNAL MEDICINE

## 2025-04-15 PROCEDURE — 85025 COMPLETE CBC W/AUTO DIFF WBC: CPT | Performed by: INTERNAL MEDICINE

## 2025-04-15 RX ADMIN — SENNOSIDES AND DOCUSATE SODIUM 2 TABLET: 50; 8.6 TABLET ORAL at 08:08

## 2025-04-15 RX ADMIN — SENNOSIDES AND DOCUSATE SODIUM 2 TABLET: 50; 8.6 TABLET ORAL at 21:18

## 2025-04-15 RX ADMIN — Medication 10 MG: at 22:53

## 2025-04-15 RX ADMIN — INSULIN LISPRO 2 UNITS: 100 INJECTION, SOLUTION INTRAVENOUS; SUBCUTANEOUS at 16:50

## 2025-04-15 RX ADMIN — MUPIROCIN 1 APPLICATION: 20 OINTMENT TOPICAL at 21:18

## 2025-04-15 RX ADMIN — COLCHICINE 0.6 MG: 0.6 TABLET ORAL at 08:08

## 2025-04-15 RX ADMIN — Medication 10 MG: at 03:05

## 2025-04-15 RX ADMIN — SODIUM ZIRCONIUM CYCLOSILICATE 10 G: 10 POWDER, FOR SUSPENSION ORAL at 14:39

## 2025-04-15 RX ADMIN — Medication 10 MG: at 12:31

## 2025-04-15 RX ADMIN — MUPIROCIN 1 APPLICATION: 20 OINTMENT TOPICAL at 08:08

## 2025-04-15 NOTE — PLAN OF CARE
Goal Outcome Evaluation:  Plan of Care Reviewed With: patient        Progress: improving  Outcome Evaluation: Pt neuro intact, SBP increased, PRN given per order with improvement, urine output 600mL, no BM, active bowel sounds, CHG wipe down done again this AM to prep for dialysis access placement, NPO since midnight, on room air, NSR with 1st degree AV block.

## 2025-04-15 NOTE — PROGRESS NOTES
Intensive Care Follow-up     Hospital:  LOS: 4 days   Mr. Jun Young, 64 y.o. male is followed for:   Pericardial effusion with cardiac tamponade   Postoperative hemodynamic, electrolyte and respiratory management         History of present illness:   64-year-old male with ESRD on hemodialysis MWF, type 2 diabetes mellitus, hypertension, prior CVA, GERD, and dyslipidemia.   He presented to the ER on 4/11 and workup revealed a large pericardial effusion with what appears to be RV collapse by bedside echo.  He also had significant elevation of his transaminases likely consistent with congestive hepatopathy.  His lactic acid level was elevated. He underwent pericardial drain placement performed by Dr. Canseco.  900 mL of bloody fluid was obtained.  Lactic acid was rapidly reduced.  LFTs improved.  Patient's left AV fistula is not working and nephrology team is following and fistulogram done and AV graft is occluded.  Temporary dialysis line was placed.      Subjective   Interval History:  No acute events overnight.  Patient remains on room air and hemodynamically stable.               The patient's past medical, surgical and social history were reviewed and updated in Epic as appropriate.       Objective     Infusions:     Medications:  colchicine, 0.6 mg, Oral, Daily  insulin lispro, 2-7 Units, Subcutaneous, 4x Daily AC & at Bedtime  mupirocin, 1 Application, Each Nare, BID  senna-docusate sodium, 2 tablet, Oral, BID        Vital Sign Min/Max for last 24 hours  Temp  Min: 97.1 °F (36.2 °C)  Max: 98.1 °F (36.7 °C)   BP  Min: 120/74  Max: 191/92   Pulse  Min: 64  Max: 89   Resp  Min: 8  Max: 23   SpO2  Min: 92 %  Max: 100 %   Flow (L/min) (Oxygen Therapy)  Min: 2  Max: 2       Input/Output for last 24 hour shift  04/14 0701 - 04/15 0700  In: -   Out: 2225 [Urine:1225]      Objective:  Vital signs: (most recent): Blood pressure 170/87, pulse 73, temperature 97.9 °F (36.6 °C), temperature source Oral, resp.  "rate 16, height 170 cm (66.93\"), weight 67 kg (147 lb 11.3 oz), SpO2 96%.            General Appearance: Awake, alert, in no acute distress  Lungs:   B/L Breath sounds present with decreased breath sounds on bases, no wheezing heard, no crackles.   Heart: S1 and S2 present, no murmur  Extremities:  no edema, warm to touch.  No thrill noted in AV fistula  Neurologic:  Moving all four extremities. Good strength bilaterally.     Results from last 7 days   Lab Units 04/15/25  0334 04/14/25 0323 04/13/25 0455   WBC 10*3/mm3 12.12* 12.57* 12.52*   HEMOGLOBIN g/dL 8.9* 8.5* 8.5*   PLATELETS 10*3/mm3 182 268 227     Results from last 7 days   Lab Units 04/15/25  0334 04/14/25  0323 04/13/25  0455 04/12/25  0313   SODIUM mmol/L 136 136 134* 138   POTASSIUM mmol/L 5.4* 5.1 4.8 5.1   CO2 mmol/L 20.0* 20.0* 20.0* 21.0*   BUN mg/dL 65* 93* 85* 63*   CREATININE mg/dL 10.23* 11.34* 12.18* 9.60*   MAGNESIUM mg/dL  --   --  3.2* 3.0*   PHOSPHORUS mg/dL  --   --  8.9* 10.0*   GLUCOSE mg/dL 94 112* 104* 125*     Estimated Creatinine Clearance: 6.7 mL/min (A) (by C-G formula based on SCr of 10.23 mg/dL (H)).          Images:   None new    I reviewed the patient's results and images.     Assessment & Plan   Impression        Pericardial effusion with cardiac tamponade    Type 2 diabetes mellitus    ESRD (end stage renal disease)    Hypertension       Plan        1.  Patient status post pericardial tamponade s/p pericardial fluid drainage which was bloody.  Unclear etiology.  Cultures negative.  Cytology report reviewed and negative as well.  Cardiology team is following and started on colchicine.  Repeat  echocardiogram does not show any significant residual pericardial effusion currently.  Continue to monitor.  2.  Remained hemodynamically stable.  No arrhythmias noted currently.  3.  Course is complicated by AV fistula not working currently. IR team attempted to open the AV fistula but unsuccessful.  Temporary dialysis line was " placed.  Patient is on schedule to get tunneled dialysis line placement today.  Will need hemodialysis after. Nephrology team is following  4.  Currently NPO.  Oral diet as tolerated otherwise.    Patient awaiting bed to be transferred out of ICU.    Plan of care and goals reviewed with multidisciplinary/antibiotic stewardship team during rounds.   I discussed the patient's findings and my recommendations with patient and nursing staff   Above documentation reviewed and reflect accurate information as of 4/15/2025 including copied elements of the note.     René Guthrie MD, FCCP  Pulmonary, Critical care and Sleep Medicine

## 2025-04-15 NOTE — PROGRESS NOTES
" LOS: 4 days    Patient Care Team:  Samir Chow DO as PCP - General (Internal Medicine)  Provider, No Known as PCP - Family Medicine  Provider, No Known  Lora Dickey DO as Consulting Physician (Hospitalist)  Katie Lau MD as Consulting Physician (Hospitalist)    Subjective     No acute events overnight. No new complaints     No CP or SOA , fever, chills, rigors, rash, N/V, Constipation.       Objective     colchicine, 0.6 mg, Oral, Daily  insulin lispro, 2-7 Units, Subcutaneous, 4x Daily AC & at Bedtime  mupirocin, 1 Application, Each Nare, BID  senna-docusate sodium, 2 tablet, Oral, BID       Vital Signs:  Blood pressure 170/87, pulse 73, temperature 97.9 °F (36.6 °C), temperature source Oral, resp. rate 16, height 170 cm (66.93\"), weight 67 kg (147 lb 11.3 oz), SpO2 96%.    Flowsheet Rows      Flowsheet Row First Filed Value   Admission Height 170.2 cm (67\") Documented at 04/11/2025 1045   Admission Weight 67 kg (147 lb 11.3 oz) Documented at 04/11/2025 1045            04/14 0701 - 04/15 0700  In: -   Out: 2225 [Urine:1225]    Physical Exam:  GENERAL: Awake and alert, in no acute distress.   HEENT: Normocephalic, atraumatic.    NECK: Supple .  HEART: RRR; No murmur, rubs, gallops. Trace edema  LUNGS:  Clear to auscultation bilaterally without wheezing, rales, rhonchi.  ABDOMEN: Soft, nontender, nondistended.   EXT: Trace edema.  :  No Whitfield   SKIN: Warm and dry without rash  NEURO: No focal neurological deficits. Strength equal bilateral  PSYCHIATRIC:  Cooperative with PE    Labs:  Results from last 7 days   Lab Units 04/15/25  0334 04/14/25  0323 04/13/25  0455   WBC 10*3/mm3 12.12* 12.57* 12.52*   HEMOGLOBIN g/dL 8.9* 8.5* 8.5*   HEMATOCRIT % 29.2* 27.1* 27.0*   PLATELETS 10*3/mm3 182 268 227     Results from last 7 days   Lab Units 04/15/25  0334 04/14/25  0323 04/13/25  0455 04/12/25  0313 04/11/25  1116   SODIUM mmol/L 136 136 134* 138 139   POTASSIUM mmol/L 5.4* 5.1 4.8 5.1 5.3* "   CHLORIDE mmol/L 99 95* 94* 94* 94*   CO2 mmol/L 20.0* 20.0* 20.0* 21.0* 19.0*   BUN mg/dL 65* 93* 85* 63* 39*   CREATININE mg/dL 10.23* 11.34* 12.18* 9.60* 7.53*   CALCIUM mg/dL 9.0 8.8 8.4* 8.2* 9.1   PHOSPHORUS mg/dL  --   --  8.9* 10.0*  --    MAGNESIUM mg/dL  --   --  3.2* 3.0*  --    ALBUMIN g/dL  --  3.1* 3.1* 3.3* 4.0     Results from last 7 days   Lab Units 04/15/25  0334   GLUCOSE mg/dL 94       Results from last 7 days   Lab Units 04/14/25  0323   ALK PHOS U/L 348*   BILIRUBIN mg/dL 0.8   ALT (SGPT) U/L 177*   AST (SGOT) U/L 673*       Assessment       Pericardial effusion with cardiac tamponade    Type 2 diabetes mellitus    ESRD (end stage renal disease)    Hypertension    End-stage renal disease:   - On Monday and Friday schedule.  Follows up with NAL.    Large pericardial effusion:   - S/p pericardial drain 4/11.      Anemia:   - Epogen on dialysis days.      Hyperkalemia :  - Should get better with dialysis.      Elevated liver enzymes:  - Secondary to hypotension/shock liver.      Hyperphosphatemia:   - Low phos diet.  Need to restart home binders.      Hypertension:   - Start home medications.     Plan:  - Will need Tunneled dialysis access tomorrow.   - Daily evaluation for HD, no need of HD today.    - Renal diet   - MARTÍN with HD   - renal diet with low K and give a dose of lokelma     Discussed with RN.     Domenica Leary MD  04/15/25  08:57 EDT

## 2025-04-16 ENCOUNTER — APPOINTMENT (OUTPATIENT)
Dept: NEPHROLOGY | Facility: HOSPITAL | Age: 65
DRG: 673 | End: 2025-04-16
Payer: MEDICARE

## 2025-04-16 ENCOUNTER — APPOINTMENT (OUTPATIENT)
Dept: INTERVENTIONAL RADIOLOGY/VASCULAR | Facility: HOSPITAL | Age: 65
DRG: 673 | End: 2025-04-16
Payer: MEDICARE

## 2025-04-16 PROBLEM — E44.0 MODERATE PROTEIN-CALORIE MALNUTRITION: Status: ACTIVE | Noted: 2025-04-16

## 2025-04-16 LAB
ANION GAP SERPL CALCULATED.3IONS-SCNC: 19 MMOL/L (ref 5–15)
BASOPHILS # BLD MANUAL: 0.11 10*3/MM3 (ref 0–0.2)
BASOPHILS NFR BLD MANUAL: 1 % (ref 0–1.5)
BUN SERPL-MCNC: 81 MG/DL (ref 8–23)
BUN/CREAT SERPL: 6.9 (ref 7–25)
CALCIUM SPEC-SCNC: 7.8 MG/DL (ref 8.6–10.5)
CHLORIDE SERPL-SCNC: 100 MMOL/L (ref 98–107)
CO2 SERPL-SCNC: 18 MMOL/L (ref 22–29)
CREAT SERPL-MCNC: 11.66 MG/DL (ref 0.76–1.27)
DEPRECATED RDW RBC AUTO: 49.8 FL (ref 37–54)
EGFRCR SERPLBLD CKD-EPI 2021: 4.4 ML/MIN/1.73
EOSINOPHIL # BLD MANUAL: 3.06 10*3/MM3 (ref 0–0.4)
EOSINOPHIL NFR BLD MANUAL: 29 % (ref 0.3–6.2)
ERYTHROCYTE [DISTWIDTH] IN BLOOD BY AUTOMATED COUNT: 16 % (ref 12.3–15.4)
GLUCOSE BLDC GLUCOMTR-MCNC: 100 MG/DL (ref 70–130)
GLUCOSE BLDC GLUCOMTR-MCNC: 107 MG/DL (ref 70–130)
GLUCOSE BLDC GLUCOMTR-MCNC: 115 MG/DL (ref 70–130)
GLUCOSE SERPL-MCNC: 91 MG/DL (ref 65–99)
HCT VFR BLD AUTO: 27.9 % (ref 37.5–51)
HGB BLD-MCNC: 8.6 G/DL (ref 13–17.7)
INR PPP: 1.17 (ref 0.89–1.12)
LYMPHOCYTES # BLD MANUAL: 0.95 10*3/MM3 (ref 0.7–3.1)
LYMPHOCYTES NFR BLD MANUAL: 10 % (ref 5–12)
MAGNESIUM SERPL-MCNC: 2.9 MG/DL (ref 1.6–2.4)
MCH RBC QN AUTO: 28.2 PG (ref 26.6–33)
MCHC RBC AUTO-ENTMCNC: 30.8 G/DL (ref 31.5–35.7)
MCV RBC AUTO: 91.5 FL (ref 79–97)
MONOCYTES # BLD: 1.06 10*3/MM3 (ref 0.1–0.9)
NEUTROPHILS # BLD AUTO: 5.39 10*3/MM3 (ref 1.7–7)
NEUTROPHILS NFR BLD MANUAL: 50 % (ref 42.7–76)
NEUTS BAND NFR BLD MANUAL: 1 % (ref 0–5)
NRBC SPEC MANUAL: 1 /100 WBC (ref 0–0.2)
PLAT MORPH BLD: NORMAL
PLATELET # BLD AUTO: 160 10*3/MM3 (ref 140–450)
PMV BLD AUTO: 9 FL (ref 6–12)
POTASSIUM SERPL-SCNC: 5.1 MMOL/L (ref 3.5–5.2)
PROTHROMBIN TIME: 15.6 SECONDS (ref 12.2–15.3)
RBC # BLD AUTO: 3.05 10*6/MM3 (ref 4.14–5.8)
RBC MORPH BLD: NORMAL
SODIUM SERPL-SCNC: 137 MMOL/L (ref 136–145)
VARIANT LYMPHS NFR BLD MANUAL: 9 % (ref 19.6–45.3)
WBC MORPH BLD: NORMAL
WBC NRBC COR # BLD AUTO: 10.56 10*3/MM3 (ref 3.4–10.8)

## 2025-04-16 PROCEDURE — 99152 MOD SED SAME PHYS/QHP 5/>YRS: CPT | Performed by: STUDENT IN AN ORGANIZED HEALTH CARE EDUCATION/TRAINING PROGRAM

## 2025-04-16 PROCEDURE — 80048 BASIC METABOLIC PNL TOTAL CA: CPT | Performed by: INTERNAL MEDICINE

## 2025-04-16 PROCEDURE — C1769 GUIDE WIRE: HCPCS

## 2025-04-16 PROCEDURE — 77001 FLUOROGUIDE FOR VEIN DEVICE: CPT

## 2025-04-16 PROCEDURE — 86900 BLOOD TYPING SEROLOGIC ABO: CPT | Performed by: NURSE PRACTITIONER

## 2025-04-16 PROCEDURE — 77001 FLUOROGUIDE FOR VEIN DEVICE: CPT | Performed by: STUDENT IN AN ORGANIZED HEALTH CARE EDUCATION/TRAINING PROGRAM

## 2025-04-16 PROCEDURE — 36558 INSERT TUNNELED CV CATH: CPT

## 2025-04-16 PROCEDURE — C1894 INTRO/SHEATH, NON-LASER: HCPCS

## 2025-04-16 PROCEDURE — 85610 PROTHROMBIN TIME: CPT | Performed by: STUDENT IN AN ORGANIZED HEALTH CARE EDUCATION/TRAINING PROGRAM

## 2025-04-16 PROCEDURE — 36558 INSERT TUNNELED CV CATH: CPT | Performed by: STUDENT IN AN ORGANIZED HEALTH CARE EDUCATION/TRAINING PROGRAM

## 2025-04-16 PROCEDURE — 99232 SBSQ HOSP IP/OBS MODERATE 35: CPT | Performed by: INTERNAL MEDICINE

## 2025-04-16 PROCEDURE — 25010000002 HEPARIN (PORCINE) PER 1000 UNITS

## 2025-04-16 PROCEDURE — 25010000002 MIDAZOLAM PER 1 MG: Performed by: STUDENT IN AN ORGANIZED HEALTH CARE EDUCATION/TRAINING PROGRAM

## 2025-04-16 PROCEDURE — 02HV33Z INSERTION OF INFUSION DEVICE INTO SUPERIOR VENA CAVA, PERCUTANEOUS APPROACH: ICD-10-PCS | Performed by: STUDENT IN AN ORGANIZED HEALTH CARE EDUCATION/TRAINING PROGRAM

## 2025-04-16 PROCEDURE — 86850 RBC ANTIBODY SCREEN: CPT | Performed by: NURSE PRACTITIONER

## 2025-04-16 PROCEDURE — 83735 ASSAY OF MAGNESIUM: CPT | Performed by: INTERNAL MEDICINE

## 2025-04-16 PROCEDURE — 86901 BLOOD TYPING SEROLOGIC RH(D): CPT | Performed by: NURSE PRACTITIONER

## 2025-04-16 PROCEDURE — 82948 REAGENT STRIP/BLOOD GLUCOSE: CPT

## 2025-04-16 PROCEDURE — B518ZZA FLUOROSCOPY OF SUPERIOR VENA CAVA, GUIDANCE: ICD-10-PCS | Performed by: STUDENT IN AN ORGANIZED HEALTH CARE EDUCATION/TRAINING PROGRAM

## 2025-04-16 PROCEDURE — 99152 MOD SED SAME PHYS/QHP 5/>YRS: CPT

## 2025-04-16 PROCEDURE — 85610 PROTHROMBIN TIME: CPT | Performed by: NURSE PRACTITIONER

## 2025-04-16 PROCEDURE — 25010000002 FENTANYL CITRATE (PF) 50 MCG/ML SOLUTION: Performed by: STUDENT IN AN ORGANIZED HEALTH CARE EDUCATION/TRAINING PROGRAM

## 2025-04-16 PROCEDURE — C1750 CATH, HEMODIALYSIS,LONG-TERM: HCPCS

## 2025-04-16 PROCEDURE — 0JH63XZ INSERTION OF TUNNELED VASCULAR ACCESS DEVICE INTO CHEST SUBCUTANEOUS TISSUE AND FASCIA, PERCUTANEOUS APPROACH: ICD-10-PCS | Performed by: STUDENT IN AN ORGANIZED HEALTH CARE EDUCATION/TRAINING PROGRAM

## 2025-04-16 PROCEDURE — 85025 COMPLETE CBC W/AUTO DIFF WBC: CPT | Performed by: NURSE PRACTITIONER

## 2025-04-16 PROCEDURE — 25010000002 LIDOCAINE 1% - EPINEPHRINE 1:100000 1 %-1:100000 SOLUTION

## 2025-04-16 PROCEDURE — 85007 BL SMEAR W/DIFF WBC COUNT: CPT | Performed by: INTERNAL MEDICINE

## 2025-04-16 PROCEDURE — 85025 COMPLETE CBC W/AUTO DIFF WBC: CPT | Performed by: INTERNAL MEDICINE

## 2025-04-16 RX ORDER — HEPARIN SODIUM 200 [USP'U]/100ML
INJECTION, SOLUTION INTRAVENOUS
Status: DISPENSED
Start: 2025-04-16 | End: 2025-04-17

## 2025-04-16 RX ORDER — MIRTAZAPINE 15 MG/1
7.5 TABLET, FILM COATED ORAL NIGHTLY
Status: DISCONTINUED | OUTPATIENT
Start: 2025-04-16 | End: 2025-04-19 | Stop reason: HOSPADM

## 2025-04-16 RX ORDER — MIDAZOLAM HYDROCHLORIDE 1 MG/ML
INJECTION, SOLUTION INTRAMUSCULAR; INTRAVENOUS AS NEEDED
Status: COMPLETED | OUTPATIENT
Start: 2025-04-16 | End: 2025-04-16

## 2025-04-16 RX ORDER — HEPARIN SODIUM 1000 [USP'U]/ML
INJECTION, SOLUTION INTRAVENOUS; SUBCUTANEOUS
Status: COMPLETED
Start: 2025-04-16 | End: 2025-04-16

## 2025-04-16 RX ORDER — FENTANYL CITRATE 50 UG/ML
INJECTION, SOLUTION INTRAMUSCULAR; INTRAVENOUS
Status: DISPENSED
Start: 2025-04-16 | End: 2025-04-17

## 2025-04-16 RX ORDER — MIDAZOLAM HYDROCHLORIDE 1 MG/ML
INJECTION, SOLUTION INTRAMUSCULAR; INTRAVENOUS
Status: DISPENSED
Start: 2025-04-16 | End: 2025-04-17

## 2025-04-16 RX ORDER — HYDROCODONE BITARTRATE AND ACETAMINOPHEN 5; 325 MG/1; MG/1
1 TABLET ORAL EVERY 6 HOURS PRN
Refills: 0 | Status: DISCONTINUED | OUTPATIENT
Start: 2025-04-16 | End: 2025-04-16

## 2025-04-16 RX ORDER — LIDOCAINE HYDROCHLORIDE AND EPINEPHRINE 10; 10 MG/ML; UG/ML
INJECTION, SOLUTION INFILTRATION; PERINEURAL
Status: COMPLETED
Start: 2025-04-16 | End: 2025-04-16

## 2025-04-16 RX ORDER — HYDROCODONE BITARTRATE AND ACETAMINOPHEN 5; 325 MG/1; MG/1
1 TABLET ORAL EVERY 8 HOURS PRN
Refills: 0 | Status: DISCONTINUED | OUTPATIENT
Start: 2025-04-16 | End: 2025-04-17

## 2025-04-16 RX ORDER — FENTANYL CITRATE 50 UG/ML
INJECTION, SOLUTION INTRAMUSCULAR; INTRAVENOUS AS NEEDED
Status: COMPLETED | OUTPATIENT
Start: 2025-04-16 | End: 2025-04-16

## 2025-04-16 RX ADMIN — Medication 10 MG: at 05:03

## 2025-04-16 RX ADMIN — MIDAZOLAM HYDROCHLORIDE 1 MG: 1 INJECTION, SOLUTION INTRAMUSCULAR; INTRAVENOUS at 14:43

## 2025-04-16 RX ADMIN — FENTANYL CITRATE 25 MCG: 50 INJECTION, SOLUTION INTRAMUSCULAR; INTRAVENOUS at 14:55

## 2025-04-16 RX ADMIN — LIDOCAINE HYDROCHLORIDE AND EPINEPHRINE 6 ML: 10; 10 INJECTION, SOLUTION INFILTRATION; PERINEURAL at 15:06

## 2025-04-16 RX ADMIN — FENTANYL CITRATE 50 MCG: 50 INJECTION, SOLUTION INTRAMUSCULAR; INTRAVENOUS at 14:43

## 2025-04-16 RX ADMIN — HEPARIN SODIUM 4500 UNITS: 1000 INJECTION INTRAVENOUS; SUBCUTANEOUS at 15:06

## 2025-04-16 RX ADMIN — MIRTAZAPINE 7.5 MG: 15 TABLET, FILM COATED ORAL at 20:18

## 2025-04-16 RX ADMIN — MUPIROCIN 1 APPLICATION: 20 OINTMENT TOPICAL at 08:09

## 2025-04-16 RX ADMIN — MIDAZOLAM HYDROCHLORIDE 0.5 MG: 1 INJECTION, SOLUTION INTRAMUSCULAR; INTRAVENOUS at 14:55

## 2025-04-16 RX ADMIN — HYDROCODONE BITARTRATE AND ACETAMINOPHEN 1 TABLET: 5; 325 TABLET ORAL at 20:23

## 2025-04-16 NOTE — CONSULTS
Malnutrition Severity Assessment    Patient Name:  Jun Young  YOB: 1960  MRN: [id number]  Admit Date:  4/11/2025    Patient meets criteria for : Moderate (non-severe) Malnutrition    Comments:  Pt meets criteria for moderate malnutrition in the context of chronic illness indicated by severe muscle wasting and moderate subcutaneous fat loss    Malnutrition Severity Assessment  Malnutrition Type: Chronic Disease - Related Malnutrition  Malnutrition Type (Last 8 Hours)       Malnutrition Severity Assessment       Row Name 04/16/25 1342       Malnutrition Severity Assessment    Malnutrition Type Chronic Disease - Related Malnutrition      Row Name 04/16/25 1342       Muscle Loss    Loss of Muscle Mass Findings Severe    [place] Region Moderate - slight depression    Clavicle Bone Region Moderate - some protrusion in females, visible in males    Dorsal Hand Region Moderate - slight depression    Patellar Region Severe - prominent bone, square looking, very little muscle definition    Anterior Thigh Region Severe - line/depression along thigh, obviously thin    Posterior Calf Region Severe - thin with very little definition/firmness      Row Name 04/16/25 1342       Fat Loss    Subcutaneous Fat Loss Findings Moderate    Orbital Region  Moderate -  somewhat hollowness, slightly dark circles    Upper Arm Region Severe - mostly skin, very little space between folds, fingers touch      Row Name 04/16/25 1342       Criteria Met (Must meet criteria for severity in at least 2 of these categories: M Wasting, Fat Loss, Fluid, Secondary Signs, Wt. Status, Intake)    Patient meets criteria for  Moderate (non-severe) Malnutrition                    Electronically signed by:  Zhanna Vasquez, MS,RD,LD  04/16/25 13:45 EDT

## 2025-04-16 NOTE — PRE-SEDATION DOCUMENTATION
Pre-anesthesia evaluation performed: Yes    PRE-OPERATIVE NOTES    Time: 14:40 EDT    Planned Sedation: Moderate sedation    Risks,Benefits, Complications And Alternatives Discussed with Patients: Yes      Patient status classification of the American Society of Anesthesiologists      Patient re-evaluated for appropriateness of Conscious Sedation: Yes      ASA SCALE ASSESSMENT:  2-Mild to moderate systemic disease, medically well controlled, with no functional limitation    MALLAMPATI CLASSIFICATION:  2-Able to visualize the soft palate, fauces, uvula. The anterior & posterior tonsilar pillars are hidden by the tongue.    Provider signature:  Benigno Mcmullen MD  14:40 EDT  04/16/25

## 2025-04-16 NOTE — CASE MANAGEMENT/SOCIAL WORK
Continued Stay Note  [facility]     Patient Name: Jun Young  MRN: [id number]  Today's Date: 4/16/2025    Admit Date: 4/11/2025    Plan: HOme  w/family transporting   Discharge Plan       Row Name 04/16/25 1213       Plan    Plan HOme  w/family transporting    Patient/Family in Agreement with Plan yes    Plan Comments I spoke w/patient in room, he stated that he does [facility], d/c plan is home w/family @ d/c. Scheduled to have TDC  placement today. I called [facility] and confirmed that he is a patient there on [facility], requested d/c summary be faxed to them @ d/c to [phone]. CM will continue to follow.    Final Discharge Disposition Code 01 - home or self-care                   Discharge Codes    No documentation.                 Expected Discharge Date and Time       Expected Discharge Date Expected Discharge Time    Apr 18, 2025               Slim Marino RN

## 2025-04-16 NOTE — CONSULTS
"          Clinical Nutrition Assessment     Patient Name: Jun Young  YOB: 1960  MRN: [id number]  Date of Encounter: 04/16/25 11:15 EDT  Admission date: 4/11/2025  Reason for Visit: Consult/Chronic Poor Intake    Assessment   Nutrition Assessment   Admission Diagnosis:  Pericardial effusion with cardiac tamponade [I31.39, I31.4]    Problem List:    Pericardial effusion with cardiac tamponade    Type 2 diabetes mellitus    ESRD (end stage renal disease)    Hypertension      PMH:   He  has a past medical history of Acute on chronic renal failure, Bilateral hydronephrosis, BPH (benign prostatic hyperplasia), Cholelithiasis, Diabetes mellitus, Dyslipidemia, Elevated cholesterol, GERD (gastroesophageal reflux disease), Gross hematuria, Hemodialysis patient, History of transfusion, Hyperlipidemia, Hypertension, Pericardial effusion, and Stroke.    PSH:  He  has a past surgical history that includes cystoscopy bladder biopsy (N/A, 02/09/2023); cystoscopy with clot evacuation; Hemodialysis Catheter; cystoscopy bladder biopsy (N/A, 03/18/2023); Colonoscopy; Transurethral resection of prostate (N/A, 5/5/2023); and Cardiac catheterization (N/A, 4/11/2025).    Applicable Nutrition History:       Anthropometrics     Height: Height: 170 cm (66.93\")  Last Filed Weight: Weight: 67.9 kg (149 lb 12.8 oz) (04/16/25 0600)  Method: Weight Method: Stated  BMI: BMI (Calculated): 23.5    UBW:  187lbs per pt report   Weight change: difficult to discern wt loss 2/2 limited wt hx from 2024 and fluctuations    Nutrition Focused Physical Exam    Date: 4/16    Patient meets criteria for malnutrition diagnosis, see MSA note.     Subjective   Reported/Observed/Food/Nutrition Related History:     Pt reports poor intake x 2 yrs and notes early satiety. Pt states he doesn't eat full meals anymore and usually only eats small snacks throughout the day. Pt does not feel like he can force himself to eat more as he will " "throw up. Pt states he has also had significant wt loss over the past 2-3 yrs. Pt declines ONS, as this makes him feel too full. RD also discussed alternative strategies for increasing kcals/po intake however pt states he likely \"needs to see a specialist\". Pt is agreeable to fortified yogurt and applesauce, will send once pt no longer NPO. Discussed w/nsg and encouraged supplement use. Pt denies food allergies and dysphagia.     Current Nutrition Prescription   PO: NPO Diet NPO Type: Strict NPO  Oral Nutrition Supplement:   Intake:  65% x 5 meals    Assessment & Plan   Nutrition Diagnosis   Date:  4/16            Updated:    Problem Malnutrition chronic moderate   Etiology Early satiety in the setting of ESRD   Signs/Symptoms Severe muscle wasting and moderate subcutaneous fat loss   Status: New    Date:     Updated:     Problem Inadequate oral intake   Etiology Pending tunneled dialysis line   Signs/Symptoms NPO   Status: New    Goal / Objectives:   Nutrition to support treatment and Increase intake      Nutrition Intervention      Follow treatment progress, Care plan reviewed, Interview for preferences, Encourage intake, Supplement provided    Pt meets criteria for malnutrition (see MSA)    Encourage po intake   Pt agreeable to trial of fortified yogurt and applesauce, declines alternative ONS    Monitoring/Evaluation:   Per protocol, PO intake, Supplement intake, Weight, Symptoms    Zhanna Vasquez, MS,RD,LD  Time Spent:35  "

## 2025-04-16 NOTE — POST-PROCEDURE NOTE
IR POST OP NOTE    Physician:Benigno Mcmullen MD      Procedure: Conversion of nontunneled to tunneled dialysis catheter      Pre Op DX: ESRD on HD      Post Op DX: Same      Anesthesia: Local moderate sedation      Findings: Right IJ collateral tunneled dialysis catheter placement.      Complications: No immediate      Provider Signature: Benigno Mcmullen MD    04/16/25  16:25 EDT

## 2025-04-16 NOTE — PROGRESS NOTES
" LOS: 5 days    Patient Care Team:  Samir Chow DO as PCP - General (Internal Medicine)  Provider, No Known as PCP - Family Medicine  Provider, No Known  Lora Dickey DO as Consulting Physician (Hospitalist)  Katie Lau MD as Consulting Physician (Hospitalist)    Subjective     No acute events overnight. Loss of appetite.     No CP or SOA , fever, chills, rigors, rash, N/V, Constipation.       Objective     colchicine, 0.6 mg, Oral, Daily  insulin lispro, 2-7 Units, Subcutaneous, 4x Daily AC & at Bedtime  mupirocin, 1 Application, Each Nare, BID  senna-docusate sodium, 2 tablet, Oral, BID       Vital Signs:  Blood pressure 171/80, pulse 70, temperature 97.8 °F (36.6 °C), temperature source Oral, resp. rate 17, height 170 cm (66.93\"), weight 67.9 kg (149 lb 12.8 oz), SpO2 96%.    Flowsheet Rows      Flowsheet Row First Filed Value   Admission Height 170.2 cm (67\") Documented at 04/11/2025 1045   Admission Weight 67 kg (147 lb 11.3 oz) Documented at 04/11/2025 1045            04/15 0701 - 04/16 0700  In: 300 [P.O.:300]  Out: 500 [Urine:500]    Physical Exam:  GENERAL: Awake and alert, in no acute distress.   HEENT: Normocephalic, atraumatic.    NECK: Supple .  HEART: RRR; No murmur, rubs, gallops. Trace edema  LUNGS:  Clear to auscultation bilaterally without wheezing, rales, rhonchi.  ABDOMEN: Soft, nontender, nondistended.   EXT: Trace edema.  :  No Whitfield   SKIN: Warm and dry without rash  NEURO: No focal neurological deficits. Strength equal bilateral  PSYCHIATRIC:  Cooperative with PE    Labs:  Results from last 7 days   Lab Units 04/16/25  0446 04/15/25  0334 04/14/25  0323   WBC 10*3/mm3 10.56 12.12* 12.57*   HEMOGLOBIN g/dL 8.6* 8.9* 8.5*   HEMATOCRIT % 27.9* 29.2* 27.1*   PLATELETS 10*3/mm3 160 182 268     Results from last 7 days   Lab Units 04/16/25  0446 04/15/25  0334 04/14/25  0323 04/13/25  0455 04/12/25  0313 04/11/25  1116   SODIUM mmol/L 137 136 136 134* 138 139   POTASSIUM " mmol/L 5.1 5.4* 5.1 4.8 5.1 5.3*   CHLORIDE mmol/L 100 99 95* 94* 94* 94*   CO2 mmol/L 18.0* 20.0* 20.0* 20.0* 21.0* 19.0*   BUN mg/dL 81* 65* 93* 85* 63* 39*   CREATININE mg/dL 11.66* 10.23* 11.34* 12.18* 9.60* 7.53*   CALCIUM mg/dL 7.8* 9.0 8.8 8.4* 8.2* 9.1   PHOSPHORUS mg/dL  --   --   --  8.9* 10.0*  --    MAGNESIUM mg/dL 2.9*  --   --  3.2* 3.0*  --    ALBUMIN g/dL  --   --  3.1* 3.1* 3.3* 4.0     Results from last 7 days   Lab Units 04/16/25  0446   GLUCOSE mg/dL 91       Results from last 7 days   Lab Units 04/14/25  0323   ALK PHOS U/L 348*   BILIRUBIN mg/dL 0.8   ALT (SGPT) U/L 177*   AST (SGOT) U/L 673*       Assessment       Pericardial effusion with cardiac tamponade    Type 2 diabetes mellitus    ESRD (end stage renal disease)    Hypertension    End-stage renal disease:   - On Monday and Friday schedule.  Follows up with NAL.    Large pericardial effusion:   - S/p pericardial drain 4/11.      Anemia:   - Epogen on dialysis days.      Hyperkalemia :  - Should get better with dialysis.      Elevated liver enzymes:  - Secondary to hypotension/shock liver.      Hyperphosphatemia:   - Low phos diet.  Need to restart home binders.      Hypertension:   - Start home medications.     Plan:  - TDC placement today  - HD today after TDC palcement . UF as tolerated.   - Renal diet   - MARTÍN with HD   - renal diet   - Mirtazapine low dose as appetite stimulant.     Discussed with RN.     Domenica Leary MD  04/16/25  09:27 EDT

## 2025-04-16 NOTE — PRE-PROCEDURE NOTE
[facility]   Interventional Radiology H&P    Patient Name: Jun Young  : 1960  MRN: [id number]  Primary Care Physician:  Samir Chow DO  Referring Physician: Sushil Hoyt,*  Date of admission: 2025    Subjective   Subjective     HPI:  Jun Young is a 64 y.o. male with a history of ESRD on HD.  Patient is status post venous EXTR have after attempted fistulogram and angioplasty of left upper extremity fistula.  At that time a right IJ central venous catheter was placed for dialysis.  Patient presents to interventional radiology today for conversion of nontunneled to tunneled hemodialysis catheter.    Review of Systems:   Constitutional no fever,  no weight loss       Otolaryngeal no difficulty swallowing   Cardiovascular no chest pain   Pulmonary no cough, no sputum production   Gastrointestinal no constipation, no diarrhea                         Personal History       Past Medical/Surgical History:   Past Medical History:   Diagnosis Date    Acute on chronic renal failure     Bilateral hydronephrosis     BPH (benign prostatic hyperplasia)     Cholelithiasis     Diabetes mellitus     Dyslipidemia     Elevated cholesterol     GERD (gastroesophageal reflux disease)     Gross hematuria     Hemodialysis patient     History of transfusion     BHL, no reactions per pt    Hyperlipidemia     Hypertension     Pericardial effusion     echo 2023 - 1-2 cm preserved LV function    Stroke     no residual effects     Past Surgical History:   Procedure Laterality Date    CARDIAC CATHETERIZATION N/A 2025    Procedure: Pericardiocentesis;  Surgeon: Braxton Canseco MD;  Location: [facility] CATH INVASIVE LOCATION;  Service: Cardiovascular;  Laterality: N/A;    COLONOSCOPY      CYSTOSCOPY BLADDER BIOPSY N/A 2023    Procedure: CYSTOSCOPY CLOT EVACUATION WITH FULGURATION, RETROGRADE PYELOGRAM;  Surgeon: Mandi Velasco MD;  Location: [facility] OR;  Service: Urology;   Laterality: N/A;    CYSTOSCOPY BLADDER BIOPSY N/A 03/18/2023    Procedure: CYSTOSCOPY WITH FULGURATION OF BLEEDERS;  Surgeon: Santana Christianson MD;  Location: [facility] OR;  Service: Urology;  Laterality: N/A;    CYSTOSCOPY TRANSURETHRAL RESECTION OF PROSTATE N/A 5/5/2023    Procedure: CYSTOSCOPY TRANSURETHRAL RESECTION OF PROSTATE (BIPOLAR);  Surgeon: Pawan Damico MD;  Location: [facility] OR;  Service: Urology;  Laterality: N/A;    CYSTOSCOPY WITH CLOT EVACUATION      fulguration - 3 way catheter placement    INSERTION HEMODIALYSIS CATHETER         Social History:  reports that he has never smoked. He has never been exposed to tobacco smoke. He has never used smokeless tobacco. He reports that he does not drink alcohol and does not use drugs.    Medications:  Medications Prior to Admission   Medication Sig Dispense Refill Last Dose/Taking    acetaminophen (TYLENOL) 325 MG tablet Take 2 tablets by mouth Every 4 (Four) Hours As Needed for Mild Pain.   Past Week    sennosides-docusate (PERICOLACE) 8.6-50 MG per tablet Take 2 tablets by mouth 2 (Two) Times a Day As Needed for Constipation. 60 tablet 1 Past Week    sevelamer (RENVELA) 800 MG tablet Take 1 tablet by mouth 3 (Three) Times a Day With Meals. 90 tablet 1 Past Week    simethicone (MYLICON) 80 MG chewable tablet Chew and swallow 1 tablet by mouth 4 (Four) Times a Day As Needed for Flatulence. 30 tablet 1 Past Week    B Complex-C-Folic Acid (KAYLEIGH-LASHON PO) Take 1 tablet by mouth Daily. (Patient not taking: Reported on 4/15/2025)   Not Taking    pantoprazole (PROTONIX) 40 MG EC tablet Take 1 tablet by mouth Daily. (Patient not taking: Reported on 4/15/2025) 30 tablet 2 Not Taking     Current medications:  colchicine, 0.6 mg, Oral, Daily  fentaNYL citrate (PF), , ,   heparin (porcine), , ,   heparin (porcine), , ,   insulin lispro, 2-7 Units, Subcutaneous, 4x Daily AC & at Bedtime  lidocaine 1% - EPINEPHrine 1:[id number], , ,   midazolam, , ,   mirtazapine, 7.5 mg,  "Oral, Nightly  mupirocin, 1 Application, Each Nare, BID  senna-docusate sodium, 2 tablet, Oral, BID      Current IV drips:       Allergies:  No Known Allergies    Objective    Objective     Vitals:   Temp:  [97.7 °F (36.5 °C)-98.7 °F (37.1 °C)] 98.7 °F (37.1 °C)  Heart Rate:  [70-84] 74  Resp:  [17-18] 18  BP: (161-188)/(77-99) 172/77      Physical Exam:   Constitutional: Awake, alert, No acute distress    Respiratory: nonlabored respirations    Cardiovascular: palpable pedal pulses bilaterally   Gastrointestinal: soft, nontender, nondistended        ASA SCALE ASSESSMENT:  2-Mild to moderate systemic disease, medically well controlled, with no functional limitation    MALLAMPATI CLASSIFICATION:  2-Able to visualize the soft palate, fauces, uvula. The anterior & posterior tonsilar pillars are hidden by the tongue.       Result Review        Result Review:     Sodium   Date Value Ref Range Status   04/16/2025 137 136 - 145 mmol/L Final   04/15/2025 136 136 - 145 mmol/L Final   04/14/2025 136 136 - 145 mmol/L Final       Potassium   Date Value Ref Range Status   04/16/2025 5.1 3.5 - 5.2 mmol/L Final   04/15/2025 5.4 (H) 3.5 - 5.2 mmol/L Final     Comment:     Slight hemolysis detected by analyzer. Result may be falsely elevated.   04/14/2025 5.1 3.5 - 5.2 mmol/L Final       Chloride   Date Value Ref Range Status   04/16/2025 100 98 - 107 mmol/L Final   04/15/2025 99 98 - 107 mmol/L Final   04/14/2025 95 (L) 98 - 107 mmol/L Final       No results found for: \"PLASMABICARB\"    BUN   Date Value Ref Range Status   04/16/2025 81 (H) 8 - 23 mg/dL Final   04/15/2025 65 (H) 8 - 23 mg/dL Final   04/14/2025 93 (H) 8 - 23 mg/dL Final       Creatinine   Date Value Ref Range Status   04/16/2025 11.66 (H) 0.76 - 1.27 mg/dL Final   04/15/2025 10.23 (H) 0.76 - 1.27 mg/dL Final   04/14/2025 11.34 (H) 0.76 - 1.27 mg/dL Final       Calcium   Date Value Ref Range Status   04/16/2025 7.8 (L) 8.6 - 10.5 mg/dL Final   04/15/2025 9.0 8.6 - " "10.5 mg/dL Final   04/14/2025 8.8 8.6 - 10.5 mg/dL Final           No components found for: \"GLUCOSE.*\"  Results from last 7 days   Lab Units 04/16/25  0446   WBC 10*3/mm3 10.56   HEMOGLOBIN g/dL 8.6*   HEMATOCRIT % 27.9*   PLATELETS 10*3/mm3 160      Results from last 7 days   Lab Units 04/16/25  1256   INR  1.17*           Assessment / Plan     Assesment:  64-year-old male with a history of ESRD on HD.  Patient presents to interventional radiology today for the conversion of a nontunneled to a tunneled hemodialysis catheter via a right sided IJ collateral.      Plan:   Conversion of a nontunneled to a tunneled right-sided hemodialysis catheter.    The risks and benefits of the procedure were discussed with the patient.    Electronically signed by Benigno Mcmullen MD, 04/16/25, 2:38 PM EDT.    "

## 2025-04-16 NOTE — PLAN OF CARE
Goal Outcome Evaluation:         A&Ox4. RA. VSS. Pt NPO for tunneled dialysis cath placement today in IR. Pt currently in dialysis. Safety maintained.

## 2025-04-16 NOTE — PROGRESS NOTES
[facility] Medicine Services  PROGRESS NOTE    Patient Name: Jun Young  : 1960  MRN: [id number]    Date of Admission: 2025  Primary Care Physician: Samir Chow DO    Subjective   Subjective     CC:  F/U cardiac tamponade    HPI:  He is doing OK today.  Complains of chronic decreased appetite/early satiety.  Says he was only going to HD twice weekly due to leg cramping when he went three times weekly.      Objective   Objective     Vital Signs:   Temp:  [97.7 °F (36.5 °C)-98.3 °F (36.8 °C)] 97.8 °F (36.6 °C)  Heart Rate:  [70-84] 70  Resp:  [17-18] 17  BP: (161-188)/(76-99) 171/80     Physical Exam:  Constitutional: No acute distress, awake, alert, laying in bed  HENT: NCAT, mucous membranes moist  Respiratory: Respiratory effort normal   Cardiovascular: RRR  Psychiatric: Appropriate affect, cooperative  Neurologic: Speech clear  Skin: No rashes on exposed surfaces    Results Reviewed:  LAB RESULTS:      Lab 25  0446 04/15/25  0334 25  0323 25  0455 25  0313 25  2122 25  1817 25  1344 25  1257 25  1116   WBC 10.56 12.12* 12.57* 12.52*  --  15.78*  --   --   --   --  17.18*   HEMOGLOBIN 8.6* 8.9* 8.5* 8.5*  --  8.0*  --   --   --   --  9.3*   HEMATOCRIT 27.9* 29.2* 27.1* 27.0*  --  25.4*  --   --   --   --  31.3*   PLATELETS 160 182 268 227  --  214  --   --   --   --  210   NEUTROS ABS 5.39 7.04* 8.33* 9.85*  --  14.68*  --   --   --   --  15.16*   IMMATURE GRANS (ABS)  --  0.42* 0.49* 0.44*  --   --   --   --   --   --  0.25*   LYMPHS ABS  --  0.76 0.78 0.54*  --   --   --   --   --   --  0.76   MONOS ABS  --  1.18* 0.78 0.53  --   --   --   --   --   --  0.94*   EOS ABS 3.06* 2.62* 2.09* 1.09*  --  0.00  --   --   --   --  0.04   MCV 91.5 91.3 89.7 88.5  --  89.8  --   --   --   --  95.7   LACTATE  --   --   --   --  1.5 2.2* 4.7* 12.2*  --  11.9* 7.6*   HSTROP T  --   --   --    --   --   --   --   --  83*  --  88*         Lab 04/16/25  0446 04/15/25  0334 04/14/25 0323 04/13/25 0455 04/12/25  0313   SODIUM 137 136 136 134* 138   POTASSIUM 5.1 5.4* 5.1 4.8 5.1   CHLORIDE 100 99 95* 94* 94*   CO2 18.0* 20.0* 20.0* 20.0* 21.0*   ANION GAP 19.0* 17.0* 21.0* 20.0* 23.0*   BUN 81* 65* 93* 85* 63*   CREATININE 11.66* 10.23* 11.34* 12.18* 9.60*   EGFR 4.4* 5.1* 4.5* 4.1* 5.5*   GLUCOSE 91 94 112* 104* 125*   CALCIUM 7.8* 9.0 8.8 8.4* 8.2*   MAGNESIUM 2.9*  --   --  3.2* 3.0*   PHOSPHORUS  --   --   --  8.9* 10.0*         Lab 04/14/25  0323 04/13/25 0455 04/12/25 0313 04/11/25  1116   TOTAL PROTEIN 6.1 6.1 6.1 8.0   ALBUMIN 3.1* 3.1* 3.3* 4.0   GLOBULIN 3.0 3.0 2.8 4.0   ALT (SGPT) 177* 1,133* 5,702* 4,663*   AST (SGOT) 673* 2,081* 6,968* 4,327*   BILIRUBIN 0.8 0.6 0.6 1.3*   ALK PHOS 348* 297* 267* 336*   LIPASE  --   --   --  57         Lab 04/11/25  1344 04/11/25  1116   HSTROP T 83* 88*                 Brief Urine Lab Results       None            Microbiology Results Abnormal       None            IR Angioplasty AV Fistula / Graft Arterial  Result Date: 4/14/2025  PROCEDURE: Arteriovenous graft declot  Procedural Personnel Attending physician(s): Benigno Mcmullen Fellow physician(s): None Resident physician(s): None Advanced practice provider(s): None  Pre-procedure diagnosis: ESRD on HD with a clotted left upper extremity graft Post-procedure diagnosis: Same  Complications: No immediate complications.      Impression:  Left upper extremity graft status post declot with a large amount of chronic fibrinous appearing clot. A distal venous extravasation was noted after balloon angioplasty. A 10 mm x 10 cm covered stent was deployed. Repeat venography demonstrated a satisfactory result. Despite numerous attempts the flow through the graft was generally poor. Given history of prior intervention, recommend conversion of existing right jugular collateral nontunneled central venous catheter to  tunneled central venous access and follow-up with the patient's already scheduled appointment with a vascular surgeon for graft/fistula revision. I discussed the results of this procedure with the patient's nephrologist at the conclusion of the procedure.  Plan:  Use nontunneled hemodialysis catheter for dialysis at the present time. _______________________________________________________________  PROCEDURE SUMMARY: - Access of dialysis graft with ultrasound guidance - Dialysis graft declot - Additional procedure(s): None  PROCEDURE DETAILS:  Pre-procedure Consent: Informed consent for the procedure including risks, benefits and alternatives was obtained and time-out was performed prior to the procedure. Preparation: The site was prepared and draped using maximal sterile barrier technique including cutaneous antisepsis.  Anesthesia/sedation Level of anesthesia/sedation: Moderate sedation (conscious sedation) Anesthesia/sedation administered by: Independent trained observer under attending supervision with continuous monitoring of the patient?s level of consciousness and physiologic status Total intra-service sedation time (minutes): 62  Initial dialysis graft evaluation Dialysis graft side: Left Dialysis graft type: Upper arm graft Initial graft palpation: No pulse, no thrill  Access Local anesthesia was administered. The dialysis graft was accessed and a 7 Lithuanian sheath was placed. Access technique: Micropuncture set with 21 gauge needle Access direction: Toward venous anastomosis  Initial angiography Initial angiography of the central veins was performed. Findings: Patent central vasculature. Within the patient's upper extremity extensive clot is seen from the level of the AV anastomosis centrally to the level of the axillary vein.  Angioplasty Angioplasty of the venous dialysis circuit was performed. Angioplasty balloon(s): 8 mm x 40 mm conquest balloon.  Mechanical or aspiration thrombectomy Thrombectomy of the  venous circuit was performed. Thrombectomy device: Penumbra  Stent placement Stent placement was performed within the proximal venous circuit due to contrast extravasation at the conclusion of initial angioplasty with 8 mm x 40 mm conquest balloon. Stent placed: Pfeifer-Andriy Viabahn Stent type: Covered Stent diameter (mm): 10 Stent length (mm): 100 Balloon dilation of stent (mm): 10 mm  Final angiography Final angiography was performed. Findings: Patent venous circuit. Final graft palpation: Pulse but no thrill  Closure The sheath was removed and hemostasis was achieved with suture. A sterile dressing was applied.  Contrast Contrast agent: Visipaque 320 Contrast volume (mL): 50  Radiation Dose Fluoroscopy time (minutes): 9.5 Reference air kerma (mGy): 23.4 Kerma area product (uGy-cm2): 946.86  Additional Details Additional description of procedure: None Equipment details: None Specimens removed: None Estimated blood loss (mL): 25    4/14/2025 5:14 PM by Benigno Mcmullen MD on Workstation: [id number]       [facility] DIALYSIS ACC  Result Date: 4/14/2025  PROCEDURE: Non-tunneled central venous catheter placement  Procedural Personnel Attending physician(s): Benigno Mcmullen  Pre-procedure diagnosis: ESRD on HD. No dialysis access. Post-procedure diagnosis: Same  Complications: No immediate complications.      Impression:  Insertion of right-sided non-tunneled dual-lumen temporary dialysis catheter, with tip in the expected location of the cavoatrial junction.  Plan:  The catheter may be used immediately. _______________________________________________________________  PROCEDURE SUMMARY: - Venous access with ultrasound guidance - Non-tunneled central venous catheter insertion with fluoroscopic guidance - Additional procedure(s): None  PROCEDURE DETAILS:  Pre-procedure Consent: Informed consent for the procedure including risks, benefits and alternatives was obtained and time-out was performed prior to the procedure.  Preparation (MIPS): The site was prepared and draped using all elements of maximal sterile barrier technique including sterile gloves, sterile gown, cap, mask, large sterile sheet, sterile ultrasound probe cover, hand hygiene and cutaneous antisepsis with 2% chlorhexidine. Medical reason for site preparation exception (MIPS): Not applicable  Access Local anesthesia was administered. The vessel was sonographically evaluated and determined to be patent. Real time ultrasound was used to visualize needle entry into the vessel and a permanent image was stored. Vein accessed: Jugular collateral vein Access technique: Micropuncture set with 21 gauge needle  Catheter placement The access site was dilated and the catheter was placed into the vein over a wire  under fluoroscopic guidance.  The catheter tip location was fluoroscopically verified and a permanent image was stored.. A sterile dressing was applied. Catheter placed: Glanceon Catheter size (Malaysian): 14 Catheter length (cm): 15 cm Catheter flush: Heparin (1000 units/mL) Catheter securement technique: Non-absorbable suture  Radiation Dose Fluoroscopy time (minutes): 1 Reference air kerma (mGy): 7.8 Kerma area product (uGy-cm2): 133.99  Additional Details Additional description of procedure: None Equipment details: None Specimens removed: None Estimated blood loss (mL): Less than 10  4/14/2025 5:03 PM by Benigno Mcmullen MD on Workstation: [id number]        Results for orders placed during the hospital encounter of 04/11/25    Adult Transthoracic Echo Limited W/ Cont if Necessary Per Protocol    Interpretation Summary    Left ventricular systolic function is normal. Estimated left ventricular EF = 65%    Left ventricular wall thickness is consistent with mild concentric hypertrophy.    No significant pericardial effusion is noted.      Current medications:  Scheduled Meds:colchicine, 0.6 mg, Oral, Daily  insulin lispro, 2-7 Units, Subcutaneous, 4x Daily AC & at  Bedtime  mupirocin, 1 Application, Each Nare, BID  senna-docusate sodium, 2 tablet, Oral, BID      Continuous Infusions:   PRN Meds:.  senna-docusate sodium **AND** polyethylene glycol **AND** bisacodyl **AND** bisacodyl    dextrose    dextrose    fentaNYL citrate (PF)    glucagon (human recombinant)    heparin (porcine)    hydrALAZINE    labetalol    midazolam    nitroglycerin    sodium chloride    sodium chloride    Assessment & Plan   Assessment & Plan     Active Hospital Problems    Diagnosis  POA    **Pericardial effusion with cardiac tamponade [I31.39, I31.4]  Yes    Hypertension [I10]  Yes    ESRD (end stage renal disease) [N18.6]  Yes    Type 2 diabetes mellitus [E11.9]  Yes      Resolved Hospital Problems   No resolved problems to display.        Brief Hospital Course to date:  Jun Young is a 64 y.o. male with ESRD on HD MWF, DM2, HTN, HLD, history of CVA who presented to the ED 4/11 and was found to have large pericardial effusion with tamponade.  He was admitted to the ICU, seen by cardiology and had cardiac drain placed.  He has required placement of a temporary HD catheter due to AV graft occlusion.  He was transferred out of ICU with hospitalists assuming care 4/16.    Pericardial effusion with cardiac tamponade  -S/P drain placement by cardiology  -Cytology and cultures negative  -Started on colchicine   -F/U Dr. Canseco in 3 months    ESRD on HD MWF  AV graft occlusion  -Awaiting tunneled HD catheter placement  -Nephrology following for HD  -Discussed with Dr. Leary and he will send HD orders to patient's home dialysis center to help with three times weekly tolerance.  We discussed importance of receiving HD three times per week with the patient.    HTN  -If BP remains elevated post-HD consider starting amlodipine 5mg daily per cardiology recommendations    DM2  -Continue SSI    Decreased appetite/early satiety  -CT A/P on admission without acute findings  -Consider outpatient gastric  emptying study per PCP  -Nightly remeron started  -Patient should follow with dietician at dialysis center    Expected Discharge Location and Transportation: Home  Expected Discharge   Expected Discharge Date: 4/18/2025; Expected Discharge Time:      VTE Prophylaxis:  Pharmacologic & mechanical VTE prophylaxis orders are present.         AM-PAC 6 Clicks Score (PT): 24 (04/15/25 2000)    CODE STATUS:   Code Status and Medical Interventions: CPR (Attempt to Resuscitate); Full Support   Ordered at: 04/11/25 1521     Code Status (Patient has no pulse and is not breathing):    CPR (Attempt to Resuscitate)     Medical Interventions (Patient has pulse or is breathing):    Full Support       Lisa Kim MD  04/16/25

## 2025-04-16 NOTE — NURSING NOTE
15.5 Czech Image guided tunneled dialysis catheter placed to right externall jugular by Benigno Mcmullen MD. Patient tolerated well. Sedation time of 20 minutes. Patient was given 75 mcg Fentanyl & 1.5 mg Versed. Report called to nurse.

## 2025-04-17 LAB
ABO GROUP BLD: NORMAL
ANION GAP SERPL CALCULATED.3IONS-SCNC: 17 MMOL/L (ref 5–15)
BASOPHILS # BLD AUTO: 0.09 10*3/MM3 (ref 0–0.2)
BASOPHILS NFR BLD AUTO: 1 % (ref 0–1.5)
BLD GP AB SCN SERPL QL: NEGATIVE
BUN SERPL-MCNC: 45 MG/DL (ref 8–23)
BUN/CREAT SERPL: 5.5 (ref 7–25)
CALCIUM SPEC-SCNC: 8.8 MG/DL (ref 8.6–10.5)
CHLORIDE SERPL-SCNC: 97 MMOL/L (ref 98–107)
CO2 SERPL-SCNC: 22 MMOL/L (ref 22–29)
CREAT SERPL-MCNC: 8.17 MG/DL (ref 0.76–1.27)
DEPRECATED RDW RBC AUTO: 49.5 FL (ref 37–54)
EGFRCR SERPLBLD CKD-EPI 2021: 6.7 ML/MIN/1.73
EOSINOPHIL # BLD AUTO: 2.18 10*3/MM3 (ref 0–0.4)
EOSINOPHIL NFR BLD AUTO: 24.1 % (ref 0.3–6.2)
ERYTHROCYTE [DISTWIDTH] IN BLOOD BY AUTOMATED COUNT: 16.4 % (ref 12.3–15.4)
GLUCOSE BLDC GLUCOMTR-MCNC: 143 MG/DL (ref 70–130)
GLUCOSE BLDC GLUCOMTR-MCNC: 145 MG/DL (ref 70–130)
GLUCOSE BLDC GLUCOMTR-MCNC: 180 MG/DL (ref 70–130)
GLUCOSE BLDC GLUCOMTR-MCNC: 95 MG/DL (ref 70–130)
GLUCOSE SERPL-MCNC: 82 MG/DL (ref 65–99)
HCT VFR BLD AUTO: 28.7 % (ref 37.5–51)
HCT VFR BLD AUTO: 30.2 % (ref 37.5–51)
HCT VFR BLD AUTO: 30.2 % (ref 37.5–51)
HGB BLD-MCNC: 8.9 G/DL (ref 13–17.7)
HGB BLD-MCNC: 9.3 G/DL (ref 13–17.7)
HGB BLD-MCNC: 9.4 G/DL (ref 13–17.7)
IMM GRANULOCYTES # BLD AUTO: 0.26 10*3/MM3 (ref 0–0.05)
IMM GRANULOCYTES NFR BLD AUTO: 2.9 % (ref 0–0.5)
INR PPP: 1.1 (ref 0.89–1.12)
LYMPHOCYTES # BLD AUTO: 0.92 10*3/MM3 (ref 0.7–3.1)
LYMPHOCYTES NFR BLD AUTO: 10.2 % (ref 19.6–45.3)
MCH RBC QN AUTO: 28.1 PG (ref 26.6–33)
MCHC RBC AUTO-ENTMCNC: 31.1 G/DL (ref 31.5–35.7)
MCV RBC AUTO: 90.1 FL (ref 79–97)
MONOCYTES # BLD AUTO: 1.06 10*3/MM3 (ref 0.1–0.9)
MONOCYTES NFR BLD AUTO: 11.7 % (ref 5–12)
NEUTROPHILS NFR BLD AUTO: 4.55 10*3/MM3 (ref 1.7–7)
NEUTROPHILS NFR BLD AUTO: 50.1 % (ref 42.7–76)
NRBC BLD AUTO-RTO: 0 /100 WBC (ref 0–0.2)
PLATELET # BLD AUTO: 150 10*3/MM3 (ref 140–450)
PMV BLD AUTO: 9 FL (ref 6–12)
POTASSIUM SERPL-SCNC: 4.5 MMOL/L (ref 3.5–5.2)
PROTHROMBIN TIME: 14.9 SECONDS (ref 12.2–15.3)
RBC # BLD AUTO: 3.35 10*6/MM3 (ref 4.14–5.8)
RH BLD: POSITIVE
SODIUM SERPL-SCNC: 136 MMOL/L (ref 136–145)
T&S EXPIRATION DATE: NORMAL
WBC NRBC COR # BLD AUTO: 9.06 10*3/MM3 (ref 3.4–10.8)

## 2025-04-17 PROCEDURE — 85018 HEMOGLOBIN: CPT | Performed by: INTERNAL MEDICINE

## 2025-04-17 PROCEDURE — 63710000001 INSULIN LISPRO (HUMAN) PER 5 UNITS: Performed by: INTERNAL MEDICINE

## 2025-04-17 PROCEDURE — 99232 SBSQ HOSP IP/OBS MODERATE 35: CPT | Performed by: INTERNAL MEDICINE

## 2025-04-17 PROCEDURE — 85014 HEMATOCRIT: CPT | Performed by: INTERNAL MEDICINE

## 2025-04-17 PROCEDURE — 80048 BASIC METABOLIC PNL TOTAL CA: CPT | Performed by: INTERNAL MEDICINE

## 2025-04-17 PROCEDURE — 82948 REAGENT STRIP/BLOOD GLUCOSE: CPT

## 2025-04-17 RX ORDER — HYDROCODONE BITARTRATE AND ACETAMINOPHEN 5; 325 MG/1; MG/1
1 TABLET ORAL EVERY 6 HOURS PRN
Status: DISCONTINUED | OUTPATIENT
Start: 2025-04-17 | End: 2025-04-19 | Stop reason: HOSPADM

## 2025-04-17 RX ADMIN — SENNOSIDES AND DOCUSATE SODIUM 2 TABLET: 50; 8.6 TABLET ORAL at 21:41

## 2025-04-17 RX ADMIN — HYDROCODONE BITARTRATE AND ACETAMINOPHEN 1 TABLET: 5; 325 TABLET ORAL at 21:40

## 2025-04-17 RX ADMIN — COLCHICINE 0.6 MG: 0.6 TABLET ORAL at 08:25

## 2025-04-17 RX ADMIN — MIRTAZAPINE 7.5 MG: 15 TABLET, FILM COATED ORAL at 21:40

## 2025-04-17 RX ADMIN — INSULIN LISPRO 2 UNITS: 100 INJECTION, SOLUTION INTRAVENOUS; SUBCUTANEOUS at 11:26

## 2025-04-17 NOTE — PLAN OF CARE
Goal Outcome Evaluation:      A&O x4. RA. VSS. Tolerating diet. This am during assessment, active bleeding noted from EJ site. Provider aware and came to bedside. Pressure dressing applied but with no success. Dialysis nurses came to bedside to help and IR was called d/t pt still actively bleeding from site after saturating multiple incontinence pads and gauze. New stitch placed in IR and bleeding has stopped. Pt feeling much better, just tired from the blood loss. Provider notified. Safety maintained.

## 2025-04-17 NOTE — PROGRESS NOTES
[facility] Medicine Services  PROGRESS NOTE    Patient Name: Jun Young  : 1960  MRN: [id number]    Date of Admission: 2025  Primary Care Physician: Samir Chow DO    Subjective   Subjective     CC:  F/U cardiac tamponade    HPI:  Has had issues with HD catheter site bleeding overnight.      Objective   Objective     Vital Signs:   Temp:  [97.5 °F (36.4 °C)-98.1 °F (36.7 °C)] 98.1 °F (36.7 °C)  Heart Rate:  [] 96  Resp:  [16-18] 18  BP: (137-172)/() 144/88     Physical Exam:  Constitutional: No acute distress, awake, alert, laying in bed  HENT: NCAT, mucous membranes moist  Respiratory: Respiratory effort normal   Cardiovascular: RRR, tunneled HD catheter with oozing around site.  Pressure dressing in place.  Psychiatric: Appropriate affect, cooperative  Neurologic: Speech clear  Skin: No rashes on exposed surfaces    Results Reviewed:  LAB RESULTS:      Lab 25  1137 25  2344 25  1256 25  0446 04/15/25  0334 25  0323 25  0455 25  2006 25  0313 25  2122 25  1817 25  1344 25  1257 25  1116   WBC  --  9.06  --  10.56 12.12* 12.57* 12.52*  --  15.78*  --   --   --   --  17.18*   HEMOGLOBIN 9.3* 9.4*  --  8.6* 8.9* 8.5* 8.5*  --  8.0*  --   --   --   --  9.3*   HEMATOCRIT 30.2* 30.2*  --  27.9* 29.2* 27.1* 27.0*  --  25.4*  --   --   --   --  31.3*   PLATELETS  --  150  --  160 182 268 227  --  214  --   --   --   --  210   NEUTROS ABS  --  4.55  --  5.39 7.04* 8.33* 9.85*  --  14.68*  --   --   --   --  15.16*   IMMATURE GRANS (ABS)  --  0.26*  --   --  0.42* 0.49* 0.44*  --   --   --   --   --   --  0.25*   LYMPHS ABS  --  0.92  --   --  0.76 0.78 0.54*  --   --   --   --   --   --  0.76   MONOS ABS  --  1.06*  --   --  1.18* 0.78 0.53  --   --   --   --   --   --  0.94*   EOS ABS  --  2.18*  --  3.06* 2.62* 2.09* 1.09*  --  0.00  --   --   --   --  0.04   MCV  --   90.1  --  91.5 91.3 89.7 88.5  --  89.8  --   --   --   --  95.7   LACTATE  --   --   --   --   --   --   --  1.5 2.2* 4.7* 12.2*  --  11.9* 7.6*   PROTIME  --  14.9 15.6*  --   --   --   --   --   --   --   --   --   --   --    HSTROP T  --   --   --   --   --   --   --   --   --   --   --  83*  --  88*         Lab 04/17/25  0431 04/16/25  0446 04/15/25  0334 04/14/25  0323 04/13/25  0455 04/12/25  0313   SODIUM 136 137 136 136 134* 138   POTASSIUM 4.5 5.1 5.4* 5.1 4.8 5.1   CHLORIDE 97* 100 99 95* 94* 94*   CO2 22.0 18.0* 20.0* 20.0* 20.0* 21.0*   ANION GAP 17.0* 19.0* 17.0* 21.0* 20.0* 23.0*   BUN 45* 81* 65* 93* 85* 63*   CREATININE 8.17* 11.66* 10.23* 11.34* 12.18* 9.60*   EGFR 6.7* 4.4* 5.1* 4.5* 4.1* 5.5*   GLUCOSE 82 91 94 112* 104* 125*   CALCIUM 8.8 7.8* 9.0 8.8 8.4* 8.2*   MAGNESIUM  --  2.9*  --   --  3.2* 3.0*   PHOSPHORUS  --   --   --   --  8.9* 10.0*         Lab 04/14/25 0323 04/13/25 0455 04/12/25 0313 04/11/25  1116   TOTAL PROTEIN 6.1 6.1 6.1 8.0   ALBUMIN 3.1* 3.1* 3.3* 4.0   GLOBULIN 3.0 3.0 2.8 4.0   ALT (SGPT) 177* 1,133* 5,702* 4,663*   AST (SGOT) 673* 2,081* 6,968* 4,327*   BILIRUBIN 0.8 0.6 0.6 1.3*   ALK PHOS 348* 297* 267* 336*   LIPASE  --   --   --  57         Lab 04/16/25  2344 04/16/25  1256 04/11/25  1344 04/11/25  1116   HSTROP T  --   --  83* 88*   PROTIME 14.9 15.6*  --   --    INR 1.10 1.17*  --   --              Lab 04/16/25  2344   ABO TYPING O   RH TYPING Positive   ANTIBODY SCREEN Negative         Brief Urine Lab Results       None            Microbiology Results Abnormal       None            IR Insert Tunneled CV Catheter Without Port 5 Plus  Result Date: 4/16/2025  PROCEDURE: Conversion of non-tunneled to tunneled central venous catheter  Procedural Personnel Attending physician(s): Benigno Mcmullen   Pre-procedure diagnosis: ESRD on HD Post-procedure diagnosis: Same  Complications: No immediate complications.      Impression:  Conversion of right-sided external jugular  non-tunneled central venous catheter for a tunneled dialysis catheter, with tip in the expected location of the cavoatrial junction.  Plan:  The catheter may be used immediately. _______________________________________________________________  PROCEDURE SUMMARY: - Temporary central venous catheter removal - Tunneled central venous catheter insertion with fluoroscopic guidance - Additional procedure(s): None  PROCEDURE DETAILS:  Pre-procedure Consent: Informed consent for the procedure including risks, benefits and alternatives was obtained and time-out was performed prior to the procedure. Preparation (MIPS): The site was prepared and draped using all elements of maximal sterile barrier technique including sterile gloves, sterile gown, cap, mask, large sterile sheet, sterile ultrasound probe cover, hand hygiene and cutaneous antisepsis with 2% chlorhexidine.  Anesthesia/sedation Level of anesthesia/sedation: Moderate sedation (conscious sedation) Anesthesia/sedation administered by: Independent trained observer under attending supervision with continuous monitoring of the patient?s level of consciousness and physiologic status Total intra-service sedation time (minutes): 20  Catheter exchange Local anesthesia was administered. A wire was passed through the indwelling central venous catheter and into the central veins. The catheter was removed, and a peel-away sheath was placed. An incision was made near the venous access site and the catheter was tunneled subcutaneously to the venous access site. The catheter was advanced via a peel-away sheath into the vein under fluoroscopic guidance. Catheter tip location was fluoroscopically verified and a permanent image was stored. Catheter placed: AngioDynamics Catheter size (English): 15.5 English Catheter flush: Heparin (1000 units/mL)  Closure The access site was closed and a sterile bandage was applied. Access site closure technique: Tissue adhesive Catheter securement  technique: Non-absorbable suture  Radiation Dose Fluoroscopy time (minutes): 0.2 Reference air kerma (mGy): 0.7 Kerma area product (uGy-cm2): 11.87  Additional Details Additional description of procedure: None Equipment details: None Specimens removed: Temporary central venous catheter Estimated blood loss (mL): Less than 10   4/16/2025 5:21 PM by Benigno Mcmullen MD on Workstation: [id number]        Results for orders placed during the hospital encounter of 04/11/25    Adult Transthoracic Echo Limited W/ Cont if Necessary Per Protocol    Interpretation Summary    Left ventricular systolic function is normal. Estimated left ventricular EF = 65%    Left ventricular wall thickness is consistent with mild concentric hypertrophy.    No significant pericardial effusion is noted.      Current medications:  Scheduled Meds:colchicine, 0.6 mg, Oral, Daily  insulin lispro, 2-7 Units, Subcutaneous, 4x Daily AC & at Bedtime  mirtazapine, 7.5 mg, Oral, Nightly  Jd, 1 patch, Topical, Once  senna-docusate sodium, 2 tablet, Oral, BID      Continuous Infusions:   PRN Meds:.  senna-docusate sodium **AND** polyethylene glycol **AND** bisacodyl **AND** bisacodyl    dextrose    dextrose    glucagon (human recombinant)    heparin (porcine)    HYDROcodone-acetaminophen    labetalol    nitroglycerin    sodium chloride    sodium chloride    Assessment & Plan   Assessment & Plan     Active Hospital Problems    Diagnosis  POA    **Pericardial effusion with cardiac tamponade [I31.39, I31.4]  Yes    Moderate protein-calorie malnutrition [E44.0]  Yes    Hypertension [I10]  Yes    ESRD (end stage renal disease) [N18.6]  Yes    Type 2 diabetes mellitus [E11.9]  Yes      Resolved Hospital Problems   No resolved problems to display.        Brief Hospital Course to date:  Jun Young is a 64 y.o. male with ESRD on HD MWF, DM2, HTN, HLD, history of CVA who presented to the ED 4/11 and was found to have large pericardial effusion  with tamponade.  He was admitted to the ICU, seen by cardiology and had cardiac drain placed.  He has required placement of a temporary HD catheter due to AV graft occlusion.  He was transferred out of ICU with hospitalists assuming care 4/16.    This patient's problems and plans were partially entered by my partner and updated as appropriate by me 04/17/25.     Pericardial effusion with cardiac tamponade  -S/P drain placement by cardiology  -Cytology and cultures negative  -Started on colchicine   -F/U Dr. Canseco in 3 months    ESRD on HD MWF  AV graft occlusion  -S/P tunneled HD catheter placement 4/16 with subsequent bleeding following HD that improved with additional suture placed in IR today  -Nephrology following for HD  -Previously discussed with Dr. Leary and he will send HD orders to patient's home dialysis center to help with three times weekly tolerance.  We discussed importance of receiving HD three times per week with the patient.  -I discussed with nephrology and patient should not receive heparin with dialysis until inflammation from uremic pericarditis is resolved (which can take 6-8 weeks)    HTN  -If BP remains elevated post-HD consider starting amlodipine 5mg daily per cardiology recommendations    DM2  -Continue SSI    Decreased appetite/early satiety  -CT A/P on admission without acute findings  -Consider outpatient gastric emptying study per PCP  -Nightly remeron started  -Patient should follow with dietician at dialysis center    Expected Discharge Location and Transportation: Home  Expected Discharge   Expected Discharge Date: 4/18/2025; Expected Discharge Time:      VTE Prophylaxis:  Pharmacologic & mechanical VTE prophylaxis orders are present.         AM-PAC 6 Clicks Score (PT): 24 (04/16/25 2000)    CODE STATUS:   Code Status and Medical Interventions: CPR (Attempt to Resuscitate); Full Support   Ordered at: 04/11/25 1521     Code Status (Patient has no pulse and is not breathing):    CPR  (Attempt to Resuscitate)     Medical Interventions (Patient has pulse or is breathing):    Full Support       Lisa Kim MD  04/17/25

## 2025-04-17 NOTE — PROGRESS NOTES
" LOS: 6 days    Patient Care Team:  Samir Chow DO as PCP - General (Internal Medicine)  Provider, No Known as PCP - Family Medicine  Provider, No Known  Lora Dickey DO as Consulting Physician (Hospitalist)  Katie Lau MD as Consulting Physician (Hospitalist)    Subjective     HD yesterday tolerated treatment well UF 2 liter. Patient went to IR for continued bleeding at the cath site. Plan for hDin AM.       Objective     colchicine, 0.6 mg, Oral, Daily  insulin lispro, 2-7 Units, Subcutaneous, 4x Daily AC & at Bedtime  mirtazapine, 7.5 mg, Oral, Nightly  Jd, 1 patch, Topical, Once  senna-docusate sodium, 2 tablet, Oral, BID       Vital Signs:  Blood pressure 141/84, pulse 100, temperature 98.1 °F (36.7 °C), temperature source Oral, resp. rate 18, height 170 cm (66.93\"), weight 67.9 kg (149 lb 12.8 oz), SpO2 97%.    Flowsheet Rows      Flowsheet Row First Filed Value   Admission Height 170.2 cm (67\") Documented at 04/11/2025 1045   Admission Weight 67 kg (147 lb 11.3 oz) Documented at 04/11/2025 1045            04/16 0701 - 04/17 0700  In: -   Out: 2320 [Urine:300]    Physical Exam:  GENERAL: Awake and alert, in no acute distress.   HEENT: Normocephalic, atraumatic.    NECK: Supple .  HEART: RRR; No murmur, rubs, gallops. Trace edema  LUNGS:  Clear to auscultation bilaterally without wheezing, rales, rhonchi.  ABDOMEN: Soft, nontender, nondistended.   EXT: Trace edema.  :  No Whitfield   SKIN: Warm and dry without rash  NEURO: No focal neurological deficits. Strength equal bilateral  PSYCHIATRIC:  Cooperative with PE    Labs:  Results from last 7 days   Lab Units 04/17/25  1137 04/16/25  2344 04/16/25  0446 04/15/25  0334   WBC 10*3/mm3  --  9.06 10.56 12.12*   HEMOGLOBIN g/dL 9.3* 9.4* 8.6* 8.9*   HEMATOCRIT % 30.2* 30.2* 27.9* 29.2*   PLATELETS 10*3/mm3  --  150 160 182     Results from last 7 days   Lab Units 04/17/25  0431 04/16/25  0446 04/15/25  0334 04/14/25  0323 04/13/25  0455 " 04/12/25  0313 04/11/25  1116   SODIUM mmol/L 136 137 136 136 134* 138 139   POTASSIUM mmol/L 4.5 5.1 5.4* 5.1 4.8 5.1 5.3*   CHLORIDE mmol/L 97* 100 99 95* 94* 94* 94*   CO2 mmol/L 22.0 18.0* 20.0* 20.0* 20.0* 21.0* 19.0*   BUN mg/dL 45* 81* 65* 93* 85* 63* 39*   CREATININE mg/dL 8.17* 11.66* 10.23* 11.34* 12.18* 9.60* 7.53*   CALCIUM mg/dL 8.8 7.8* 9.0 8.8 8.4* 8.2* 9.1   PHOSPHORUS mg/dL  --   --   --   --  8.9* 10.0*  --    MAGNESIUM mg/dL  --  2.9*  --   --  3.2* 3.0*  --    ALBUMIN g/dL  --   --   --  3.1* 3.1* 3.3* 4.0     Results from last 7 days   Lab Units 04/17/25  0431   GLUCOSE mg/dL 82       Results from last 7 days   Lab Units 04/14/25  0323   ALK PHOS U/L 348*   BILIRUBIN mg/dL 0.8   ALT (SGPT) U/L 177*   AST (SGOT) U/L 673*       Assessment       Pericardial effusion with cardiac tamponade    Type 2 diabetes mellitus    ESRD (end stage renal disease)    Hypertension    Moderate protein-calorie malnutrition    End-stage renal disease:   - On Monday and Friday schedule.  Follows up with NAL.  - Access TDC exchange on this visit. Now will be doing three time weekly HD.    Large pericardial effusion:   - S/p pericardial drain 4/11. 900 ml fluid removed ( Hemorrhagic fluid)     Anemia:   - Epogen on dialysis days.      Hyperkalemia :  - Should get better with dialysis.      Elevated liver enzymes:  - Secondary to hypotension/shock liver. Improving.     Hyperphosphatemia:   - Low phos diet.  Need to restart home binders.      Hypertension:   - Start home medications.     Plan:  - HD per MWF jeannie.   - Renal diet   - MARTÍN with HD   - renal diet   - Mirtazapine low dose as appetite stimulant.     Discussed with MATT.     Jordan Marquez MD  04/17/25  14:30 EDT

## 2025-04-17 NOTE — PLAN OF CARE
Problem: Hemodialysis  Goal: Safe, Effective Therapy Delivery  Outcome: Progressing  Goal: Effective Tissue Perfusion  Outcome: Progressing  Goal: Absence of Infection Signs and Symptoms  Outcome: Progressing   Goal Outcome Evaluation:               HD completed. UF 2020, goal met. Tolerated well. VS stable and wdl during tx. Access functioned well with lines reversed. Blood returned to pt. Bleeding under dressing noted approx 35 minutes before tx ended. Sand bag applied, but was unsuccessful. Dressing was removed, site cleansed and replaced. Safeguard pressure dressing was applied over site, 20 ml air instilled, but seeping continued. Contacted floor nurse and house supervisor who stated she would contact IR person for recommendations. Report to MATT Boyle

## 2025-04-17 NOTE — NURSING NOTE
Lois,clients staff nurse called dialysis unit explaining how clients right sided catheter was still oozing out and bleeding, stated that reinforcement dressing was added to area , along with a safe guard device with 20 cc of air inflated. Dialysis nurse arrived at clients room for a dressing change and observed site still actively oozing out bright red blood and both catheter dressing and safeguard dressing was saturated. Clients staff nurseLois called IR, explaining clients status,IR stated to bring client down for a suture to be placed. Dialysis staff nurses transported client down to IR.Physician in IR placed a suture, to rt sided catheter area, new dressing placed on area, noted clean, dry and intact. Client was then transported back to his assigned room on 5H, report was given to clients assigned nurseLois, regarding clients status and procedure, Client alert/stable and talking upon dialysis staff leaving room.

## 2025-04-18 ENCOUNTER — APPOINTMENT (OUTPATIENT)
Dept: NEPHROLOGY | Facility: HOSPITAL | Age: 65
DRG: 673 | End: 2025-04-18
Payer: MEDICARE

## 2025-04-18 LAB
ALBUMIN SERPL-MCNC: 3.4 G/DL (ref 3.5–5.2)
ALBUMIN/GLOB SERPL: 1 G/DL
ALP SERPL-CCNC: 380 U/L (ref 39–117)
ALT SERPL W P-5'-P-CCNC: 27 U/L (ref 1–41)
ANION GAP SERPL CALCULATED.3IONS-SCNC: 19 MMOL/L (ref 5–15)
AST SERPL-CCNC: 76 U/L (ref 1–40)
BILIRUB SERPL-MCNC: 0.6 MG/DL (ref 0–1.2)
BUN SERPL-MCNC: 75 MG/DL (ref 8–23)
BUN/CREAT SERPL: 7.2 (ref 7–25)
CALCIUM SPEC-SCNC: 8.7 MG/DL (ref 8.6–10.5)
CHLORIDE SERPL-SCNC: 98 MMOL/L (ref 98–107)
CO2 SERPL-SCNC: 20 MMOL/L (ref 22–29)
CREAT SERPL-MCNC: 10.48 MG/DL (ref 0.76–1.27)
DEPRECATED RDW RBC AUTO: 52 FL (ref 37–54)
EGFRCR SERPLBLD CKD-EPI 2021: 5 ML/MIN/1.73
ERYTHROCYTE [DISTWIDTH] IN BLOOD BY AUTOMATED COUNT: 16.6 % (ref 12.3–15.4)
GLOBULIN UR ELPH-MCNC: 3.3 GM/DL
GLUCOSE BLDC GLUCOMTR-MCNC: 126 MG/DL (ref 70–130)
GLUCOSE BLDC GLUCOMTR-MCNC: 129 MG/DL (ref 70–130)
GLUCOSE BLDC GLUCOMTR-MCNC: 146 MG/DL (ref 70–130)
GLUCOSE BLDC GLUCOMTR-MCNC: 99 MG/DL (ref 70–130)
GLUCOSE SERPL-MCNC: 120 MG/DL (ref 65–99)
HCT VFR BLD AUTO: 27.9 % (ref 37.5–51)
HGB BLD-MCNC: 8.6 G/DL (ref 13–17.7)
MCH RBC QN AUTO: 28 PG (ref 26.6–33)
MCHC RBC AUTO-ENTMCNC: 30.8 G/DL (ref 31.5–35.7)
MCV RBC AUTO: 90.9 FL (ref 79–97)
MYCOBACTERIUM SPEC CULT: NORMAL
NIGHT BLUE STAIN TISS: NORMAL
PLATELET # BLD AUTO: 180 10*3/MM3 (ref 140–450)
PMV BLD AUTO: 9.4 FL (ref 6–12)
POTASSIUM SERPL-SCNC: 4.7 MMOL/L (ref 3.5–5.2)
PROT SERPL-MCNC: 6.7 G/DL (ref 6–8.5)
RBC # BLD AUTO: 3.07 10*6/MM3 (ref 4.14–5.8)
SODIUM SERPL-SCNC: 137 MMOL/L (ref 136–145)
WBC NRBC COR # BLD AUTO: 13.91 10*3/MM3 (ref 3.4–10.8)

## 2025-04-18 PROCEDURE — 80053 COMPREHEN METABOLIC PANEL: CPT | Performed by: INTERNAL MEDICINE

## 2025-04-18 PROCEDURE — 85027 COMPLETE CBC AUTOMATED: CPT | Performed by: INTERNAL MEDICINE

## 2025-04-18 PROCEDURE — 25010000002 HEPARIN (PORCINE) PER 1000 UNITS: Performed by: STUDENT IN AN ORGANIZED HEALTH CARE EDUCATION/TRAINING PROGRAM

## 2025-04-18 PROCEDURE — 25010000002 ALBUMIN HUMAN 25% PER 50 ML: Performed by: INTERNAL MEDICINE

## 2025-04-18 PROCEDURE — 99232 SBSQ HOSP IP/OBS MODERATE 35: CPT | Performed by: STUDENT IN AN ORGANIZED HEALTH CARE EDUCATION/TRAINING PROGRAM

## 2025-04-18 PROCEDURE — 82948 REAGENT STRIP/BLOOD GLUCOSE: CPT

## 2025-04-18 PROCEDURE — P9047 ALBUMIN (HUMAN), 25%, 50ML: HCPCS | Performed by: INTERNAL MEDICINE

## 2025-04-18 RX ORDER — ACETAMINOPHEN 325 MG/1
650 TABLET ORAL EVERY 6 HOURS PRN
Status: DISCONTINUED | OUTPATIENT
Start: 2025-04-18 | End: 2025-04-19 | Stop reason: HOSPADM

## 2025-04-18 RX ORDER — ALBUMIN (HUMAN) 12.5 G/50ML
12.5 SOLUTION INTRAVENOUS AS NEEDED
Status: ACTIVE | OUTPATIENT
Start: 2025-04-18 | End: 2025-04-18

## 2025-04-18 RX ORDER — ALBUMIN (HUMAN) 12.5 G/50ML
SOLUTION INTRAVENOUS
Status: DISCONTINUED
Start: 2025-04-18 | End: 2025-04-19 | Stop reason: HOSPADM

## 2025-04-18 RX ADMIN — ALBUMIN (HUMAN) 12.5 G: 0.25 INJECTION, SOLUTION INTRAVENOUS at 09:20

## 2025-04-18 RX ADMIN — MIRTAZAPINE 7.5 MG: 15 TABLET, FILM COATED ORAL at 21:04

## 2025-04-18 RX ADMIN — SENNOSIDES AND DOCUSATE SODIUM 2 TABLET: 50; 8.6 TABLET ORAL at 21:05

## 2025-04-18 RX ADMIN — COLCHICINE 0.6 MG: 0.6 TABLET ORAL at 11:29

## 2025-04-18 RX ADMIN — HEPARIN SODIUM 2000 UNITS: 1000 INJECTION INTRAVENOUS; SUBCUTANEOUS at 11:35

## 2025-04-18 RX ADMIN — HYDROCODONE BITARTRATE AND ACETAMINOPHEN 1 TABLET: 5; 325 TABLET ORAL at 11:29

## 2025-04-18 RX ADMIN — ACETAMINOPHEN 650 MG: 325 TABLET ORAL at 16:25

## 2025-04-18 RX ADMIN — ALBUMIN (HUMAN) 12.5 G: 0.25 INJECTION, SOLUTION INTRAVENOUS at 09:29

## 2025-04-18 NOTE — CASE MANAGEMENT/SOCIAL WORK
Continued Stay Note  [facility]     Patient Name: Jun Young  MRN: [id number]  Today's Date: 4/18/2025    Admit Date: 4/11/2025    Plan: Home w/family transporting.   Discharge Plan       Row Name 04/18/25 1643       Plan    Plan Home w/family transporting.    Patient/Family in Agreement with Plan yes    Plan Comments Plan is to d/c over weekend. I called and confirmed w[place] that he also has chair on Wednesday , will d/c w/MWF dialysis now. Per [facility], he is on MWF schedule w/0600am chair time.I met w/patient in room  DC plan is home w/family transporting. [facility] [place] would like d/c summary faxed to them @ [phone]. No other d/c needs expressed @ this time.    Final Discharge Disposition Code 01 - home or self-care                   Discharge Codes    No documentation.                 Expected Discharge Date and Time       Expected Discharge Date Expected Discharge Time    Apr 18, 2025               Slim Marino RN

## 2025-04-18 NOTE — PROGRESS NOTES
[facility] Medicine Services  PROGRESS NOTE    Patient Name: Jun Young  : 1960  MRN: [id number]    Date of Admission: 2025  Primary Care Physician: Samir Chow DO    Subjective   Subjective     CC:  F/U cardiac tamponade    HPI:  Pt seen in HD this AM. Reports a headache and fatigue today. HD catheter appears to be functioning well.       Objective   Objective     Vital Signs:   Temp:  [97.6 °F (36.4 °C)-98 °F (36.7 °C)] 98 °F (36.7 °C)  Heart Rate:  [] 97  Resp:  [16-18] 18  BP: ()/() 110/78     Physical Exam:  Constitutional: No acute distress, awake, alert, laying in bed  HENT: NCAT, mucous membranes moist  Respiratory: Respiratory effort normal   Cardiovascular: RRR, tunneled HD catheter with small amount of blood appears to be dried blood under bandage, does not appear to be actively oozing anymore.  Dressing in place  Psychiatric: Appropriate affect, cooperative  Neurologic: Speech clear  Skin: No rashes on exposed surfaces    Results Reviewed:  LAB RESULTS:      Lab 25  0417 25  1137 25  2344 25  1256 25  0446 04/15/25  0334 25  0323 25  0455 25  0313 25  0313 25  2122 25  1817   WBC 13.91*  --   --  9.06  --  10.56 12.12* 12.57* 12.52*  --    < > 15.78*  --   --    HEMOGLOBIN 8.6* 8.9* 9.3* 9.4*  --  8.6* 8.9* 8.5* 8.5*  --    < > 8.0*  --   --    HEMATOCRIT 27.9* 28.7* 30.2* 30.2*  --  27.9* 29.2* 27.1* 27.0*  --    < > 25.4*  --   --    PLATELETS 180  --   --  150  --  160 182 268 227  --    < > 214  --   --    NEUTROS ABS  --   --   --  4.55  --  5.39 7.04* 8.33* 9.85*  --   --  14.68*  --   --    IMMATURE GRANS (ABS)  --   --   --  0.26*  --   --  0.42* 0.49* 0.44*  --   --   --   --   --    LYMPHS ABS  --   --   --  0.92  --   --  0.76 0.78 0.54*  --   --   --   --   --    MONOS ABS  --   --   --  1.06*  --   --  1.18* 0.78  0.53  --   --   --   --   --    EOS ABS  --   --   --  2.18*  --  3.06* 2.62* 2.09* 1.09*  --   --  0.00  --   --    MCV 90.9  --   --  90.1  --  91.5 91.3 89.7 88.5  --    < > 89.8  --   --    LACTATE  --   --   --   --   --   --   --   --   --  1.5  --  2.2* 4.7* 12.2*   PROTIME  --   --   --  14.9 15.6*  --   --   --   --   --   --   --   --   --     < > = values in this interval not displayed.         Lab 04/18/25  0417 04/17/25  0431 04/16/25  0446 04/15/25  0334 04/14/25  0323 04/13/25  0455 04/12/25  0313   SODIUM 137 136 137 136 136 134* 138   POTASSIUM 4.7 4.5 5.1 5.4* 5.1 4.8 5.1   CHLORIDE 98 97* 100 99 95* 94* 94*   CO2 20.0* 22.0 18.0* 20.0* 20.0* 20.0* 21.0*   ANION GAP 19.0* 17.0* 19.0* 17.0* 21.0* 20.0* 23.0*   BUN 75* 45* 81* 65* 93* 85* 63*   CREATININE 10.48* 8.17* 11.66* 10.23* 11.34* 12.18* 9.60*   EGFR 5.0* 6.7* 4.4* 5.1* 4.5* 4.1* 5.5*   GLUCOSE 120* 82 91 94 112* 104* 125*   CALCIUM 8.7 8.8 7.8* 9.0 8.8 8.4* 8.2*   MAGNESIUM  --   --  2.9*  --   --  3.2* 3.0*   PHOSPHORUS  --   --   --   --   --  8.9* 10.0*         Lab 04/18/25 0417 04/14/25 0323 04/13/25 0455 04/12/25 0313   TOTAL PROTEIN 6.7 6.1 6.1 6.1   ALBUMIN 3.4* 3.1* 3.1* 3.3*   GLOBULIN 3.3 3.0 3.0 2.8   ALT (SGPT) 27 177* 1,133* 5,702*   AST (SGOT) 76* 673* 2,081* 6,968*   BILIRUBIN 0.6 0.8 0.6 0.6   ALK PHOS 380* 348* 297* 267*         Lab 04/16/25  2344 04/16/25  1256   PROTIME 14.9 15.6*   INR 1.10 1.17*             Lab 04/16/25  2344   ABO TYPING O   RH TYPING Positive   ANTIBODY SCREEN Negative         Brief Urine Lab Results       None            Microbiology Results Abnormal       None            No radiology results from the last 24 hrs      Results for orders placed during the hospital encounter of 04/11/25    Adult Transthoracic Echo Limited W/ Cont if Necessary Per Protocol    Interpretation Summary    Left ventricular systolic function is normal. Estimated left ventricular EF = 65%    Left ventricular wall thickness  is consistent with mild concentric hypertrophy.    No significant pericardial effusion is noted.      Current medications:  Scheduled Meds:albumin human, , ,   albumin human, , ,   colchicine, 0.6 mg, Oral, Daily  insulin lispro, 2-7 Units, Subcutaneous, 4x Daily AC & at Bedtime  mirtazapine, 7.5 mg, Oral, Nightly  Jd, 1 patch, Topical, Once  senna-docusate sodium, 2 tablet, Oral, BID      Continuous Infusions:   PRN Meds:.  acetaminophen    albumin human    albumin human    albumin human    senna-docusate sodium **AND** polyethylene glycol **AND** bisacodyl **AND** bisacodyl    dextrose    dextrose    glucagon (human recombinant)    heparin (porcine)    HYDROcodone-acetaminophen    labetalol    nitroglycerin    sodium chloride    sodium chloride    Assessment & Plan   Assessment & Plan     Active Hospital Problems    Diagnosis  POA    **Pericardial effusion with cardiac tamponade [I31.39, I31.4]  Yes    Moderate protein-calorie malnutrition [E44.0]  Yes    Hypertension [I10]  Yes    ESRD (end stage renal disease) [N18.6]  Yes    Type 2 diabetes mellitus [E11.9]  Yes      Resolved Hospital Problems   No resolved problems to display.        Brief Hospital Course to date:  Jun Young is a 64 y.o. male with ESRD on HD MWF, DM2, HTN, HLD, history of CVA who presented to the ED 4/11 and was found to have large pericardial effusion with tamponade.  He was admitted to the ICU, seen by cardiology and had cardiac drain placed.  He has required placement of a temporary HD catheter due to AV graft occlusion.  He was transferred out of ICU with hospitalists assuming care 4/16.    This patient's problems and plans were partially entered by my partner and updated as appropriate by me 04/18/25.     Pericardial effusion with cardiac tamponade  -S/P drain placement by cardiology  -Cytology and cultures negative  -Started on colchicine, continue   -F/U Dr. Canseco in 3 months    ESRD on HD MWF  AV graft  occlusion  -S/P tunneled HD catheter placement 4/16 with subsequent bleeding following HD that improved with additional suture placed in IR 4/17 with improvement in the bleeding   -Nephrology following for HD  -Previously discussed with Dr. Leary and he will send HD orders to patient's home dialysis center to help with three times weekly tolerance.  We discussed importance of receiving HD three times per week with the patient.  -my colleague discussed with nephrology and patient should not receive heparin with dialysis until inflammation from uremic pericarditis is resolved (which can take 6-8 weeks)    HTN  -If BP remains elevated post-HD consider starting amlodipine 5mg daily per cardiology recommendations  -has improved     DM2  -Continue SSI    Decreased appetite/early satiety  -CT A/P on admission without acute findings  -Consider outpatient gastric emptying study per PCP  -Nightly remeron started  -Patient should follow with dietician at dialysis center    Expected Discharge Location and Transportation: Home tomorrow   Expected Discharge   Expected Discharge Date: 4/18/2025; Expected Discharge Time:      VTE Prophylaxis:  Pharmacologic & mechanical VTE prophylaxis orders are present.         AM-PAC 6 Clicks Score (PT): 24 (04/17/25 2000)    CODE STATUS:   Code Status and Medical Interventions: CPR (Attempt to Resuscitate); Full Support   Ordered at: 04/11/25 1521     Code Status (Patient has no pulse and is not breathing):    CPR (Attempt to Resuscitate)     Medical Interventions (Patient has pulse or is breathing):    Full Support       Zofia Steinberg MD  04/18/25

## 2025-04-18 NOTE — PROGRESS NOTES
" LOS: 7 days    Patient Care Team:  Samir Chow DO as PCP - General (Internal Medicine)  Provider, No Known as PCP - Family Medicine  Provider, No Known  Lora Dickey DO as Consulting Physician (Hospitalist)  Katie Lau MD as Consulting Physician (Hospitalist)    Subjective     Seen on HD UF 2 liter. C/o dull chest discomfort since this morning. Pain is non radiating. Bp stable. Continue with HD.       Objective     albumin human, , ,   albumin human, , ,   colchicine, 0.6 mg, Oral, Daily  insulin lispro, 2-7 Units, Subcutaneous, 4x Daily AC & at Bedtime  mirtazapine, 7.5 mg, Oral, Nightly  Jd, 1 patch, Topical, Once  senna-docusate sodium, 2 tablet, Oral, BID       Vital Signs:  Blood pressure 102/81, pulse 108, temperature 98 °F (36.7 °C), temperature source Oral, resp. rate 18, height 170 cm (66.93\"), weight 69.9 kg (154 lb), SpO2 98%.    Flowsheet Rows      Flowsheet Row First Filed Value   Admission Height 170.2 cm (67\") Documented at 04/11/2025 1045   Admission Weight 67 kg (147 lb 11.3 oz) Documented at 04/11/2025 1045            04/17 0701 - 04/18 0700  In: -   Out: 300 [Urine:300]    Physical Exam:  GENERAL: Awake and alert, in no acute distress.   HEENT: Normocephalic, atraumatic.    NECK: Supple .  HEART: RRR; No murmur, rubs, gallops. Trace edema  LUNGS:  Clear to auscultation bilaterally without wheezing, rales, rhonchi.  ABDOMEN: Soft, nontender, nondistended.   EXT: Trace edema.  :  No [place]   SKIN: Warm and dry without rash  NEURO: No focal neurological deficits. Strength equal bilateral  PSYCHIATRIC:  Cooperative with PE    Labs:  Results from last 7 days   Lab Units 04/18/25  0417 04/17/25 2010 04/17/25  1137 04/16/25  2344 04/16/25  0446   WBC 10*3/mm3 13.91*  --   --  9.06 10.56   HEMOGLOBIN g/dL 8.6* 8.9* 9.3* 9.4* 8.6*   HEMATOCRIT % 27.9* 28.7* 30.2* 30.2* 27.9*   PLATELETS 10*3/mm3 180  --   --  150 160     Results from last 7 days   Lab Units 04/18/25  0417 " 04/17/25  0431 04/16/25  0446 04/15/25  0334 04/14/25  0323 04/13/25  0455 04/12/25  0313   SODIUM mmol/L 137 136 137 136 136 134* 138   POTASSIUM mmol/L 4.7 4.5 5.1 5.4* 5.1 4.8 5.1   CHLORIDE mmol/L 98 97* 100 99 95* 94* 94*   CO2 mmol/L 20.0* 22.0 18.0* 20.0* 20.0* 20.0* 21.0*   BUN mg/dL 75* 45* 81* 65* 93* 85* 63*   CREATININE mg/dL 10.48* 8.17* 11.66* 10.23* 11.34* 12.18* 9.60*   CALCIUM mg/dL 8.7 8.8 7.8* 9.0 8.8 8.4* 8.2*   PHOSPHORUS mg/dL  --   --   --   --   --  8.9* 10.0*   MAGNESIUM mg/dL  --   --  2.9*  --   --  3.2* 3.0*   ALBUMIN g/dL 3.4*  --   --   --  3.1* 3.1* 3.3*     Results from last 7 days   Lab Units 04/18/25  0417   GLUCOSE mg/dL 120*       Results from last 7 days   Lab Units 04/18/25  0417   ALK PHOS U/L 380*   BILIRUBIN mg/dL 0.6   ALT (SGPT) U/L 27   AST (SGOT) U/L 76*       Assessment       Pericardial effusion with cardiac tamponade    Type 2 diabetes mellitus    ESRD (end stage renal disease)    Hypertension    Moderate protein-calorie malnutrition    End-stage renal disease:   - On Monday and Friday schedule.  Follows up with [facility].  - Access TDC exchange on this visit. Now will be doing three time weekly HD.    Large pericardial effusion:   - S/p pericardial drain 4/11. 900 ml fluid removed ( Hemorrhagic fluid). 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      Anemia:   - Epogen on dialysis days.      Hyperkalemia :  - Should get better with dialysis.      Elevated liver enzymes:  - Secondary to hypotension/shock liver. Improving.     Hyperphosphatemia:   - Low phos diet.  Need to restart home binders.      Hypertension:   - Start home medications.     Plan:  - HD per [facility] jeannie.   - Renal diet   - MARTÍN with HD   - renal diet   - Mirtazapine low dose as appetite stimulant.     Discussed with RN.     Jordan Marquez MD  04/18/25  10:31 EDT

## 2025-04-18 NOTE — PLAN OF CARE
Problem: Hemodialysis  Goal: Safe, Effective Therapy Delivery  Outcome: Progressing  Goal: Effective Tissue Perfusion  Outcome: Progressing  Goal: Absence of Infection Signs and Symptoms  Outcome: Progressing   Goal Outcome Evaluation:   HD ended early. Patient needed to use bathroom but refused bed pan and wanted to stop dialysis, blood reinfused without complication. UF goal of 2L not met. 1.7L removed. Gave 2 bags of albumin 12.5g given for hypotension early in treatment. Report called to primary RNCaty.

## 2025-04-18 NOTE — PLAN OF CARE
Goal Outcome Evaluation:  Plan of Care Reviewed With: patient        Progress: improving        Pt. Was in dialysis this morning and they got 1.7 L off. VSS, on room air. Pt. Has complained of a headache so PRNs given. He stated he is feeling a little better this evening.

## 2025-04-18 NOTE — PLAN OF CARE
Problem: Adult Inpatient Plan of Care  Goal: Plan of Care Review  Outcome: Progressing  Goal: Patient-Specific Goal (Individualized)  Outcome: Progressing  Goal: Absence of Hospital-Acquired Illness or Injury  Outcome: Progressing  Intervention: Identify and Manage Fall Risk  Description: Perform standard risk assessment on admission using a validated tool or comprehensive approach appropriate to the patient; reassess fall risk frequently, with change in status or transfer to another level of care.Communicate risk to interprofessional healthcare team; ensure fall risk visible cue.Determine need for increased observation, equipment and environmental modification, as well as use of supportive, nonskid footwear.Adjust safety measures to individual needs and identified risk factors.Reinforce the importance of active participation with fall risk prevention, safety, and physical activity with the patient and family.Perform regular intentional rounding to assess need for position change, pain assessment and personal needs, including assistance with toileting.  Recent Flowsheet Documentation  Taken 4/18/2025 0407 by Rupali Azul, RN  Safety Promotion/Fall Prevention:   activity supervised   assistive device/personal items within reach   clutter free environment maintained   fall prevention program maintained   lighting adjusted   mobility aid in reach   nonskid shoes/slippers when out of bed   room organization consistent   safety round/check completed  Taken 4/18/2025 0205 by Rupali Azul, RN  Safety Promotion/Fall Prevention:   activity supervised   assistive device/personal items within reach   clutter free environment maintained   fall prevention program maintained   lighting adjusted   mobility aid in reach   nonskid shoes/slippers when out of bed   room organization consistent   safety round/check completed  Taken 4/18/2025 0007 by Rupali Azul, RN  Safety Promotion/Fall Prevention:   activity supervised    assistive device/personal items within reach   clutter free environment maintained   fall prevention program maintained   lighting adjusted   mobility aid in reach   nonskid shoes/slippers when out of bed   room organization consistent   safety round/check completed  Taken 4/17/2025 2230 by Rupali Azul RN  Safety Promotion/Fall Prevention:   activity supervised   assistive device/personal items within reach   clutter free environment maintained   fall prevention program maintained   lighting adjusted   mobility aid in reach   nonskid shoes/slippers when out of bed   room organization consistent   safety round/check completed  Taken 4/17/2025 2000 by Rupali Azul RN  Safety Promotion/Fall Prevention:   activity supervised   assistive device/personal items within reach   clutter free environment maintained   fall prevention program maintained   lighting adjusted   mobility aid in reach   nonskid shoes/slippers when out of bed   room organization consistent   safety round/check completed  Intervention: Prevent Skin Injury  Description: Perform a screening for skin injury risk, such as pressure or moisture-associated skin damage on admission and at regular intervals throughout hospital stay.Keep all areas of skin (especially folds) clean and dry.Maintain adequate skin hydration.Relieve and redistribute pressure and protect bony prominences and skin at risk for injury; implement measures based on patient-specific risk factors.Match turning and repositioning schedule to clinical condition.Encourage weight shift frequently; assist with reposition if unable to complete independently.Float heels off bed; avoid pressure on the Achilles tendon.Keep skin free from extended contact with medical devices.Optimize nutrition and hydration.Encourage functional activity and mobility, as early as tolerated.Use aids (e.g., slide boards, mechanical lift) during transfer.  Recent Flowsheet Documentation  Taken 4/18/2025 0407 by  Rupali Azul RN  Body Position: position changed independently  Skin Protection:   transparent dressing maintained   skin sealant/moisture barrier applied   silicone foam dressing in place   incontinence pads utilized  Taken 4/18/2025 0205 by Rupali Azul RN  Body Position: position changed independently  Taken 4/18/2025 0007 by Rupali Azul RN  Body Position: position changed independently  Taken 4/17/2025 2230 by Rupali Azul RN  Body Position: position changed independently  Taken 4/17/2025 2000 by Rupali Azul RN  Body Position: position changed independently  Skin Protection:   transparent dressing maintained   skin sealant/moisture barrier applied   silicone foam dressing in place   incontinence pads utilized  Intervention: Prevent Infection  Description: Maintain skin and mucous membrane integrity; promote hand, oral and pulmonary hygiene.Optimize fluid balance, nutrition, sleep and glycemic control to maximize infection resistance.Identify potential sources of infection early to prevent or mitigate progression of infection (e.g., wound, lines, devices).Evaluate ongoing need for invasive devices; remove promptly when no longer indicated.Review vaccination status.  Recent Flowsheet Documentation  Taken 4/17/2025 2000 by Rupali Azul RN  Infection Prevention:   environmental surveillance performed   equipment surfaces disinfected   hand hygiene promoted   personal protective equipment utilized   rest/sleep promoted  Goal: Optimal Comfort and Wellbeing  Outcome: Progressing  Intervention: Monitor Pain and Promote Comfort  Description: Assess pain level, treatment efficacy and patient response at regular intervals using a consistent pain scale.Consider the presence and impact of preexisting chronic pain.Encourage patient and caregiver involvement in pain assessment, interventions and safety measures.Promote activity; balance with sleep and rest to enhance healing.  Recent Flowsheet  Documentation  Taken 4/17/2025 2140 by Rupali Azul, RN  Pain Management Interventions: pain medication given  Goal: Readiness for Transition of Care  Outcome: Progressing     Problem: Skin Injury Risk Increased  Goal: Skin Health and Integrity  Outcome: Progressing  Intervention: Optimize Skin Protection  Description: Perform a full pressure injury risk assessment, as indicated by screening, upon admission to care unit.Reassess skin (full inspection and injury risk, including skin temperature, consistency and color) frequently (e.g., scheduled interval, with change in condition) to provide optimal early detection and prevention.Maintain adequate tissue perfusion (e.g., encourage fluid balance; avoid crossing legs, constrictive clothing or devices) to promote tissue oxygenation.Maintain head of bed at lowest degree of elevation tolerated, considering medical condition and other restrictions. Use positioning supports to prevent sliding and friction. Consider low friction textiles.Avoid positioning onto an area that remains reddened or on bony prominences.Minimize incontinence and moisture (e.g., toileting schedule; moisture-wicking pad, diaper or incontinence collection device; skin moisture barrier).Cleanse skin promptly and gently, when soiled, utilizing a pH-balanced cleanser.Relieve and redistribute pressure (e.g., scheduled position changes, weight shifts, use of support surface, medical device repositioning, protective dressing application, use of positioning device, microclimate control, use of pressure-injury-monitorEncourage increased activity, such as sitting in a chair at the bedside or early mobilization, when able to tolerate. Avoid prolonged sitting.  Recent Flowsheet Documentation  Taken 4/18/2025 0407 by Rupali Azul, RN  Activity Management: activity encouraged  Pressure Reduction Techniques:   frequent weight shift encouraged   weight shift assistance provided   heels elevated off bed    positioned off wounds   pressure points protected  Pressure Reduction Devices:   pressure-redistributing mattress utilized   positioning supports utilized   heel offloading device utilized  Skin Protection:   transparent dressing maintained   skin sealant/moisture barrier applied   silicone foam dressing in place   incontinence pads utilized  Taken 4/18/2025 0205 by Rupali Azul RN  Activity Management: activity encouraged  Taken 4/18/2025 0007 by Rupali Azul RN  Activity Management: up ad nemo  Taken 4/17/2025 2000 by Rupali Azul RN  Activity Management: up ad nemo  Pressure Reduction Techniques:   frequent weight shift encouraged   weight shift assistance provided   heels elevated off bed   positioned off wounds   pressure points protected  Pressure Reduction Devices:   pressure-redistributing mattress utilized   positioning supports utilized   heel offloading device utilized  Skin Protection:   transparent dressing maintained   skin sealant/moisture barrier applied   silicone foam dressing in place   incontinence pads utilized     Problem: Comorbidity Management  Goal: Blood Glucose Level Within Target Range  Outcome: Progressing  Intervention: Monitor and Manage Glycemia  Description: Establish target blood glucose levels based on patient-specific factors, such as age, diabetes-related complications and illness severity.Document blood glucose levels and monitor trend.Provide antihyperglycemic pharmacologic therapy to maintain blood glucose levels within targeted range.Advocate for patient use of home devices such as insulin pump, continuous glucose monitor; assess for safe and competent participation in care.Check blood glucose level if there is a change in mental or cognitive status.Recognize, treat and document hypoglycemia event and potential cause.Assess current lifestyle patterns; acknowledging positive patterns supporting wellbeing.Evaluate effectiveness of coping skills; encourage expression of  feelings, expectations and concerns related to disease management and quality of life; reinforce education to enhance management plan and wellbeing.  Recent Flowsheet Documentation  Taken 4/18/2025 0407 by Rupali Azul RN  Medication Review/Management: medications reviewed  Taken 4/18/2025 0205 by Rupali Azul RN  Medication Review/Management: medications reviewed  Taken 4/18/2025 0007 by Rupali Azul RN  Medication Review/Management: medications reviewed  Taken 4/17/2025 2230 by Rupali Azul RN  Medication Review/Management: medications reviewed  Taken 4/17/2025 2000 by Rupali Azul RN  Medication Review/Management: medications reviewed  Goal: Blood Pressure in Desired Range  Outcome: Progressing  Intervention: Maintain Blood Pressure Management  Description: Evaluate adherence to home antihypertensive regimen (e.g., exercise and activity, diet modification, medication).Provide scheduled antihypertensive medication; consider administration time and effects (e.g., avoid giving diuretic prior to bedtime).Monitor response to antihypertensive medication therapy (e.g., blood pressure, electrolyte levels, medication effects).Minimize risk of orthostatic hypotension; encourage caution with position changes, particularly if elderly.  Recent Flowsheet Documentation  Taken 4/18/2025 0407 by Rupali Azul RN  Medication Review/Management: medications reviewed  Taken 4/18/2025 0205 by Rupali Azul RN  Medication Review/Management: medications reviewed  Taken 4/18/2025 0007 by Rupali Azul RN  Medication Review/Management: medications reviewed  Taken 4/17/2025 2230 by Rupali Azul RN  Medication Review/Management: medications reviewed  Taken 4/17/2025 2000 by Rupali Azul RN  Medication Review/Management: medications reviewed     Problem: Fall Injury Risk  Goal: Absence of Fall and Fall-Related Injury  Outcome: Progressing  Intervention: Identify and Manage Contributors  Description: Develop a fall  prevention plan, considering patient-centered interventions and family/caregiver involvement; identify and address patient's facilitators and barriers.Provide reorientation, appropriate sensory stimulation and routines with changes in mental status to decrease risk of fall.Promote use of personal vision and auditory aids.Assess assistance level required for safe and effective self-care; provide support as needed, such as toileting and mobilization. For age 65 and older, implement timed toileting with assistance.Encourage physical activity, such as performance of mobility and self-care at highest level of patient ability, multicomponent exercise program and provision of appropriate assistive devices.If fall occurs, assess the severity of injury; implement fall injury protocol. Determine the cause and revise fall injury prevention plan.Regularly review and advocate for medication adjustment to decrease fall risk; consider administration times, polypharmacy and age.Balance adequate pain management with potential for oversedation.  Recent Flowsheet Documentation  Taken 4/18/2025 0407 by Rupali Azul RN  Medication Review/Management: medications reviewed  Taken 4/18/2025 0205 by Rupali Azul RN  Medication Review/Management: medications reviewed  Taken 4/18/2025 0007 by Rupali Azul RN  Medication Review/Management: medications reviewed  Taken 4/17/2025 2230 by Rupali Azul RN  Medication Review/Management: medications reviewed  Taken 4/17/2025 2000 by Rupali Azul RN  Medication Review/Management: medications reviewed  Intervention: Promote Injury-Free Environment  Description: Provide a safe, barrier-free environment that encourages independent activity.Keep care area uncluttered and well-lighted.Determine need for increased observation or monitoring.Avoid use of devices that minimize mobility, such as restraints or indwelling urinary catheter.  Recent Flowsheet Documentation  Taken 4/18/2025 0407 by  Bohara, Rupali, RN  Safety Promotion/Fall Prevention:   activity supervised   assistive device/personal items within reach   clutter free environment maintained   fall prevention program maintained   lighting adjusted   mobility aid in reach   nonskid shoes/slippers when out of bed   room organization consistent   safety round/check completed  Taken 4/18/2025 0205 by Rupali Azul RN  Safety Promotion/Fall Prevention:   activity supervised   assistive device/personal items within reach   clutter free environment maintained   fall prevention program maintained   lighting adjusted   mobility aid in reach   nonskid shoes/slippers when out of bed   room organization consistent   safety round/check completed  Taken 4/18/2025 0007 by Rupali Azul RN  Safety Promotion/Fall Prevention:   activity supervised   assistive device/personal items within reach   clutter free environment maintained   fall prevention program maintained   lighting adjusted   mobility aid in reach   nonskid shoes/slippers when out of bed   room organization consistent   safety round/check completed  Taken 4/17/2025 2230 by Rupali Azul RN  Safety Promotion/Fall Prevention:   activity supervised   assistive device/personal items within reach   clutter free environment maintained   fall prevention program maintained   lighting adjusted   mobility aid in reach   nonskid shoes/slippers when out of bed   room organization consistent   safety round/check completed  Taken 4/17/2025 2000 by Rupali Azul RN  Safety Promotion/Fall Prevention:   activity supervised   assistive device/personal items within reach   clutter free environment maintained   fall prevention program maintained   lighting adjusted   mobility aid in reach   nonskid shoes/slippers when out of bed   room organization consistent   safety round/check completed     Problem: Hemodialysis  Goal: Safe, Effective Therapy Delivery  Outcome: Progressing  Intervention: Optimize Device Care  and Function  Description: Maintain flow rate, anticoagulation parameters, pressure ranges and prescribed fluid balance with gradual adjustments to maintain hemodynamic stability and achieve therapy goals.Monitor laboratory results (e.g., electrolytes, glucose, albumin, coagulation studies, hemoglobin, hematocrit) and clinical status (e.g., cramping; nausea, vomiting, blood pressure fluctuations) for response to therapy; advocate for treatment or adjuAssess vascular access site for patency, securement and position to meet flow demands. Assess perfusion distal to access site to ensure adequate tissue oxygenation.For arteriovenous fistula, assess presence of thrill to ensure presence of blood flow. Avoid blood pressure readings, lab draws, tight clothing or jewelry on the AV (arteriovenous) fistula extremity to prevent trauma.Maintain circuit monitoring (e.g., arterial, venous and transmembrane pressure; ultrafiltrate removal; dialysate flow, conductivity and temperature); address alarms promptly to decrease risk to patient and preserve circuit function.Evaluate circuit for disconnections, cracks, clotting, malfunction, leaks or rupture of hemofilter; intervene promptly to prevent risk to patient.Consider distal vascular access for antibiotic or vasoactive medication administration to prevent filtration of medication effect prior to being delivered systemically to the patient.Maintain infusion of anticoagulation; adjust to keep laboratory values within ordered range.Provide emergency equipment, such as clamps, replacement devices and resuscitative supplies, if malfunction, tubing rupture, clot formation or migration occur.  Recent Flowsheet Documentation  Taken 4/18/2025 0407 by Rupali Azul, RN  Medication Review/Management: medications reviewed  Taken 4/18/2025 0205 by Rupali Azul, RN  Medication Review/Management: medications reviewed  Taken 4/18/2025 0007 by Rupali Azul, RN  Medication Review/Management:  medications reviewed  Taken 4/17/2025 2230 by Rupali Azul, RN  Medication Review/Management: medications reviewed  Taken 4/17/2025 2000 by Rupali Azul RN  Medication Review/Management: medications reviewed  Goal: Effective Tissue Perfusion  Outcome: Progressing  Goal: Absence of Infection Signs and Symptoms  Outcome: Progressing  Intervention: Prevent or Manage Infection  Description: Implement transmission-based precautions and isolation, as indicated, to prevent spread of infection.Obtain cultures prior to initiating antimicrobial therapy when possible. Do not delay treatment for laboratory results in the presence of high suspicion or clinical indicators.Identify potential sources of infection, such as respiratory, wound, lines and devices, early to prevent or mitigate progression of infection.Administer ordered antimicrobial therapy promptly; reassess need regularly.Monitor laboratory value, diagnostic test and clinical status trends for signs of infection progression.Identify early signs of sepsis, such as increased heart rate and decreased blood pressure, as well as changes in mental state, respiratory pattern or peripheral perfusion.Prepare for rapid sepsis management, including lactate level, intravenous access, fluid administration and oxygen therapy.Provide fever-reduction and comfort measures.  Recent Flowsheet Documentation  Taken 4/17/2025 2000 by Rupali Azul, RN  Infection Prevention:   environmental surveillance performed   equipment surfaces disinfected   hand hygiene promoted   personal protective equipment utilized   rest/sleep promoted   Goal Outcome Evaluation:

## 2025-04-19 ENCOUNTER — READMISSION MANAGEMENT (OUTPATIENT)
Dept: CALL CENTER | Facility: HOSPITAL | Age: 65
End: 2025-04-19
Payer: MEDICARE

## 2025-04-19 VITALS
TEMPERATURE: 98.1 F | BODY MASS INDEX: 24.03 KG/M2 | HEART RATE: 101 BPM | RESPIRATION RATE: 16 BRPM | WEIGHT: 153.1 LBS | DIASTOLIC BLOOD PRESSURE: 94 MMHG | HEIGHT: 67 IN | SYSTOLIC BLOOD PRESSURE: 174 MMHG | OXYGEN SATURATION: 96 %

## 2025-04-19 LAB
GLUCOSE BLDC GLUCOMTR-MCNC: 114 MG/DL (ref 70–130)
GLUCOSE BLDC GLUCOMTR-MCNC: 119 MG/DL (ref 70–130)

## 2025-04-19 PROCEDURE — 82948 REAGENT STRIP/BLOOD GLUCOSE: CPT

## 2025-04-19 PROCEDURE — 99239 HOSP IP/OBS DSCHRG MGMT >30: CPT | Performed by: STUDENT IN AN ORGANIZED HEALTH CARE EDUCATION/TRAINING PROGRAM

## 2025-04-19 RX ORDER — COLCHICINE 0.6 MG/1
0.6 TABLET ORAL DAILY
Qty: 30 TABLET | Refills: 0 | Status: SHIPPED | OUTPATIENT
Start: 2025-04-20

## 2025-04-19 RX ORDER — MIRTAZAPINE 7.5 MG/1
7.5 TABLET, FILM COATED ORAL NIGHTLY
Qty: 30 TABLET | Refills: 0 | Status: SHIPPED | OUTPATIENT
Start: 2025-04-19

## 2025-04-19 RX ADMIN — SENNOSIDES AND DOCUSATE SODIUM 2 TABLET: 50; 8.6 TABLET ORAL at 08:22

## 2025-04-19 RX ADMIN — Medication 10 MG: at 14:04

## 2025-04-19 RX ADMIN — COLCHICINE 0.6 MG: 0.6 TABLET ORAL at 08:23

## 2025-04-19 NOTE — PLAN OF CARE
Problem: Adult Inpatient Plan of Care  Goal: Plan of Care Review  Outcome: Progressing  Goal: Patient-Specific Goal (Individualized)  Outcome: Progressing  Goal: Absence of Hospital-Acquired Illness or Injury  Outcome: Progressing  Intervention: Identify and Manage Fall Risk  Description: Perform standard risk assessment on admission using a validated tool or comprehensive approach appropriate to the patient; reassess fall risk frequently, with change in status or transfer to another level of care.Communicate risk to interprofessional healthcare team; ensure fall risk visible cue.Determine need for increased observation, equipment and environmental modification, as well as use of supportive, nonskid footwear.Adjust safety measures to individual needs and identified risk factors.Reinforce the importance of active participation with fall risk prevention, safety, and physical activity with the patient and family.Perform regular intentional rounding to assess need for position change, pain assessment and personal needs, including assistance with toileting.  Recent Flowsheet Documentation  Taken 4/19/2025 0408 by Rupali Azul, RN  Safety Promotion/Fall Prevention:   activity supervised   assistive device/personal items within reach   clutter free environment maintained   fall prevention program maintained   lighting adjusted   mobility aid in reach   nonskid shoes/slippers when out of bed   room organization consistent   safety round/check completed  Taken 4/19/2025 0215 by Rupali Azul, RN  Safety Promotion/Fall Prevention:   activity supervised   clutter free environment maintained   assistive device/personal items within reach   fall prevention program maintained   lighting adjusted   mobility aid in reach   nonskid shoes/slippers when out of bed   room organization consistent   safety round/check completed  Taken 4/19/2025 0007 by Rupali Azul, RN  Safety Promotion/Fall Prevention:   activity supervised    assistive device/personal items within reach   clutter free environment maintained   fall prevention program maintained   lighting adjusted   mobility aid in reach   nonskid shoes/slippers when out of bed   room organization consistent   safety round/check completed  Taken 4/18/2025 2200 by Rupali Azul RN  Safety Promotion/Fall Prevention:   activity supervised   assistive device/personal items within reach   clutter free environment maintained   fall prevention program maintained   lighting adjusted   mobility aid in reach   nonskid shoes/slippers when out of bed   room organization consistent   safety round/check completed  Taken 4/18/2025 2030 by Rupali Azul RN  Safety Promotion/Fall Prevention:   activity supervised   assistive device/personal items within reach   clutter free environment maintained   fall prevention program maintained   lighting adjusted   mobility aid in reach   nonskid shoes/slippers when out of bed   room organization consistent   safety round/check completed  Intervention: Prevent Skin Injury  Description: Perform a screening for skin injury risk, such as pressure or moisture-associated skin damage on admission and at regular intervals throughout hospital stay.Keep all areas of skin (especially folds) clean and dry.Maintain adequate skin hydration.Relieve and redistribute pressure and protect bony prominences and skin at risk for injury; implement measures based on patient-specific risk factors.Match turning and repositioning schedule to clinical condition.Encourage weight shift frequently; assist with reposition if unable to complete independently.Float heels off bed; avoid pressure on the Achilles tendon.Keep skin free from extended contact with medical devices.Optimize nutrition and hydration.Encourage functional activity and mobility, as early as tolerated.Use aids (e.g., slide boards, mechanical lift) during transfer.  Recent Flowsheet Documentation  Taken 4/19/2025 0408 by  Rupali Azul RN  Body Position: position changed independently  Taken 4/19/2025 0007 by Rupali Azul RN  Body Position: position changed independently  Taken 4/18/2025 2200 by Rupali Azul RN  Body Position: position changed independently  Taken 4/18/2025 2030 by Rupali Azul RN  Body Position: position changed independently  Skin Protection:   transparent dressing maintained   skin sealant/moisture barrier applied   silicone foam dressing in place   incontinence pads utilized  Intervention: Prevent Infection  Description: Maintain skin and mucous membrane integrity; promote hand, oral and pulmonary hygiene.Optimize fluid balance, nutrition, sleep and glycemic control to maximize infection resistance.Identify potential sources of infection early to prevent or mitigate progression of infection (e.g., wound, lines, devices).Evaluate ongoing need for invasive devices; remove promptly when no longer indicated.Review vaccination status.  Recent Flowsheet Documentation  Taken 4/18/2025 2030 by Rupali Azul RN  Infection Prevention:   equipment surfaces disinfected   hand hygiene promoted   environmental surveillance performed   personal protective equipment utilized   rest/sleep promoted  Goal: Optimal Comfort and Wellbeing  Outcome: Progressing  Intervention: Provide Person-Centered Care  Description: Use a family-focused approach to care; encourage support system presence and participation.Develop trust and rapport by proactively providing information, encouraging questions, addressing concerns and offering reassurance.Acknowledge emotional response to hospitalization.Recognize and utilize personal coping strategies and strengths; develop goals via shared decision-making.Honor spiritual and cultural preferences.  Recent Flowsheet Documentation  Taken 4/18/2025 2030 by Rupali Azul RN  Trust Relationship/Rapport:   care explained   choices provided   emotional support provided   empathic listening  provided   questions answered   questions encouraged   thoughts/feelings acknowledged  Goal: Readiness for Transition of Care  Outcome: Progressing     Problem: Skin Injury Risk Increased  Goal: Skin Health and Integrity  Outcome: Progressing  Intervention: Optimize Skin Protection  Description: Perform a full pressure injury risk assessment, as indicated by screening, upon admission to care unit.Reassess skin (full inspection and injury risk, including skin temperature, consistency and color) frequently (e.g., scheduled interval, with change in condition) to provide optimal early detection and prevention.Maintain adequate tissue perfusion (e.g., encourage fluid balance; avoid crossing legs, constrictive clothing or devices) to promote tissue oxygenation.Maintain head of bed at lowest degree of elevation tolerated, considering medical condition and other restrictions. Use positioning supports to prevent sliding and friction. Consider low friction textiles.Avoid positioning onto an area that remains reddened or on bony prominences.Minimize incontinence and moisture (e.g., toileting schedule; moisture-wicking pad, diaper or incontinence collection device; skin moisture barrier).Cleanse skin promptly and gently, when soiled, utilizing a pH-balanced cleanser.Relieve and redistribute pressure (e.g., scheduled position changes, weight shifts, use of support surface, medical device repositioning, protective dressing application, use of positioning device, microclimate control, use of pressure-injury-monitorEncourage increased activity, such as sitting in a chair at the bedside or early mobilization, when able to tolerate. Avoid prolonged sitting.  Recent Flowsheet Documentation  Taken 4/19/2025 0408 by Rupali Azul, RN  Activity Management: up ad nemo  Taken 4/19/2025 0007 by Rupali Azul, RN  Activity Management: up ad nemo  Taken 4/18/2025 2030 by Rupali Azul, RN  Activity Management: up ad nemo  Pressure Reduction  Techniques:   frequent weight shift encouraged   weight shift assistance provided   heels elevated off bed   positioned off wounds   pressure points protected  Head of Bed (HOB) Positioning: HOB at 30-45 degrees  Pressure Reduction Devices:   pressure-redistributing mattress utilized   positioning supports utilized   heel offloading device utilized  Skin Protection:   transparent dressing maintained   skin sealant/moisture barrier applied   silicone foam dressing in place   incontinence pads utilized     Problem: Comorbidity Management  Goal: Blood Glucose Level Within Target Range  Outcome: Progressing  Intervention: Monitor and Manage Glycemia  Description: Establish target blood glucose levels based on patient-specific factors, such as age, diabetes-related complications and illness severity.Document blood glucose levels and monitor trend.Provide antihyperglycemic pharmacologic therapy to maintain blood glucose levels within targeted range.Advocate for patient use of home devices such as insulin pump, continuous glucose monitor; assess for safe and competent participation in care.Check blood glucose level if there is a change in mental or cognitive status.Recognize, treat and document hypoglycemia event and potential cause.Assess current lifestyle patterns; acknowledging positive patterns supporting wellbeing.Evaluate effectiveness of coping skills; encourage expression of feelings, expectations and concerns related to disease management and quality of life; reinforce education to enhance management plan and wellbeing.  Recent Flowsheet Documentation  Taken 4/19/2025 0007 by Rupali Azul, RN  Medication Review/Management: medications reviewed  Taken 4/18/2025 2200 by Rupali Azul RN  Medication Review/Management: medications reviewed  Taken 4/18/2025 2030 by Rupali Azul, RN  Medication Review/Management: medications reviewed  Goal: Blood Pressure in Desired Range  Outcome: Progressing  Intervention:  Maintain Blood Pressure Management  Description: Evaluate adherence to home antihypertensive regimen (e.g., exercise and activity, diet modification, medication).Provide scheduled antihypertensive medication; consider administration time and effects (e.g., avoid giving diuretic prior to bedtime).Monitor response to antihypertensive medication therapy (e.g., blood pressure, electrolyte levels, medication effects).Minimize risk of orthostatic hypotension; encourage caution with position changes, particularly if elderly.  Recent Flowsheet Documentation  Taken 4/19/2025 0007 by Rupali Azul RN  Medication Review/Management: medications reviewed  Taken 4/18/2025 2200 by Rupali Azul RN  Medication Review/Management: medications reviewed  Taken 4/18/2025 2030 by Rupali Azul RN  Medication Review/Management: medications reviewed     Problem: Fall Injury Risk  Goal: Absence of Fall and Fall-Related Injury  Outcome: Progressing  Intervention: Identify and Manage Contributors  Description: Develop a fall prevention plan, considering patient-centered interventions and family/caregiver involvement; identify and address patient's facilitators and barriers.Provide reorientation, appropriate sensory stimulation and routines with changes in mental status to decrease risk of fall.Promote use of personal vision and auditory aids.Assess assistance level required for safe and effective self-care; provide support as needed, such as toileting and mobilization. For age 65 and older, implement timed toileting with assistance.Encourage physical activity, such as performance of mobility and self-care at highest level of patient ability, multicomponent exercise program and provision of appropriate assistive devices.If fall occurs, assess the severity of injury; implement fall injury protocol. Determine the cause and revise fall injury prevention plan.Regularly review and advocate for medication adjustment to decrease fall risk;  consider administration times, polypharmacy and age.Balance adequate pain management with potential for oversedation.  Recent Flowsheet Documentation  Taken 4/19/2025 0007 by Rupali Azul RN  Medication Review/Management: medications reviewed  Taken 4/18/2025 2200 by Rupali Azul RN  Medication Review/Management: medications reviewed  Taken 4/18/2025 2030 by Rupali Azul RN  Medication Review/Management: medications reviewed  Intervention: Promote Injury-Free Environment  Description: Provide a safe, barrier-free environment that encourages independent activity.Keep care area uncluttered and well-lighted.Determine need for increased observation or monitoring.Avoid use of devices that minimize mobility, such as restraints or indwelling urinary catheter.  Recent Flowsheet Documentation  Taken 4/19/2025 0408 by Rupali Azul RN  Safety Promotion/Fall Prevention:   activity supervised   assistive device/personal items within reach   clutter free environment maintained   fall prevention program maintained   lighting adjusted   mobility aid in reach   nonskid shoes/slippers when out of bed   room organization consistent   safety round/check completed  Taken 4/19/2025 0215 by Rupali Azul RN  Safety Promotion/Fall Prevention:   activity supervised   clutter free environment maintained   assistive device/personal items within reach   fall prevention program maintained   lighting adjusted   mobility aid in reach   nonskid shoes/slippers when out of bed   room organization consistent   safety round/check completed  Taken 4/19/2025 0007 by Rupali Azul RN  Safety Promotion/Fall Prevention:   activity supervised   assistive device/personal items within reach   clutter free environment maintained   fall prevention program maintained   lighting adjusted   mobility aid in reach   nonskid shoes/slippers when out of bed   room organization consistent   safety round/check completed  Taken 4/18/2025 2200 by Latha  MATT Zapien  Safety Promotion/Fall Prevention:   activity supervised   assistive device/personal items within reach   clutter free environment maintained   fall prevention program maintained   lighting adjusted   mobility aid in reach   nonskid shoes/slippers when out of bed   room organization consistent   safety round/check completed  Taken 4/18/2025 2030 by Rupali Azul RN  Safety Promotion/Fall Prevention:   activity supervised   assistive device/personal items within reach   clutter free environment maintained   fall prevention program maintained   lighting adjusted   mobility aid in reach   nonskid shoes/slippers when out of bed   room organization consistent   safety round/check completed     Problem: Hemodialysis  Goal: Safe, Effective Therapy Delivery  Outcome: Progressing  Intervention: Optimize Device Care and Function  Description: Maintain flow rate, anticoagulation parameters, pressure ranges and prescribed fluid balance with gradual adjustments to maintain hemodynamic stability and achieve therapy goals.Monitor laboratory results (e.g., electrolytes, glucose, albumin, coagulation studies, hemoglobin, hematocrit) and clinical status (e.g., cramping; nausea, vomiting, blood pressure fluctuations) for response to therapy; advocate for treatment or adjuAssess vascular access site for patency, securement and position to meet flow demands. Assess perfusion distal to access site to ensure adequate tissue oxygenation.For arteriovenous fistula, assess presence of thrill to ensure presence of blood flow. Avoid blood pressure readings, lab draws, tight clothing or jewelry on the AV (arteriovenous) fistula extremity to prevent trauma.Maintain circuit monitoring (e.g., arterial, venous and transmembrane pressure; ultrafiltrate removal; dialysate flow, conductivity and temperature); address alarms promptly to decrease risk to patient and preserve circuit function.Evaluate circuit for disconnections, cracks,  clotting, malfunction, leaks or rupture of hemofilter; intervene promptly to prevent risk to patient.Consider distal vascular access for antibiotic or vasoactive medication administration to prevent filtration of medication effect prior to being delivered systemically to the patient.Maintain infusion of anticoagulation; adjust to keep laboratory values within ordered range.Provide emergency equipment, such as clamps, replacement devices and resuscitative supplies, if malfunction, tubing rupture, clot formation or migration occur.  Recent Flowsheet Documentation  Taken 4/19/2025 0007 by Rupali Azul RN  Medication Review/Management: medications reviewed  Taken 4/18/2025 2200 by Rupali Azul RN  Medication Review/Management: medications reviewed  Taken 4/18/2025 2030 by Rupali Azul RN  Medication Review/Management: medications reviewed  Goal: Effective Tissue Perfusion  Outcome: Progressing  Goal: Absence of Infection Signs and Symptoms  Outcome: Progressing  Intervention: Prevent or Manage Infection  Description: Implement transmission-based precautions and isolation, as indicated, to prevent spread of infection.Obtain cultures prior to initiating antimicrobial therapy when possible. Do not delay treatment for laboratory results in the presence of high suspicion or clinical indicators.Identify potential sources of infection, such as respiratory, wound, lines and devices, early to prevent or mitigate progression of infection.Administer ordered antimicrobial therapy promptly; reassess need regularly.Monitor laboratory value, diagnostic test and clinical status trends for signs of infection progression.Identify early signs of sepsis, such as increased heart rate and decreased blood pressure, as well as changes in mental state, respiratory pattern or peripheral perfusion.Prepare for rapid sepsis management, including lactate level, intravenous access, fluid administration and oxygen therapy.Provide  fever-reduction and comfort measures.  Recent Flowsheet Documentation  Taken 4/18/2025 2030 by Rupali Azul, RN  Infection Prevention:   equipment surfaces disinfected   hand hygiene promoted   environmental surveillance performed   personal protective equipment utilized   rest/sleep promoted   Goal Outcome Evaluation:

## 2025-04-19 NOTE — CASE MANAGEMENT/SOCIAL WORK
Continued Stay Note  [facility]     Patient Name: Jun Young  MRN: [id number]  Today's Date: 4/19/2025    Admit Date: 4/11/2025    Plan: Home with family transport   Discharge Plan       Row Name 04/19/25 1529       Plan    Plan Home with family transport    Patient/Family in Agreement with Plan yes    Plan Comments  [facility] at [phone].    Final Discharge Disposition Code 01 - home or self-care                   Discharge Codes    No documentation.                 Expected Discharge Date and Time       Expected Discharge Date Expected Discharge Time    Apr 19, 2025               Saumya Pimentel RN

## 2025-04-19 NOTE — DISCHARGE SUMMARY
[facility]  DISCHARGE SUMMARY    Patient Name: Jun Young  : 1960  MRN: [id number]    Date of Admission: 2025 10:42 AM  Date of Discharge:25  Primary Care Physician: Samir Chow DO    Consults       Date and Time Order Name Status Description    2025 11:15 PM Inpatient Nephrology Consult Completed             Hospital Course     Presenting Problem: Hypotension    Active Hospital Problems    Diagnosis  POA    **Pericardial effusion with cardiac tamponade [I31.39, I31.4]  Yes    Moderate protein-calorie malnutrition [E44.0]  Yes    Hypertension [I10]  Yes    ESRD (end stage renal disease) [N18.6]  Yes    Type 2 diabetes mellitus [E11.9]  Yes      Resolved Hospital Problems   No resolved problems to display.          Hospital Course:  Jun Young is a 64 y.o. male with ESRD on HD, type 2 diabetes, hypertension, hyperlipidemia, history of CVA who presented for hypotension after dialysis session.  Patient was found to have a large pericardial effusion with tamponade C was admitted to the ICU.  Patient is status post cardiac drain placement.  Hospital course further complicated by AV graft occlusion with new HD catheter placement on .  This was complicated by bleeding from the catheter site for which IR however had to place an additional stitch on .  On the day of discharge patient is stable and feeling well.  He tolerated HD well yesterday.    Pericardial effusion with cardiac tamponade  -Concern for uremic pericarditis  - Status post drain placement cardiology  with hemorrhagic fluid removed   - Continue colchicine  - Follow-up with Dr. Canseco in 3 months    ESRD on HD  AV graft occlusion  -Status post tunneled line  with additional stitch   -pt switched form MF HD to MWF HD, his HD clinic is aware and he has a chair on Wed.  Discussed with patient again today, he is aware to schedule  -  Patient should not receive heparin with dialysis until inflammation from uremic pericarditis has resolved, this can take up to 6 to 8 weeks    Poor appetite  -continue mirtazapine       Discharge Follow Up Recommendations for outpatient labs/diagnostics:  PCP follow-up next week  HD MWF  Cardiology follow up with Dr. Canseco in 3 months    Day of Discharge     HPI:   Pt feeling well this AM. He feels well enough to d/c today. He is aware of his new HD schedule.    Review of Systems   Respiratory:  Negative for cough and shortness of breath.    Cardiovascular:  Negative for chest pain and leg swelling.   Gastrointestinal:  Negative for constipation, diarrhea, nausea and vomiting.         Vital Signs:   Temp:  [97.5 °F (36.4 °C)-98.1 °F (36.7 °C)] 98.1 °F (36.7 °C)  Heart Rate:  [] 101  Resp:  [16-18] 16  BP: (144-174)/() 174/94      Physical Exam  Constitutional:       General: He is not in acute distress.  Cardiovascular:      Rate and Rhythm: Normal rate and regular rhythm.      Heart sounds: Normal heart sounds.   Pulmonary:      Effort: Pulmonary effort is normal.      Breath sounds: Normal breath sounds.   Abdominal:      General: There is no distension.      Palpations: Abdomen is soft.      Tenderness: There is no abdominal tenderness.   Musculoskeletal:      Right lower leg: No edema.      Left lower leg: No edema.   Neurological:      General: No focal deficit present.      Mental Status: He is alert.          Pertinent  and/or Most Recent Results     LAB RESULTS:      Lab 04/18/25  0417 04/17/25 2010 04/17/25  1137 04/16/25  2344 04/16/25  1256 04/16/25  0446 04/15/25  0334 04/14/25  0323 04/13/25  0455 04/13/25  0455 04/12/25 2006   WBC 13.91*  --   --  9.06  --  10.56 12.12* 12.57*   < > 12.52*  --    HEMOGLOBIN 8.6* 8.9* 9.3* 9.4*  --  8.6* 8.9* 8.5*   < > 8.5*  --    HEMATOCRIT 27.9* 28.7* 30.2* 30.2*  --  27.9* 29.2* 27.1*   < > 27.0*  --    PLATELETS 180  --   --  150  --  160 182 268   <  > 227  --    NEUTROS ABS  --   --   --  4.55  --  5.39 7.04* 8.33*  --  9.85*  --    IMMATURE GRANS (ABS)  --   --   --  0.26*  --   --  0.42* 0.49*  --  0.44*  --    LYMPHS ABS  --   --   --  0.92  --   --  0.76 0.78  --  0.54*  --    MONOS ABS  --   --   --  1.06*  --   --  1.18* 0.78  --  0.53  --    EOS ABS  --   --   --  2.18*  --  3.06* 2.62* 2.09*  --  1.09*  --    MCV 90.9  --   --  90.1  --  91.5 91.3 89.7   < > 88.5  --    LACTATE  --   --   --   --   --   --   --   --   --   --  1.5   PROTIME  --   --   --  14.9 15.6*  --   --   --   --   --   --     < > = values in this interval not displayed.         Lab 04/18/25  0417 04/17/25  0431 04/16/25  0446 04/15/25  0334 04/14/25  0323 04/13/25  0455   SODIUM 137 136 137 136 136 134*   POTASSIUM 4.7 4.5 5.1 5.4* 5.1 4.8   CHLORIDE 98 97* 100 99 95* 94*   CO2 20.0* 22.0 18.0* 20.0* 20.0* 20.0*   ANION GAP 19.0* 17.0* 19.0* 17.0* 21.0* 20.0*   BUN 75* 45* 81* 65* 93* 85*   CREATININE 10.48* 8.17* 11.66* 10.23* 11.34* 12.18*   EGFR 5.0* 6.7* 4.4* 5.1* 4.5* 4.1*   GLUCOSE 120* 82 91 94 112* 104*   CALCIUM 8.7 8.8 7.8* 9.0 8.8 8.4*   MAGNESIUM  --   --  2.9*  --   --  3.2*   PHOSPHORUS  --   --   --   --   --  8.9*         Lab 04/18/25  0417 04/14/25  0323 04/13/25  0455   TOTAL PROTEIN 6.7 6.1 6.1   ALBUMIN 3.4* 3.1* 3.1*   GLOBULIN 3.3 3.0 3.0   ALT (SGPT) 27 177* 1,133*   AST (SGOT) 76* 673* 2,081*   BILIRUBIN 0.6 0.8 0.6   ALK PHOS 380* 348* 297*         Lab 04/16/25  2344 04/16/25  1256   PROTIME 14.9 15.6*   INR 1.10 1.17*             Lab 04/16/25  2344   ABO TYPING O   RH TYPING Positive   ANTIBODY SCREEN Negative         Brief Urine Lab Results       None          Microbiology Results (last 10 days)       Procedure Component Value - Date/Time    Body Fluid Culture - Body Fluid, Pericardium [[id number]] Collected: 04/11/25 1705    Lab Status: Final result Specimen: Body Fluid from Pericardium Updated: 04/14/25 0755     Body Fluid Culture No growth at 3 days      Gram Stain No WBCs or organisms seen    AFB Culture - Body Fluid, Pericardium [[id number]] Collected: 04/11/25 1705    Lab Status: Preliminary result Specimen: Body Fluid from Pericardium Updated: 04/18/25 1800     AFB Culture No AFB isolated at 1 week     AFB Stain No acid fast bacilli seen on concentrated smear            IR Insert Tunneled CV Catheter Without Port 5 Plus  Result Date: 4/16/2025  PROCEDURE: Conversion of non-tunneled to tunneled central venous catheter  Procedural Personnel Attending physician(s): Benigno Mcmullen   Pre-procedure diagnosis: ESRD on HD Post-procedure diagnosis: Same  Complications: No immediate complications.       Conversion of right-sided external jugular non-tunneled central venous catheter for a tunneled dialysis catheter, with tip in the expected location of the cavoatrial junction.  Plan:  The catheter may be used immediately. _______________________________________________________________  PROCEDURE SUMMARY: - Temporary central venous catheter removal - Tunneled central venous catheter insertion with fluoroscopic guidance - Additional procedure(s): None  PROCEDURE DETAILS:  Pre-procedure Consent: Informed consent for the procedure including risks, benefits and alternatives was obtained and time-out was performed prior to the procedure. Preparation (MIPS): The site was prepared and draped using all elements of maximal sterile barrier technique including sterile gloves, sterile gown, cap, mask, large sterile sheet, sterile ultrasound probe cover, hand hygiene and cutaneous antisepsis with 2% chlorhexidine.  Anesthesia/sedation Level of anesthesia/sedation: Moderate sedation (conscious sedation) Anesthesia/sedation administered by: Independent trained observer under attending supervision with continuous monitoring of the patient?s level of consciousness and physiologic status Total intra-service sedation time (minutes): 20  Catheter exchange Local anesthesia was administered. A  wire was passed through the indwelling central venous catheter and into the central veins. The catheter was removed, and a peel-away sheath was placed. An incision was made near the venous access site and the catheter was tunneled subcutaneously to the venous access site. The catheter was advanced via a peel-away sheath into the vein under fluoroscopic guidance. Catheter tip location was fluoroscopically verified and a permanent image was stored. Catheter placed: AngioDynamics Catheter size (Cymro): 15.5 Cymro Catheter flush: Heparin (1000 units/mL)  Closure The access site was closed and a sterile bandage was applied. Access site closure technique: Tissue adhesive Catheter securement technique: Non-absorbable suture  Radiation Dose Fluoroscopy time (minutes): 0.2 Reference air kerma (mGy): 0.7 Kerma area product (uGy-cm2): 11.87  Additional Details Additional description of procedure: None Equipment details: None Specimens removed: Temporary central venous catheter Estimated blood loss (mL): Less than 10   4/16/2025 5:21 PM by Benigno Mcmullen MD on Workstation: [id number]      IR Angioplasty AV Fistula / Graft Arterial  Result Date: 4/14/2025  PROCEDURE: Arteriovenous graft declot  Procedural Personnel Attending physician(s): Benigno Mcmullen Fellow physician(s): None Resident physician(s): None Advanced practice provider(s): None  Pre-procedure diagnosis: ESRD on HD with a clotted left upper extremity graft Post-procedure diagnosis: Same  Complications: No immediate complications.       Left upper extremity graft status post declot with a large amount of chronic fibrinous appearing clot. A distal venous extravasation was noted after balloon angioplasty. A 10 mm x 10 cm covered stent was deployed. Repeat venography demonstrated a satisfactory result. Despite numerous attempts the flow through the graft was generally poor. Given history of prior intervention, recommend conversion of existing right jugular  collateral nontunneled central venous catheter to tunneled central venous access and follow-up with the patient's already scheduled appointment with a vascular surgeon for graft/fistula revision. I discussed the results of this procedure with the patient's nephrologist at the conclusion of the procedure.  Plan:  Use nontunneled hemodialysis catheter for dialysis at the present time. _______________________________________________________________  PROCEDURE SUMMARY: - Access of dialysis graft with ultrasound guidance - Dialysis graft declot - Additional procedure(s): None  PROCEDURE DETAILS:  Pre-procedure Consent: Informed consent for the procedure including risks, benefits and alternatives was obtained and time-out was performed prior to the procedure. Preparation: The site was prepared and draped using maximal sterile barrier technique including cutaneous antisepsis.  Anesthesia/sedation Level of anesthesia/sedation: Moderate sedation (conscious sedation) Anesthesia/sedation administered by: Independent trained observer under attending supervision with continuous monitoring of the patient?s level of consciousness and physiologic status Total intra-service sedation time (minutes): 62  Initial dialysis graft evaluation Dialysis graft side: Left Dialysis graft type: Upper arm graft Initial graft palpation: No pulse, no thrill  Access Local anesthesia was administered. The dialysis graft was accessed and a 7 Ivorian sheath was placed. Access technique: Micropuncture set with 21 gauge needle Access direction: Toward venous anastomosis  Initial angiography Initial angiography of the central veins was performed. Findings: Patent central vasculature. Within the patient's upper extremity extensive clot is seen from the level of the AV anastomosis centrally to the level of the axillary vein.  Angioplasty Angioplasty of the venous dialysis circuit was performed. Angioplasty balloon(s): 8 mm x 40 mm conquest balloon.   Mechanical or aspiration thrombectomy Thrombectomy of the venous circuit was performed. Thrombectomy device: Penumbra  Stent placement Stent placement was performed within the proximal venous circuit due to contrast extravasation at the conclusion of initial angioplasty with 8 mm x 40 mm conquest balloon. Stent placed: [place]-Andriy Viabahn Stent type: Covered Stent diameter (mm): 10 Stent length (mm): 100 Balloon dilation of stent (mm): 10 mm  Final angiography Final angiography was performed. Findings: Patent venous circuit. Final graft palpation: Pulse but no thrill  Closure The sheath was removed and hemostasis was achieved with suture. A sterile dressing was applied.  Contrast Contrast agent: Visipaque 320 Contrast volume (mL): 50  Radiation Dose Fluoroscopy time (minutes): 9.5 Reference air kerma (mGy): 23.4 Kerma area product (uGy-cm2): 946.86  Additional Details Additional description of procedure: None Equipment details: None Specimens removed: None Estimated blood loss (mL): 25    4/14/2025 5:14 PM by Benigno Mcmullen MD on Workstation: [id number]      [facility]  Result Date: 4/14/2025  PROCEDURE: Non-tunneled central venous catheter placement  Procedural Personnel Attending physician(s): Benigno Mcmullen  Pre-procedure diagnosis: ESRD on HD. No dialysis access. Post-procedure diagnosis: Same  Complications: No immediate complications.       Insertion of right-sided non-tunneled dual-lumen temporary dialysis catheter, with tip in the expected location of the cavoatrial junction.  Plan:  The catheter may be used immediately. _______________________________________________________________  PROCEDURE SUMMARY: - Venous access with ultrasound guidance - Non-tunneled central venous catheter insertion with fluoroscopic guidance - Additional procedure(s): None  PROCEDURE DETAILS:  Pre-procedure Consent: Informed consent for the procedure including risks, benefits and alternatives was obtained and  time-out was performed prior to the procedure. Preparation (MIPS): The site was prepared and draped using all elements of maximal sterile barrier technique including sterile gloves, sterile gown, cap, mask, large sterile sheet, sterile ultrasound probe cover, hand hygiene and cutaneous antisepsis with 2% chlorhexidine. Medical reason for site preparation exception (MIPS): Not applicable  Access Local anesthesia was administered. The vessel was sonographically evaluated and determined to be patent. Real time ultrasound was used to visualize needle entry into the vessel and a permanent image was stored. Vein accessed: Jugular collateral vein Access technique: Micropuncture set with 21 gauge needle  Catheter placement The access site was dilated and the catheter was placed into the vein over a wire  under fluoroscopic guidance.  The catheter tip location was fluoroscopically verified and a permanent image was stored.. A sterile dressing was applied. Catheter placed: Kalani Catheter size (Kinyarwanda): 14 Catheter length (cm): 15 cm Catheter flush: Heparin (1000 units/mL) Catheter securement technique: Non-absorbable suture  Radiation Dose Fluoroscopy time (minutes): 1 Reference air kerma (mGy): 7.8 Kerma area product (uGy-cm2): 133.99  Additional Details Additional description of procedure: None Equipment details: None Specimens removed: None Estimated blood loss (mL): Less than 10  4/14/2025 5:03 PM by Benigno Mcmullen MD on Workstation: [id number]      XR Chest 1 View  Result Date: 4/13/2025  XR CHEST 1 VW Date of Exam: 4/13/2025 1:12 AM EDT Indication: Pericardial effusion Comparison: April 12, 2025 Findings: The heart is enlarged. It has a globular configuration which could relate to reported pericardial effusion noted on more recent CT. There is lack of definition margin left hemidiaphragm. An underlying pleural effusion could not be excluded. There may be a small right pleural effusion as well.     Impression:  1.Cardiomegaly with globular configuration which could relate to pericardial effusion noted on more recent CT. 2.Bilateral pleural effusions greater on the left not excluded. There may be some bibasilar atelectasis. Electronically Signed: Jorge Nickerson MD  4/13/2025 7:59 AM EDT  Workstation ID: [id number]    XR Chest 1 View  Result Date: 4/12/2025  XR CHEST 1 VW Date of Exam: 4/12/2025 3:13 AM EDT Indication: Follow-up pericardial effusion Comparison: Chest CT 4/11/2025, chest x-ray 2/7/2023 Findings: Cardiac silhouette remains enlarged compatible with large pericardial effusion seen on prior chest CT. There is probably some mild left basilar atelectasis. The lungs are otherwise clear. Pulmonary vascularity is normal. No pneumothorax.     Stable enlargement of the cardiac silhouette with some mild left basilar atelectasis. Electronically Signed: Ad Taylor MD  4/12/2025 5:41 AM EDT  Workstation ID: [id number]    Cardiac Catheterization/Vascular Study  Result Date: 4/11/2025  Procedure: Pericardiocentesis : Braxton Canseco MD, [facility], [facility] Indication: Large pericardial effusion with pericardial tamponade. Post procedure diagnosis: The same Anesthesia: Intravenous conscious sedation with Versed/fentanyl.  Local lidocaine. Complications: None Estimated blood loss: Less than 5 mL Brief history: 64-year-old male patient with ESRD on dialysis, became hypotensive during dialysis and had nausea vomiting diarrhea fatigue and dyspnea.  Was transferred to ED following volume resuscitation.  Diagnosed with massive pericardial effusion by CT scan which was further confirmed by echo that showed features of tamponade. Procedure description: After informed consent the patient was brought to the lab in a fasting condition.  Anterior chest wall was prepped and draped in standard sterile fashion.  Lidocaine was injected for local anesthesia.  The pericardial space was accessed with a 14-gauge needle through the subxiphoid  approach, free flow of hemorrhagic effusion was noted.  A guidewire was advanced which followed the control of the pericardium.  Skin and subcutaneous tissues were dilated and a 5 Yi pigtail catheter was advanced.  More than 900 mL of hemorrhagic pericardial fluid was aspirated and sent to the lab for studies.  Throughout the procedure echocardiogram was monitored.  Initial images reveal large circumferential pericardial effusion with tamponade.  Final images revealed no significant residual effusion.  Finally the pericardial drain was left in place and attached to a suction.  It was secured to the skin with silk sutures and sterile dressing was applied.  The patient was transferred to the ICU in a stable condition Complications: There were no.  Acute procedure related complications.    CT Angiogram Chest  Result Date: 4/11/2025  CT ANGIOGRAM CHEST Date of Exam: 4/11/2025 1:26 PM EDT Indication: eval pericardial effusion/aorta. Comparison: Chest x-ray 2/7/2023 and CT abdomen and pelvis 4/11/2025 Technique: CTA of the chest was performed before and after the uneventful intravenous administration of 75 mL Isovue-370. Reconstructed coronal and sagittal images were also obtained. In addition, a 3-D volume rendered image was created for interpretation. Automated exposure control and iterative reconstruction methods were used. Findings: PULMONARY VASCULATURE: Pulmonary arteries are widely patent without evidence of embolus.Main pulmonary artery is normal in size. No evidence of right heart strain. MEDIASTINUM: The heart is prominent in size due to a large pericardial effusion measuring up to 4.1 cm in maximum diameter. The effusion is uniform throughout measuring slightly greater than water density. The thoracic aorta is normal in caliber measuring 2.9 cm without evidence for dissection. CORONARY ARTERIES: Moderate calcified atherosclerotic disease. LUNGS: Evaluation of lung parenchyma demonstrates some atelectasis  within the posterior left upper lung and lingula. There is linear scarring and/or atelectasis at the left lung base. There is a 3 mm juxta fissural nodule noted (image 57 of series 7). PLEURAL SPACE: No effusion, mass, nor pneumothorax. LYMPH NODES: There are no pathologically enlarged lymph nodes. UPPER ABDOMEN: Calcified gallstones are noted. OSSEOUS STRUCTURES: Appropriate for age with no acute process identified.     Impression: 1. Large pericardial effusion measuring up to 4.1 cm. Cardiology consultation recommended. 2. 3 mm juxta fissural nodule in the right middle lung.In a low-risk patient with a solid nodule <6 mm, recommend no follow-up. In a high-risk patient, CT at 12 months is optional with stronger consideration if there is suspicious nodule morphology and/or upper lobe location. Electronically Signed: Celsa Jacques MD  4/11/2025 1:49 PM EDT  Workstation ID: [id number]    CT Head Without Contrast  Result Date: 4/11/2025  CT HEAD WO CONTRAST Date of Exam: 4/11/2025 1:26 PM EDT Indication: headache. Comparison: None available. Technique: Axial CT images were obtained of the head without contrast administration.  Automated exposure control and iterative construction methods were used. Findings: Gray-white differentiation is maintained and there is no evidence of intracranial hemorrhage, mass or mass effect. The ventricles are normal in size and configuration. The orbits appear normal. There is some mucosal thickening as well as aerated secretions noted within the left maxillary sinus. The calvarium is intact.     Impression: No acute intracranial abnormality. Findings which could reflect left maxillary sinusitis in the correct clinical setting. Electronically Signed: Jesus Denny MD  4/11/2025 1:41 PM EDT  Workstation ID: [id number]    CT Abdomen Pelvis Without Contrast  Result Date: 4/11/2025  CT ABDOMEN PELVIS WO CONTRAST Date of Exam: 4/11/2025 11:23 AM EDT Indication: abd pain, N/V/D. Comparison:  5/26/2023 Technique: Axial CT images were obtained of the abdomen and pelvis without the administration of contrast. Reconstructed coronal and sagittal images were also obtained. Automated exposure control and iterative construction methods were used. Findings: LUNG BASES: There is a large pericardial effusion measuring up to 4 cm posteriorly. Cardiology consultation recommended. No evidence of airspace disease. There is linear basal atelectasis/parenchymal scarring. LIVER:  Unremarkable parenchyma without focal lesion. BILIARY/GALLBLADDER: There our calcified gallstones SPLEEN:  Unremarkable PANCREAS:  Unremarkable ADRENAL:  Unremarkable KIDNEYS: Bilateral renal cortical atrophy with no solid mass identified. No obstruction.  No calculus identified. GASTROINTESTINAL/MESENTERY:  No evidence of obstruction nor acute inflammation.  The appendix remains prominent in size measuring 9 mm maximum diameter. However there is gas in the appendix and there are no surrounding inflammatory changes and this is likely a normal finding for patient. There is similar fairly diffuse colonic fatty mural infiltration. AORTA/IVC:  Normal caliber. RETROPERITONEUM/LYMPH NODES:  Unremarkable REPRODUCTIVE: Large prostate gland impresses upon the urinary bladder base. BLADDER: There is generalized urinary bladder mural thickening, similar to prior examination. May be related to neurogenic bladder, chronic bladder outlet obstruction, and cystitis. OSSEUS STRUCTURES:  Typical for age with no acute process identified.     Impression: 1.Large pericardial effusion measuring up to 4 cm posteriorly. Cardiology consultation recommended. 2.Cholelithiasis. 3.Bilateral renal cortical atrophy. 4.Large prostate gland. 5.Generalized urinary bladder mural thickening, similar to prior examination. May be related to neurogenic bladder, chronic bladder outlet obstruction, and cystitis. 6.The appendix remains prominent in size measuring 9 mm maximum diameter.  However there is gas in the appendix and there are no surrounding inflammatory changes and this is likely a normal finding for patient. Electronically Signed: Manuel Jacques MD  4/11/2025 11:35 AM EDT  Workstation ID: [id number]      Results for orders placed during the hospital encounter of 02/07/23    Duplex Vein Mapping Study Upper Extremity - Left CAR    Interpretation Summary    Inadequate/small left cephalic vein    Adequate/good basilic vein.    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      Results for orders placed during the hospital encounter of 02/07/23    Duplex Vein Mapping Study Upper Extremity - Left CAR    Interpretation Summary    Inadequate/small left cephalic vein    Adequate/good basilic vein.    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      Results for orders placed during the hospital encounter of 04/11/25    Adult Transthoracic Echo Limited W/ Cont if Necessary Per Protocol    Interpretation Summary    Left ventricular systolic function is normal. Estimated left ventricular EF = 65%    Left ventricular wall thickness is consistent with mild concentric hypertrophy.    No significant pericardial effusion is noted.      Plan for Follow-up of Pending Labs/Results: Will contact patient if significant results return  Pending Labs       Order Current Status    AFB Culture - Body Fluid, Pericardium Preliminary result          Discharge Details        Discharge Medications        New Medications        Instructions Start Date   colchicine 0.6 MG tablet   0.6 mg, Oral, Daily   Start Date: April 20, 2025     mirtazapine 7.5 MG tablet  Commonly known as: REMERON   7.5 mg, Oral, Nightly             Continue These Medications        Instructions Start Date   acetaminophen 325 MG tablet  Commonly known as: TYLENOL   650 mg, Oral, Every 4 Hours PRN      KAYLEIGH-LASHON PO   1 tablet, Daily      sennosides-docusate 8.6-50 MG per  tablet  Commonly known as: PERICOLACE   2 tablets, Oral, 2 Times Daily PRN      sevelamer 800 MG tablet  Commonly known as: RENVELA   800 mg, Oral, 3 Times Daily With Meals      simethicone 80 MG chewable tablet  Commonly known as: MYLICON   Chew and swallow 1 tablet by mouth 4 (Four) Times a Day As Needed for Flatulence.             Stop These Medications      pantoprazole 40 MG EC tablet  Commonly known as: PROTONIX              No Known Allergies      Discharge Disposition:  Home or Self Care    Diet:  Hospital:  Diet Order   Procedures    Diet: Renal; Low Potassium; Fluid Consistency: Thin (IDDSI 0)            Activity:  As tolerated    Restrictions or Other Recommendations:  none       CODE STATUS:    Code Status and Medical Interventions: CPR (Attempt to Resuscitate); Full Support   Ordered at: 04/11/25 1521     Code Status (Patient has no pulse and is not breathing):    CPR (Attempt to Resuscitate)     Medical Interventions (Patient has pulse or is breathing):    Full Support       No future appointments.          Zofia Steinberg MD  04/19/25      Time Spent on Discharge:  I spent  35  minutes on this discharge activity which included: face-to-face encounter with the patient, reviewing the data in the system, coordination of the care with the nursing staff as well as consultants, documentation, and entering orders.

## 2025-04-19 NOTE — PROGRESS NOTES
" LOS: 8 days    Patient Care Team:  Samir Chow DO as PCP - General (Internal Medicine)  Provider, No Known as PCP - Family Medicine  Provider, No Known  Lora Dickey DO as Consulting Physician (Hospitalist)  Katie Lau MD as Consulting Physician (Hospitalist)    Subjective     HD yesterday tolerated well.       Objective     albumin human, , ,   albumin human, , ,   colchicine, 0.6 mg, Oral, Daily  insulin lispro, 2-7 Units, Subcutaneous, 4x Daily AC & at Bedtime  mirtazapine, 7.5 mg, Oral, Nightly  Jd, 1 patch, Topical, Once  senna-docusate sodium, 2 tablet, Oral, BID       Vital Signs:  Blood pressure 174/94, pulse 101, temperature 98.1 °F (36.7 °C), temperature source Oral, resp. rate 16, height 170 cm (66.93\"), weight 69.4 kg (153 lb 1.6 oz), SpO2 96%.    Flowsheet Rows      Flowsheet Row First Filed Value   Admission Height 170.2 cm (67\") Documented at 04/11/2025 1045   Admission Weight 67 kg (147 lb 11.3 oz) Documented at 04/11/2025 1045            04/18 0701 - 04/19 0700  In: 400   Out: 2150 [Urine:80]    Physical Exam:  GENERAL: Awake and alert, in no acute distress.   HEENT: Normocephalic, atraumatic.    NECK: Supple .  HEART: RRR; No murmur, rubs, gallops. Trace edema  LUNGS:  Clear to auscultation bilaterally without wheezing, rales, rhonchi.  ABDOMEN: Soft, nontender, nondistended.   EXT: Trace edema.  :  No [place]   SKIN: Warm and dry without rash  NEURO: No focal neurological deficits. Strength equal bilateral  PSYCHIATRIC:  Cooperative with PE    Labs:  Results from last 7 days   Lab Units 04/18/25  0417 04/17/25 2010 04/17/25  1137 04/16/25  2344 04/16/25  0446   WBC 10*3/mm3 13.91*  --   --  9.06 10.56   HEMOGLOBIN g/dL 8.6* 8.9* 9.3* 9.4* 8.6*   HEMATOCRIT % 27.9* 28.7* 30.2* 30.2* 27.9*   PLATELETS 10*3/mm3 180  --   --  150 160     Results from last 7 days   Lab Units 04/18/25  0417 04/17/25  0431 04/16/25  0446 04/15/25  0334 04/14/25  0323 04/13/25  0455   SODIUM " mmol/L 137 136 137 136 136 134*   POTASSIUM mmol/L 4.7 4.5 5.1 5.4* 5.1 4.8   CHLORIDE mmol/L 98 97* 100 99 95* 94*   CO2 mmol/L 20.0* 22.0 18.0* 20.0* 20.0* 20.0*   BUN mg/dL 75* 45* 81* 65* 93* 85*   CREATININE mg/dL 10.48* 8.17* 11.66* 10.23* 11.34* 12.18*   CALCIUM mg/dL 8.7 8.8 7.8* 9.0 8.8 8.4*   PHOSPHORUS mg/dL  --   --   --   --   --  8.9*   MAGNESIUM mg/dL  --   --  2.9*  --   --  3.2*   ALBUMIN g/dL 3.4*  --   --   --  3.1* 3.1*     Results from last 7 days   Lab Units 04/18/25  0417   GLUCOSE mg/dL 120*       Results from last 7 days   Lab Units 04/18/25  0417   ALK PHOS U/L 380*   BILIRUBIN mg/dL 0.6   ALT (SGPT) U/L 27   AST (SGOT) U/L 76*       Assessment       Pericardial effusion with cardiac tamponade    Type 2 diabetes mellitus    ESRD (end stage renal disease)    Hypertension    Moderate protein-calorie malnutrition    End-stage renal disease:   - On Monday and Friday schedule.  Follows up with [facility].  - Access TDC exchange on this visit. Now will be doing three time weekly HD.    Large pericardial effusion:   - S/p pericardial drain 4/11. 900 ml fluid removed ( Hemorrhagic fluid). 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      Anemia:   - Epogen on dialysis days.      Hyperkalemia :  - Should get better with dialysis.      Elevated liver enzymes:  - Secondary to hypotension/shock liver. Improving.     Hyperphosphatemia:   - Low phos diet.  Need to restart home binders.      Hypertension:   - Start home medications.     Plan:  - HD per MWF [facility].   - Renal diet   - MARTÍN with HD   - renal diet   - Mirtazapine low dose as appetite stimulant.       Jordan Marquez MD  04/19/25  15:05 EDT

## 2025-04-19 NOTE — OUTREACH NOTE
Prep Survey      Flowsheet Row Responses   [employer] facility patient discharged from? [facility]   Is LACE score < 7 ? No   Eligibility [facility]   Date of Admission 04/11/25   Date of Discharge 04/19/25   Discharge Disposition Home or Self Care   Discharge diagnosis Pericardial effusion   Does the patient have one of the following disease processes/diagnoses(primary or secondary)? Other   Does the patient have Home health ordered? No   Is there a DME ordered? No   Prep survey completed? Yes            WM RODRIGUEZ - Registered Nurse

## 2025-04-19 NOTE — DISCHARGE PLACEMENT REQUEST
"Jun Young (64 y.o. Male)       Date of Birth   1960    Social Security Number       Address   [address] [place] [address]    Home Phone   [phone]    MRN   [id number]       Cheondoism   None    Marital Status   Single                            Admission Date   2025    Admission Type   Emergency    Admitting Provider   Zofia Steinberg MD    Attending Provider   Zofia Steinberg MD    Department, Room/Bed   [address], S573/1       Discharge Date       Discharge Disposition   Home or Self Care    Discharge Destination                                 Attending Provider: Zofia Steinberg MD    Allergies: No Known Allergies    Isolation: None   Infection: None   Code Status: CPR    Ht: 170 cm (66.93\")   Wt: 69.4 kg (153 lb 1.6 oz)    Admission Cmt: None   Principal Problem: Pericardial effusion with cardiac tamponade [I31.39,I31.4]                   Active Insurance as of 2025       Primary Coverage       Payor Plan Insurance Group Employer/Plan Group    MEDICARE MEDICARE A & B        Payor Plan Address Payor Plan Phone Number Payor Plan Fax Number Effective Dates    [address] [phone]  2023 - None Entered    [place] [id number]         Subscriber Name Subscriber Birth Date Member ID       JUN YOUNG 1960 [id number]                     Emergency Contacts        (Rel.) Home Phone Work Phone Mobile Phone    VENECIA CAMPOS (Daughter) [phone] -- [phone]    TAWANDA CAMPOS (Son) [phone] -- [phone]    CAMPOSTIEN DOWDO (Son) [phone] -- --                 Discharge Summary        Zofia Steinberg MD at [address] [place] [address] Medicine Services  DISCHARGE SUMMARY    Patient Name: Jun Young  : 1960  MRN: [id number]    Date of Admission: 2025 10:42 AM  Date of " Discharge:4/19/25  Primary Care Physician: Samir Chow DO    Consults       Date and Time Order Name Status Description    4/11/2025 11:15 PM Inpatient Nephrology Consult Completed             Hospital Course     Presenting Problem: Hypotension    Active Hospital Problems    Diagnosis  POA    **Pericardial effusion with cardiac tamponade [I31.39, I31.4]  Yes    Moderate protein-calorie malnutrition [E44.0]  Yes    Hypertension [I10]  Yes    ESRD (end stage renal disease) [N18.6]  Yes    Type 2 diabetes mellitus [E11.9]  Yes      Resolved Hospital Problems   No resolved problems to display.          Hospital Course:  Jun Young is a 64 y.o. male with ESRD on HD, type 2 diabetes, hypertension, hyperlipidemia, history of CVA who presented for hypotension after dialysis session.  Patient was found to have a large pericardial effusion with tamponade C was admitted to the ICU.  Patient is status post cardiac drain placement.  Hospital course further complicated by AV graft occlusion with new HD catheter placement on 4/16.  This was complicated by bleeding from the catheter site for which IR however had to place an additional stitch on 4/17.  On the day of discharge patient is stable and feeling well.  He tolerated HD well yesterday.    Pericardial effusion with cardiac tamponade  -Concern for uremic pericarditis  - Status post drain placement cardiology 4/11 with hemorrhagic fluid removed   - Continue colchicine  - Follow-up with Dr. Canseco in 3 months    ESRD on HD  AV graft occlusion  -Status post tunneled line 4/16 with additional stitch 4/17  -pt switched form MF HD to MWF HD, his HD clinic is aware and he has a chair on Wed.  Discussed with patient again today, he is aware to schedule  - Patient should not receive heparin with dialysis until inflammation from uremic pericarditis has resolved, this can take up to 6 to 8 weeks    Poor appetite  -continue mirtazapine       Discharge Follow Up  Recommendations for outpatient labs/diagnostics:  PCP follow-up next week  HD MWF  Cardiology follow up with Dr. Canseco in 3 months    Day of Discharge     HPI:   Pt feeling well this AM. He feels well enough to d/c today. He is aware of his new HD schedule.    Review of Systems   Respiratory:  Negative for cough and shortness of breath.    Cardiovascular:  Negative for chest pain and leg swelling.   Gastrointestinal:  Negative for constipation, diarrhea, nausea and vomiting.         Vital Signs:   Temp:  [97.5 °F (36.4 °C)-98.1 °F (36.7 °C)] 98.1 °F (36.7 °C)  Heart Rate:  [] 101  Resp:  [16-18] 16  BP: (144-174)/() 174/94      Physical Exam  Constitutional:       General: He is not in acute distress.  Cardiovascular:      Rate and Rhythm: Normal rate and regular rhythm.      Heart sounds: Normal heart sounds.   Pulmonary:      Effort: Pulmonary effort is normal.      Breath sounds: Normal breath sounds.   Abdominal:      General: There is no distension.      Palpations: Abdomen is soft.      Tenderness: There is no abdominal tenderness.   Musculoskeletal:      Right lower leg: No edema.      Left lower leg: No edema.   Neurological:      General: No focal deficit present.      Mental Status: He is alert.          Pertinent  and/or Most Recent Results     LAB RESULTS:      Lab 04/18/25  0417 04/17/25 2010 04/17/25  1137 04/16/25  2344 04/16/25  1256 04/16/25  0446 04/15/25  0334 04/14/25  0323 04/13/25  0455 04/13/25  0455 04/12/25 2006   WBC 13.91*  --   --  9.06  --  10.56 12.12* 12.57*   < > 12.52*  --    HEMOGLOBIN 8.6* 8.9* 9.3* 9.4*  --  8.6* 8.9* 8.5*   < > 8.5*  --    HEMATOCRIT 27.9* 28.7* 30.2* 30.2*  --  27.9* 29.2* 27.1*   < > 27.0*  --    PLATELETS 180  --   --  150  --  160 182 268   < > 227  --    NEUTROS ABS  --   --   --  4.55  --  5.39 7.04* 8.33*  --  9.85*  --    IMMATURE GRANS (ABS)  --   --   --  0.26*  --   --  0.42* 0.49*  --  0.44*  --    LYMPHS ABS  --   --   --  0.92  --   --   0.76 0.78  --  0.54*  --    MONOS ABS  --   --   --  1.06*  --   --  1.18* 0.78  --  0.53  --    EOS ABS  --   --   --  2.18*  --  3.06* 2.62* 2.09*  --  1.09*  --    MCV 90.9  --   --  90.1  --  91.5 91.3 89.7   < > 88.5  --    LACTATE  --   --   --   --   --   --   --   --   --   --  1.5   PROTIME  --   --   --  14.9 15.6*  --   --   --   --   --   --     < > = values in this interval not displayed.         Lab 04/18/25  0417 04/17/25  0431 04/16/25  0446 04/15/25  0334 04/14/25  0323 04/13/25  0455   SODIUM 137 136 137 136 136 134*   POTASSIUM 4.7 4.5 5.1 5.4* 5.1 4.8   CHLORIDE 98 97* 100 99 95* 94*   CO2 20.0* 22.0 18.0* 20.0* 20.0* 20.0*   ANION GAP 19.0* 17.0* 19.0* 17.0* 21.0* 20.0*   BUN 75* 45* 81* 65* 93* 85*   CREATININE 10.48* 8.17* 11.66* 10.23* 11.34* 12.18*   EGFR 5.0* 6.7* 4.4* 5.1* 4.5* 4.1*   GLUCOSE 120* 82 91 94 112* 104*   CALCIUM 8.7 8.8 7.8* 9.0 8.8 8.4*   MAGNESIUM  --   --  2.9*  --   --  3.2*   PHOSPHORUS  --   --   --   --   --  8.9*         Lab 04/18/25 0417 04/14/25 0323 04/13/25  0455   TOTAL PROTEIN 6.7 6.1 6.1   ALBUMIN 3.4* 3.1* 3.1*   GLOBULIN 3.3 3.0 3.0   ALT (SGPT) 27 177* 1,133*   AST (SGOT) 76* 673* 2,081*   BILIRUBIN 0.6 0.8 0.6   ALK PHOS 380* 348* 297*         Lab 04/16/25  2344 04/16/25  1256   PROTIME 14.9 15.6*   INR 1.10 1.17*             Lab 04/16/25  2344   ABO TYPING O   RH TYPING Positive   ANTIBODY SCREEN Negative         Brief Urine Lab Results       None          Microbiology Results (last 10 days)       Procedure Component Value - Date/Time    Body Fluid Culture - Body Fluid, Pericardium [[id number]] Collected: 04/11/25 1705    Lab Status: Final result Specimen: Body Fluid from Pericardium Updated: 04/14/25 0758     Body Fluid Culture No growth at 3 days     Gram Stain No WBCs or organisms seen    AFB Culture - Body Fluid, Pericardium [[id number]] Collected: 04/11/25 1705    Lab Status: Preliminary result Specimen: Body Fluid from Pericardium Updated: 04/18/25  1800     AFB Culture No AFB isolated at 1 week     AFB Stain No acid fast bacilli seen on concentrated smear            IR Insert Tunneled CV Catheter Without Port 5 Plus  Result Date: 4/16/2025  PROCEDURE: Conversion of non-tunneled to tunneled central venous catheter  Procedural Personnel Attending physician(s): Benigno Mcmullen   Pre-procedure diagnosis: ESRD on HD Post-procedure diagnosis: Same  Complications: No immediate complications.       Conversion of right-sided external jugular non-tunneled central venous catheter for a tunneled dialysis catheter, with tip in the expected location of the cavoatrial junction.  Plan:  The catheter may be used immediately. _______________________________________________________________  PROCEDURE SUMMARY: - Temporary central venous catheter removal - Tunneled central venous catheter insertion with fluoroscopic guidance - Additional procedure(s): None  PROCEDURE DETAILS:  Pre-procedure Consent: Informed consent for the procedure including risks, benefits and alternatives was obtained and time-out was performed prior to the procedure. Preparation (MIPS): The site was prepared and draped using all elements of maximal sterile barrier technique including sterile gloves, sterile gown, cap, mask, large sterile sheet, sterile ultrasound probe cover, hand hygiene and cutaneous antisepsis with 2% chlorhexidine.  Anesthesia/sedation Level of anesthesia/sedation: Moderate sedation (conscious sedation) Anesthesia/sedation administered by: Independent trained observer under attending supervision with continuous monitoring of the patient?s level of consciousness and physiologic status Total intra-service sedation time (minutes): 20  Catheter exchange Local anesthesia was administered. A wire was passed through the indwelling central venous catheter and into the central veins. The catheter was removed, and a peel-away sheath was placed. An incision was made near the venous access site and  the catheter was tunneled subcutaneously to the venous access site. The catheter was advanced via a peel-away sheath into the vein under fluoroscopic guidance. Catheter tip location was fluoroscopically verified and a permanent image was stored. Catheter placed: [employer] Catheter size (Wolof): 15.5 Wolof Catheter flush: Heparin (1000 units/mL)  Closure The access site was closed and a sterile bandage was applied. Access site closure technique: Tissue adhesive Catheter securement technique: Non-absorbable suture  Radiation Dose Fluoroscopy time (minutes): 0.2 Reference air kerma (mGy): 0.7 Kerma area product (uGy-cm2): 11.87  Additional Details Additional description of procedure: None Equipment details: None Specimens removed: Temporary central venous catheter Estimated blood loss (mL): Less than 10   4/16/2025 5:21 PM by Benigno Mcmullen MD on Workstation: [id number]      IR Angioplasty AV Fistula / Graft Arterial  Result Date: 4/14/2025  PROCEDURE: Arteriovenous graft declot  Procedural Personnel Attending physician(s): Benigno Mcmullen Fellow physician(s): None Resident physician(s): None Advanced practice provider(s): None  Pre-procedure diagnosis: ESRD on HD with a clotted left upper extremity graft Post-procedure diagnosis: Same  Complications: No immediate complications.       Left upper extremity graft status post declot with a large amount of chronic fibrinous appearing clot. A distal venous extravasation was noted after balloon angioplasty. A 10 mm x 10 cm covered stent was deployed. Repeat venography demonstrated a satisfactory result. Despite numerous attempts the flow through the graft was generally poor. Given history of prior intervention, recommend conversion of existing right jugular collateral nontunneled central venous catheter to tunneled central venous access and follow-up with the patient's already scheduled appointment with a vascular surgeon for graft/fistula revision. I discussed the  results of this procedure with the patient's nephrologist at the conclusion of the procedure.  Plan:  Use nontunneled hemodialysis catheter for dialysis at the present time. _______________________________________________________________  PROCEDURE SUMMARY: - Access of dialysis graft with ultrasound guidance - Dialysis graft declot - Additional procedure(s): None  PROCEDURE DETAILS:  Pre-procedure Consent: Informed consent for the procedure including risks, benefits and alternatives was obtained and time-out was performed prior to the procedure. Preparation: The site was prepared and draped using maximal sterile barrier technique including cutaneous antisepsis.  Anesthesia/sedation Level of anesthesia/sedation: Moderate sedation (conscious sedation) Anesthesia/sedation administered by: Independent trained observer under attending supervision with continuous monitoring of the patient?s level of consciousness and physiologic status Total intra-service sedation time (minutes): 62  Initial dialysis graft evaluation Dialysis graft side: Left Dialysis graft type: Upper arm graft Initial graft palpation: No pulse, no thrill  Access Local anesthesia was administered. The dialysis graft was accessed and a 7 Guyanese sheath was placed. Access technique: Micropuncture set with 21 gauge needle Access direction: Toward venous anastomosis  Initial angiography Initial angiography of the central veins was performed. Findings: Patent central vasculature. Within the patient's upper extremity extensive clot is seen from the level of the AV anastomosis centrally to the level of the axillary vein.  Angioplasty Angioplasty of the venous dialysis circuit was performed. Angioplasty balloon(s): 8 mm x 40 mm conquest balloon.  Mechanical or aspiration thrombectomy Thrombectomy of the venous circuit was performed. Thrombectomy device: Penumbra  Stent placement Stent placement was performed within the proximal venous circuit due to contrast  extravasation at the conclusion of initial angioplasty with 8 mm x 40 mm conquest balloon. Stent placed: [place]-Andriy Viabahn Stent type: Covered Stent diameter (mm): 10 Stent length (mm): 100 Balloon dilation of stent (mm): 10 mm  Final angiography Final angiography was performed. Findings: Patent venous circuit. Final graft palpation: Pulse but no thrill  Closure The sheath was removed and hemostasis was achieved with suture. A sterile dressing was applied.  Contrast Contrast agent: Visipaque 320 Contrast volume (mL): 50  Radiation Dose Fluoroscopy time (minutes): 9.5 Reference air kerma (mGy): 23.4 Kerma area product (uGy-cm2): 946.86  Additional Details Additional description of procedure: None Equipment details: None Specimens removed: None Estimated blood loss (mL): 25    4/14/2025 5:14 PM by Benigno Mcmullen MD on Workstation: [id number]      [email or web address] NON-NADEEN TEMP DIALYSIS ACC  Result Date: 4/14/2025  PROCEDURE: Non-tunneled central venous catheter placement  Procedural Personnel Attending physician(s): Benigno Mcmullen  Pre-procedure diagnosis: ESRD on HD. No dialysis access. Post-procedure diagnosis: Same  Complications: No immediate complications.       Insertion of right-sided non-tunneled dual-lumen temporary dialysis catheter, with tip in the expected location of the cavoatrial junction.  Plan:  The catheter may be used immediately. _______________________________________________________________  PROCEDURE SUMMARY: - Venous access with ultrasound guidance - Non-tunneled central venous catheter insertion with fluoroscopic guidance - Additional procedure(s): None  PROCEDURE DETAILS:  Pre-procedure Consent: Informed consent for the procedure including risks, benefits and alternatives was obtained and time-out was performed prior to the procedure. Preparation (MIPS): The site was prepared and draped using all elements of maximal sterile barrier technique including sterile gloves, sterile gown, cap, mask, large  sterile sheet, sterile ultrasound probe cover, hand hygiene and cutaneous antisepsis with 2% chlorhexidine. Medical reason for site preparation exception (MIPS): Not applicable  Access Local anesthesia was administered. The vessel was sonographically evaluated and determined to be patent. Real time ultrasound was used to visualize needle entry into the vessel and a permanent image was stored. Vein accessed: Jugular collateral vein Access technique: Micropuncture set with 21 gauge needle  Catheter placement The access site was dilated and the catheter was placed into the vein over a wire  under fluoroscopic guidance.  The catheter tip location was fluoroscopically verified and a permanent image was stored.. A sterile dressing was applied. Catheter placed: [facility] Catheter size (Chinese): 14 Catheter length (cm): 15 cm Catheter flush: Heparin (1000 units/mL) Catheter securement technique: Non-absorbable suture  Radiation Dose Fluoroscopy time (minutes): 1 Reference air kerma (mGy): 7.8 Kerma area product (uGy-cm2): 133.99  Additional Details Additional description of procedure: None Equipment details: None Specimens removed: None Estimated blood loss (mL): Less than 10  4/14/2025 5:03 PM by Benigno Mcmullen MD on Workstation: [id number]      XR Chest 1 View  Result Date: 4/13/2025  XR CHEST 1 VW Date of Exam: 4/13/2025 1:12 AM EDT Indication: Pericardial effusion Comparison: April 12, 2025 Findings: The heart is enlarged. It has a globular configuration which could relate to reported pericardial effusion noted on more recent CT. There is lack of definition margin left hemidiaphragm. An underlying pleural effusion could not be excluded. There may be a small right pleural effusion as well.     Impression: 1.Cardiomegaly with globular configuration which could relate to pericardial effusion noted on more recent CT. 2.Bilateral pleural effusions greater on the left not excluded. There may be some bibasilar atelectasis.  Electronically Signed: Jorge Nickerson MD  4/13/2025 7:59 AM EDT  Workstation ID: [id number]    XR Chest 1 View  Result Date: 4/12/2025  XR CHEST 1 VW Date of Exam: 4/12/2025 3:13 AM EDT Indication: Follow-up pericardial effusion Comparison: Chest CT 4/11/2025, chest x-ray 2/7/2023 Findings: Cardiac silhouette remains enlarged compatible with large pericardial effusion seen on prior chest CT. There is probably some mild left basilar atelectasis. The lungs are otherwise clear. Pulmonary vascularity is normal. No pneumothorax.     Stable enlargement of the cardiac silhouette with some mild left basilar atelectasis. Electronically Signed: Ad Taylor MD  4/12/2025 5:41 AM EDT  Workstation ID: [id number]    Cardiac Catheterization/Vascular Study  Result Date: 4/11/2025  Procedure: Pericardiocentesis : Braxton Canseco MD, [facility], [facility] Indication: Large pericardial effusion with pericardial tamponade. Post procedure diagnosis: The same Anesthesia: Intravenous conscious sedation with Versed/fentanyl.  Local lidocaine. Complications: None Estimated blood loss: Less than 5 mL Brief history: 64-year-old male patient with ESRD on dialysis, became hypotensive during dialysis and had nausea vomiting diarrhea fatigue and dyspnea.  Was transferred to ED following volume resuscitation.  Diagnosed with massive pericardial effusion by CT scan which was further confirmed by echo that showed features of tamponade. Procedure description: After informed consent the patient was brought to the lab in a fasting condition.  Anterior chest wall was prepped and draped in standard sterile fashion.  Lidocaine was injected for local anesthesia.  The pericardial space was accessed with a 14-gauge needle through the subxiphoid approach, free flow of hemorrhagic effusion was noted.  A guidewire was advanced which followed the control of the pericardium.  Skin and subcutaneous tissues were dilated and a 5 Albanian pigtail catheter was advanced.  More  than 900 mL of hemorrhagic pericardial fluid was aspirated and sent to the lab for studies.  Throughout the procedure echocardiogram was monitored.  Initial images reveal large circumferential pericardial effusion with tamponade.  Final images revealed no significant residual effusion.  Finally the pericardial drain was left in place and attached to a suction.  It was secured to the skin with silk sutures and sterile dressing was applied.  The patient was transferred to the ICU in a stable condition Complications: There were no.  Acute procedure related complications.    CT Angiogram Chest  Result Date: 4/11/2025  CT ANGIOGRAM CHEST Date of Exam: 4/11/2025 1:26 PM EDT Indication: eval pericardial effusion/aorta. Comparison: Chest x-ray 2/7/2023 and CT abdomen and pelvis 4/11/2025 Technique: CTA of the chest was performed before and after the uneventful intravenous administration of 75 mL Isovue-370. Reconstructed coronal and sagittal images were also obtained. In addition, a 3-D volume rendered image was created for interpretation. Automated exposure control and iterative reconstruction methods were used. Findings: PULMONARY VASCULATURE: Pulmonary arteries are widely patent without evidence of embolus.Main pulmonary artery is normal in size. No evidence of right heart strain. MEDIASTINUM: The heart is prominent in size due to a large pericardial effusion measuring up to 4.1 cm in maximum diameter. The effusion is uniform throughout measuring slightly greater than water density. The thoracic aorta is normal in caliber measuring 2.9 cm without evidence for dissection. CORONARY ARTERIES: Moderate calcified atherosclerotic disease. LUNGS: Evaluation of lung parenchyma demonstrates some atelectasis within the posterior left upper lung and lingula. There is linear scarring and/or atelectasis at the left lung base. There is a 3 mm juxta fissural nodule noted (image 57 of series 7). PLEURAL SPACE: No effusion, mass, nor  pneumothorax. LYMPH NODES: There are no pathologically enlarged lymph nodes. UPPER ABDOMEN: Calcified gallstones are noted. OSSEOUS STRUCTURES: Appropriate for age with no acute process identified.     Impression: 1. Large pericardial effusion measuring up to 4.1 cm. Cardiology consultation recommended. 2. 3 mm juxta fissural nodule in the right middle lung.In a low-risk patient with a solid nodule <6 mm, recommend no follow-up. In a high-risk patient, CT at 12 months is optional with stronger consideration if there is suspicious nodule morphology and/or upper lobe location. Electronically Signed: Celsa Jacques MD  4/11/2025 1:49 PM EDT  Workstation ID: [id number]    CT Head Without Contrast  Result Date: 4/11/2025  CT HEAD WO CONTRAST Date of Exam: 4/11/2025 1:26 PM EDT Indication: headache. Comparison: None available. Technique: Axial CT images were obtained of the head without contrast administration.  Automated exposure control and iterative construction methods were used. Findings: Gray-white differentiation is maintained and there is no evidence of intracranial hemorrhage, mass or mass effect. The ventricles are normal in size and configuration. The orbits appear normal. There is some mucosal thickening as well as aerated secretions noted within the left maxillary sinus. The calvarium is intact.     Impression: No acute intracranial abnormality. Findings which could reflect left maxillary sinusitis in the correct clinical setting. Electronically Signed: Jesus Denny MD  4/11/2025 1:41 PM EDT  Workstation ID: [id number]    CT Abdomen Pelvis Without Contrast  Result Date: 4/11/2025  CT ABDOMEN PELVIS WO CONTRAST Date of Exam: 4/11/2025 11:23 AM EDT Indication: abd pain, N/V/D. Comparison: 5/26/2023 Technique: Axial CT images were obtained of the abdomen and pelvis without the administration of contrast. Reconstructed coronal and sagittal images were also obtained. Automated exposure control and iterative  construction methods were used. Findings: LUNG BASES: There is a large pericardial effusion measuring up to 4 cm posteriorly. Cardiology consultation recommended. No evidence of airspace disease. There is linear basal atelectasis/parenchymal scarring. LIVER:  Unremarkable parenchyma without focal lesion. BILIARY/GALLBLADDER: There our calcified gallstones SPLEEN:  Unremarkable PANCREAS:  Unremarkable ADRENAL:  Unremarkable KIDNEYS: Bilateral renal cortical atrophy with no solid mass identified. No obstruction.  No calculus identified. GASTROINTESTINAL/MESENTERY:  No evidence of obstruction nor acute inflammation.  The appendix remains prominent in size measuring 9 mm maximum diameter. However there is gas in the appendix and there are no surrounding inflammatory changes and this is likely a normal finding for patient. There is similar fairly diffuse colonic fatty mural infiltration. AORTA/IVC:  Normal caliber. RETROPERITONEUM/LYMPH NODES:  Unremarkable REPRODUCTIVE: Large prostate gland impresses upon the urinary bladder base. BLADDER: There is generalized urinary bladder mural thickening, similar to prior examination. May be related to neurogenic bladder, chronic bladder outlet obstruction, and cystitis. OSSEUS STRUCTURES:  Typical for age with no acute process identified.     Impression: 1.Large pericardial effusion measuring up to 4 cm posteriorly. Cardiology consultation recommended. 2.Cholelithiasis. 3.Bilateral renal cortical atrophy. 4.Large prostate gland. 5.Generalized urinary bladder mural thickening, similar to prior examination. May be related to neurogenic bladder, chronic bladder outlet obstruction, and cystitis. 6.The appendix remains prominent in size measuring 9 mm maximum diameter. However there is gas in the appendix and there are no surrounding inflammatory changes and this is likely a normal finding for patient. Electronically Signed: Manuel Jacques MD  4/11/2025 11:35 AM EDT  Workstation ID:  [id number]      Results for orders placed during the hospital encounter of 02/07/23    Duplex Vein Mapping Study Upper Extremity - Left CAR    Interpretation Summary    Inadequate/small left cephalic vein    Adequate/good basilic vein.    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      Results for orders placed during the hospital encounter of 02/07/23    Duplex Vein Mapping Study Upper Extremity - Left CAR    Interpretation Summary    Inadequate/small left cephalic vein    Adequate/good basilic vein.    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      Results for orders placed during the hospital encounter of 04/11/25    Adult Transthoracic Echo Limited W/ Cont if Necessary Per Protocol    Interpretation Summary    Left ventricular systolic function is normal. Estimated left ventricular EF = 65%    Left ventricular wall thickness is consistent with mild concentric hypertrophy.    No significant pericardial effusion is noted.      Plan for Follow-up of Pending Labs/Results: Will contact patient if significant results return  Pending Labs       Order Current Status    AFB Culture - Body Fluid, Pericardium Preliminary result          Discharge Details        Discharge Medications        New Medications        Instructions Start Date   colchicine 0.6 MG tablet   0.6 mg, Oral, Daily   Start Date: April 20, 2025     mirtazapine 7.5 MG tablet  Commonly known as: REMERON   7.5 mg, Oral, Nightly             Continue These Medications        Instructions Start Date   acetaminophen 325 MG tablet  Commonly known as: TYLENOL   650 mg, Oral, Every 4 Hours PRN      KAYLEIGH-LASHON PO   1 tablet, Daily      sennosides-docusate 8.6-50 MG per tablet  Commonly known as: PERICOLACE   2 tablets, Oral, 2 Times Daily PRN      sevelamer 800 MG tablet  Commonly known as: RENVELA   800 mg, Oral, 3 Times Daily With Meals      simethicone 80 MG chewable  tablet  Commonly known as: MYLICON   Chew and swallow 1 tablet by mouth 4 (Four) Times a Day As Needed for Flatulence.             Stop These Medications      pantoprazole 40 MG EC tablet  Commonly known as: PROTONIX              No Known Allergies      Discharge Disposition:  Home or Self Care    Diet:  Hospital:  Diet Order   Procedures    Diet: Renal; Low Potassium; Fluid Consistency: Thin (IDDSI 0)            Activity:  As tolerated    Restrictions or Other Recommendations:  none       CODE STATUS:    Code Status and Medical Interventions: CPR (Attempt to Resuscitate); Full Support   Ordered at: 04/11/25 1521     Code Status (Patient has no pulse and is not breathing):    CPR (Attempt to Resuscitate)     Medical Interventions (Patient has pulse or is breathing):    Full Support       No future appointments.          Zofia Steinberg MD  04/19/25      Time Spent on Discharge:  I spent  35  minutes on this discharge activity which included: face-to-face encounter with the patient, reviewing the data in the system, coordination of the care with the nursing staff as well as consultants, documentation, and entering orders.           Electronically signed by Zofia Steinberg MD at 04/19/25 1528

## 2025-04-20 ENCOUNTER — TRANSITIONAL CARE MANAGEMENT TELEPHONE ENCOUNTER (OUTPATIENT)
Dept: CALL CENTER | Facility: HOSPITAL | Age: 65
End: 2025-04-20
Payer: MEDICARE

## 2025-04-20 NOTE — OUTREACH NOTE
Call Center TCM Note      Flowsheet Row Responses   [facility] patient discharged from? [place]   Does the patient have one of the following disease processes/diagnoses(primary or secondary)? Other   TCM attempt successful? Yes   Call start time 1743   Call end time 1752   Is patient permission given to speak with other caregiver? Yes   Person spoke with today (if not patient) and relationship Patient and son, Manny.   Meds reviewed with patient/caregiver? Yes   Is the patient having any side effects they believe may be caused by any medication additions or changes? No   Does the patient have all medications ordered at discharge? Yes   Is the patient taking all medications as directed (includes completed medication regime)? Yes   Comments Dialysis M-W-F. Patient prefers to contact PCP office to schedule hospital f/u appt at his convenience.   Does the patient have an appointment with their PCP within 7-14 days of discharge? No   Nursing Interventions Patient declined scheduling/rescheduling appointment at this time, Routed TCM call to PCP office   Has home health visited the patient within 72 hours of discharge? N/A   Psychosocial issues? No   Did the patient receive a copy of their discharge instructions? Yes   Nursing interventions Reviewed instructions with patient   What is the patient's perception of their health status since discharge? Same   Is the patient/caregiver able to teach back signs and symptoms related to disease process for when to call PCP? Yes   Is the patient/caregiver able to teach back signs and symptoms related to disease process for when to call 911? Yes   Is the patient/caregiver able to teach back the hierarchy of who to call/visit for symptoms/problems? PCP, Specialist, Home health nurse, Urgent Care, ED, 911 Yes   If the patient is a current smoker, are they able to teach back resources for cessation? Not a smoker   TCM call completed? Yes   Wrap up additional comments Patient  [facility] is about the same, and will be attending dialysis tomorrow. Patient and son, Manny, voiced a complaint. Information escalated to appropriate leadership. Patient has already been in contact with management. TCM complete.   Call end time 1752   Would this patient benefit from a Referral to [facility] Social Work? No   Is the patient interested in additional calls from an ambulatory ? No            Meghann HINES - Registered Nurse    4/20/2025, 17:53 EDT

## 2025-04-25 LAB
MYCOBACTERIUM SPEC CULT: NORMAL
NIGHT BLUE STAIN TISS: NORMAL
QT INTERVAL: 474 MS
QTC INTERVAL: 635 MS

## 2025-04-29 ENCOUNTER — READMISSION MANAGEMENT (OUTPATIENT)
Dept: CALL CENTER | Facility: HOSPITAL | Age: 65
End: 2025-04-29
Payer: MEDICARE

## 2025-04-29 NOTE — OUTREACH NOTE
Medical Week 2 Survey      Flowsheet Row Responses   [facility] patient discharged from? [place]   Does the patient have one of the following disease processes/diagnoses(primary or secondary)? Other   Week 2 attempt successful? No  [[place] - [id number] [place]]   Unsuccessful attempts Attempt 1            Katherine HINES - Registered Nurse

## 2025-05-02 LAB
MYCOBACTERIUM SPEC CULT: NORMAL
NIGHT BLUE STAIN TISS: NORMAL

## 2025-05-06 ENCOUNTER — READMISSION MANAGEMENT (OUTPATIENT)
Dept: CALL CENTER | Facility: HOSPITAL | Age: 65
End: 2025-05-06
Payer: MEDICARE

## 2025-05-06 NOTE — OUTREACH NOTE
Medical Week 2 Survey      Flowsheet Row Responses   [facility] patient discharged from? [place]   Does the patient have one of the following disease processes/diagnoses(primary or secondary)? Other   Call start time 1117   Discharge diagnosis Pericardial effusion   Call end time 1119   Meds reviewed with patient/caregiver? Yes   Is the patient having any side effects they believe may be caused by any medication additions or changes? No   Does the patient have all medications ordered at discharge? Yes   Is the patient taking all medications as directed (includes completed medication regime)? Yes   Has home health visited the patient within 72 hours of discharge? N/A   Psychosocial issues? No   Did the patient receive a copy of their discharge instructions? Yes   Nursing interventions Reviewed instructions with patient   What is the patient's perception of their health status since discharge? Returned to baseline/stable  [pt reports he is doing better, discusses return to work.  Denies chest pain and]   Is the patient/caregiver able to teach back signs and symptoms related to disease process for when to call PCP? Yes   Is the patient/caregiver able to teach back signs and symptoms related to disease process for when to call 911? Yes   Call end time 1119            ALFRED LEMUS - Registered Nurse

## 2025-05-09 LAB
MYCOBACTERIUM SPEC CULT: NORMAL
NIGHT BLUE STAIN TISS: NORMAL

## 2025-05-16 LAB
MYCOBACTERIUM SPEC CULT: NORMAL
NIGHT BLUE STAIN TISS: NORMAL

## 2025-05-23 LAB
MYCOBACTERIUM SPEC CULT: NORMAL
NIGHT BLUE STAIN TISS: NORMAL

## (undated) DEVICE — CATH URETRL FLXITP POLLACK STD 5F 70CM

## (undated) DEVICE — TRY DRN PNEUMO WAYNE ST 14F 19SD/PRT 29CM W/SUT

## (undated) DEVICE — DRAPE,CHEST,FENES,15X10,STERIL: Brand: MEDLINE

## (undated) DEVICE — DRAINBAG,ANTI-REFLUX TOWER,L/F,2000ML,LL: Brand: MEDLINE

## (undated) DEVICE — KT EVAC WND 3SPRNG 400CC W/DRN RND 3/16IN

## (undated) DEVICE — CATH FOLEY LUBRICATH 3WY 22F 30CC

## (undated) DEVICE — KT PERICARDIOCENT .035 STR 8.3F

## (undated) DEVICE — CATH OMNI FLUSH 5FR

## (undated) DEVICE — PK CYSTO-TUR BASIC 10

## (undated) DEVICE — SYRINGE,TOOMEY,IRRIGATION,70CC,STERILE: Brand: MEDLINE

## (undated) DEVICE — GLV SURG BIOGEL LTX PF 8

## (undated) DEVICE — ADULT, W/LG. BACK PAD, RADIOTRANSPARENT ELEMENT AND LEAD WIRE COMPATIBLE W/: Brand: DEFIBRILLATION ELECTRODES

## (undated) DEVICE — CUTTING LOOP, BIPOLAR, 0.30MM, 24/26 FR.: Brand: N.A.

## (undated) DEVICE — PINNACLE INTRODUCER SHEATH: Brand: PINNACLE

## (undated) DEVICE — GOWN,NON-REINFORCED,SIRUS,SET IN SLV,XXL: Brand: MEDLINE

## (undated) DEVICE — INTRAOPERATIVE COVER KIT, 10 PACK: Brand: SITE-RITE

## (undated) DEVICE — GLV SURG BIOGEL LTX PF 7

## (undated) DEVICE — PLUG,CATHETER,DRAINAGE PROTECTOR,TUBE: Brand: MEDLINE

## (undated) DEVICE — 24FR BIPLR COAG ELECTRDE, STERILE: Brand: N.A.

## (undated) DEVICE — CATH FOL BARDEX HEMATURIA 3WY 22F 30CC

## (undated) DEVICE — BLANKT WARM UPPR/BDY ARM/OUT 57X196CM

## (undated) DEVICE — EVAC BLDR UROVAC W ADAPT

## (undated) DEVICE — Device: Brand: MEDEX

## (undated) DEVICE — CUTTING LOOP, BIPOLAR, 24/26 FR.: Brand: N.A.

## (undated) DEVICE — GLV SURG BIOGEL LTX PF 7 1/2

## (undated) DEVICE — SYRINGE,PISTON,IRRIGATION,60ML,STERILE: Brand: MEDLINE